# Patient Record
Sex: FEMALE | Race: BLACK OR AFRICAN AMERICAN | NOT HISPANIC OR LATINO | Employment: OTHER | URBAN - METROPOLITAN AREA
[De-identification: names, ages, dates, MRNs, and addresses within clinical notes are randomized per-mention and may not be internally consistent; named-entity substitution may affect disease eponyms.]

---

## 2017-01-04 RX ORDER — ESTRADIOL 0.1 MG/G
1 CREAM VAGINAL DAILY
Qty: 45 G | Refills: 1 | Status: SHIPPED | OUTPATIENT
Start: 2017-01-04 | End: 2018-06-26

## 2017-01-04 NOTE — TELEPHONE ENCOUNTER
----- Message from Kristen Denise sent at 1/3/2017  2:16 PM CST -----  Contact: pt  Needs refill on Estyrace sent to the [facility] in [place].  Please call pt at [phone] to confirm

## 2017-01-18 ENCOUNTER — OFFICE VISIT (OUTPATIENT)
Dept: URGENT CARE | Facility: CLINIC | Age: 67
End: 2017-01-18
Payer: COMMERCIAL

## 2017-01-18 VITALS
DIASTOLIC BLOOD PRESSURE: 66 MMHG | OXYGEN SATURATION: 98 % | HEART RATE: 77 BPM | BODY MASS INDEX: 29.72 KG/M2 | WEIGHT: 178.38 LBS | TEMPERATURE: 98 F | SYSTOLIC BLOOD PRESSURE: 140 MMHG | HEIGHT: 65 IN | RESPIRATION RATE: 20 BRPM

## 2017-01-18 DIAGNOSIS — J32.9 SINOBRONCHITIS: Primary | ICD-10-CM

## 2017-01-18 DIAGNOSIS — J40 SINOBRONCHITIS: Primary | ICD-10-CM

## 2017-01-18 PROCEDURE — 99999 PR PBB SHADOW E&M-EST. PATIENT-LVL III: CPT | Mod: PBBFAC,,, | Performed by: PHYSICIAN ASSISTANT

## 2017-01-18 PROCEDURE — 1159F MED LIST DOCD IN RCRD: CPT | Mod: S$GLB,,, | Performed by: PHYSICIAN ASSISTANT

## 2017-01-18 PROCEDURE — 3078F DIAST BP <80 MM HG: CPT | Mod: S$GLB,,, | Performed by: PHYSICIAN ASSISTANT

## 2017-01-18 PROCEDURE — 99214 OFFICE O/P EST MOD 30 MIN: CPT | Mod: 25,S$GLB,, | Performed by: PHYSICIAN ASSISTANT

## 2017-01-18 PROCEDURE — 3077F SYST BP >= 140 MM HG: CPT | Mod: S$GLB,,, | Performed by: PHYSICIAN ASSISTANT

## 2017-01-18 PROCEDURE — 96372 THER/PROPH/DIAG INJ SC/IM: CPT | Mod: S$GLB,,, | Performed by: PHYSICIAN ASSISTANT

## 2017-01-18 RX ORDER — AZITHROMYCIN 250 MG/1
TABLET, FILM COATED ORAL
Qty: 6 TABLET | Refills: 0 | Status: SHIPPED | OUTPATIENT
Start: 2017-01-18 | End: 2017-04-06

## 2017-01-18 RX ORDER — CODEINE PHOSPHATE AND GUAIFENESIN 10; 100 MG/5ML; MG/5ML
10 SOLUTION ORAL EVERY 6 HOURS PRN
Qty: 120 ML | Refills: 0 | Status: SHIPPED | OUTPATIENT
Start: 2017-01-18 | End: 2017-04-06

## 2017-01-18 RX ORDER — BENZONATATE 100 MG/1
200 CAPSULE ORAL 3 TIMES DAILY PRN
Qty: 60 CAPSULE | Refills: 0 | Status: SHIPPED | OUTPATIENT
Start: 2017-01-18 | End: 2017-01-28

## 2017-01-18 RX ORDER — BETAMETHASONE SODIUM PHOSPHATE AND BETAMETHASONE ACETATE 3; 3 MG/ML; MG/ML
6 INJECTION, SUSPENSION INTRA-ARTICULAR; INTRALESIONAL; INTRAMUSCULAR; SOFT TISSUE
Status: COMPLETED | OUTPATIENT
Start: 2017-01-18 | End: 2017-01-18

## 2017-01-18 RX ADMIN — BETAMETHASONE SODIUM PHOSPHATE AND BETAMETHASONE ACETATE 6 MG: 3; 3 INJECTION, SUSPENSION INTRA-ARTICULAR; INTRALESIONAL; INTRAMUSCULAR; SOFT TISSUE at 04:01

## 2017-01-18 NOTE — PATIENT INSTRUCTIONS
Bronchitis, Antibiotic Treatment (Adult)    Bronchitis is an infection of the air passages (bronchial tubes) in your lungs. It often occurs when you have a cold. This illness is contagious during the first few days and is spread through the air by coughing and sneezing, or by direct contact (touching the sick person and then touching your own eyes, nose, or mouth).  Symptoms of bronchitis include cough with mucus (phlegm) and low-grade fever. Bronchitis usually lasts 7 to 14 days. Mild cases can be treated with simple home remedies. More severe infection is treated with an antibiotic.  Home care  Follow these guidelines when caring for yourself at home:  · If your symptoms are severe, rest at home for the first 2 to 3 days. When you go back to your usual activities, don't let yourself get too tired.  · Do not smoke. Also avoid being exposed to secondhand smoke.  · You may use over-the-counter medicines to control fever or pain, unless another medicine was prescribed. (Note: If you have chronic liver or kidney disease or have ever had a stomach ulcer or gastrointestinal bleeding, talk with your healthcare provider before using these medicines. Also talk to your provider if you are taking medicine to prevent blood clots.) Aspirin should never be given to anyone younger than 18 years of age who is ill with a viral infection or fever. It may cause severe liver or brain damage.  · Your appetite may be poor, so a light diet is fine. Avoid dehydration by drinking 6 to 8 glasses of fluids per day (such as water, soft drinks, sports drinks, juices, tea, or soup). Extra fluids will help loosen secretions in the nose and lungs.  · Over-the-counter cough, cold, and sore-throat medicines will not shorten the length of the illness, but they may be helpful to reduce symptoms. (Note: Do not use decongestants if you have high blood pressure.)  · Finish all antibiotic medicine. Do this even if you are feeling better after only a  few days.  Follow-up care  Follow up with your healthcare provider, or as advised. If you had an X-ray or ECG (electrocardiogram), a specialist will review it. You will be notified of any new findings that may affect your care.  Note: If you are age 65 or older, or if you have a chronic lung disease or condition that affects your immune system, or you smoke, talk to your healthcare provider about having pneumococcal vaccinations and a yearly influenza vaccination (flu shot).  When to seek medical advice  Call your healthcare provider right away if any of these occur:  · Fever of 100.4°F (38°C) or higher  · Coughing up increased amounts of colored sputum  · Weakness, drowsiness, headache, facial pain, ear pain, or a stiff neck  Call 911, or get immediate medical care  Contact emergency services right away if any of these occur.  · Coughing up blood  · Worsening weakness, drowsiness, headache, or stiff neck  · Trouble breathing, wheezing, or pain with breathing  © 4512-0814 [employer] [address] [place] [address]. All rights reserved. This information is not intended as a substitute for professional medical care. Always follow your healthcare professional's instructions.      Complete antibiotic course.  Rest  Drink plenty of clear fluids--at least 64 ounces of water/juice  Normal saline nasal wash (example Ocean spray) to irrigate sinuses and for congestion/runny nose  Ibuprofen for fever, headache and body aches  Tessalon pearls to help with cough during daytime  Cough syrup to help with cough at night  Cool mist humidifier/vaporizer  Warm salt water gargles for throat comfort  Chloraseptic spray or lozenges for throat comfort  Warm tea with honey  Practice good handwashing    See your PCP or go to ER if symptoms worsen or fail to improve with treatment.

## 2017-01-18 NOTE — PROGRESS NOTES
"Subjective:       Patient ID: Smitha Salinas is a 66 y.o. female.    Chief Complaint: Cough and Sinus Problem    Cough   This is a new problem. The current episode started in the past 7 days (5 days ago). The problem has been gradually worsening. The problem occurs constantly. The cough is productive of sputum (clear). Associated symptoms include headaches (frontal sinus headache), nasal congestion, postnasal drip and rhinorrhea. Pertinent negatives include no chest pain, chills, ear congestion, ear pain, fever, sore throat, shortness of breath or wheezing. The symptoms are aggravated by lying down. Treatments tried: Mucinex, Multisymptom cold/flu, Robitussin. The treatment provided mild relief. Her past medical history is significant for bronchitis and pneumonia. There is no history of asthma. has RA on Actemra     Review of Systems   Constitutional: Positive for fatigue. Negative for chills, fever and unexpected weight change.   HENT: Positive for congestion, postnasal drip, rhinorrhea and sinus pressure. Negative for ear discharge, ear pain, sneezing, sore throat, trouble swallowing and voice change.    Eyes: Negative for pain and discharge.   Respiratory: Positive for cough. Negative for shortness of breath and wheezing.    Cardiovascular: Negative for chest pain and leg swelling.   Gastrointestinal: Negative for abdominal pain, nausea and vomiting.   Neurological: Positive for headaches (frontal sinus headache).       Objective:      Visit Vitals    BP (!) 140/66 (BP Location: Right arm, Patient Position: Sitting, BP Method: Manual)    Pulse 77    Temp 97.5 °F (36.4 °C) (Tympanic)    Resp 20    Ht 5' 5" (1.651 m)    Wt 80.9 kg (178 lb 5.6 oz)    SpO2 98%    BMI 29.68 kg/m2     Physical Exam   Constitutional: She is oriented to person, place, and time. She appears well-developed and well-nourished. No distress.   HENT:   Head: Normocephalic and atraumatic.   Right Ear: External ear normal.   Left Ear: " External ear normal.   Nose: Mucosal edema and rhinorrhea present. Right sinus exhibits frontal sinus tenderness. Left sinus exhibits frontal sinus tenderness.   Mouth/Throat: Oropharynx is clear and moist. No oropharyngeal exudate.   Eyes: Conjunctivae and EOM are normal. Pupils are equal, round, and reactive to light. Right eye exhibits no discharge. Left eye exhibits no discharge. No scleral icterus.   Neck: Normal range of motion. Neck supple.   Cardiovascular: Normal rate, regular rhythm, normal heart sounds and intact distal pulses.  Exam reveals no gallop and no friction rub.    No murmur heard.  Pulmonary/Chest: Effort normal and breath sounds normal. No stridor. No respiratory distress. She has no wheezes. She has no rales. She exhibits no tenderness.   Lymphadenopathy:     She has no cervical adenopathy.   Neurological: She is alert and oriented to person, place, and time. Coordination normal.   Skin: Skin is warm and dry. No rash noted. She is not diaphoretic. No erythema. No pallor.   Nursing note and vitals reviewed.      Assessment:       1. Sinobronchitis        Plan:       Sinobronchitis  -     guaifenesin-codeine 100-10 mg/5 ml (TUSSI-ORGANIDIN NR)  mg/5 mL syrup; Take 10 mLs by mouth every 6 (six) hours as needed for Cough.  Dispense: 120 mL; Refill: 0  -     betamethasone acetate-betamethasone sodium phosphate injection 6 mg; Inject 1 mL (6 mg total) into the muscle one time.  -     azithromycin (ZITHROMAX Z-ATIF) 250 MG tablet; Take pack as directed  Dispense: 6 tablet; Refill: 0  -     benzonatate (TESSALON) 100 MG capsule; Take 2 capsules (200 mg total) by mouth 3 (three) times daily as needed for Cough.  Dispense: 60 capsule; Refill: 0    Risk and benefits of steroid therapy were reviewed in detail and include, but not limited to, elevated blood sugars, necrosis of tissue or infection of the injection site, insomnia, sweating, hyperactivity, tremors, suppression of one's own cortisone  production, delayed wound healing and GI bleeding. The patient would like to proceed with steroid treatment (injection/medication) knowing and verbally accepting the risks.      Complete antibiotic course.  Rest  Drink plenty of clear fluids--at least 64 ounces of water/juice  Normal saline nasal wash (example Ocean spray) to irrigate sinuses and for congestion/runny nose  Ibuprofen for fever, headache and body aches  Tessalon pearls to help with cough during daytime  Cough syrup to help with cough at night  Cool mist humidifier/vaporizer  Warm salt water gargles for throat comfort  Chloraseptic spray or lozenges for throat comfort  Warm tea with honey  Practice good handwashing    See your PCP or go to ER if symptoms worsen or fail to improve with treatment.       Heather Trant PA-C [facility]

## 2017-02-07 NOTE — TELEPHONE ENCOUNTER
----- Message from Edith Gant sent at 2/7/2017  1:00 PM CST -----  Contact: Pt  Pt is requesting to have a refill on Myrbetriq. Pt states that she's completely out. Pt uses [facility] at [facility]. Pt can be reached at [phone].

## 2017-04-04 ENCOUNTER — TELEPHONE (OUTPATIENT)
Dept: INTERNAL MEDICINE | Facility: CLINIC | Age: 67
End: 2017-04-04

## 2017-04-04 NOTE — TELEPHONE ENCOUNTER
----- Message from Mark Multani sent at 4/4/2017  1:29 PM CDT -----  Contact: Smitha Salinas  The pt is requesting a refill for potassium to be filled at [facility] in [facility].  The can be reached at [phone] or [phone]

## 2017-04-06 ENCOUNTER — OFFICE VISIT (OUTPATIENT)
Dept: INTERNAL MEDICINE | Facility: CLINIC | Age: 67
End: 2017-04-06
Payer: COMMERCIAL

## 2017-04-06 VITALS
HEIGHT: 65 IN | SYSTOLIC BLOOD PRESSURE: 138 MMHG | OXYGEN SATURATION: 97 % | HEART RATE: 70 BPM | DIASTOLIC BLOOD PRESSURE: 62 MMHG | WEIGHT: 183.44 LBS | TEMPERATURE: 98 F | BODY MASS INDEX: 30.56 KG/M2

## 2017-04-06 DIAGNOSIS — I10 ESSENTIAL HYPERTENSION: Primary | Chronic | ICD-10-CM

## 2017-04-06 PROCEDURE — 3075F SYST BP GE 130 - 139MM HG: CPT | Mod: S$GLB,,, | Performed by: FAMILY MEDICINE

## 2017-04-06 PROCEDURE — 99999 PR PBB SHADOW E&M-EST. PATIENT-LVL III: CPT | Mod: PBBFAC,,, | Performed by: FAMILY MEDICINE

## 2017-04-06 PROCEDURE — 3078F DIAST BP <80 MM HG: CPT | Mod: S$GLB,,, | Performed by: FAMILY MEDICINE

## 2017-04-06 PROCEDURE — 99213 OFFICE O/P EST LOW 20 MIN: CPT | Mod: S$GLB,,, | Performed by: FAMILY MEDICINE

## 2017-04-06 PROCEDURE — 1160F RVW MEDS BY RX/DR IN RCRD: CPT | Mod: S$GLB,,, | Performed by: FAMILY MEDICINE

## 2017-04-06 RX ORDER — ACYCLOVIR 400 MG/1
TABLET ORAL DAILY
COMMUNITY
End: 2019-03-05 | Stop reason: SDUPTHER

## 2017-04-06 RX ORDER — PITAVASTATIN CALCIUM 2.09 MG/1
2 TABLET, FILM COATED ORAL DAILY
Qty: 30 TABLET | Refills: 5 | Status: SHIPPED | OUTPATIENT
Start: 2017-04-06 | End: 2018-01-04

## 2017-04-06 RX ORDER — CLONIDINE HYDROCHLORIDE 0.2 MG/1
TABLET ORAL
Qty: 90 TABLET | Refills: 5 | Status: SHIPPED | OUTPATIENT
Start: 2017-04-06 | End: 2018-01-18 | Stop reason: SDUPTHER

## 2017-04-06 RX ORDER — AMLODIPINE BESYLATE 5 MG/1
5 TABLET ORAL DAILY
Qty: 30 TABLET | Refills: 5 | Status: SHIPPED | OUTPATIENT
Start: 2017-04-06 | End: 2018-02-02 | Stop reason: SDUPTHER

## 2017-04-06 RX ORDER — CYCLOBENZAPRINE HCL 10 MG
10 TABLET ORAL
Qty: 30 TABLET | Refills: 5 | Status: SHIPPED | OUTPATIENT
Start: 2017-04-06 | End: 2019-11-27 | Stop reason: SDUPTHER

## 2017-04-06 RX ORDER — POTASSIUM CHLORIDE 20 MEQ/1
20 TABLET, EXTENDED RELEASE ORAL DAILY
Qty: 60 TABLET | Refills: 5 | Status: SHIPPED | OUTPATIENT
Start: 2017-04-06 | End: 2018-03-12 | Stop reason: SDUPTHER

## 2017-04-06 NOTE — MR AVS SNAPSHOT
[facility]  [address]  [place] [address]  Phone: [phone]  Fax: [phone]                  Smitha Salinsa   2017 3:20 PM   Office Visit    Description:  Female : 1950   Provider:  Jayesh Jaramillo MD   Department:  [facility] - [facility] Medicine           Diagnoses this Visit        Comments    Essential hypertension    -  Primary            To Do List           Future Appointments        Provider Department Dept Phone    10/5/2017 2:40 PM Jayesh Jaramillo MD [facility] [phone]      Goals (5 Years of Data)     None      Follow-Up and Disposition     Return in about 6 months (around 10/6/2017).       These Medications        Disp Refills Start End    cloNIDine (CATAPRES) 0.2 MG tablet 90 tablet 2017     1/2 tab in the am, 1/2 tab at noon, 2 tabs nightly.    Pharmacy: [address] Ph #: [phone]       cyclobenzaprine (FLEXERIL) 10 MG tablet 30 tablet 2017     Take 1 tablet (10 mg total) by mouth as needed for Muscle spasms. - Oral    Pharmacy: [address] Ph #: [phone]       potassium chloride SA (K-DUR,KLOR-CON) 20 MEQ tablet 60 tablet 5 2017     Take 1 tablet (20 mEq total) by mouth once daily. 1 tablet,ER particles/crystals Oral Twice a day - Oral    Pharmacy: [address] Ph #: [phone]       amlodipine (NORVASC) 5 MG tablet 30 tablet 5 2017     Take 1 tablet (5 mg total) by mouth once daily. - Oral    Pharmacy: [address] Ph #: [phone]       pitavastatin (LIVALO) 2 mg Tab tablet 30 tablet 5 2017     Take 1 tablet (2 mg total) by mouth once daily. - Oral    Pharmacy: [address] Ph #: [phone]         [facility]     [facility] -   Assistance  Unless otherwise directed by your provider, please contact [facility], our nurse care line that is available for 24/7 assistance.     Registered nurses in the [facility] provide: appointment scheduling, clinical advisement, health education, and other advisory services.  Call: [phone] (toll free)               Medications           Message regarding Medications     Verify the changes and/or additions to your medication regime listed below are the same as discussed with your clinician today.  If any of these changes or additions are incorrect, please notify your healthcare provider.        CHANGE how you are taking these medications     Start Taking Instead of    cyclobenzaprine (FLEXERIL) 10 MG tablet cyclobenzaprine (FLEXERIL) 10 MG tablet    Dosage:  Take 1 tablet (10 mg total) by mouth as needed for Muscle spasms. Dosage:  Take 10 mg by mouth every evening. 1 Tablet Oral Twice a day    Reason for Change:  Reorder     potassium chloride SA (K-DUR,KLOR-CON) 20 MEQ tablet potassium chloride SA (K-DUR,KLOR-CON) 20 MEQ tablet    Dosage:  Take 1 tablet (20 mEq total) by mouth once daily. 1 tablet,ER particles/crystals Oral Twice a day Dosage:  Take 20 mEq by mouth once daily. 1 tablet,ER particles/crystals Oral Twice a day    Reason for Change:  Reorder     amlodipine (NORVASC) 5 MG tablet amlodipine (NORVASC) 5 MG tablet    Dosage:  Take 1 tablet (5 mg total) by mouth once daily. Dosage:  Take 5 mg by mouth once daily.     Reason for Change:  Reorder     pitavastatin (LIVALO) 2 mg Tab tablet pitavastatin (LIVALO) 2 mg Tab tablet    Dosage:  Take 1 tablet (2 mg total) by mouth once daily. Dosage:  Take 2 mg by mouth once daily.    Reason for Change:  Reorder       STOP taking these medications     azithromycin (ZITHROMAX Z-ATIF) 250 MG tablet Take pack as directed    cyanocobalamin, vitamin B-12, 1,000 mcg TbSR Take by mouth. 1 Tablet Extended Release Oral Every day    duloxetine  (CYMBALTA) 30 MG capsule Take 30 mg by mouth.    guaifenesin-codeine 100-10 mg/5 ml (TUSSI-ORGANIDIN NR)  mg/5 mL syrup Take 10 mLs by mouth every 6 (six) hours as needed for Cough.    hydrOXYzine (ATARAX) 50 MG tablet Take 1 tablet (50 mg total) by mouth 3 (three) times daily as needed for Itching.           Verify that the below list of medications is an accurate representation of the medications you are currently taking.  If none reported, the list may be blank. If incorrect, please contact your healthcare provider. Carry this list with you in case of emergency.           Current Medications     acyclovir (ZOVIRAX) 400 MG tablet Take by mouth once daily.    amlodipine (NORVASC) 5 MG tablet Take 1 tablet (5 mg total) by mouth once daily.    ascorbic acid (VITAMIN C) 500 MG tablet Take by mouth. 1 Tablet Oral Every day    clobetasol 0.05% (TEMOVATE) 0.05 % Oint Apply topically daily prn vulvar irritation    cloNIDine (CATAPRES) 0.2 MG tablet 1/2 tab in the am, 1/2 tab at noon, 2 tabs nightly.    cyclobenzaprine (FLEXERIL) 10 MG tablet Take 1 tablet (10 mg total) by mouth as needed for Muscle spasms.    diazepam (VALIUM) 5 MG tablet TAKE ONE TABLET BY MOUTH BY MOUTH AT BEDTIME FOR ANXIETY    estradiol (ESTRACE) 0.01 % (0.1 mg/gram) vaginal cream Place 1 g vaginally once daily.    FEXOFENADINE HCL (ALLEGRA ORAL) Take by mouth.    LORATADINE (CLARITIN ORAL) Take by mouth.    mirabegron (MYRBETRIQ) 50 mg Tb24 Take 1 tablet (50 mg total) by mouth once daily.    modafinil (PROVIGIL) 100 MG Tab Take 200 mg by mouth once daily.    oxycodone-acetaminophen (PERCOCET)  mg per tablet Take 1 tablet by mouth every 4 (four) hours as needed for Pain.    pitavastatin (LIVALO) 2 mg Tab tablet Take 1 tablet (2 mg total) by mouth once daily.    potassium chloride SA (K-DUR,KLOR-CON) 20 MEQ tablet Take 1 tablet (20 mEq total) by mouth once daily. 1 tablet,ER particles/crystals Oral Twice a day    promethazine (PHENERGAN) 25  "MG tablet Take 1 tablet (25 mg total) by mouth every 6 (six) hours as needed for Nausea.    tocilizumab (ACTEMRA) 162 mg/0.9 mL Syrg Inject 162 mg into the skin.    tramadol (ULTRAM) 50 mg tablet Take 100 mg by mouth.           Clinical Reference Information           Your Vitals Were     BP Pulse Temp Height    138/62 (BP Location: Right arm, Patient Position: Sitting, BP Method: Manual) 70 97.7 °F (36.5 °C) (Tympanic) 5' 5" (1.651 m)    Weight SpO2 BMI    83.2 kg (183 lb 6.8 oz) 97% 30.52 kg/m2      Blood Pressure          Most Recent Value    BP  138/62      Allergies as of 4/6/2017     Bextra [Valdecoxib]    Gabapentin    Hydrocodone-acetaminophen      Immunizations Administered on Date of Encounter - 4/6/2017     None      Orders Placed During Today's Visit     Future Labs/Procedures Expected by Expires    Basic metabolic panel  4/6/2017 4/6/2018    Lipid panel  4/6/2017 4/6/2018      Language Assistance Services     ATTENTION: Language assistance services are available, free of charge. Please call [phone].      ATENCIÓN: Si habla español, tiene a rao disposición servicios gratuitos de asistencia lingüística. Llame al [phone].     CATINA Ý: N?u b?n nói Ti?ng Vi?t, có các d?ch v? h? tr? ngôn ng? mi?n phí dành cho b?n. G?i s? [phone].         [facility] complies with applicable Federal civil rights laws and does not discriminate on the basis of race, color, national origin, age, disability, or sex.        "

## 2017-04-17 NOTE — PROGRESS NOTES
Subjective:       Patient ID: Smitha helms. female.    Chief Complaint: Multiple issues see below    HPI Hypertension: blood pressures at home normal. Tolerating medicine.   RA utd rheum   Past Medical History   Diagnosis Date    Adult ADHD     Chronic rheumatic arthritis      dr tai    Colon polyps     Ex-smoker     Herpes zoster infection     Hyperlipidemia     Hypertension     Immunosuppressed status     Lichen sclerosus et atrophicus     SUKHDEEP (obstructive sleep apnea)     Osteopenia      5/16 wai 5/18(start fmax if on pred 3months or more)     Past Surgical History   Procedure Laterality Date    Bladder surgery  8/2012     Dr Claudia Lamar    Tonsillectomy  1958    Skin graft  1965     laceration to right  fingers  from thigh     Cryotherapy  1980     Family History   Problem Relation Age of Onset    Heart disease Mother     Diabetes Mother     Stroke Father     Kidney disease Father     Cancer Sister 39     breast    Cancer Other 68     breast    Stroke Maternal Grandmother     Stroke Paternal Grandmother     Stroke Paternal Grandfather      Social History     Social History    Marital status:      Spouse name: N/A    Number of children: 1    Years of education: N/A     Occupational History     [employer]      Social History Main Topics    Smoking status: Former Smoker     Quit date: 7/22/2013    Smokeless tobacco: Never Used      Comment: smokes for a few weeks per year - when under pressure. has been abstinent times years at a time. a pack lasts about a week    Alcohol use 0.0 oz/week     0 Standard drinks or equivalent per week    Drug use: No    Sexual activity: Not Currently     Birth control/ protection: Post-menopausal     Other Topics Concern    None     Social History Narrative    Lives alone. Caffeine intake rare -1-2 per week of coffee. Does not have a Living Will or Advanced Directive       Review of Systems  Cardiovascular: no chest  pain  Chest: no shortness of breath  Abd: no abd pain  Remainder review of systems negative    Objective:      Physical Exam   Constitutional: She is oriented to person, place, and time. She appears well-developed and well-nourished. No distress.   HENT:   Head: Atraumatic.   Right Ear: External ear normal.   Left Ear: External ear normal.   Nose: Nose normal.   Mouth/Throat: Oropharynx is clear and moist. No oropharyngeal exudate.  Eyes: Conjunctivae and EOM are normal. Pupils are equal, round, and reactive to light. No scleral icterus.   Neck: Normal range of motion. Neck supple. No thyromegaly present.   Cardiovascular: Normal rate, regular rhythm and normal heart sounds.    No murmur heard.  Pulmonary/Chest: Effort normal and breath sounds normal. No respiratory distress. She has no wheezes. She has no rales.      Musculoskeletal: Normal range of motion. She exhibits no edema or tenderness.     Lymphadenopathy:     She has no cervical adenopathy.   Neurological: She is alert and oriented to person, place, and time. No cranial nerve deficit. She exhibits normal muscle tone. Coordination normal.   Skin: Skin is warm. No rash noted. No erythema. No pallor.   Psychiatric: She has a normal mood and affect. Her behavior is normal. Judgment and thought content normal.   Nursing note and vitals reviewed.      Assessment:       1. Essential hypertension    2. Rheumatoid arthritis of hand, unspecified laterality, unspecified rheumatoid factor presence    3. Immunosuppressed status    4. Hyperlipidemia, unspecified hyperlipidemia type    5.    6.        Plan:     [facility]  *Essential hypertension  -     Lipid panel; Future; Expected date: 4/6/17  -     Basic metabolic panel; Future; Expected date: 4/6/17    Other orders  -     cloNIDine (CATAPRES) 0.2 MG tablet; 1/2 tab in the am, 1/2 tab at noon, 2 tabs nightly.  Dispense: 90 tablet; Refill: 5  -     cyclobenzaprine (FLEXERIL) 10 MG tablet; Take 1 tablet (10 mg total) by  mouth as needed for Muscle spasms.  Dispense: 30 tablet; Refill: 5  -     potassium chloride SA (K-DUR,KLOR-CON) 20 MEQ tablet; Take 1 tablet (20 mEq total) by mouth once daily. 1 tablet,ER particles/crystals Oral Twice a day  Dispense: 60 tablet; Refill: 5  -     amlodipine (NORVASC) 5 MG tablet; Take 1 tablet (5 mg total) by mouth once daily.  Dispense: 30 tablet; Refill: 5  -     pitavastatin (LIVALO) 2 mg Tab tablet; Take 1 tablet (2 mg total) by mouth once daily.  Dispense: 30 tablet; Refill: 5

## 2017-05-02 ENCOUNTER — OFFICE VISIT (OUTPATIENT)
Dept: PAIN MEDICINE | Facility: CLINIC | Age: 67
End: 2017-05-02
Payer: COMMERCIAL

## 2017-05-02 VITALS
HEART RATE: 55 BPM | BODY MASS INDEX: 30.49 KG/M2 | HEIGHT: 65 IN | SYSTOLIC BLOOD PRESSURE: 140 MMHG | WEIGHT: 183 LBS | DIASTOLIC BLOOD PRESSURE: 76 MMHG

## 2017-05-02 DIAGNOSIS — M25.529 ELBOW PAIN, UNSPECIFIED LATERALITY: ICD-10-CM

## 2017-05-02 DIAGNOSIS — M47.817 SPONDYLOSIS OF LUMBOSACRAL REGION WITHOUT MYELOPATHY OR RADICULOPATHY: ICD-10-CM

## 2017-05-02 DIAGNOSIS — G89.4 CHRONIC PAIN DISORDER: Primary | ICD-10-CM

## 2017-05-02 DIAGNOSIS — M06.9 RHEUMATOID ARTHRITIS INVOLVING MULTIPLE JOINTS: ICD-10-CM

## 2017-05-02 PROCEDURE — 99999 PR PBB SHADOW E&M-EST. PATIENT-LVL IV: CPT | Mod: PBBFAC,,, | Performed by: PHYSICIAN ASSISTANT

## 2017-05-02 PROCEDURE — 3077F SYST BP >= 140 MM HG: CPT | Mod: S$GLB,,, | Performed by: PHYSICIAN ASSISTANT

## 2017-05-02 PROCEDURE — 99214 OFFICE O/P EST MOD 30 MIN: CPT | Mod: S$GLB,,, | Performed by: PHYSICIAN ASSISTANT

## 2017-05-02 PROCEDURE — 3078F DIAST BP <80 MM HG: CPT | Mod: S$GLB,,, | Performed by: PHYSICIAN ASSISTANT

## 2017-05-02 PROCEDURE — 1160F RVW MEDS BY RX/DR IN RCRD: CPT | Mod: S$GLB,,, | Performed by: PHYSICIAN ASSISTANT

## 2017-05-02 RX ORDER — OXYCODONE AND ACETAMINOPHEN 5; 325 MG/1; MG/1
1 TABLET ORAL EVERY 8 HOURS PRN
Qty: 90 TABLET | Refills: 0 | Status: SHIPPED | OUTPATIENT
Start: 2017-05-02 | End: 2017-06-01

## 2017-05-02 NOTE — PROGRESS NOTES
"Chief Pain Complaint:  Low Back Pain, bilateral shoulder pain, bilateral buttock pain, bilateral foot pain    History of Present Illness:  This patient is a 66 y.o. female who presents today complaining of the above noted pain/s. The patient describes this pain as follows.    - duration of pain: pain for years   - timing (constant, intermittent): mostly constant  - character (sharp, dull, aching, burning): stiffness, aching, sharp  - radiating, dermatomal:nNonradiating, nondermatomal  - antecedent trauma, prior spinal surgery: No antecedent trauma or prior spinal surgery  - pertinent negatives: No fever, No chills, No weight loss, No urinary incontinence, No bowel dysfunction, No extremity weakness, No saddle anesthesia  - pertinent positives: none  - medications, other therapies tried (physical therapy, injections):    >> Medications: Tramadol, Percocet, Mobic, Norco (causes nausea)    >> Has previously undergone physical therapy which was not effective    >> Injections: previously underwent ESIs which were marginally effective      IMAGING (reviewed on 5/2/2017):    10/8/12 Left Foot X-ray   Findings:  Dorsal and plantar calcaneal spurs.  Mild degenerative change at the ankle, midfoot, first MTP and throughout the IP joints.  No acute or healing fracture.  Mild pes planus.          Review of Systems:  CONSTITUTIONAL: No fever, chills, weight loss  SKIN: No rash or itching  CARDIOVASCULAR:  No chest pain, palpitations  RESPIRATORY: No shortness of breath  GASTROINTESTINAL: No diarrhea, No constipation  GENITOURINARY: No urinary incontinence  MUSCULOSKELETAL:  - patient reports pain as above  NEUROLOGICAL:   - Pain as above  - Strength in lower extremities is normal  - Sensation in lower extremities is normal  - No bowel or bladder incontinence  PSYCHIATRIC: No change in mood noticed       Physical Exam:    Vitals:  BP (!) 140/76  Pulse (!) 55  Ht 5' 5" (1.651 m)  Wt 83 kg (183 lb)  BMI 30.45 kg/m2   (reviewed " on 5/2/2017)     General: alert and oriented   Gait: normal gait  Skin: No rashes, No discoloration, No obvious lesions  HEENT: EOMI  Respiratory: respirations nonlabored     Musculoskeletal:  - Any pain on flexion, extension, rotation:    >> Pain on extension and rotation  - Straight Leg Raise:    >> negative on RIGHT    >> negative on LEFT  - Any tenderness to palpation across lumbar paraspinal muscles, Sacroiliac joint/s, greater trochanteric bursae:     >> along lumbar paraspinals and SI joints      Neuro:  - Lower extremities:    >> 5/5 strength bilaterally, throughout, symmetric  - Reflexes:    >> Patellar 2+ on the (R) & 2+ on the (L), Achilles 2+ on the (R) & 2+ on the (L)  - Sensory:    >> sensation to light touch intact bilaterally, symmetric      Assessment:  - Lumbar Spondylosis  - Rheumatoid Arthritis  - Chronic Pain      Plan:  This patient presents today for follow-up after 8 month absence. She has multiple pain complaints and hx of RA.  She takes tramadol from her rheumatologist (Dr. Clinton). She takes Percocet when the pain is more severe.  - Will refill Percocet 5mg #90 PRN. She was last given this in September 2016 at her last appt.   - [place]  reviewed. She has filled medications sporadically. She filled [place] from the dentist in November, tramadol #180 from Dr. Clinton on 1-6-17, and [place] 10mg #120 from Dr. Neo Henry (rheumatology) on 1-18-17. She was informed that in the future, she will only be allowed to fill other pain medication from us, which she understands. She reports that Norco makes her nauseated, and she cannot tolerate it.  RTC for med refill when needed. I discussed the risks, benefits, and alternatives to potential treatment options. All questions and concerns were fully addressed today in clinic. Dr. Hutchison was consulted regarding the patient plan and agrees.            >>Pain Disability Questionnaire:   9-13-16 :: 109    >> Pain Disability Index:  5/2/2017 :: 40

## 2017-06-30 ENCOUNTER — OFFICE VISIT (OUTPATIENT)
Dept: URGENT CARE | Facility: CLINIC | Age: 67
End: 2017-06-30
Payer: COMMERCIAL

## 2017-06-30 VITALS
WEIGHT: 176.94 LBS | DIASTOLIC BLOOD PRESSURE: 64 MMHG | SYSTOLIC BLOOD PRESSURE: 112 MMHG | OXYGEN SATURATION: 98 % | BODY MASS INDEX: 29.48 KG/M2 | HEART RATE: 102 BPM | TEMPERATURE: 99 F | HEIGHT: 65 IN

## 2017-06-30 DIAGNOSIS — J01.91 ACUTE RECURRENT SINUSITIS, UNSPECIFIED LOCATION: ICD-10-CM

## 2017-06-30 DIAGNOSIS — R11.0 NAUSEA: Primary | ICD-10-CM

## 2017-06-30 PROCEDURE — 99999 PR PBB SHADOW E&M-EST. PATIENT-LVL IV: CPT | Mod: PBBFAC,,, | Performed by: PHYSICIAN ASSISTANT

## 2017-06-30 PROCEDURE — 99213 OFFICE O/P EST LOW 20 MIN: CPT | Mod: S$GLB,,, | Performed by: PHYSICIAN ASSISTANT

## 2017-06-30 RX ORDER — ASPIRIN 81 MG/1
81 TABLET ORAL DAILY
Status: ON HOLD | COMMUNITY
End: 2020-11-23 | Stop reason: HOSPADM

## 2017-06-30 RX ORDER — FLUTICASONE PROPIONATE 50 MCG
2 SPRAY, SUSPENSION (ML) NASAL DAILY
Qty: 1 BOTTLE | Refills: 0 | Status: SHIPPED | OUTPATIENT
Start: 2017-06-30 | End: 2017-08-28

## 2017-06-30 RX ORDER — MONTELUKAST SODIUM 10 MG/1
10 TABLET ORAL NIGHTLY
Qty: 30 TABLET | Refills: 0 | Status: SHIPPED | OUTPATIENT
Start: 2017-06-30 | End: 2017-07-30

## 2017-06-30 RX ORDER — ONDANSETRON 4 MG/1
4 TABLET, ORALLY DISINTEGRATING ORAL EVERY 8 HOURS PRN
Qty: 10 TABLET | Refills: 0 | Status: SHIPPED | OUTPATIENT
Start: 2017-06-30 | End: 2017-08-28

## 2017-06-30 NOTE — LETTER
June 30, 2017      [facility]  [address] [facility] [address]  [place] [address]  Phone: [phone]  Fax: [phone]       Patient: Smitha Salinas   YOB: 1950  Date of Visit: 06/30/2017    To Whom It May Concern:    Smitha Mccracken was at [facility] on 06/30/2017. She may return to work/school on 7/3/17 with no restrictions. If you have any questions or concerns, or if I can be of further assistance, please do not hesitate to contact me.    Sincerely,      Laura Camara PA-C

## 2017-06-30 NOTE — PROGRESS NOTES
"Subjective:      Patient ID: Smitha aSlinas is a 66 y.o. female.    Chief Complaint: Nausea; Emesis; and Fatigue    Ms. Salinas is a 65yo female that presents to Urgent Care for nausea/vomiting and recurrent sinus issues.  Patient states that she has had on and off sinus problems for years.  Occassionally (every few years), the drainage will get bad enough to make her nauseated and vomit.  She states this has been the case over the last few days.  She states she was projectile vomiting, however, the last episode was yesterday.  She states she always feels like there is "phlegm" in her throat.  She does feel like her symptoms are improving a bit since yesterday, however, they never fully resolve.  She has seen an ENT in the past but it has been years.          Nausea   This is a new problem. The current episode started in the past 7 days. The problem has been gradually improving. Associated symptoms include congestion, diaphoresis ("clammy"), a fever, nausea (due to phlegm ) and vomiting (last episode yesterday). Pertinent negatives include no abdominal pain, chest pain, chills, coughing, headaches or sore throat.   Vomiting     Associated symptoms include a fever and URI. Pertinent negatives include no abdominal pain, chest pain, chills, coughing, dizziness or headaches.     Associated symptoms include congestion, diaphoresis ("clammy"), a fever, nausea (due to phlegm ) and vomiting (last episode yesterday). Pertinent negatives include no abdominal pain, chest pain, chills, coughing, headaches or sore throat.   URI    This is a new problem. The problem has been waxing and waning. There has been no fever. Associated symptoms include congestion, nausea (due to phlegm ), rhinorrhea and vomiting (last episode yesterday). Pertinent negatives include no abdominal pain, chest pain, coughing, headaches, sneezing, sore throat or wheezing. Treatments tried: Claritin or allegra, nasal spray prn, mucus relief syrup. The " "treatment provided mild relief.     Review of Systems   Constitutional: Positive for diaphoresis ("clammy") and fever. Negative for chills.   HENT: Positive for congestion, postnasal drip, rhinorrhea and sinus pressure. Negative for sneezing and sore throat.    Eyes:        Watery eyes   Respiratory: Negative for cough, shortness of breath and wheezing.    Cardiovascular: Negative for chest pain and palpitations.   Gastrointestinal: Positive for constipation, nausea (due to phlegm ) and vomiting (last episode yesterday). Negative for abdominal pain.   Musculoskeletal:        Leg cramps, chronic   Neurological: Positive for light-headedness (mild, lately bc not eating). Negative for dizziness and headaches.       Objective:   /64 (BP Location: Right arm, Patient Position: Sitting, BP Method: Manual)   Pulse 102   Temp 99 °F (37.2 °C) (Tympanic)   Ht 5' 5" (1.651 m)   Wt 80.3 kg (176 lb 14.7 oz)   SpO2 98%   BMI 29.44 kg/m²   Physical Exam   Constitutional: Vital signs are normal. She appears well-developed and well-nourished.   HENT:   Head: Normocephalic and atraumatic.   Right Ear: Tympanic membrane and ear canal normal. No tenderness. Tympanic membrane is not erythematous.   Left Ear: Tympanic membrane and ear canal normal. No tenderness. Tympanic membrane is not erythematous.   Nose: Mucosal edema present. Right sinus exhibits no maxillary sinus tenderness and no frontal sinus tenderness. Left sinus exhibits no maxillary sinus tenderness and no frontal sinus tenderness.   Mouth/Throat: Uvula is midline. Posterior oropharyngeal erythema (mild) present.   (+) for signs of post-nasal drip   Cardiovascular: Normal rate, regular rhythm and normal heart sounds.    Pulmonary/Chest: Effort normal and breath sounds normal. No respiratory distress. She has no wheezes. She has no rhonchi. She has no rales.   Skin: Skin is warm, dry and intact. She is not diaphoretic.   Psychiatric: She has a normal mood and " affect. Her speech is normal and behavior is normal. Thought content normal.     Assessment:      1. Nausea    2. Acute recurrent sinusitis, unspecified location       Plan:   Nausea  -     ondansetron (ZOFRAN-ODT) 4 MG TbDL; Take 1 tablet (4 mg total) by mouth every 8 (eight) hours as needed (nausea).  Dispense: 10 tablet; Refill: 0    Acute recurrent sinusitis, unspecified location  -     Ambulatory referral to ENT  -     fluticasone (FLONASE) 50 mcg/actuation nasal spray; 2 sprays by Each Nare route once daily.  Dispense: 1 Bottle; Refill: 0  -     montelukast (SINGULAIR) 10 mg tablet; Take 1 tablet (10 mg total) by mouth every evening.  Dispense: 30 tablet; Refill: 0    Gave patient handout on sinusitis.  Printed and reviewed AVS.   Discussed mild diet while current symptoms persist.   Scheduled follow up with ENT to be evaluated for recurrent sinus symptoms/infection.   If symptoms worsen or do not improve before ENT appt, follow up with PCP.    Patient expressed understanding and agrees with treatment plan.

## 2017-06-30 NOTE — PATIENT INSTRUCTIONS

## 2017-07-03 ENCOUNTER — TELEPHONE (OUTPATIENT)
Dept: URGENT CARE | Facility: CLINIC | Age: 67
End: 2017-07-03

## 2017-07-03 NOTE — TELEPHONE ENCOUNTER
Spoke with pt stated that she was calling to get excuse back dated for wed and thurs. Pt stated that she was in the clinic on Friday. Informed pt that i'm sorry but ms burnett is not able to back date work excuse due to she was not seen on that date per ms burnett. Pt stated understanding.  thanks

## 2017-07-03 NOTE — TELEPHONE ENCOUNTER
----- Message from Laura Camara PA-C sent at 7/3/2017  3:01 PM CDT -----  Contact: Pt  I believe you tried to return her call regarding her initial call asking for a changed work excuse.  The dates on the excuse can not be changed, if she is still symptomatic and needs another excuse she may need to follow up w PCP.  Thanks!  ----- Message -----  From: Tamara Nuñez  Sent: 7/3/2017   2:54 PM  To: Laura Camara PA-C    Pt called and stated she was returning a call. She can be reached at [phone] (bydu) [phone].     Thanks,  TF

## 2017-07-03 NOTE — TELEPHONE ENCOUNTER
----- Message from Rebecca Colon sent at 7/3/2017  9:57 AM CDT -----  Would like to speak to nurse about changing dates on her work excuse. Please call back at [phone]. thanks

## 2017-08-15 ENCOUNTER — TELEPHONE (OUTPATIENT)
Dept: INTERNAL MEDICINE | Facility: CLINIC | Age: 67
End: 2017-08-15

## 2017-08-15 DIAGNOSIS — R09.81 SINUS CONGESTION: Primary | ICD-10-CM

## 2017-08-15 NOTE — TELEPHONE ENCOUNTER
----- Message from Rebecca Colon sent at 8/15/2017  2:12 PM CDT -----  Would like to speak to nurse about referral. Please call back at [phone]. thanks

## 2017-08-16 NOTE — TELEPHONE ENCOUNTER
----- Message from Tabatha Huggins sent at 8/16/2017 12:55 PM CDT -----  Contact: Patient  Patient called and stated that she was supposed to get a call yesterday for a recommendation of an ENT and no one called her back, so she just went onto [facility] site and found one and scheduled it.    She stated she also needs a Lipid Panel test; the orders need to go to [facility].      She can be contacted at [phone].    Thanks,  Tabatha

## 2017-08-28 ENCOUNTER — OFFICE VISIT (OUTPATIENT)
Dept: OTOLARYNGOLOGY | Facility: CLINIC | Age: 67
End: 2017-08-28
Payer: COMMERCIAL

## 2017-08-28 ENCOUNTER — TELEPHONE (OUTPATIENT)
Dept: OTOLARYNGOLOGY | Facility: CLINIC | Age: 67
End: 2017-08-28

## 2017-08-28 VITALS
WEIGHT: 182.13 LBS | SYSTOLIC BLOOD PRESSURE: 137 MMHG | HEART RATE: 59 BPM | BODY MASS INDEX: 30.3 KG/M2 | DIASTOLIC BLOOD PRESSURE: 80 MMHG

## 2017-08-28 DIAGNOSIS — J30.89 CHRONIC NON-SEASONAL ALLERGIC RHINITIS, UNSPECIFIED TRIGGER: Primary | ICD-10-CM

## 2017-08-28 DIAGNOSIS — J35.1 LINGUAL TONSIL HYPERTROPHY: ICD-10-CM

## 2017-08-28 DIAGNOSIS — J32.9 CHRONIC SINUSITIS, UNSPECIFIED LOCATION: ICD-10-CM

## 2017-08-28 PROCEDURE — 31231 NASAL ENDOSCOPY DX: CPT | Mod: S$GLB,,, | Performed by: OTOLARYNGOLOGY

## 2017-08-28 PROCEDURE — 99244 OFF/OP CNSLTJ NEW/EST MOD 40: CPT | Mod: 25,S$GLB,, | Performed by: OTOLARYNGOLOGY

## 2017-08-28 PROCEDURE — 99999 PR PBB SHADOW E&M-EST. PATIENT-LVL III: CPT | Mod: PBBFAC,,, | Performed by: OTOLARYNGOLOGY

## 2017-08-28 NOTE — LETTER
August 31, 2017      Jayesh Jaramillo MD  [address] [place] [address]           [facility]  [address] [place] [place] [address]  Phone: [phone]  Fax: [phone]          Patient: Smitha Salinas   MR Number: [id number]   YOB: 1950   Date of Visit: 8/28/2017       Dear Dr. Jayesh Jaramillo:    Thank you for referring Smitha Salinas to me for evaluation. Attached you will find relevant portions of my assessment and plan of care.    If you have questions, please do not hesitate to call me. I look forward to following Smitha Salinas along with you.    Sincerely,    Neo Zheng MD    Enclosure  CC:  No Recipients    If you would like to receive this communication electronically, please contact [email or web address] or [phone] to request more information on [facility] access.    For providers and/or their staff who would like to refer a patient to [facility], please contact us through our one-stop-shop provider referral line, [facility], at [phone].    If you feel you have received this communication in error or would no longer like to receive these types of communications, please e-mail [email or web address]

## 2017-08-28 NOTE — PROGRESS NOTES
Referring Provider:    Jayesh Jaramillo Md  [address] [facility] [address] [place] [address]  Subjective:   Patient: Smitha Salinas [id number], :1950   Visit date:2017 10:56 AM    Chief Complaint:  Sinus Problem    HPI:  Smitha is a 66 y.o. female who I was asked to see in consultation for evaluation of the following issue(s):    Sinonasal / Allergy: Smitha has bilateral mild nasal obstruction. She reports the the following sinonasal symptoms: post-nasal drip, frequent congestion.  When she has PND, she has severe gagging and vomiting that may last for days.  This happens 2-3 times per year..  She has been  on medications for these symptoms: Dymista, claritin, flonase, zyrtec, allegra- unhelpful.  Mucinex D, Afrin taking now and helpful.   She has had 0 sinus infections in the past 12 months.  There have been no recent imaging study of the sinuses (none).    She has moderate allergic rhinitis with symptoms including nasal congestion and sneezing.  She denies the following allergy symptoms:   coughing, eye itchiness, eye watering, nasal itchiness and wheezing    Allergy testing for this patient was not done.    Current smoker:  No          Review of Systems:  Negative unless checked off.  Gen:  []fever   []fatigue  HENT:  []nosebleeds  []dental problem   Eyes:  []photophobia  []visual disturbance  Resp:  []chest tightness []wheezing  Card:  []chest pain  []leg swelling  GI:  []abdominal pain []blood in stool  :  []dysuria  []hematuria  Musc:  []joint swelling  []gait problem  Skin:  []color change  []pallor  Neuro:  []seizures  []numbness  Hem:  []bruise/bleed easily  Psych:  []hallucinations  []behavioral problems  Allergy/Imm: is allergic to bextra [valdecoxib]; gabapentin; and hydrocodone-acetaminophen.    Her meds, allergies, medical, surgical, social & family histories were reviewed & updated:  -     She has a current medication list which includes the following prescription(s): acyclovir, amlodipine,  aspirin, clonidine, cyclobenzaprine, diazepam, estradiol, fexofenadine hcl, mirabegron, pitavastatin, potassium chloride sa, tocilizumab, tramadol, clobetasol 0.05%, and loratadine.  -     She  has a past medical history of Adult ADHD; Chronic rheumatic arthritis; Colon polyps; Ex-smoker; Herpes zoster infection; Hyperlipidemia; Hypertension; Immunosuppressed status; Lichen sclerosus et atrophicus; SUKHDEEP (obstructive sleep apnea); and Osteopenia.   -     She  does not have any pertinent problems on file.   -     She  has a past surgical history that includes Bladder surgery (8/2012); Tonsillectomy (1958); Skin graft (1965); and Cryotherapy (1980).  -     She  reports that she quit smoking about 4 years ago. She has never used smokeless tobacco. She reports that she drinks alcohol. She reports that she does not use drugs.  -     Her family history includes Cancer (age of onset: 39) in her sister; Cancer (age of onset: 68) in her other; Diabetes in her mother; Heart disease in her mother; Kidney disease in her father; Stroke in her father, maternal grandmother, paternal grandfather, and paternal grandmother.  -     She is allergic to bextra [valdecoxib]; gabapentin; and hydrocodone-acetaminophen.    Objective:     Physical Exam:  Vitals:  /80   Pulse (!) 59   Wt 82.6 kg (182 lb 1.6 oz)   BMI 30.30 kg/m²   General appearance:  Well developed, well nourished    Eyes:  Extraocular motions intact, PERRL    Communication:  no hoarseness, no dysphonia    Ears:  Otoscopy of external auditory canals and tympanic membranes was normal, clinical speech reception thresholds grossly intact, no mass/lesion of auricle.  Nose:  No masses/lesions of external nose, nasal mucosa, septum, and turbinates were within normal limits.  Mouth:  No mass/lesion of lips, teeth, gums, hard/soft palate, tongue, tonsils, or oropharynx.    Cardiovascular:  No pedal edema; Radial Pulses +2     Neck & Lymphatics:  No cervical lymphadenopathy, no  neck mass/crepitus/ asymmetry, trachea is midline, no thyroid enlargement/tenderness/mass.    Psych: Oriented x3,  Alert with normal mood and affect.     Respiration/Chest:  Symmetric expansion during respiration, normal respiratory effort.    Skin:  Warm and intact. No ulcerations of face, scalp, neck.    Assessment & Plan:   Smitha was seen today for sinus problem.    Diagnoses and all orders for this visit:    Chronic non-seasonal allergic rhinitis, unspecified trigger  -     Aspergillus fumagatus IgE; Future  -     Bermuda grass IgE; Future  -     Cat epithelium IgE; Future  -     Cladosporium IgE; Future  -     Cockroach, American IgE; Future  -     [place], bald IgE; Future  -     D. farinae IgE; Future  -     D. pteronyssinus IgE; Future  -     Dog dander IgE; Future  -     Plantain, English IgE; Future  -     Efe grass IgE; Future  -     Marsh elder, rough IgE; Future  -     Mugwort IgE; Future  -     Nettle IgE; Future  -     Oak, white IgE; Future  -     Penicillium IgE; Future  -     Ragweed, short, common IgE; Future  -     Dmitri IgE; Future  -     Allergen, Cocklebur; Future  -     Allergen, Meadow Grass ([place] Blue); Future  -     Allergen, Pecan [place]ola IgE; Future  -     Allergen, White Selvin; Future  -     Allergen-Alternaria Alternata; Future  -     Allergen-Cedar; Future  -     Allergen-Common Pigweed; Future  -     RAST Allergen Maple ([place]); Future  -     RAST Allergen Lancing; Future  -     RAST Allergen, Sheep Huey([place]ellow [place]); Future  -     RAST Allergen for [place]; Future  -     RAST Allergen, Lamb's Quarters; Future  -     Feather Panel #2; Future  -     Allergen-Silver Birch; Future  -     Allergen, Elm Cedar; Future  -     X-Ray Sinuses Min 3 Views; Future  -     X-Ray Sinuses Min 3 Views    Chronic sinusitis, unspecified location    Lingual tonsil hypertrophy      -SINUSITIS/RHINITIS  Smitha presents today for initial evaluation.  They have multiple sinonasal  complaints and determination of the underlying etiology is a problem of moderate to high complexity.  Patients may present with sinonasal symptoms such as nasal obstruction as a primary anatomic disorder.  Patients may also present with recurrent or chronic inflammatory sinonasal symptoms.  Generally, patients can be stratified into one of several groups.      The first group represents patients who have frequent or recurrent upper respiratory infections, most frequently viral.  These patients most frequently have normally functioning immune system's but have high levels of exposure.  This is commonly seen in nurses and schoolteachers as well as other groups who spend large amounts of time around sick patients and children.      Other patients may have isolated rhinitis without evidence of sinusitis.  The majority of these patients have ALLERGIC rhinitis however other groups may have more rare forms of rhinitis such as nonallergic rhinitis with eosinophilia syndrome.  As a screening evaluation, if the patient has had a normal endoscopy, from time to time a simple x-ray of the sinuses may be performed in combination with antigen specific ALLERGY testing.    The third group of patients present with significant evidence of chronic sinusitis.  Nasal endoscopy may reveal polyps or purulent drainage.  CT scan is an important aspect to determining the extent of disease.  Some patients with chronic sinusitis have an underlying etiology of ALLERGY leading to obstruction of the ostiomeatal units with furthering of the infection.  Others may have an acute infection that leads to stenosis of the sinonasal openings followed by a long, progressive course of infection.  Patients with unilateral disease who do not have inverting papilloma typically fall into this latter group.    CT scan of the sinuses has not been performed.      Antigen specific allergy testing  has not been performed.    Allergy treatment with topical steroids  and/or antihistamines has been used with good compliance with symptoms unchanged.      By review of all data, history and exam, there does not seem to be a clear distinction between allergic rhinitis versus rhinitis with a component of chronic sinusitis.   My impression is that there is not a significant component of sinusitis.  Her main issue is unrelieved vomting with a sensation of PND.     My recommendation is RAST testing, sinus plain films.        Over 25 minutes were spent in face to face contact with the patient with greater than 50% spent discussing their diagnosis and coordination of care.     We discussed her medical conditions, treatments and plan.  Smitha should return to clinic if any issues arise (symptoms worsen or persist), otherwise we will see her back in the clinic after labs and/or imaging, consults, etc. have been completed.    Thank you for allowing me to participate in the care of Smitha.    Neo Zheng MD      Patient: Smitha Salinas [id number], :1950  Procedure date:2017  Patient's medications, allergies, past medical, surgical, social and family histories were reviewed and updated as appropriate.  Chief Complaint:  Sinus Problem    HPI:  Smitha is a 66 y.o. female with the history of present illness as discussed in the clinic note from today.    Procedure: Risks, benefits, and alternatives of the procedure were discussed with the patient, and the patient consented to the fiberoptic examination.  We applied a topical nasal decongestant and analgesic.  After adequate anesthesia was obtained, the flexible fiberoptic scope was passed into each nostril independently.  Each nasal cavity, the entire pharynx (nasopharynx to hypopharynx) and the larynx were visualized. At the end of the examination, the scope was removed. The patient tolerated the procedure well with no complications.     Findings:  -     Laryngeal mucosa is normal  -     Posterior commissure has mild hypertrophy  -      Lingual tonsils have significant hypertrophy  -     Adenoids have no  hypertrophy  -     Right vocal fold: normal mobility     mass/lesion: none  -     Left vocal fold: normal mobility     mass/lesion: none  -     Other findings: none    Assessment & Plan:  - see today's clinic note

## 2017-08-29 LAB
BUN SERPL-MCNC: 11 MG/DL (ref 7–25)
BUN/CREAT SERPL: NORMAL (CALC) (ref 6–22)
CALCIUM SERPL-MCNC: 9.9 MG/DL (ref 8.6–10.4)
CHLORIDE SERPL-SCNC: 106 MMOL/L (ref 98–110)
CHOLEST SERPL-MCNC: 200 MG/DL
CHOLEST/HDLC SERPL: 4.2 (CALC)
CO2 SERPL-SCNC: 28 MMOL/L (ref 20–31)
CREAT SERPL-MCNC: 0.74 MG/DL (ref 0.5–0.99)
GFR SERPL CREATININE-BSD FRML MDRD: 85 ML/MIN/1.73M2
GLUCOSE SERPL-MCNC: 88 MG/DL (ref 65–99)
HDLC SERPL-MCNC: 48 MG/DL
LDLC SERPL CALC-MCNC: 115 MG/DL (CALC)
NONHDLC SERPL-MCNC: 152 MG/DL (CALC)
POTASSIUM SERPL-SCNC: 4 MMOL/L (ref 3.5–5.3)
SODIUM SERPL-SCNC: 142 MMOL/L (ref 135–146)
TRIGL SERPL-MCNC: 259 MG/DL

## 2017-09-15 ENCOUNTER — TELEPHONE (OUTPATIENT)
Dept: INTERNAL MEDICINE | Facility: CLINIC | Age: 67
End: 2017-09-15

## 2017-09-17 ENCOUNTER — PATIENT MESSAGE (OUTPATIENT)
Dept: OBSTETRICS AND GYNECOLOGY | Facility: CLINIC | Age: 67
End: 2017-09-17

## 2017-09-20 ENCOUNTER — TELEPHONE (OUTPATIENT)
Dept: OBSTETRICS AND GYNECOLOGY | Facility: CLINIC | Age: 67
End: 2017-09-20

## 2017-09-20 DIAGNOSIS — Z12.39 BREAST CANCER SCREENING: Primary | ICD-10-CM

## 2017-09-20 NOTE — TELEPHONE ENCOUNTER
Mammogram ordered placed per written order guideline. Left message with pt to call and schedule her mammogram appointment.

## 2017-09-20 NOTE — TELEPHONE ENCOUNTER
----- Message from Tabatha Huggins sent at 9/20/2017 10:34 AM CDT -----  Contact: Patient  Patient called to schedule her Mammo and will like to have it done on 10/17/17. She can be contacted at [phone].    Thanks,  Tabatha

## 2017-10-06 ENCOUNTER — PATIENT MESSAGE (OUTPATIENT)
Dept: OBSTETRICS AND GYNECOLOGY | Facility: CLINIC | Age: 67
End: 2017-10-06

## 2017-10-10 ENCOUNTER — TELEPHONE (OUTPATIENT)
Dept: OTOLARYNGOLOGY | Facility: CLINIC | Age: 67
End: 2017-10-10

## 2017-10-10 LAB
A ALTERNATA IGE QN: <0.1 KU/L
A FUMIGATUS1 AB SER QL ID: NEGATIVE
AMER ROACH IGE QN: <0.1 KU/L
BALD CYPRESS IGE QN: <0.1 KU/L
BERMUDA GRASS IGE QN: <0.1 KU/L
BOXELDER IGE QN: <0.1 KU/L
BUDGERIGAR FEATHER IGE AB [UNITS/VOLUME] IN SERUM: <0.1 KU/L
C HERBARUM IGE QN: <0.1 KU/L
CANARY FEATHER IGE QN: <0.1 KU/L
CAT DANDER IGE QN: <0.1 KU/L
CHICKEN FEATHER IGE QN: <0.1 KU/L
CMN PIGWEED IGE QN: <0.1 KU/L
COCKLEBUR IGE QN: <0.1 KU/L
COMMON RAGWEED IGE QN: <0.1 KU/L
D FARINAE IGE QN: <0.1 KU/L
D PTERONYSS IGE QN: <0.1 KU/L
DOG DANDER IGE QN: <0.1 KU/L
DUCK FEATHER IGE QN: <0.1 KU/L
ELM CEDAR, IGE: <0.1 KU/L
ENGL PLANTAIN IGE QN: <0.1 KU/L
FINCH FEATHER IGE AB [UNITS/VOLUME] IN SERUM: <0.1 KU/L
GOOSE FEATHER IGE QN: <0.1 KU/L
GOOSEFOOT IGE QN: <0.1 KU/L
JOHNSON GRASS IGE QN: <0.1 KU/L
KENT BLUE GRASS IGE QN: <0.1 KU/L
LONDON PLANE IGE QN: <0.1 KU/L
MARSH ELDER IGE QN: <0.1 KU/L
MUGWORT IGE QN: <0.1 KU/L
NETTLE IGE QN: <0.1 KU/L
P NOTATUM IGE QN: <0.1 KU/L
PECAN/HICK TREE IGE QN: <0.1 KU/L
PIGEON FEATHER IGE AB [UNITS/VOLUME] IN SERUM: <0.1 KU/L
RED CEDAR IGE QN: <0.1 KU/L
SHEEP SORREL IGE QN: <0.1 KU/L
SILVER BIRCH IGE QN: <0.1 KU/L
TIMOTHY IGE QN: <0.1 KU/L
TURKEY FEATHER IGE AB [UNITS/VOLUME] IN SERUM: <0.1 KU/L
WHITE ASH IGE QN: <0.1 KU/L
WHITE MULBERRY IGE QN: <0.1 KU/L
WHITE OAK IGE QN: <0.1 KU/L

## 2017-10-11 ENCOUNTER — TELEPHONE (OUTPATIENT)
Dept: OTOLARYNGOLOGY | Facility: CLINIC | Age: 67
End: 2017-10-11

## 2017-10-11 NOTE — TELEPHONE ENCOUNTER
Left detailed message on mobile contact regarding negative results and that she may discontinue her allergy medications. Asked to please return our call with any additional questions or concerns.

## 2017-10-11 NOTE — TELEPHONE ENCOUNTER
RAST testing received from [facility] and reviewed.  37 of 37 antigens tested were Class 0 for IgE specific allergic response.

## 2017-10-17 ENCOUNTER — TELEPHONE (OUTPATIENT)
Dept: OBSTETRICS AND GYNECOLOGY | Facility: CLINIC | Age: 67
End: 2017-10-17

## 2017-10-17 NOTE — TELEPHONE ENCOUNTER
----- Message from Monalisa Coronel sent at 10/17/2017  1:18 PM CDT -----  Contact: pt  Calling in regard to mammogram orders and pt is very upset. She is confused about the scheduling and the referring. She said she took off work for today for mammo.  Please advise. [phone]

## 2017-11-02 RX ORDER — DIAZEPAM 5 MG/1
TABLET ORAL
Qty: 30 TABLET | Refills: 1 | Status: SHIPPED | OUTPATIENT
Start: 2017-11-02 | End: 2019-04-11 | Stop reason: SDUPTHER

## 2017-11-06 ENCOUNTER — TELEPHONE (OUTPATIENT)
Dept: OBSTETRICS AND GYNECOLOGY | Facility: CLINIC | Age: 67
End: 2017-11-06

## 2017-11-06 NOTE — TELEPHONE ENCOUNTER
After calling patient she said that she was on the phone with [facility] for her mammogram myrtle taveras

## 2017-11-06 NOTE — TELEPHONE ENCOUNTER
----- Message from Thomas Salinas sent at 11/6/2017 11:00 AM CST -----  Contact: pt  She's calling in regards to her mammogram results, [phone] (home) [phone] (work)

## 2018-01-04 ENCOUNTER — OFFICE VISIT (OUTPATIENT)
Dept: INTERNAL MEDICINE | Facility: CLINIC | Age: 68
End: 2018-01-04
Payer: MEDICARE

## 2018-01-04 VITALS
WEIGHT: 177.5 LBS | OXYGEN SATURATION: 98 % | TEMPERATURE: 98 F | SYSTOLIC BLOOD PRESSURE: 118 MMHG | BODY MASS INDEX: 29.57 KG/M2 | HEIGHT: 65 IN | DIASTOLIC BLOOD PRESSURE: 65 MMHG | HEART RATE: 68 BPM

## 2018-01-04 DIAGNOSIS — M89.9 BONE DISORDER: ICD-10-CM

## 2018-01-04 DIAGNOSIS — M85.89 OSTEOPENIA OF MULTIPLE SITES: ICD-10-CM

## 2018-01-04 DIAGNOSIS — E78.5 HYPERLIPIDEMIA, UNSPECIFIED HYPERLIPIDEMIA TYPE: Chronic | ICD-10-CM

## 2018-01-04 DIAGNOSIS — D84.9 IMMUNOSUPPRESSED STATUS: ICD-10-CM

## 2018-01-04 DIAGNOSIS — M06.9 RHEUMATOID ARTHRITIS OF HAND, UNSPECIFIED LATERALITY, UNSPECIFIED RHEUMATOID FACTOR PRESENCE: Chronic | ICD-10-CM

## 2018-01-04 DIAGNOSIS — Z00.00 ROUTINE HEALTH MAINTENANCE: Primary | ICD-10-CM

## 2018-01-04 DIAGNOSIS — I10 ESSENTIAL HYPERTENSION: Chronic | ICD-10-CM

## 2018-01-04 PROCEDURE — 99999 PR PBB SHADOW E&M-EST. PATIENT-LVL III: CPT | Mod: PBBFAC,,, | Performed by: FAMILY MEDICINE

## 2018-01-04 PROCEDURE — 90662 IIV NO PRSV INCREASED AG IM: CPT | Mod: S$GLB,,, | Performed by: FAMILY MEDICINE

## 2018-01-04 PROCEDURE — 99397 PER PM REEVAL EST PAT 65+ YR: CPT | Mod: S$GLB,,, | Performed by: FAMILY MEDICINE

## 2018-01-04 PROCEDURE — G0009 ADMIN PNEUMOCOCCAL VACCINE: HCPCS | Mod: S$GLB,,, | Performed by: FAMILY MEDICINE

## 2018-01-04 PROCEDURE — G0008 ADMIN INFLUENZA VIRUS VAC: HCPCS | Mod: S$GLB,,, | Performed by: FAMILY MEDICINE

## 2018-01-04 PROCEDURE — 90732 PPSV23 VACC 2 YRS+ SUBQ/IM: CPT | Mod: S$GLB,,, | Performed by: FAMILY MEDICINE

## 2018-01-04 RX ORDER — PRAVASTATIN SODIUM 40 MG/1
40 TABLET ORAL DAILY
Qty: 90 TABLET | Refills: 3 | Status: SHIPPED | OUTPATIENT
Start: 2018-01-04 | End: 2019-05-07 | Stop reason: SDUPTHER

## 2018-01-04 RX ORDER — DULOXETIN HYDROCHLORIDE 30 MG/1
30 CAPSULE, DELAYED RELEASE ORAL DAILY
COMMUNITY
Start: 2017-11-22 | End: 2018-06-26

## 2018-01-19 RX ORDER — CLONIDINE HYDROCHLORIDE 0.2 MG/1
TABLET ORAL
Qty: 90 TABLET | Refills: 5 | Status: SHIPPED | OUTPATIENT
Start: 2018-01-19 | End: 2018-01-19 | Stop reason: SDUPTHER

## 2018-01-19 NOTE — TELEPHONE ENCOUNTER
----- Message from Tamara Nuñez sent at 1/19/2018 11:16 AM CST -----  Contact: Pt  Pt called and stated she needed to speak to the nurse. She stated she needs a refill:      1. What is the name of the medication you are requesting? Clonidine   2. What is the dose? 0.2 mg   3. How do you take the medication? Orally, topically, etc? One half in am and 1/2 in afternoon  A nd 2 at bedtime  4. How often do you take this medication?   5. Do you need a 30 day or 90 day supply? 90 day supply   6. How many refills are you requesting?   7. What is your preferred pharmacy and location of the pharmacy?   [facility] [address] [place] [address] - [address] [facility]  [address] [facility] [address]  Phone: [phone] Fax: [phone]      8. Who can we contact with further questions? She can be reached at [phone] (home) [phone] (work).  Thanks,  TF

## 2018-01-22 RX ORDER — CLONIDINE HYDROCHLORIDE 0.2 MG/1
TABLET ORAL
Qty: 90 TABLET | Refills: 5 | Status: SHIPPED | OUTPATIENT
Start: 2018-01-22 | End: 2018-03-12 | Stop reason: SDUPTHER

## 2018-01-31 NOTE — TELEPHONE ENCOUNTER
----- Message from Monalisa Coronel sent at 1/31/2018 11:24 AM CST -----  Contact: pt  Calling about Rx Amolditine need to be refill and please send it to [facility] and please advise and contact pt once it has been sent  [phone] (home) [phone] (work)

## 2018-02-02 RX ORDER — AMLODIPINE BESYLATE 5 MG/1
TABLET ORAL
Qty: 30 TABLET | Refills: 5 | Status: SHIPPED | OUTPATIENT
Start: 2018-02-02 | End: 2018-03-12 | Stop reason: SDUPTHER

## 2018-03-12 ENCOUNTER — PATIENT MESSAGE (OUTPATIENT)
Dept: INTERNAL MEDICINE | Facility: CLINIC | Age: 68
End: 2018-03-12

## 2018-03-12 ENCOUNTER — TELEPHONE (OUTPATIENT)
Dept: INTERNAL MEDICINE | Facility: CLINIC | Age: 68
End: 2018-03-12

## 2018-03-12 RX ORDER — POTASSIUM CHLORIDE 20 MEQ/1
20 TABLET, EXTENDED RELEASE ORAL DAILY
Qty: 60 TABLET | Refills: 5 | Status: SHIPPED | OUTPATIENT
Start: 2018-03-12 | End: 2018-03-21 | Stop reason: SDUPTHER

## 2018-03-12 RX ORDER — CLONIDINE HYDROCHLORIDE 0.2 MG/1
TABLET ORAL
Qty: 90 TABLET | Refills: 5 | Status: SHIPPED | OUTPATIENT
Start: 2018-03-12 | End: 2018-03-14 | Stop reason: SDUPTHER

## 2018-03-12 RX ORDER — AMLODIPINE BESYLATE 5 MG/1
5 TABLET ORAL DAILY
Qty: 30 TABLET | Refills: 5 | Status: SHIPPED | OUTPATIENT
Start: 2018-03-12 | End: 2018-03-21 | Stop reason: SDUPTHER

## 2018-03-12 NOTE — TELEPHONE ENCOUNTER
----- Message from Karli Basilio sent at 3/12/2018  3:03 PM CDT -----  Contact: Pt   Pt called and requested a callback in regards to 90 day  request was not sent 3/4 prescriptions need to know why callback number to discuss us..[phone] (home)

## 2018-03-14 RX ORDER — CLONIDINE HYDROCHLORIDE 0.2 MG/1
TABLET ORAL
Qty: 30 TABLET | Refills: 5 | Status: SHIPPED | OUTPATIENT
Start: 2018-03-14 | End: 2018-03-16 | Stop reason: SDUPTHER

## 2018-03-14 NOTE — TELEPHONE ENCOUNTER
----- Message from Monalisa Coronel sent at 3/14/2018 11:01 AM CDT -----  Contact: pt  Calling about Conadine 0.2 mg called in to [facility] and please advise [phone] (home)

## 2018-03-14 NOTE — TELEPHONE ENCOUNTER
S/w pt.Pt stated that her clonidine was sent to [facility]. Pt stated she would like a 30 day supply sent to [facility]. Please Advise

## 2018-03-18 RX ORDER — CLONIDINE HYDROCHLORIDE 0.2 MG/1
TABLET ORAL
Qty: 30 TABLET | Refills: 5 | Status: SHIPPED | OUTPATIENT
Start: 2018-03-18 | End: 2018-03-21 | Stop reason: SDUPTHER

## 2018-03-21 NOTE — TELEPHONE ENCOUNTER
----- Message from Christiano Todd sent at 3/21/2018 10:50 AM CDT -----  Pt is requesting a call from nurse to discuss her medication.        Please call pt back at [phone]

## 2018-03-22 RX ORDER — CLONIDINE HYDROCHLORIDE 0.2 MG/1
TABLET ORAL
Qty: 30 TABLET | Refills: 5 | Status: SHIPPED | OUTPATIENT
Start: 2018-03-22 | End: 2018-03-27 | Stop reason: SDUPTHER

## 2018-03-22 RX ORDER — POTASSIUM CHLORIDE 20 MEQ/1
20 TABLET, EXTENDED RELEASE ORAL DAILY
Qty: 60 TABLET | Refills: 5 | Status: SHIPPED | OUTPATIENT
Start: 2018-03-22 | End: 2020-02-07 | Stop reason: SDUPTHER

## 2018-03-22 RX ORDER — AMLODIPINE BESYLATE 5 MG/1
5 TABLET ORAL DAILY
Qty: 30 TABLET | Refills: 5 | Status: SHIPPED | OUTPATIENT
Start: 2018-03-22 | End: 2019-02-26

## 2018-03-27 ENCOUNTER — TELEPHONE (OUTPATIENT)
Dept: INTERNAL MEDICINE | Facility: CLINIC | Age: 68
End: 2018-03-27

## 2018-03-27 NOTE — TELEPHONE ENCOUNTER
----- Message from Magui Owens sent at 3/27/2018 12:22 PM CDT -----  Contact: Pt  Please give pt a call at ..[phone] (home) [phone] (work) regarding a refill on her CLONIDINE 0.2 Mg        ..  [facility] [id number] [place] - [address] [facility]  [address] [facility] [address]  Phone: [phone] Fax: [phone]

## 2018-03-27 NOTE — TELEPHONE ENCOUNTER
S/w pt. Pt stated that her bp has been high. Pt stated that she would like to get a increase in clonidine. Please Advise

## 2018-03-27 NOTE — TELEPHONE ENCOUNTER
Needs amauri Garrett review home bps make sure there is not a reason it is high and see how high and determine which med to increase if needed as increasing clonidine has increased side effects

## 2018-03-28 RX ORDER — CLONIDINE HYDROCHLORIDE 0.2 MG/1
TABLET ORAL
Qty: 120 TABLET | Refills: 11 | Status: SHIPPED | OUTPATIENT
Start: 2018-03-28 | End: 2018-03-28 | Stop reason: SDUPTHER

## 2018-03-28 RX ORDER — CLONIDINE HYDROCHLORIDE 0.2 MG/1
TABLET ORAL
Qty: 360 TABLET | Refills: 3 | Status: SHIPPED | OUTPATIENT
Start: 2018-03-28 | End: 2018-03-28 | Stop reason: SDUPTHER

## 2018-03-28 RX ORDER — CLONIDINE HYDROCHLORIDE 0.2 MG/1
TABLET ORAL
Qty: 270 TABLET | Refills: 3 | Status: SHIPPED | OUTPATIENT
Start: 2018-03-28 | End: 2018-05-09 | Stop reason: SDUPTHER

## 2018-03-28 NOTE — TELEPHONE ENCOUNTER
S/w pt. Pt stated that she was out of the clonidine and would like a 90 day refill. Pt stated that she had it under control now. Pt declined to come in and see Tami Pt stated that she didn't have the time to come in. Please Advise

## 2018-04-25 RX ORDER — MIRABEGRON 50 MG/1
TABLET, FILM COATED, EXTENDED RELEASE ORAL
Qty: 30 TABLET | Refills: 11 | Status: SHIPPED | OUTPATIENT
Start: 2018-04-25 | End: 2019-05-07 | Stop reason: SDUPTHER

## 2018-04-25 NOTE — TELEPHONE ENCOUNTER
----- Message from Jackie Aggarwal sent at 4/25/2018  2:56 PM CDT -----  Contact: Pt   ..1. What is the name of the medication you are requesting? Myrperic  2. What is the dose? 50 mg   3. How do you take the medication? Orally, topically, etc? Orally  4. How often do you take this medication? Once a day   5. Do you need a 30 day or 90 day supply? 30 day   6. How many refills are you requesting? 2  7. What is your preferred pharmacy and location of the pharmacy? ..  [facility] [address] [place] [address] - [address] [facility]  [address] [facility] [address]  Phone: [phone] Fax: [phone]      8. Who can we contact with further questions? Pt ..[phone] (home)

## 2018-05-10 RX ORDER — CLONIDINE HYDROCHLORIDE 0.2 MG/1
TABLET ORAL
Qty: 180 TABLET | Refills: 5 | Status: SHIPPED | OUTPATIENT
Start: 2018-05-10 | End: 2019-02-26

## 2018-06-25 ENCOUNTER — TELEPHONE (OUTPATIENT)
Dept: PAIN MEDICINE | Facility: CLINIC | Age: 68
End: 2018-06-25

## 2018-06-25 NOTE — TELEPHONE ENCOUNTER
----- Message from Latasha Lamb sent at 6/25/2018  2:04 PM CDT -----  Contact: pt  States she would like a sooner appt for pain management for both of her knees. Please call pt at [phone]. Thank you

## 2018-06-26 ENCOUNTER — OFFICE VISIT (OUTPATIENT)
Dept: INTERNAL MEDICINE | Facility: CLINIC | Age: 68
End: 2018-06-26
Payer: MEDICARE

## 2018-06-26 VITALS
DIASTOLIC BLOOD PRESSURE: 60 MMHG | SYSTOLIC BLOOD PRESSURE: 114 MMHG | TEMPERATURE: 98 F | HEIGHT: 65 IN | HEART RATE: 73 BPM | WEIGHT: 188.69 LBS | BODY MASS INDEX: 31.44 KG/M2 | OXYGEN SATURATION: 99 %

## 2018-06-26 DIAGNOSIS — M06.9 RHEUMATOID ARTHRITIS OF HAND, UNSPECIFIED LATERALITY, UNSPECIFIED RHEUMATOID FACTOR PRESENCE: Chronic | ICD-10-CM

## 2018-06-26 DIAGNOSIS — E78.5 HYPERLIPIDEMIA, UNSPECIFIED HYPERLIPIDEMIA TYPE: Chronic | ICD-10-CM

## 2018-06-26 DIAGNOSIS — I10 ESSENTIAL HYPERTENSION: Primary | Chronic | ICD-10-CM

## 2018-06-26 DIAGNOSIS — D84.9 IMMUNOSUPPRESSED STATUS: ICD-10-CM

## 2018-06-26 PROCEDURE — 99214 OFFICE O/P EST MOD 30 MIN: CPT | Mod: S$GLB,,, | Performed by: FAMILY MEDICINE

## 2018-06-26 PROCEDURE — 99999 PR PBB SHADOW E&M-EST. PATIENT-LVL III: CPT | Mod: PBBFAC,,, | Performed by: FAMILY MEDICINE

## 2018-06-26 PROCEDURE — 3074F SYST BP LT 130 MM HG: CPT | Mod: CPTII,S$GLB,, | Performed by: FAMILY MEDICINE

## 2018-06-26 PROCEDURE — 3078F DIAST BP <80 MM HG: CPT | Mod: CPTII,S$GLB,, | Performed by: FAMILY MEDICINE

## 2018-06-26 RX ORDER — VIT C/E/ZN/COPPR/LUTEIN/ZEAXAN 250MG-90MG
1 CAPSULE ORAL DAILY
COMMUNITY
End: 2022-02-11

## 2018-06-26 NOTE — PROGRESS NOTES
Subjective:       Patient ID: Smitha Salinas is a  female.    Chief Complaint:Multiple issues see below\    S: Hypertension: blood pressures at home normal. Tolerating medicine.   RA/immunosup utd dr tai  Hypercholesterolemia: controlled. Tolerating medicine.b/c insur needs to switch statin  intermitt anxiety prn infre bnzo    Stressed and at times down/frustrated working on getting affordable contractor. No si. No desire for meds /therapist    Past Medical History:   Diagnosis Date    Adult ADHD     Chronic rheumatic arthritis     dr tai    Colon polyps     Ex-smoker     Herpes zoster infection     Hyperlipidemia     Hypertension     Immunosuppressed status     Lichen sclerosus et atrophicus     SUKHDEEP (obstructive sleep apnea)     Osteopenia     5/16 wai 5/18(start fmax if on pred 3months or more)     Past Surgical History:   Procedure Laterality Date    BLADDER SURGERY  8/2012    Dr Claudia Lamar    CRYOTHERAPY  1980    SKIN GRAFT  1965    laceration to right  fingers  from thigh     TONSILLECTOMY  1958     Family History   Problem Relation Age of Onset    Heart disease Mother     Diabetes Mother     Stroke Father     Kidney disease Father     Cancer Sister 39     breast    Cancer Other 68     breast    Stroke Maternal Grandmother     Stroke Paternal Grandmother     Stroke Paternal Grandfather      Social History     Social History    Marital status:      Spouse name: N/A    Number of children: 1    Years of education: N/A     Occupational History     [employer]      Social History Main Topics    Smoking status: Former Smoker     Quit date: 7/22/2013    Smokeless tobacco: Never Used      Comment: smokes for a few weeks per year - when under pressure. has been abstinent times years at a time. a pack lasts about a week    Alcohol use 0.0 oz/week    Drug use: No    Sexual activity: Not Currently     Birth control/ protection: Post-menopausal     Other Topics  Concern    None     Social History Narrative    Lives alone. Caffeine intake rare -1-2 per week of coffee. Does not have a Living Will or Advanced Directive                   HPI  Review of Systems  Cardiovascular: no chest pain  Chest: no shortness of breath  Abd: no abd pain  Remainder review of systems negative    Objective:      Physical Exam   Constitutional: She is oriented to person, place, and time. She appears well-developed and well-nourished. No distress.   HENT:   Head: Atraumatic.   Right Ear: External ear normal.   Left Ear: External ear normal.   Nose: Nose normal.   Mouth/Throat: Oropharynx is clear and moist. No oropharyngeal exudate.   bilat tms nl   Eyes: Conjunctivae and EOM are normal. Pupils are equal, round, and reactive to light. No scleral icterus.   Neck: Normal range of motion. Neck supple. No thyromegaly present.   Cardiovascular: Normal rate, regular rhythm and normal heart sounds.    No murmur heard.  Pulmonary/Chest: Effort normal and breath sounds normal. No respiratory distress. She has no wheezes. She has no rales.   Abdominal: Soft. Bowel sounds are normal. She exhibits no distension and no mass. There is no hepatosplenomegaly. There is no tenderness. There is no rebound and no guarding.   Musculoskeletal: Normal range of motion. She exhibits no edema or tenderness.   Lymphadenopathy:     She has no cervical adenopathy.   Neurological: She is alert and oriented to person, place, and time. No cranial nerve deficit. She exhibits normal muscle tone. Coordination normal.   Skin: Skin is warm. No rash noted. No erythema. No pallor.   Psychiatric: She has a normal mood and affect. Her behavior is normal. Judgment and thought content normal.   Nursing note and vitals reviewed.      Assessment:           2. Essential hypertension    3. Rheumatoid arthritis of hand, unspecified laterality, unspecified rheumatoid factor presence    4. Immunosuppressed status    5. Hyperlipidemia,  unspecified hyperlipidemia type      osteopenia  Plan:       **f/u 6months  F/u dr tai when due  dexa sched early july    Due lipid at f/u  rf valium when due. Ok now

## 2018-07-02 ENCOUNTER — TELEPHONE (OUTPATIENT)
Dept: INTERNAL MEDICINE | Facility: CLINIC | Age: 68
End: 2018-07-02

## 2018-07-02 NOTE — TELEPHONE ENCOUNTER
----- Message from Maryann Baxter sent at 7/2/2018  3:17 PM CDT -----  Contact: pt   Call pt regarding pain mgmt appt. Pt states that she still haven't been schedule to see anyone.    .[phone] (home) [phone] (work)

## 2018-07-03 ENCOUNTER — TELEPHONE (OUTPATIENT)
Dept: INTERNAL MEDICINE | Facility: CLINIC | Age: 68
End: 2018-07-03

## 2018-07-03 DIAGNOSIS — M05.762 RHEUMATOID ARTHRITIS INVOLVING BOTH KNEES WITH POSITIVE RHEUMATOID FACTOR: Primary | ICD-10-CM

## 2018-07-03 DIAGNOSIS — M05.761 RHEUMATOID ARTHRITIS INVOLVING BOTH KNEES WITH POSITIVE RHEUMATOID FACTOR: Primary | ICD-10-CM

## 2018-07-03 NOTE — TELEPHONE ENCOUNTER
----- Message from Carolin Loza sent at 7/3/2018  3:44 PM CDT -----  States she is calling back regarding her referral for pain management//can be reached at [phone]//please call//thanks/lh

## 2018-07-05 ENCOUNTER — TELEPHONE (OUTPATIENT)
Dept: INTERNAL MEDICINE | Facility: CLINIC | Age: 68
End: 2018-07-05

## 2018-07-05 ENCOUNTER — APPOINTMENT (OUTPATIENT)
Dept: RADIOLOGY | Facility: CLINIC | Age: 68
End: 2018-07-05
Attending: FAMILY MEDICINE
Payer: MEDICARE

## 2018-07-05 DIAGNOSIS — M89.9 BONE DISORDER: ICD-10-CM

## 2018-07-05 PROCEDURE — 77080 DXA BONE DENSITY AXIAL: CPT | Mod: 26,,, | Performed by: RADIOLOGY

## 2018-07-05 PROCEDURE — 77080 DXA BONE DENSITY AXIAL: CPT | Mod: TC,PO

## 2018-07-05 NOTE — TELEPHONE ENCOUNTER
----- Message from Tabatha Huggins sent at 7/5/2018  9:14 AM CDT -----  Contact: Patient  Patient called to speak with the nurse; she contacted her insurance and they gave her the names of some pain medication doctors. She is at [facility] today and wants to sign anything she needs to in order to have her records sent because some of the providers won't even schedule without records. She would like someone to call her right away. She can be contacted at [phone].    Thanks,  Tabatha

## 2018-07-05 NOTE — TELEPHONE ENCOUNTER
Called pt regarding message, pt was in clinic on the first floor already filling out release of information. Went out to lobby to speak with patient. Pt requesting office visits from Dr. Hutchison to be sent to Dr. Hussein at [facility]. Notes, referral, and release form faxed to Dr. Hussein at [phone]. Pt also filled out release of information form for Bone Density test to be sent to Dr. Domenico Clinton when results come in. Form at Lis's desk.

## 2018-10-15 ENCOUNTER — TELEPHONE (OUTPATIENT)
Dept: INTERNAL MEDICINE | Facility: CLINIC | Age: 68
End: 2018-10-15

## 2018-10-15 NOTE — TELEPHONE ENCOUNTER
Spoke with pt and she states that her foot is much better.  Does not want to come in for an appointment.

## 2018-10-17 ENCOUNTER — IMMUNIZATION (OUTPATIENT)
Dept: INTERNAL MEDICINE | Facility: CLINIC | Age: 68
End: 2018-10-17
Payer: MEDICARE

## 2018-10-17 ENCOUNTER — HOSPITAL ENCOUNTER (OUTPATIENT)
Dept: RADIOLOGY | Facility: HOSPITAL | Age: 68
Discharge: HOME | End: 2018-10-17
Attending: OBSTETRICS & GYNECOLOGY
Payer: MEDICARE

## 2018-10-17 VITALS — HEIGHT: 65 IN | WEIGHT: 188 LBS | BODY MASS INDEX: 31.32 KG/M2

## 2018-10-17 DIAGNOSIS — Z12.39 BREAST CANCER SCREENING: ICD-10-CM

## 2018-10-17 PROCEDURE — 99999 PR PBB SHADOW E&M-EST. PATIENT-LVL II: CPT | Mod: PBBFAC,,,

## 2018-10-17 PROCEDURE — 99212 OFFICE O/P EST SF 10 MIN: CPT | Mod: PBBFAC,PO,25

## 2018-10-17 PROCEDURE — 77067 SCR MAMMO BI INCL CAD: CPT | Mod: TC,PO

## 2018-10-17 PROCEDURE — 77063 BREAST TOMOSYNTHESIS BI: CPT | Mod: 26,,, | Performed by: RADIOLOGY

## 2018-10-17 PROCEDURE — 77063 BREAST TOMOSYNTHESIS BI: CPT | Mod: TC,PO

## 2018-10-17 PROCEDURE — 77067 SCR MAMMO BI INCL CAD: CPT | Mod: 26,,, | Performed by: RADIOLOGY

## 2018-10-17 PROCEDURE — 90662 IIV NO PRSV INCREASED AG IM: CPT | Mod: PBBFAC,PO

## 2018-11-29 ENCOUNTER — OFFICE VISIT (OUTPATIENT)
Dept: URGENT CARE | Facility: CLINIC | Age: 68
End: 2018-11-29
Payer: MEDICARE

## 2018-11-29 VITALS
BODY MASS INDEX: 32.06 KG/M2 | OXYGEN SATURATION: 98 % | DIASTOLIC BLOOD PRESSURE: 72 MMHG | RESPIRATION RATE: 16 BRPM | HEART RATE: 71 BPM | SYSTOLIC BLOOD PRESSURE: 130 MMHG | TEMPERATURE: 98 F | HEIGHT: 65 IN | WEIGHT: 192.44 LBS

## 2018-11-29 DIAGNOSIS — H65.01 RIGHT ACUTE SEROUS OTITIS MEDIA, RECURRENCE NOT SPECIFIED: Primary | ICD-10-CM

## 2018-11-29 DIAGNOSIS — B96.89 ACUTE BACTERIAL PHARYNGITIS: ICD-10-CM

## 2018-11-29 DIAGNOSIS — J02.8 ACUTE BACTERIAL PHARYNGITIS: ICD-10-CM

## 2018-11-29 LAB
CTP QC/QA: YES
S PYO RRNA THROAT QL PROBE: NEGATIVE

## 2018-11-29 PROCEDURE — 99214 OFFICE O/P EST MOD 30 MIN: CPT | Mod: HCNC,S$GLB,, | Performed by: NURSE PRACTITIONER

## 2018-11-29 PROCEDURE — 3075F SYST BP GE 130 - 139MM HG: CPT | Mod: CPTII,HCNC,S$GLB, | Performed by: NURSE PRACTITIONER

## 2018-11-29 PROCEDURE — 87880 STREP A ASSAY W/OPTIC: CPT | Mod: QW,HCNC,S$GLB, | Performed by: NURSE PRACTITIONER

## 2018-11-29 PROCEDURE — 99999 PR PBB SHADOW E&M-EST. PATIENT-LVL V: CPT | Mod: PBBFAC,HCNC,, | Performed by: NURSE PRACTITIONER

## 2018-11-29 PROCEDURE — 1101F PT FALLS ASSESS-DOCD LE1/YR: CPT | Mod: CPTII,HCNC,S$GLB, | Performed by: NURSE PRACTITIONER

## 2018-11-29 PROCEDURE — 3078F DIAST BP <80 MM HG: CPT | Mod: CPTII,HCNC,S$GLB, | Performed by: NURSE PRACTITIONER

## 2018-11-29 PROCEDURE — 87081 CULTURE SCREEN ONLY: CPT | Mod: HCNC

## 2018-11-29 RX ORDER — BENZONATATE 200 MG/1
200 CAPSULE ORAL 3 TIMES DAILY PRN
Qty: 30 CAPSULE | Refills: 0 | Status: SHIPPED | OUTPATIENT
Start: 2018-11-29 | End: 2019-03-05

## 2018-11-29 RX ORDER — AMOXICILLIN 875 MG/1
875 TABLET, FILM COATED ORAL 2 TIMES DAILY
Qty: 20 TABLET | Refills: 0 | Status: SHIPPED | OUTPATIENT
Start: 2018-11-29 | End: 2018-12-09

## 2018-11-29 RX ORDER — TRAMADOL HYDROCHLORIDE 200 MG/1
TABLET, EXTENDED RELEASE ORAL
COMMUNITY
Start: 2018-10-30 | End: 2019-07-05

## 2018-11-29 NOTE — PROGRESS NOTES
Subjective:       Patient ID: Smitha Salinas is a 68 y.o. female.    Chief Complaint: Sore Throat and Otalgia    68 year old female presents to Urgent Care with reports of sore throat and ear painthat has been present for about 1 week with no improvement with self-treatment. Denies any other problems or concerns at this time.       Sore Throat    This is a new problem. The current episode started in the past 7 days. The problem has been gradually worsening. There has been no fever. The pain is mild. Associated symptoms include ear pain. Pertinent negatives include no abdominal pain, diarrhea, shortness of breath, trouble swallowing or vomiting. She has tried cool liquids and gargles for the symptoms.     Review of Systems   Constitutional: Negative for appetite change, chills and fever.   HENT: Positive for ear pain and sore throat. Negative for sinus pressure and trouble swallowing.    Eyes: Negative for visual disturbance.   Respiratory: Negative for shortness of breath.    Cardiovascular: Negative for chest pain.   Gastrointestinal: Negative for abdominal pain, diarrhea, nausea and vomiting.   Endocrine: Negative for cold intolerance, polyphagia and polyuria.   Genitourinary: Negative for decreased urine volume and dysuria.   Musculoskeletal: Negative for back pain.   Skin: Negative for rash.   Allergic/Immunologic: Negative for environmental allergies and food allergies.   Neurological: Negative for dizziness, tremors, weakness and numbness.   Hematological: Does not bruise/bleed easily.   Psychiatric/Behavioral: Negative for confusion and hallucinations. The patient is not nervous/anxious and is not hyperactive.    All other systems reviewed and are negative.      Objective:     Physical Exam   Constitutional: She is oriented to person, place, and time. She appears well-developed and well-nourished.   HENT:   Head: Normocephalic and atraumatic.   Right Ear: External ear normal.   Left Ear: External ear normal.  Tympanic membrane is erythematous.   Nose: Nose normal.   Mouth/Throat: Uvula is midline. Posterior oropharyngeal erythema present.   Eyes: Conjunctivae and EOM are normal. Pupils are equal, round, and reactive to light.   Neck: Normal range of motion. Neck supple.   Cardiovascular: Normal rate, regular rhythm, normal heart sounds and intact distal pulses.   No murmur heard.  Pulmonary/Chest: Effort normal and breath sounds normal. She has no wheezes.   Abdominal: Soft. Bowel sounds are normal. There is no tenderness.   Musculoskeletal: Normal range of motion.   Neurological: She is alert and oriented to person, place, and time. She has normal reflexes.   Skin: Skin is warm and dry. No rash noted.   Psychiatric: She has a normal mood and affect. Her behavior is normal. Judgment and thought content normal.   Nursing note and vitals reviewed.    Assessment:     No diagnosis found.  Plan:   There are no diagnoses linked to this encounter.

## 2018-11-29 NOTE — PROGRESS NOTES
Subjective:       Patient ID: Smitha Salinas is a 68 y.o. female.    Chief Complaint: Sore Throat and Otalgia    68 year old presents to Urgent Care with reports of sore throat and ear pain. Denies any other problems or concerns at this time.      Sore Throat    This is a new problem. The current episode started yesterday. The problem has been gradually worsening. There has been no fever. The fever has been present for 3 to 4 days. The pain is at a severity of 4/10. The pain is mild. Associated symptoms include ear pain. Pertinent negatives include no abdominal pain, diarrhea, shortness of breath, trouble swallowing or vomiting. She has had no exposure to strep. She has tried nothing for the symptoms.     Review of Systems   Constitutional: Negative for appetite change, chills and fever.   HENT: Positive for ear pain and sore throat. Negative for sinus pressure and trouble swallowing.    Eyes: Negative for visual disturbance.   Respiratory: Negative for shortness of breath.    Cardiovascular: Negative for chest pain.   Gastrointestinal: Negative for abdominal pain, diarrhea, nausea and vomiting.   Endocrine: Negative for cold intolerance, polyphagia and polyuria.   Genitourinary: Negative for decreased urine volume and dysuria.   Musculoskeletal: Negative for back pain.   Skin: Negative for rash.   Allergic/Immunologic: Negative for environmental allergies and food allergies.   Neurological: Negative for dizziness, tremors, weakness and numbness.   Hematological: Does not bruise/bleed easily.   Psychiatric/Behavioral: Negative for confusion and hallucinations. The patient is not nervous/anxious and is not hyperactive.    All other systems reviewed and are negative.      Objective:     Physical Exam   Constitutional: She is oriented to person, place, and time. She appears well-developed and well-nourished.   HENT:   Head: Normocephalic and atraumatic.   Right Ear: External ear normal. Tympanic membrane is  erythematous.   Left Ear: External ear normal.   Nose: Nose normal.   Mouth/Throat: Uvula is midline. Posterior oropharyngeal erythema present.   Eyes: Conjunctivae and EOM are normal. Pupils are equal, round, and reactive to light.   Neck: Normal range of motion. Neck supple.   Cardiovascular: Normal rate, regular rhythm, normal heart sounds and intact distal pulses.   No murmur heard.  Pulmonary/Chest: Effort normal and breath sounds normal. She has no wheezes.   Abdominal: Soft. Bowel sounds are normal. There is no tenderness.   Musculoskeletal: Normal range of motion.   Neurological: She is alert and oriented to person, place, and time. She has normal reflexes.   Skin: Skin is warm and dry. No rash noted.   Psychiatric: She has a normal mood and affect. Her behavior is normal. Judgment and thought content normal.   Nursing note and vitals reviewed.    Assessment:     1. Right acute serous otitis media, recurrence not specified    2. Acute bacterial pharyngitis      Plan:   Smitha was seen today for sore throat and otalgia.    Diagnoses and all orders for this visit:    Right acute serous otitis media, recurrence not specified    Acute bacterial pharyngitis  -     POCT Rapid Strep A  -     Strep A culture, throat    Other orders  -     amoxicillin (AMOXIL) 875 MG tablet; Take 1 tablet (875 mg total) by mouth 2 (two) times daily. for 10 days  -     benzonatate (TESSALON) 200 MG capsule; Take 1 capsule (200 mg total) by mouth 3 (three) times daily as needed for Cough.

## 2018-11-30 NOTE — PATIENT INSTRUCTIONS
Middle Ear Infection (Adult)  You have an infection of the middle ear, the space behind the eardrum. This is also called acute otitis media (AOM). Sometimes it is caused by the common cold. This is because congestion can block the internal passage (eustachian tube) that drains fluid from the middle ear. When the middle ear fills with fluid, bacteria can grow there and cause an infection. Oral antibiotics are used to treat this illness, not ear drops. Symptoms usually start to improve within 1 to 2 days of treatment.    Home care  The following are general care guidelines:  · Finish all of the antibiotic medicine given, even though you may feel better after the first few days.  · You may use over-the-counter medicine, such as acetaminophen or ibuprofen, to control pain and fever, unless something else was prescribed. If you have chronic liver or kidney disease or have ever had a stomach ulcer or gastrointestinal bleeding, talk with your healthcare provider before using these medicines. Do not give aspirin to anyone under 18 years of age who has a fever. It may cause severe illness or death.  Follow-up care  Follow up with your healthcare provider, or as advised, in 2 weeks if all symptoms have not gotten better, or if hearing doesn't go back to normal within 1 month.  When to seek medical advice  Call your healthcare provider right away if any of these occur:  · Ear pain gets worse or does not improve after 3 days of treatment  · Unusual drowsiness or confusion  · Neck pain, stiff neck, or headache  · Fluid or blood draining from the ear canal  · Fever of 100.4°F (38°C) or as advised   · Seizure  Date Last Reviewed: 6/1/2016  © 6383-1556 [employer]. [address] [place] [address], [place] [address]. All rights reserved. This information is not intended as a substitute for professional medical care. Always follow your healthcare professional's instructions.

## 2018-12-02 LAB — BACTERIA THROAT CULT: NORMAL

## 2019-01-23 ENCOUNTER — TELEPHONE (OUTPATIENT)
Dept: UROLOGY | Facility: CLINIC | Age: 69
End: 2019-01-23

## 2019-01-23 NOTE — TELEPHONE ENCOUNTER
Patient states Myrbetriq is 80.00 and [employer] is supposed to be faxing our office a PA/tier exception request. Provided patient with our fax number and advised that she contact [employer] to confirm that paper work is being sent to correct number.

## 2019-01-23 NOTE — TELEPHONE ENCOUNTER
----- Message from Sylvia James sent at 1/23/2019 11:31 AM CST -----  Contact: pt   Pt is requesting a call back from the nurse in regards to the pt Rx MYRBETRIQ and her insurance   [phone] (home)

## 2019-02-26 ENCOUNTER — TELEPHONE (OUTPATIENT)
Dept: INTERNAL MEDICINE | Facility: CLINIC | Age: 69
End: 2019-02-26

## 2019-02-26 RX ORDER — CLONIDINE HYDROCHLORIDE 0.2 MG/1
TABLET ORAL
Qty: 270 TABLET | Refills: 3 | Status: SHIPPED | OUTPATIENT
Start: 2019-02-26 | End: 2020-02-18 | Stop reason: SDUPTHER

## 2019-02-26 RX ORDER — AMLODIPINE BESYLATE 5 MG/1
5 TABLET ORAL DAILY
Qty: 90 TABLET | Refills: 3 | Status: SHIPPED | OUTPATIENT
Start: 2019-02-26 | End: 2019-05-07 | Stop reason: SDUPTHER

## 2019-02-26 NOTE — TELEPHONE ENCOUNTER
----- Message from Blue Aburto sent at 2/26/2019 10:36 AM CST -----  Contact: [phone]   Pt would like to consult with the nurse about Rx medication from [facility], pt would like to an update.  Pt would like a 90 day refill supply called in on Rx medication Amlodipine 5 mg  And Clonidine 0.2mg.  Please call back at [phone].  Thx-           Pt Uses:  [facility] mail order

## 2019-03-02 ENCOUNTER — OFFICE VISIT (OUTPATIENT)
Dept: URGENT CARE | Facility: CLINIC | Age: 69
End: 2019-03-02
Payer: MEDICARE

## 2019-03-02 VITALS
OXYGEN SATURATION: 98 % | HEIGHT: 65 IN | HEART RATE: 58 BPM | WEIGHT: 182.56 LBS | DIASTOLIC BLOOD PRESSURE: 84 MMHG | TEMPERATURE: 98 F | BODY MASS INDEX: 30.42 KG/M2 | SYSTOLIC BLOOD PRESSURE: 136 MMHG

## 2019-03-02 DIAGNOSIS — H01.119 ALLERGIC DERMATITIS OF EYELID, UNSPECIFIED LATERALITY: ICD-10-CM

## 2019-03-02 DIAGNOSIS — A60.00 GENITAL HERPES SIMPLEX, UNSPECIFIED SITE: Primary | ICD-10-CM

## 2019-03-02 PROCEDURE — 99999 PR PBB SHADOW E&M-EST. PATIENT-LVL V: CPT | Mod: PBBFAC,,, | Performed by: NURSE PRACTITIONER

## 2019-03-02 PROCEDURE — 3075F SYST BP GE 130 - 139MM HG: CPT | Mod: CPTII,S$GLB,, | Performed by: NURSE PRACTITIONER

## 2019-03-02 PROCEDURE — 3079F PR MOST RECENT DIASTOLIC BLOOD PRESSURE 80-89 MM HG: ICD-10-PCS | Mod: CPTII,S$GLB,, | Performed by: NURSE PRACTITIONER

## 2019-03-02 PROCEDURE — 3079F DIAST BP 80-89 MM HG: CPT | Mod: CPTII,S$GLB,, | Performed by: NURSE PRACTITIONER

## 2019-03-02 PROCEDURE — 99214 OFFICE O/P EST MOD 30 MIN: CPT | Mod: 25,S$GLB,, | Performed by: NURSE PRACTITIONER

## 2019-03-02 PROCEDURE — 96372 PR INJECTION,THERAP/PROPH/DIAG2ST, IM OR SUBCUT: ICD-10-PCS | Mod: S$GLB,,, | Performed by: NURSE PRACTITIONER

## 2019-03-02 PROCEDURE — 96372 THER/PROPH/DIAG INJ SC/IM: CPT | Mod: S$GLB,,, | Performed by: NURSE PRACTITIONER

## 2019-03-02 PROCEDURE — 1101F PR PT FALLS ASSESS DOC 0-1 FALLS W/OUT INJ PAST YR: ICD-10-PCS | Mod: CPTII,S$GLB,, | Performed by: NURSE PRACTITIONER

## 2019-03-02 PROCEDURE — 3075F PR MOST RECENT SYSTOLIC BLOOD PRESS GE 130-139MM HG: ICD-10-PCS | Mod: CPTII,S$GLB,, | Performed by: NURSE PRACTITIONER

## 2019-03-02 PROCEDURE — 99214 PR OFFICE/OUTPT VISIT, EST, LEVL IV, 30-39 MIN: ICD-10-PCS | Mod: 25,S$GLB,, | Performed by: NURSE PRACTITIONER

## 2019-03-02 PROCEDURE — 99999 PR PBB SHADOW E&M-EST. PATIENT-LVL V: ICD-10-PCS | Mod: PBBFAC,,, | Performed by: NURSE PRACTITIONER

## 2019-03-02 PROCEDURE — 1101F PT FALLS ASSESS-DOCD LE1/YR: CPT | Mod: CPTII,S$GLB,, | Performed by: NURSE PRACTITIONER

## 2019-03-02 RX ORDER — ACYCLOVIR 800 MG/1
800 TABLET ORAL 2 TIMES DAILY
Qty: 10 TABLET | Refills: 0 | Status: SHIPPED | OUTPATIENT
Start: 2019-03-02 | End: 2020-03-12

## 2019-03-02 RX ORDER — METHYLPREDNISOLONE ACETATE 40 MG/ML
40 INJECTION, SUSPENSION INTRA-ARTICULAR; INTRALESIONAL; INTRAMUSCULAR; SOFT TISSUE
Status: COMPLETED | OUTPATIENT
Start: 2019-03-02 | End: 2019-03-02

## 2019-03-02 RX ORDER — HYDROXYZINE HYDROCHLORIDE 25 MG/1
25 TABLET, FILM COATED ORAL
Qty: 21 TABLET | Refills: 0 | Status: SHIPPED | OUTPATIENT
Start: 2019-03-02 | End: 2019-03-09

## 2019-03-02 RX ADMIN — METHYLPREDNISOLONE ACETATE 40 MG: 40 INJECTION, SUSPENSION INTRA-ARTICULAR; INTRALESIONAL; INTRAMUSCULAR; SOFT TISSUE at 02:03

## 2019-03-02 NOTE — Clinical Note
Ms. Salinas requested a refill on her tramadol (taking for chronic knee pain). Rheumatology would not refill. She has an appointment with you on Tuesday at 7 am, but just started a new job and was hoping to be able to get medications refilled in urgent care this weekend. I advised that Urgent Care couldn't refill pain meds and that she would likely still need to come in and see you but that I would forward my note to you.

## 2019-03-02 NOTE — PATIENT INSTRUCTIONS
Nonspecific Dermatitis  Dermatitis is a skin rash caused by something that touches the skin and makes it irritated and inflamed.  Your skin may be red, swollen, dry, and may be cracked. Blisters may form and ooze. The rash will itch.  Dermatitis can form on the face and neck, backs of hands, forearms, genitals, and lower legs. Dermatitis is not passed from person to person.  Talk with your health care provider about what may have caused the rash. Common things that cause skin allergies are metal in jewelry, plants like poison ivy or poison oak, and certain skin care products. You will need to avoid the source of your rash in the future to prevent it from coming back. In some cases, the cause of the dermatitis may not be found.  Treatment is done to relieve itching and prevent the rash from coming back. The rash should go away in a few days to a few weeks.  Home care  The health care provider may prescribe medications to relieve swelling and itching. Follow all instructions when using these medications.  · Avoid anything that heats up your skin, such as hot showers or baths, or direct sunlight. This can make itching worse.  · Stay away from whatever you think caused the rash.  · Bathe in warm, not hot, water. Apply a moisturizing lotion after bathing to prevent dry skin.  · Avoid skin irritants such as wool or silk clothing, grease, oils, harsh soaps, and detergents.  · Apply cold compresses to soothe your sores to help relieve your symptoms. Do this for 30 minutes 3 to 4 times a day. You can make a cold compress by soaking a cloth in cold water. Squeeze out excess water. You can add colloidal oatmeal to the water to help reduce itching. For severe itching in a small area, apply an ice pack wrapped in a thin towel. Do this for 20 minutes 3 to 4 times a day.  · You can also help relieve large areas of itching by taking a lukewarm bath with colloidal oatmeal added to the water.  · Use hydrocortisone cream for redness  and irritation, unless another medicine was prescribed. You can also use benzocaine anesthetic cream or spray.  · Use oral diphenhydramine to help reduce itching. This is an antihistamine you can buy at drug and grocery stores. It can make you sleepy, so use lower doses during the daytime. Or you can use loratadine. This is an antihistamine that will not make you sleepy. Dont use diphenhydramine if you have glaucoma or have trouble urinating because of an enlarged prostate.  · Wash your hands or use an antibacterial gel often to prevent the spread of the rash.  Follow-up care  Follow up with your health care provider. Make an appointment with your health care provider if your symptoms do not get better in the next 1 to 2 weeks.  When to seek medical advice  Call your health care provider right away if any of these occur:  · Spreading of the rash to other parts of your body  · Severe swelling of your face, eyelids, mouth, throat or tongue  · Trouble urinating due to swelling in the genital area  · Fever of 100.4°F (38°C) or higher  · Redness or swelling that gets worse  · Pain that gets worse  · Foul-smelling fluid leaking from the skin  · Yellow-brown crusts on the open blisters  · Joint pain   Date Last Reviewed: 7/23/2014  © 8736-5169 [employer]. [address] [place] [address], [place] [id number]. All rights reserved. This information is not intended as a substitute for professional medical care. Always follow your healthcare professional's instructions.        The Herpes Virus  Herpes is a virus that can cause sores on the skin. There are 2 types of the virus. Depending on how you come in contact with the virus, either type can cause outbreaks near the mouth or on the sex organs.  Understanding the herpes virus  Herpes reproduces only when it is inside the body. It does so by tricking a healthy cell into producing copies of the herpes virus. Each copy can infect nearby cells. But, before too long, the  bodys defenses rally to stop the attack. The immune system forces the virus to retreat. Even then, the virus stays inside the body but does not cause disease. For some people, an acute outbreak never happens again. For others, outbreaks are more likely to occur due to menstruation, illness, poor diet, fatigue, exposure to cold or strong sunlight, or stress.    How the herpes virus attacks  1. The herpes virus enters the body through a small break in the skin. The virus can also enter by direct contact with mucous membranes, such as those of the lips, vagina, or anus.  2. Inside the body, the herpes virus binds to a special site on a skin cell. Then part of the virus moves into the cell.  3. Inside the skin cell, the virus releases a set of instructions. These commands cause the cell to begin making copies of the herpes virus.  4. Herpes blisters appear on the skin. Herpes blisters may also appear on mucous membranes lining the mouth, vagina, or anus.  Date Last Reviewed: 1/1/2017  © 4937-4848 [employer]. [address] [place] [address], [place] [address]. All rights reserved. This information is not intended as a substitute for professional medical care. Always follow your healthcare professional's instructions.
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
## 2019-03-05 ENCOUNTER — OFFICE VISIT (OUTPATIENT)
Dept: INTERNAL MEDICINE | Facility: CLINIC | Age: 69
End: 2019-03-05
Payer: MEDICARE

## 2019-03-05 VITALS
OXYGEN SATURATION: 97 % | TEMPERATURE: 98 F | DIASTOLIC BLOOD PRESSURE: 56 MMHG | SYSTOLIC BLOOD PRESSURE: 98 MMHG | BODY MASS INDEX: 30.38 KG/M2 | HEART RATE: 64 BPM | HEIGHT: 65 IN

## 2019-03-05 DIAGNOSIS — Z00.00 ROUTINE HEALTH MAINTENANCE: Primary | ICD-10-CM

## 2019-03-05 DIAGNOSIS — I10 ESSENTIAL HYPERTENSION: Chronic | ICD-10-CM

## 2019-03-05 DIAGNOSIS — M06.9 RHEUMATOID ARTHRITIS OF HAND, UNSPECIFIED LATERALITY, UNSPECIFIED RHEUMATOID FACTOR PRESENCE: Chronic | ICD-10-CM

## 2019-03-05 DIAGNOSIS — R73.9 HYPERGLYCEMIA: ICD-10-CM

## 2019-03-05 DIAGNOSIS — D84.9 IMMUNOSUPPRESSED STATUS: ICD-10-CM

## 2019-03-05 PROCEDURE — G0008 ADMIN INFLUENZA VIRUS VAC: HCPCS | Mod: S$GLB,,, | Performed by: FAMILY MEDICINE

## 2019-03-05 PROCEDURE — 3078F PR MOST RECENT DIASTOLIC BLOOD PRESSURE < 80 MM HG: ICD-10-PCS | Mod: CPTII,S$GLB,, | Performed by: FAMILY MEDICINE

## 2019-03-05 PROCEDURE — 99499 RISK ADDL DX/OHS AUDIT: ICD-10-PCS | Mod: S$GLB,,, | Performed by: FAMILY MEDICINE

## 2019-03-05 PROCEDURE — 99499 UNLISTED E&M SERVICE: CPT | Mod: S$GLB,,, | Performed by: FAMILY MEDICINE

## 2019-03-05 PROCEDURE — 99214 OFFICE O/P EST MOD 30 MIN: CPT | Mod: 25,S$GLB,, | Performed by: FAMILY MEDICINE

## 2019-03-05 PROCEDURE — 99999 PR PBB SHADOW E&M-EST. PATIENT-LVL V: CPT | Mod: PBBFAC,,, | Performed by: FAMILY MEDICINE

## 2019-03-05 PROCEDURE — 99214 PR OFFICE/OUTPT VISIT, EST, LEVL IV, 30-39 MIN: ICD-10-PCS | Mod: 25,S$GLB,, | Performed by: FAMILY MEDICINE

## 2019-03-05 PROCEDURE — 90662 FLU VACCINE - HIGH DOSE (65+) PRESERVATIVE FREE IM: ICD-10-PCS | Mod: S$GLB,,, | Performed by: FAMILY MEDICINE

## 2019-03-05 PROCEDURE — 3078F DIAST BP <80 MM HG: CPT | Mod: CPTII,S$GLB,, | Performed by: FAMILY MEDICINE

## 2019-03-05 PROCEDURE — 90662 IIV NO PRSV INCREASED AG IM: CPT | Mod: S$GLB,,, | Performed by: FAMILY MEDICINE

## 2019-03-05 PROCEDURE — G0008 FLU VACCINE - HIGH DOSE (65+) PRESERVATIVE FREE IM: ICD-10-PCS | Mod: S$GLB,,, | Performed by: FAMILY MEDICINE

## 2019-03-05 PROCEDURE — 3074F SYST BP LT 130 MM HG: CPT | Mod: CPTII,S$GLB,, | Performed by: FAMILY MEDICINE

## 2019-03-05 PROCEDURE — 3074F PR MOST RECENT SYSTOLIC BLOOD PRESSURE < 130 MM HG: ICD-10-PCS | Mod: CPTII,S$GLB,, | Performed by: FAMILY MEDICINE

## 2019-03-05 PROCEDURE — 99999 PR PBB SHADOW E&M-EST. PATIENT-LVL V: ICD-10-PCS | Mod: PBBFAC,,, | Performed by: FAMILY MEDICINE

## 2019-03-05 RX ORDER — ACYCLOVIR 400 MG/1
400 TABLET ORAL DAILY
Qty: 90 TABLET | Refills: 3 | Status: SHIPPED | OUTPATIENT
Start: 2019-03-05 | End: 2019-09-26 | Stop reason: SDUPTHER

## 2019-03-05 NOTE — PROGRESS NOTES
Subjective:       Patient ID: Smitha Salinas is a 68 y.o. female.    Chief Complaint: here for physical examination and issues  below    HPI Hypertension: blood pressures  normal. Tolerating medicine. Lipid cmp 1/19 via dr blackwood reviewed  RA/immunosup utd dr tai but she asks to switch rheum. She plans to f/u pain mgmt dr sharpe for tramadol  Hypercholesterolemia: controlled. Tolerating medicine.b/c insur needs to switch statin  intermitt anxiety prn infre bnzolipi    intermitt genital herpes. She req rf maint dose acyl.     Stressed and at times down/frustrated working on getting house finished 2/12 yrs later. No si expressed. No desire for meds /therapist    Recent lab mild inc gluc 112-115 may have had coffee w sugar that morning    Past Medical History:   Diagnosis Date    Adult ADHD     Chronic rheumatic arthritis     dr tai    Colon polyps     Ex-smoker     Genital herpes     Herpes zoster infection     Hyperlipidemia     Hypertension     Immunosuppressed status     Lichen sclerosus et atrophicus     SUKHDEEP (obstructive sleep apnea)     Osteopenia     5/16 wai 5/18(start fmax if on pred 3months or more)     Past Surgical History:   Procedure Laterality Date    BLADDER SURGERY  8/2012    Dr Claudia Lamar    BREAST BIOPSY      CRYOTHERAPY  1980    SKIN GRAFT  1965    laceration to right  fingers  from thigh     TONSILLECTOMY  1958     Family History   Problem Relation Age of Onset    Heart disease Mother     Diabetes Mother     Stroke Father     Kidney disease Father     Cancer Sister 39        breast    Breast cancer Sister     Cancer Other 68        breast    Stroke Maternal Grandmother     Stroke Paternal Grandmother     Stroke Paternal Grandfather      Social History     Socioeconomic History    Marital status:      Spouse name: None    Number of children: 1    Years of education: None    Highest education level: None   Social Needs    Financial resource strain: None     Food insecurity - worry: None    Food insecurity - inability: None    Transportation needs - medical: None    Transportation needs - non-medical: None   Occupational History    Occupation:      Employer: [employer]    Tobacco Use    Smoking status: Former Smoker     Last attempt to quit: 2013     Years since quittin.6    Smokeless tobacco: Never Used    Tobacco comment: smokes for a few weeks per year - when under pressure. has been abstinent times years at a time. a pack lasts about a week   Substance and Sexual Activity    Alcohol use: Yes     Alcohol/week: 0.0 oz    Drug use: No    Sexual activity: Not Currently     Birth control/protection: Post-menopausal   Other Topics Concern    None   Social History Narrative    Lives alone. Caffeine intake rare -1-2 per week of coffee. Does not have a Living Will or Advanced Directive       Review of Systems  Cardiovascular: no chest pain  Chest: no shortness of breath  Abd: no abd pain  Remainder review of systems negative    Objective:      Physical Exam   Constitutional: She is oriented to person, place, and time. She appears well-developed and well-nourished. No distress.   HENT:   Head: Atraumatic.   Right Ear: External ear normal.   Left Ear: External ear normal.   Nose: Nose normal.   Mouth/Throat: Oropharynx is clear and moist. No oropharyngeal exudate.   bilat tms nl   Eyes: Conjunctivae and EOM are normal. Pupils are equal, round, and reactive to light. No scleral icterus.   Neck: Normal range of motion. Neck supple. No thyromegaly present.   Cardiovascular: Normal rate, regular rhythm and normal heart sounds.   No murmur heard.  Pulmonary/Chest: Effort normal and breath sounds normal. No respiratory distress. She has no wheezes. She has no rales.   Abdominal: Soft. Bowel sounds are normal. She exhibits no distension and no mass. There is no hepatosplenomegaly. There is no tenderness. There is no rebound and no guarding.    Musculoskeletal: Normal range of motion. She exhibits no edema or tenderness.   Lymphadenopathy:     She has no cervical adenopathy.   Neurological: She is alert and oriented to person, place, and time. No cranial nerve deficit. She exhibits normal muscle tone. Coordination normal.   Skin: Skin is warm. No rash noted. No erythema. No pallor.   Psychiatric: She has a normal mood and affect. Her behavior is normal. Judgment and thought content normal.   Nursing note and vitals reviewed.      Assessment:       1. Routine health maintenance    2. Rheumatoid arthritis of hand, unspecified laterality, unspecified rheumatoid factor presence    3. Essential hypertension    4. Immunosuppressed status    5. Hyperglycemia        Plan:       **flu shot  Shingrix new shingles vaccine  via a pharmacy  Routine health maintenance    Rheumatoid arthritis of hand, unspecified laterality, unspecified rheumatoid factor presence  -     Ambulatory Referral to Rheumatology    Essential hypertension    Immunosuppressed status    Hyperglycemia  -     Hemoglobin A1c; Future; Expected date: 09/01/2019  -     Glucose, fasting; Future; Expected date: 09/01/2019    Other orders  -     acyclovir (ZOVIRAX) 400 MG tablet; Take 1 tablet (400 mg total) by mouth once daily.  Dispense: 90 tablet; Refill: 3    *

## 2019-04-11 RX ORDER — DIAZEPAM 5 MG/1
TABLET ORAL
Qty: 30 TABLET | Refills: 2 | Status: SHIPPED | OUTPATIENT
Start: 2019-04-11 | End: 2019-09-26 | Stop reason: SDUPTHER

## 2019-04-11 NOTE — TELEPHONE ENCOUNTER
----- Message from Maira Holt sent at 4/11/2019 11:04 AM CDT -----  Contact: self  Pt is calling in regards to shingles vaccine. Pt doesn't want to have vaccine done at [facility].     Type:  RX Refill Request    Who Called: Patient  Refill or New Rx: Refill  RX Name and Strength:Valum 5 mg  How is the patient currently taking it? (ex. 1XDay): as needed  Is this a 30 day or 90 day RX: 30   Preferred Pharmacy with phone number:    Pt uses:  [facility] [address] [facility] [address]  [address] [facility] [address]  Phone: [phone] Fax: [phone]    Local or Mail Order: Local  Ordering Provider: Dr. Jaramillo  Would the patient rather a call back or a response via [facility]? Call back  Best Call Back Number: [phone]  Additional Information: n/a    Thanks,   Maira Holt

## 2019-05-07 RX ORDER — AMLODIPINE BESYLATE 5 MG/1
5 TABLET ORAL DAILY
Qty: 90 TABLET | Refills: 3 | Status: SHIPPED | OUTPATIENT
Start: 2019-05-07 | End: 2019-09-05

## 2019-05-07 RX ORDER — PRAVASTATIN SODIUM 40 MG/1
40 TABLET ORAL DAILY
Qty: 90 TABLET | Refills: 3 | Status: SHIPPED | OUTPATIENT
Start: 2019-05-07 | End: 2020-02-18 | Stop reason: SDUPTHER

## 2019-05-07 NOTE — TELEPHONE ENCOUNTER
----- Message from Ina Diaz sent at 5/7/2019 10:01 AM CDT -----  ..Type:  RX Refill Request    Who Called: patient  Refill or New Rx:refill  RX Name and Strength:amloditine  How is the patient currently taking it? (ex. 1XDay):  Is this a 30 day or 90 day RX  Preferred Pharmacy with phone number:..  [facility]     Local or Mail Order:  Ordering Provider  Would the patient rather a call back or a response via [facility]? Call back  Best Call Back Number:..[phone] (home)     Additional Information:

## 2019-05-07 NOTE — TELEPHONE ENCOUNTER
----- Message from Maryann Baxter sent at 5/7/2019 10:42 AM CDT -----  Contact: pt   Type:  RX Refill Request    Who Called: pt   Refill or New Rx:refill  RX Name and Strength: MYRBETRIQ 50 mg Tb24  How is the patient currently taking it? (ex. 1XDay):  Is this a 30 day or 90 day RX: 30  Preferred Pharmacy with phone number:  [facility] [place], [place] - [address]  [address] [facility] [address]  Phone: [phone] Fax: [phone]  Local or Mail Order:local  Ordering Provider:  Would the patient rather a call back or a response via [facility]? Call back   Best Call Back Number:[phone] (home)  Additional Information:

## 2019-05-07 NOTE — TELEPHONE ENCOUNTER
----- Message from Patrizia Mott sent at 5/7/2019  9:15 AM CDT -----  Contact: self/[phone]  Would like to consult with nurse regarding medication. Patient need for medication to be call in for 90 days to [facility]. Please call back at [phone]. Thanks/ar

## 2019-06-26 ENCOUNTER — TELEPHONE (OUTPATIENT)
Dept: INTERNAL MEDICINE | Facility: CLINIC | Age: 69
End: 2019-06-26

## 2019-06-26 NOTE — TELEPHONE ENCOUNTER
Patient notified she will need to be seen and appointment scheduled with Np. Jose De Jesus 7/1/19 at 2:20pm

## 2019-06-26 NOTE — TELEPHONE ENCOUNTER
----- Message from Paulette Ortega sent at 6/26/2019  9:30 AM CDT -----  Contact: Self  Patient is requesting lab work. She would like to schedule an appointment also but she has to have her lab work scheduled before. Please call patient back at [phone]

## 2019-07-05 ENCOUNTER — OFFICE VISIT (OUTPATIENT)
Dept: INTERNAL MEDICINE | Facility: CLINIC | Age: 69
End: 2019-07-05
Payer: MEDICARE

## 2019-07-05 VITALS
SYSTOLIC BLOOD PRESSURE: 110 MMHG | OXYGEN SATURATION: 98 % | DIASTOLIC BLOOD PRESSURE: 64 MMHG | TEMPERATURE: 97 F | WEIGHT: 183 LBS | HEIGHT: 65 IN | BODY MASS INDEX: 30.49 KG/M2 | HEART RATE: 52 BPM

## 2019-07-05 DIAGNOSIS — I10 ESSENTIAL HYPERTENSION: Chronic | ICD-10-CM

## 2019-07-05 DIAGNOSIS — Z00.00 ROUTINE MEDICAL EXAM: Primary | ICD-10-CM

## 2019-07-05 DIAGNOSIS — M06.9 RHEUMATOID ARTHRITIS OF HAND, UNSPECIFIED LATERALITY, UNSPECIFIED RHEUMATOID FACTOR PRESENCE: Chronic | ICD-10-CM

## 2019-07-05 DIAGNOSIS — E78.5 HYPERLIPIDEMIA, UNSPECIFIED HYPERLIPIDEMIA TYPE: Chronic | ICD-10-CM

## 2019-07-05 DIAGNOSIS — R73.02 IMPAIRED GLUCOSE TOLERANCE: ICD-10-CM

## 2019-07-05 PROCEDURE — 1101F PT FALLS ASSESS-DOCD LE1/YR: CPT | Mod: CPTII,S$GLB,, | Performed by: NURSE PRACTITIONER

## 2019-07-05 PROCEDURE — 99213 PR OFFICE/OUTPT VISIT, EST, LEVL III, 20-29 MIN: ICD-10-PCS | Mod: S$GLB,,, | Performed by: NURSE PRACTITIONER

## 2019-07-05 PROCEDURE — 3078F DIAST BP <80 MM HG: CPT | Mod: CPTII,S$GLB,, | Performed by: NURSE PRACTITIONER

## 2019-07-05 PROCEDURE — 99999 PR PBB SHADOW E&M-EST. PATIENT-LVL IV: CPT | Mod: PBBFAC,,, | Performed by: NURSE PRACTITIONER

## 2019-07-05 PROCEDURE — 99999 PR PBB SHADOW E&M-EST. PATIENT-LVL IV: ICD-10-PCS | Mod: PBBFAC,,, | Performed by: NURSE PRACTITIONER

## 2019-07-05 PROCEDURE — 3074F PR MOST RECENT SYSTOLIC BLOOD PRESSURE < 130 MM HG: ICD-10-PCS | Mod: CPTII,S$GLB,, | Performed by: NURSE PRACTITIONER

## 2019-07-05 PROCEDURE — 1101F PR PT FALLS ASSESS DOC 0-1 FALLS W/OUT INJ PAST YR: ICD-10-PCS | Mod: CPTII,S$GLB,, | Performed by: NURSE PRACTITIONER

## 2019-07-05 PROCEDURE — 3074F SYST BP LT 130 MM HG: CPT | Mod: CPTII,S$GLB,, | Performed by: NURSE PRACTITIONER

## 2019-07-05 PROCEDURE — 99213 OFFICE O/P EST LOW 20 MIN: CPT | Mod: S$GLB,,, | Performed by: NURSE PRACTITIONER

## 2019-07-05 PROCEDURE — 3078F PR MOST RECENT DIASTOLIC BLOOD PRESSURE < 80 MM HG: ICD-10-PCS | Mod: CPTII,S$GLB,, | Performed by: NURSE PRACTITIONER

## 2019-07-05 RX ORDER — TRAMADOL HYDROCHLORIDE 50 MG/1
100 TABLET ORAL EVERY 8 HOURS PRN
Qty: 30 TABLET | Refills: 0 | Status: SHIPPED | OUTPATIENT
Start: 2019-07-05 | End: 2019-09-26 | Stop reason: SDUPTHER

## 2019-07-05 NOTE — PROGRESS NOTES
Subjective:       Patient ID: Smitha Salinas is a 68 y.o. female.    Chief Complaint: Continuous Glucose Monitoring Evaluation    Patient presents for routine labs.  Concerned about diabetes.  Had labs in Jan at [facility].  Fasting glucose was elevated.   No other concerns.  Requesting refill on Tramadol.  Has arthritic pain.      Review of Systems   Constitutional: Negative for chills and fatigue.   Respiratory: Negative for cough and shortness of breath.    Genitourinary: Positive for frequency.   Musculoskeletal: Positive for arthralgias and myalgias. Negative for gait problem.   Skin: Negative for color change and wound.   Psychiatric/Behavioral: Negative for agitation and confusion.       Objective:      Physical Exam   Constitutional: She is oriented to person, place, and time. Vital signs are normal. She appears well-developed and well-nourished.   HENT:   Head: Normocephalic and atraumatic.   Neck: Normal range of motion.   Cardiovascular: Normal rate and regular rhythm.   Pulmonary/Chest: Effort normal and breath sounds normal.   Musculoskeletal: Normal range of motion.   Neurological: She is alert and oriented to person, place, and time.   Skin: Skin is warm.   Psychiatric: She has a normal mood and affect. Her behavior is normal.       Assessment:       1. Routine medical exam    2. Hyperlipidemia, unspecified hyperlipidemia type    3. Essential hypertension    4. Impaired glucose tolerance    5. Rheumatoid arthritis of hand, unspecified laterality, unspecified rheumatoid factor presence        Plan:         Routine medical exam    Hyperlipidemia, unspecified hyperlipidemia type    Essential hypertension    Impaired glucose tolerance    Rheumatoid arthritis of hand, unspecified laterality, unspecified rheumatoid factor presence  -     traMADol (ULTRAM) 50 mg tablet; Take 2 tablets (100 mg total) by mouth every 8 (eight) hours as needed for Pain.  Dispense: 30 tablet; Refill: 0        Will reschedule labs for  today.  Follow up as scheduled.

## 2019-07-08 ENCOUNTER — LAB VISIT (OUTPATIENT)
Dept: LAB | Facility: HOSPITAL | Age: 69
End: 2019-07-08
Attending: FAMILY MEDICINE
Payer: MEDICARE

## 2019-07-08 DIAGNOSIS — R73.9 HYPERGLYCEMIA: ICD-10-CM

## 2019-07-08 LAB
ESTIMATED AVG GLUCOSE: 131 MG/DL (ref 68–131)
GLUCOSE SERPL-MCNC: 91 MG/DL (ref 70–110)
HBA1C MFR BLD HPLC: 6.2 % (ref 4–5.6)

## 2019-07-08 PROCEDURE — 36415 COLL VENOUS BLD VENIPUNCTURE: CPT

## 2019-07-08 PROCEDURE — 82947 ASSAY GLUCOSE BLOOD QUANT: CPT

## 2019-07-08 PROCEDURE — 83036 HEMOGLOBIN GLYCOSYLATED A1C: CPT

## 2019-07-15 RX ORDER — AMLODIPINE BESYLATE 5 MG/1
TABLET ORAL
Qty: 30 TABLET | Refills: 5 | Status: SHIPPED | OUTPATIENT
Start: 2019-07-15 | End: 2020-02-18 | Stop reason: SDUPTHER

## 2019-07-23 ENCOUNTER — OFFICE VISIT (OUTPATIENT)
Dept: INTERNAL MEDICINE | Facility: CLINIC | Age: 69
End: 2019-07-23
Payer: MEDICARE

## 2019-07-23 VITALS
HEIGHT: 65 IN | BODY MASS INDEX: 29.12 KG/M2 | TEMPERATURE: 97 F | SYSTOLIC BLOOD PRESSURE: 108 MMHG | WEIGHT: 174.81 LBS | DIASTOLIC BLOOD PRESSURE: 66 MMHG | OXYGEN SATURATION: 98 % | HEART RATE: 62 BPM

## 2019-07-23 DIAGNOSIS — R10.9 ABDOMINAL DISCOMFORT: Primary | ICD-10-CM

## 2019-07-23 DIAGNOSIS — R19.7 DIARRHEA, UNSPECIFIED TYPE: ICD-10-CM

## 2019-07-23 DIAGNOSIS — R11.0 NAUSEA: ICD-10-CM

## 2019-07-23 PROCEDURE — 3074F PR MOST RECENT SYSTOLIC BLOOD PRESSURE < 130 MM HG: ICD-10-PCS | Mod: CPTII,S$GLB,, | Performed by: NURSE PRACTITIONER

## 2019-07-23 PROCEDURE — 1101F PT FALLS ASSESS-DOCD LE1/YR: CPT | Mod: CPTII,S$GLB,, | Performed by: NURSE PRACTITIONER

## 2019-07-23 PROCEDURE — 99214 PR OFFICE/OUTPT VISIT, EST, LEVL IV, 30-39 MIN: ICD-10-PCS | Mod: S$GLB,,, | Performed by: NURSE PRACTITIONER

## 2019-07-23 PROCEDURE — 99999 PR PBB SHADOW E&M-EST. PATIENT-LVL V: ICD-10-PCS | Mod: PBBFAC,,, | Performed by: NURSE PRACTITIONER

## 2019-07-23 PROCEDURE — 3074F SYST BP LT 130 MM HG: CPT | Mod: CPTII,S$GLB,, | Performed by: NURSE PRACTITIONER

## 2019-07-23 PROCEDURE — 1101F PR PT FALLS ASSESS DOC 0-1 FALLS W/OUT INJ PAST YR: ICD-10-PCS | Mod: CPTII,S$GLB,, | Performed by: NURSE PRACTITIONER

## 2019-07-23 PROCEDURE — 3078F PR MOST RECENT DIASTOLIC BLOOD PRESSURE < 80 MM HG: ICD-10-PCS | Mod: CPTII,S$GLB,, | Performed by: NURSE PRACTITIONER

## 2019-07-23 PROCEDURE — 99999 PR PBB SHADOW E&M-EST. PATIENT-LVL V: CPT | Mod: PBBFAC,,, | Performed by: NURSE PRACTITIONER

## 2019-07-23 PROCEDURE — 99214 OFFICE O/P EST MOD 30 MIN: CPT | Mod: S$GLB,,, | Performed by: NURSE PRACTITIONER

## 2019-07-23 PROCEDURE — 3078F DIAST BP <80 MM HG: CPT | Mod: CPTII,S$GLB,, | Performed by: NURSE PRACTITIONER

## 2019-07-23 RX ORDER — ONDANSETRON 4 MG/1
4 TABLET, ORALLY DISINTEGRATING ORAL EVERY 8 HOURS PRN
Qty: 10 TABLET | Refills: 1 | Status: ON HOLD | OUTPATIENT
Start: 2019-07-23 | End: 2023-03-27

## 2019-07-23 NOTE — PATIENT INSTRUCTIONS
Clear Liquid Diet    Clear liquids are any liquid that you can see through. They are also very easy to digest. You may be put on a clear liquid diet if you are recovering from irritation or infection of the stomach or intestinal tract. This diet may also be used before surgery or special procedures such as a colonoscopy. You should not be on this diet for more than 3 days. Below are some clear liquids you can have on this diet.  Adults and children over 2 years old  Adults should drink a total of 2 to 3 quarts of liquid per day. It may be easier to drink small frequent servings rather than a few large ones. Liquids can include:  · Fruit juices. Strained orange juice or lemonade (no pulp); apple, grape, and cranberry juice; clear fruit drinks  · Beverages. Sport drinks, sodas, mineral water (plain or flavored), tea, black coffee, liquid gelatin (add twice the recommended amount of water)  · Soups. Clear broth  · Desserts. Plain gelatin, popsicles, fruit juice bars  Children under 2 years old  Oral rehydration fluids are available at drug stores and most grocery stores. You dont need a prescription.  Date Last Reviewed: 8/1/2016  © 3529-7544 [employer]. [address] [place] [address], [place] [address]. All rights reserved. This information is not intended as a substitute for professional medical care. Always follow your healthcare professional's instructions.

## 2019-07-23 NOTE — PROGRESS NOTES
Subjective:       Patient ID: Smitha Salinas is a 68 y.o. female.    Chief Complaint: Nausea and Vomiting    (Patient upset because of wait).  Patient presents with abdominal pain and diarrhea that started on Friday.  Don't think its food related.  Reports symptoms are intermittent throughout the year.  She has abdominal cramps, diarrhea, and nausea.   Usually spit up mucous.  [facility].  Had polyps with colonoscopy.  Not sure of date.      Review of Systems   Constitutional: Positive for fatigue. Negative for chills.   Respiratory: Negative for cough and shortness of breath.    Gastrointestinal: Positive for abdominal pain, diarrhea and nausea.   Musculoskeletal: Negative for arthralgias and gait problem.   Skin: Negative for color change and rash.   Psychiatric/Behavioral: Negative for agitation and confusion.       Objective:      Physical Exam   Constitutional: She is oriented to person, place, and time. Vital signs are normal. She appears well-developed and well-nourished.   HENT:   Head: Normocephalic and atraumatic.   Neck: Normal range of motion.   Cardiovascular: Normal rate.   Pulmonary/Chest: Effort normal.   Musculoskeletal: Normal range of motion.   Neurological: She is alert and oriented to person, place, and time.   Skin: Skin is warm.   Psychiatric: She has a normal mood and affect. Her behavior is normal.       Assessment:       1. Abdominal discomfort    2. Diarrhea, unspecified type    3. Nausea        Plan:         Abdominal discomfort  -     Ambulatory Referral to Gastroenterology    Diarrhea, unspecified type  -     Cancel: Ambulatory Referral to Gastroenterology  -     WBC, Stool; Future; Expected date: 07/23/2019  -     Stool Exam-Ova,Cysts,Parasites; Future; Expected date: 07/23/2019  -     Clostridium difficile EIA; Future; Expected date: 07/23/2019  -     Giardia / Cryptosporidum, EIA; Future; Expected date: 07/23/2019  -     Stool culture; Future; Expected date:  07/23/2019  -     H. pylori antigen, stool; Future; Expected date: 07/23/2019  -     Ambulatory Referral to Gastroenterology    Nausea  -     ondansetron (ZOFRAN-ODT) 4 MG TbDL; Take 1 tablet (4 mg total) by mouth every 8 (eight) hours as needed (nausea).  Dispense: 10 tablet; Refill: 1        GI referral.  Request to see an [facility] provider.   Stool testing.  Zofran as needed. Clear liquid/bland diet.  Follow up sooner if needed.

## 2019-07-26 ENCOUNTER — TELEPHONE (OUTPATIENT)
Dept: INTERNAL MEDICINE | Facility: CLINIC | Age: 69
End: 2019-07-26

## 2019-07-26 ENCOUNTER — TELEPHONE (OUTPATIENT)
Dept: GASTROENTEROLOGY | Facility: CLINIC | Age: 69
End: 2019-07-26

## 2019-07-26 NOTE — TELEPHONE ENCOUNTER
----- Message from Ana Rosa Matthews sent at 7/26/2019  1:41 PM CDT -----  Contact: Patient  Patient would like a call back in reference to her gastro appointment and stool sample. Please call at Ph [phone]

## 2019-07-26 NOTE — TELEPHONE ENCOUNTER
Spoke with patient. She stated that she had a question about the stool sample that she just obtained. Told her that she can drop it off at the  Lab today by 5 pm or they will be open from 8-1 on tomorrow. She stated she will bring the sample tomorrow. Also she got an appointment with Gastro but it wasn't with who she wanted. She scheduled with Dr Al but Dr Kinney didn't have anything soon. She said she will keep that in the meantime.

## 2019-07-26 NOTE — TELEPHONE ENCOUNTER
----- Message from Kiel Todd sent at 7/26/2019  2:11 PM CDT -----  Contact: [phone]  Pt would like a call from nurse to schedule an appt. Please call back [phone].       Thank You,   Kiel Todd

## 2019-07-27 ENCOUNTER — LAB VISIT (OUTPATIENT)
Dept: LAB | Facility: HOSPITAL | Age: 69
End: 2019-07-27
Attending: NURSE PRACTITIONER
Payer: MEDICARE

## 2019-07-27 DIAGNOSIS — R19.7 DIARRHEA, UNSPECIFIED TYPE: ICD-10-CM

## 2019-07-27 LAB — WBC #/AREA STL HPF: NORMAL /[HPF]

## 2019-07-27 PROCEDURE — 87427 SHIGA-LIKE TOXIN AG IA: CPT

## 2019-07-27 PROCEDURE — 87045 FECES CULTURE AEROBIC BACT: CPT

## 2019-07-27 PROCEDURE — 87338 HPYLORI STOOL AG IA: CPT

## 2019-07-27 PROCEDURE — 87209 SMEAR COMPLEX STAIN: CPT

## 2019-07-27 PROCEDURE — 87329 GIARDIA AG IA: CPT

## 2019-07-27 PROCEDURE — 87046 STOOL CULTR AEROBIC BACT EA: CPT

## 2019-07-27 PROCEDURE — 89055 LEUKOCYTE ASSESSMENT FECAL: CPT

## 2019-07-29 LAB
CRYPTOSP AG STL QL IA: NEGATIVE
E COLI SXT1 STL QL IA: NEGATIVE
E COLI SXT2 STL QL IA: NEGATIVE
G LAMBLIA AG STL QL IA: NEGATIVE
O+P STL TRI STN: NORMAL

## 2019-07-30 ENCOUNTER — TELEPHONE (OUTPATIENT)
Dept: INTERNAL MEDICINE | Facility: CLINIC | Age: 69
End: 2019-07-30

## 2019-07-30 NOTE — TELEPHONE ENCOUNTER
harleen put in referral last week so no new referral needed ;  She had appt dr escobedo but canceled. Dr Kinney , dr wu very good gi doctors and we can help facilitate that appt; if for recent nausea, abd pain then should get in to see them asap   [facility]

## 2019-07-30 NOTE — TELEPHONE ENCOUNTER
Patient would like you to refer her to a Gastro doctor that has a 5 star credentials and they don't have to be part of [facility].

## 2019-07-30 NOTE — TELEPHONE ENCOUNTER
----- Message from Tammy Bello sent at 7/30/2019  7:41 AM CDT -----  Contact: patient   Patient requesting a call back regarding gastro referral.Please call back at [phone].      Thanks,  Tammy Bello

## 2019-07-31 LAB — BACTERIA STL CULT: NORMAL

## 2019-08-04 LAB — H PYLORI AG STL QL IA: NOT DETECTED

## 2019-08-28 ENCOUNTER — TELEPHONE (OUTPATIENT)
Dept: GASTROENTEROLOGY | Facility: CLINIC | Age: 69
End: 2019-08-28

## 2019-08-28 ENCOUNTER — TELEPHONE (OUTPATIENT)
Dept: RHEUMATOLOGY | Facility: CLINIC | Age: 69
End: 2019-08-28

## 2019-08-28 NOTE — TELEPHONE ENCOUNTER
----- Message from Patrizia Mott sent at 8/28/2019  4:03 PM CDT -----  Contact: self/[phone]  Would like to consult with nurse regarding a order for labs before appt on 09-05-19. Please call back at [phone]. Thanks/ar

## 2019-08-28 NOTE — TELEPHONE ENCOUNTER
Patient is scheduled as a new patient in Sept. She would like to get labs done before her appointment. Informed that since we have never seen her we would see her first. Patient states that since 2005 she has always had labs before her appointment and she wants her labs drawn.

## 2019-08-28 NOTE — TELEPHONE ENCOUNTER
----- Message from Joseline Nascimento sent at 8/28/2019 12:41 PM CDT -----  Contact: Pt  Pt would like nurse to contact her for NP appointment.  [phone]

## 2019-08-28 NOTE — TELEPHONE ENCOUNTER
Spoke to pt and advised her that Dr Kinney is out of the office on medical leave and not sure when he will be back in the office.   Offered to make an appt with another provider however, pt refused to schedule an appt. She states she will do her research and call back if she changes her mind but prefers to only see Dr Kinney.   Again offered to schedule an appt with another provider. Pt refused.

## 2019-09-03 NOTE — TELEPHONE ENCOUNTER
MD Amada Hooper MA   Caller: Unspecified (6 days ago,  4:22 PM)             Need to evaluate patient first to know what labs need to be done. At subsequent visits, can potentially do labs prior to visit, but not at initial visit.      Left message with Dr. VALENTIN's recommendations.

## 2019-09-04 ENCOUNTER — TELEPHONE (OUTPATIENT)
Dept: RHEUMATOLOGY | Facility: CLINIC | Age: 69
End: 2019-09-04

## 2019-09-04 NOTE — TELEPHONE ENCOUNTER
Left voice message stating I was calling as a courtesy reminder that you have an appointment scheduled with Dr. Aguila on tomorrow

## 2019-09-05 ENCOUNTER — OFFICE VISIT (OUTPATIENT)
Dept: INTERNAL MEDICINE | Facility: CLINIC | Age: 69
End: 2019-09-05
Payer: MEDICARE

## 2019-09-05 ENCOUNTER — OFFICE VISIT (OUTPATIENT)
Dept: RHEUMATOLOGY | Facility: CLINIC | Age: 69
End: 2019-09-05
Payer: MEDICARE

## 2019-09-05 ENCOUNTER — HOSPITAL ENCOUNTER (OUTPATIENT)
Dept: RADIOLOGY | Facility: HOSPITAL | Age: 69
Discharge: HOME | End: 2019-09-05
Attending: STUDENT IN AN ORGANIZED HEALTH CARE EDUCATION/TRAINING PROGRAM
Payer: MEDICARE

## 2019-09-05 ENCOUNTER — IMMUNIZATION (OUTPATIENT)
Dept: PHARMACY | Facility: CLINIC | Age: 69
End: 2019-09-05
Payer: MEDICARE

## 2019-09-05 ENCOUNTER — IMMUNIZATION (OUTPATIENT)
Dept: PHARMACY | Facility: CLINIC | Age: 69
End: 2019-09-05

## 2019-09-05 VITALS
SYSTOLIC BLOOD PRESSURE: 142 MMHG | TEMPERATURE: 96 F | DIASTOLIC BLOOD PRESSURE: 72 MMHG | HEIGHT: 65 IN | WEIGHT: 170.88 LBS | OXYGEN SATURATION: 98 % | BODY MASS INDEX: 28.47 KG/M2 | HEART RATE: 71 BPM

## 2019-09-05 VITALS
BODY MASS INDEX: 30.23 KG/M2 | WEIGHT: 181.44 LBS | DIASTOLIC BLOOD PRESSURE: 88 MMHG | SYSTOLIC BLOOD PRESSURE: 162 MMHG | HEART RATE: 70 BPM | HEIGHT: 65 IN

## 2019-09-05 DIAGNOSIS — M06.9 RHEUMATOID ARTHRITIS OF HAND, UNSPECIFIED LATERALITY, UNSPECIFIED RHEUMATOID FACTOR PRESENCE: Chronic | ICD-10-CM

## 2019-09-05 DIAGNOSIS — R73.02 IGT (IMPAIRED GLUCOSE TOLERANCE): ICD-10-CM

## 2019-09-05 DIAGNOSIS — R53.82 CHRONIC FATIGUE: ICD-10-CM

## 2019-09-05 DIAGNOSIS — E78.5 HYPERLIPIDEMIA, UNSPECIFIED HYPERLIPIDEMIA TYPE: Chronic | ICD-10-CM

## 2019-09-05 DIAGNOSIS — F41.8 MIXED ANXIETY AND DEPRESSIVE DISORDER: ICD-10-CM

## 2019-09-05 DIAGNOSIS — M06.9 RHEUMATOID ARTHRITIS, INVOLVING UNSPECIFIED SITE, UNSPECIFIED RHEUMATOID FACTOR PRESENCE: Primary | ICD-10-CM

## 2019-09-05 DIAGNOSIS — R63.4 LOSS OF WEIGHT: ICD-10-CM

## 2019-09-05 DIAGNOSIS — M06.9 RHEUMATOID ARTHRITIS, INVOLVING UNSPECIFIED SITE, UNSPECIFIED RHEUMATOID FACTOR PRESENCE: ICD-10-CM

## 2019-09-05 DIAGNOSIS — D84.9 IMMUNOSUPPRESSED STATUS: ICD-10-CM

## 2019-09-05 DIAGNOSIS — F90.9 ADULT ADHD: ICD-10-CM

## 2019-09-05 DIAGNOSIS — I10 ESSENTIAL HYPERTENSION: Primary | ICD-10-CM

## 2019-09-05 DIAGNOSIS — M85.89 OSTEOPENIA OF MULTIPLE SITES: ICD-10-CM

## 2019-09-05 PROCEDURE — 73130 X-RAY EXAM OF HAND: CPT | Mod: 26,LT,, | Performed by: RADIOLOGY

## 2019-09-05 PROCEDURE — 3077F PR MOST RECENT SYSTOLIC BLOOD PRESSURE >= 140 MM HG: ICD-10-PCS | Mod: CPTII,S$GLB,, | Performed by: STUDENT IN AN ORGANIZED HEALTH CARE EDUCATION/TRAINING PROGRAM

## 2019-09-05 PROCEDURE — 73630 X-RAY EXAM OF FOOT: CPT | Mod: 26,LT,, | Performed by: RADIOLOGY

## 2019-09-05 PROCEDURE — 73630 XR FOOT COMPLETE 3 VIEW BILATERAL: ICD-10-PCS | Mod: 26,LT,, | Performed by: RADIOLOGY

## 2019-09-05 PROCEDURE — 1101F PR PT FALLS ASSESS DOC 0-1 FALLS W/OUT INJ PAST YR: ICD-10-PCS | Mod: CPTII,S$GLB,, | Performed by: STUDENT IN AN ORGANIZED HEALTH CARE EDUCATION/TRAINING PROGRAM

## 2019-09-05 PROCEDURE — 73630 X-RAY EXAM OF FOOT: CPT | Mod: 50,TC

## 2019-09-05 PROCEDURE — 99499 UNLISTED E&M SERVICE: CPT | Mod: S$GLB,,, | Performed by: FAMILY MEDICINE

## 2019-09-05 PROCEDURE — 73130 X-RAY EXAM OF HAND: CPT | Mod: 26,RT,, | Performed by: RADIOLOGY

## 2019-09-05 PROCEDURE — 99203 PR OFFICE/OUTPT VISIT, NEW, LEVL III, 30-44 MIN: ICD-10-PCS | Mod: S$GLB,,, | Performed by: STUDENT IN AN ORGANIZED HEALTH CARE EDUCATION/TRAINING PROGRAM

## 2019-09-05 PROCEDURE — 99999 PR PBB SHADOW E&M-EST. PATIENT-LVL IV: ICD-10-PCS | Mod: PBBFAC,,, | Performed by: FAMILY MEDICINE

## 2019-09-05 PROCEDURE — 3078F DIAST BP <80 MM HG: CPT | Mod: CPTII,S$GLB,, | Performed by: FAMILY MEDICINE

## 2019-09-05 PROCEDURE — 99203 OFFICE O/P NEW LOW 30 MIN: CPT | Mod: S$GLB,,, | Performed by: STUDENT IN AN ORGANIZED HEALTH CARE EDUCATION/TRAINING PROGRAM

## 2019-09-05 PROCEDURE — 99999 PR PBB SHADOW E&M-EST. PATIENT-LVL IV: CPT | Mod: PBBFAC,,, | Performed by: STUDENT IN AN ORGANIZED HEALTH CARE EDUCATION/TRAINING PROGRAM

## 2019-09-05 PROCEDURE — 1101F PT FALLS ASSESS-DOCD LE1/YR: CPT | Mod: CPTII,S$GLB,, | Performed by: FAMILY MEDICINE

## 2019-09-05 PROCEDURE — 1101F PT FALLS ASSESS-DOCD LE1/YR: CPT | Mod: CPTII,S$GLB,, | Performed by: STUDENT IN AN ORGANIZED HEALTH CARE EDUCATION/TRAINING PROGRAM

## 2019-09-05 PROCEDURE — 73130 PR  X-RAY HAND 3+ VW: ICD-10-PCS | Mod: 26,RT,, | Performed by: RADIOLOGY

## 2019-09-05 PROCEDURE — 99999 PR PBB SHADOW E&M-EST. PATIENT-LVL IV: ICD-10-PCS | Mod: PBBFAC,,, | Performed by: STUDENT IN AN ORGANIZED HEALTH CARE EDUCATION/TRAINING PROGRAM

## 2019-09-05 PROCEDURE — 99999 PR PBB SHADOW E&M-EST. PATIENT-LVL IV: CPT | Mod: PBBFAC,,, | Performed by: FAMILY MEDICINE

## 2019-09-05 PROCEDURE — 73130 X-RAY EXAM OF HAND: CPT | Mod: 50,TC

## 2019-09-05 PROCEDURE — 99499 RISK ADDL DX/OHS AUDIT: ICD-10-PCS | Mod: S$GLB,,, | Performed by: FAMILY MEDICINE

## 2019-09-05 PROCEDURE — 1101F PR PT FALLS ASSESS DOC 0-1 FALLS W/OUT INJ PAST YR: ICD-10-PCS | Mod: CPTII,S$GLB,, | Performed by: FAMILY MEDICINE

## 2019-09-05 PROCEDURE — 3079F DIAST BP 80-89 MM HG: CPT | Mod: CPTII,S$GLB,, | Performed by: STUDENT IN AN ORGANIZED HEALTH CARE EDUCATION/TRAINING PROGRAM

## 2019-09-05 PROCEDURE — 3078F PR MOST RECENT DIASTOLIC BLOOD PRESSURE < 80 MM HG: ICD-10-PCS | Mod: CPTII,S$GLB,, | Performed by: FAMILY MEDICINE

## 2019-09-05 PROCEDURE — 3077F SYST BP >= 140 MM HG: CPT | Mod: CPTII,S$GLB,, | Performed by: STUDENT IN AN ORGANIZED HEALTH CARE EDUCATION/TRAINING PROGRAM

## 2019-09-05 PROCEDURE — 3077F SYST BP >= 140 MM HG: CPT | Mod: CPTII,S$GLB,, | Performed by: FAMILY MEDICINE

## 2019-09-05 PROCEDURE — 3079F PR MOST RECENT DIASTOLIC BLOOD PRESSURE 80-89 MM HG: ICD-10-PCS | Mod: CPTII,S$GLB,, | Performed by: STUDENT IN AN ORGANIZED HEALTH CARE EDUCATION/TRAINING PROGRAM

## 2019-09-05 PROCEDURE — 3077F PR MOST RECENT SYSTOLIC BLOOD PRESSURE >= 140 MM HG: ICD-10-PCS | Mod: CPTII,S$GLB,, | Performed by: FAMILY MEDICINE

## 2019-09-05 PROCEDURE — 73630 X-RAY EXAM OF FOOT: CPT | Mod: 26,RT,, | Performed by: RADIOLOGY

## 2019-09-05 PROCEDURE — 99214 PR OFFICE/OUTPT VISIT, EST, LEVL IV, 30-39 MIN: ICD-10-PCS | Mod: S$GLB,,, | Performed by: FAMILY MEDICINE

## 2019-09-05 PROCEDURE — 99214 OFFICE O/P EST MOD 30 MIN: CPT | Mod: S$GLB,,, | Performed by: FAMILY MEDICINE

## 2019-09-05 RX ORDER — LEVOCETIRIZINE DIHYDROCHLORIDE 5 MG/1
TABLET, FILM COATED ORAL
Refills: 2 | COMMUNITY
Start: 2019-08-09 | End: 2020-01-17 | Stop reason: SDUPTHER

## 2019-09-05 RX ORDER — DEXTROAMPHETAMINE SACCHARATE, AMPHETAMINE ASPARTATE, DEXTROAMPHETAMINE SULFATE AND AMPHETAMINE SULFATE 5; 5; 5; 5 MG/1; MG/1; MG/1; MG/1
1 TABLET ORAL 2 TIMES DAILY
Refills: 0 | COMMUNITY
Start: 2019-08-09 | End: 2019-09-05

## 2019-09-05 RX ORDER — ESCITALOPRAM OXALATE 5 MG/1
5 TABLET ORAL DAILY
Qty: 30 TABLET | Refills: 11 | Status: SHIPPED | OUTPATIENT
Start: 2019-09-05 | End: 2019-11-27

## 2019-09-05 NOTE — PROGRESS NOTES
Subjective:       Patient ID: Smitha Salnias is a 69 y.o. female.    Chief Complaint: Follow-up (6mo rtc)    HPIhtn typically controlled  Wt loss: lots of stress(no recurr abd pain) ; back in home but still stressors  ADHD dx and tx via neuromed. Tried adderall but too stimulating  RA plans to [facility]  Chronc pain sees dr sharpe pain mgmt;no longer seeing  intermitt anxiety prn infre bnzolipi; no si.   igt rcent lab;d/wd lab and diet/exerc  intermitt insomniia taking benadryl    occas n/v went to gi clinic but plans to see another;had egd;nl bms    Wt loss she attrib to stress;appet is ok though    Past Medical History:   Diagnosis Date    [facility]    Chronic rheumatic arthritis     dr tai    Colon polyps     Ex-smoker     Genital herpes     Herpes zoster infection     Hyperlipidemia     Hypertension     Immunosuppressed status     Lichen sclerosus et atrophicus     SUKHDEEP (obstructive sleep apnea)     Osteopenia     5/16 wai 5/18(start fmax if on pred 3months or more)     Past Surgical History:   Procedure Laterality Date    BLADDER SURGERY  8/2012    Dr Claudia Lamar    BREAST BIOPSY      CRYOTHERAPY  1980    SKIN GRAFT  1965    laceration to right  fingers  from thigh     TONSILLECTOMY  1958     Family History   Problem Relation Age of Onset    Heart disease Mother     Diabetes Mother     Stroke Father     Kidney disease Father     Cancer Sister 39        breast    Breast cancer Sister     Cancer Other 68        breast    Stroke Maternal Grandmother     Stroke Paternal Grandmother     Stroke Paternal Grandfather      Social History     Socioeconomic History    Marital status:      Spouse name: Not on file    Number of children: 1    Years of education: Not on file    Highest education level: Not on file   Occupational History    Occupation:      Employer: [employer]    Social Needs    Financial resource strain: Not on file    Food  insecurity:     Worry: Not on file     Inability: Not on file    Transportation needs:     Medical: Not on file     Non-medical: Not on file   Tobacco Use    Smoking status: Former Smoker     Last attempt to quit: 2013     Years since quittin.1    Smokeless tobacco: Never Used    Tobacco comment: smokes for a few weeks per year - when under pressure. has been abstinent times years at a time. a pack lasts about a week   Substance and Sexual Activity    Alcohol use: Yes     Alcohol/week: 0.0 oz    Drug use: No    Sexual activity: Not Currently     Birth control/protection: Post-menopausal   Lifestyle    Physical activity:     Days per week: Not on file     Minutes per session: Not on file    Stress: Not on file   Relationships    Social connections:     Talks on phone: Not on file     Gets together: Not on file     Attends Sabianist service: Not on file     Active member of club or organization: Not on file     Attends meetings of clubs or organizations: Not on file     Relationship status: Not on file   Other Topics Concern    Not on file   Social History Narrative    Lives alone. Caffeine intake rare -1-2 per week of coffee. Does not have a Living Will or Advanced Directive       Review of Systems  Cardiovascular: no chest pain  Chest: no shortness of breath  Abd: no abd pain  Remainder review of systems negative    Objective:      Physical Exam   Constitutional: She appears well-developed and well-nourished.   Cardiovascular: Normal rate and regular rhythm.   Pulmonary/Chest: Effort normal and breath sounds normal.       Assessment:       1. Essential hypertension    2. IGT (impaired glucose tolerance)    3. Rheumatoid arthritis of hand, unspecified laterality, unspecified rheumatoid factor presence    4. Hyperlipidemia, unspecified hyperlipidemia type    5. Immunosuppressed status    6. Osteopenia of multiple sites    7. Adult ADHD      wt loss  Stressors  Depression  Plan:     also lab  today  **Lab and follow up after in 6 months  Determine +/-asa at a f/u  Nery bp at f/u  D/wd counseling  Gisela- to schedule counseling [phone](she defers meds for mood or insomnia)  *  .F/u me couple weeks  lexapro (hx higher dose in past sedating)  ab and follow up after in 6 months  Also lab today . Please make sure correct ones  F/ume couple weeks      Essential hypertension  -     Comprehensive metabolic panel; Future; Expected date: 03/03/2020  -     Lipid panel; Future; Expected date: 03/03/2020  -     TSH; Future; Expected date: 03/03/2020    IGT (impaired glucose tolerance)  -     Hemoglobin A1c; Future; Expected date: 03/03/2020    Rheumatoid arthritis of hand, unspecified laterality, unspecified rheumatoid factor presence    Hyperlipidemia, unspecified hyperlipidemia type    Immunosuppressed status    Osteopenia of multiple sites    Adult ADHD    Mixed anxiety and depressive disorder    Loss of weight  -     TSH; Future; Expected date: 09/05/2019  -     Comprehensive metabolic panel; Future; Expected date: 09/05/2019  -     CBC auto differential; Future; Expected date: 09/05/2019    Other orders  -     escitalopram oxalate (LEXAPRO) 5 MG Tab; Take 1 tablet (5 mg total) by mouth once daily.  Dispense: 30 tablet; Refill: 11

## 2019-09-09 NOTE — PROGRESS NOTES
RHEUMATOLOGY OUTPATIENT CLINIC NOTE        Attending Rheumatologist: Lois Aguila  Primary Care Provider: Jayseh Jaramillo MD   Physician Requesting Consultation: Aaareferral Self  No address on file  Chief Complaint/Reason For Consultation:  RA    Subjective:       HPI  Smitha Salinas is a 69 y.o. AAF presents to establish care for hx rheumatoid arthritis. On actemra x past 4 years. Reports overall feeling well. No prolonged AM stiffness. Previously followed w Dr Clinton, last seen 1/2019. Hx mtx but stopped 2.2 genital herpes flares. No other acute issues or complaints.    14pt ROS negative xcept as otherwise stated above    Review of Systems   Constitutional: Negative for chills, fever and weight loss.   HENT: Negative for congestion, sinus pain and sore throat.    Eyes: Negative for blurred vision, double vision and pain.   Respiratory: Negative for cough and shortness of breath.    Cardiovascular: Negative for chest pain, palpitations and claudication.   Gastrointestinal: Negative for abdominal pain, nausea and vomiting.   Genitourinary: Negative for dysuria and frequency.   Musculoskeletal: Positive for joint pain. Negative for falls.   Skin: Negative for itching and rash.   Neurological: Negative for dizziness and weakness.   Endo/Heme/Allergies: Negative for polydipsia. Does not bruise/bleed easily.       Chronic comorbid conditions affecting medical decision making today:  Past Medical History:   Diagnosis Date    [facility]    Chronic rheumatic arthritis     dr clinton    Colon polyps     Ex-smoker     Genital herpes     Herpes zoster infection     Hyperlipidemia     Hypertension     IGT (impaired glucose tolerance)     Immunosuppressed status     Lichen sclerosus et atrophicus     SUKHDEEP (obstructive sleep apnea)     Osteopenia     5/16 wai 5/18(start fmax if on pred 3months or more)     Past Surgical History:   Procedure Laterality Date    BLADDER SURGERY  8/2012     Dr Claudia Lamar    BREAST BIOPSY      CRYOTHERAPY      SKIN GRAFT      laceration to right  fingers  from thigh     TONSILLECTOMY       Family History   Problem Relation Age of Onset    Heart disease Mother     Diabetes Mother     Stroke Father     Kidney disease Father     Cancer Sister 39        breast    Breast cancer Sister     Cancer Other 68        breast    Stroke Maternal Grandmother     Stroke Paternal Grandmother     Stroke Paternal Grandfather      Social History     Substance and Sexual Activity   Alcohol Use Yes    Alcohol/week: 0.0 oz     Social History     Tobacco Use   Smoking Status Former Smoker    Last attempt to quit: 2013    Years since quittin.1   Smokeless Tobacco Never Used   Tobacco Comment    smokes for a few weeks per year - when under pressure. has been abstinent times years at a time. a pack lasts about a week     Social History     Substance and Sexual Activity   Drug Use No       Current Outpatient Medications:     acyclovir (ZOVIRAX) 400 MG tablet, Take 1 tablet (400 mg total) by mouth once daily., Disp: 90 tablet, Rfl: 3    amLODIPine (NORVASC) 5 MG tablet, TAKE ONE TABLET BY MOUTH ONCE DAILY, Disp: 30 tablet, Rfl: 5    aspirin (ECOTRIN) 81 MG EC tablet, Take 81 mg by mouth once daily., Disp: , Rfl:     cholecalciferol, vitamin D3, 1,000 unit capsule, Take 1 capsule by mouth once daily., Disp: , Rfl:     cloNIDine (CATAPRES) 0.2 MG tablet, TAKE 1/2 TABLET IN THE MORNING, 1/2 TABLET AT NOON,   THEN 2 TABLETS AT BEDTIME (Patient taking differently: TAKE 1/2 TABLET IN THE MORNING, 1/2 TABLET AT NOON,   THEN 2 TABLETS AT BEDTIME), Disp: 270 tablet, Rfl: 3    cyclobenzaprine (FLEXERIL) 10 MG tablet, Take 1 tablet (10 mg total) by mouth as needed for Muscle spasms., Disp: 30 tablet, Rfl: 5    diazePAM (VALIUM) 5 MG tablet, TAKE ONE TABLET BY MOUTH AT BEDTIME FOR ANXIETY, Disp: 30 tablet, Rfl: 2    escitalopram oxalate (LEXAPRO) 5 MG Tab, Take  1 tablet (5 mg total) by mouth once daily., Disp: 30 tablet, Rfl: 11    levocetirizine (XYZAL) 5 MG tablet, TAKE 1 TABLET BY MOUTH ONCE DAILY IN THE EVENING FOR 30 DAYS, Disp: , Rfl: 2    mirabegron (MYRBETRIQ) 50 mg Tb24, Take 1 tablet (50 mg total) by mouth once daily., Disp: 30 tablet, Rfl: 11    ondansetron (ZOFRAN-ODT) 4 MG TbDL, Take 1 tablet (4 mg total) by mouth every 8 (eight) hours as needed (nausea)., Disp: 10 tablet, Rfl: 1    potassium chloride SA (K-DUR,KLOR-CON) 20 MEQ tablet, Take 1 tablet (20 mEq total) by mouth once daily. 1 tablet,ER particles/crystals Oral Twice a day, Disp: 60 tablet, Rfl: 5    pravastatin (PRAVACHOL) 40 MG tablet, Take 1 tablet (40 mg total) by mouth once daily., Disp: 90 tablet, Rfl: 3    tocilizumab (ACTEMRA) 162 mg/0.9 mL Syrg, Inject 162 mg into the skin., Disp: , Rfl:     traMADol (ULTRAM) 50 mg tablet, Take 2 tablets (100 mg total) by mouth every 8 (eight) hours as needed for Pain., Disp: 30 tablet, Rfl: 0       Objective:         Vitals:    09/05/19 1054   BP: (!) 162/88   Pulse: 70     Physical Exam   Nursing note and vitals reviewed.  Constitutional: She is oriented to person, place, and time and well-developed, well-nourished, and in no distress.   HENT:   Head: Normocephalic.   Mouth/Throat: Oropharynx is clear and moist.   Eyes: Conjunctivae are normal. Pupils are equal, round, and reactive to light.   Neck: Normal range of motion. Neck supple.   Cardiovascular: Normal rate and normal heart sounds.    Pulmonary/Chest: Effort normal. No respiratory distress.   Abdominal: Soft. There is no tenderness.   Neurological: She is alert and oriented to person, place, and time.   Skin: Skin is warm. No rash noted. No erythema.     Psychiatric: Memory and affect normal.   Musculoskeletal: She exhibits no edema, tenderness or deformity.   +fullness vs synovial hypertrophy in mcps b/l. Mild pain w lateral squeeze. No nodules. No effusions over large joints            Reviewed old and all outside pertinent medical records available.    All lab results personally reviewed and interpreted by me.  Lab Results   Component Value Date    WBC 6.18 09/05/2019    WBC 5.95 09/05/2019    HGB 13.5 09/05/2019    HGB 13.6 09/05/2019    HCT 43.2 09/05/2019    HCT 42.2 09/05/2019    MCV 90 09/05/2019    MCV 86 09/05/2019    MCH 28.0 09/05/2019    MCH 27.8 09/05/2019    MCHC 31.3 (L) 09/05/2019    MCHC 32.2 09/05/2019    RDW 13.9 09/05/2019    RDW 13.8 09/05/2019     (L) 09/05/2019     09/05/2019    MPV SEE COMMENT 09/05/2019    MPV 13.9 (H) 09/05/2019    PLTEST Adequate 05/14/2013       Lab Results   Component Value Date     09/05/2019     09/05/2019    K 3.8 09/05/2019    K 3.9 09/05/2019     09/05/2019     09/05/2019    CO2 26 09/05/2019    CO2 25 09/05/2019     09/05/2019     09/05/2019    BUN 14 09/05/2019    BUN 13 09/05/2019    CALCIUM 10.4 09/05/2019    CALCIUM 10.2 09/05/2019    PROT 7.9 09/05/2019    PROT 7.7 09/05/2019    ALBUMIN 5.0 09/05/2019    ALBUMIN 4.7 09/05/2019    BILITOT 0.5 09/05/2019    BILITOT 0.5 09/05/2019    AST 23 09/05/2019    AST 22 09/05/2019    ALKPHOS 86 09/05/2019    ALKPHOS 88 09/05/2019    ALT 36 09/05/2019    ALT 36 09/05/2019       Lab Results   Component Value Date    COLORU Yellow 12/26/2013    APPEARANCEUA Cloudy (A) 12/26/2013    SPECGRAV 1.025 12/26/2013    PHUR 6.0 12/26/2013    PROTEINUA Negative 12/26/2013    KETONESU Negative 12/26/2013    LEUKOCYTESUR Negative 12/26/2013    NITRITE Negative 12/26/2013    UROBILINOGEN Negative 12/26/2013       Lab Results   Component Value Date    CRP 0.1 09/05/2019       Lab Results   Component Value Date    SEDRATE <2 09/05/2019       Lab Results   Component Value Date     (H) 03/19/2012    SEDRATE <2 09/05/2019       No components found for: 25OHVITDTOT, 84ELLEHE2, 45BTXTIY9, METHODNOTE    Lab Results   Component Value Date    URICACID 2.6  03/07/2009       No components found for: TSPOTTB      Imaging:     ASSESSMENT / PLAN:     Smitha Salinas is a 69 y.o. Black or  female with:      1. Rheumatoid arthritis, involving unspecified site, unspecified rheumatoid factor presence  -Presents for initial evaluation of hx RA. Previously f/w Dr Clinton  -RF+/CCP+ nonerosive arthritis  -On actemra x past 4 years, stable.   -No synovitis on exam. C/w actemra. Labs as below. Will update hand/foot XR to assess for progression.  - Comprehensive metabolic panel; Future  - CBC auto differential; Standing  - Sedimentation rate; Standing  - C-reactive protein; Future  - Hepatitis panel, acute; Future  - Quantiferon Gold TB; Future  - X-Ray Hand Complete Bilateral; Future  - X-Ray Foot Complete Bilateral; Future         Follow up in about 8 weeks (around 10/31/2019).    Method of contact with patient concerns: [facility]      Lois Aguila M.D.  Rheumatology Department   [facility] [place] [address], [place] [address]  Phone: [phone]  Fax: [phone]

## 2019-09-17 ENCOUNTER — TELEPHONE (OUTPATIENT)
Dept: RHEUMATOLOGY | Facility: CLINIC | Age: 69
End: 2019-09-17

## 2019-09-18 ENCOUNTER — TELEPHONE (OUTPATIENT)
Dept: RHEUMATOLOGY | Facility: CLINIC | Age: 69
End: 2019-09-18

## 2019-09-18 NOTE — TELEPHONE ENCOUNTER
Spoke with Mrs. Salinas informed her that I faxed the enrollment form for [facility]

## 2019-09-18 NOTE — TELEPHONE ENCOUNTER
----- Message from Christiano Todd sent at 9/18/2019 11:19 AM CDT -----  ..Type:  Patient Returning Call    Who Called: pt   Who Left Message for Patient:  Does the patient know what this is regarding?: prescription   Would the patient rather a call back or a response via [facility] [employer] [facility]? Call back   Best Call Back Number: [phone]  Additional Information: pt is requesting a call from nurse to f/u on a form  . Pt states she is out of medication [facility] pharmacy [phone]. Pt states please contact after cause she has to be at home to sign for it

## 2019-09-18 NOTE — TELEPHONE ENCOUNTER
----- Message from Mary Maciel sent at 9/18/2019  4:05 PM CDT -----  Contact: Pt  Pt is requesting call back in regards to fax number        [id number]  Fax:[phone]    Pls call back at [phone]

## 2019-09-19 ENCOUNTER — TELEPHONE (OUTPATIENT)
Dept: RHEUMATOLOGY | Facility: CLINIC | Age: 69
End: 2019-09-19

## 2019-09-19 NOTE — TELEPHONE ENCOUNTER
----- Message from Joseline Nascimento sent at 9/19/2019  9:10 AM CDT -----  Contact: Pt  Pt would like nurse to contact her regarding her medication. Pt states she is waiting on her medication to be fax over to pharmacy. Please contact pt at ([phone])  (tocilizumab (ACTEMRA) 162 mg/0.9 mL Syrg)      [employer]   [phone]

## 2019-09-19 NOTE — TELEPHONE ENCOUNTER
----- Message from Joseline Nascimento sent at 9/19/2019  9:10 AM CDT -----  Contact: Pt  Pt would like nurse to contact her regarding her medication. Pt states she is waiting on her medication to be fax over to pharmacy. Please contact pt at ([phone])  (tocilizumab (ACTEMRA) 162 mg/0.9 mL Syrg)      [employer]   [phone]

## 2019-09-23 NOTE — PROGRESS NOTES
Patient, Smitha Salinas (MRN #[id number]), presented with a recent Platelet count less than 150 K/uL consistent with the definition of thrombocytopenia (ICD10 - D69.6).    Platelets   Date Value Ref Range Status   09/05/2019 149 (L) 150 - 350 K/uL Final   09/05/2019 153 150 - 350 K/uL Final     The patient's thrombocytopenia was monitored, evaluated, addressed and/or treated. This addendum to the medical record is made on 09/22/2019.

## 2019-09-26 ENCOUNTER — OFFICE VISIT (OUTPATIENT)
Dept: OBSTETRICS AND GYNECOLOGY | Facility: CLINIC | Age: 69
End: 2019-09-26
Payer: MEDICARE

## 2019-09-26 VITALS
HEIGHT: 65 IN | SYSTOLIC BLOOD PRESSURE: 134 MMHG | DIASTOLIC BLOOD PRESSURE: 78 MMHG | WEIGHT: 182.75 LBS | BODY MASS INDEX: 30.45 KG/M2

## 2019-09-26 DIAGNOSIS — Z01.419 WELL WOMAN EXAM WITH ROUTINE GYNECOLOGICAL EXAM: Primary | ICD-10-CM

## 2019-09-26 DIAGNOSIS — Z12.39 BREAST CANCER SCREENING: ICD-10-CM

## 2019-09-26 PROCEDURE — 3078F PR MOST RECENT DIASTOLIC BLOOD PRESSURE < 80 MM HG: ICD-10-PCS | Mod: CPTII,S$GLB,, | Performed by: OBSTETRICS & GYNECOLOGY

## 2019-09-26 PROCEDURE — 99999 PR PBB SHADOW E&M-EST. PATIENT-LVL III: CPT | Mod: PBBFAC,,, | Performed by: OBSTETRICS & GYNECOLOGY

## 2019-09-26 PROCEDURE — 99999 PR PBB SHADOW E&M-EST. PATIENT-LVL III: ICD-10-PCS | Mod: PBBFAC,,, | Performed by: OBSTETRICS & GYNECOLOGY

## 2019-09-26 PROCEDURE — 3075F SYST BP GE 130 - 139MM HG: CPT | Mod: CPTII,S$GLB,, | Performed by: OBSTETRICS & GYNECOLOGY

## 2019-09-26 PROCEDURE — G0101 CA SCREEN;PELVIC/BREAST EXAM: HCPCS | Mod: S$GLB,,, | Performed by: OBSTETRICS & GYNECOLOGY

## 2019-09-26 PROCEDURE — G0101 PR CA SCREEN;PELVIC/BREAST EXAM: ICD-10-PCS | Mod: S$GLB,,, | Performed by: OBSTETRICS & GYNECOLOGY

## 2019-09-26 PROCEDURE — 3078F DIAST BP <80 MM HG: CPT | Mod: CPTII,S$GLB,, | Performed by: OBSTETRICS & GYNECOLOGY

## 2019-09-26 PROCEDURE — 3075F PR MOST RECENT SYSTOLIC BLOOD PRESS GE 130-139MM HG: ICD-10-PCS | Mod: CPTII,S$GLB,, | Performed by: OBSTETRICS & GYNECOLOGY

## 2019-09-26 RX ORDER — BUMETANIDE 1 MG/1
TABLET ORAL
COMMUNITY
Start: 2016-01-04 | End: 2020-10-07

## 2019-09-26 RX ORDER — DEXTROAMPHETAMINE SACCHARATE, AMPHETAMINE ASPARTATE, DEXTROAMPHETAMINE SULFATE AND AMPHETAMINE SULFATE 5; 5; 5; 5 MG/1; MG/1; MG/1; MG/1
TABLET ORAL
COMMUNITY
Start: 2019-08-07 | End: 2020-03-12

## 2019-09-26 RX ORDER — CYCLOBENZAPRINE HCL 5 MG
TABLET ORAL
COMMUNITY
End: 2019-11-27

## 2019-09-26 RX ORDER — HYDROXYZINE HYDROCHLORIDE 25 MG/1
TABLET, FILM COATED ORAL
COMMUNITY
End: 2019-09-26

## 2019-09-26 RX ORDER — POLYETHYLENE GLYCOL 3350 17 G/17G
17 POWDER, FOR SOLUTION ORAL DAILY PRN
COMMUNITY
End: 2020-10-07

## 2019-09-26 RX ORDER — DIAZEPAM 5 MG/1
TABLET ORAL EVERY 12 HOURS PRN
COMMUNITY
Start: 2014-04-29 | End: 2020-01-28

## 2019-09-26 RX ORDER — ACYCLOVIR 800 MG/1
TABLET ORAL
COMMUNITY
End: 2020-02-18 | Stop reason: SDUPTHER

## 2019-09-26 RX ORDER — POTASSIUM CHLORIDE 1.5 G/1.58G
POWDER, FOR SOLUTION ORAL
COMMUNITY
Start: 2017-01-09 | End: 2019-10-10 | Stop reason: DRUGHIGH

## 2019-09-26 RX ORDER — TRAMADOL HYDROCHLORIDE 50 MG/1
TABLET ORAL
COMMUNITY
Start: 2009-02-05 | End: 2019-11-27

## 2019-09-26 RX ORDER — ACYCLOVIR 800 MG/1
TABLET ORAL
Status: ON HOLD | COMMUNITY
Start: 2016-03-22 | End: 2020-02-12 | Stop reason: HOSPADM

## 2019-09-26 RX ORDER — METRONIDAZOLE 500 MG/1
TABLET ORAL
Status: ON HOLD | COMMUNITY
End: 2020-02-12 | Stop reason: HOSPADM

## 2019-09-26 NOTE — PROGRESS NOTES
Subjective:       Patient ID: Smitha Salinas is a 69 y.o. female.    Chief Complaint:  No chief complaint on file.      History of Present Illness  HPI  Presents for well-woman exam.  No gyn complaints.  Pt is not sexually active.  Has lichen sclerosus.  Uses clobetasol sparingly as needed for itching and irritation.  She has urge incontinence well-controlled with Myrbetriq.   Last pap: 2016: normal; no hx of dysplasia  MMG: 10/2018: normal  Has a colonoscopy scheduled at [facility] next month  DXA: 2018: osteopenia; low FRAX    GYN & OB History  No LMP recorded. Patient is postmenopausal.   Date of Last Pap: 10/6/2016    OB History    Para Term  AB Living   1 1 1     1   SAB TAB Ectopic Multiple Live Births                  # Outcome Date GA Lbr Law/2nd Weight Sex Delivery Anes PTL Lv   1 Term                Review of Systems  Review of Systems   Constitutional: Negative for fatigue, fever and unexpected weight change.   Gastrointestinal: Negative for abdominal pain, bloating, blood in stool, constipation, diarrhea, nausea and vomiting.   Endocrine: Negative for hot flashes.   Genitourinary: Negative for dysuria, flank pain, frequency, genital sores, hematuria, pelvic pain, urgency, vaginal bleeding, vaginal discharge, vaginal pain, urinary incontinence, postmenopausal bleeding and vaginal odor.   Integumentary:  Negative for rash, hair changes, breast mass, nipple discharge and breast skin changes.   Psychiatric/Behavioral: Negative for depression. The patient is not nervous/anxious.    Breast: Negative for mass, mastodynia, nipple discharge and skin changes          Objective:    Physical Exam:   Constitutional: She is oriented to person, place, and time. She appears well-developed and well-nourished. No distress.      Neck: Neck supple. No thyromegaly present.     Pulmonary/Chest: Right breast exhibits no inverted nipple, no mass, no nipple discharge, no skin change, no tenderness, no  bleeding and no swelling. Left breast exhibits no inverted nipple, no mass, no nipple discharge, no skin change, no tenderness, no bleeding and no swelling. Breasts are symmetrical.        Abdominal: Soft. She exhibits no distension and no mass. There is no tenderness. There is no rebound and no guarding.     Genitourinary:       Pelvic exam was performed with patient supine. There is no rash, tenderness, lesion or injury on the right labia. There is no rash, tenderness, lesion or injury on the left labia. Uterus is not deviated, not enlarged, not fixed, not tender, not hosting fibroids and not experiencing uterine prolapse. Cervix is normal. Right adnexum displays no mass, no tenderness and no fullness. Left adnexum displays no mass, no tenderness and no fullness. No erythema, tenderness, bleeding, rectocele or cystocele in the vagina. No foreign body in the vagina. No signs of injury around the vagina. No vaginal discharge found. Cervix exhibits no motion tenderness, no discharge and no friability.        Uterus Size: 6 cm      Lymphadenopathy:        Right: No inguinal adenopathy present.        Left: No inguinal adenopathy present.    Neurological: She is alert and oriented to person, place, and time.     Psychiatric: She has a normal mood and affect.          Assessment:       Diagnoses and all orders for this visit:    Well woman exam with routine gynecological exam    Breast cancer screening  -     Mammo Digital Screening Bilat; Future    Lichen sclerosis         Plan:      Diagnoses and all orders for this visit:    Well woman exam with routine gynecological exam    Breast cancer screening  -     Mammo Digital Screening Bilat; Future    Counseled pt on recommendations for Calcium and Vit D supplementation.  Pt no longer needs pap smears.  Needs a pelvic exam q 2 years.  Advised on annual CBE and annual mammogram.  Okay to use clobetasol ointment as needed for irritation related to lichen sclerosus.  States  she does not need a refill.  RTC 2 years.

## 2019-10-04 ENCOUNTER — PATIENT MESSAGE (OUTPATIENT)
Dept: INTERNAL MEDICINE | Facility: CLINIC | Age: 69
End: 2019-10-04

## 2019-10-04 DIAGNOSIS — I10 ESSENTIAL HYPERTENSION: Primary | ICD-10-CM

## 2019-10-04 RX ORDER — POTASSIUM CHLORIDE 1.5 G/1.58G
POWDER, FOR SOLUTION ORAL
Qty: 14 PACKET | Refills: 0 | Status: SHIPPED | OUTPATIENT
Start: 2019-10-04 | End: 2019-10-10 | Stop reason: SDUPTHER

## 2019-10-04 RX ORDER — POTASSIUM CHLORIDE 1.5 G/1.58G
POWDER, FOR SOLUTION ORAL
Qty: 90 PACKET | Refills: 3 | OUTPATIENT
Start: 2019-10-04

## 2019-10-04 NOTE — TELEPHONE ENCOUNTER
See [employer] message, refill request sent to Dr. Jaramillo for authorization [K+].    LV 09/05/19  NV 03/12/20    Last CMP 09/05/19

## 2019-10-08 ENCOUNTER — IMMUNIZATION (OUTPATIENT)
Dept: PHARMACY | Facility: CLINIC | Age: 69
End: 2019-10-08
Payer: MEDICARE

## 2019-10-10 ENCOUNTER — PATIENT MESSAGE (OUTPATIENT)
Dept: INTERNAL MEDICINE | Facility: CLINIC | Age: 69
End: 2019-10-10

## 2019-10-10 DIAGNOSIS — I10 ESSENTIAL HYPERTENSION: ICD-10-CM

## 2019-10-10 NOTE — TELEPHONE ENCOUNTER
See mychart message, chart & HM updated w/ Shingrix immun. Potassium refill request #[id number] sent to Dr. Jaramillo for authorization.    LV 09/05/19  NV 03/12/20    Last CMP 09/05/19

## 2019-10-14 RX ORDER — POTASSIUM CHLORIDE 20 MEQ/1
20 TABLET, EXTENDED RELEASE ORAL DAILY
Qty: 90 TABLET | Refills: 4 | Status: SHIPPED | OUTPATIENT
Start: 2019-10-14 | End: 2019-10-15 | Stop reason: SDUPTHER

## 2019-10-15 ENCOUNTER — PATIENT MESSAGE (OUTPATIENT)
Dept: INTERNAL MEDICINE | Facility: CLINIC | Age: 69
End: 2019-10-15

## 2019-10-15 ENCOUNTER — TELEPHONE (OUTPATIENT)
Dept: INTERNAL MEDICINE | Facility: CLINIC | Age: 69
End: 2019-10-15

## 2019-10-15 DIAGNOSIS — I10 ESSENTIAL HYPERTENSION: ICD-10-CM

## 2019-10-15 NOTE — TELEPHONE ENCOUNTER
----- Message from Mireya Kemp sent at 10/15/2019  9:31 AM CDT -----  Contact: patient  Type:  RX Refill Request    Who Called: PATIENT  Refill or New Rx:REFILL  RX Name and Strength:POTASSIUM CHLORIDE 20 MEQ TABLETS  How is the patient currently taking it? (ex. 1XDay):1 TAB DAILY  Is this a 30 day or 90 day RX:90 DAY  Preferred Pharmacy with phone number:  [facility] - [place], [place] - [address] AT [address] [facility]  [address]  [facility] [address]  Phone: [phone] Fax: [phone] - USE THIS FAX # [phone]    Local or Mail Order:MAIL ORDER  Ordering Provider:CHELSEY  Would the patient rather a call back or a response via [facility]? CALL  Best Call Back Number:[phone]  Additional Information: PLEASE CALL PATIENT BACK

## 2019-10-16 RX ORDER — POTASSIUM CHLORIDE 20 MEQ/1
20 TABLET, EXTENDED RELEASE ORAL DAILY
Qty: 90 TABLET | Refills: 4 | Status: SHIPPED | OUTPATIENT
Start: 2019-10-16 | End: 2020-02-18 | Stop reason: SDUPTHER

## 2019-10-17 ENCOUNTER — HOSPITAL ENCOUNTER (OUTPATIENT)
Dept: RADIOLOGY | Facility: HOSPITAL | Age: 69
Discharge: HOME | End: 2019-10-17
Attending: OBSTETRICS & GYNECOLOGY
Payer: MEDICARE

## 2019-10-17 DIAGNOSIS — Z12.31 BREAST CANCER SCREENING BY MAMMOGRAM: ICD-10-CM

## 2019-10-17 DIAGNOSIS — Z12.39 BREAST CANCER SCREENING: ICD-10-CM

## 2019-10-17 PROCEDURE — 77067 SCR MAMMO BI INCL CAD: CPT | Mod: 26,,, | Performed by: RADIOLOGY

## 2019-10-17 PROCEDURE — 77063 MAMMO DIGITAL SCREENING BILAT WITH TOMOSYNTHESIS_CAD: ICD-10-PCS | Mod: 26,,, | Performed by: RADIOLOGY

## 2019-10-17 PROCEDURE — 77067 SCR MAMMO BI INCL CAD: CPT | Mod: TC

## 2019-10-17 PROCEDURE — 77063 BREAST TOMOSYNTHESIS BI: CPT | Mod: 26,,, | Performed by: RADIOLOGY

## 2019-10-17 PROCEDURE — 77067 MAMMO DIGITAL SCREENING BILAT WITH TOMOSYNTHESIS_CAD: ICD-10-PCS | Mod: 26,,, | Performed by: RADIOLOGY

## 2019-10-31 ENCOUNTER — OFFICE VISIT (OUTPATIENT)
Dept: RHEUMATOLOGY | Facility: CLINIC | Age: 69
End: 2019-10-31
Payer: MEDICARE

## 2019-10-31 VITALS
WEIGHT: 184.31 LBS | HEART RATE: 53 BPM | HEIGHT: 65 IN | BODY MASS INDEX: 30.71 KG/M2 | DIASTOLIC BLOOD PRESSURE: 82 MMHG | SYSTOLIC BLOOD PRESSURE: 136 MMHG

## 2019-10-31 DIAGNOSIS — M06.9 RHEUMATOID ARTHRITIS, INVOLVING UNSPECIFIED SITE, UNSPECIFIED RHEUMATOID FACTOR PRESENCE: Primary | ICD-10-CM

## 2019-10-31 PROCEDURE — 3075F PR MOST RECENT SYSTOLIC BLOOD PRESS GE 130-139MM HG: ICD-10-PCS | Mod: CPTII,S$GLB,, | Performed by: STUDENT IN AN ORGANIZED HEALTH CARE EDUCATION/TRAINING PROGRAM

## 2019-10-31 PROCEDURE — 3075F SYST BP GE 130 - 139MM HG: CPT | Mod: CPTII,S$GLB,, | Performed by: STUDENT IN AN ORGANIZED HEALTH CARE EDUCATION/TRAINING PROGRAM

## 2019-10-31 PROCEDURE — 99499 RISK ADDL DX/OHS AUDIT: ICD-10-PCS | Mod: S$GLB,,, | Performed by: STUDENT IN AN ORGANIZED HEALTH CARE EDUCATION/TRAINING PROGRAM

## 2019-10-31 PROCEDURE — 1101F PT FALLS ASSESS-DOCD LE1/YR: CPT | Mod: CPTII,S$GLB,, | Performed by: STUDENT IN AN ORGANIZED HEALTH CARE EDUCATION/TRAINING PROGRAM

## 2019-10-31 PROCEDURE — 3079F PR MOST RECENT DIASTOLIC BLOOD PRESSURE 80-89 MM HG: ICD-10-PCS | Mod: CPTII,S$GLB,, | Performed by: STUDENT IN AN ORGANIZED HEALTH CARE EDUCATION/TRAINING PROGRAM

## 2019-10-31 PROCEDURE — 99999 PR PBB SHADOW E&M-EST. PATIENT-LVL IV: CPT | Mod: PBBFAC,,, | Performed by: STUDENT IN AN ORGANIZED HEALTH CARE EDUCATION/TRAINING PROGRAM

## 2019-10-31 PROCEDURE — 99213 OFFICE O/P EST LOW 20 MIN: CPT | Mod: S$GLB,,, | Performed by: STUDENT IN AN ORGANIZED HEALTH CARE EDUCATION/TRAINING PROGRAM

## 2019-10-31 PROCEDURE — 99213 PR OFFICE/OUTPT VISIT, EST, LEVL III, 20-29 MIN: ICD-10-PCS | Mod: S$GLB,,, | Performed by: STUDENT IN AN ORGANIZED HEALTH CARE EDUCATION/TRAINING PROGRAM

## 2019-10-31 PROCEDURE — 1101F PR PT FALLS ASSESS DOC 0-1 FALLS W/OUT INJ PAST YR: ICD-10-PCS | Mod: CPTII,S$GLB,, | Performed by: STUDENT IN AN ORGANIZED HEALTH CARE EDUCATION/TRAINING PROGRAM

## 2019-10-31 PROCEDURE — 99999 PR PBB SHADOW E&M-EST. PATIENT-LVL IV: ICD-10-PCS | Mod: PBBFAC,,, | Performed by: STUDENT IN AN ORGANIZED HEALTH CARE EDUCATION/TRAINING PROGRAM

## 2019-10-31 PROCEDURE — 99499 UNLISTED E&M SERVICE: CPT | Mod: S$GLB,,, | Performed by: STUDENT IN AN ORGANIZED HEALTH CARE EDUCATION/TRAINING PROGRAM

## 2019-10-31 PROCEDURE — 3079F DIAST BP 80-89 MM HG: CPT | Mod: CPTII,S$GLB,, | Performed by: STUDENT IN AN ORGANIZED HEALTH CARE EDUCATION/TRAINING PROGRAM

## 2019-10-31 ASSESSMENT — ROUTINE ASSESSMENT OF PATIENT INDEX DATA (RAPID3): MDHAQ FUNCTION SCORE: .2

## 2019-10-31 NOTE — PROGRESS NOTES
RHEUMATOLOGY OUTPATIENT CLINIC NOTE        Attending Rheumatologist: Lois Aguila  Primary Care Provider: Jayesh Jaramillo MD   Physician Requesting Consultation: No referring provider defined for this encounter.  Chief Complaint/Reason For Consultation:  RA    Subjective:       HPI  Smitha Salinas is a 69 y.o. AAF presents to establish care for hx rheumatoid arthritis. On actemra x past 4 years. Reports overall feeling well. No prolonged AM stiffness. Previously followed w Dr Clinton, last seen 1/2019. Hx mtx but stopped 2.2 genital herpes flares. No other acute issues or complaints.    14pt ROS negative xcept as otherwise stated above    Review of Systems   Constitutional: Negative for chills, fever and weight loss.   HENT: Negative for congestion, sinus pain and sore throat.    Eyes: Negative for blurred vision, double vision and pain.   Respiratory: Negative for cough and shortness of breath.    Cardiovascular: Negative for chest pain, palpitations and claudication.   Gastrointestinal: Negative for abdominal pain, nausea and vomiting.   Genitourinary: Negative for dysuria and frequency.   Musculoskeletal: Positive for joint pain. Negative for falls.   Skin: Negative for itching and rash.   Neurological: Negative for dizziness and weakness.   Endo/Heme/Allergies: Negative for polydipsia. Does not bruise/bleed easily.       Chronic comorbid conditions affecting medical decision making today:  Past Medical History:   Diagnosis Date    [facility]    Chronic rheumatic arthritis     dr clinton    Colon polyps     Ex-smoker     Genital herpes     Herpes zoster infection     Hyperlipidemia     Hypertension     IGT (impaired glucose tolerance)     Immunosuppressed status     Lichen sclerosus et atrophicus     SUKHDEEP (obstructive sleep apnea)     Osteopenia     5/16 wai 5/18(start fmax if on pred 3months or more)     Past Surgical History:   Procedure Laterality Date    BLADDER  SURGERY  2012    Dr Claudia Lamar    BREAST BIOPSY Left     benign    CRYOTHERAPY      SKIN GRAFT      laceration to right  fingers  from thigh     TONSILLECTOMY       Family History   Problem Relation Age of Onset    Heart disease Mother     Diabetes Mother     Stroke Father     Kidney disease Father     Cancer Sister 39        breast    Breast cancer Sister     Cancer Other 68        breast    Stroke Maternal Grandmother     Stroke Paternal Grandmother     Stroke Paternal Grandfather     Colon cancer Neg Hx     Ovarian cancer Neg Hx      Social History     Substance and Sexual Activity   Alcohol Use Yes    Alcohol/week: 0.0 standard drinks     Social History     Tobacco Use   Smoking Status Former Smoker    Last attempt to quit: 2013    Years since quittin.2   Smokeless Tobacco Never Used   Tobacco Comment    smokes for a few weeks per year - when under pressure. has been abstinent times years at a time. a pack lasts about a week     Social History     Substance and Sexual Activity   Drug Use No       Current Outpatient Medications:     amLODIPine (NORVASC) 5 MG tablet, TAKE ONE TABLET BY MOUTH ONCE DAILY, Disp: 30 tablet, Rfl: 5    aspirin (ECOTRIN) 81 MG EC tablet, Take 81 mg by mouth once daily., Disp: , Rfl:     cholecalciferol, vitamin D3, 1,000 unit capsule, Take 1 capsule by mouth once daily., Disp: , Rfl:     cloNIDine (CATAPRES) 0.2 MG tablet, TAKE 1/2 TABLET IN THE MORNING, 1/2 TABLET AT NOON,   THEN 2 TABLETS AT BEDTIME (Patient taking differently: TAKE 1/2 TABLET IN THE MORNING, 1/2 TABLET AT NOON,   THEN 2 TABLETS AT BEDTIME), Disp: 270 tablet, Rfl: 3    cyclobenzaprine (FLEXERIL) 10 MG tablet, Take 1 tablet (10 mg total) by mouth as needed for Muscle spasms., Disp: 30 tablet, Rfl: 5    diazePAM (VALIUM) 5 MG tablet, every 12 (twelve) hours as needed. , Disp: , Rfl:     flu vacc ox5120-52,65yr up,PF (FLUZONE HIGH-DOSE , PF,) 180 mcg/0.5 mL Syrg,  Fluzone High-Dose 2019-20 (PF) 180 mcg/0.5 mL intramuscular syringe, Disp: , Rfl:     levocetirizine (XYZAL) 5 MG tablet, TAKE 1 TABLET BY MOUTH ONCE DAILY IN THE EVENING FOR 30 DAYS, Disp: , Rfl: 2    mirabegron (MYRBETRIQ) 50 mg Tb24, Take 1 tablet (50 mg total) by mouth once daily., Disp: 30 tablet, Rfl: 11    polyethylene glycol (GLYCOLAX) 17 gram/dose powder, Miralax 17 gram/dose oral powder  use as directed, Disp: , Rfl:     potassium chloride SA (K-DUR,KLOR-CON) 20 MEQ tablet, Take 1 tablet (20 mEq total) by mouth once daily. 1 tablet,ER particles/crystals Oral Twice a day, Disp: 60 tablet, Rfl: 5    potassium chloride SA (K-DUR,KLOR-CON) 20 MEQ tablet, Take 1 tablet (20 mEq total) by mouth once daily., Disp: 90 tablet, Rfl: 4    pravastatin (PRAVACHOL) 40 MG tablet, Take 1 tablet (40 mg total) by mouth once daily., Disp: 90 tablet, Rfl: 3    tocilizumab (ACTEMRA) 162 mg/0.9 mL Syrg, Inject 162 mg into the skin once a week., Disp: 4 Syringe, Rfl: 3    traMADol (ULTRAM) 50 mg tablet, 1 tab(s) By Mouth 2 Times A Day prn, Disp: , Rfl:     acyclovir (ZOVIRAX) 800 MG Tab, acyclovir 800 mg tablet, Disp: , Rfl:     acyclovir (ZOVIRAX) 800 MG Tab, 1 tablet(s) by mouth 3 times per day, Disp: , Rfl:     bumetanide (BUMEX) 1 MG tablet, 1 tablet(s) by mouth daily, Disp: , Rfl:     cyclobenzaprine (FLEXERIL) 5 MG tablet, , Disp: , Rfl:     dextroamphetamine-amphetamine (ADDERALL) 20 mg tablet, dextroamphetamine-amphetamine 20 mg tablet, Disp: , Rfl:     escitalopram oxalate (LEXAPRO) 5 MG Tab, Take 1 tablet (5 mg total) by mouth once daily. (Patient not taking: Reported on 10/31/2019), Disp: 30 tablet, Rfl: 11    methylPREDNISolone (MEDROL) 2 MG tablet, methylprednisolone 4 mg tablets in a dose pack, Disp: , Rfl:     metroNIDAZOLE (FLAGYL) 500 MG tablet, metronidazole 500 mg tablet, Disp: , Rfl:     ondansetron (ZOFRAN-ODT) 4 MG TbDL, Take 1 tablet (4 mg total) by mouth every 8 (eight) hours as needed  (nausea). (Patient not taking: Reported on 10/31/2019), Disp: 10 tablet, Rfl: 1       Objective:         Vitals:    10/31/19 0915   BP: 136/82   Pulse: (!) 53     Physical Exam   Nursing note and vitals reviewed.  Constitutional: She is oriented to person, place, and time and well-developed, well-nourished, and in no distress.   HENT:   Head: Normocephalic.   Mouth/Throat: Oropharynx is clear and moist.   Eyes: Conjunctivae are normal. Pupils are equal, round, and reactive to light.   Neck: Normal range of motion. Neck supple.   Cardiovascular: Normal rate and normal heart sounds.    Pulmonary/Chest: Effort normal. No respiratory distress.   Abdominal: Soft. There is no tenderness.   Neurological: She is alert and oriented to person, place, and time.   Skin: Skin is warm. No rash noted. No erythema.     Psychiatric: Memory and affect normal.   Musculoskeletal: She exhibits no edema, tenderness or deformity.   +fullness vs synovial hypertrophy in mcps b/l. Mild pain w lateral squeeze. No nodules. No effusions over large joints           Reviewed old and all outside pertinent medical records available.    All lab results personally reviewed and interpreted by me.  Lab Results   Component Value Date    WBC 6.18 09/05/2019    WBC 5.95 09/05/2019    HGB 13.5 09/05/2019    HGB 13.6 09/05/2019    HCT 43.2 09/05/2019    HCT 42.2 09/05/2019    MCV 90 09/05/2019    MCV 86 09/05/2019    MCH 28.0 09/05/2019    MCH 27.8 09/05/2019    MCHC 31.3 (L) 09/05/2019    MCHC 32.2 09/05/2019    RDW 13.9 09/05/2019    RDW 13.8 09/05/2019     (L) 09/05/2019     09/05/2019    MPV SEE COMMENT 09/05/2019    MPV 13.9 (H) 09/05/2019    PLTEST Adequate 05/14/2013       Lab Results   Component Value Date     09/05/2019     09/05/2019    K 3.8 09/05/2019    K 3.9 09/05/2019     09/05/2019     09/05/2019    CO2 26 09/05/2019    CO2 25 09/05/2019     09/05/2019     09/05/2019    BUN 14 09/05/2019     BUN 13 09/05/2019    CALCIUM 10.4 09/05/2019    CALCIUM 10.2 09/05/2019    PROT 7.9 09/05/2019    PROT 7.7 09/05/2019    ALBUMIN 5.0 09/05/2019    ALBUMIN 4.7 09/05/2019    BILITOT 0.5 09/05/2019    BILITOT 0.5 09/05/2019    AST 23 09/05/2019    AST 22 09/05/2019    ALKPHOS 86 09/05/2019    ALKPHOS 88 09/05/2019    ALT 36 09/05/2019    ALT 36 09/05/2019       Lab Results   Component Value Date    COLORU Yellow 12/26/2013    APPEARANCEUA Cloudy (A) 12/26/2013    SPECGRAV 1.025 12/26/2013    PHUR 6.0 12/26/2013    PROTEINUA Negative 12/26/2013    KETONESU Negative 12/26/2013    LEUKOCYTESUR Negative 12/26/2013    NITRITE Negative 12/26/2013    UROBILINOGEN Negative 12/26/2013       Lab Results   Component Value Date    CRP 0.1 09/05/2019       Lab Results   Component Value Date    SEDRATE <2 09/05/2019       Lab Results   Component Value Date     (H) 03/19/2012    SEDRATE <2 09/05/2019       No components found for: 25OHVITDTOT, 09DARXVX9, 02RMHCWN0, METHODNOTE    Lab Results   Component Value Date    URICACID 2.6 03/07/2009       No components found for: TSPOTTB      Imaging:  Hand XR  Degenerative changes are seen scattered throughout the right hand and most prominent at the 1st interphalangeal joint, 1st CMC joint and DIP joint of the 3rd digit of the right hand.  The degenerative changes have progressed since the 2012 examination.  There is also a stable probable well corticated erosion within the trapezium.  No new erosive changes are identified.  Mild degenerative changes present at the 1st and 2nd MCP joints.    There is a swan-neck deformity of the 5th digit.  This is stable.  Prominent degenerative change also present at the 1st interphalangeal joint on the left which has progressed since the 2012 exam..  No obvious erosive changes are identified.  No significant soft tissue swelling noted.  Stable degenerative change noted at the 1st CMC joint.      Impression       1. No new erosive changes or soft  tissue swelling identified.        ASSESSMENT / PLAN:     Smitha Salinas is a 69 y.o. Black or  female with:      1. Rheumatoid arthritis, involving unspecified site, unspecified rheumatoid factor presence  -RF+/CCP+ nonerosive arthritis  -On actemra x past 4 years, stable. No progression on hand XR.   -No synovitis on exam. SJC 0; CDAI 0- in remission  -C/w actemra.     Fu 3 months  Method of contact with patient concerns: MyChart attn Rheumatology      Lois Aguila M.D.  Rheumatology Department   [facility] [place] [address], [place] [address]  Phone: [phone]  Fax: [phone]

## 2019-11-07 ENCOUNTER — PATIENT OUTREACH (OUTPATIENT)
Dept: ADMINISTRATIVE | Facility: HOSPITAL | Age: 69
End: 2019-11-07

## 2019-11-07 NOTE — PROGRESS NOTES
Attempted to contact patient regarding scheduling a clinic visit for HTN follow-up and/or annual visit. No answer. Left a detailed voicemail message including call back number.

## 2019-11-27 ENCOUNTER — TELEPHONE (OUTPATIENT)
Dept: INTERNAL MEDICINE | Facility: CLINIC | Age: 69
End: 2019-11-27

## 2019-11-27 RX ORDER — CYCLOBENZAPRINE HCL 10 MG
TABLET ORAL
Qty: 60 TABLET | Refills: 5 | Status: SHIPPED | OUTPATIENT
Start: 2019-11-27 | End: 2021-07-23

## 2019-11-27 RX ORDER — TRAMADOL HYDROCHLORIDE 50 MG/1
50 TABLET ORAL EVERY 6 HOURS PRN
Qty: 60 TABLET | Refills: 5 | Status: SHIPPED | OUTPATIENT
Start: 2019-11-27 | End: 2020-11-03

## 2019-11-27 NOTE — TELEPHONE ENCOUNTER
Flexeril erxd  Tramadol controlled substance and has been getting from a different primary care doctor dr zachery santiago

## 2019-11-27 NOTE — TELEPHONE ENCOUNTER
----- Message from Chantel Garcia sent at 11/27/2019  2:17 PM CST -----  Contact: Pt   Type:  RX Refill Request    Who Called: Pt   Refill or New Rx:refill  RX Name and Strength: 1.tramdol 50 mg , 2. Flexeril 10 mg   How is the patient currently taking it? (ex. 1XDay): as needed   Is this a 30 day or 90 day RX:30  Preferred Pharmacy with phone number:  [facility] [address] [facility] [address]  [address] [facility] [address]  Phone: [phone] Fax: [phone]  Local or Mail Order:local   Ordering Provider: liyah   Would the patient rather a call back or a response via [facility]? Call back   Best Call Back Number:[phone] (home)   Additional Information: n/a

## 2019-12-02 ENCOUNTER — PATIENT MESSAGE (OUTPATIENT)
Dept: ADMINISTRATIVE | Facility: OTHER | Age: 69
End: 2019-12-02

## 2019-12-06 ENCOUNTER — OFFICE VISIT (OUTPATIENT)
Dept: INTERNAL MEDICINE | Facility: CLINIC | Age: 69
End: 2019-12-06
Payer: MEDICARE

## 2019-12-06 VITALS
HEART RATE: 60 BPM | SYSTOLIC BLOOD PRESSURE: 116 MMHG | TEMPERATURE: 98 F | OXYGEN SATURATION: 98 % | DIASTOLIC BLOOD PRESSURE: 70 MMHG | BODY MASS INDEX: 31.22 KG/M2 | HEIGHT: 65 IN | WEIGHT: 187.38 LBS

## 2019-12-06 DIAGNOSIS — G89.29 CHRONIC PAIN OF BOTH KNEES: ICD-10-CM

## 2019-12-06 DIAGNOSIS — M25.562 CHRONIC PAIN OF BOTH KNEES: ICD-10-CM

## 2019-12-06 DIAGNOSIS — J30.9 ALLERGIC RHINITIS, UNSPECIFIED SEASONALITY, UNSPECIFIED TRIGGER: ICD-10-CM

## 2019-12-06 DIAGNOSIS — J02.9 ACUTE PHARYNGITIS, UNSPECIFIED ETIOLOGY: Primary | ICD-10-CM

## 2019-12-06 DIAGNOSIS — M25.561 CHRONIC PAIN OF BOTH KNEES: ICD-10-CM

## 2019-12-06 PROCEDURE — 3078F PR MOST RECENT DIASTOLIC BLOOD PRESSURE < 80 MM HG: ICD-10-PCS | Mod: CPTII,S$GLB,, | Performed by: PHYSICIAN ASSISTANT

## 2019-12-06 PROCEDURE — 1125F PR PAIN SEVERITY QUANTIFIED, PAIN PRESENT: ICD-10-PCS | Mod: S$GLB,,, | Performed by: PHYSICIAN ASSISTANT

## 2019-12-06 PROCEDURE — 99999 PR PBB SHADOW E&M-EST. PATIENT-LVL V: CPT | Mod: PBBFAC,,, | Performed by: PHYSICIAN ASSISTANT

## 2019-12-06 PROCEDURE — 96372 THER/PROPH/DIAG INJ SC/IM: CPT | Mod: S$GLB,,, | Performed by: PHYSICIAN ASSISTANT

## 2019-12-06 PROCEDURE — 3074F PR MOST RECENT SYSTOLIC BLOOD PRESSURE < 130 MM HG: ICD-10-PCS | Mod: CPTII,S$GLB,, | Performed by: PHYSICIAN ASSISTANT

## 2019-12-06 PROCEDURE — 1101F PR PT FALLS ASSESS DOC 0-1 FALLS W/OUT INJ PAST YR: ICD-10-PCS | Mod: CPTII,S$GLB,, | Performed by: PHYSICIAN ASSISTANT

## 2019-12-06 PROCEDURE — 3078F DIAST BP <80 MM HG: CPT | Mod: CPTII,S$GLB,, | Performed by: PHYSICIAN ASSISTANT

## 2019-12-06 PROCEDURE — 99214 PR OFFICE/OUTPT VISIT, EST, LEVL IV, 30-39 MIN: ICD-10-PCS | Mod: 25,S$GLB,, | Performed by: PHYSICIAN ASSISTANT

## 2019-12-06 PROCEDURE — 3074F SYST BP LT 130 MM HG: CPT | Mod: CPTII,S$GLB,, | Performed by: PHYSICIAN ASSISTANT

## 2019-12-06 PROCEDURE — 1159F MED LIST DOCD IN RCRD: CPT | Mod: S$GLB,,, | Performed by: PHYSICIAN ASSISTANT

## 2019-12-06 PROCEDURE — 99999 PR PBB SHADOW E&M-EST. PATIENT-LVL V: ICD-10-PCS | Mod: PBBFAC,,, | Performed by: PHYSICIAN ASSISTANT

## 2019-12-06 PROCEDURE — 1101F PT FALLS ASSESS-DOCD LE1/YR: CPT | Mod: CPTII,S$GLB,, | Performed by: PHYSICIAN ASSISTANT

## 2019-12-06 PROCEDURE — 1159F PR MEDICATION LIST DOCUMENTED IN MEDICAL RECORD: ICD-10-PCS | Mod: S$GLB,,, | Performed by: PHYSICIAN ASSISTANT

## 2019-12-06 PROCEDURE — 96372 PR INJECTION,THERAP/PROPH/DIAG2ST, IM OR SUBCUT: ICD-10-PCS | Mod: S$GLB,,, | Performed by: PHYSICIAN ASSISTANT

## 2019-12-06 PROCEDURE — 1125F AMNT PAIN NOTED PAIN PRSNT: CPT | Mod: S$GLB,,, | Performed by: PHYSICIAN ASSISTANT

## 2019-12-06 PROCEDURE — 99214 OFFICE O/P EST MOD 30 MIN: CPT | Mod: 25,S$GLB,, | Performed by: PHYSICIAN ASSISTANT

## 2019-12-06 RX ORDER — FLUTICASONE PROPIONATE 50 MCG
1 SPRAY, SUSPENSION (ML) NASAL DAILY
Qty: 1 BOTTLE | Refills: 0 | Status: SHIPPED | OUTPATIENT
Start: 2019-12-06 | End: 2020-01-05

## 2019-12-06 RX ORDER — BETAMETHASONE SODIUM PHOSPHATE AND BETAMETHASONE ACETATE 3; 3 MG/ML; MG/ML
9 INJECTION, SUSPENSION INTRA-ARTICULAR; INTRALESIONAL; INTRAMUSCULAR; SOFT TISSUE
Status: COMPLETED | OUTPATIENT
Start: 2019-12-06 | End: 2019-12-06

## 2019-12-06 RX ADMIN — BETAMETHASONE SODIUM PHOSPHATE AND BETAMETHASONE ACETATE 9 MG: 3; 3 INJECTION, SUSPENSION INTRA-ARTICULAR; INTRALESIONAL; INTRAMUSCULAR; SOFT TISSUE at 02:12

## 2019-12-06 NOTE — PATIENT INSTRUCTIONS
When You Have a Sore Throat    A sore throat can be painful. There are many reasons why you may have a sore throat. Your healthcare provider will work with you to find the cause of your sore throat. He or she will also find the best treatment for you.  What causes a sore throat?  Sore throats can be caused or worsened by:  · Cold or flu viruses  · Bacteria  · Irritants such as tobacco smoke or air pollution  · Acid reflux  A healthy throat  The tonsils are on the sides of the throat near the base of the tongue. They collect viruses and bacteria and help fight infection. The throat (pharynx) is the passage for air. Mucus from the nasal cavity also moves down the passage.  An inflamed throat  The tonsils and pharynx can become inflamed due to a cold or flu virus. Postnasal drip (excess mucus draining from the nasal cavity) can irritate the throat. It can also make the throat or tonsils more likely to be infected by bacteria. Severe, untreated tonsillitis in children or adults can cause a pocket of pus (abscess) to form near the tonsil.  Your evaluation  A medical evaluation can help find the cause of your sore throat. It can also help your healthcare provider choose the best treatment for you. The evaluation may include a health history, physical exam, and diagnostic tests.  Health history  Your healthcare provider may ask you the following:  · How long has the sore throat lasted and how have you been treating it?  · Do you have any other symptoms, such as body aches, fever, or cough?  · Does your sore throat recur? If so, how often? How many days of school or work have you missed because of a sore throat?  · Do you have trouble eating or swallowing?  · Have you been told that you snore or have other sleep problems?  · Do you have bad breath?  · Do you cough up bad-tasting mucus?  Physical exam  During the exam, your healthcare provider checks your ears, nose, and throat for problems. He or she also checks for  "swelling in the neck, and may listen to your chest.  Possible tests  Other tests your healthcare provider may perform include:  · A throat swab to check for bacteria such as streptococcus (the bacteria that causes strep throat)  · A blood test to check for mononucleosis (a viral infection)  · A chest X-ray to rule out pneumonia, especially if you have a cough  Treating a sore throat  Treatment depends on many factors. What is the likely cause? Is the problem recent? Does it keep coming back? In many cases, the best thing to do is to treat the symptoms, rest, and let the problem heal itself. Antibiotics may help clear up some bacterial infections. For cases of severe or recurring tonsillitis, the tonsils may need to be removed.  Relieving your symptoms  · Dont smoke, and avoid secondhand smoke.  · For children, try throat sprays or Popsicles. Adults and older children may try lozenges.  · Drink warm liquids to soothe the throat and help thin mucus. Avoid alcohol, spicy foods, and acidic drinks such as orange juice. These can irritate the throat.  · Gargle with warm saltwater (1 teaspoon of salt to 8 ounces of warm water).  · Use a humidifier to keep air moist and relieve throat dryness.  · Try over-the-counter pain relievers such as acetaminophen or ibuprofen. Use as directed, and dont exceed the recommended dose. Dont give aspirin to children.   Are antibiotics needed?  If your sore throat is due to a bacterial infection, antibiotics may speed healing and prevent complications. Although group A streptococcus ("strep throat" or GAS) is the major treatable infection for a sore throat, GAS causes only 5% to 15% of sore throats in adults who seek medical care. Most sore throats are caused by cold or flu viruses. And antibiotics dont treat viral illness. In fact, using antibiotics when theyre not needed may produce bacteria that are harder to kill. Your healthcare provider will prescribe antibiotics only if he or " she thinks they are likely to help.  If antibiotics are prescribed  Take the medicine exactly as directed. Be sure to finish your prescription even if youre feeling better. And be sure to ask your healthcare provider or pharmacist what side effects are common and what to do about them.  Is surgery needed?  In some cases, tonsils need to be removed. This is often done as outpatient (same-day) surgery. Your healthcare provider may advise removing the tonsils in cases of:  · Several severe bouts of tonsillitis in a year. Severe episodes include those that lead to missed days of school or work, or that need to be treated with antibiotics.  · Tonsillitis that causes breathing problems during sleep  · Tonsillitis caused by food particles collecting in pouches in the tonsils (cryptic tonsillitis)  Call your healthcare provider if any of the following occur:  · Symptoms worsen, or new symptoms develop.  · Swollen tonsils make breathing difficult.  · The pain is severe enough to keep you from drinking liquids.  · A skin rash, hives, or wheezing develops. Any of these could signal an allergic reaction to antibiotics.  · Symptoms dont improve within a week.  · Symptoms dont improve within 2 to 3 days of starting antibiotics.   Date Last Reviewed: 10/1/2016  © 0226-3366 [employer] (?????). [address] [place] [address], [place] [address]. All rights reserved. This information is not intended as a substitute for professional medical care. Always follow your healthcare professional's instructions.        Self-Care for Sore Throats    Sore throats happen for many reasons, such as colds, allergies, and infections caused by viruses or bacteria. In any case, your throat becomes red and sore. Your goal for self-care is to reduce your discomfort while giving your throat a chance to heal.  Moisten and soothe your throat  Tips include the following:  · Try a sip of water first thing after waking up.  · Keep your throat moist by  drinking 6 or more glasses of clear liquids every day.  · Run a cool-air humidifier in your room overnight.  · Avoid cigarette smoke.   · Suck on throat lozenges, cough drops, hard candy, ice chips, or frozen fruit-juice bars. Use the sugar-free versions if your diet or medical condition requires them.  Gargle to ease irritation  Gargling every hour or 2 can ease irritation. Try gargling with 1 of these solutions:  · 1/4 teaspoon of salt in 1/2 cup of warm water  · An over-the-counter anesthetic gargle  Use medicine for more relief  Over-the-counter medicine can reduce sore throat symptoms. Ask your pharmacist if you have questions about which medicine to use:  · Ease pain with anesthetic sprays. Aspirin or an aspirin substitute also helps. Remember, never give aspirin to anyone 18 or younger, or if you are already taking blood thinners.   · For sore throats caused by allergies, try antihistamines to block the allergic reaction.  · Remember: unless a sore throat is caused by a bacterial infection, antibiotics wont help you.  Prevent future sore throats  Prevention tips include the following:  · Stop smoking or reduce contact with secondhand smoke. Smoke irritates the tender throat lining.  · Limit contact with pets and with allergy-causing substances, such as pollen and mold.  · When youre around someone with a sore throat or cold, wash your hands often to keep viruses or bacteria from spreading.  · Dont strain your vocal cords.  Call your healthcare provider  Contact your healthcare provider if you have:  · A temperature over 101°F (38.3°C)  · White spots on the throat  · Great difficulty swallowing  · Trouble breathing  · A skin rash  · Recent exposure to someone else with strep bacteria  · Severe hoarseness and swollen glands in the neck or jaw   Date Last Reviewed: 8/1/2016  © 7621-5157 [employer]. [address] [place] [address], [place] [address]. All rights reserved. This information is not intended  as a substitute for professional medical care. Always follow your healthcare professional's instructions.        Viral Upper Respiratory Illness (Adult)  You have a viral upper respiratory illness (URI), which is another term for the common cold. This illness is contagious during the first few days. It is spread through the air by coughing and sneezing. It may also be spread by direct contact (touching the sick person and then touching your own eyes, nose, or mouth). Frequent handwashing will decrease risk of spread. Most viral illnesses go away within 7 to 10 days with rest and simple home remedies. Sometimes the illness may last for several weeks. Antibiotics will not kill a virus, and they are generally not prescribed for this condition.    Home care  · If symptoms are severe, rest at home for the first 2 to 3 days. When you resume activity, don't let yourself get too tired.  · Avoid being exposed to cigarette smoke (yours or others).  · You may use acetaminophen or ibuprofen to control pain and fever, unless another medicine was prescribed. (Note: If you have chronic liver or kidney disease, have ever had a stomach ulcer or gastrointestinal bleeding, or are taking blood-thinning medicines, talk with your healthcare provider before using these medicines.) Aspirin should never be given to anyone under 18 years of age who is ill with a viral infection or fever. It may cause severe liver or brain damage.  · Your appetite may be poor, so a light diet is fine. Avoid dehydration by drinking 6 to 8 glasses of fluids per day (water, soft drinks, juices, tea, or soup). Extra fluids will help loosen secretions in the nose and lungs.  · Over-the-counter cold medicines will not shorten the length of time youre sick, but they may be helpful for the following symptoms: cough, sore throat, and nasal and sinus congestion. (Note: Do not use decongestants if you have high blood pressure.)  Follow-up care  Follow up with your  healthcare provider, or as advised.  When to seek medical advice  Call your healthcare provider right away if any of these occur:  · Cough with lots of colored sputum (mucus)  · Severe headache; face, neck, or ear pain  · Difficulty swallowing due to throat pain  · Fever of 100.4°F (38°C)  Call 911, or get immediate medical care  Call emergency services right away if any of these occur:  · Chest pain, shortness of breath, wheezing, or difficulty breathing  · Coughing up blood  · Inability to swallow due to throat pain  Date Last Reviewed: 9/13/2015  © 8357-8303 [employer]. [address] [place] [address]. All rights reserved. This information is not intended as a substitute for professional medical care. Always follow your healthcare professional's instructions.

## 2019-12-07 ENCOUNTER — PATIENT MESSAGE (OUTPATIENT)
Dept: INTERNAL MEDICINE | Facility: CLINIC | Age: 69
End: 2019-12-07

## 2019-12-09 ENCOUNTER — OFFICE VISIT (OUTPATIENT)
Dept: RHEUMATOLOGY | Facility: CLINIC | Age: 69
End: 2019-12-09
Payer: MEDICARE

## 2019-12-09 VITALS
WEIGHT: 191.38 LBS | BODY MASS INDEX: 31.89 KG/M2 | SYSTOLIC BLOOD PRESSURE: 140 MMHG | DIASTOLIC BLOOD PRESSURE: 78 MMHG | HEIGHT: 65 IN | HEART RATE: 72 BPM

## 2019-12-09 DIAGNOSIS — M06.9 RHEUMATOID ARTHRITIS, INVOLVING UNSPECIFIED SITE, UNSPECIFIED RHEUMATOID FACTOR PRESENCE: ICD-10-CM

## 2019-12-09 DIAGNOSIS — M19.90 OSTEOARTHRITIS, UNSPECIFIED OSTEOARTHRITIS TYPE, UNSPECIFIED SITE: Primary | ICD-10-CM

## 2019-12-09 PROCEDURE — 1125F AMNT PAIN NOTED PAIN PRSNT: CPT | Mod: S$GLB,,, | Performed by: STUDENT IN AN ORGANIZED HEALTH CARE EDUCATION/TRAINING PROGRAM

## 2019-12-09 PROCEDURE — 99999 PR PBB SHADOW E&M-EST. PATIENT-LVL III: ICD-10-PCS | Mod: PBBFAC,,, | Performed by: STUDENT IN AN ORGANIZED HEALTH CARE EDUCATION/TRAINING PROGRAM

## 2019-12-09 PROCEDURE — 99999 PR PBB SHADOW E&M-EST. PATIENT-LVL III: CPT | Mod: PBBFAC,,, | Performed by: STUDENT IN AN ORGANIZED HEALTH CARE EDUCATION/TRAINING PROGRAM

## 2019-12-09 PROCEDURE — 1125F PR PAIN SEVERITY QUANTIFIED, PAIN PRESENT: ICD-10-PCS | Mod: S$GLB,,, | Performed by: STUDENT IN AN ORGANIZED HEALTH CARE EDUCATION/TRAINING PROGRAM

## 2019-12-09 PROCEDURE — 1159F MED LIST DOCD IN RCRD: CPT | Mod: S$GLB,,, | Performed by: STUDENT IN AN ORGANIZED HEALTH CARE EDUCATION/TRAINING PROGRAM

## 2019-12-09 PROCEDURE — 3078F DIAST BP <80 MM HG: CPT | Mod: CPTII,S$GLB,, | Performed by: STUDENT IN AN ORGANIZED HEALTH CARE EDUCATION/TRAINING PROGRAM

## 2019-12-09 PROCEDURE — 99214 PR OFFICE/OUTPT VISIT, EST, LEVL IV, 30-39 MIN: ICD-10-PCS | Mod: S$GLB,,, | Performed by: STUDENT IN AN ORGANIZED HEALTH CARE EDUCATION/TRAINING PROGRAM

## 2019-12-09 PROCEDURE — 1101F PT FALLS ASSESS-DOCD LE1/YR: CPT | Mod: CPTII,S$GLB,, | Performed by: STUDENT IN AN ORGANIZED HEALTH CARE EDUCATION/TRAINING PROGRAM

## 2019-12-09 PROCEDURE — 99214 OFFICE O/P EST MOD 30 MIN: CPT | Mod: S$GLB,,, | Performed by: STUDENT IN AN ORGANIZED HEALTH CARE EDUCATION/TRAINING PROGRAM

## 2019-12-09 PROCEDURE — 3078F PR MOST RECENT DIASTOLIC BLOOD PRESSURE < 80 MM HG: ICD-10-PCS | Mod: CPTII,S$GLB,, | Performed by: STUDENT IN AN ORGANIZED HEALTH CARE EDUCATION/TRAINING PROGRAM

## 2019-12-09 PROCEDURE — 1101F PR PT FALLS ASSESS DOC 0-1 FALLS W/OUT INJ PAST YR: ICD-10-PCS | Mod: CPTII,S$GLB,, | Performed by: STUDENT IN AN ORGANIZED HEALTH CARE EDUCATION/TRAINING PROGRAM

## 2019-12-09 PROCEDURE — 1159F PR MEDICATION LIST DOCUMENTED IN MEDICAL RECORD: ICD-10-PCS | Mod: S$GLB,,, | Performed by: STUDENT IN AN ORGANIZED HEALTH CARE EDUCATION/TRAINING PROGRAM

## 2019-12-09 PROCEDURE — 3077F PR MOST RECENT SYSTOLIC BLOOD PRESSURE >= 140 MM HG: ICD-10-PCS | Mod: CPTII,S$GLB,, | Performed by: STUDENT IN AN ORGANIZED HEALTH CARE EDUCATION/TRAINING PROGRAM

## 2019-12-09 PROCEDURE — 3077F SYST BP >= 140 MM HG: CPT | Mod: CPTII,S$GLB,, | Performed by: STUDENT IN AN ORGANIZED HEALTH CARE EDUCATION/TRAINING PROGRAM

## 2019-12-09 RX ORDER — DICLOFENAC SODIUM 10 MG/G
2 GEL TOPICAL 4 TIMES DAILY
Qty: 100 G | Refills: 5 | Status: SHIPPED | OUTPATIENT
Start: 2019-12-09 | End: 2020-06-18

## 2019-12-10 ENCOUNTER — PATIENT MESSAGE (OUTPATIENT)
Dept: INTERNAL MEDICINE | Facility: CLINIC | Age: 69
End: 2019-12-10

## 2019-12-10 ENCOUNTER — OFFICE VISIT (OUTPATIENT)
Dept: GASTROENTEROLOGY | Facility: CLINIC | Age: 69
End: 2019-12-10
Payer: MEDICARE

## 2019-12-10 ENCOUNTER — TELEPHONE (OUTPATIENT)
Dept: INTERNAL MEDICINE | Facility: CLINIC | Age: 69
End: 2019-12-10

## 2019-12-10 VITALS
DIASTOLIC BLOOD PRESSURE: 60 MMHG | BODY MASS INDEX: 30.99 KG/M2 | SYSTOLIC BLOOD PRESSURE: 120 MMHG | HEART RATE: 68 BPM | WEIGHT: 186 LBS | OXYGEN SATURATION: 99 % | HEIGHT: 65 IN

## 2019-12-10 DIAGNOSIS — R13.10 DYSPHAGIA, UNSPECIFIED TYPE: Primary | ICD-10-CM

## 2019-12-10 DIAGNOSIS — R19.5 LOOSE STOOLS: ICD-10-CM

## 2019-12-10 DIAGNOSIS — J01.90 ACUTE SINUSITIS, RECURRENCE NOT SPECIFIED, UNSPECIFIED LOCATION: Primary | ICD-10-CM

## 2019-12-10 PROCEDURE — 1159F MED LIST DOCD IN RCRD: CPT | Mod: S$GLB,,, | Performed by: PHYSICIAN ASSISTANT

## 2019-12-10 PROCEDURE — 3078F PR MOST RECENT DIASTOLIC BLOOD PRESSURE < 80 MM HG: ICD-10-PCS | Mod: CPTII,S$GLB,, | Performed by: PHYSICIAN ASSISTANT

## 2019-12-10 PROCEDURE — 99999 PR PBB SHADOW E&M-EST. PATIENT-LVL V: CPT | Mod: PBBFAC,,, | Performed by: PHYSICIAN ASSISTANT

## 2019-12-10 PROCEDURE — 1101F PT FALLS ASSESS-DOCD LE1/YR: CPT | Mod: CPTII,S$GLB,, | Performed by: PHYSICIAN ASSISTANT

## 2019-12-10 PROCEDURE — 1126F AMNT PAIN NOTED NONE PRSNT: CPT | Mod: S$GLB,,, | Performed by: PHYSICIAN ASSISTANT

## 2019-12-10 PROCEDURE — 1101F PR PT FALLS ASSESS DOC 0-1 FALLS W/OUT INJ PAST YR: ICD-10-PCS | Mod: CPTII,S$GLB,, | Performed by: PHYSICIAN ASSISTANT

## 2019-12-10 PROCEDURE — 3078F DIAST BP <80 MM HG: CPT | Mod: CPTII,S$GLB,, | Performed by: PHYSICIAN ASSISTANT

## 2019-12-10 PROCEDURE — 99204 PR OFFICE/OUTPT VISIT, NEW, LEVL IV, 45-59 MIN: ICD-10-PCS | Mod: S$GLB,,, | Performed by: PHYSICIAN ASSISTANT

## 2019-12-10 PROCEDURE — 99204 OFFICE O/P NEW MOD 45 MIN: CPT | Mod: S$GLB,,, | Performed by: PHYSICIAN ASSISTANT

## 2019-12-10 PROCEDURE — 1126F PR PAIN SEVERITY QUANTIFIED, NO PAIN PRESENT: ICD-10-PCS | Mod: S$GLB,,, | Performed by: PHYSICIAN ASSISTANT

## 2019-12-10 PROCEDURE — 3074F SYST BP LT 130 MM HG: CPT | Mod: CPTII,S$GLB,, | Performed by: PHYSICIAN ASSISTANT

## 2019-12-10 PROCEDURE — 3074F PR MOST RECENT SYSTOLIC BLOOD PRESSURE < 130 MM HG: ICD-10-PCS | Mod: CPTII,S$GLB,, | Performed by: PHYSICIAN ASSISTANT

## 2019-12-10 PROCEDURE — 99999 PR PBB SHADOW E&M-EST. PATIENT-LVL V: ICD-10-PCS | Mod: PBBFAC,,, | Performed by: PHYSICIAN ASSISTANT

## 2019-12-10 PROCEDURE — 1159F PR MEDICATION LIST DOCUMENTED IN MEDICAL RECORD: ICD-10-PCS | Mod: S$GLB,,, | Performed by: PHYSICIAN ASSISTANT

## 2019-12-10 RX ORDER — BENZONATATE 200 MG/1
200 CAPSULE ORAL 3 TIMES DAILY PRN
Qty: 30 CAPSULE | Refills: 0 | Status: SHIPPED | OUTPATIENT
Start: 2019-12-10 | End: 2019-12-20

## 2019-12-10 RX ORDER — PROMETHAZINE HYDROCHLORIDE AND DEXTROMETHORPHAN HYDROBROMIDE 6.25; 15 MG/5ML; MG/5ML
5 SYRUP ORAL NIGHTLY
Qty: 118 ML | Refills: 0 | Status: SHIPPED | OUTPATIENT
Start: 2019-12-10 | End: 2019-12-15

## 2019-12-10 RX ORDER — SODIUM, POTASSIUM,MAG SULFATES 17.5-3.13G
1 SOLUTION, RECONSTITUTED, ORAL ORAL DAILY
Qty: 1 KIT | Refills: 0 | Status: SHIPPED | OUTPATIENT
Start: 2019-12-10 | End: 2019-12-12

## 2019-12-10 RX ORDER — DOXYCYCLINE 100 MG/1
100 CAPSULE ORAL 2 TIMES DAILY
Qty: 20 CAPSULE | Refills: 0 | Status: SHIPPED | OUTPATIENT
Start: 2019-12-10 | End: 2019-12-20

## 2019-12-10 NOTE — LETTER
December 10, 2019      Tami Dela Cruz, ANÍBAL  [address]  [place] [address]           [facility]ly [facility], later [facility]  [address] [place] [id number]  Phone: [phone]  Fax: [phone]          Patient: Smitha Salinas   MR Number: [id number]   YOB: 1950   Date of Visit: 12/10/2019       Dear Tami Dela Cruz:    Thank you for referring Smitha Salinas to me for evaluation. Attached you will find relevant portions of my assessment and plan of care.    If you have questions, please do not hesitate to call me. I look forward to following Smitha Salinas along with you.    Sincerely,    Airam Aden PA-C    Enclosure  CC:  No Recipients    If you would like to receive this communication electronically, please contact [email or web address] or [phone] to request more information on [employer] access.    For providers and/or their staff who would like to refer a patient to [facility], please contact us through our one-stop-shop provider referral line, [facility], at [phone].    If you feel you have received this communication in error or would no longer like to receive these types of communications, please e-mail [email or web address]

## 2019-12-10 NOTE — PROGRESS NOTES
"Clinic Consult:  [facility] Consultation Note    Reason for Consult:  The primary encounter diagnosis was Dysphagia, unspecified type. A diagnosis of Loose stools was also pertinent to this visit.    PCP: Jayesh Jaramillo   [id number] [facility] / [place] [id number]    CC: Colonoscopy and Dysphagia      HPI:  This is a 69 y.o. female here for colonoscopy screening. Patient had colonoscopy in 2006 at [facility] with single polyp removal. Suggested repeat in 4 years. Another colonoscopy in 2010 with no polyps. Suggested repeat was 5 years. She reports having loose stools daily that began approximately 1 year ago. She denies blood in her stools except for some light pink on the toilet paper every 6 months or so that she believes is from her fissure. Denies abdominal pain. Her PCP ran stool studies in July with negative results. She was also having episodes of chest discomfort with projectile vomiting. These episodes were only occasional. She had reported to the ER at one point for the symptoms but no findings. The painful episodes with nausea and vomiting have seem to have resolved (last episodes in September), but she does notice some trouble intermittently with swallowing - mostly liquids. She had an EGD back in 2006 which showed gastritis. Patient denies reflux type symptoms and denies taking any medications for it. She is taking Imodium regularly and has started also to incorporate Pepto Bismol.      Review of Systems   Constitutional: Negative for appetite change, chills, fever and unexpected weight change.   HENT: Positive for trouble swallowing (more so with liquids).    Eyes: Negative for visual disturbance.   Respiratory: Negative for chest tightness and shortness of breath.    Cardiovascular: Positive for chest pain (pain after "eating something" last week.). Negative for palpitations.   Gastrointestinal: Positive for diarrhea. Negative for abdominal pain, blood in stool, constipation, " nausea and vomiting.   Genitourinary: Negative for dysuria and hematuria.        Urge incontinence.   Musculoskeletal: Negative for back pain and myalgias.   Skin: Negative for rash.   Neurological: Negative for dizziness, weakness and light-headedness.   Psychiatric/Behavioral: Negative for agitation and confusion. The patient is not nervous/anxious.         Medical History:   Past Medical History:   Diagnosis Date    [facility]    Chronic rheumatic arthritis     dr tai    Colon polyps     Ex-smoker     Genital herpes     Herpes zoster infection     Hyperlipidemia     Hypertension     IGT (impaired glucose tolerance)     Immunosuppressed status     Lichen sclerosus et atrophicus     SUKHDEEP (obstructive sleep apnea)     Osteopenia     5/16 wai 5/18(start fmax if on pred 3months or more)       Surgical History:   Past Surgical History:   Procedure Laterality Date    BLADDER SURGERY  8/2012    Dr Claudia Lamar    BREAST BIOPSY Left     benign    CRYOTHERAPY  1980    SKIN GRAFT  1965    laceration to right  fingers  from thigh     TONSILLECTOMY  1958       Family History:    Family History   Problem Relation Age of Onset    Heart disease Mother     Diabetes Mother     Stroke Father     Kidney disease Father     Cancer Sister 39        breast    Breast cancer Sister     Cancer Other 68        breast    Stroke Maternal Grandmother     Stroke Paternal Grandmother     Stroke Paternal Grandfather     Colon cancer Neg Hx     Ovarian cancer Neg Hx        Social History:   Social History     Socioeconomic History    Marital status:      Spouse name: Not on file    Number of children: 1    Years of education: Not on file    Highest education level: Not on file   Occupational History    Occupation:      Employer: [employer]    Social Needs    Financial resource strain: Not on file    Food insecurity:     Worry: Not on file     Inability: Not on file     Transportation needs:     Medical: Not on file     Non-medical: Not on file   Tobacco Use    Smoking status: Former Smoker     Last attempt to quit: 2013     Years since quittin.3    Smokeless tobacco: Never Used    Tobacco comment: smokes for a few weeks per year - when under pressure. has been abstinent times years at a time. a pack lasts about a week   Substance and Sexual Activity    Alcohol use: Yes     Alcohol/week: 0.0 standard drinks    Drug use: No    Sexual activity: Not Currently     Birth control/protection: Post-menopausal   Lifestyle    Physical activity:     Days per week: Not on file     Minutes per session: Not on file    Stress: Not on file   Relationships    Social connections:     Talks on phone: Not on file     Gets together: Not on file     Attends Muslim service: Not on file     Active member of club or organization: Not on file     Attends meetings of clubs or organizations: Not on file     Relationship status: Not on file   Other Topics Concern    Not on file   Social History Narrative    Lives alone. Caffeine intake rare -1-2 per week of coffee. Does not have a Living Will or Advanced Directive       Allergies:   Review of patient's allergies indicates:   Allergen Reactions    Cymbalta [duloxetine]      constipation    Gabapentin      Disoriented    Mirabegron Hives and Other (See Comments)    Valdecoxib Other (See Comments)     palpitation    Hydrocodone-acetaminophen Anxiety     Other reaction(s): increase heart rate       Home Medications:   Current Outpatient Medications on File Prior to Visit   Medication Sig Dispense Refill    acyclovir (ZOVIRAX) 800 MG Tab acyclovir 800 mg tablet      amLODIPine (NORVASC) 5 MG tablet TAKE ONE TABLET BY MOUTH ONCE DAILY 30 tablet 5    aspirin (ECOTRIN) 81 MG EC tablet Take 81 mg by mouth once daily.      cholecalciferol, vitamin D3, 1,000 unit capsule Take 1 capsule by mouth once daily.      cloNIDine (CATAPRES) 0.2 MG  tablet TAKE 1/2 TABLET IN THE MORNING, 1/2 TABLET AT NOON,   THEN 2 TABLETS AT BEDTIME (Patient taking differently: TAKE 1/2 TABLET IN THE MORNING, 1/2 TABLET AT NOON,   THEN 2 TABLETS AT BEDTIME) 270 tablet 3    cyclobenzaprine (FLEXERIL) 10 MG tablet 1/2 to 1 po tid prn muscle spasm 60 tablet 5    diazePAM (VALIUM) 5 MG tablet every 12 (twelve) hours as needed.       fluticasone propionate (FLONASE) 50 mcg/actuation nasal spray 1 spray (50 mcg total) by Each Nostril route once daily. 1 Bottle 0    levocetirizine (XYZAL) 5 MG tablet TAKE 1 TABLET BY MOUTH ONCE DAILY IN THE EVENING FOR 30 DAYS  2    potassium chloride SA (K-DUR,KLOR-CON) 20 MEQ tablet Take 1 tablet (20 mEq total) by mouth once daily. 90 tablet 4    pravastatin (PRAVACHOL) 40 MG tablet Take 1 tablet (40 mg total) by mouth once daily. 90 tablet 3    traMADol (ULTRAM) 50 mg tablet Take 1 tablet (50 mg total) by mouth every 6 (six) hours as needed for Pain. 60 tablet 5    acyclovir (ZOVIRAX) 800 MG Tab 1 tablet(s) by mouth 3 times per day      bumetanide (BUMEX) 1 MG tablet 1 tablet(s) by mouth daily      dextroamphetamine-amphetamine (ADDERALL) 20 mg tablet dextroamphetamine-amphetamine 20 mg tablet      diclofenac sodium (VOLTAREN) 1 % Gel Apply 2 g topically 4 (four) times daily. (Patient not taking: Reported on 12/10/2019) 1 Tube 5    metroNIDAZOLE (FLAGYL) 500 MG tablet metronidazole 500 mg tablet      mirabegron (MYRBETRIQ) 50 mg Tb24 Take 1 tablet (50 mg total) by mouth once daily. 30 tablet 11    ondansetron (ZOFRAN-ODT) 4 MG TbDL Take 1 tablet (4 mg total) by mouth every 8 (eight) hours as needed (nausea). (Patient not taking: Reported on 12/10/2019) 10 tablet 1    polyethylene glycol (GLYCOLAX) 17 gram/dose powder Miralax 17 gram/dose oral powder   use as directed      potassium chloride SA (K-DUR,KLOR-CON) 20 MEQ tablet Take 1 tablet (20 mEq total) by mouth once daily. 1 tablet,ER particles/crystals Oral Twice a day 60 tablet  "5    tocilizumab (ACTEMRA) 162 mg/0.9 mL Syrg Inject 162 mg into the skin once a week. 4 Syringe 3     No current facility-administered medications on file prior to visit.        Physical Exam:  /60   Pulse 68   Ht 5' 5" (1.651 m)   Wt 84.4 kg (186 lb) Comment: stated wt/pt did not want to weigh  SpO2 99%   BMI 30.95 kg/m²   Body mass index is 30.95 kg/m².  Physical Exam   Constitutional: She is oriented to person, place, and time. She appears well-developed and well-nourished. No distress.   HENT:   Head: Normocephalic and atraumatic.   Eyes: EOM are normal. Right eye exhibits no discharge. Left eye exhibits no discharge.   Neck: Normal range of motion.   Cardiovascular: Normal rate, regular rhythm and normal heart sounds.   Pulmonary/Chest: Effort normal and breath sounds normal. No respiratory distress. She has no wheezes.   Abdominal: Soft. Bowel sounds are normal. She exhibits no distension. There is no tenderness. There is no guarding.   Musculoskeletal: Normal range of motion. She exhibits no deformity.   Neurological: She is alert and oriented to person, place, and time.   Skin: Skin is warm and dry. She is not diaphoretic.   Psychiatric: She has a normal mood and affect. Her behavior is normal. Judgment and thought content normal.         Labs: Pertinent labs reviewed.  Endoscopy:   CRC Screenin  Anticoagulation: none    Assessment:  1. Dysphagia, unspecified type    2. Loose stools         Recommendations:  -Metamucil daily to help bulk the stool.  -Schedule Colonoscopy/EGD  -Discussed starting with a PPI daily for likely reflux. Patient denies having reflux symptoms and would like to wait until after EGD to discuss medication further.    Dysphagia, unspecified type  -     Case request GI: COLONOSCOPY, EGD (ESOPHAGOGASTRODUODENOSCOPY)  -     sodium,potassium,mag sulfates (SUPREP BOWEL PREP KIT) 17.5-3.13-1.6 gram SolR; Take 177 mLs by mouth once daily. for 2 days  Dispense: 1 kit; " Refill: 0    Loose stools  -     Case request GI: COLONOSCOPY, EGD (ESOPHAGOGASTRODUODENOSCOPY)  -     sodium,potassium,mag sulfates (SUPREP BOWEL PREP KIT) 17.5-3.13-1.6 gram SolR; Take 177 mLs by mouth once daily. for 2 days  Dispense: 1 kit; Refill: 0        No follow-ups on file.    Thank you so much for allowing me to participate in the care of Smitha Aden PA-C

## 2019-12-16 NOTE — PROGRESS NOTES
RHEUMATOLOGY OUTPATIENT CLINIC NOTE        Attending Rheumatologist: Lois Aguila  Primary Care Provider: Jayesh Jaramillo MD   Physician Requesting Consultation: No referring provider defined for this encounter.  Chief Complaint/Reason For Consultation:  Knee pain    Subjective:       HPI  Smitha Salinas is a 69 y.o. AAF presents to establish care for hx rheumatoid arthritis. On actemra x past 4 years. Reports overall feeling well. No prolonged AM stiffness. Previously followed w Dr Clinton, last seen 1/2019. Hx mtx but stopped 2.2 genital herpes flares. No other acute issues or complaints.    Today:  Pt reports overall doing well since last visit. Main complaint however is chronic knee pain. States she has had steroid injections in the past, no significant relief. Pain now 5/10 worse w prolonged activity and towards end of day. Occasional swelling. No other acute issues or complaints. Has not tried topicals or PT.    14pt ROS negative xcept as otherwise stated above    Review of Systems   Constitutional: Negative for chills, fever and weight loss.   HENT: Negative for congestion, sinus pain and sore throat.    Eyes: Negative for blurred vision, double vision and pain.   Respiratory: Negative for cough and shortness of breath.    Cardiovascular: Negative for chest pain, palpitations and claudication.   Gastrointestinal: Negative for abdominal pain, nausea and vomiting.   Genitourinary: Negative for dysuria and frequency.   Musculoskeletal: Positive for joint pain. Negative for falls.   Skin: Negative for itching and rash.   Neurological: Negative for dizziness and weakness.   Endo/Heme/Allergies: Negative for polydipsia. Does not bruise/bleed easily.       Chronic comorbid conditions affecting medical decision making today:  Past Medical History:   Diagnosis Date    [facility]    Chronic rheumatic arthritis     dr clinton    Colon polyps     Ex-smoker     Genital herpes     Herpes  zoster infection     Hyperlipidemia     Hypertension     IGT (impaired glucose tolerance)     Immunosuppressed status     Lichen sclerosus et atrophicus     SUKHDEEP (obstructive sleep apnea)     Osteopenia      wai (start fmax if on pred 3months or more)     Past Surgical History:   Procedure Laterality Date    BLADDER SURGERY  2012    Dr Claudia Lamar    BREAST BIOPSY Left     benign    CRYOTHERAPY      SKIN GRAFT      laceration to right  fingers  from thigh     TONSILLECTOMY       Family History   Problem Relation Age of Onset    Heart disease Mother     Diabetes Mother     Stroke Father     Kidney disease Father     Cancer Sister 39        breast    Breast cancer Sister     Cancer Other 68        breast    Stroke Maternal Grandmother     Stroke Paternal Grandmother     Stroke Paternal Grandfather     Colon cancer Neg Hx     Ovarian cancer Neg Hx      Social History     Substance and Sexual Activity   Alcohol Use Yes    Alcohol/week: 0.0 standard drinks     Social History     Tobacco Use   Smoking Status Former Smoker    Last attempt to quit: 2013    Years since quittin.4   Smokeless Tobacco Never Used   Tobacco Comment    smokes for a few weeks per year - when under pressure. has been abstinent times years at a time. a pack lasts about a week     Social History     Substance and Sexual Activity   Drug Use No       Current Outpatient Medications:     acyclovir (ZOVIRAX) 800 MG Tab, 1 tablet(s) by mouth 3 times per day, Disp: , Rfl:     amLODIPine (NORVASC) 5 MG tablet, TAKE ONE TABLET BY MOUTH ONCE DAILY, Disp: 30 tablet, Rfl: 5    aspirin (ECOTRIN) 81 MG EC tablet, Take 81 mg by mouth once daily., Disp: , Rfl:     bumetanide (BUMEX) 1 MG tablet, 1 tablet(s) by mouth daily, Disp: , Rfl:     cholecalciferol, vitamin D3, 1,000 unit capsule, Take 1 capsule by mouth once daily., Disp: , Rfl:     cloNIDine (CATAPRES) 0.2 MG tablet, TAKE 1/2 TABLET IN THE  MORNING, 1/2 TABLET AT NOON,   THEN 2 TABLETS AT BEDTIME (Patient taking differently: TAKE 1/2 TABLET IN THE MORNING, 1/2 TABLET AT NOON,   THEN 2 TABLETS AT BEDTIME), Disp: 270 tablet, Rfl: 3    cyclobenzaprine (FLEXERIL) 10 MG tablet, 1/2 to 1 po tid prn muscle spasm, Disp: 60 tablet, Rfl: 5    dextroamphetamine-amphetamine (ADDERALL) 20 mg tablet, dextroamphetamine-amphetamine 20 mg tablet, Disp: , Rfl:     diazePAM (VALIUM) 5 MG tablet, every 12 (twelve) hours as needed. , Disp: , Rfl:     fluticasone propionate (FLONASE) 50 mcg/actuation nasal spray, 1 spray (50 mcg total) by Each Nostril route once daily., Disp: 1 Bottle, Rfl: 0    levocetirizine (XYZAL) 5 MG tablet, TAKE 1 TABLET BY MOUTH ONCE DAILY IN THE EVENING FOR 30 DAYS, Disp: , Rfl: 2    metroNIDAZOLE (FLAGYL) 500 MG tablet, metronidazole 500 mg tablet, Disp: , Rfl:     mirabegron (MYRBETRIQ) 50 mg Tb24, Take 1 tablet (50 mg total) by mouth once daily., Disp: 30 tablet, Rfl: 11    ondansetron (ZOFRAN-ODT) 4 MG TbDL, Take 1 tablet (4 mg total) by mouth every 8 (eight) hours as needed (nausea). (Patient not taking: Reported on 12/10/2019), Disp: 10 tablet, Rfl: 1    polyethylene glycol (GLYCOLAX) 17 gram/dose powder, Miralax 17 gram/dose oral powder  use as directed, Disp: , Rfl:     potassium chloride SA (K-DUR,KLOR-CON) 20 MEQ tablet, Take 1 tablet (20 mEq total) by mouth once daily. 1 tablet,ER particles/crystals Oral Twice a day, Disp: 60 tablet, Rfl: 5    potassium chloride SA (K-DUR,KLOR-CON) 20 MEQ tablet, Take 1 tablet (20 mEq total) by mouth once daily., Disp: 90 tablet, Rfl: 4    pravastatin (PRAVACHOL) 40 MG tablet, Take 1 tablet (40 mg total) by mouth once daily., Disp: 90 tablet, Rfl: 3    tocilizumab (ACTEMRA) 162 mg/0.9 mL Syrg, Inject 162 mg into the skin once a week., Disp: 4 Syringe, Rfl: 3    traMADol (ULTRAM) 50 mg tablet, Take 1 tablet (50 mg total) by mouth every 6 (six) hours as needed for Pain., Disp: 60 tablet, Rfl:  5    acyclovir (ZOVIRAX) 800 MG Tab, acyclovir 800 mg tablet, Disp: , Rfl:     benzonatate (TESSALON) 200 MG capsule, Take 1 capsule (200 mg total) by mouth 3 (three) times daily as needed for Cough. (Patient not taking: Reported on 12/10/2019), Disp: 30 capsule, Rfl: 0    diclofenac sodium (VOLTAREN) 1 % Gel, Apply 2 g topically 4 (four) times daily. (Patient not taking: Reported on 12/10/2019), Disp: 100 g, Rfl: 5    doxycycline (MONODOX) 100 MG capsule, Take 1 capsule (100 mg total) by mouth 2 (two) times daily. Do not take with milk or yogurt for 10 days (Patient not taking: Reported on 12/10/2019), Disp: 20 capsule, Rfl: 0    promethazine-dextromethorphan (PROMETHAZINE-DM) 6.25-15 mg/5 mL Syrp, Take 5 mLs by mouth every evening. for 5 days (Patient not taking: Reported on 12/10/2019), Disp: 118 mL, Rfl: 0       Objective:         Vitals:    12/09/19 0837   BP: (!) 140/78   Pulse: 72     Physical Exam   Nursing note and vitals reviewed.  Constitutional: She is oriented to person, place, and time and well-developed, well-nourished, and in no distress.   HENT:   Head: Normocephalic.   Mouth/Throat: Oropharynx is clear and moist.   Eyes: Conjunctivae are normal. Pupils are equal, round, and reactive to light.   Neck: Normal range of motion. Neck supple.   Cardiovascular: Normal rate and normal heart sounds.    Pulmonary/Chest: Effort normal. No respiratory distress.   Abdominal: Soft. There is no tenderness.   Neurological: She is alert and oriented to person, place, and time.   Skin: Skin is warm. No rash noted. No erythema.     Psychiatric: Memory and affect normal.   Musculoskeletal: She exhibits no edema, tenderness or deformity.   +fullness vs synovial hypertrophy in mcps b/l. Mild pain w lateral squeeze. No nodules. No effusions over large joints           Reviewed old and all outside pertinent medical records available.    All lab results personally reviewed and interpreted by me.  Lab Results    Component Value Date    WBC 6.18 09/05/2019    WBC 5.95 09/05/2019    HGB 13.5 09/05/2019    HGB 13.6 09/05/2019    HCT 43.2 09/05/2019    HCT 42.2 09/05/2019    MCV 90 09/05/2019    MCV 86 09/05/2019    MCH 28.0 09/05/2019    MCH 27.8 09/05/2019    MCHC 31.3 (L) 09/05/2019    MCHC 32.2 09/05/2019    RDW 13.9 09/05/2019    RDW 13.8 09/05/2019     (L) 09/05/2019     09/05/2019    MPV SEE COMMENT 09/05/2019    MPV 13.9 (H) 09/05/2019    PLTEST Adequate 05/14/2013       Lab Results   Component Value Date     09/05/2019     09/05/2019    K 3.8 09/05/2019    K 3.9 09/05/2019     09/05/2019     09/05/2019    CO2 26 09/05/2019    CO2 25 09/05/2019     09/05/2019     09/05/2019    BUN 14 09/05/2019    BUN 13 09/05/2019    CALCIUM 10.4 09/05/2019    CALCIUM 10.2 09/05/2019    PROT 7.9 09/05/2019    PROT 7.7 09/05/2019    ALBUMIN 5.0 09/05/2019    ALBUMIN 4.7 09/05/2019    BILITOT 0.5 09/05/2019    BILITOT 0.5 09/05/2019    AST 23 09/05/2019    AST 22 09/05/2019    ALKPHOS 86 09/05/2019    ALKPHOS 88 09/05/2019    ALT 36 09/05/2019    ALT 36 09/05/2019       Lab Results   Component Value Date    COLORU Yellow 12/26/2013    APPEARANCEUA Cloudy (A) 12/26/2013    SPECGRAV 1.025 12/26/2013    PHUR 6.0 12/26/2013    PROTEINUA Negative 12/26/2013    KETONESU Negative 12/26/2013    LEUKOCYTESUR Negative 12/26/2013    NITRITE Negative 12/26/2013    UROBILINOGEN Negative 12/26/2013       Lab Results   Component Value Date    CRP 0.1 09/05/2019       Lab Results   Component Value Date    SEDRATE <2 09/05/2019       Lab Results   Component Value Date     (H) 03/19/2012    SEDRATE <2 09/05/2019       No components found for: 25OHVITDTOT, 59VGWBYS8, 84ZRZHRW2, METHODNOTE    Lab Results   Component Value Date    URICACID 2.6 03/07/2009       No components found for: TSPOTTB      Imaging:  Hand XR  Degenerative changes are seen scattered throughout the right hand and most prominent  at the 1st interphalangeal joint, 1st CMC joint and DIP joint of the 3rd digit of the right hand.  The degenerative changes have progressed since the 2012 examination.  There is also a stable probable well corticated erosion within the trapezium.  No new erosive changes are identified.  Mild degenerative changes present at the 1st and 2nd MCP joints.    There is a swan-neck deformity of the 5th digit.  This is stable.  Prominent degenerative change also present at the 1st interphalangeal joint on the left which has progressed since the 2012 exam..  No obvious erosive changes are identified.  No significant soft tissue swelling noted.  Stable degenerative change noted at the 1st CMC joint.      Impression       1. No new erosive changes or soft tissue swelling identified.     Knee MRI 1/2019  1. Osseous changes suggestive of osteoarthritis of knees. Medial joint compartment narrowing with subarticular edema/sclerosis and marginal osteophyte formation.  2. Degenerative change of the medial meniscus with linear signal tear and blunting.  3. Small joint effusion and nonspecific mild soft tissue edema.  4. Popliteal cyst.   ASSESSMENT / PLAN:     Smitha Salinas is a 69 y.o. Black or  female with:      1. Rheumatoid arthritis, involving unspecified site, unspecified rheumatoid factor presence  -RF+/CCP+ nonerosive arthritis  -On actemra x past 4 years, stable. No progression on hand XR.   -No synovitis on exam. SJC 0; CDAI 0- in remission  -C/w actemra.     #Knee pain  -2.2 OA ; no significant benefit w steroid injections previously  -c/w voltaren gel, ice, topicals prn. Discussed quad strengthening exercises  -Start PA for synvisc injections at rtc       Method of contact with patient concerns: [facility]      Lois Aguila M.D.  Rheumatology Department   [facility] - [address] [address]  Phone: [phone]  Fax: [phone]

## 2019-12-27 ENCOUNTER — PATIENT MESSAGE (OUTPATIENT)
Dept: INTERNAL MEDICINE | Facility: CLINIC | Age: 69
End: 2019-12-27

## 2019-12-27 DIAGNOSIS — J40 BRONCHITIS: Primary | ICD-10-CM

## 2019-12-27 RX ORDER — PROMETHAZINE HYDROCHLORIDE AND DEXTROMETHORPHAN HYDROBROMIDE 6.25; 15 MG/5ML; MG/5ML
5 SYRUP ORAL NIGHTLY
Qty: 118 ML | Refills: 0 | Status: SHIPPED | OUTPATIENT
Start: 2019-12-27 | End: 2020-01-01

## 2019-12-30 ENCOUNTER — OFFICE VISIT (OUTPATIENT)
Dept: RHEUMATOLOGY | Facility: CLINIC | Age: 69
End: 2019-12-30
Payer: MEDICARE

## 2019-12-30 VITALS
BODY MASS INDEX: 32.29 KG/M2 | SYSTOLIC BLOOD PRESSURE: 126 MMHG | WEIGHT: 193.81 LBS | DIASTOLIC BLOOD PRESSURE: 72 MMHG | HEIGHT: 65 IN | HEART RATE: 64 BPM

## 2019-12-30 DIAGNOSIS — M19.90 OSTEOARTHRITIS, UNSPECIFIED OSTEOARTHRITIS TYPE, UNSPECIFIED SITE: ICD-10-CM

## 2019-12-30 DIAGNOSIS — M06.9 RHEUMATOID ARTHRITIS, INVOLVING UNSPECIFIED SITE, UNSPECIFIED RHEUMATOID FACTOR PRESENCE: Primary | ICD-10-CM

## 2019-12-30 PROCEDURE — 1101F PR PT FALLS ASSESS DOC 0-1 FALLS W/OUT INJ PAST YR: ICD-10-PCS | Mod: CPTII,S$GLB,, | Performed by: STUDENT IN AN ORGANIZED HEALTH CARE EDUCATION/TRAINING PROGRAM

## 2019-12-30 PROCEDURE — 3074F PR MOST RECENT SYSTOLIC BLOOD PRESSURE < 130 MM HG: ICD-10-PCS | Mod: CPTII,S$GLB,, | Performed by: STUDENT IN AN ORGANIZED HEALTH CARE EDUCATION/TRAINING PROGRAM

## 2019-12-30 PROCEDURE — 3078F DIAST BP <80 MM HG: CPT | Mod: CPTII,S$GLB,, | Performed by: STUDENT IN AN ORGANIZED HEALTH CARE EDUCATION/TRAINING PROGRAM

## 2019-12-30 PROCEDURE — 99213 OFFICE O/P EST LOW 20 MIN: CPT | Mod: 25,S$GLB,, | Performed by: STUDENT IN AN ORGANIZED HEALTH CARE EDUCATION/TRAINING PROGRAM

## 2019-12-30 PROCEDURE — 99213 PR OFFICE/OUTPT VISIT, EST, LEVL III, 20-29 MIN: ICD-10-PCS | Mod: 25,S$GLB,, | Performed by: STUDENT IN AN ORGANIZED HEALTH CARE EDUCATION/TRAINING PROGRAM

## 2019-12-30 PROCEDURE — 1159F PR MEDICATION LIST DOCUMENTED IN MEDICAL RECORD: ICD-10-PCS | Mod: S$GLB,,, | Performed by: STUDENT IN AN ORGANIZED HEALTH CARE EDUCATION/TRAINING PROGRAM

## 2019-12-30 PROCEDURE — 3074F SYST BP LT 130 MM HG: CPT | Mod: CPTII,S$GLB,, | Performed by: STUDENT IN AN ORGANIZED HEALTH CARE EDUCATION/TRAINING PROGRAM

## 2019-12-30 PROCEDURE — 3078F PR MOST RECENT DIASTOLIC BLOOD PRESSURE < 80 MM HG: ICD-10-PCS | Mod: CPTII,S$GLB,, | Performed by: STUDENT IN AN ORGANIZED HEALTH CARE EDUCATION/TRAINING PROGRAM

## 2019-12-30 PROCEDURE — 1125F PR PAIN SEVERITY QUANTIFIED, PAIN PRESENT: ICD-10-PCS | Mod: S$GLB,,, | Performed by: STUDENT IN AN ORGANIZED HEALTH CARE EDUCATION/TRAINING PROGRAM

## 2019-12-30 PROCEDURE — 20610 DRAIN/INJ JOINT/BURSA W/O US: CPT | Mod: 50,S$GLB,, | Performed by: STUDENT IN AN ORGANIZED HEALTH CARE EDUCATION/TRAINING PROGRAM

## 2019-12-30 PROCEDURE — 1125F AMNT PAIN NOTED PAIN PRSNT: CPT | Mod: S$GLB,,, | Performed by: STUDENT IN AN ORGANIZED HEALTH CARE EDUCATION/TRAINING PROGRAM

## 2019-12-30 PROCEDURE — 1101F PT FALLS ASSESS-DOCD LE1/YR: CPT | Mod: CPTII,S$GLB,, | Performed by: STUDENT IN AN ORGANIZED HEALTH CARE EDUCATION/TRAINING PROGRAM

## 2019-12-30 PROCEDURE — 20610 LARGE JOINT ASPIRATION/INJECTION: ICD-10-PCS | Mod: 50,S$GLB,, | Performed by: STUDENT IN AN ORGANIZED HEALTH CARE EDUCATION/TRAINING PROGRAM

## 2019-12-30 PROCEDURE — 99999 PR PBB SHADOW E&M-EST. PATIENT-LVL IV: ICD-10-PCS | Mod: PBBFAC,,, | Performed by: STUDENT IN AN ORGANIZED HEALTH CARE EDUCATION/TRAINING PROGRAM

## 2019-12-30 PROCEDURE — 1159F MED LIST DOCD IN RCRD: CPT | Mod: S$GLB,,, | Performed by: STUDENT IN AN ORGANIZED HEALTH CARE EDUCATION/TRAINING PROGRAM

## 2019-12-30 PROCEDURE — 99999 PR PBB SHADOW E&M-EST. PATIENT-LVL IV: CPT | Mod: PBBFAC,,, | Performed by: STUDENT IN AN ORGANIZED HEALTH CARE EDUCATION/TRAINING PROGRAM

## 2019-12-31 ENCOUNTER — DOCUMENTATION ONLY (OUTPATIENT)
Dept: RHEUMATOLOGY | Facility: CLINIC | Age: 69
End: 2019-12-31

## 2019-12-31 NOTE — NURSING
Prior authorization for Cyclobenzaprine 10mg approved through 3/2020, spoke with Solomon @ [facility]. Prior authorization #[id number]

## 2020-01-06 NOTE — PROGRESS NOTES
RHEUMATOLOGY OUTPATIENT CLINIC NOTE        Attending Rheumatologist: Lois Aguila  Primary Care Provider: Jayesh Jaramillo MD   Physician Requesting Consultation: Lois Aguila MD  [id number] [place] [address]  Chief Complaint/Reason For Consultation:  Knee pain    Subjective:       HPI  Smitha Salinas is a 69 y.o. AAF presents to establish care for hx rheumatoid arthritis. On actemra x past 4 years. Reports overall feeling well. No prolonged AM stiffness. Previously followed w Dr Clinton, last seen 1/2019. Hx mtx but stopped 2.2 genital herpes flares. No other acute issues or complaints.    Today:  Pt reports overall doing well since last visit. Main complaint however is chronic knee pain. States she has had steroid injections in the past, no significant relief. Pain now 5/10 worse w prolonged activity and towards end of day. Occasional swelling. No other acute issues or complaints. Here for synvisc injections.    14pt ROS negative xcept as otherwise stated above    Review of Systems   Constitutional: Negative for chills, fever and weight loss.   HENT: Negative for congestion, sinus pain and sore throat.    Eyes: Negative for blurred vision, double vision and pain.   Respiratory: Negative for cough and shortness of breath.    Cardiovascular: Negative for chest pain, palpitations and claudication.   Gastrointestinal: Negative for abdominal pain, nausea and vomiting.   Genitourinary: Negative for dysuria and frequency.   Musculoskeletal: Positive for joint pain. Negative for falls.   Skin: Negative for itching and rash.   Neurological: Negative for dizziness and weakness.   Endo/Heme/Allergies: Negative for polydipsia. Does not bruise/bleed easily.       Chronic comorbid conditions affecting medical decision making today:  Past Medical History:   Diagnosis Date    [facility]    Chronic rheumatic arthritis     dr clinton    Colon polyps     Ex-smoker     Genital  herpes     Herpes zoster infection     Hyperlipidemia     Hypertension     IGT (impaired glucose tolerance)     Immunosuppressed status     Lichen sclerosus et atrophicus     SUKHDEEP (obstructive sleep apnea)     Osteopenia      wai (start fmax if on pred 3months or more)     Past Surgical History:   Procedure Laterality Date    BLADDER SURGERY  2012    Dr Claudia Lamar    BREAST BIOPSY Left     benign    CRYOTHERAPY      SKIN GRAFT      laceration to right  fingers  from thigh     TONSILLECTOMY       Family History   Problem Relation Age of Onset    Heart disease Mother     Diabetes Mother     Stroke Father     Kidney disease Father     Cancer Sister 39        breast    Breast cancer Sister     Cancer Other 68        breast    Stroke Maternal Grandmother     Stroke Paternal Grandmother     Stroke Paternal Grandfather     Colon cancer Neg Hx     Ovarian cancer Neg Hx      Social History     Substance and Sexual Activity   Alcohol Use Yes    Alcohol/week: 0.0 standard drinks     Social History     Tobacco Use   Smoking Status Former Smoker    Last attempt to quit: 2013    Years since quittin.4   Smokeless Tobacco Never Used   Tobacco Comment    smokes for a few weeks per year - when under pressure. has been abstinent times years at a time. a pack lasts about a week     Social History     Substance and Sexual Activity   Drug Use No       Current Outpatient Medications:     acyclovir (ZOVIRAX) 800 MG Tab, acyclovir 800 mg tablet, Disp: , Rfl:     acyclovir (ZOVIRAX) 800 MG Tab, 1 tablet(s) by mouth 3 times per day, Disp: , Rfl:     amLODIPine (NORVASC) 5 MG tablet, TAKE ONE TABLET BY MOUTH ONCE DAILY, Disp: 30 tablet, Rfl: 5    aspirin (ECOTRIN) 81 MG EC tablet, Take 81 mg by mouth once daily., Disp: , Rfl:     bumetanide (BUMEX) 1 MG tablet, 1 tablet(s) by mouth daily, Disp: , Rfl:     cholecalciferol, vitamin D3, 1,000 unit capsule, Take 1 capsule by mouth  once daily., Disp: , Rfl:     cloNIDine (CATAPRES) 0.2 MG tablet, TAKE 1/2 TABLET IN THE MORNING, 1/2 TABLET AT NOON,   THEN 2 TABLETS AT BEDTIME (Patient taking differently: TAKE 1/2 TABLET IN THE MORNING, 1/2 TABLET AT NOON,   THEN 2 TABLETS AT BEDTIME), Disp: 270 tablet, Rfl: 3    cyclobenzaprine (FLEXERIL) 10 MG tablet, 1/2 to 1 po tid prn muscle spasm, Disp: 60 tablet, Rfl: 5    dextroamphetamine-amphetamine (ADDERALL) 20 mg tablet, dextroamphetamine-amphetamine 20 mg tablet, Disp: , Rfl:     diazePAM (VALIUM) 5 MG tablet, every 12 (twelve) hours as needed. , Disp: , Rfl:     diclofenac sodium (VOLTAREN) 1 % Gel, Apply 2 g topically 4 (four) times daily., Disp: 100 g, Rfl: 5    fluticasone propionate (FLONASE) 50 mcg/actuation nasal spray, 1 spray (50 mcg total) by Each Nostril route once daily., Disp: 1 Bottle, Rfl: 0    levocetirizine (XYZAL) 5 MG tablet, TAKE 1 TABLET BY MOUTH ONCE DAILY IN THE EVENING FOR 30 DAYS, Disp: , Rfl: 2    metroNIDAZOLE (FLAGYL) 500 MG tablet, metronidazole 500 mg tablet, Disp: , Rfl:     mirabegron (MYRBETRIQ) 50 mg Tb24, Take 1 tablet (50 mg total) by mouth once daily., Disp: 30 tablet, Rfl: 11    ondansetron (ZOFRAN-ODT) 4 MG TbDL, Take 1 tablet (4 mg total) by mouth every 8 (eight) hours as needed (nausea)., Disp: 10 tablet, Rfl: 1    polyethylene glycol (GLYCOLAX) 17 gram/dose powder, Miralax 17 gram/dose oral powder  use as directed, Disp: , Rfl:     potassium chloride SA (K-DUR,KLOR-CON) 20 MEQ tablet, Take 1 tablet (20 mEq total) by mouth once daily. 1 tablet,ER particles/crystals Oral Twice a day, Disp: 60 tablet, Rfl: 5    potassium chloride SA (K-DUR,KLOR-CON) 20 MEQ tablet, Take 1 tablet (20 mEq total) by mouth once daily., Disp: 90 tablet, Rfl: 4    pravastatin (PRAVACHOL) 40 MG tablet, Take 1 tablet (40 mg total) by mouth once daily., Disp: 90 tablet, Rfl: 3    tocilizumab (ACTEMRA) 162 mg/0.9 mL Syrg, Inject 162 mg into the skin once a week., Disp: 4  Syringe, Rfl: 3    traMADol (ULTRAM) 50 mg tablet, Take 1 tablet (50 mg total) by mouth every 6 (six) hours as needed for Pain., Disp: 60 tablet, Rfl: 5       Objective:         Vitals:    12/30/19 1329   BP: 126/72   Pulse: 64     Physical Exam   Nursing note and vitals reviewed.  Constitutional: She is oriented to person, place, and time and well-developed, well-nourished, and in no distress.   HENT:   Head: Normocephalic.   Mouth/Throat: Oropharynx is clear and moist.   Eyes: Conjunctivae are normal. Pupils are equal, round, and reactive to light.   Neck: Normal range of motion. Neck supple.   Cardiovascular: Normal rate and normal heart sounds.    Pulmonary/Chest: Effort normal. No respiratory distress.   Abdominal: Soft. There is no tenderness.   Neurological: She is alert and oriented to person, place, and time.   Skin: Skin is warm. No rash noted. No erythema.     Psychiatric: Memory and affect normal.   Musculoskeletal: She exhibits no edema, tenderness or deformity.   No synovitis in small joints of hands and feet. No nodules. No effusions over large joints             Reviewed old and all outside pertinent medical records available.    All lab results personally reviewed and interpreted by me.  Lab Results   Component Value Date    WBC 6.18 09/05/2019    WBC 5.95 09/05/2019    HGB 13.5 09/05/2019    HGB 13.6 09/05/2019    HCT 43.2 09/05/2019    HCT 42.2 09/05/2019    MCV 90 09/05/2019    MCV 86 09/05/2019    MCH 28.0 09/05/2019    MCH 27.8 09/05/2019    MCHC 31.3 (L) 09/05/2019    MCHC 32.2 09/05/2019    RDW 13.9 09/05/2019    RDW 13.8 09/05/2019     (L) 09/05/2019     09/05/2019    MPV SEE COMMENT 09/05/2019    MPV 13.9 (H) 09/05/2019    PLTEST Adequate 05/14/2013       Lab Results   Component Value Date     09/05/2019     09/05/2019    K 3.8 09/05/2019    K 3.9 09/05/2019     09/05/2019     09/05/2019    CO2 26 09/05/2019    CO2 25 09/05/2019     09/05/2019      09/05/2019    BUN 14 09/05/2019    BUN 13 09/05/2019    CALCIUM 10.4 09/05/2019    CALCIUM 10.2 09/05/2019    PROT 7.9 09/05/2019    PROT 7.7 09/05/2019    ALBUMIN 5.0 09/05/2019    ALBUMIN 4.7 09/05/2019    BILITOT 0.5 09/05/2019    BILITOT 0.5 09/05/2019    AST 23 09/05/2019    AST 22 09/05/2019    ALKPHOS 86 09/05/2019    ALKPHOS 88 09/05/2019    ALT 36 09/05/2019    ALT 36 09/05/2019       Lab Results   Component Value Date    COLORU Yellow 12/26/2013    APPEARANCEUA Cloudy (A) 12/26/2013    SPECGRAV 1.025 12/26/2013    PHUR 6.0 12/26/2013    PROTEINUA Negative 12/26/2013    KETONESU Negative 12/26/2013    LEUKOCYTESUR Negative 12/26/2013    NITRITE Negative 12/26/2013    UROBILINOGEN Negative 12/26/2013       Lab Results   Component Value Date    CRP 0.1 09/05/2019       Lab Results   Component Value Date    SEDRATE <2 09/05/2019       Lab Results   Component Value Date     (H) 03/19/2012    SEDRATE <2 09/05/2019       No components found for: 25OHVITDTOT, 79VWMWRL4, 72KNYAFC2, METHODNOTE    Lab Results   Component Value Date    URICACID 2.6 03/07/2009       No components found for: TSPOTTB      Imaging:  Hand XR  Degenerative changes are seen scattered throughout the right hand and most prominent at the 1st interphalangeal joint, 1st CMC joint and DIP joint of the 3rd digit of the right hand.  The degenerative changes have progressed since the 2012 examination.  There is also a stable probable well corticated erosion within the trapezium.  No new erosive changes are identified.  Mild degenerative changes present at the 1st and 2nd MCP joints.    There is a swan-neck deformity of the 5th digit.  This is stable.  Prominent degenerative change also present at the 1st interphalangeal joint on the left which has progressed since the 2012 exam..  No obvious erosive changes are identified.  No significant soft tissue swelling noted.  Stable degenerative change noted at the 1st CMC joint.       Impression       1. No new erosive changes or soft tissue swelling identified.     Knee MRI 1/2019  1. Osseous changes suggestive of osteoarthritis of knees. Medial joint compartment narrowing with subarticular edema/sclerosis and marginal osteophyte formation.  2. Degenerative change of the medial meniscus with linear signal tear and blunting.  3. Small joint effusion and nonspecific mild soft tissue edema.  4. Popliteal cyst.     Large Joint Aspiration/Injection  Date/Time: 12/30/2019 1:00 PM  Performed by: Lois Aguila MD  Authorized by: Lois Aguila MD     Consent Done?:  Yes (Verbal)  Indications:  Pain  Procedure site marked: Yes    Timeout: Prior to procedure the correct patient, procedure, and site was verified    Anesthesia  Local anesthesia used  Anesthetic: lidocaine 2% without epinephrine  Anesthetic total: 2mL  Prep: Patient was prepped and draped in usual sterile fashion    Needle gauge: 21.  Approach:  Anterolateral  Medications:  48 mg hylan g-f 20 48 mg/6 mL  Patient tolerance:  Patient tolerated the procedure well with no immediate complications  Large Joint Aspiration/Injection  Date/Time: 12/30/2019 1:00 PM  Performed by: Lois Aguila MD  Authorized by: Lois Aguila MD     Consent Done?:  Yes (Verbal)  Indications:  Pain  Procedure site marked: Yes    Timeout: Prior to procedure the correct patient, procedure, and site was verified    Anesthesia  Local anesthesia used  Anesthetic: lidocaine 2% without epinephrine  Anesthetic total: 3mL  Prep: Patient was prepped and draped in usual sterile fashion    Needle gauge: 21.  Approach:  Anterolateral  Medications:  48 mg hylan g-f 20 48 mg/6 mL  Patient tolerance:  Patient tolerated the procedure well with no immediate complications       ASSESSMENT / PLAN:     Smitha aSlinas is a 69 y.o. Black or  female with:      1. Rheumatoid arthritis, involving unspecified site, unspecified rheumatoid factor presence  -RF+/CCP+  nonerosive arthritis  -On actemra x past 4 years, stable. No progression on hand XR.   -No synovitis on exam. SJC 0; CDAI 0- in remission  -C/w actemra.     #Knee pain  -2.2 OA ; no significant benefit w steroid injections previously  -c/w voltaren gel, ice, topicals prn. Discussed quad strengthening exercises  -synvisc-1 injected today b/l knees       Method of contact with patient concerns: MyChart attn Rheumatology      Lois Aguila M.D.  Rheumatology Department   [facility] [place] [address], [place] [address]  Phone: [phone]  Fax: [phone]

## 2020-01-13 ENCOUNTER — TELEPHONE (OUTPATIENT)
Dept: RHEUMATOLOGY | Facility: CLINIC | Age: 70
End: 2020-01-13

## 2020-01-13 ENCOUNTER — PATIENT MESSAGE (OUTPATIENT)
Dept: INTERNAL MEDICINE | Facility: CLINIC | Age: 70
End: 2020-01-13

## 2020-01-14 ENCOUNTER — PATIENT MESSAGE (OUTPATIENT)
Dept: RHEUMATOLOGY | Facility: CLINIC | Age: 70
End: 2020-01-14

## 2020-01-17 RX ORDER — LEVOCETIRIZINE DIHYDROCHLORIDE 5 MG/1
5 TABLET, FILM COATED ORAL NIGHTLY PRN
Qty: 90 TABLET | Refills: 4 | Status: SHIPPED | OUTPATIENT
Start: 2020-01-17 | End: 2020-02-18 | Stop reason: SDUPTHER

## 2020-01-23 ENCOUNTER — OFFICE VISIT (OUTPATIENT)
Dept: PODIATRY | Facility: CLINIC | Age: 70
End: 2020-01-23
Payer: COMMERCIAL

## 2020-01-23 ENCOUNTER — TELEPHONE (OUTPATIENT)
Dept: UROLOGY | Facility: CLINIC | Age: 70
End: 2020-01-23

## 2020-01-23 ENCOUNTER — PATIENT MESSAGE (OUTPATIENT)
Dept: INTERNAL MEDICINE | Facility: CLINIC | Age: 70
End: 2020-01-23

## 2020-01-23 ENCOUNTER — OFFICE VISIT (OUTPATIENT)
Dept: INTERNAL MEDICINE | Facility: CLINIC | Age: 70
End: 2020-01-23
Payer: COMMERCIAL

## 2020-01-23 ENCOUNTER — IMMUNIZATION (OUTPATIENT)
Dept: PHARMACY | Facility: CLINIC | Age: 70
End: 2020-01-23
Payer: MEDICARE

## 2020-01-23 VITALS
OXYGEN SATURATION: 98 % | SYSTOLIC BLOOD PRESSURE: 96 MMHG | BODY MASS INDEX: 32.51 KG/M2 | HEIGHT: 65 IN | WEIGHT: 195.13 LBS | DIASTOLIC BLOOD PRESSURE: 60 MMHG | TEMPERATURE: 96 F | HEART RATE: 63 BPM

## 2020-01-23 VITALS
WEIGHT: 195.13 LBS | DIASTOLIC BLOOD PRESSURE: 80 MMHG | HEART RATE: 67 BPM | SYSTOLIC BLOOD PRESSURE: 140 MMHG | HEIGHT: 65 IN | BODY MASS INDEX: 32.51 KG/M2

## 2020-01-23 DIAGNOSIS — S99.922S TOE INJURY, LEFT, SEQUELA: Primary | ICD-10-CM

## 2020-01-23 DIAGNOSIS — S91.209A AVULSION OF TOENAIL OF LEFT FOOT: ICD-10-CM

## 2020-01-23 DIAGNOSIS — M79.675 TOE PAIN, LEFT: Primary | ICD-10-CM

## 2020-01-23 DIAGNOSIS — S91.209A TRAUMATIC AVULSION OF NAIL PLATE OF TOE, INITIAL ENCOUNTER: Primary | ICD-10-CM

## 2020-01-23 PROCEDURE — 99213 OFFICE O/P EST LOW 20 MIN: CPT | Mod: S$GLB,,, | Performed by: NURSE PRACTITIONER

## 2020-01-23 PROCEDURE — 99999 PR PBB SHADOW E&M-EST. PATIENT-LVL V: CPT | Mod: PBBFAC,,, | Performed by: NURSE PRACTITIONER

## 2020-01-23 PROCEDURE — 3074F SYST BP LT 130 MM HG: CPT | Mod: CPTII,S$GLB,, | Performed by: NURSE PRACTITIONER

## 2020-01-23 PROCEDURE — 99999 PR PBB SHADOW E&M-EST. PATIENT-LVL II: CPT | Mod: PBBFAC,,, | Performed by: PODIATRIST

## 2020-01-23 PROCEDURE — 99213 PR OFFICE/OUTPT VISIT, EST, LEVL III, 20-29 MIN: ICD-10-PCS | Mod: S$GLB,,, | Performed by: NURSE PRACTITIONER

## 2020-01-23 PROCEDURE — 3074F PR MOST RECENT SYSTOLIC BLOOD PRESSURE < 130 MM HG: ICD-10-PCS | Mod: CPTII,S$GLB,, | Performed by: NURSE PRACTITIONER

## 2020-01-23 PROCEDURE — 1101F PT FALLS ASSESS-DOCD LE1/YR: CPT | Mod: CPTII,S$GLB,, | Performed by: NURSE PRACTITIONER

## 2020-01-23 PROCEDURE — 1159F MED LIST DOCD IN RCRD: CPT | Mod: S$GLB,,, | Performed by: NURSE PRACTITIONER

## 2020-01-23 PROCEDURE — 99999 PR PBB SHADOW E&M-EST. PATIENT-LVL II: ICD-10-PCS | Mod: PBBFAC,,, | Performed by: PODIATRIST

## 2020-01-23 PROCEDURE — 1125F PR PAIN SEVERITY QUANTIFIED, PAIN PRESENT: ICD-10-PCS | Mod: S$GLB,,, | Performed by: NURSE PRACTITIONER

## 2020-01-23 PROCEDURE — 1159F PR MEDICATION LIST DOCUMENTED IN MEDICAL RECORD: ICD-10-PCS | Mod: S$GLB,,, | Performed by: NURSE PRACTITIONER

## 2020-01-23 PROCEDURE — 1101F PR PT FALLS ASSESS DOC 0-1 FALLS W/OUT INJ PAST YR: ICD-10-PCS | Mod: CPTII,S$GLB,, | Performed by: NURSE PRACTITIONER

## 2020-01-23 PROCEDURE — 99203 PR OFFICE/OUTPT VISIT, NEW, LEVL III, 30-44 MIN: ICD-10-PCS | Mod: S$GLB,,, | Performed by: PODIATRIST

## 2020-01-23 PROCEDURE — 99203 OFFICE O/P NEW LOW 30 MIN: CPT | Mod: S$GLB,,, | Performed by: PODIATRIST

## 2020-01-23 PROCEDURE — 3078F PR MOST RECENT DIASTOLIC BLOOD PRESSURE < 80 MM HG: ICD-10-PCS | Mod: CPTII,S$GLB,, | Performed by: NURSE PRACTITIONER

## 2020-01-23 PROCEDURE — 3078F DIAST BP <80 MM HG: CPT | Mod: CPTII,S$GLB,, | Performed by: NURSE PRACTITIONER

## 2020-01-23 PROCEDURE — 99999 PR PBB SHADOW E&M-EST. PATIENT-LVL V: ICD-10-PCS | Mod: PBBFAC,,, | Performed by: NURSE PRACTITIONER

## 2020-01-23 PROCEDURE — 1125F AMNT PAIN NOTED PAIN PRSNT: CPT | Mod: S$GLB,,, | Performed by: NURSE PRACTITIONER

## 2020-01-23 RX ORDER — OFLOXACIN 3 MG/ML
SOLUTION/ DROPS OPHTHALMIC
COMMUNITY
Start: 2020-01-22 | End: 2020-10-07

## 2020-01-23 RX ORDER — ERYTHROMYCIN 5 MG/G
OINTMENT OPHTHALMIC
COMMUNITY
Start: 2020-01-22 | End: 2020-10-07

## 2020-01-23 NOTE — TELEPHONE ENCOUNTER
----- Message from Santana Arredondo sent at 1/23/2020 11:17 AM CST -----  Hello,     Pt called today requesting a sooner appt with Urology. She has an appointment scheduled on 6/11 with Dr. Ibanez, but has concerns about her Myrabetriq not working.     She mentioned that she has looked into adding a supplement to it, but needed advice and would like to be seen soon to address the issues she is having.     In attempting to reschedule, I could not find any appointments sooner than her standing appointment. Pt would like to bee seen as soon as possible, with Dr. Ibanez or any other available provider in or near the [facility].     Thank you for your help,   Santana

## 2020-01-23 NOTE — LETTER
January 23, 2020      Tami Dela Cruz NP  [address] [facility] [address]           [facility]  [address] [facility] [address]  Phone: [phone]  Fax: [phone]          Patient: Smitha Salinas   MR Number: [id number]   YOB: 1950   Date of Visit: 1/23/2020       Dear Tami Dela Cruz:    Thank you for referring Smitha Salinas to me for evaluation. Attached you will find relevant portions of my assessment and plan of care.    If you have questions, please do not hesitate to call me. I look forward to following Smitha Salinas along with you.    Sincerely,    Violetta High, GEOVANI    Enclosure  CC:  No Recipients    If you would like to receive this communication electronically, please contact [email or web address] or [phone] to request more information on [employer] access.    For providers and/or their staff who would like to refer a patient to [facility], please contact us through our one-stop-shop provider referral line, [facility], at [phone].    If you feel you have received this communication in error or would no longer like to receive these types of communications, please e-mail [email or web address]

## 2020-01-23 NOTE — PROGRESS NOTES
[facility]  PODIATRIC MEDICINE AND SURGERY  PROGRESS NOTE    Reason for Visit   Chief Complaint   Patient presents with    Foot Injury     left 3rd toe injury 01/19/20, dropped a top of a pot on foot, denies pain at present, 12/6/2019 last appt with internal medicine        HPI  Smitha Salinas is a 69 y.o. female w/ PMH of RA, who presents today after sustaining injury to left third toe. Pt describes mechanism as direct injury in which sharp object fell on toenail.  She denies any pain to the site.  She states she has been able to ambulate without any issues.  She was referred here for further evaluation by Tami Dela Cruz NP.  She has performed warms soaks since injury. Pain is 0/10. She is able to ambulate without difficulty.    Patient denies other pedal complaints at this time.        PMH  Past Medical History:   Diagnosis Date    [facility]    Chronic rheumatic arthritis     dr tai    Colon polyps     Ex-smoker     Genital herpes     Herpes zoster infection     Hyperlipidemia     Hypertension     IGT (impaired glucose tolerance)     Immunosuppressed status     Lichen sclerosus et atrophicus     SUKHDEEP (obstructive sleep apnea)     Osteopenia     5/16 wai 5/18(start fmax if on pred 3months or more)       MEDS  Current Outpatient Medications on File Prior to Visit   Medication Sig Dispense Refill    acyclovir (ZOVIRAX) 800 MG Tab acyclovir 800 mg tablet      acyclovir (ZOVIRAX) 800 MG Tab 1 tablet(s) by mouth 3 times per day      amLODIPine (NORVASC) 5 MG tablet TAKE ONE TABLET BY MOUTH ONCE DAILY 30 tablet 5    aspirin (ECOTRIN) 81 MG EC tablet Take 81 mg by mouth once daily.      bumetanide (BUMEX) 1 MG tablet 1 tablet(s) by mouth daily      cholecalciferol, vitamin D3, 1,000 unit capsule Take 1 capsule by mouth once daily.      cloNIDine (CATAPRES) 0.2 MG tablet TAKE 1/2 TABLET IN THE MORNING, 1/2 TABLET AT NOON,   THEN 2 TABLETS AT BEDTIME (Patient  taking differently: TAKE 1/2 TABLET IN THE MORNING, 1/2 TABLET AT NOON,   THEN 2 TABLETS AT BEDTIME) 270 tablet 3    cyclobenzaprine (FLEXERIL) 10 MG tablet 1/2 to 1 po tid prn muscle spasm 60 tablet 5    dextroamphetamine-amphetamine (ADDERALL) 20 mg tablet dextroamphetamine-amphetamine 20 mg tablet      diazePAM (VALIUM) 5 MG tablet every 12 (twelve) hours as needed.       diclofenac sodium (VOLTAREN) 1 % Gel Apply 2 g topically 4 (four) times daily. 100 g 5    erythromycin (ROMYCIN) ophthalmic ointment       levocetirizine (XYZAL) 5 MG tablet Take 1 tablet (5 mg total) by mouth nightly as needed for Allergies. 90 tablet 4    metroNIDAZOLE (FLAGYL) 500 MG tablet metronidazole 500 mg tablet      mirabegron (MYRBETRIQ) 50 mg Tb24 Take 1 tablet (50 mg total) by mouth once daily. 30 tablet 11    ofloxacin (OCUFLOX) 0.3 % ophthalmic solution       ondansetron (ZOFRAN-ODT) 4 MG TbDL Take 1 tablet (4 mg total) by mouth every 8 (eight) hours as needed (nausea). 10 tablet 1    polyethylene glycol (GLYCOLAX) 17 gram/dose powder Miralax 17 gram/dose oral powder   use as directed      potassium chloride SA (K-DUR,KLOR-CON) 20 MEQ tablet Take 1 tablet (20 mEq total) by mouth once daily. 1 tablet,ER particles/crystals Oral Twice a day 60 tablet 5    potassium chloride SA (K-DUR,KLOR-CON) 20 MEQ tablet Take 1 tablet (20 mEq total) by mouth once daily. 90 tablet 4    pravastatin (PRAVACHOL) 40 MG tablet Take 1 tablet (40 mg total) by mouth once daily. 90 tablet 3    tocilizumab (ACTEMRA) 162 mg/0.9 mL Syrg Inject 162 mg into the skin once a week. 4 Syringe 3    traMADol (ULTRAM) 50 mg tablet Take 1 tablet (50 mg total) by mouth every 6 (six) hours as needed for Pain. 60 tablet 5     No current facility-administered medications on file prior to visit.        PSH     Past Surgical History:   Procedure Laterality Date    BLADDER SURGERY  8/2012    Dr Claudia Lamar    BREAST BIOPSY Left     benign    CRYOTHERAPY  1980  "   SKIN GRAFT      laceration to right  fingers  from thigh     TONSILLECTOMY          ALL  Review of patient's allergies indicates:   Allergen Reactions    Gabapentin Other (See Comments)     Disoriented    Valdecoxib Other (See Comments)     palpitation       SOC     Social History     Tobacco Use    Smoking status: Former Smoker     Last attempt to quit: 2013     Years since quittin.5    Smokeless tobacco: Never Used    Tobacco comment: smokes for a few weeks per year - when under pressure. has been abstinent times years at a time. a pack lasts about a week   Substance Use Topics    Alcohol use: Yes     Alcohol/week: 0.0 standard drinks    Drug use: No         Family HX    Family History   Problem Relation Age of Onset    Heart disease Mother     Diabetes Mother     Stroke Father     Kidney disease Father     Cancer Sister 39        breast    Breast cancer Sister     Cancer Other 68        breast    Stroke Maternal Grandmother     Stroke Paternal Grandmother     Stroke Paternal Grandfather     Colon cancer Neg Hx     Ovarian cancer Neg Hx             REVIEW OF SYSTEMS  General: Denies any fever or chills  Chest: Denies shortness of breath, wheezing, coughing, or sputum production  Heart: Denies chest pain, cold extremities, orthopenia, or reduced exercise tolerance  Musk: Positive for pain as noted to affected extremity in HPI   As noted above and per history of current illness above, otherwise negative in the remainder of the 14 systems.      PHYSICAL EXAM  Vitals:    20 1316   BP: (!) 140/80   Pulse: 67   Weight: 88.5 kg (195 lb 1.7 oz)   Height: 5' 5" (1.651 m)   PainSc: 0-No pain       General: This patient is well-developed, well-nourished and appears stated age, well-oriented to person, place and time, and cooperative and pleasant on today's visit    Lower Extremity Physical Exam    Vascular exam:   · Dorsalis pedis and posterior tibial pulses palpable 2/4 " bilaterally.   · Capillary refill time immediate to the toes.   · Feet are warm to the touch. Skin temperature warm to warm from proximally to distally   · There are no varicosities, telangiectasias noted to bilateral foot and ankle regions.   · There are no ecchymoses noted to bilateral foot and ankle regions.   · There is  No edema     Dermatologic exam:   · Skin moist with healthy texture and turgor.  · There are no open ulcerations, lacerations, or fissures to bilateral foot and ankle regions.  There is no  evidence of ecchymosis or fracture blisters. The nail plate on left 3rd toenail is partially traumatically avulsed proximally, there is intact nail plate distally approximately 3 mm from nail fold.  No acute signs of infection noted.  There is negative drainage noted.      Neuro:   · Epicritic sensation is intact as the patient is able to sense light touch to bilateral foot and ankle regions.   · Achilles and patellar deep tendon reflexes intact  · Babinski reflex absent    MSK:   + Wiggle toes ; there is negative pain with diffuse palpation of left third digit and with ROM  5/5 muscle strength for all compartments tested.    IMAGING   Reviewed by me and I agree with radiologist findings, 3 views of foot/ankle, reveal:  Results for orders placed during the hospital encounter of 09/05/19   X-Ray Foot Complete Bilateral    Narrative EXAMINATION:  XR FOOT COMPLETE 3 VIEW BILATERAL    CLINICAL HISTORY:  . Rheumatoid arthritis, unspecified    TECHNIQUE:  Three views of both feet.    COMPARISON:  None    FINDINGS:  The bones are diffusely osteopenic.  There is bilateral hallux valgus and bunion formation.  Degenerative changes present in the midfoot region and 1st MTP joints bilaterally.  Calcaneal spurs on both sides.  No periarticular erosions.  No acute fracture or dislocation.      Impression As above.      Electronically signed by: ESTHER Pratt MD  Date:    09/05/2019  Time:    12:47          Impression  As above.      Electronically signed by: ESTHER Pratt MD  Date:    09/05/2019  Time:    12:47         ASSESSMENT  1. Traumatic avulsion of nail plate of toe, initial encounter         PLAN  1. Patient was educated about clinical and imaging findings, and verbalizes understanding of above.     Diagnoses and all orders for this visit:  Traumatic avulsion of nail plate of toe, initial encounter     Discussed warm Epsom salt soaks times 10 days b.i.d., applied Neosporin ointment cyst site.  Allow area to be clean and dry.  Recommend x-ray to rule out underlying and subungual fracture.  Patient declines.  Patient states that she does not have any pain to the site and would like to hold off on x-ray.     RTC if symptoms fail to improve or worsen in severity    Future Appointments   Date Time Provider Department Center   1/29/2020  9:30 AM Lois Aguila MD [facility]   3/5/2020 10:30 AM LABORATORY, [facility] LAB [facility]   3/12/2020  9:40 AM Jayesh Jaramillo MD [facility]   6/11/2020  1:00 PM Perez Ibanez IV, MD ON UROLOGY  Medical C     Report Electronically Signed By:     Violetta High DPM   Podiatry  [facility]   1/23/2020

## 2020-01-23 NOTE — PROGRESS NOTES
Subjective:       Patient ID: Smitha Salinas is a 69 y.o. female.    Chief Complaint: Foot Pain (left middle toe injury x 5 days)    Patient presents with injury to left 2nd and 3rd toe.  Reports dropping a sharp object about 5 days ago.  No pain with walking.  Some pain with palpation.  Concerned about bleeding that has not stopped.  Has been soaking foot in Apple Cider vinegar and salt water.     Review of Systems   Constitutional: Negative for chills and fatigue.   Respiratory: Negative for cough and shortness of breath.    Musculoskeletal: Positive for arthralgias and joint swelling. Negative for gait problem.   Skin: Negative for color change and rash.   Psychiatric/Behavioral: Negative for agitation and confusion.       Objective:      Physical Exam   Constitutional: She is oriented to person, place, and time. Vital signs are normal. She appears well-developed and well-nourished.   HENT:   Head: Normocephalic and atraumatic.   Neck: Normal range of motion.   Cardiovascular: Normal rate.   Pulmonary/Chest: Effort normal.   Musculoskeletal: She exhibits tenderness.        Left foot: There is tenderness. There is normal range of motion.        Feet:    Neurological: She is alert and oriented to person, place, and time.   Skin: Skin is warm.   Psychiatric: She has a normal mood and affect. Her behavior is normal.       Assessment:       1. Toe injury, left, sequela    2. Avulsion of toenail of left foot        Plan:         Toe injury, left, sequela  -     Ambulatory Referral to Podiatry    Avulsion of toenail of left foot        Due to avulsion of the toenail to the 3rd toe, I recommend that she see podiatry.  Recommend x-ray of the toes, patient declined.  Will wait to see podiatry.

## 2020-01-28 RX ORDER — DIAZEPAM 5 MG/1
TABLET ORAL
Qty: 30 TABLET | Refills: 5 | Status: SHIPPED | OUTPATIENT
Start: 2020-01-28 | End: 2020-02-18 | Stop reason: SDUPTHER

## 2020-01-29 ENCOUNTER — OFFICE VISIT (OUTPATIENT)
Dept: RHEUMATOLOGY | Facility: CLINIC | Age: 70
End: 2020-01-29
Payer: COMMERCIAL

## 2020-01-29 ENCOUNTER — HOSPITAL ENCOUNTER (OUTPATIENT)
Dept: RADIOLOGY | Facility: HOSPITAL | Age: 70
Discharge: HOME | End: 2020-01-29
Attending: STUDENT IN AN ORGANIZED HEALTH CARE EDUCATION/TRAINING PROGRAM
Payer: COMMERCIAL

## 2020-01-29 VITALS
BODY MASS INDEX: 32.1 KG/M2 | HEIGHT: 65 IN | WEIGHT: 192.69 LBS | HEART RATE: 59 BPM | SYSTOLIC BLOOD PRESSURE: 126 MMHG | DIASTOLIC BLOOD PRESSURE: 78 MMHG

## 2020-01-29 DIAGNOSIS — M06.9 RHEUMATOID ARTHRITIS, INVOLVING UNSPECIFIED SITE, UNSPECIFIED RHEUMATOID FACTOR PRESENCE: ICD-10-CM

## 2020-01-29 DIAGNOSIS — M19.90 OSTEOARTHRITIS, UNSPECIFIED OSTEOARTHRITIS TYPE, UNSPECIFIED SITE: ICD-10-CM

## 2020-01-29 DIAGNOSIS — M19.90 OSTEOARTHRITIS, UNSPECIFIED OSTEOARTHRITIS TYPE, UNSPECIFIED SITE: Primary | ICD-10-CM

## 2020-01-29 DIAGNOSIS — Z79.899 HIGH RISK MEDICATION USE: ICD-10-CM

## 2020-01-29 PROCEDURE — 1101F PR PT FALLS ASSESS DOC 0-1 FALLS W/OUT INJ PAST YR: ICD-10-PCS | Mod: CPTII,S$GLB,, | Performed by: STUDENT IN AN ORGANIZED HEALTH CARE EDUCATION/TRAINING PROGRAM

## 2020-01-29 PROCEDURE — 1125F PR PAIN SEVERITY QUANTIFIED, PAIN PRESENT: ICD-10-PCS | Mod: S$GLB,,, | Performed by: STUDENT IN AN ORGANIZED HEALTH CARE EDUCATION/TRAINING PROGRAM

## 2020-01-29 PROCEDURE — 99215 PR OFFICE/OUTPT VISIT, EST, LEVL V, 40-54 MIN: ICD-10-PCS | Mod: 25,S$GLB,, | Performed by: STUDENT IN AN ORGANIZED HEALTH CARE EDUCATION/TRAINING PROGRAM

## 2020-01-29 PROCEDURE — 3078F DIAST BP <80 MM HG: CPT | Mod: CPTII,S$GLB,, | Performed by: STUDENT IN AN ORGANIZED HEALTH CARE EDUCATION/TRAINING PROGRAM

## 2020-01-29 PROCEDURE — 73562 X-RAY EXAM OF KNEE 3: CPT | Mod: TC,50

## 2020-01-29 PROCEDURE — 99999 PR PBB SHADOW E&M-EST. PATIENT-LVL IV: ICD-10-PCS | Mod: PBBFAC,,, | Performed by: STUDENT IN AN ORGANIZED HEALTH CARE EDUCATION/TRAINING PROGRAM

## 2020-01-29 PROCEDURE — 73562 XR KNEE ORTHO BILAT: ICD-10-PCS | Mod: 26,50,, | Performed by: RADIOLOGY

## 2020-01-29 PROCEDURE — 1159F MED LIST DOCD IN RCRD: CPT | Mod: S$GLB,,, | Performed by: STUDENT IN AN ORGANIZED HEALTH CARE EDUCATION/TRAINING PROGRAM

## 2020-01-29 PROCEDURE — 1125F AMNT PAIN NOTED PAIN PRSNT: CPT | Mod: S$GLB,,, | Performed by: STUDENT IN AN ORGANIZED HEALTH CARE EDUCATION/TRAINING PROGRAM

## 2020-01-29 PROCEDURE — 3074F SYST BP LT 130 MM HG: CPT | Mod: CPTII,S$GLB,, | Performed by: STUDENT IN AN ORGANIZED HEALTH CARE EDUCATION/TRAINING PROGRAM

## 2020-01-29 PROCEDURE — 1159F PR MEDICATION LIST DOCUMENTED IN MEDICAL RECORD: ICD-10-PCS | Mod: S$GLB,,, | Performed by: STUDENT IN AN ORGANIZED HEALTH CARE EDUCATION/TRAINING PROGRAM

## 2020-01-29 PROCEDURE — 73562 X-RAY EXAM OF KNEE 3: CPT | Mod: 26,50,, | Performed by: RADIOLOGY

## 2020-01-29 PROCEDURE — 1101F PT FALLS ASSESS-DOCD LE1/YR: CPT | Mod: CPTII,S$GLB,, | Performed by: STUDENT IN AN ORGANIZED HEALTH CARE EDUCATION/TRAINING PROGRAM

## 2020-01-29 PROCEDURE — 99999 PR PBB SHADOW E&M-EST. PATIENT-LVL IV: CPT | Mod: PBBFAC,,, | Performed by: STUDENT IN AN ORGANIZED HEALTH CARE EDUCATION/TRAINING PROGRAM

## 2020-01-29 PROCEDURE — 99215 OFFICE O/P EST HI 40 MIN: CPT | Mod: 25,S$GLB,, | Performed by: STUDENT IN AN ORGANIZED HEALTH CARE EDUCATION/TRAINING PROGRAM

## 2020-01-29 PROCEDURE — 20610 DRAIN/INJ JOINT/BURSA W/O US: CPT | Mod: 50,S$GLB,, | Performed by: STUDENT IN AN ORGANIZED HEALTH CARE EDUCATION/TRAINING PROGRAM

## 2020-01-29 PROCEDURE — 3078F PR MOST RECENT DIASTOLIC BLOOD PRESSURE < 80 MM HG: ICD-10-PCS | Mod: CPTII,S$GLB,, | Performed by: STUDENT IN AN ORGANIZED HEALTH CARE EDUCATION/TRAINING PROGRAM

## 2020-01-29 PROCEDURE — 3074F PR MOST RECENT SYSTOLIC BLOOD PRESSURE < 130 MM HG: ICD-10-PCS | Mod: CPTII,S$GLB,, | Performed by: STUDENT IN AN ORGANIZED HEALTH CARE EDUCATION/TRAINING PROGRAM

## 2020-01-29 PROCEDURE — 20610 LARGE JOINT ASPIRATION/INJECTION: ICD-10-PCS | Mod: 50,S$GLB,, | Performed by: STUDENT IN AN ORGANIZED HEALTH CARE EDUCATION/TRAINING PROGRAM

## 2020-01-29 RX ORDER — TRIAMCINOLONE ACETONIDE 40 MG/ML
40 INJECTION, SUSPENSION INTRA-ARTICULAR; INTRAMUSCULAR
Status: DISCONTINUED | OUTPATIENT
Start: 2020-01-29 | End: 2020-01-29 | Stop reason: HOSPADM

## 2020-01-29 RX ORDER — PREDNISONE 5 MG/1
TABLET ORAL
Qty: 21 TABLET | Refills: 1 | Status: ON HOLD | OUTPATIENT
Start: 2020-01-29 | End: 2020-02-12 | Stop reason: HOSPADM

## 2020-01-29 RX ADMIN — TRIAMCINOLONE ACETONIDE 40 MG: 40 INJECTION, SUSPENSION INTRA-ARTICULAR; INTRAMUSCULAR at 09:01

## 2020-01-29 NOTE — PROGRESS NOTES
RHEUMATOLOGY OUTPATIENT CLINIC NOTE        Attending Rheumatologist: Lois Aguila  Primary Care Provider: Jayesh Jaramillo MD   Physician Requesting Consultation: No referring provider defined for this encounter.  Chief Complaint/Reason For Consultation:  Knee pain    Subjective:       HPI  Smitha Salinas is a 69 y.o. AAF presents to establish care for hx rheumatoid arthritis. On actemra x past 4 years. Reports overall feeling well. No prolonged AM stiffness. Previously followed w Dr Clinton, last seen 1/2019. Hx mtx but stopped 2.2 genital herpes flares. No other acute issues or complaints.    Today:  Pt reports overall doing well since last visit. Main complaint however is chronic knee pain. States she has had steroid injections in the past, no significant relief. Pain now 5/10 worse w prolonged activity and towards end of day. Occasional swelling. No other acute issues or complaints. Denies any significant benefit since synvisc injections. Dropped a large kettle on left foot recently and has been walking w altered gait. Has noticed worsening knee pain also since. Last steroid injections were >3 years ago. No relief w otc analgesics or tramadol.    14pt ROS negative xcept as otherwise stated above    Review of Systems   Constitutional: Negative for chills, fever and weight loss.   HENT: Negative for congestion, sinus pain and sore throat.    Eyes: Negative for blurred vision, double vision and pain.   Respiratory: Negative for cough and shortness of breath.    Cardiovascular: Negative for chest pain, palpitations and claudication.   Gastrointestinal: Negative for abdominal pain, nausea and vomiting.   Genitourinary: Negative for dysuria and frequency.   Musculoskeletal: Positive for joint pain. Negative for falls.   Skin: Negative for itching and rash.   Neurological: Negative for dizziness and weakness.   Endo/Heme/Allergies: Negative for polydipsia. Does not bruise/bleed easily.       Chronic comorbid  conditions affecting medical decision making today:  Past Medical History:   Diagnosis Date    [facility]    Chronic rheumatic arthritis     dr tai    Colon polyps     Ex-smoker     Genital herpes     Herpes zoster infection     Hyperlipidemia     Hypertension     IGT (impaired glucose tolerance)     Immunosuppressed status     Lichen sclerosus et atrophicus     SUKHDEEP (obstructive sleep apnea)     Osteopenia      wai (start fmax if on pred 3months or more)     Past Surgical History:   Procedure Laterality Date    BLADDER SURGERY  2012    Dr Claudia Lamar    BREAST BIOPSY Left     benign    CRYOTHERAPY      SKIN GRAFT  1965    laceration to right  fingers  from thigh     TONSILLECTOMY       Family History   Problem Relation Age of Onset    Heart disease Mother     Diabetes Mother     Stroke Father     Kidney disease Father     Cancer Sister 39        breast    Breast cancer Sister     Cancer Other 68        breast    Stroke Maternal Grandmother     Stroke Paternal Grandmother     Stroke Paternal Grandfather     Colon cancer Neg Hx     Ovarian cancer Neg Hx      Social History     Substance and Sexual Activity   Alcohol Use Yes    Alcohol/week: 0.0 standard drinks     Social History     Tobacco Use   Smoking Status Former Smoker    Last attempt to quit: 2013    Years since quittin.5   Smokeless Tobacco Never Used   Tobacco Comment    smokes for a few weeks per year - when under pressure. has been abstinent times years at a time. a pack lasts about a week     Social History     Substance and Sexual Activity   Drug Use No       Current Outpatient Medications:     acyclovir (ZOVIRAX) 800 MG Tab, 1 tablet(s) by mouth 3 times per day, Disp: , Rfl:     amLODIPine (NORVASC) 5 MG tablet, TAKE ONE TABLET BY MOUTH ONCE DAILY, Disp: 30 tablet, Rfl: 5    aspirin (ECOTRIN) 81 MG EC tablet, Take 81 mg by mouth once daily., Disp: , Rfl:     bumetanide  (BUMEX) 1 MG tablet, 1 tablet(s) by mouth daily, Disp: , Rfl:     cholecalciferol, vitamin D3, 1,000 unit capsule, Take 1 capsule by mouth once daily., Disp: , Rfl:     cloNIDine (CATAPRES) 0.2 MG tablet, TAKE 1/2 TABLET IN THE MORNING, 1/2 TABLET AT NOON,   THEN 2 TABLETS AT BEDTIME (Patient taking differently: TAKE 1/2 TABLET IN THE MORNING, 1/2 TABLET AT NOON,   THEN 2 TABLETS AT BEDTIME), Disp: 270 tablet, Rfl: 3    cyclobenzaprine (FLEXERIL) 10 MG tablet, 1/2 to 1 po tid prn muscle spasm, Disp: 60 tablet, Rfl: 5    dextroamphetamine-amphetamine (ADDERALL) 20 mg tablet, dextroamphetamine-amphetamine 20 mg tablet, Disp: , Rfl:     diazePAM (VALIUM) 5 MG tablet, TAKE 1 TABLET BY MOUTH AT BEDTIME FOR ANXIETY, Disp: 30 tablet, Rfl: 5    diclofenac sodium (VOLTAREN) 1 % Gel, Apply 2 g topically 4 (four) times daily., Disp: 100 g, Rfl: 5    erythromycin (ROMYCIN) ophthalmic ointment, , Disp: , Rfl:     levocetirizine (XYZAL) 5 MG tablet, Take 1 tablet (5 mg total) by mouth nightly as needed for Allergies., Disp: 90 tablet, Rfl: 4    metroNIDAZOLE (FLAGYL) 500 MG tablet, metronidazole 500 mg tablet, Disp: , Rfl:     mirabegron (MYRBETRIQ) 50 mg Tb24, Take 1 tablet (50 mg total) by mouth once daily., Disp: 30 tablet, Rfl: 11    ofloxacin (OCUFLOX) 0.3 % ophthalmic solution, , Disp: , Rfl:     ondansetron (ZOFRAN-ODT) 4 MG TbDL, Take 1 tablet (4 mg total) by mouth every 8 (eight) hours as needed (nausea)., Disp: 10 tablet, Rfl: 1    polyethylene glycol (GLYCOLAX) 17 gram/dose powder, Miralax 17 gram/dose oral powder  use as directed, Disp: , Rfl:     potassium chloride SA (K-DUR,KLOR-CON) 20 MEQ tablet, Take 1 tablet (20 mEq total) by mouth once daily. 1 tablet,ER particles/crystals Oral Twice a day, Disp: 60 tablet, Rfl: 5    potassium chloride SA (K-DUR,KLOR-CON) 20 MEQ tablet, Take 1 tablet (20 mEq total) by mouth once daily., Disp: 90 tablet, Rfl: 4    pravastatin (PRAVACHOL) 40 MG tablet, Take 1  tablet (40 mg total) by mouth once daily., Disp: 90 tablet, Rfl: 3    tocilizumab (ACTEMRA) 162 mg/0.9 mL Syrg, Inject 162 mg into the skin once a week., Disp: 4 Syringe, Rfl: 3    traMADol (ULTRAM) 50 mg tablet, Take 1 tablet (50 mg total) by mouth every 6 (six) hours as needed for Pain., Disp: 60 tablet, Rfl: 5    acyclovir (ZOVIRAX) 800 MG Tab, acyclovir 800 mg tablet, Disp: , Rfl:        Objective:         Vitals:    01/29/20 0920   BP: 126/78   Pulse: (!) 59     Physical Exam   Nursing note and vitals reviewed.  Constitutional: She is oriented to person, place, and time and well-developed, well-nourished, and in no distress.   HENT:   Head: Normocephalic.   Mouth/Throat: Oropharynx is clear and moist.   Eyes: Conjunctivae are normal. Pupils are equal, round, and reactive to light.   Neck: Normal range of motion. Neck supple.   Cardiovascular: Normal rate and normal heart sounds.    Pulmonary/Chest: Effort normal. No respiratory distress.   Abdominal: Soft. There is no tenderness.   Neurological: She is alert and oriented to person, place, and time.   Skin: Skin is warm. No rash noted. No erythema.     Psychiatric: Memory and affect normal.   Musculoskeletal: She exhibits no edema, tenderness or deformity.   No synovitis in small joints of hands and feet. No nodules.  +mild effusion over left knee               Reviewed old and all outside pertinent medical records available.    All lab results personally reviewed and interpreted by me.  Lab Results   Component Value Date    WBC 6.18 09/05/2019    WBC 5.95 09/05/2019    HGB 13.5 09/05/2019    HGB 13.6 09/05/2019    HCT 43.2 09/05/2019    HCT 42.2 09/05/2019    MCV 90 09/05/2019    MCV 86 09/05/2019    MCH 28.0 09/05/2019    MCH 27.8 09/05/2019    MCHC 31.3 (L) 09/05/2019    MCHC 32.2 09/05/2019    RDW 13.9 09/05/2019    RDW 13.8 09/05/2019     (L) 09/05/2019     09/05/2019    MPV SEE COMMENT 09/05/2019    MPV 13.9 (H) 09/05/2019    PLTEST  Adequate 05/14/2013       Lab Results   Component Value Date     09/05/2019     09/05/2019    K 3.8 09/05/2019    K 3.9 09/05/2019     09/05/2019     09/05/2019    CO2 26 09/05/2019    CO2 25 09/05/2019     09/05/2019     09/05/2019    BUN 14 09/05/2019    BUN 13 09/05/2019    CALCIUM 10.4 09/05/2019    CALCIUM 10.2 09/05/2019    PROT 7.9 09/05/2019    PROT 7.7 09/05/2019    ALBUMIN 5.0 09/05/2019    ALBUMIN 4.7 09/05/2019    BILITOT 0.5 09/05/2019    BILITOT 0.5 09/05/2019    AST 23 09/05/2019    AST 22 09/05/2019    ALKPHOS 86 09/05/2019    ALKPHOS 88 09/05/2019    ALT 36 09/05/2019    ALT 36 09/05/2019       Lab Results   Component Value Date    COLORU Yellow 12/26/2013    APPEARANCEUA Cloudy (A) 12/26/2013    SPECGRAV 1.025 12/26/2013    PHUR 6.0 12/26/2013    PROTEINUA Negative 12/26/2013    KETONESU Negative 12/26/2013    LEUKOCYTESUR Negative 12/26/2013    NITRITE Negative 12/26/2013    UROBILINOGEN Negative 12/26/2013       Lab Results   Component Value Date    CRP 0.1 09/05/2019       Lab Results   Component Value Date    SEDRATE <2 09/05/2019       Lab Results   Component Value Date     (H) 03/19/2012    SEDRATE <2 09/05/2019       No components found for: 25OHVITDTOT, 28VHYITG3, 79AORGIB1, METHODNOTE    Lab Results   Component Value Date    URICACID 2.6 03/07/2009       No components found for: TSPOTTB      Imaging:  Hand XR  Degenerative changes are seen scattered throughout the right hand and most prominent at the 1st interphalangeal joint, 1st CMC joint and DIP joint of the 3rd digit of the right hand.  The degenerative changes have progressed since the 2012 examination.  There is also a stable probable well corticated erosion within the trapezium.  No new erosive changes are identified.  Mild degenerative changes present at the 1st and 2nd MCP joints.    There is a swan-neck deformity of the 5th digit.  This is stable.  Prominent degenerative change also  present at the 1st interphalangeal joint on the left which has progressed since the 2012 exam..  No obvious erosive changes are identified.  No significant soft tissue swelling noted.  Stable degenerative change noted at the 1st CMC joint.      Impression       1. No new erosive changes or soft tissue swelling identified.     Knee MRI 1/2019  1. Osseous changes suggestive of osteoarthritis of knees. Medial joint compartment narrowing with subarticular edema/sclerosis and marginal osteophyte formation.  2. Degenerative change of the medial meniscus with linear signal tear and blunting.  3. Small joint effusion and nonspecific mild soft tissue edema.  4. Popliteal cyst.     Large Joint Aspiration/Injection: R knee  Date/Time: 1/29/2020 9:30 AM  Performed by: Lois Aguila MD  Authorized by: Lois Aguila MD     Consent Done?:  Yes (Verbal)  Indications:  Pain  Procedure site marked: Yes    Timeout: Prior to procedure the correct patient, procedure, and site was verified    Anesthesia    Anesthetic: lidocaine 2% without epinephrine  Anesthetic total: 2mL    Location:  Knee  Site:  R knee  Prep: Patient was prepped and draped in usual sterile fashion    Needle size:  25 G  Ultrasonic Guidance for needle placement: No  Approach:  Anterolateral  Medications:  40 mg triamcinolone acetonide 40 mg/mL  Patient tolerance:  Patient tolerated the procedure well with no immediate complications  Large Joint Aspiration/Injection  Date/Time: 1/29/2020 9:30 AM  Performed by: Lois Aguila MD  Authorized by: Lois Aguila MD     Consent Done?:  Yes (Verbal)  Indications:  Pain and joint swelling  Procedure site marked: Yes    Timeout: Prior to procedure the correct patient, procedure, and site was verified    Anesthesia      Anesthetic total: 2mL    Location:  Knee  Prep: Patient was prepped and draped in usual sterile fashion    Needle size:  25 G  Approach:  Anterolateral  Medications:  40 mg triamcinolone acetonide 40  mg/mL  Patient tolerance:  Patient tolerated the procedure well with no immediate complications       ASSESSMENT / PLAN:     Smitha Salinas is a 69 y.o. Black or  female with:      1. Rheumatoid arthritis, involving unspecified site, unspecified rheumatoid factor presence  -RF+/CCP+ nonerosive arthritis  -On actemra x past 4 years, stable. No progression on hand XR.   -No synovitis on exam. SJC 0; CDAI 3- low disease activity  -C/w actemra.     #OA  -No significant benefit w  synvisc injections previously  -c/w voltaren gel, ice, topicals prn. Discussed quad strengthening exercises. No significant benefit w PT  -kenalog/lidocaine injected today b/l knees  -Previous hx single monosodium urate crystal per knee arthrocentesis in 2015 w Dr Clinton. No hx urate lowering therapy. Will check uric acid today and update knee XR  -Prx prednisone taper    #High risk med use  -No sign of infection. Hold if infection     Time spent: 45 minutes in face to face discussion concerning diagnosis, prognosis, review of lab and test results, benefits of treatment as well as management of disease, counseling of patient and coordination of care between various health care providers . Greater than half the time spent was used for coordination of care and counseling of patient.  Method of contact with patient concerns: MyChart attn Rheumatology      Lois Aguila M.D.  Rheumatology Department   [facility] - [address] [address]  Phone: [phone]  Fax: [phone]

## 2020-02-07 ENCOUNTER — TELEPHONE (OUTPATIENT)
Dept: ENDOSCOPY | Facility: HOSPITAL | Age: 70
End: 2020-02-07

## 2020-02-07 NOTE — TELEPHONE ENCOUNTER
----- Message from Vonda Mckeon sent at 2/7/2020  1:28 PM CST -----  Contact: Patient  Type:  RX Refill Request    Who Called: Patient  Refill or New Rx:new  RX Name and Strength: Potassium, delarosa snot know strength  How is the patient currently taking it? (ex. 1XDay):once daily  Is this a 30 day or 90 day RX:90  Preferred Pharmacy with phone number:[facility]  Local or Mail Order:mail order  Ordering Provider:Dr Jaramillo  Would the patient rather a call back or a response via [facility]? call  Best Call Back Number:[phone]  Additional Information:

## 2020-02-10 ENCOUNTER — PATIENT MESSAGE (OUTPATIENT)
Dept: INTERNAL MEDICINE | Facility: CLINIC | Age: 70
End: 2020-02-10

## 2020-02-10 ENCOUNTER — TELEPHONE (OUTPATIENT)
Dept: GASTROENTEROLOGY | Facility: CLINIC | Age: 70
End: 2020-02-10

## 2020-02-10 RX ORDER — CYCLOBENZAPRINE HCL 10 MG
TABLET ORAL
Qty: 60 TABLET | Refills: 5 | Status: CANCELLED | OUTPATIENT
Start: 2020-02-10

## 2020-02-10 RX ORDER — POTASSIUM CHLORIDE 20 MEQ/1
20 TABLET, EXTENDED RELEASE ORAL DAILY
Qty: 90 TABLET | Refills: 5 | Status: SHIPPED | OUTPATIENT
Start: 2020-02-10 | End: 2020-02-11 | Stop reason: SDUPTHER

## 2020-02-10 NOTE — TELEPHONE ENCOUNTER
Refill requested for Cyclobenzaprine to be sent to [facility]

## 2020-02-10 NOTE — TELEPHONE ENCOUNTER
Spoke with patient in regards to prep instructions for upcoming procedure. Patient advised not to follow the prep instructions that came with suprep. Patient advised to follow the instructions that she received at time of visit. Patient verbalized that he still had suprep instructions that she received at time of visit.

## 2020-02-10 NOTE — TELEPHONE ENCOUNTER
----- Message from Paco Freire sent at 2/10/2020  1:03 PM CST -----  Contact: Pt  Please call Smitha to discuss her colonoscopy prep [phone] (home).

## 2020-02-11 NOTE — TELEPHONE ENCOUNTER
Received refill request for Tramadol from [facility], called patient and she said that she does not need a refill at this time. Recently had filled by Dr Jaramillo

## 2020-02-12 ENCOUNTER — ANESTHESIA EVENT (OUTPATIENT)
Dept: ENDOSCOPY | Facility: HOSPITAL | Age: 70
End: 2020-02-12
Payer: MEDICARE

## 2020-02-12 ENCOUNTER — ANESTHESIA (OUTPATIENT)
Dept: ENDOSCOPY | Facility: HOSPITAL | Age: 70
End: 2020-02-12
Payer: MEDICARE

## 2020-02-12 ENCOUNTER — HOSPITAL ENCOUNTER (OUTPATIENT)
Facility: HOSPITAL | Age: 70
Discharge: HOME | End: 2020-02-12
Attending: INTERNAL MEDICINE | Admitting: INTERNAL MEDICINE
Payer: MEDICARE

## 2020-02-12 VITALS
RESPIRATION RATE: 17 BRPM | DIASTOLIC BLOOD PRESSURE: 99 MMHG | WEIGHT: 185.88 LBS | HEART RATE: 58 BPM | OXYGEN SATURATION: 100 % | SYSTOLIC BLOOD PRESSURE: 161 MMHG | BODY MASS INDEX: 30.93 KG/M2 | TEMPERATURE: 97 F

## 2020-02-12 DIAGNOSIS — K63.5 POLYP OF COLON, UNSPECIFIED PART OF COLON, UNSPECIFIED TYPE: ICD-10-CM

## 2020-02-12 DIAGNOSIS — R19.5 LOOSE STOOLS: Primary | ICD-10-CM

## 2020-02-12 DIAGNOSIS — R13.10 DYSPHAGIA, UNSPECIFIED TYPE: ICD-10-CM

## 2020-02-12 PROCEDURE — 88305 TISSUE EXAM BY PATHOLOGIST: CPT | Mod: HCNC | Performed by: PATHOLOGY

## 2020-02-12 PROCEDURE — 43239 EGD BIOPSY SINGLE/MULTIPLE: CPT | Mod: HCNC | Performed by: INTERNAL MEDICINE

## 2020-02-12 PROCEDURE — 37000009 HC ANESTHESIA EA ADD 15 MINS: Mod: HCNC | Performed by: INTERNAL MEDICINE

## 2020-02-12 PROCEDURE — 45380 COLONOSCOPY AND BIOPSY: CPT | Mod: HCNC,,, | Performed by: INTERNAL MEDICINE

## 2020-02-12 PROCEDURE — 45380 PR COLONOSCOPY,BIOPSY: ICD-10-PCS | Mod: HCNC,,, | Performed by: INTERNAL MEDICINE

## 2020-02-12 PROCEDURE — 63600175 PHARM REV CODE 636 W HCPCS: Mod: HCNC | Performed by: NURSE ANESTHETIST, CERTIFIED REGISTERED

## 2020-02-12 PROCEDURE — 43239 PR EGD, FLEX, W/BIOPSY, SGL/MULTI: ICD-10-PCS | Mod: 51,HCNC,, | Performed by: INTERNAL MEDICINE

## 2020-02-12 PROCEDURE — 27201012 HC FORCEPS, HOT/COLD, DISP: Mod: HCNC | Performed by: INTERNAL MEDICINE

## 2020-02-12 PROCEDURE — 37000008 HC ANESTHESIA 1ST 15 MINUTES: Mod: HCNC | Performed by: INTERNAL MEDICINE

## 2020-02-12 PROCEDURE — 88305 TISSUE EXAM BY PATHOLOGIST: CPT | Mod: 26,HCNC,, | Performed by: PATHOLOGY

## 2020-02-12 PROCEDURE — 45380 COLONOSCOPY AND BIOPSY: CPT | Mod: HCNC | Performed by: INTERNAL MEDICINE

## 2020-02-12 PROCEDURE — 88305 TISSUE EXAM BY PATHOLOGIST: ICD-10-PCS | Mod: 26,HCNC,, | Performed by: PATHOLOGY

## 2020-02-12 PROCEDURE — 43239 EGD BIOPSY SINGLE/MULTIPLE: CPT | Mod: 51,HCNC,, | Performed by: INTERNAL MEDICINE

## 2020-02-12 RX ORDER — SODIUM CHLORIDE, SODIUM LACTATE, POTASSIUM CHLORIDE, CALCIUM CHLORIDE 600; 310; 30; 20 MG/100ML; MG/100ML; MG/100ML; MG/100ML
INJECTION, SOLUTION INTRAVENOUS CONTINUOUS
Status: CANCELLED | OUTPATIENT
Start: 2020-02-12

## 2020-02-12 RX ORDER — SODIUM CHLORIDE 0.9 % (FLUSH) 0.9 %
10 SYRINGE (ML) INJECTION
Status: CANCELLED | OUTPATIENT
Start: 2020-02-12

## 2020-02-12 RX ORDER — SODIUM CHLORIDE, SODIUM LACTATE, POTASSIUM CHLORIDE, CALCIUM CHLORIDE 600; 310; 30; 20 MG/100ML; MG/100ML; MG/100ML; MG/100ML
INJECTION, SOLUTION INTRAVENOUS CONTINUOUS PRN
Status: DISCONTINUED | OUTPATIENT
Start: 2020-02-12 | End: 2020-02-12

## 2020-02-12 RX ORDER — PROPOFOL 10 MG/ML
VIAL (ML) INTRAVENOUS
Status: DISCONTINUED | OUTPATIENT
Start: 2020-02-12 | End: 2020-02-12

## 2020-02-12 RX ADMIN — PROPOFOL 50 MG: 10 INJECTION, EMULSION INTRAVENOUS at 12:02

## 2020-02-12 RX ADMIN — SODIUM CHLORIDE, SODIUM LACTATE, POTASSIUM CHLORIDE, AND CALCIUM CHLORIDE: 600; 310; 30; 20 INJECTION, SOLUTION INTRAVENOUS at 11:02

## 2020-02-12 RX ADMIN — PROPOFOL 30 MG: 10 INJECTION, EMULSION INTRAVENOUS at 12:02

## 2020-02-12 RX ADMIN — PROPOFOL 20 MG: 10 INJECTION, EMULSION INTRAVENOUS at 12:02

## 2020-02-12 RX ADMIN — PROPOFOL 80 MG: 10 INJECTION, EMULSION INTRAVENOUS at 12:02

## 2020-02-12 NOTE — OR NURSING
Pt denies anything loose or removable in mouth. Bite block placed gently into mouth while pt awake, no complaints from pt.

## 2020-02-12 NOTE — [employer] PATIENT INSTRUCTIONS
Discharge Summary/Instructions after an Endoscopic Procedure  Patient Name: Smitha Salinas  Patient MRN: [id number]  Patient YOB: 1950 Wednesday, February 12, 2020 Eloina Castro MD  RESTRICTIONS:  During your procedure today, you received medications for sedation.  These   medications may affect your judgment, balance and coordination.  Therefore,   for 24 hours, you have the following restrictions:   - DO NOT drive a car, operate machinery, make legal/financial decisions,   sign important papers or drink alcohol.    ACTIVITY:  Today: no heavy lifting, straining or running due to procedural   sedation/anesthesia.  The following day: return to full activity including work.  DIET:  Eat and drink normally unless instructed otherwise.     TREATMENT FOR COMMON SIDE EFFECTS:  - Mild abdominal pain, nausea, belching, bloating or excessive gas:  rest,   eat lightly and use a heating pad.  - Sore Throat: treat with throat lozenges and/or gargle with warm salt   water.  - Because air was used during the procedure, expelling large amounts of air   from your rectum or belching is normal.  - If a bowel prep was taken, you may not have a bowel movement for 1-3 days.    This is normal.  SYMPTOMS TO WATCH FOR AND REPORT TO YOUR PHYSICIAN:  1. Abdominal pain or bloating, other than gas cramps.  2. Chest pain.  3. Back pain.  4. Signs of infection such as: chills or fever occurring within 24 hours   after the procedure.  5. Rectal bleeding, which would show as bright red, maroon, or black stools.   (A tablespoon of blood from the rectum is not serious, especially if   hemorrhoids are present.)  6. Vomiting.  7. Weakness or dizziness.  GO DIRECTLY TO THE NEAREST EMERGENCY ROOM IF YOU HAVE ANY OF THE FOLLOWING:      Difficulty breathing              Chills and/or fever over 101 F   Persistent vomiting and/or vomiting blood   Severe abdominal pain   Severe chest pain   Black, tarry stools   Bleeding- more than one  tablespoon   Any other symptom or condition that you feel may need urgent attention  Your doctor recommends these additional instructions:  If any biopsies were taken, your doctors clinic will contact you in 1 to 2   weeks with any results.  - Discharge patient to home (via wheelchair).   - Resume previous diet.   - Continue present medications.   - Await pathology results.   - Telephone GI clinic for pathology results in 2 weeks.   - Patient has a contact number available for emergencies.  The signs and   symptoms of potential delayed complications were discussed with the   patient.  Return to normal activities tomorrow.  Written discharge   instructions were provided to the patient.  For questions, problems or results please call your physician Eloina Castro MD at Work:  [phone]  If you have any questions about the above instructions, call the GI   department at [phone] or call the endoscopy unit at [phone]   from 7am until 3 pm.  [facility] [place], EMERGENCY ROOM PHONE NUMBER:   [phone]  IF A COMPLICATION OR EMERGENCY SITUATION ARISES AND YOU ARE UNABLE TO REACH   YOUR PHYSICIAN - GO DIRECTLY TO THE EMERGENCY ROOM.  I have read or have had read to me these discharge instructions for my   procedure and have received a written copy.  I understand these   instructions and will follow-up with my physician if I have any questions.     __________________________________       _____________________________________  Nurse Signature                                          Patient/Designated   Responsible Party Signature  MD Eloina Teresa MD  2/12/2020 12:24:50 PM  [employer]

## 2020-02-12 NOTE — [employer]ATION PATIENT INSTRUCTIONS
Discharge Summary/Instructions after an Endoscopic Procedure  Patient Name: Smitha Salinas  Patient MRN: [id number]  Patient YOB: 1950 Wednesday, February 12, 2020 Eloina Castro MD  RESTRICTIONS:  During your procedure today, you received medications for sedation.  These   medications may affect your judgment, balance and coordination.  Therefore,   for 24 hours, you have the following restrictions:   - DO NOT drive a car, operate machinery, make legal/financial decisions,   sign important papers or drink alcohol.    ACTIVITY:  Today: no heavy lifting, straining or running due to procedural   sedation/anesthesia.  The following day: return to full activity including work.  DIET:  Eat and drink normally unless instructed otherwise.     TREATMENT FOR COMMON SIDE EFFECTS:  - Mild abdominal pain, nausea, belching, bloating or excessive gas:  rest,   eat lightly and use a heating pad.  - Sore Throat: treat with throat lozenges and/or gargle with warm salt   water.  - Because air was used during the procedure, expelling large amounts of air   from your rectum or belching is normal.  - If a bowel prep was taken, you may not have a bowel movement for 1-3 days.    This is normal.  SYMPTOMS TO WATCH FOR AND REPORT TO YOUR PHYSICIAN:  1. Abdominal pain or bloating, other than gas cramps.  2. Chest pain.  3. Back pain.  4. Signs of infection such as: chills or fever occurring within 24 hours   after the procedure.  5. Rectal bleeding, which would show as bright red, maroon, or black stools.   (A tablespoon of blood from the rectum is not serious, especially if   hemorrhoids are present.)  6. Vomiting.  7. Weakness or dizziness.  GO DIRECTLY TO THE NEAREST EMERGENCY ROOM IF YOU HAVE ANY OF THE FOLLOWING:      Difficulty breathing              Chills and/or fever over 101 F   Persistent vomiting and/or vomiting blood   Severe abdominal pain   Severe chest pain   Black, tarry stools   Bleeding- more than one  tablespoon   Any other symptom or condition that you feel may need urgent attention  Your doctor recommends these additional instructions:  If any biopsies were taken, your doctors clinic will contact you in 1 to 2   weeks with any results.  - Discharge patient to home (via wheelchair).   - High fiber diet.   - Continue present medications.   - Await pathology results.   - Repeat colonoscopy in 5 years for surveillance.   - Telephone GI clinic for pathology results in 2 weeks.   - Patient has a contact number available for emergencies.  The signs and   symptoms of potential delayed complications were discussed with the   patient.  Return to normal activities tomorrow.  Written discharge   instructions were provided to the patient.  For questions, problems or results please call your physician Eloina Castro MD at Work:  [phone]  If you have any questions about the above instructions, call the GI   department at [phone] or call the endoscopy unit at [phone]   from 7am until 3 pm.  [facility], EMERGENCY ROOM PHONE NUMBER:   [phone]  IF A COMPLICATION OR EMERGENCY SITUATION ARISES AND YOU ARE UNABLE TO REACH   YOUR PHYSICIAN - GO DIRECTLY TO THE EMERGENCY ROOM.  I have read or have had read to me these discharge instructions for my   procedure and have received a written copy.  I understand these   instructions and will follow-up with my physician if I have any questions.     __________________________________       _____________________________________  Nurse Signature                                          Patient/Designated   Responsible Party Signature  MD Eloina Teresa MD  2/12/2020 12:22:47 PM  [employer]

## 2020-02-12 NOTE — ANESTHESIA PREPROCEDURE EVALUATION
02/12/2020  Smitha Salinas is a 69 y.o., female.    Anesthesia Evaluation    I have reviewed the Patient Summary Reports.    I have reviewed the Nursing Notes.   I have reviewed the Medications.   Prednisone    Review of Systems  Anesthesia Hx:  No problems with previous Anesthesia Denies Hx of Anesthetic complications  History of prior surgery of interest to airway management or planning: Denies Family Hx of Anesthesia complications.   Denies Personal Hx of Anesthesia complications.   Social:  Former Smoker    Hematology/Oncology:  Hematology Normal   Oncology Normal     EENT/Dental:EENT/Dental Normal   Cardiovascular:   Hypertension hyperlipidemia    Pulmonary:   Sleep Apnea    Education provided regarding risk of obstructive sleep apnea     Renal/:  Renal/ Normal  Genital herpes   Hepatic/GI:   dysphagia   Musculoskeletal:   Arthritis     Neurological:   Post herpetic neuralgia   Endocrine:  Endocrine Normal    Dermatological:   Lichen sclerosus et atrophicus   Psych:   anxiety depression ADHD         Physical Exam  General:  Obesity    Airway/Jaw/Neck:  Airway Findings: Mouth Opening: Normal Tongue: Normal  General Airway Assessment: Adult  Mallampati: III  TM Distance: Normal, at least 6 cm      Dental:  Pt states no loose teeth             Anesthesia Plan  Type of Anesthesia, risks & benefits discussed:  Anesthesia Type:  MAC  Patient's Preference:   Intra-op Monitoring Plan: standard ASA monitors  Intra-op Monitoring Plan Comments:   Post Op Pain Control Plan:   Post Op Pain Control Plan Comments:   Induction:   IV  Beta Blocker:  Patient is not currently on a Beta-Blocker (No further documentation required).       Informed Consent: Patient understands risks and agrees with Anesthesia plan.  Questions answered. Anesthesia consent signed with patient.  ASA Score: 2     Day of Surgery Review of  History & Physical: I have interviewed and examined the patient. I have reviewed the patient's H&P dated:    H&P update referred to the provider.         Ready For Surgery From Anesthesia Perspective.

## 2020-02-12 NOTE — ANESTHESIA POSTPROCEDURE EVALUATION
Anesthesia Post Evaluation    Patient: Smitha Salinas    Procedure(s) Performed: Procedure(s) (LRB):  COLONOSCOPY (N/A)  EGD (ESOPHAGOGASTRODUODENOSCOPY) (N/A)    Final Anesthesia Type: MAC    Patient location during evaluation: GI PACU  Patient participation: Yes- Able to Participate  Level of consciousness: awake and alert and oriented  Post-procedure vital signs: reviewed and stable  Pain management: adequate  Airway patency: patent    PONV status at discharge: No PONV  Anesthetic complications: no      Cardiovascular status: blood pressure returned to baseline, stable and hemodynamically stable  Respiratory status: unassisted, room air and spontaneous ventilation  Hydration status: euvolemic  Follow-up not needed.          Vitals Value Taken Time   /99 2/12/2020 12:44 PM   Temp 36.2 °C (97.2 °F) 2/12/2020 11:31 AM   Pulse 58 2/12/2020 12:44 PM   Resp 17 2/12/2020 12:44 PM   SpO2 100 % 2/12/2020 12:44 PM         Event Time     Out of Recovery 12:45:04          Pain/Bianka Score: Bianka Score: 10 (2/12/2020 12:44 PM)

## 2020-02-12 NOTE — DISCHARGE SUMMARY
Endoscopy Discharge Summary      Admit Date: 2/12/2020    Discharge Date and Time:  2/12/2020 12:25 PM    Attending Physician: Eloina Castro MD     Discharge Physician: Eloina Castro MD     Principal Admitting Diagnoses: Loose stools         Discharge Diagnosis: The primary encounter diagnosis was Loose stools. Diagnoses of Polyp of colon, unspecified part of colon, unspecified type and Dysphagia, unspecified type were also pertinent to this visit.     Discharged Condition: Good    Indication for Admission: Loose stools     Hospital Course: Patient was admitted for an inpatient procedure and tolerated the procedure well with no complications.    Significant Diagnostic Studies: EGD with biopsy and colonoscopy with biopsy    Pathology (if any):  Specimen (12h ago, onward)    None          Estimated Blood Loss: 1 ml.    Discussed with: patient.    Disposition: Home.    Follow Up/Patient Instructions:   Current Discharge Medication List      CONTINUE these medications which have NOT CHANGED    Details   acyclovir (ZOVIRAX) 800 MG Tab acyclovir 800 mg tablet      amLODIPine (NORVASC) 5 MG tablet TAKE ONE TABLET BY MOUTH ONCE DAILY  Qty: 30 tablet, Refills: 5    Comments: Please consider 90 day supplies to promote better adherence      aspirin (ECOTRIN) 81 MG EC tablet Take 81 mg by mouth once daily.      cloNIDine (CATAPRES) 0.2 MG tablet TAKE 1/2 TABLET IN THE MORNING, 1/2 TABLET AT NOON,   THEN 2 TABLETS AT BEDTIME  Qty: 270 tablet, Refills: 3      cyclobenzaprine (FLEXERIL) 10 MG tablet 1/2 to 1 po tid prn muscle spasm  Qty: 60 tablet, Refills: 5      diazePAM (VALIUM) 5 MG tablet TAKE 1 TABLET BY MOUTH AT BEDTIME FOR ANXIETY  Qty: 30 tablet, Refills: 5      diclofenac sodium (VOLTAREN) 1 % Gel Apply 2 g topically 4 (four) times daily.  Qty: 100 g, Refills: 5      levocetirizine (XYZAL) 5 MG tablet Take 1 tablet (5 mg total) by mouth nightly as needed for Allergies.  Qty: 90 tablet, Refills: 4       mirabegron (MYRBETRIQ) 50 mg Tb24 Take 1 tablet (50 mg total) by mouth once daily.  Qty: 30 tablet, Refills: 11    Comments: Please consider 90 day supplies to promote better adherence      ofloxacin (OCUFLOX) 0.3 % ophthalmic solution       !! potassium chloride SA (K-DUR,KLOR-CON) 20 MEQ tablet Take 1 tablet (20 mEq total) by mouth once daily.  Qty: 90 tablet, Refills: 4    Associated Diagnoses: Essential hypertension      !! potassium chloride SA (K-DUR,KLOR-CON) 20 MEQ tablet Take 1 tablet (20 mEq total) by mouth once daily. 1 tablet,ER particles/crystals Oral Twice a day  Qty: 90 tablet, Refills: 5      pravastatin (PRAVACHOL) 40 MG tablet Take 1 tablet (40 mg total) by mouth once daily.  Qty: 90 tablet, Refills: 3      tocilizumab (ACTEMRA) 162 mg/0.9 mL Syrg Inject 162 mg into the skin once a week.  Qty: 4 Syringe, Refills: 3      traMADol (ULTRAM) 50 mg tablet Take 1 tablet (50 mg total) by mouth every 6 (six) hours as needed for Pain.  Qty: 60 tablet, Refills: 5    Comments: Quantity prescribed more than 7 day supply? Yes, quantity medically necessary      bumetanide (BUMEX) 1 MG tablet 1 tablet(s) by mouth daily      cholecalciferol, vitamin D3, 1,000 unit capsule Take 1 capsule by mouth once daily.      dextroamphetamine-amphetamine (ADDERALL) 20 mg tablet dextroamphetamine-amphetamine 20 mg tablet      erythromycin (ROMYCIN) ophthalmic ointment       ondansetron (ZOFRAN-ODT) 4 MG TbDL Take 1 tablet (4 mg total) by mouth every 8 (eight) hours as needed (nausea).  Qty: 10 tablet, Refills: 1    Associated Diagnoses: Nausea      polyethylene glycol (GLYCOLAX) 17 gram/dose powder Miralax 17 gram/dose oral powder   use as directed       !! - Potential duplicate medications found. Please discuss with provider.      STOP taking these medications       metroNIDAZOLE (FLAGYL) 500 MG tablet Comments:   Reason for Stopping:         predniSONE (DELTASONE) 5 MG tablet Comments:   Reason for Stopping:                No discharge procedures on file.        Eloina Castro MD  Gastroenterology and Hepatology

## 2020-02-12 NOTE — H&P
Short Stay Endoscopy History and Physical    PCP - Jayesh Jaramillo MD    Procedure - EGD and colonoscopy  ASA - 2  Mallampati - per anesthesia  History of Anesthesia problems - no  Family history Anesthesia problems -  no     HPI:  This is a 69 y.o. female here for evaluation of :   Active Hospital Problems    Diagnosis  POA    *Loose stools [R19.5]  Yes    Dysphagia [R13.10]  No    Colon polyps [K63.5]  Yes      Resolved Hospital Problems   No resolved problems to display.         Health Maintenance       Date Due Completion Date    TETANUS VACCINE 05/30/2021 5/30/2011    DEXA SCAN 07/05/2021 7/5/2018    Override on 4/29/2011: Done (Normal results; low fx risk; repeat in 2-3 years per [facility])    Mammogram 10/17/2021 10/17/2019    Override on 10/17/2012: Done    Colonoscopy 09/23/2023 9/23/2013 (Done)    Override on 9/23/2013: Done ( [facility] , EGD as well)    Lipid Panel 01/23/2024 1/23/2019          EGD  Reflux - no  Dysphagia - yes  Abdominal pain - no  Diarrhea - no  Anemia - no  GI bleeding - no  Other - no    Colonoscopy  Screening - no  History of polyps - yes  Diarrhea - no  Anemia - no  Blood in stools - no  Abdominal pain - no  Other - no    ROS:  CONSTITUTIONAL: Denies weight change,  fatigue, fevers, chills, night sweats.  CARDIOVASCULAR: Denies chest pain, shortness of breath, orthopnea and edema.  RESPIRATORY: Denies cough, hemoptysis, dyspnea, and wheezing.  GI: See HPI.    Medical History:   Past Medical History:   Diagnosis Date    [facility]    Chronic rheumatic arthritis     dr tai    Colon polyps     Ex-smoker     Genital herpes     Herpes zoster infection     Hyperlipidemia     Hypertension     IGT (impaired glucose tolerance)     Immunosuppressed status     Lichen sclerosus et atrophicus     SUKHDEEP (obstructive sleep apnea)     Osteopenia     5/16 wai 5/18(start fmax if on pred 3months or more)       Surgical History:   Past Surgical History:    Procedure Laterality Date    BLADDER SURGERY  2012    Dr Claudia Lamar    BREAST BIOPSY Left     benign    CRYOTHERAPY      SKIN GRAFT      laceration to right  fingers  from thigh     TONSILLECTOMY         Family History:   Family History   Problem Relation Age of Onset    Heart disease Mother     Diabetes Mother     Stroke Father     Kidney disease Father     Cancer Sister 39        breast    Breast cancer Sister     Cancer Other 68        breast    Stroke Maternal Grandmother     Stroke Paternal Grandmother     Stroke Paternal Grandfather     Colon cancer Neg Hx     Ovarian cancer Neg Hx        Social History:   Social History     Tobacco Use    Smoking status: Former Smoker     Last attempt to quit: 2013     Years since quittin.5    Smokeless tobacco: Never Used    Tobacco comment: smokes for a few weeks per year - when under pressure. has been abstinent times years at a time. a pack lasts about a week   Substance Use Topics    Alcohol use: Yes     Alcohol/week: 0.0 standard drinks    Drug use: No       Allergies:   Review of patient's allergies indicates:   Allergen Reactions    Gabapentin Other (See Comments)     Disoriented    Valdecoxib Other (See Comments)     palpitation       Medications:   No current facility-administered medications on file prior to encounter.      Current Outpatient Medications on File Prior to Encounter   Medication Sig Dispense Refill    acyclovir (ZOVIRAX) 800 MG Tab acyclovir 800 mg tablet      acyclovir (ZOVIRAX) 800 MG Tab 1 tablet(s) by mouth 3 times per day      amLODIPine (NORVASC) 5 MG tablet TAKE ONE TABLET BY MOUTH ONCE DAILY 30 tablet 5    aspirin (ECOTRIN) 81 MG EC tablet Take 81 mg by mouth once daily.      cloNIDine (CATAPRES) 0.2 MG tablet TAKE 1/2 TABLET IN THE MORNING, 1/2 TABLET AT NOON,   THEN 2 TABLETS AT BEDTIME (Patient taking differently: TAKE 1/2 TABLET IN THE MORNING, 1/2 TABLET AT NOON,   THEN 2 TABLETS AT  BEDTIME) 270 tablet 3    cyclobenzaprine (FLEXERIL) 10 MG tablet 1/2 to 1 po tid prn muscle spasm 60 tablet 5    diclofenac sodium (VOLTAREN) 1 % Gel Apply 2 g topically 4 (four) times daily. 100 g 5    mirabegron (MYRBETRIQ) 50 mg Tb24 Take 1 tablet (50 mg total) by mouth once daily. 30 tablet 11    potassium chloride SA (K-DUR,KLOR-CON) 20 MEQ tablet Take 1 tablet (20 mEq total) by mouth once daily. 90 tablet 4    pravastatin (PRAVACHOL) 40 MG tablet Take 1 tablet (40 mg total) by mouth once daily. 90 tablet 3    traMADol (ULTRAM) 50 mg tablet Take 1 tablet (50 mg total) by mouth every 6 (six) hours as needed for Pain. 60 tablet 5    bumetanide (BUMEX) 1 MG tablet 1 tablet(s) by mouth daily      cholecalciferol, vitamin D3, 1,000 unit capsule Take 1 capsule by mouth once daily.      dextroamphetamine-amphetamine (ADDERALL) 20 mg tablet dextroamphetamine-amphetamine 20 mg tablet      metroNIDAZOLE (FLAGYL) 500 MG tablet metronidazole 500 mg tablet      ondansetron (ZOFRAN-ODT) 4 MG TbDL Take 1 tablet (4 mg total) by mouth every 8 (eight) hours as needed (nausea). 10 tablet 1    polyethylene glycol (GLYCOLAX) 17 gram/dose powder Miralax 17 gram/dose oral powder   use as directed         Physical Exam:  Vital Signs:   Vitals:    02/12/20 1131   BP: 138/70   Pulse: (!) 46   Resp: 18   Temp: 97.2 °F (36.2 °C)     General Appearance: Well appearing in no acute distress  ENT: OP clear  Chest: CTA B  CV: RRR, no m/r/g  Abd: s/nt/nd/nabs  Ext: no edema    Labs:Reviewed    IMP:  Active Hospital Problems    Diagnosis  POA    *Loose stools [R19.5]  Yes    Dysphagia [R13.10]  No    Colon polyps [K63.5]  Yes      Resolved Hospital Problems   No resolved problems to display.         Plan:   I have explained the risks and benefits of upper endoscopy and colonoscopy to the patient including but not limited to bleeding, perforation, infection, and death. The patient wishes to proceed.

## 2020-02-12 NOTE — TRANSFER OF CARE
Anesthesia Transfer of Care Note    Patient: Smitha Salinas    Procedure(s) Performed: Procedure(s) (LRB):  COLONOSCOPY (N/A)  EGD (ESOPHAGOGASTRODUODENOSCOPY) (N/A)    Patient location: GI    Anesthesia Type: MAC    Transport from OR: Transported from OR on room air with adequate spontaneous ventilation    Post pain: adequate analgesia    Post assessment: no apparent anesthetic complications    Post vital signs: stable    Level of consciousness: awake, alert and oriented    Nausea/Vomiting: no nausea/vomiting    Complications: none    Transfer of care protocol was followed      Last vitals:   Visit Vitals  BP (!) 118/57   Pulse 61   Temp 36.2 °C (97.2 °F) (Temporal)   Resp 16   Wt 84.3 kg (185 lb 13.6 oz)   SpO2 96%   Breastfeeding? No   BMI 30.93 kg/m²

## 2020-02-12 NOTE — DISCHARGE INSTRUCTIONS
Diverticulosis    Diverticulosis means that small pouches have formed in the wall of your large intestine (colon). Most often, this problem causes no symptoms and is common as people age. But the pouches in the colon are at risk of becoming infected. When this happens, the condition is called diverticulitis. Although most people with diverticulosis never develop diverticulitis, it is still not uncommon. Rectal bleeding can also occur and in less common situations, a type of colon inflammation called colitis.  While most people do not have symptoms, some people with diverticulosis may have:  · Abdominal cramps and pain  · Bloating  · Constipation  · Change in bowel habits  Causes  The exact cause of diverticulosis (and diverticulitis) has not been proved, but a few things are associated with the condition:  · Low-fiber diet  · Constipation  · Lack of exercise  Your healthcare provider will talk with you about how to manage your condition. Diet changes may be all that are needed to help control diverticulosis and prevent progression to diverticulitis. If you develop diverticulitis, you will likely need other treatments.  Home care  You may be told to take fiber supplements daily. Fiber adds bulk to the stool so that it passes through the colon more easily. Stool softeners may be recommended. You may also be given medications for pain relief. Be sure to take all medications as directed.  In the past, people were told to avoid corn, nuts, and seeds. This is no longer necessary.  Follow these guidelines when caring for yourself at home:  · Eat unprocessed foods that are high in fiber. Whole grains, fruits, and vegetables are good choices.  · Drink 6 to 8 glasses of water every day unless your healthcare provider has you limit how much fluid you should have.  · Watch for changes in your bowel movements. Tell your provider if you notice any changes.  · Begin an exercise program. Ask your provider how to get started.  Generally, walking is the best.  · Get plenty of rest and sleep.  Follow-up care  Follow up with your healthcare provider, or as advised. Regular visits may be needed to check on your health. Sometimes special procedures such as colonoscopy, are needed after an episode of diverticulitis or blooding. Be sure to keep all your appointments.  If a stool sample was taken, or cultures were done, you should be told if they are positive, or if your treatment needs to be changed. You can call as directed for the results.  If X-rays were done, a radiologist will look at them. You will be told if there is a change in your treatment.  If antibiotics were prescribed, be sure to finish them all.  When to seek medical advice  Call your healthcare provider right away if any of these occur:  · Fever of 100.4°F (38°C) or higher, or as directed by your healthcare provider  · Severe cramps in the lower left side of the abdomen or pain that is getting worse  · Tenderness in the lower left side of the abdomen or worsening pain throughout the abdomen  · Diarrhea or constipation that doesn't get better within 24 hours  · Nausea and vomiting  · Bleeding from the rectum  Call 911  Call emergency services if any of the following occur:  · Trouble breathing  · Confusion  · Very drowsy or trouble awakening  · Fainting or loss of consciousness  · Rapid heart rate  · Chest pain  Date Last Reviewed: 12/30/2015 © 2000-2017 [employer]. [address] [place] [address]. All rights reserved. This information is not intended as a substitute for professional medical care. Always follow your healthcare professional's instructions.        Gastritis (Adult)    Gastritis is inflammation and irritation of the stomach lining. It can be present for a short time (acute) or be long lasting (chronic). Gastritis is often caused by infection with bacteria called H pylori. More than a third of people in the [place] have this bacteria in their  bodies. In many cases, H pylori causes no problems or symptoms. In some people, though, the infection irritates the stomach lining and causes gastritis. Other causes of stomach irritation include drinking alcohol or taking pain-relieving medicines called NSAIDs (such as aspirin or ibuprofen).   Symptoms of gastritis can include:  · Abdominal pain or bloating  · Loss of appetite  · Nausea or vomiting  · Vomiting blood or having black stools  · Feeling more tired than usual  An inflamed and irritated stomach lining is more likely to develop a sore called an ulcer. To help prevent this, gastritis should be treated.  Home care  If needed, medicines may be prescribed. If you have H pylori infection, treating it will likely relieve your symptoms. Other changes can help reduce stomach irritation and help it heal.  · If you have been prescribed medicines for H pylori infection, take them as directed. Take all of the medicine until it is finished or your healthcare provider tells you to stop, even if you feel better.  · Your healthcare provider may recommend avoiding NSAIDs. If you take daily aspirin for your heart or other medical reasons, do not stop without talking to your healthcare provider first.  · Avoid drinking alcohol.  · Stop smoking. Smoking can irritate the stomach and delay healing. As much as possible, stay away from second hand smoke.  Follow-up care  Follow up with your healthcare provider, or as advised by our staff. Testing may be needed to check for inflammation or an ulcer.  When to seek medical advice  Call your healthcare provider for any of the following:  · Stomach pain that gets worse or moves to the lower right abdomen (appendix area)  · Chest pain that appears or gets worse, or spreads to the back, neck, shoulder, or arm  · Frequent vomiting (cant keep down liquids)  · Blood in the stool or vomit (red or black in color)  · Feeling weak or dizzy  · Fever of 100.4ºF (38ºC) or higher, or as directed  by your healthcare provider  Date Last Reviewed: 6/22/2015  © 8173-1851 [employer]. [address] [place] [address], [place] [address]. All rights reserved. This information is not intended as a substitute for professional medical care. Always follow your healthcare professional's instructions.

## 2020-02-13 RX ORDER — POTASSIUM CHLORIDE 20 MEQ/1
20 TABLET, EXTENDED RELEASE ORAL DAILY
Qty: 90 TABLET | Refills: 5 | Status: SHIPPED | OUTPATIENT
Start: 2020-02-13 | End: 2020-11-03 | Stop reason: SDUPTHER

## 2020-02-14 RX ORDER — TRAMADOL HYDROCHLORIDE 50 MG/1
50 TABLET ORAL EVERY 6 HOURS PRN
Qty: 60 TABLET | Refills: 5 | OUTPATIENT
Start: 2020-02-14

## 2020-02-14 RX ORDER — CYCLOBENZAPRINE HCL 10 MG
10 TABLET ORAL NIGHTLY
Qty: 90 TABLET | Refills: 1 | Status: SHIPPED | OUTPATIENT
Start: 2020-02-14 | End: 2020-03-12 | Stop reason: SDUPTHER

## 2020-02-17 ENCOUNTER — PATIENT MESSAGE (OUTPATIENT)
Dept: INTERNAL MEDICINE | Facility: CLINIC | Age: 70
End: 2020-02-17

## 2020-02-17 DIAGNOSIS — I10 ESSENTIAL HYPERTENSION: ICD-10-CM

## 2020-02-18 ENCOUNTER — TELEPHONE (OUTPATIENT)
Dept: RHEUMATOLOGY | Facility: CLINIC | Age: 70
End: 2020-02-18

## 2020-02-18 RX ORDER — POTASSIUM CHLORIDE 20 MEQ/1
20 TABLET, EXTENDED RELEASE ORAL DAILY
Qty: 90 TABLET | Refills: 4 | Status: SHIPPED | OUTPATIENT
Start: 2020-02-18 | End: 2020-06-18

## 2020-02-18 RX ORDER — CLONIDINE HYDROCHLORIDE 0.2 MG/1
TABLET ORAL
Qty: 270 TABLET | Refills: 3 | Status: SHIPPED | OUTPATIENT
Start: 2020-02-18 | End: 2020-06-18 | Stop reason: SDUPTHER

## 2020-02-18 RX ORDER — AMLODIPINE BESYLATE 5 MG/1
5 TABLET ORAL DAILY
Qty: 30 TABLET | Refills: 5 | Status: SHIPPED | OUTPATIENT
Start: 2020-02-18 | End: 2020-07-08 | Stop reason: SDUPTHER

## 2020-02-18 RX ORDER — PRAVASTATIN SODIUM 40 MG/1
40 TABLET ORAL DAILY
Qty: 90 TABLET | Refills: 3 | Status: SHIPPED | OUTPATIENT
Start: 2020-02-18 | End: 2020-08-20 | Stop reason: SDUPTHER

## 2020-02-18 RX ORDER — LEVOCETIRIZINE DIHYDROCHLORIDE 5 MG/1
5 TABLET, FILM COATED ORAL NIGHTLY PRN
Qty: 90 TABLET | Refills: 4 | Status: SHIPPED | OUTPATIENT
Start: 2020-02-18 | End: 2020-10-07

## 2020-02-18 RX ORDER — ACYCLOVIR 800 MG/1
TABLET ORAL
Qty: 90 TABLET | Refills: 3 | Status: SHIPPED | OUTPATIENT
Start: 2020-02-18 | End: 2020-03-12

## 2020-02-18 RX ORDER — DIAZEPAM 5 MG/1
TABLET ORAL
Qty: 30 TABLET | Refills: 5 | Status: SHIPPED | OUTPATIENT
Start: 2020-02-18 | End: 2024-02-08

## 2020-02-18 NOTE — TELEPHONE ENCOUNTER
----- Message from Gena Mccoy sent at 2/18/2020 11:37 AM CST -----  Contact: [facility]   Caller called in regards to getting clarification on a new Rx for pt. The name of the medication is traMADol (ULTRAM) 50 mg tablet.  Caller can be reached at [phone]    [facility] - [facility] [address]  [address] [facility] [place] [address]  Phone: [phone] Fax: [phone]

## 2020-02-18 NOTE — TELEPHONE ENCOUNTER
----- Message from Gena Mcocy sent at 2/18/2020 11:34 AM CST -----  Contact: Hayley slater/ [facility]   Caller called in regards to pt requesting several new Rx and that they only received one. Caller wanted to clarify the directions for the pt Potassium.   The other medications are   1)diazePAM (VALIUM) 5 MG tablet   2) acyclovir (ZOVIRAX) 800 MG Tab  3)amLODIPine (NORVASC) 5 MG tablet  4)pravastatin (PRAVACHOL) 40 MG tablet   5)cloNIDine (CATAPRES) 0.2 MG tablet   6)levocetirizine (XYZAL) 5 MG tablet

## 2020-02-26 ENCOUNTER — TELEPHONE (OUTPATIENT)
Dept: INTERNAL MEDICINE | Facility: CLINIC | Age: 70
End: 2020-02-26

## 2020-02-26 NOTE — TELEPHONE ENCOUNTER
Please check with her to see if she is taking daily to prevent outbreak or prn outbreak. That will determine dosage.

## 2020-02-26 NOTE — TELEPHONE ENCOUNTER
----- Message from Mark Dominguez sent at 2/26/2020 12:25 PM CST -----  Type:  Pharmacy Calling to Clarify an RX    Name of Caller: maged   [facility] Name: [facility]  Prescription Name:acyclovir  What do they need to clarify?:missing the directions   Best Call Back Number: [phone]  Additional Information: ref # [id number]

## 2020-02-27 ENCOUNTER — TELEPHONE (OUTPATIENT)
Dept: INTERNAL MEDICINE | Facility: CLINIC | Age: 70
End: 2020-02-27

## 2020-02-27 NOTE — TELEPHONE ENCOUNTER
----- Message from Liza Baxter sent at 2/27/2020  2:59 PM CST -----  Contact: Patient   Patient returned call she is not sure what the call is for, Please call her [phone].    Thanks  Td

## 2020-02-27 NOTE — TELEPHONE ENCOUNTER
Spoke with pt regarding refill on medication.Pt states that she have since receivied medication and is good.//LB

## 2020-03-03 ENCOUNTER — TELEPHONE (OUTPATIENT)
Dept: INTERNAL MEDICINE | Facility: CLINIC | Age: 70
End: 2020-03-03

## 2020-03-03 NOTE — TELEPHONE ENCOUNTER
----- Message from Evelia Tabares sent at 3/3/2020  8:51 AM CST -----  .Type:  Patient Returning Call    Who Called:self  Who Left Message for Patient:  Does the patient know what this is regarding?:no  Would the patient rather a call back or a response via [facility]? call  Best Call Back Number:.[phone] (home)   Additional Information:

## 2020-03-05 ENCOUNTER — LAB VISIT (OUTPATIENT)
Dept: LAB | Facility: HOSPITAL | Age: 70
End: 2020-03-05
Attending: FAMILY MEDICINE
Payer: MEDICARE

## 2020-03-05 DIAGNOSIS — R73.02 IGT (IMPAIRED GLUCOSE TOLERANCE): ICD-10-CM

## 2020-03-05 DIAGNOSIS — I10 ESSENTIAL HYPERTENSION: ICD-10-CM

## 2020-03-05 LAB
ALBUMIN SERPL BCP-MCNC: 4.1 G/DL (ref 3.5–5.2)
ALP SERPL-CCNC: 82 U/L (ref 55–135)
ALT SERPL W/O P-5'-P-CCNC: 32 U/L (ref 10–44)
ANION GAP SERPL CALC-SCNC: 10 MMOL/L (ref 8–16)
AST SERPL-CCNC: 18 U/L (ref 10–40)
BILIRUB SERPL-MCNC: 0.5 MG/DL (ref 0.1–1)
BUN SERPL-MCNC: 17 MG/DL (ref 8–23)
CALCIUM SERPL-MCNC: 9.6 MG/DL (ref 8.7–10.5)
CHLORIDE SERPL-SCNC: 112 MMOL/L (ref 95–110)
CHOLEST SERPL-MCNC: 219 MG/DL (ref 120–199)
CHOLEST/HDLC SERPL: 3.7 {RATIO} (ref 2–5)
CO2 SERPL-SCNC: 24 MMOL/L (ref 23–29)
CREAT SERPL-MCNC: 0.8 MG/DL (ref 0.5–1.4)
EST. GFR  (AFRICAN AMERICAN): >60 ML/MIN/1.73 M^2
EST. GFR  (NON AFRICAN AMERICAN): >60 ML/MIN/1.73 M^2
ESTIMATED AVG GLUCOSE: 140 MG/DL (ref 68–131)
GLUCOSE SERPL-MCNC: 101 MG/DL (ref 70–110)
HBA1C MFR BLD HPLC: 6.5 % (ref 4–5.6)
HDLC SERPL-MCNC: 60 MG/DL (ref 40–75)
HDLC SERPL: 27.4 % (ref 20–50)
LDLC SERPL CALC-MCNC: 146.2 MG/DL (ref 63–159)
NONHDLC SERPL-MCNC: 159 MG/DL
POTASSIUM SERPL-SCNC: 4.5 MMOL/L (ref 3.5–5.1)
PROT SERPL-MCNC: 6.8 G/DL (ref 6–8.4)
SODIUM SERPL-SCNC: 146 MMOL/L (ref 136–145)
TRIGL SERPL-MCNC: 64 MG/DL (ref 30–150)
TSH SERPL DL<=0.005 MIU/L-ACNC: 2.07 UIU/ML (ref 0.4–4)

## 2020-03-05 PROCEDURE — 84443 ASSAY THYROID STIM HORMONE: CPT | Mod: HCNC

## 2020-03-05 PROCEDURE — 83036 HEMOGLOBIN GLYCOSYLATED A1C: CPT | Mod: HCNC

## 2020-03-05 PROCEDURE — 80061 LIPID PANEL: CPT | Mod: HCNC

## 2020-03-05 PROCEDURE — 80053 COMPREHEN METABOLIC PANEL: CPT | Mod: HCNC

## 2020-03-05 PROCEDURE — 36415 COLL VENOUS BLD VENIPUNCTURE: CPT | Mod: HCNC

## 2020-03-12 ENCOUNTER — OFFICE VISIT (OUTPATIENT)
Dept: INTERNAL MEDICINE | Facility: CLINIC | Age: 70
End: 2020-03-12
Payer: MEDICARE

## 2020-03-12 VITALS
TEMPERATURE: 98 F | SYSTOLIC BLOOD PRESSURE: 110 MMHG | HEART RATE: 72 BPM | HEIGHT: 65 IN | DIASTOLIC BLOOD PRESSURE: 60 MMHG | OXYGEN SATURATION: 96 % | BODY MASS INDEX: 31.77 KG/M2 | WEIGHT: 190.69 LBS

## 2020-03-12 DIAGNOSIS — M06.9 RHEUMATOID ARTHRITIS OF HAND, UNSPECIFIED LATERALITY, UNSPECIFIED RHEUMATOID FACTOR PRESENCE: Chronic | ICD-10-CM

## 2020-03-12 DIAGNOSIS — I10 ESSENTIAL HYPERTENSION: Primary | Chronic | ICD-10-CM

## 2020-03-12 DIAGNOSIS — A60.00 GENITAL HERPES SIMPLEX, UNSPECIFIED SITE: ICD-10-CM

## 2020-03-12 DIAGNOSIS — F41.8 MIXED ANXIETY AND DEPRESSIVE DISORDER: ICD-10-CM

## 2020-03-12 DIAGNOSIS — F90.9 ADULT ADHD: ICD-10-CM

## 2020-03-12 DIAGNOSIS — M85.89 OSTEOPENIA OF MULTIPLE SITES: ICD-10-CM

## 2020-03-12 DIAGNOSIS — R73.02 IGT (IMPAIRED GLUCOSE TOLERANCE): ICD-10-CM

## 2020-03-12 DIAGNOSIS — D84.9 IMMUNOSUPPRESSED STATUS: ICD-10-CM

## 2020-03-12 PROCEDURE — 1159F PR MEDICATION LIST DOCUMENTED IN MEDICAL RECORD: ICD-10-PCS | Mod: HCNC,S$GLB,, | Performed by: FAMILY MEDICINE

## 2020-03-12 PROCEDURE — 1159F MED LIST DOCD IN RCRD: CPT | Mod: HCNC,S$GLB,, | Performed by: FAMILY MEDICINE

## 2020-03-12 PROCEDURE — 99999 PR PBB SHADOW E&M-EST. PATIENT-LVL III: ICD-10-PCS | Mod: PBBFAC,HCNC,, | Performed by: FAMILY MEDICINE

## 2020-03-12 PROCEDURE — 99999 PR PBB SHADOW E&M-EST. PATIENT-LVL III: CPT | Mod: PBBFAC,HCNC,, | Performed by: FAMILY MEDICINE

## 2020-03-12 PROCEDURE — 3078F DIAST BP <80 MM HG: CPT | Mod: HCNC,CPTII,S$GLB, | Performed by: FAMILY MEDICINE

## 2020-03-12 PROCEDURE — 3074F PR MOST RECENT SYSTOLIC BLOOD PRESSURE < 130 MM HG: ICD-10-PCS | Mod: HCNC,CPTII,S$GLB, | Performed by: FAMILY MEDICINE

## 2020-03-12 PROCEDURE — 99499 RISK ADDL DX/OHS AUDIT: ICD-10-PCS | Mod: S$GLB,,, | Performed by: FAMILY MEDICINE

## 2020-03-12 PROCEDURE — 99214 PR OFFICE/OUTPT VISIT, EST, LEVL IV, 30-39 MIN: ICD-10-PCS | Mod: HCNC,S$GLB,, | Performed by: FAMILY MEDICINE

## 2020-03-12 PROCEDURE — 1126F AMNT PAIN NOTED NONE PRSNT: CPT | Mod: HCNC,S$GLB,, | Performed by: FAMILY MEDICINE

## 2020-03-12 PROCEDURE — 99499 UNLISTED E&M SERVICE: CPT | Mod: S$GLB,,, | Performed by: FAMILY MEDICINE

## 2020-03-12 PROCEDURE — 1101F PT FALLS ASSESS-DOCD LE1/YR: CPT | Mod: HCNC,CPTII,S$GLB, | Performed by: FAMILY MEDICINE

## 2020-03-12 PROCEDURE — 1126F PR PAIN SEVERITY QUANTIFIED, NO PAIN PRESENT: ICD-10-PCS | Mod: HCNC,S$GLB,, | Performed by: FAMILY MEDICINE

## 2020-03-12 PROCEDURE — 99214 OFFICE O/P EST MOD 30 MIN: CPT | Mod: HCNC,S$GLB,, | Performed by: FAMILY MEDICINE

## 2020-03-12 PROCEDURE — 3078F PR MOST RECENT DIASTOLIC BLOOD PRESSURE < 80 MM HG: ICD-10-PCS | Mod: HCNC,CPTII,S$GLB, | Performed by: FAMILY MEDICINE

## 2020-03-12 PROCEDURE — 3074F SYST BP LT 130 MM HG: CPT | Mod: HCNC,CPTII,S$GLB, | Performed by: FAMILY MEDICINE

## 2020-03-12 PROCEDURE — 1101F PR PT FALLS ASSESS DOC 0-1 FALLS W/OUT INJ PAST YR: ICD-10-PCS | Mod: HCNC,CPTII,S$GLB, | Performed by: FAMILY MEDICINE

## 2020-03-12 RX ORDER — ACYCLOVIR 800 MG/1
800 TABLET ORAL 2 TIMES DAILY
Qty: 10 TABLET | Refills: 11 | Status: SHIPPED | OUTPATIENT
Start: 2020-03-12 | End: 2020-08-20 | Stop reason: SDUPTHER

## 2020-03-12 NOTE — PROGRESS NOTES
Subjective:       Patient ID: Smitha Salinas is a . female.    Chief Complaint: Follow-up (6mo rtc)    HPIhtncontrolled    ADHD dx and tx via neuromed. Tried adderall but too stimulating  RA plans to see [facility]  Chronc pain sees dr sharpe pain mgmt;no longer seeing  intermitt anxiety/depression prn infre bnzolipi; no si. Doing well off lexapro. Also not taking adderall; not in counseling. Stressors better    igt rcent lab;d/wd lab and diet/exerc;a1c 6.2; 6.5 fgluc nl last couple; she did not take prednisone that was rxd late jan until after one week ago a1c; she did have a steroid injxn in knee late january        occas n/v went to [facility] clinic but plans to see another;had egd;nl bms      Past Medical History:   Diagnosis Date    [facility]    Chronic rheumatic arthritis     dr tai    Colon polyps     Ex-smoker     Genital herpes     Herpes zoster infection     Hyperlipidemia     Hypertension     IGT (impaired glucose tolerance)     Immunosuppressed status     Lichen sclerosus et atrophicus     SUKHDEEP (obstructive sleep apnea)     Osteopenia     5/16 wai 5/18(start fmax if on pred 3months or more)     Past Surgical History:   Procedure Laterality Date    BLADDER SURGERY  8/2012    Dr Claudia Lamar    BREAST BIOPSY Left     benign    COLONOSCOPY N/A 2/12/2020    Procedure: COLONOSCOPY;  Surgeon: Eloina Castro MD;  Location: [facility];  Service: Endoscopy;  Laterality: N/A;    CRYOTHERAPY  1980    ESOPHAGOGASTRODUODENOSCOPY N/A 2/12/2020    Procedure: EGD (ESOPHAGOGASTRODUODENOSCOPY);  Surgeon: Eloina Castro MD;  Location: [facility];  Service: Endoscopy;  Laterality: N/A;    SKIN GRAFT  1965    laceration to right  fingers  from thigh     TONSILLECTOMY  1958     Family History   Problem Relation Age of Onset    Heart disease Mother     Diabetes Mother     Stroke Father     Kidney disease Father     Cancer Sister 39        breast    Breast cancer Sister      Cancer Other 68        breast    Stroke Maternal Grandmother     Stroke Paternal Grandmother     Stroke Paternal Grandfather     Colon cancer Neg Hx     Ovarian cancer Neg Hx      Social History     Socioeconomic History    Marital status:      Spouse name: Not on file    Number of children: 1    Years of education: Not on file    Highest education level: Not on file   Occupational History    Occupation:      Employer: [employer]    Social Needs    Financial resource strain: Not on file    Food insecurity:     Worry: Not on file     Inability: Not on file    Transportation needs:     Medical: Not on file     Non-medical: Not on file   Tobacco Use    Smoking status: Former Smoker     Last attempt to quit: 2013     Years since quittin.6    Smokeless tobacco: Never Used    Tobacco comment: smokes for a few weeks per year - when under pressure. has been abstinent times years at a time. a pack lasts about a week   Substance and Sexual Activity    Alcohol use: Yes     Alcohol/week: 0.0 standard drinks    Drug use: No    Sexual activity: Not Currently     Birth control/protection: Post-menopausal   Lifestyle    Physical activity:     Days per week: Not on file     Minutes per session: Not on file    Stress: Not on file   Relationships    Social connections:     Talks on phone: Not on file     Gets together: Not on file     Attends Caodaism service: Not on file     Active member of club or organization: Not on file     Attends meetings of clubs or organizations: Not on file     Relationship status: Not on file   Other Topics Concern    Not on file   Social History Narrative    Lives alone. Caffeine intake rare -1-2 per week of coffee. Does not have a Living Will or Advanced Directive           Review of Systems  Cardiovascular: no chest pain  Chest: no shortness of breath  Abd: no abd pain  Remainder review of systems negative    Objective:      Physical Exam    Constitutional: She appears well-developed and well-nourished.   Cardiovascular: Normal rate and regular rhythm.   Pulmonary/Chest: Effort normal and breath sounds normal.       Assessment:       1. Essential hypertension    2. IGT (impaired glucose tolerance)    3. Rheumatoid arthritis of hand, unspecified laterality, unspecified rheumatoid factor presence    4. Hyperlipidemia, unspecified hyperlipidemia type    5. Immunosuppressed status    6. Osteopenia of multiple sites    7. Adult ADHD        Depression  Plan:     also lab today  **Lab and follow up after in 3 months; if a1c 6.5 and if no recent steroid then this will be the first a1c of record that could contrib to dx dm as most recent a1c raised by steroid    Clarify smoking hx at f/u    Consider cont asa until igt? Dm dx established    Essential hypertension  -     Hypertension Digital Medicine ([facility]) Enrollment Order  -     Hypertension Digital Medicine ([facility]): Assign Onboarding Questionnaires    Immunosuppressed status    Mixed anxiety and depressive disorder    Osteopenia of multiple sites    Rheumatoid arthritis of hand, unspecified laterality, unspecified rheumatoid factor presence    Adult ADHD    IGT (impaired glucose tolerance)  -     Hemoglobin A1c; Future; Expected date: 06/10/2020  -     Glucose, fasting; Future; Expected date: 06/12/2020    Lab in 3 months and video visit after if she is ok with that

## 2020-03-13 NOTE — TELEPHONE ENCOUNTER
Please let her know I sent the modified rx that is for when she has an outbreak . If this is not how she wants the rx please ask to her contact us

## 2020-03-21 LAB
FINAL PATHOLOGIC DIAGNOSIS: NORMAL
GROSS: NORMAL

## 2020-03-24 ENCOUNTER — PATIENT MESSAGE (OUTPATIENT)
Dept: UROLOGY | Facility: CLINIC | Age: 70
End: 2020-03-24

## 2020-04-09 ENCOUNTER — IMMUNIZATION (OUTPATIENT)
Dept: PHARMACY | Facility: CLINIC | Age: 70
End: 2020-04-09
Payer: MEDICARE

## 2020-04-15 ENCOUNTER — TELEPHONE (OUTPATIENT)
Dept: RHEUMATOLOGY | Facility: CLINIC | Age: 70
End: 2020-04-15

## 2020-04-15 ENCOUNTER — PATIENT MESSAGE (OUTPATIENT)
Dept: RHEUMATOLOGY | Facility: CLINIC | Age: 70
End: 2020-04-15

## 2020-04-15 NOTE — TELEPHONE ENCOUNTER
Left voice message to return call to clinic regarding rescheduling appointment with Dr. Aguila from 4/29/2020. Also sent patient a message through patient portal

## 2020-04-16 ENCOUNTER — TELEPHONE (OUTPATIENT)
Dept: RHEUMATOLOGY | Facility: CLINIC | Age: 70
End: 2020-04-16

## 2020-04-16 NOTE — TELEPHONE ENCOUNTER
Pt wanted to reschedule appointment was able to move appt to the 06/18/2020 all questions answered//dp

## 2020-05-04 ENCOUNTER — PATIENT MESSAGE (OUTPATIENT)
Dept: RHEUMATOLOGY | Facility: CLINIC | Age: 70
End: 2020-05-04

## 2020-05-08 ENCOUNTER — PATIENT MESSAGE (OUTPATIENT)
Dept: OBSTETRICS AND GYNECOLOGY | Facility: CLINIC | Age: 70
End: 2020-05-08

## 2020-05-18 RX ORDER — MIRABEGRON 50 MG/1
TABLET, FILM COATED, EXTENDED RELEASE ORAL
Qty: 30 TABLET | Refills: 11 | Status: SHIPPED | OUTPATIENT
Start: 2020-05-18 | End: 2020-06-18

## 2020-05-18 RX ORDER — MIRABEGRON 50 MG/1
TABLET, FILM COATED, EXTENDED RELEASE ORAL
Qty: 30 TABLET | Refills: 11 | Status: SHIPPED | OUTPATIENT
Start: 2020-05-18 | End: 2020-07-23 | Stop reason: SDUPTHER

## 2020-05-21 ENCOUNTER — TELEPHONE (OUTPATIENT)
Dept: OBSTETRICS AND GYNECOLOGY | Facility: CLINIC | Age: 70
End: 2020-05-21

## 2020-05-21 DIAGNOSIS — N95.2 VAGINAL ATROPHY: Primary | ICD-10-CM

## 2020-05-21 RX ORDER — ESTRADIOL 0.1 MG/G
1 CREAM VAGINAL
Qty: 42.5 G | Refills: 3 | Status: SHIPPED | OUTPATIENT
Start: 2020-05-21 | End: 2021-09-16

## 2020-05-21 NOTE — TELEPHONE ENCOUNTER
"Patient stating that she used to use estrace cream and has not in the past few years.  Would like to either have estrace prescribed or something else if there is something new that you would suggest.    Offered patient telemed appointment so that she could discuss this with you and she declined stated "I will only do that if it is required".     Advised patient message would be sent to Dr. Rodriguez and she verbalized understanding.  "

## 2020-05-21 NOTE — TELEPHONE ENCOUNTER
----- Message from Evelia Tabares sent at 5/21/2020  8:36 AM CDT -----  Contact: self  duplicate message regarding estrace cream or another med for post menopausal vaginal pain...[phone] (home)

## 2020-05-21 NOTE — TELEPHONE ENCOUNTER
----- Message from Taylor Rodriguez MD sent at 5/21/2020 11:21 AM CDT -----  Contact: self    I have no idea what this means.  Can you please call this patient and see what she needs?  ----- Message -----  From: Karyn Mcclain LPN  Sent: 5/21/2020   9:35 AM CDT  To: MD Dr. Michael Wiggins:    Please advise.    J  ----- Message -----  From: Evelia Tabares  Sent: 5/21/2020   8:36 AM CDT  To: [facility] Staff    duplicate message regarding estrace cream or another med for post menopausal vaginal pain...[phone] ([place])

## 2020-05-22 ENCOUNTER — TELEPHONE (OUTPATIENT)
Dept: OBSTETRICS AND GYNECOLOGY | Facility: CLINIC | Age: 70
End: 2020-05-22

## 2020-05-22 NOTE — TELEPHONE ENCOUNTER
Informed pt. Prescription sent to pharmacy. Pt. Voiced understanding.  
impaired postural control/decreased strength/impaired balance

## 2020-06-11 ENCOUNTER — LAB VISIT (OUTPATIENT)
Dept: LAB | Facility: HOSPITAL | Age: 70
End: 2020-06-11
Attending: FAMILY MEDICINE
Payer: MEDICARE

## 2020-06-11 DIAGNOSIS — R73.02 IGT (IMPAIRED GLUCOSE TOLERANCE): ICD-10-CM

## 2020-06-11 LAB
ESTIMATED AVG GLUCOSE: 137 MG/DL (ref 68–131)
GLUCOSE SERPL-MCNC: 108 MG/DL (ref 70–110)
HBA1C MFR BLD HPLC: 6.4 % (ref 4–5.6)

## 2020-06-11 PROCEDURE — 82947 ASSAY GLUCOSE BLOOD QUANT: CPT

## 2020-06-11 PROCEDURE — 83036 HEMOGLOBIN GLYCOSYLATED A1C: CPT

## 2020-06-11 PROCEDURE — 36415 COLL VENOUS BLD VENIPUNCTURE: CPT

## 2020-06-16 ENCOUNTER — PATIENT OUTREACH (OUTPATIENT)
Dept: ADMINISTRATIVE | Facility: OTHER | Age: 70
End: 2020-06-16

## 2020-06-18 ENCOUNTER — OFFICE VISIT (OUTPATIENT)
Dept: RHEUMATOLOGY | Facility: CLINIC | Age: 70
End: 2020-06-18
Payer: MEDICARE

## 2020-06-18 ENCOUNTER — OFFICE VISIT (OUTPATIENT)
Dept: INTERNAL MEDICINE | Facility: CLINIC | Age: 70
End: 2020-06-18
Payer: MEDICARE

## 2020-06-18 VITALS
DIASTOLIC BLOOD PRESSURE: 70 MMHG | OXYGEN SATURATION: 97 % | RESPIRATION RATE: 16 BRPM | TEMPERATURE: 98 F | SYSTOLIC BLOOD PRESSURE: 136 MMHG | HEART RATE: 74 BPM | HEIGHT: 65 IN | WEIGHT: 191.38 LBS | BODY MASS INDEX: 31.89 KG/M2

## 2020-06-18 VITALS
BODY MASS INDEX: 31.92 KG/M2 | HEART RATE: 74 BPM | SYSTOLIC BLOOD PRESSURE: 137 MMHG | DIASTOLIC BLOOD PRESSURE: 70 MMHG | WEIGHT: 191.56 LBS | HEIGHT: 65 IN

## 2020-06-18 DIAGNOSIS — M19.90 OSTEOARTHRITIS, UNSPECIFIED OSTEOARTHRITIS TYPE, UNSPECIFIED SITE: ICD-10-CM

## 2020-06-18 DIAGNOSIS — R73.02 IGT (IMPAIRED GLUCOSE TOLERANCE): Primary | ICD-10-CM

## 2020-06-18 DIAGNOSIS — M06.9 RHEUMATOID ARTHRITIS, INVOLVING UNSPECIFIED SITE, UNSPECIFIED RHEUMATOID FACTOR PRESENCE: Primary | ICD-10-CM

## 2020-06-18 DIAGNOSIS — M79.18 MYALGIA, OTHER SITE: ICD-10-CM

## 2020-06-18 DIAGNOSIS — I10 ESSENTIAL HYPERTENSION: Chronic | ICD-10-CM

## 2020-06-18 DIAGNOSIS — M79.18 MYOFASCIAL MUSCLE PAIN: ICD-10-CM

## 2020-06-18 DIAGNOSIS — Z79.899 HIGH RISK MEDICATION USE: ICD-10-CM

## 2020-06-18 DIAGNOSIS — M85.89 OSTEOPENIA OF MULTIPLE SITES: ICD-10-CM

## 2020-06-18 DIAGNOSIS — M06.9 RHEUMATOID ARTHRITIS OF HAND, UNSPECIFIED LATERALITY, UNSPECIFIED RHEUMATOID FACTOR PRESENCE: Chronic | ICD-10-CM

## 2020-06-18 PROCEDURE — 99214 PR OFFICE/OUTPT VISIT, EST, LEVL IV, 30-39 MIN: ICD-10-PCS | Mod: S$GLB,,, | Performed by: FAMILY MEDICINE

## 2020-06-18 PROCEDURE — 99499 RISK ADDL DX/OHS AUDIT: ICD-10-PCS | Mod: S$GLB,,, | Performed by: FAMILY MEDICINE

## 2020-06-18 PROCEDURE — 99214 OFFICE O/P EST MOD 30 MIN: CPT | Mod: S$GLB,,, | Performed by: FAMILY MEDICINE

## 2020-06-18 PROCEDURE — 3078F DIAST BP <80 MM HG: CPT | Mod: CPTII,S$GLB,, | Performed by: STUDENT IN AN ORGANIZED HEALTH CARE EDUCATION/TRAINING PROGRAM

## 2020-06-18 PROCEDURE — 99499 UNLISTED E&M SERVICE: CPT | Mod: S$GLB,,, | Performed by: FAMILY MEDICINE

## 2020-06-18 PROCEDURE — 1159F PR MEDICATION LIST DOCUMENTED IN MEDICAL RECORD: ICD-10-PCS | Mod: S$GLB,,, | Performed by: FAMILY MEDICINE

## 2020-06-18 PROCEDURE — 1101F PR PT FALLS ASSESS DOC 0-1 FALLS W/OUT INJ PAST YR: ICD-10-PCS | Mod: CPTII,S$GLB,, | Performed by: STUDENT IN AN ORGANIZED HEALTH CARE EDUCATION/TRAINING PROGRAM

## 2020-06-18 PROCEDURE — 3075F SYST BP GE 130 - 139MM HG: CPT | Mod: CPTII,S$GLB,, | Performed by: FAMILY MEDICINE

## 2020-06-18 PROCEDURE — 99999 PR PBB SHADOW E&M-EST. PATIENT-LVL IV: CPT | Mod: PBBFAC,,, | Performed by: STUDENT IN AN ORGANIZED HEALTH CARE EDUCATION/TRAINING PROGRAM

## 2020-06-18 PROCEDURE — 99999 PR PBB SHADOW E&M-EST. PATIENT-LVL IV: ICD-10-PCS | Mod: PBBFAC,,, | Performed by: FAMILY MEDICINE

## 2020-06-18 PROCEDURE — 3078F DIAST BP <80 MM HG: CPT | Mod: CPTII,S$GLB,, | Performed by: FAMILY MEDICINE

## 2020-06-18 PROCEDURE — 1125F AMNT PAIN NOTED PAIN PRSNT: CPT | Mod: S$GLB,,, | Performed by: STUDENT IN AN ORGANIZED HEALTH CARE EDUCATION/TRAINING PROGRAM

## 2020-06-18 PROCEDURE — 99214 PR OFFICE/OUTPT VISIT, EST, LEVL IV, 30-39 MIN: ICD-10-PCS | Mod: S$GLB,,, | Performed by: STUDENT IN AN ORGANIZED HEALTH CARE EDUCATION/TRAINING PROGRAM

## 2020-06-18 PROCEDURE — 1159F MED LIST DOCD IN RCRD: CPT | Mod: S$GLB,,, | Performed by: FAMILY MEDICINE

## 2020-06-18 PROCEDURE — 3078F PR MOST RECENT DIASTOLIC BLOOD PRESSURE < 80 MM HG: ICD-10-PCS | Mod: CPTII,S$GLB,, | Performed by: FAMILY MEDICINE

## 2020-06-18 PROCEDURE — 3075F PR MOST RECENT SYSTOLIC BLOOD PRESS GE 130-139MM HG: ICD-10-PCS | Mod: CPTII,S$GLB,, | Performed by: STUDENT IN AN ORGANIZED HEALTH CARE EDUCATION/TRAINING PROGRAM

## 2020-06-18 PROCEDURE — 3008F BODY MASS INDEX DOCD: CPT | Mod: CPTII,S$GLB,, | Performed by: STUDENT IN AN ORGANIZED HEALTH CARE EDUCATION/TRAINING PROGRAM

## 2020-06-18 PROCEDURE — 3075F SYST BP GE 130 - 139MM HG: CPT | Mod: CPTII,S$GLB,, | Performed by: STUDENT IN AN ORGANIZED HEALTH CARE EDUCATION/TRAINING PROGRAM

## 2020-06-18 PROCEDURE — 99999 PR PBB SHADOW E&M-EST. PATIENT-LVL IV: CPT | Mod: PBBFAC,,, | Performed by: FAMILY MEDICINE

## 2020-06-18 PROCEDURE — 99499 RISK ADDL DX/OHS AUDIT: ICD-10-PCS | Mod: S$GLB,,, | Performed by: STUDENT IN AN ORGANIZED HEALTH CARE EDUCATION/TRAINING PROGRAM

## 2020-06-18 PROCEDURE — 3008F PR BODY MASS INDEX (BMI) DOCUMENTED: ICD-10-PCS | Mod: CPTII,S$GLB,, | Performed by: STUDENT IN AN ORGANIZED HEALTH CARE EDUCATION/TRAINING PROGRAM

## 2020-06-18 PROCEDURE — 3075F PR MOST RECENT SYSTOLIC BLOOD PRESS GE 130-139MM HG: ICD-10-PCS | Mod: CPTII,S$GLB,, | Performed by: FAMILY MEDICINE

## 2020-06-18 PROCEDURE — 99214 OFFICE O/P EST MOD 30 MIN: CPT | Mod: S$GLB,,, | Performed by: STUDENT IN AN ORGANIZED HEALTH CARE EDUCATION/TRAINING PROGRAM

## 2020-06-18 PROCEDURE — 1159F PR MEDICATION LIST DOCUMENTED IN MEDICAL RECORD: ICD-10-PCS | Mod: S$GLB,,, | Performed by: STUDENT IN AN ORGANIZED HEALTH CARE EDUCATION/TRAINING PROGRAM

## 2020-06-18 PROCEDURE — 99999 PR PBB SHADOW E&M-EST. PATIENT-LVL IV: ICD-10-PCS | Mod: PBBFAC,,, | Performed by: STUDENT IN AN ORGANIZED HEALTH CARE EDUCATION/TRAINING PROGRAM

## 2020-06-18 PROCEDURE — 3078F PR MOST RECENT DIASTOLIC BLOOD PRESSURE < 80 MM HG: ICD-10-PCS | Mod: CPTII,S$GLB,, | Performed by: STUDENT IN AN ORGANIZED HEALTH CARE EDUCATION/TRAINING PROGRAM

## 2020-06-18 PROCEDURE — 99499 UNLISTED E&M SERVICE: CPT | Mod: S$GLB,,, | Performed by: STUDENT IN AN ORGANIZED HEALTH CARE EDUCATION/TRAINING PROGRAM

## 2020-06-18 PROCEDURE — 1101F PR PT FALLS ASSESS DOC 0-1 FALLS W/OUT INJ PAST YR: ICD-10-PCS | Mod: CPTII,S$GLB,, | Performed by: FAMILY MEDICINE

## 2020-06-18 PROCEDURE — 1101F PT FALLS ASSESS-DOCD LE1/YR: CPT | Mod: CPTII,S$GLB,, | Performed by: FAMILY MEDICINE

## 2020-06-18 PROCEDURE — 1101F PT FALLS ASSESS-DOCD LE1/YR: CPT | Mod: CPTII,S$GLB,, | Performed by: STUDENT IN AN ORGANIZED HEALTH CARE EDUCATION/TRAINING PROGRAM

## 2020-06-18 PROCEDURE — 1159F MED LIST DOCD IN RCRD: CPT | Mod: S$GLB,,, | Performed by: STUDENT IN AN ORGANIZED HEALTH CARE EDUCATION/TRAINING PROGRAM

## 2020-06-18 PROCEDURE — 1125F PR PAIN SEVERITY QUANTIFIED, PAIN PRESENT: ICD-10-PCS | Mod: S$GLB,,, | Performed by: STUDENT IN AN ORGANIZED HEALTH CARE EDUCATION/TRAINING PROGRAM

## 2020-06-18 RX ORDER — CLONIDINE HYDROCHLORIDE 0.2 MG/1
TABLET ORAL
Qty: 270 TABLET | Refills: 3 | Status: SHIPPED | OUTPATIENT
Start: 2020-06-18 | End: 2020-06-25 | Stop reason: SDUPTHER

## 2020-06-18 NOTE — PROGRESS NOTES
Subjective:       Patient ID: Smitha Salinas is a . female.    Chief Complaint: Follow-up (6mo rtc)    HPIhtncontrolled    ADHD dx and tx via neuromed. Tried adderall but too stimulating  RA utdrheum  Chronc pain no longer seeing dr sharpe pain mgmt;  i    igt rcent lab;d/wd lab and diet/exerc;a1c 6.2; 6.5 fgluc nl 3/20 around time prednisone; no steroid since and a1c 6.4    Hx depression mood ok      Past Medical History:   Diagnosis Date    [facility]    Chronic rheumatic arthritis     dr tai    Colon polyps     Ex-smoker     Genital herpes     Herpes zoster infection     Hyperlipidemia     Hypertension     IGT (impaired glucose tolerance)     Immunosuppressed status     Lichen sclerosus et atrophicus     SUKHDEEP (obstructive sleep apnea)     Osteopenia     5/16 wai 5/18(start fmax if on pred 3months or more)     Past Surgical History:   Procedure Laterality Date    BLADDER SURGERY  8/2012    Dr Claudia Lamar    BREAST BIOPSY Left     benign    COLONOSCOPY N/A 2/12/2020    Procedure: COLONOSCOPY;  Surgeon: Eloina Castro MD;  Location: [facility];  Service: Endoscopy;  Laterality: N/A;    CRYOTHERAPY  1980    ESOPHAGOGASTRODUODENOSCOPY N/A 2/12/2020    Procedure: EGD (ESOPHAGOGASTRODUODENOSCOPY);  Surgeon: Eloina Castro MD;  Location: [facility];  Service: Endoscopy;  Laterality: N/A;    SKIN GRAFT  1965    laceration to right  fingers  from thigh     TONSILLECTOMY  1958     Family History   Problem Relation Age of Onset    Heart disease Mother     Diabetes Mother     Stroke Father     Kidney disease Father     Cancer Sister 39        breast    Breast cancer Sister     Cancer Other 68        breast    Stroke Maternal Grandmother     Stroke Paternal Grandmother     Stroke Paternal Grandfather     Colon cancer Neg Hx     Ovarian cancer Neg Hx      Social History     Socioeconomic History    Marital status:      Spouse name: Not on file    Number of  children: 1    Years of education: Not on file    Highest education level: Not on file   Occupational History    Occupation:      Employer: [employer]    Social Needs    Financial resource strain: Not on file    Food insecurity:     Worry: Not on file     Inability: Not on file    Transportation needs:     Medical: Not on file     Non-medical: Not on file   Tobacco Use    Smoking status: Former Smoker     Last attempt to quit: 2013     Years since quittin.6    Smokeless tobacco: Never Used    Tobacco comment: smokes for a few weeks per year - when under pressure. has been abstinent times years at a time. a pack lasts about a week   Substance and Sexual Activity    Alcohol use: Yes     Alcohol/week: 0.0 standard drinks    Drug use: No    Sexual activity: Not Currently     Birth control/protection: Post-menopausal   Lifestyle    Physical activity:     Days per week: Not on file     Minutes per session: Not on file    Stress: Not on file   Relationships    Social connections:     Talks on phone: Not on file     Gets together: Not on file     Attends Zoroastrianism service: Not on file     Active member of club or organization: Not on file     Attends meetings of clubs or organizations: Not on file     Relationship status: Not on file   Other Topics Concern    Not on file   Social History Narrative    Lives alone. Caffeine intake rare -1-2 per week of coffee. Does not have a Living Will or Advanced Directive           Review of Systems  Cardiovascular: no chest pain  Chest: no shortness of breath  Abd: no abd pain  Remainder review of systems negative    Objective:      Physical Exam   Constitutional: She appears well-developed and well-nourished.   Cardiovascular: Normal rate and regular rhythm.   Pulmonary/Chest: Effort normal and breath sounds normal.       Assessment:       1. Essential hypertension    2. IGT (impaired glucose tolerance)    3. Rheumatoid arthritis of hand, unspecified  laterality, unspecified rheumatoid factor presence    4. Hyperlipidemia, unspecified hyperlipidemia type    5. Immunosuppressed status    6. Osteopenia of multiple sites    7. Adult ADHD          Plan:       **Lab and follow up after in 3 months VV; if a1c 6.5 and if no recent steroid then this will be the first a1c of record that could contrib to dx dm as most recent a1c raised by steroid    Clarify smoking hx at f/u    D/wd metform or poss victoza she wants to work on diet (lots of bread ice cream etc)exerc    IGT (impaired glucose tolerance)  -     Hemoglobin A1C; Future; Expected date: 09/16/2020  -     Glucose, fasting; Future; Expected date: 09/18/2020    Essential hypertension    Rheumatoid arthritis of hand, unspecified laterality, unspecified rheumatoid factor presence    Osteopenia of multiple sites    Other orders  -     cloNIDine (CATAPRES) 0.2 MG tablet; TAKE 1/2 TABLET IN THE MORNING, 1/2 TABLET AT NOON,   THEN 2 TABLETS AT BEDTIME  Dispense: 270 tablet; Refill: 3

## 2020-06-18 NOTE — PROGRESS NOTES
RHEUMATOLOGY OUTPATIENT CLINIC NOTE        Attending Rheumatologist: Lois Aguila  Primary Care Provider: Jayesh Jaramillo MD   Physician Requesting Consultation: No referring provider defined for this encounter.  Chief Complaint/Reason For Consultation:  Knee pain    Subjective:       HPI  Smitha Salinas is a 69 y.o. AAF presents to establish care for hx rheumatoid arthritis. On actemra x past 4 years. Reports overall feeling well. No prolonged AM stiffness. Previously followed w Dr Clinton, last seen 1/2019. Hx mtx but stopped 2.2 genital herpes flares. No other acute issues or complaints.    Today:  Pt reports overall feeling ok since last visit. ++Fatigue. +Hx irregular sleep pattern. Joint symptoms stable. No sick contacts. No fever, sob, cough.   14pt ROS negative xcept as otherwise stated above    Review of Systems   Constitutional: Negative for chills, fever and weight loss.   HENT: Negative for congestion, sinus pain and sore throat.    Eyes: Negative for blurred vision, double vision and pain.   Respiratory: Negative for cough and shortness of breath.    Cardiovascular: Negative for chest pain, palpitations and claudication.   Gastrointestinal: Negative for abdominal pain, nausea and vomiting.   Genitourinary: Negative for dysuria and frequency.   Musculoskeletal: Positive for joint pain. Negative for falls.   Skin: Negative for itching and rash.   Neurological: Negative for dizziness and weakness.   Endo/Heme/Allergies: Negative for polydipsia. Does not bruise/bleed easily.       Chronic comorbid conditions affecting medical decision making today:  Past Medical History:   Diagnosis Date    [facility]    Chronic rheumatic arthritis     dr clinton    Colon polyps     Ex-smoker     Genital herpes     Herpes zoster infection     Hyperlipidemia     Hypertension     IGT (impaired glucose tolerance)     Immunosuppressed status     Lichen sclerosus et atrophicus     SUKHDEEP  (obstructive sleep apnea)     Osteopenia      wai (start fmax if on pred 3months or more)     Past Surgical History:   Procedure Laterality Date    BLADDER SURGERY  2012    Dr Claudia Lamar    BREAST BIOPSY Left     benign    COLONOSCOPY N/A 2020    Procedure: COLONOSCOPY;  Surgeon: Eloina Castro MD;  Location: [facility];  Service: Endoscopy;  Laterality: N/A;    CRYOTHERAPY      ESOPHAGOGASTRODUODENOSCOPY N/A 2020    Procedure: EGD (ESOPHAGOGASTRODUODENOSCOPY);  Surgeon: Eloina Castro MD;  Location: [facility];  Service: Endoscopy;  Laterality: N/A;    SKIN GRAFT      laceration to right  fingers  from thigh     TONSILLECTOMY       Family History   Problem Relation Age of Onset    Heart disease Mother     Diabetes Mother     Stroke Father     Kidney disease Father     Cancer Sister 39        breast    Breast cancer Sister     Cancer Other 68        breast    Stroke Maternal Grandmother     Stroke Paternal Grandmother     Stroke Paternal Grandfather     Colon cancer Neg Hx     Ovarian cancer Neg Hx      Social History     Substance and Sexual Activity   Alcohol Use Yes    Alcohol/week: 0.0 standard drinks     Social History     Tobacco Use   Smoking Status Former Smoker    Quit date: 2013    Years since quittin.9   Smokeless Tobacco Never Used   Tobacco Comment    smokes for a few weeks per year - when under pressure. has been abstinent times years at a time. a pack lasts about a week     Social History     Substance and Sexual Activity   Drug Use No       Current Outpatient Medications:     acyclovir (ZOVIRAX) 800 MG Tab, Take 1 tablet (800 mg total) by mouth 2 (two) times daily. for 5 days, Disp: 10 tablet, Rfl: 11    amLODIPine (NORVASC) 5 MG tablet, Take 1 tablet (5 mg total) by mouth once daily., Disp: 30 tablet, Rfl: 5    aspirin (ECOTRIN) 81 MG EC tablet, Take 81 mg by mouth once daily., Disp: , Rfl:     bumetanide (BUMEX) 1 MG  tablet, 1 tablet(s) by mouth daily, Disp: , Rfl:     cholecalciferol, vitamin D3, 1,000 unit capsule, Take 1 capsule by mouth once daily., Disp: , Rfl:     cloNIDine (CATAPRES) 0.2 MG tablet, TAKE 1/2 TABLET IN THE MORNING, 1/2 TABLET AT NOON,   THEN 2 TABLETS AT BEDTIME, Disp: 270 tablet, Rfl: 3    cyclobenzaprine (FLEXERIL) 10 MG tablet, 1/2 to 1 po tid prn muscle spasm, Disp: 60 tablet, Rfl: 5    diazePAM (VALIUM) 5 MG tablet, TAKE 1 TABLET BY MOUTH AT BEDTIME FOR ANXIETY, Disp: 30 tablet, Rfl: 5    erythromycin (ROMYCIN) ophthalmic ointment, , Disp: , Rfl:     estradioL (ESTRACE) 0.01 % (0.1 mg/gram) vaginal cream, Place 1 g vaginally twice a week., Disp: 42.5 g, Rfl: 3    levocetirizine (XYZAL) 5 MG tablet, Take 1 tablet (5 mg total) by mouth nightly as needed for Allergies., Disp: 90 tablet, Rfl: 4    MYRBETRIQ 50 mg Tb24, TAKE 1 TABLET BY MOUTH EVERY DAY, Disp: 30 tablet, Rfl: 11    ofloxacin (OCUFLOX) 0.3 % ophthalmic solution, , Disp: , Rfl:     ondansetron (ZOFRAN-ODT) 4 MG TbDL, Take 1 tablet (4 mg total) by mouth every 8 (eight) hours as needed (nausea)., Disp: 10 tablet, Rfl: 1    polyethylene glycol (GLYCOLAX) 17 gram/dose powder, Miralax 17 gram/dose oral powder  use as directed, Disp: , Rfl:     potassium chloride SA (K-DUR,KLOR-CON) 20 MEQ tablet, Take 1 tablet (20 mEq total) by mouth once daily. 1 tablet,ER particles/crystals Oral Twice a day, Disp: 90 tablet, Rfl: 5    pravastatin (PRAVACHOL) 40 MG tablet, Take 1 tablet (40 mg total) by mouth once daily., Disp: 90 tablet, Rfl: 3    tocilizumab (ACTEMRA) 162 mg/0.9 mL injection, Inject 0.9 mLs (162 mg total) into the skin once a week., Disp: 4 Syringe, Rfl: 3    traMADol (ULTRAM) 50 mg tablet, Take 1 tablet (50 mg total) by mouth every 6 (six) hours as needed for Pain., Disp: 60 tablet, Rfl: 5       Objective:         Vitals:    06/18/20 1519   BP: 137/70   Pulse: 74     Physical Exam   Nursing note and vitals  reviewed.  Constitutional: She is oriented to person, place, and time and well-developed, well-nourished, and in no distress.   HENT:   Head: Normocephalic.   Mouth/Throat: Oropharynx is clear and moist.   Eyes: Conjunctivae are normal. Pupils are equal, round, and reactive to light.   Neck: Normal range of motion. Neck supple.   Cardiovascular: Normal rate and normal heart sounds.    Pulmonary/Chest: Effort normal. No respiratory distress.   Abdominal: Soft. There is no abdominal tenderness.   Neurological: She is alert and oriented to person, place, and time.   Skin: Skin is warm. No rash noted. No erythema.     Psychiatric: Memory and affect normal.   Musculoskeletal: No tenderness, deformity or edema.      Comments: No synovitis in small joints of hands and feet. No nodules.  +mild effusion over left knee               Reviewed old and all outside pertinent medical records available.    All lab results personally reviewed and interpreted by me.  Lab Results   Component Value Date    WBC 4.31 01/29/2020    HGB 13.2 01/29/2020    HCT 43.4 01/29/2020    MCV 90 01/29/2020    MCH 27.3 01/29/2020    MCHC 30.4 (L) 01/29/2020    RDW 13.7 01/29/2020     01/29/2020    MPV 14.5 (H) 01/29/2020    PLTEST Adequate 05/14/2013       Lab Results   Component Value Date     (H) 03/05/2020    K 4.5 03/05/2020     (H) 03/05/2020    CO2 24 03/05/2020     03/05/2020    BUN 17 03/05/2020    CALCIUM 9.6 03/05/2020    PROT 6.8 03/05/2020    ALBUMIN 4.1 03/05/2020    BILITOT 0.5 03/05/2020    AST 18 03/05/2020    ALKPHOS 82 03/05/2020    ALT 32 03/05/2020       Lab Results   Component Value Date    COLORU Yellow 12/26/2013    APPEARANCEUA Cloudy (A) 12/26/2013    SPECGRAV 1.025 12/26/2013    PHUR 6.0 12/26/2013    PROTEINUA Negative 12/26/2013    KETONESU Negative 12/26/2013    LEUKOCYTESUR Negative 12/26/2013    NITRITE Negative 12/26/2013    UROBILINOGEN Negative 12/26/2013       Lab Results   Component Value  Date    CRP 0.1 01/29/2020       Lab Results   Component Value Date    SEDRATE 3 01/29/2020       Lab Results   Component Value Date     (H) 03/19/2012    SEDRATE 3 01/29/2020       No components found for: 25OHVITDTOT, 13VHVEKY2, 80JEWXMX7, METHODNOTE    Lab Results   Component Value Date    URICACID 4.3 01/29/2020       No components found for: TSPOTTB      Imaging:  Hand XR  Degenerative changes are seen scattered throughout the right hand and most prominent at the 1st interphalangeal joint, 1st CMC joint and DIP joint of the 3rd digit of the right hand.  The degenerative changes have progressed since the 2012 examination.  There is also a stable probable well corticated erosion within the trapezium.  No new erosive changes are identified.  Mild degenerative changes present at the 1st and 2nd MCP joints.    There is a swan-neck deformity of the 5th digit.  This is stable.  Prominent degenerative change also present at the 1st interphalangeal joint on the left which has progressed since the 2012 exam..  No obvious erosive changes are identified.  No significant soft tissue swelling noted.  Stable degenerative change noted at the 1st CMC joint.      Impression       1. No new erosive changes or soft tissue swelling identified.     Knee MRI 1/2019  1. Osseous changes suggestive of osteoarthritis of knees. Medial joint compartment narrowing with subarticular edema/sclerosis and marginal osteophyte formation.  2. Degenerative change of the medial meniscus with linear signal tear and blunting.  3. Small joint effusion and nonspecific mild soft tissue edema.  4. Popliteal cyst.     Procedures   ASSESSMENT / PLAN:     Smitha Salinas is a 69 y.o. Black or  female with:      1. Rheumatoid arthritis, involving unspecified site, unspecified rheumatoid factor presence  -RF+/CCP+ nonerosive arthritis  -On actemra x past 4 years, stable. No progression on hand XR.   -No synovitis on exam. SJC 0; CDAI 3-  low disease activity  -C/w actemra.     #OA  -No significant benefit w  synvisc injections previously  -c/w voltaren gel, ice, topicals prn. Discussed quad strengthening exercises. No significant benefit w PT  -Previously injected w kenalog/lidocaine w mild benefit  -Previous hx single monosodium urate crystal per knee arthrocentesis in 2015 w Dr Clinton. No hx urate lowering therapy.     #High risk med use  -No sign of infection. Hold if infection    #Myofascial muscle pain  -Heat prn / topicals/ massage  -Labs- vit d    Method of contact with patient concerns: MyChart attn Rheumatology      Lois Aguila M.D.  Rheumatology Department   [facility] - [address] [address]  Phone: [phone]  Fax: [phone]

## 2020-06-25 RX ORDER — CLONIDINE HYDROCHLORIDE 0.2 MG/1
TABLET ORAL
Qty: 270 TABLET | Refills: 0 | Status: SHIPPED | OUTPATIENT
Start: 2020-06-25 | End: 2020-09-22

## 2020-06-25 NOTE — TELEPHONE ENCOUNTER
----- Message from Kiel Todd sent at 6/25/2020  2:32 PM CDT -----  Regarding: [phone]   Pt would like a [facility] form nurse in regards to her medication  (cloNIDine (CATAPRES) 0.2 MG tablet). Please call back at .[phone] (home)     Thank you,   Kiel Todd

## 2020-07-07 ENCOUNTER — PATIENT MESSAGE (OUTPATIENT)
Dept: RHEUMATOLOGY | Facility: CLINIC | Age: 70
End: 2020-07-07

## 2020-07-08 ENCOUNTER — TELEPHONE (OUTPATIENT)
Dept: RHEUMATOLOGY | Facility: CLINIC | Age: 70
End: 2020-07-08

## 2020-07-08 ENCOUNTER — LAB VISIT (OUTPATIENT)
Dept: LAB | Facility: HOSPITAL | Age: 70
End: 2020-07-08
Attending: STUDENT IN AN ORGANIZED HEALTH CARE EDUCATION/TRAINING PROGRAM
Payer: MEDICARE

## 2020-07-08 ENCOUNTER — TELEPHONE (OUTPATIENT)
Dept: PHARMACY | Facility: CLINIC | Age: 70
End: 2020-07-08

## 2020-07-08 ENCOUNTER — OFFICE VISIT (OUTPATIENT)
Dept: RHEUMATOLOGY | Facility: CLINIC | Age: 70
End: 2020-07-08
Payer: MEDICARE

## 2020-07-08 VITALS
DIASTOLIC BLOOD PRESSURE: 82 MMHG | HEART RATE: 74 BPM | SYSTOLIC BLOOD PRESSURE: 134 MMHG | HEIGHT: 65 IN | WEIGHT: 190.94 LBS | BODY MASS INDEX: 31.81 KG/M2

## 2020-07-08 DIAGNOSIS — M06.9 RHEUMATOID ARTHRITIS, INVOLVING UNSPECIFIED SITE, UNSPECIFIED RHEUMATOID FACTOR PRESENCE: Primary | ICD-10-CM

## 2020-07-08 DIAGNOSIS — M06.9 RHEUMATOID ARTHRITIS, INVOLVING UNSPECIFIED SITE, UNSPECIFIED RHEUMATOID FACTOR PRESENCE: ICD-10-CM

## 2020-07-08 DIAGNOSIS — M19.90 OSTEOARTHRITIS, UNSPECIFIED OSTEOARTHRITIS TYPE, UNSPECIFIED SITE: ICD-10-CM

## 2020-07-08 DIAGNOSIS — Z79.899 HIGH RISK MEDICATION USE: ICD-10-CM

## 2020-07-08 DIAGNOSIS — M79.18 MYALGIA, OTHER SITE: ICD-10-CM

## 2020-07-08 LAB
ALBUMIN SERPL BCP-MCNC: 4.5 G/DL (ref 3.5–5.2)
ALP SERPL-CCNC: 92 U/L (ref 55–135)
ALT SERPL W/O P-5'-P-CCNC: 36 U/L (ref 10–44)
ANION GAP SERPL CALC-SCNC: 7 MMOL/L (ref 8–16)
AST SERPL-CCNC: 21 U/L (ref 10–40)
BASOPHILS # BLD AUTO: 0.03 K/UL (ref 0–0.2)
BASOPHILS NFR BLD: 0.5 % (ref 0–1.9)
BILIRUB SERPL-MCNC: 0.4 MG/DL (ref 0.1–1)
BUN SERPL-MCNC: 17 MG/DL (ref 8–23)
CALCIUM SERPL-MCNC: 9.8 MG/DL (ref 8.7–10.5)
CHLORIDE SERPL-SCNC: 110 MMOL/L (ref 95–110)
CO2 SERPL-SCNC: 28 MMOL/L (ref 23–29)
CREAT SERPL-MCNC: 0.9 MG/DL (ref 0.5–1.4)
CRP SERPL-MCNC: <0.1 MG/L (ref 0–8.2)
DIFFERENTIAL METHOD: ABNORMAL
EOSINOPHIL # BLD AUTO: 0.1 K/UL (ref 0–0.5)
EOSINOPHIL NFR BLD: 1.7 % (ref 0–8)
ERYTHROCYTE [DISTWIDTH] IN BLOOD BY AUTOMATED COUNT: 14.1 % (ref 11.5–14.5)
ERYTHROCYTE [SEDIMENTATION RATE] IN BLOOD BY WESTERGREN METHOD: <2 MM/HR (ref 0–36)
EST. GFR  (AFRICAN AMERICAN): >60 ML/MIN/1.73 M^2
EST. GFR  (NON AFRICAN AMERICAN): >60 ML/MIN/1.73 M^2
GLUCOSE SERPL-MCNC: 84 MG/DL (ref 70–110)
HCT VFR BLD AUTO: 43.1 % (ref 37–48.5)
HGB BLD-MCNC: 13.3 G/DL (ref 12–16)
IMM GRANULOCYTES # BLD AUTO: 0.01 K/UL (ref 0–0.04)
IMM GRANULOCYTES NFR BLD AUTO: 0.2 % (ref 0–0.5)
LYMPHOCYTES # BLD AUTO: 2 K/UL (ref 1–4.8)
LYMPHOCYTES NFR BLD: 33.7 % (ref 18–48)
MCH RBC QN AUTO: 27.3 PG (ref 27–31)
MCHC RBC AUTO-ENTMCNC: 30.9 G/DL (ref 32–36)
MCV RBC AUTO: 88 FL (ref 82–98)
MONOCYTES # BLD AUTO: 0.8 K/UL (ref 0.3–1)
MONOCYTES NFR BLD: 13.3 % (ref 4–15)
NEUTROPHILS # BLD AUTO: 3 K/UL (ref 1.8–7.7)
NEUTROPHILS NFR BLD: 50.6 % (ref 38–73)
NRBC BLD-RTO: 0 /100 WBC
PLATELET # BLD AUTO: 137 K/UL (ref 150–350)
PMV BLD AUTO: 14.9 FL (ref 9.2–12.9)
POTASSIUM SERPL-SCNC: 4.8 MMOL/L (ref 3.5–5.1)
PROT SERPL-MCNC: 7.4 G/DL (ref 6–8.4)
RBC # BLD AUTO: 4.88 M/UL (ref 4–5.4)
SODIUM SERPL-SCNC: 145 MMOL/L (ref 136–145)
WBC # BLD AUTO: 5.85 K/UL (ref 3.9–12.7)

## 2020-07-08 PROCEDURE — 99214 PR OFFICE/OUTPT VISIT, EST, LEVL IV, 30-39 MIN: ICD-10-PCS | Mod: 25,S$GLB,, | Performed by: STUDENT IN AN ORGANIZED HEALTH CARE EDUCATION/TRAINING PROGRAM

## 2020-07-08 PROCEDURE — 1125F AMNT PAIN NOTED PAIN PRSNT: CPT | Mod: S$GLB,,, | Performed by: STUDENT IN AN ORGANIZED HEALTH CARE EDUCATION/TRAINING PROGRAM

## 2020-07-08 PROCEDURE — 3008F PR BODY MASS INDEX (BMI) DOCUMENTED: ICD-10-PCS | Mod: CPTII,S$GLB,, | Performed by: STUDENT IN AN ORGANIZED HEALTH CARE EDUCATION/TRAINING PROGRAM

## 2020-07-08 PROCEDURE — 1101F PR PT FALLS ASSESS DOC 0-1 FALLS W/OUT INJ PAST YR: ICD-10-PCS | Mod: CPTII,S$GLB,, | Performed by: STUDENT IN AN ORGANIZED HEALTH CARE EDUCATION/TRAINING PROGRAM

## 2020-07-08 PROCEDURE — 1159F MED LIST DOCD IN RCRD: CPT | Mod: S$GLB,,, | Performed by: STUDENT IN AN ORGANIZED HEALTH CARE EDUCATION/TRAINING PROGRAM

## 2020-07-08 PROCEDURE — 36415 COLL VENOUS BLD VENIPUNCTURE: CPT

## 2020-07-08 PROCEDURE — 1125F PR PAIN SEVERITY QUANTIFIED, PAIN PRESENT: ICD-10-PCS | Mod: S$GLB,,, | Performed by: STUDENT IN AN ORGANIZED HEALTH CARE EDUCATION/TRAINING PROGRAM

## 2020-07-08 PROCEDURE — 3079F PR MOST RECENT DIASTOLIC BLOOD PRESSURE 80-89 MM HG: ICD-10-PCS | Mod: CPTII,S$GLB,, | Performed by: STUDENT IN AN ORGANIZED HEALTH CARE EDUCATION/TRAINING PROGRAM

## 2020-07-08 PROCEDURE — 3075F SYST BP GE 130 - 139MM HG: CPT | Mod: CPTII,S$GLB,, | Performed by: STUDENT IN AN ORGANIZED HEALTH CARE EDUCATION/TRAINING PROGRAM

## 2020-07-08 PROCEDURE — 99999 PR PBB SHADOW E&M-EST. PATIENT-LVL IV: CPT | Mod: PBBFAC,,, | Performed by: STUDENT IN AN ORGANIZED HEALTH CARE EDUCATION/TRAINING PROGRAM

## 2020-07-08 PROCEDURE — 99214 OFFICE O/P EST MOD 30 MIN: CPT | Mod: 25,S$GLB,, | Performed by: STUDENT IN AN ORGANIZED HEALTH CARE EDUCATION/TRAINING PROGRAM

## 2020-07-08 PROCEDURE — 3008F BODY MASS INDEX DOCD: CPT | Mod: CPTII,S$GLB,, | Performed by: STUDENT IN AN ORGANIZED HEALTH CARE EDUCATION/TRAINING PROGRAM

## 2020-07-08 PROCEDURE — 85025 COMPLETE CBC W/AUTO DIFF WBC: CPT

## 2020-07-08 PROCEDURE — 99999 PR PBB SHADOW E&M-EST. PATIENT-LVL IV: ICD-10-PCS | Mod: PBBFAC,,, | Performed by: STUDENT IN AN ORGANIZED HEALTH CARE EDUCATION/TRAINING PROGRAM

## 2020-07-08 PROCEDURE — 85652 RBC SED RATE AUTOMATED: CPT

## 2020-07-08 PROCEDURE — 1159F PR MEDICATION LIST DOCUMENTED IN MEDICAL RECORD: ICD-10-PCS | Mod: S$GLB,,, | Performed by: STUDENT IN AN ORGANIZED HEALTH CARE EDUCATION/TRAINING PROGRAM

## 2020-07-08 PROCEDURE — 96372 PR INJECTION,THERAP/PROPH/DIAG2ST, IM OR SUBCUT: ICD-10-PCS | Mod: S$GLB,,, | Performed by: STUDENT IN AN ORGANIZED HEALTH CARE EDUCATION/TRAINING PROGRAM

## 2020-07-08 PROCEDURE — 3079F DIAST BP 80-89 MM HG: CPT | Mod: CPTII,S$GLB,, | Performed by: STUDENT IN AN ORGANIZED HEALTH CARE EDUCATION/TRAINING PROGRAM

## 2020-07-08 PROCEDURE — 96372 THER/PROPH/DIAG INJ SC/IM: CPT | Mod: S$GLB,,, | Performed by: STUDENT IN AN ORGANIZED HEALTH CARE EDUCATION/TRAINING PROGRAM

## 2020-07-08 PROCEDURE — 80053 COMPREHEN METABOLIC PANEL: CPT

## 2020-07-08 PROCEDURE — 86140 C-REACTIVE PROTEIN: CPT

## 2020-07-08 PROCEDURE — 1101F PT FALLS ASSESS-DOCD LE1/YR: CPT | Mod: CPTII,S$GLB,, | Performed by: STUDENT IN AN ORGANIZED HEALTH CARE EDUCATION/TRAINING PROGRAM

## 2020-07-08 PROCEDURE — 3075F PR MOST RECENT SYSTOLIC BLOOD PRESS GE 130-139MM HG: ICD-10-PCS | Mod: CPTII,S$GLB,, | Performed by: STUDENT IN AN ORGANIZED HEALTH CARE EDUCATION/TRAINING PROGRAM

## 2020-07-08 RX ORDER — PREDNISONE 5 MG/1
TABLET ORAL
Qty: 21 TABLET | Refills: 1 | Status: SHIPPED | OUTPATIENT
Start: 2020-07-08 | End: 2020-10-07

## 2020-07-08 RX ORDER — UPADACITINIB 15 MG/1
15 TABLET, EXTENDED RELEASE ORAL DAILY
Qty: 30 TABLET | Refills: 2 | Status: SHIPPED | OUTPATIENT
Start: 2020-07-08 | End: 2020-10-07

## 2020-07-08 RX ORDER — BETAMETHASONE SODIUM PHOSPHATE AND BETAMETHASONE ACETATE 3; 3 MG/ML; MG/ML
6 INJECTION, SUSPENSION INTRA-ARTICULAR; INTRALESIONAL; INTRAMUSCULAR; SOFT TISSUE
Status: COMPLETED | OUTPATIENT
Start: 2020-07-08 | End: 2020-07-08

## 2020-07-08 RX ADMIN — BETAMETHASONE SODIUM PHOSPHATE AND BETAMETHASONE ACETATE 6 MG: 3; 3 INJECTION, SUSPENSION INTRA-ARTICULAR; INTRALESIONAL; INTRAMUSCULAR; SOFT TISSUE at 02:07

## 2020-07-08 NOTE — TELEPHONE ENCOUNTER
Spoke with Mrs. Salinas she reports that she is experiencing increase pain to left wrist and bilateral hands. Appointment scheduled for today with Dr. Aguila at 1:30pm

## 2020-07-08 NOTE — TELEPHONE ENCOUNTER
----- Message from Paulette Ortega sent at 7/8/2020  8:55 AM CDT -----  Contact: pt  Type:  Needs Medical Advice    Who Called: Smitha  Symptoms (please be specific): rheumatoid flare up/crisis   How long has patient had these symptoms:    Pharm[facility] and phone #:        [id number] [place] [facility] [address] [facility] [address] [facility]  [address] [facility] [address] [address]  Phone: [phone] Fax: [phone]    Would the patient rather a call back or a response via [facility]? call  Best Call Back Number: [phone]  Additional Information:

## 2020-07-08 NOTE — PROGRESS NOTES
Administered 1cc Betamethasone  6mg/cc to Left Ventrogluteal. Pt tolerated well. No acute reaction noted to site. Pt instructed on S/S to report. Pt verbalized understanding. Patient waited 15 minutes post injection    Lot:[id number]  Exp:6/4/2021  Manu:[employer]

## 2020-07-08 NOTE — TELEPHONE ENCOUNTER
----- Message from Mary Maciel sent at 7/8/2020  9:29 AM CDT -----  Regarding: RETURN CALL  Type:  Patient Returning Call    Who Called:PT  Who Left Message for Patient:EARLENE  Does the patient know what this is regarding?:YES  Would the patient rather a call back or a response via [facility]? CALL BACK  Best Call Back Number:[phone]  Additional Information:

## 2020-07-09 RX ORDER — PREDNISONE 5 MG/1
TABLET ORAL
Qty: 21 TABLET | Refills: 1 | Status: CANCELLED | OUTPATIENT
Start: 2020-07-09

## 2020-07-09 NOTE — TELEPHONE ENCOUNTER
----- Message from Temo Chang sent at 7/9/2020  4:29 PM CDT -----  Regarding: Refill Request  Contact: Smitha Salinas  Pt called and stated her prescription was sent to the wrong Pharmacy    Pt would like her prescription sent to     [facility] [address] in [place]     Pt can be reached at [phone]

## 2020-07-09 NOTE — TELEPHONE ENCOUNTER
Spoke with Ms. Brad who states that she needs her Prednisone RX sent to the [facility] on [address] and [address]. She asked if this coulc be refilled today since the medication was sent to the wrong pharmacy. Thanks, Faizan

## 2020-07-10 NOTE — TELEPHONE ENCOUNTER
Patient would like to get a refill of her Prednisone 5mg to be sent to the [facility] attached. Thanks, Faizan

## 2020-07-10 NOTE — TELEPHONE ENCOUNTER
----- Message from Temo Bernardo sent at 7/10/2020  9:56 AM CDT -----  Regarding: Refill Request  Contact: Smitha Salinas  Pt called and stated her prescription was sent to the wrong Pharmacy    predniSONE (DELTASONE) 5 MG tablet [[id number]]    Pt Pharmacy  [facility] on [address]     Phone [phone]    Pt would like to be called when possible   [phone]

## 2020-07-10 NOTE — TELEPHONE ENCOUNTER
Spoke with Mrs. Salinas informed her that her prescription for Prednisone 5mg [facility]  [address] SIG: Take 6 tablets x 1 day, then 5 tablets x 1 day, then 4 tablets x 1 day, then 3 tablets x 1 day, then 2 tablets x 1 days, then 1 tablet x 1 day. Mrs. Salinas voiced understanding

## 2020-07-11 RX ORDER — AMLODIPINE BESYLATE 5 MG/1
5 TABLET ORAL DAILY
Qty: 30 TABLET | Refills: 11 | Status: SHIPPED | OUTPATIENT
Start: 2020-07-11 | End: 2020-08-20 | Stop reason: SDUPTHER

## 2020-07-15 NOTE — TELEPHONE ENCOUNTER
DOCUMENTATION ONLY:     Prior authorization for Rinvoq approved from 07/15/20 to 12/31/20.      Case ID# [id number]       Co-pay: $ 1096.38    Patient Assistance IS required.      Forward to patient assistance for review. CRM

## 2020-07-17 NOTE — PROGRESS NOTES
RHEUMATOLOGY OUTPATIENT CLINIC NOTE        Attending Rheumatologist: Lois Aguila  Primary Care Provider: Jayesh Jaramillo MD   Physician Requesting Consultation: No referring provider defined for this encounter.  Chief Complaint/Reason For Consultation:  Knee pain    Subjective:       HPI  Smitha Salinas is a 69 y.o. AAF presents to establish care for hx rheumatoid arthritis. On actemra x past 4 years. Reports overall feeling well. No prolonged AM stiffness. Previously followed w Dr Clinton, last seen 1/2019. Hx mtx but stopped 2.2 genital herpes flares. No other acute issues or complaints.    Today:  Pt reports worsening hand symptoms w prolonged AM stiffness. Pain 8/10. Onset x several weeks. On actemra. +Fatigue. +Hx irregular sleep pattern.  No sick contacts. No fever, sob, cough. Chronic knee pain also w worsening over past several days.    14pt ROS negative xcept as otherwise stated above    Review of Systems   Constitutional: Negative for chills, fever and weight loss.   HENT: Negative for congestion, sinus pain and sore throat.    Eyes: Negative for blurred vision, double vision and pain.   Respiratory: Negative for cough and shortness of breath.    Cardiovascular: Negative for chest pain, palpitations and claudication.   Gastrointestinal: Negative for abdominal pain, nausea and vomiting.   Genitourinary: Negative for dysuria and frequency.   Musculoskeletal: Positive for joint pain. Negative for falls.   Skin: Negative for itching and rash.   Neurological: Negative for dizziness and weakness.   Endo/Heme/Allergies: Negative for polydipsia. Does not bruise/bleed easily.       Chronic comorbid conditions affecting medical decision making today:  Past Medical History:   Diagnosis Date    [facility]    Chronic rheumatic arthritis     dr clinton    Colon polyps     Ex-smoker     Genital herpes     Herpes zoster infection     Hyperlipidemia     Hypertension     IGT (impaired  glucose tolerance)     Immunosuppressed status     Lichen sclerosus et atrophicus     SUKHDEEP (obstructive sleep apnea)     Osteopenia      wai (start fmax if on pred 3months or more)     Past Surgical History:   Procedure Laterality Date    BLADDER SURGERY  2012    Dr Claudia Lamar    BREAST BIOPSY Left     benign    COLONOSCOPY N/A 2020    Procedure: COLONOSCOPY;  Surgeon: Eloina Castro MD;  Location: [facility] ENDO;  Service: Endoscopy;  Laterality: N/A;    CRYOTHERAPY      ESOPHAGOGASTRODUODENOSCOPY N/A 2020    Procedure: EGD (ESOPHAGOGASTRODUODENOSCOPY);  Surgeon: Eloina Castro MD;  Location: [facility] ENDO;  Service: Endoscopy;  Laterality: N/A;    SKIN GRAFT      laceration to right  fingers  from thigh     TONSILLECTOMY       Family History   Problem Relation Age of Onset    Heart disease Mother     Diabetes Mother     Stroke Father     Kidney disease Father     Cancer Sister 39        breast    Breast cancer Sister     Cancer Other 68        breast    Stroke Maternal Grandmother     Stroke Paternal Grandmother     Stroke Paternal Grandfather     Colon cancer Neg Hx     Ovarian cancer Neg Hx      Social History     Substance and Sexual Activity   Alcohol Use Yes    Alcohol/week: 0.0 standard drinks     Social History     Tobacco Use   Smoking Status Former Smoker    Quit date: 2013    Years since quittin.9   Smokeless Tobacco Never Used   Tobacco Comment    smokes for a few weeks per year - when under pressure. has been abstinent times years at a time. a pack lasts about a week     Social History     Substance and Sexual Activity   Drug Use No       Current Outpatient Medications:     aspirin (ECOTRIN) 81 MG EC tablet, Take 81 mg by mouth once daily., Disp: , Rfl:     cholecalciferol, vitamin D3, 1,000 unit capsule, Take 1 capsule by mouth once daily., Disp: , Rfl:     cloNIDine (CATAPRES) 0.2 MG tablet, TAKE 1/2 TABLET IN THE MORNING, 1/2  TABLET AT NOON,   THEN 2 TABLETS AT BEDTIME, Disp: 270 tablet, Rfl: 0    cyclobenzaprine (FLEXERIL) 10 MG tablet, 1/2 to 1 po tid prn muscle spasm, Disp: 60 tablet, Rfl: 5    estradioL (ESTRACE) 0.01 % (0.1 mg/gram) vaginal cream, Place 1 g vaginally twice a week., Disp: 42.5 g, Rfl: 3    MYRBETRIQ 50 mg Tb24, TAKE 1 TABLET BY MOUTH EVERY DAY, Disp: 30 tablet, Rfl: 11    ondansetron (ZOFRAN-ODT) 4 MG TbDL, Take 1 tablet (4 mg total) by mouth every 8 (eight) hours as needed (nausea)., Disp: 10 tablet, Rfl: 1    potassium chloride SA (K-DUR,KLOR-CON) 20 MEQ tablet, Take 1 tablet (20 mEq total) by mouth once daily. 1 tablet,ER particles/crystals Oral Twice a day, Disp: 90 tablet, Rfl: 5    pravastatin (PRAVACHOL) 40 MG tablet, Take 1 tablet (40 mg total) by mouth once daily., Disp: 90 tablet, Rfl: 3    tocilizumab (ACTEMRA) 162 mg/0.9 mL injection, Inject 0.9 mLs (162 mg total) into the skin once a week., Disp: 4 Syringe, Rfl: 3    traMADol (ULTRAM) 50 mg tablet, Take 1 tablet (50 mg total) by mouth every 6 (six) hours as needed for Pain., Disp: 60 tablet, Rfl: 5    acyclovir (ZOVIRAX) 800 MG Tab, Take 1 tablet (800 mg total) by mouth 2 (two) times daily. for 5 days, Disp: 10 tablet, Rfl: 11    amLODIPine (NORVASC) 5 MG tablet, Take 1 tablet (5 mg total) by mouth once daily., Disp: 30 tablet, Rfl: 11    bumetanide (BUMEX) 1 MG tablet, 1 tablet(s) by mouth daily, Disp: , Rfl:     diazePAM (VALIUM) 5 MG tablet, TAKE 1 TABLET BY MOUTH AT BEDTIME FOR ANXIETY (Patient not taking: Reported on 7/8/2020), Disp: 30 tablet, Rfl: 5    erythromycin (ROMYCIN) ophthalmic ointment, , Disp: , Rfl:     levocetirizine (XYZAL) 5 MG tablet, Take 1 tablet (5 mg total) by mouth nightly as needed for Allergies. (Patient not taking: Reported on 7/8/2020), Disp: 90 tablet, Rfl: 4    ofloxacin (OCUFLOX) 0.3 % ophthalmic solution, , Disp: , Rfl:     polyethylene glycol (GLYCOLAX) 17 gram/dose powder, Miralax 17 gram/dose oral  powder  use as directed, Disp: , Rfl:     predniSONE (DELTASONE) 5 MG tablet, Take 6 tablets x 1 day, then 5 tablets x 1 day, then 4 tablets x 1 day, then 3 tablets x 1 day, then 2 tablets x 1 days, then 1 tablet x 1 day (Patient not taking: Reported on 7/8/2020), Disp: 21 tablet, Rfl: 1    upadacitinib (RINVOQ) 15 mg 24 hr tablet, Take 1 tablet (15 mg total) by mouth once daily., Disp: 30 tablet, Rfl: 2       Objective:         Vitals:    07/08/20 1311   BP: 134/82   Pulse: 74     Physical Exam   Nursing note and vitals reviewed.  Constitutional: She is oriented to person, place, and time and well-developed, well-nourished, and in no distress.   HENT:   Head: Normocephalic.   Mouth/Throat: Oropharynx is clear and moist.   Eyes: Conjunctivae are normal. Pupils are equal, round, and reactive to light.   Neck: Normal range of motion. Neck supple.   Cardiovascular: Normal rate and normal heart sounds.    Pulmonary/Chest: Effort normal. No respiratory distress.   Abdominal: Soft. There is no abdominal tenderness.   Neurological: She is alert and oriented to person, place, and time.   Skin: Skin is warm. No rash noted. No erythema.     Psychiatric: Memory and affect normal.   Musculoskeletal: No tenderness, deformity or edema.      Comments: +Synovitis R3 mcp   No nodules.  +mild effusion over left knee               Reviewed old and all outside pertinent medical records available.    All lab results personally reviewed and interpreted by me.  Lab Results   Component Value Date    WBC 5.85 07/08/2020    HGB 13.3 07/08/2020    HCT 43.1 07/08/2020    MCV 88 07/08/2020    MCH 27.3 07/08/2020    MCHC 30.9 (L) 07/08/2020    RDW 14.1 07/08/2020     (L) 07/08/2020    MPV 14.9 (H) 07/08/2020    PLTEST Adequate 05/14/2013       Lab Results   Component Value Date     07/08/2020    K 4.8 07/08/2020     07/08/2020    CO2 28 07/08/2020    GLU 84 07/08/2020    BUN 17 07/08/2020    CALCIUM 9.8 07/08/2020    PROT  7.4 07/08/2020    ALBUMIN 4.5 07/08/2020    BILITOT 0.4 07/08/2020    AST 21 07/08/2020    ALKPHOS 92 07/08/2020    ALT 36 07/08/2020       Lab Results   Component Value Date    COLORU Yellow 12/26/2013    APPEARANCEUA Cloudy (A) 12/26/2013    SPECGRAV 1.025 12/26/2013    PHUR 6.0 12/26/2013    PROTEINUA Negative 12/26/2013    KETONESU Negative 12/26/2013    LEUKOCYTESUR Negative 12/26/2013    NITRITE Negative 12/26/2013    UROBILINOGEN Negative 12/26/2013       Lab Results   Component Value Date    CRP <0.1 07/08/2020       Lab Results   Component Value Date    SEDRATE <2 07/08/2020       Lab Results   Component Value Date     (H) 03/19/2012    SEDRATE <2 07/08/2020       No components found for: 25OHVITDTOT, 88RBISBO8, 92TPGVNR0, METHODNOTE    Lab Results   Component Value Date    URICACID 4.3 01/29/2020       No components found for: TSPOTTB      Imaging:  Hand XR  Degenerative changes are seen scattered throughout the right hand and most prominent at the 1st interphalangeal joint, 1st CMC joint and DIP joint of the 3rd digit of the right hand.  The degenerative changes have progressed since the 2012 examination.  There is also a stable probable well corticated erosion within the trapezium.  No new erosive changes are identified.  Mild degenerative changes present at the 1st and 2nd MCP joints.    There is a swan-neck deformity of the 5th digit.  This is stable.  Prominent degenerative change also present at the 1st interphalangeal joint on the left which has progressed since the 2012 exam..  No obvious erosive changes are identified.  No significant soft tissue swelling noted.  Stable degenerative change noted at the 1st CMC joint.      Impression       1. No new erosive changes or soft tissue swelling identified.     Knee MRI 1/2019  1. Osseous changes suggestive of osteoarthritis of knees. Medial joint compartment narrowing with subarticular edema/sclerosis and marginal osteophyte formation.  2.  Degenerative change of the medial meniscus with linear signal tear and blunting.  3. Small joint effusion and nonspecific mild soft tissue edema.  4. Popliteal cyst.     Procedures   ASSESSMENT / PLAN:     Smitha Salinas is a 69 y.o. Black or  female with:      1. Rheumatoid arthritis, involving unspecified site, unspecified rheumatoid factor presence  -RF+/CCP+ nonerosive arthritis  -On actemra x past 4 years, previously stable. Has had progressive worsening and recurrence of symptoms x months. +Synovitis.   -Discussed risks/benefits of all meds including increased risk of infection, DVT, GI perforation.   -Start PA for rinvoq. Received shingles vaccine.   -Celestone IM x1    #OA  -No significant benefit w  synvisc injections previously  -c/w voltaren gel, ice, topicals prn. Discussed quad strengthening exercises. No significant benefit w PT  -Previously injected w kenalog/lidocaine w mild benefit  -Previous hx single monosodium urate crystal per knee arthrocentesis in 2015 w Dr Clinton. No hx urate lowering therapy.     #High risk med use  -No sign of infection. Hold if infection    #Myofascial muscle pain  -Heat prn / topicals/ massage      Method of contact with patient concerns: MyChart attn Rheumatology      Lois Aguila M.D.  Rheumatology Department   [facility] [place] [address], [place] [address]  Phone: [phone]  Fax: [phone]

## 2020-07-22 ENCOUNTER — TELEPHONE (OUTPATIENT)
Dept: RHEUMATOLOGY | Facility: CLINIC | Age: 70
End: 2020-07-22

## 2020-07-22 NOTE — TELEPHONE ENCOUNTER
Patient informed that Dr. Aguila is no longer with [facility] and 9-18 appointment needs to be rescheduled with another provider. Patient states that she will call us back.

## 2020-07-23 ENCOUNTER — OFFICE VISIT (OUTPATIENT)
Dept: UROLOGY | Facility: CLINIC | Age: 70
End: 2020-07-23
Payer: MEDICARE

## 2020-07-23 VITALS
BODY MASS INDEX: 31.42 KG/M2 | DIASTOLIC BLOOD PRESSURE: 70 MMHG | SYSTOLIC BLOOD PRESSURE: 144 MMHG | WEIGHT: 188.81 LBS | TEMPERATURE: 97 F

## 2020-07-23 DIAGNOSIS — N32.81 OAB (OVERACTIVE BLADDER): Primary | ICD-10-CM

## 2020-07-23 DIAGNOSIS — R32 URINARY INCONTINENCE, UNSPECIFIED TYPE: ICD-10-CM

## 2020-07-23 DIAGNOSIS — E66.01 MORBID OBESITY: ICD-10-CM

## 2020-07-23 LAB
BILIRUB SERPL-MCNC: ABNORMAL MG/DL
BLOOD URINE, POC: ABNORMAL
CLARITY, POC UA: CLEAR
COLOR, POC UA: YELLOW
GLUCOSE UR QL STRIP: ABNORMAL
KETONES UR QL STRIP: ABNORMAL
LEUKOCYTE ESTERASE URINE, POC: ABNORMAL
NITRITE, POC UA: ABNORMAL
PH, POC UA: 5
PROTEIN, POC: POSITIVE
SPECIFIC GRAVITY, POC UA: 1.02
UROBILINOGEN, POC UA: ABNORMAL

## 2020-07-23 PROCEDURE — 3078F DIAST BP <80 MM HG: CPT | Mod: CPTII,S$GLB,, | Performed by: UROLOGY

## 2020-07-23 PROCEDURE — 1126F PR PAIN SEVERITY QUANTIFIED, NO PAIN PRESENT: ICD-10-PCS | Mod: S$GLB,,, | Performed by: UROLOGY

## 2020-07-23 PROCEDURE — 1101F PT FALLS ASSESS-DOCD LE1/YR: CPT | Mod: CPTII,S$GLB,, | Performed by: UROLOGY

## 2020-07-23 PROCEDURE — 81002 POCT URINE DIPSTICK WITHOUT MICROSCOPE: ICD-10-PCS | Mod: S$GLB,,, | Performed by: UROLOGY

## 2020-07-23 PROCEDURE — 99999 PR PBB SHADOW E&M-EST. PATIENT-LVL III: CPT | Mod: PBBFAC,,, | Performed by: UROLOGY

## 2020-07-23 PROCEDURE — 99204 PR OFFICE/OUTPT VISIT, NEW, LEVL IV, 45-59 MIN: ICD-10-PCS | Mod: 25,S$GLB,, | Performed by: UROLOGY

## 2020-07-23 PROCEDURE — 3077F SYST BP >= 140 MM HG: CPT | Mod: CPTII,S$GLB,, | Performed by: UROLOGY

## 2020-07-23 PROCEDURE — 1101F PR PT FALLS ASSESS DOC 0-1 FALLS W/OUT INJ PAST YR: ICD-10-PCS | Mod: CPTII,S$GLB,, | Performed by: UROLOGY

## 2020-07-23 PROCEDURE — 1159F PR MEDICATION LIST DOCUMENTED IN MEDICAL RECORD: ICD-10-PCS | Mod: S$GLB,,, | Performed by: UROLOGY

## 2020-07-23 PROCEDURE — 99999 PR PBB SHADOW E&M-EST. PATIENT-LVL III: ICD-10-PCS | Mod: PBBFAC,,, | Performed by: UROLOGY

## 2020-07-23 PROCEDURE — 3078F PR MOST RECENT DIASTOLIC BLOOD PRESSURE < 80 MM HG: ICD-10-PCS | Mod: CPTII,S$GLB,, | Performed by: UROLOGY

## 2020-07-23 PROCEDURE — 3008F PR BODY MASS INDEX (BMI) DOCUMENTED: ICD-10-PCS | Mod: CPTII,S$GLB,, | Performed by: UROLOGY

## 2020-07-23 PROCEDURE — 1159F MED LIST DOCD IN RCRD: CPT | Mod: S$GLB,,, | Performed by: UROLOGY

## 2020-07-23 PROCEDURE — 81002 URINALYSIS NONAUTO W/O SCOPE: CPT | Mod: S$GLB,,, | Performed by: UROLOGY

## 2020-07-23 PROCEDURE — 99204 OFFICE O/P NEW MOD 45 MIN: CPT | Mod: 25,S$GLB,, | Performed by: UROLOGY

## 2020-07-23 PROCEDURE — 1126F AMNT PAIN NOTED NONE PRSNT: CPT | Mod: S$GLB,,, | Performed by: UROLOGY

## 2020-07-23 PROCEDURE — 3077F PR MOST RECENT SYSTOLIC BLOOD PRESSURE >= 140 MM HG: ICD-10-PCS | Mod: CPTII,S$GLB,, | Performed by: UROLOGY

## 2020-07-23 PROCEDURE — 3008F BODY MASS INDEX DOCD: CPT | Mod: CPTII,S$GLB,, | Performed by: UROLOGY

## 2020-07-23 RX ORDER — MIRABEGRON 50 MG/1
1 TABLET, FILM COATED, EXTENDED RELEASE ORAL DAILY
Qty: 30 TABLET | Refills: 11 | Status: SHIPPED | OUTPATIENT
Start: 2020-07-23 | End: 2020-11-03 | Stop reason: SDUPTHER

## 2020-07-23 NOTE — PROGRESS NOTES
Chief Complaint: Incontinence    HPI:   7/23/20: Returns as a new pt again with complaints of OAB/incontinence.  Myrbetriq has worked well for a long time and not needing pads at times. Daytime is main problem.  No caffeine.  No chocolate.  No spicy foods.  No gross hematuria.  Gained 10-15 lbs last 4 years  1/20/16: Oxybutinin poorly tolerated with drowsiness and other effects.  Now on ER version not effective enough.  Chocolate found to be a bladder irritant.  Thinks she needs to address her diagnosed sleep apnea.  6/29/15: Pt returns with worsening OAB and having to use 1-2 pads a day but having to use thicker bigger pads.  Urgency is frequent and very bothersome. Did go to PFMT.  The urgency is all of a sudden and very severe.  Leaks on the way to the toilet 8/10 times. No hematuria. No UTI.  12/26/13: 64 yo woman had a procedure with Dr. Lamar for UI and OAB.  Before this she would use one pad a day or less and some months without needing a pad at all.  It was more squirts and not filling the pads up.  She can't say what was done.  She didn't need a pad at all after that for about a year and she was on oxybutinin during this period and not the Detrol she was on originally.  About 6-7 months ago she started needing a pad from time to time, now daily and with multiple pads; 2-3 a day.  She has been off of the oxybutinin for 4-5 days and the leakage was especially bad.  She got a refill from Dr. Lamar and is back to one a day that stays dry for the most part.  Has not tried PFMT.  She wants to be perfectly dry.  No urolithiasis history.  No UTI history.  No hematuria.  Not sexually active.    Allergies:  Gabapentin and Valdecoxib    Medications: has a current medication list which includes the following prescription(s): amlodipine, aspirin, bumetanide, cholecalciferol (vitamin d3), clonidine, cyclobenzaprine, diazepam, erythromycin, estradiol, levocetirizine, myrbetriq, ofloxacin, ondansetron, polyethylene glycol,  potassium chloride sa, pravastatin, prednisone, tocilizumab, tramadol, rinvoq, and acyclovir.    Review of Systems:  General: No fever, chills, fatigability, or weight loss.  Skin: No rashes, itching, or changes in color or texture of skin.  Chest: Denies ADLER, cyanosis, wheezing, cough, and sputum production.  Abdomen: Appetite fine. No weight loss. Denies diarrhea, abdominal pain, hematemesis, or blood in stool.  Musculoskeletal: No joint stiffness or swelling. Some back pain.  : As above.  All other review of systems negative.    PMH:   has a past medical history of Adult ADHD, Chronic rheumatic arthritis, Colon polyps, Ex-smoker, Genital herpes, Herpes zoster infection, Hyperlipidemia, Hypertension, IGT (impaired glucose tolerance), Immunosuppressed status, Lichen sclerosus et atrophicus, SUKHDEEP (obstructive sleep apnea), and Osteopenia.    PSH:   has a past surgical history that includes Bladder surgery (8/2012); Tonsillectomy (1958); Skin graft (1965); Cryotherapy (1980); Breast biopsy (Left); Colonoscopy (N/A, 2/12/2020); and Esophagogastroduodenoscopy (N/A, 2/12/2020).    FamHx: family history includes Breast cancer in her sister; Cancer (age of onset: 39) in her sister; Cancer (age of onset: 68) in her other; Diabetes in her mother; Heart disease in her mother; Kidney disease in her father; Stroke in her father, maternal grandmother, paternal grandfather, and paternal grandmother.    SocHx:  reports that she quit smoking about 7 years ago. She has never used smokeless tobacco. She reports current alcohol use. She reports that she does not use drugs.     Physical Exam:  Vitals:   Vitals:    07/23/20 1429   BP: (!) 144/70   Temp: 97.3 °F (36.3 °C)     General: A&Ox3. No apparent distress. No deformities.  Neck: No masses. Normal thyroid.  Lungs: CTA samia. No use of accessory muscles.  Heart: RRR. No arrhythmias.  Abdomen: Soft. NT. ND.  Skin: The skin is warm and dry. No jaundice.  Ext: No c/c/e.  :    12/26/13: External genitalia normal. No lesions. Meatus normal size and location. Urethra normal. No masses. Bladder normal. No fullness or masses. Vagina normal with no discharge or lesions. Anus/perineum normal. Uterus and adnexa no palpable abnormalities.    Labs/Studies:   Urinalysis performed in clinic, summary:     1/20/16: UA normal  Bladder Scan performed in office:     12/26/13: PVR 35 ml.    Impression/Plan:   1. Urge Incont with no stress component symptoms still persist with med change; has failed multiple anticholinergics. Advised again no caffeine or bladder stimulants.  Prob needs to reduce fluid intake.  2. Myrbetriq not good enough.  KUB/US to screen upper tracts.  To Dr. Rawls for urodynamics and further management.  3. Obseity: discussed

## 2020-07-28 ENCOUNTER — TELEPHONE (OUTPATIENT)
Dept: RHEUMATOLOGY | Facility: CLINIC | Age: 70
End: 2020-07-28

## 2020-07-28 NOTE — TELEPHONE ENCOUNTER
FOR DOCUMENTATION ONLY:  Financial Assistance for Rinvoq approved from 7/28/20 to 12/31/20  Source: [employer]  Phone: [phone]  Fax: [phone]

## 2020-07-28 NOTE — TELEPHONE ENCOUNTER
----- Message from Katherin Titus sent at 7/28/2020 10:50 AM CDT -----  FYI: Financial Assistance for Rinvoq approved from 7/28/20 to 12/31/20 through [employer].  Patient has been notified of the approval, and will call to schedule a delivery of the medication.     Thank you,  Katherin Titus  Patient Care Advocate  [facility]  Ph: [phone]

## 2020-07-29 ENCOUNTER — TELEPHONE (OUTPATIENT)
Dept: UROLOGY | Facility: CLINIC | Age: 70
End: 2020-07-29

## 2020-07-29 NOTE — TELEPHONE ENCOUNTER
----- Message from Gena Mccoy sent at 7/29/2020  2:31 PM CDT -----  Regarding: Labs  Contact: Pt  Pt called in regards to verifying that her labs would be available for the provider to see on 08/05/20. Pt can be reached at [phone] (saep).

## 2020-07-30 ENCOUNTER — DOCUMENTATION ONLY (OUTPATIENT)
Dept: RHEUMATOLOGY | Facility: CLINIC | Age: 70
End: 2020-07-30

## 2020-08-03 ENCOUNTER — TELEPHONE (OUTPATIENT)
Dept: RADIOLOGY | Facility: HOSPITAL | Age: 70
End: 2020-08-03

## 2020-08-04 ENCOUNTER — HOSPITAL ENCOUNTER (OUTPATIENT)
Dept: RADIOLOGY | Facility: HOSPITAL | Age: 70
Discharge: HOME | End: 2020-08-04
Attending: UROLOGY
Payer: MEDICARE

## 2020-08-04 DIAGNOSIS — R32 URINARY INCONTINENCE, UNSPECIFIED TYPE: ICD-10-CM

## 2020-08-04 DIAGNOSIS — N32.81 OAB (OVERACTIVE BLADDER): ICD-10-CM

## 2020-08-04 PROCEDURE — 74018 XR ABDOMEN AP 1 VIEW: ICD-10-PCS | Mod: 26,,, | Performed by: RADIOLOGY

## 2020-08-04 PROCEDURE — 76770 US RETROPERITONEAL COMPLETE: ICD-10-PCS | Mod: 26,,, | Performed by: RADIOLOGY

## 2020-08-04 PROCEDURE — 76770 US EXAM ABDO BACK WALL COMP: CPT | Mod: TC

## 2020-08-04 PROCEDURE — 74018 RADEX ABDOMEN 1 VIEW: CPT | Mod: 26,,, | Performed by: RADIOLOGY

## 2020-08-04 PROCEDURE — 76770 US EXAM ABDO BACK WALL COMP: CPT | Mod: 26,,, | Performed by: RADIOLOGY

## 2020-08-04 PROCEDURE — 74018 RADEX ABDOMEN 1 VIEW: CPT | Mod: TC

## 2020-08-05 ENCOUNTER — OFFICE VISIT (OUTPATIENT)
Dept: UROLOGY | Facility: CLINIC | Age: 70
End: 2020-08-05
Payer: MEDICARE

## 2020-08-05 VITALS
SYSTOLIC BLOOD PRESSURE: 138 MMHG | TEMPERATURE: 99 F | BODY MASS INDEX: 31.7 KG/M2 | HEIGHT: 65 IN | DIASTOLIC BLOOD PRESSURE: 76 MMHG | HEART RATE: 66 BPM | WEIGHT: 190.25 LBS

## 2020-08-05 DIAGNOSIS — N39.41 URGE INCONTINENCE: Primary | ICD-10-CM

## 2020-08-05 PROCEDURE — 3078F DIAST BP <80 MM HG: CPT | Mod: CPTII,S$GLB,, | Performed by: UROLOGY

## 2020-08-05 PROCEDURE — 3008F PR BODY MASS INDEX (BMI) DOCUMENTED: ICD-10-PCS | Mod: CPTII,S$GLB,, | Performed by: UROLOGY

## 2020-08-05 PROCEDURE — 3075F PR MOST RECENT SYSTOLIC BLOOD PRESS GE 130-139MM HG: ICD-10-PCS | Mod: CPTII,S$GLB,, | Performed by: UROLOGY

## 2020-08-05 PROCEDURE — 1126F PR PAIN SEVERITY QUANTIFIED, NO PAIN PRESENT: ICD-10-PCS | Mod: S$GLB,,, | Performed by: UROLOGY

## 2020-08-05 PROCEDURE — 99214 OFFICE O/P EST MOD 30 MIN: CPT | Mod: S$GLB,,, | Performed by: UROLOGY

## 2020-08-05 PROCEDURE — 99999 PR PBB SHADOW E&M-EST. PATIENT-LVL IV: ICD-10-PCS | Mod: PBBFAC,,, | Performed by: UROLOGY

## 2020-08-05 PROCEDURE — 1101F PR PT FALLS ASSESS DOC 0-1 FALLS W/OUT INJ PAST YR: ICD-10-PCS | Mod: CPTII,S$GLB,, | Performed by: UROLOGY

## 2020-08-05 PROCEDURE — 99214 PR OFFICE/OUTPT VISIT, EST, LEVL IV, 30-39 MIN: ICD-10-PCS | Mod: S$GLB,,, | Performed by: UROLOGY

## 2020-08-05 PROCEDURE — 3075F SYST BP GE 130 - 139MM HG: CPT | Mod: CPTII,S$GLB,, | Performed by: UROLOGY

## 2020-08-05 PROCEDURE — 1159F PR MEDICATION LIST DOCUMENTED IN MEDICAL RECORD: ICD-10-PCS | Mod: S$GLB,,, | Performed by: UROLOGY

## 2020-08-05 PROCEDURE — 1159F MED LIST DOCD IN RCRD: CPT | Mod: S$GLB,,, | Performed by: UROLOGY

## 2020-08-05 PROCEDURE — 3008F BODY MASS INDEX DOCD: CPT | Mod: CPTII,S$GLB,, | Performed by: UROLOGY

## 2020-08-05 PROCEDURE — 1101F PT FALLS ASSESS-DOCD LE1/YR: CPT | Mod: CPTII,S$GLB,, | Performed by: UROLOGY

## 2020-08-05 PROCEDURE — 1126F AMNT PAIN NOTED NONE PRSNT: CPT | Mod: S$GLB,,, | Performed by: UROLOGY

## 2020-08-05 PROCEDURE — 3078F PR MOST RECENT DIASTOLIC BLOOD PRESSURE < 80 MM HG: ICD-10-PCS | Mod: CPTII,S$GLB,, | Performed by: UROLOGY

## 2020-08-05 PROCEDURE — 99999 PR PBB SHADOW E&M-EST. PATIENT-LVL IV: CPT | Mod: PBBFAC,,, | Performed by: UROLOGY

## 2020-08-05 RX ORDER — OXYCODONE HYDROCHLORIDE 5 MG/1
5 TABLET ORAL EVERY 4 HOURS PRN
COMMUNITY
End: 2021-01-11

## 2020-08-05 NOTE — PROGRESS NOTES
Chief Complaint: Incontinence    HPI:   8/5/20- patient reports to discuss incontinence therapy, she has failed medical therapy.  Patient states she gets up 1 time per evening, going every 2-3 hours during the daytime.  No gross hematuria, no microscopic hematuria, she does have a history of smoking.  She is changing her pads a lot, stay dry.  She has transient is not as well.  No stress incontinence.  7/23/20: Returns as a new pt again with complaints of OAB/incontinence.  Myrbetriq has worked well for a long time and not needing pads at times. Daytime is main problem.  No caffeine.  No chocolate.  No spicy foods.  No gross hematuria.  Gained 10-15 lbs last 4 years  1/20/16: Oxybutinin poorly tolerated with drowsiness and other effects.  Now on ER version not effective enough.  Chocolate found to be a bladder irritant.  Thinks she needs to address her diagnosed sleep apnea.  6/29/15: Pt returns with worsening OAB and having to use 1-2 pads a day but having to use thicker bigger pads.  Urgency is frequent and very bothersome. Did go to PFMT.  The urgency is all of a sudden and very severe.  Leaks on the way to the toilet 8/10 times. No hematuria. No UTI.  12/26/13: 64 yo woman had a procedure with Dr. Lamar for UI and OAB.  Before this she would use one pad a day or less and some months without needing a pad at all.  It was more squirts and not filling the pads up.  She can't say what was done.  She didn't need a pad at all after that for about a year and she was on oxybutinin during this period and not the Detrol she was on originally.  About 6-7 months ago she started needing a pad from time to time, now daily and with multiple pads; 2-3 a day.  She has been off of the oxybutinin for 4-5 days and the leakage was especially bad.  She got a refill from Dr. Lamar and is back to one a day that stays dry for the most part.  Has not tried PFMT.  She wants to be perfectly dry.  No urolithiasis history.  No UTI history.  No  hematuria.  Not sexually active.    Allergies:  Gabapentin and Valdecoxib    Medications: has a current medication list which includes the following prescription(s): acyclovir, amlodipine, aspirin, bumetanide, cholecalciferol (vitamin d3), clonidine, cyclobenzaprine, diazepam, erythromycin, estradiol, levocetirizine, myrbetriq, ofloxacin, ondansetron, polyethylene glycol, potassium chloride sa, pravastatin, prednisone, tocilizumab, tramadol, and rinvoq.    Review of Systems:  General: No fever, chills, fatigability, or weight loss.  Skin: No rashes, itching, or changes in color or texture of skin.  Chest: Denies ADLER, cyanosis, wheezing, cough, and sputum production.  Abdomen: Appetite fine. No weight loss. Denies diarrhea, abdominal pain, hematemesis, or blood in stool.  Musculoskeletal: No joint stiffness or swelling. Some back pain.  : As above.  All other review of systems negative.    PMH:   has a past medical history of Adult ADHD, Chronic rheumatic arthritis, Colon polyps, Ex-smoker, Genital herpes, Herpes zoster infection, Hyperlipidemia, Hypertension, IGT (impaired glucose tolerance), Immunosuppressed status, Lichen sclerosus et atrophicus, SUKHDEEP (obstructive sleep apnea), and Osteopenia.    PSH:   has a past surgical history that includes Bladder surgery (8/2012); Tonsillectomy (1958); Skin graft (1965); Cryotherapy (1980); Breast biopsy (Left); Colonoscopy (N/A, 2/12/2020); and Esophagogastroduodenoscopy (N/A, 2/12/2020).    FamHx: family history includes Breast cancer in her sister; Cancer (age of onset: 39) in her sister; Cancer (age of onset: 68) in her other; Diabetes in her mother; Heart disease in her mother; Kidney disease in her father; Stroke in her father, maternal grandmother, paternal grandfather, and paternal grandmother.    SocHx:  reports that she quit smoking about 7 years ago. She has never used smokeless tobacco. She reports current alcohol use. She reports that she does not use drugs.      Physical Exam:  Vitals:   There were no vitals filed for this visit.  General: A&Ox3. No apparent distress. No deformities.  Neck: No masses. Normal thyroid.  Lungs: CTA samia. No use of accessory muscles.  Heart: RRR. No arrhythmias.  Abdomen: Soft. NT. ND.  Skin: The skin is warm and dry. No jaundice.  Ext: No c/c/e.  :   12/26/13: External genitalia normal. No lesions. Meatus normal size and location. Urethra normal. No masses. Bladder normal. No fullness or masses. Vagina normal with no discharge or lesions. Anus/perineum normal. Uterus and adnexa no palpable abnormalities.    Labs/Studies:   Urinalysis performed in clinic, summary:     1/20/16: UA normal  Bladder Scan performed in office:     12/26/13: PVR 35 ml.  KUB/GENIE normal 8/20    Impression/Plan:   1.  Urge incontinence- patient is now over time failed Myrbetriq 50 mg, Detrol and oxybutynin.  We will obtain urodynamics, follow up after this study.  I discussed 3rd line options, InterStim and Botox.  Patient appears to be leaning more towards Botox, but we can rediscuss after obtaining urodynamics.

## 2020-08-20 ENCOUNTER — TELEPHONE (OUTPATIENT)
Dept: RHEUMATOLOGY | Facility: CLINIC | Age: 70
End: 2020-08-20

## 2020-08-20 DIAGNOSIS — A60.00 GENITAL HERPES SIMPLEX, UNSPECIFIED SITE: ICD-10-CM

## 2020-08-20 DIAGNOSIS — M06.9 RHEUMATOID ARTHRITIS, INVOLVING UNSPECIFIED SITE, UNSPECIFIED RHEUMATOID FACTOR PRESENCE: Primary | ICD-10-CM

## 2020-08-20 RX ORDER — TOCILIZUMAB 180 MG/ML
162 INJECTION, SOLUTION SUBCUTANEOUS WEEKLY
Qty: 10.8 ML | Refills: 0 | Status: SHIPPED | OUTPATIENT
Start: 2020-08-20 | End: 2020-11-18

## 2020-08-25 ENCOUNTER — TELEPHONE (OUTPATIENT)
Dept: RHEUMATOLOGY | Facility: CLINIC | Age: 70
End: 2020-08-25

## 2020-08-25 ENCOUNTER — TELEPHONE (OUTPATIENT)
Dept: INTERNAL MEDICINE | Facility: CLINIC | Age: 70
End: 2020-08-25

## 2020-08-25 RX ORDER — ACYCLOVIR 800 MG/1
800 TABLET ORAL 2 TIMES DAILY
Qty: 10 TABLET | Refills: 11 | Status: SHIPPED | OUTPATIENT
Start: 2020-08-25 | End: 2020-08-25

## 2020-08-25 RX ORDER — AMLODIPINE BESYLATE 5 MG/1
5 TABLET ORAL DAILY
Qty: 90 TABLET | Refills: 4 | Status: SHIPPED | OUTPATIENT
Start: 2020-08-25 | End: 2021-07-23

## 2020-08-25 RX ORDER — PRAVASTATIN SODIUM 40 MG/1
40 TABLET ORAL DAILY
Qty: 90 TABLET | Refills: 3 | Status: SHIPPED | OUTPATIENT
Start: 2020-08-25 | End: 2021-03-22 | Stop reason: SDUPTHER

## 2020-08-25 NOTE — TELEPHONE ENCOUNTER
----- Message from Liza Baxter sent at 8/25/2020 11:12 AM CDT -----  Contact: Smitha Bone would like a call back at [phone], Regards to her My chart message.    Thanks  Td

## 2020-08-25 NOTE — TELEPHONE ENCOUNTER
Form and prescription for Actemra assistance filled out and faxed to [employer] faxed to [phone].

## 2020-08-25 NOTE — TELEPHONE ENCOUNTER
----- Message from Liza Baxter sent at 8/25/2020 11:14 AM CDT -----  Contact: Smitha Bone would like a call back at [phone], Regards to her partial refill for Acyclovir 800 mg.    Thanks  Td

## 2020-08-25 NOTE — TELEPHONE ENCOUNTER
Spoke with Mrs. Salinas informed her that Dr. England signed the forms for patient assistance and formes has been faxed.

## 2020-08-31 ENCOUNTER — TELEPHONE (OUTPATIENT)
Dept: RHEUMATOLOGY | Facility: CLINIC | Age: 70
End: 2020-08-31

## 2020-08-31 NOTE — TELEPHONE ENCOUNTER
Actemra prescription clarified with [employer]. Patient also notified to set up shipping date.

## 2020-08-31 NOTE — TELEPHONE ENCOUNTER
----- Message from Paulette Ortega sent at 8/31/2020  4:25 PM CDT -----  Contact: pt  Pt requesting a call back regarding the prescription for tocilizumab (ACTEMRA) 162 mg/0.9 mL injection. She states that the pharmacy can not read the signature or date on prescription and they are needing to speak to someone. Please call [facility] at [phone]. Pt call back at [phone] so that she will know to call and set up her shipment.

## 2020-09-09 ENCOUNTER — TELEPHONE (OUTPATIENT)
Dept: RHEUMATOLOGY | Facility: CLINIC | Age: 70
End: 2020-09-09

## 2020-09-09 NOTE — TELEPHONE ENCOUNTER
----- Message from Liat Rosario sent at 9/9/2020  4:50 PM CDT -----  Regarding: application actra rejected  Type:  Needs Medical Advice    Who Called: pt  Symptoms (please be specific): n/a   How long has patient had these symptoms:  n/a  Pharmacy name and phone #:  n/a  Would the patient rather a call back or a response via [facility]? Call back  Best Call Back Number: [phone]  Additional Information: Pt states she is out of injections. Thanks

## 2020-09-09 NOTE — TELEPHONE ENCOUNTER
Spoke with Geetha who states that the form for Ms. Salinas's Actemra will need to be faxed again. Please make sure that the signature and date is legible prior to faxing again. Please fax to [phone]

## 2020-09-10 ENCOUNTER — LAB VISIT (OUTPATIENT)
Dept: LAB | Facility: HOSPITAL | Age: 70
End: 2020-09-10
Attending: FAMILY MEDICINE
Payer: MEDICARE

## 2020-09-10 DIAGNOSIS — R73.02 IGT (IMPAIRED GLUCOSE TOLERANCE): ICD-10-CM

## 2020-09-10 PROCEDURE — 83036 HEMOGLOBIN GLYCOSYLATED A1C: CPT

## 2020-09-10 PROCEDURE — 36415 COLL VENOUS BLD VENIPUNCTURE: CPT

## 2020-09-10 PROCEDURE — 82947 ASSAY GLUCOSE BLOOD QUANT: CPT

## 2020-09-11 LAB
ESTIMATED AVG GLUCOSE: 140 MG/DL (ref 68–131)
GLUCOSE SERPL-MCNC: 103 MG/DL (ref 70–110)
HBA1C MFR BLD HPLC: 6.5 % (ref 4–5.6)

## 2020-09-15 ENCOUNTER — TELEPHONE (OUTPATIENT)
Dept: RHEUMATOLOGY | Facility: CLINIC | Age: 70
End: 2020-09-15

## 2020-09-15 ENCOUNTER — TELEPHONE (OUTPATIENT)
Dept: INTERNAL MEDICINE | Facility: CLINIC | Age: 70
End: 2020-09-15

## 2020-09-15 NOTE — TELEPHONE ENCOUNTER
----- Message from Liza Baxter sent at 9/15/2020  9:49 AM CDT -----  Contact: [facility]  Richard is calling regards to the prescriber form he stated that the date is not legible please fix it and refax to [phone] and or call him at [phone].    Thanks  Td

## 2020-09-17 ENCOUNTER — TELEPHONE (OUTPATIENT)
Dept: OBSTETRICS AND GYNECOLOGY | Facility: CLINIC | Age: 70
End: 2020-09-17

## 2020-09-17 ENCOUNTER — IMMUNIZATION (OUTPATIENT)
Dept: PHARMACY | Facility: CLINIC | Age: 70
End: 2020-09-17
Payer: MEDICARE

## 2020-09-17 ENCOUNTER — OFFICE VISIT (OUTPATIENT)
Dept: UROLOGY | Facility: CLINIC | Age: 70
End: 2020-09-17
Payer: MEDICARE

## 2020-09-17 ENCOUNTER — TELEPHONE (OUTPATIENT)
Dept: RHEUMATOLOGY | Facility: CLINIC | Age: 70
End: 2020-09-17

## 2020-09-17 VITALS
SYSTOLIC BLOOD PRESSURE: 126 MMHG | BODY MASS INDEX: 31.22 KG/M2 | DIASTOLIC BLOOD PRESSURE: 60 MMHG | WEIGHT: 187.63 LBS

## 2020-09-17 DIAGNOSIS — N39.41 URGE INCONTINENCE: Primary | ICD-10-CM

## 2020-09-17 PROCEDURE — 51797 PR VOIDING PRESS STUDY INTRA-ABDOMINAL VOID: ICD-10-PCS | Mod: 26,S$GLB,, | Performed by: UROLOGY

## 2020-09-17 PROCEDURE — 51728 CYSTOMETROGRAM W/VP: CPT | Mod: 26,S$GLB,, | Performed by: UROLOGY

## 2020-09-17 PROCEDURE — 51784 PR ANAL/URINARY MUSCLE STUDY: ICD-10-PCS | Mod: 26,51,S$GLB, | Performed by: UROLOGY

## 2020-09-17 PROCEDURE — 99999 PR PBB SHADOW E&M-EST. PATIENT-LVL IV: ICD-10-PCS | Mod: PBBFAC,,, | Performed by: UROLOGY

## 2020-09-17 PROCEDURE — 99499 UNLISTED E&M SERVICE: CPT | Mod: S$GLB,,, | Performed by: UROLOGY

## 2020-09-17 PROCEDURE — 99999 PR PBB SHADOW E&M-EST. PATIENT-LVL IV: CPT | Mod: PBBFAC,,, | Performed by: UROLOGY

## 2020-09-17 PROCEDURE — 99499 NO LOS: ICD-10-PCS | Mod: S$GLB,,, | Performed by: UROLOGY

## 2020-09-17 PROCEDURE — 51784 ANAL/URINARY MUSCLE STUDY: CPT | Mod: 26,51,S$GLB, | Performed by: UROLOGY

## 2020-09-17 PROCEDURE — 51728 PR COMPLEX CYSTOMETROGRAM VOIDING PRESSURE STUDIES: ICD-10-PCS | Mod: 26,S$GLB,, | Performed by: UROLOGY

## 2020-09-17 PROCEDURE — 51797 INTRAABDOMINAL PRESSURE TEST: CPT | Mod: 26,S$GLB,, | Performed by: UROLOGY

## 2020-09-17 RX ORDER — LEVOFLOXACIN 500 MG/1
500 TABLET, FILM COATED ORAL DAILY
Qty: 2 TABLET | Refills: 0 | Status: SHIPPED | OUTPATIENT
Start: 2020-09-17 | End: 2020-09-19

## 2020-09-17 NOTE — TELEPHONE ENCOUNTER
"Spoke with Ms. Salinas who states that [facility] has not received the form with Dr. England's highlighted signature and date. Ms. Salinas asked if this nurse could call [employer] at [phone].     Called number above 3 times and received an automated message stating " Your call can not be completed at this time." Ms. Salinas states that she spoke with them earlier and is the only number she has at this time. She attempted to call and stated the call keep dropping.     I informed Ms. Salinas to ask the nurse to pull her form on tomorrow when she comes in for her appointment and look to see if there is another number on the form to speak with someone. Ms. Salinas states understanding.   "

## 2020-09-17 NOTE — TELEPHONE ENCOUNTER
----- Message from Dorota Foster sent at 9/17/2020  2:14 PM CDT -----  Pt would like return call regarding form  [employer] prescription assistance program  Please call back at [phone].  Md Moy

## 2020-09-17 NOTE — PROGRESS NOTES
Patient here for Urodynamics Study. Urodynamics performed without incident. Patient tolerated well. Appt with Dr. Rawls in 1 week.

## 2020-09-17 NOTE — PROGRESS NOTES
09/17/2020    Procedure: Urodynamics    HPI: 70 year old female who has failed medical therapy.  Patient states she gets up 1 time per evening, going every 2-3 hours during the daytime.  No gross hematuria, no microscopic hematuria, she does have a history of smoking.  She is changing her pads a lot, stay dry.  She has transient is not as well.  No stress incontinence.    Procedure in Detail: Pt voided and a PVR was measured with straight cath using sterile technique. Urodynamics catheters were placed in bladder and rectum. EMG pads were placed. The patient was positioned on the urinal over the CMG cup. The test was performed for pressure-flow studies of storage and voiding function. Valsalva was performed at intervals to evaluate catheter function and detrusor response/incontinence. PVR was again measured after the patient voided. The catheters were withdrawn and the patient allowed to dress.      Uroflow:    Unable to be done secondary to only having 100 ml in the bladder.    Urodynamics:    Filling Phase:  Resting Pressure- 0 cm/H2O  First sensation- 195 ml  First desire- 209 ml  Strong desire- 266 ml  Capacity- 382ml  Uninhibited contractions/detrusor instability- none  Valsalva demonstrates no evidence of leakage  No loss of compliance.    Voiding Phase:  Max flow rate- 20.4 ml/sec  Voided volume- 370 ml  Residual- 12 ml  Intraabdominal pressure- 35.7 cm/H2O   Peak flow pressure- 19.9 cm/H2O  Peak detrusor pressure- 31.1 cm/H2O  Normal detrusor pressure shape.  Normal EMG.      Impression:  Urodynamics have been evaluated today demonstrating normal capacity, slightly diminished.  Patient has obvious coordinated bladder contractions.  No evidence of detrusor instability, stress incontinence or loss of compliance.  Of note this is on Myrbetriq, at this point without medical therapy patient would most likely have more diminished capacity.  She is failing medical therapy and therefore could be a candidate for  surgical treatment.  Will clinically coordinate and discuss in the office.

## 2020-09-18 ENCOUNTER — OFFICE VISIT (OUTPATIENT)
Dept: RHEUMATOLOGY | Facility: CLINIC | Age: 70
End: 2020-09-18
Payer: MEDICARE

## 2020-09-18 ENCOUNTER — TELEPHONE (OUTPATIENT)
Dept: OBSTETRICS AND GYNECOLOGY | Facility: CLINIC | Age: 70
End: 2020-09-18

## 2020-09-18 VITALS — BODY MASS INDEX: 31.48 KG/M2 | WEIGHT: 189.13 LBS

## 2020-09-18 DIAGNOSIS — M06.9 RHEUMATOID ARTHRITIS, INVOLVING UNSPECIFIED SITE, UNSPECIFIED RHEUMATOID FACTOR PRESENCE: Primary | ICD-10-CM

## 2020-09-18 DIAGNOSIS — Z12.31 ENCOUNTER FOR SCREENING MAMMOGRAM FOR MALIGNANT NEOPLASM OF BREAST: Primary | ICD-10-CM

## 2020-09-18 DIAGNOSIS — Z79.899 HIGH RISK MEDICATION USE: ICD-10-CM

## 2020-09-18 DIAGNOSIS — Z71.89 COUNSELING ON HEALTH PROMOTION AND DISEASE PREVENTION: ICD-10-CM

## 2020-09-18 PROCEDURE — 3008F PR BODY MASS INDEX (BMI) DOCUMENTED: ICD-10-PCS | Mod: CPTII,S$GLB,, | Performed by: INTERNAL MEDICINE

## 2020-09-18 PROCEDURE — 99999 PR PBB SHADOW E&M-EST. PATIENT-LVL IV: CPT | Mod: PBBFAC,,, | Performed by: INTERNAL MEDICINE

## 2020-09-18 PROCEDURE — 1159F MED LIST DOCD IN RCRD: CPT | Mod: S$GLB,,, | Performed by: INTERNAL MEDICINE

## 2020-09-18 PROCEDURE — 99214 PR OFFICE/OUTPT VISIT, EST, LEVL IV, 30-39 MIN: ICD-10-PCS | Mod: S$GLB,,, | Performed by: INTERNAL MEDICINE

## 2020-09-18 PROCEDURE — 1125F PR PAIN SEVERITY QUANTIFIED, PAIN PRESENT: ICD-10-PCS | Mod: S$GLB,,, | Performed by: INTERNAL MEDICINE

## 2020-09-18 PROCEDURE — 1101F PT FALLS ASSESS-DOCD LE1/YR: CPT | Mod: CPTII,S$GLB,, | Performed by: INTERNAL MEDICINE

## 2020-09-18 PROCEDURE — 1125F AMNT PAIN NOTED PAIN PRSNT: CPT | Mod: S$GLB,,, | Performed by: INTERNAL MEDICINE

## 2020-09-18 PROCEDURE — 1159F PR MEDICATION LIST DOCUMENTED IN MEDICAL RECORD: ICD-10-PCS | Mod: S$GLB,,, | Performed by: INTERNAL MEDICINE

## 2020-09-18 PROCEDURE — 99214 OFFICE O/P EST MOD 30 MIN: CPT | Mod: S$GLB,,, | Performed by: INTERNAL MEDICINE

## 2020-09-18 PROCEDURE — 99999 PR PBB SHADOW E&M-EST. PATIENT-LVL IV: ICD-10-PCS | Mod: PBBFAC,,, | Performed by: INTERNAL MEDICINE

## 2020-09-18 PROCEDURE — 1101F PR PT FALLS ASSESS DOC 0-1 FALLS W/OUT INJ PAST YR: ICD-10-PCS | Mod: CPTII,S$GLB,, | Performed by: INTERNAL MEDICINE

## 2020-09-18 PROCEDURE — 3008F BODY MASS INDEX DOCD: CPT | Mod: CPTII,S$GLB,, | Performed by: INTERNAL MEDICINE

## 2020-09-18 RX ORDER — TAPENTADOL HYDROCHLORIDE 50 MG/1
1 TABLET, FILM COATED, EXTENDED RELEASE ORAL EVERY 12 HOURS
Status: ON HOLD | COMMUNITY
Start: 2020-08-07 | End: 2023-03-27

## 2020-09-18 RX ORDER — OXYCODONE AND ACETAMINOPHEN 5; 325 MG/1; MG/1
TABLET ORAL
COMMUNITY
Start: 2020-07-19 | End: 2020-10-07

## 2020-09-18 NOTE — PATIENT INSTRUCTIONS
Understanding Plantar Fasciitis    Plantar fasciitis is a condition that causes foot and heel pain. The plantar fascia is a tough band of tissue that runs across the bottom of the foot from the heel to the toes. This tissue pulls on the heel bone. It supports the arch of the foot as it pushes off the ground. If the tissue becomes irritated or red and swollen (inflamed), it is called plantar fasciitis.  How to say it  PLAN-tuhr fa-see-IY-tis   What causes plantar fasciitis?  Plantar fasciitis most often occurs from overusing the plantar fascia. The tissue may become damaged from activities that put repeated stress on the heel and foot. Or it may wear down over time with age and ankle stiffness. You are more likely to have plantar fasciitis if you:  · Do activities that require a lot of running, jumping, or dancing  · Have a job that requires being on your feet for long periods  · Are overweight or obese  · Have certain foot problems, such as a tight Achilles tendon, flat feet, or high arches  · Often wear poorly fitting shoes  Symptoms of plantar fasciitis  The condition most often causes pain in the heel and the bottom of the foot. The pain may occur when you take your first steps in the morning. It may get better as you walk throughout the day. But as you continue to put weight on the foot, the pain often returns. Pain may also occur after standing or sitting for long periods.  Treating plantar fasciitis  Treatments for plantar fasciitis include:  · Resting the foot. This involves limiting movements that make your foot hurt. You may also need to avoid certain sports and types of work for a time.  · Using cold packs. Put an ice pack on the heel and foot to help reduce pain and swelling.  · Taking pain medicines. Prescription and over-the-counter pain medicines can help relieve pain and swelling.  · Using heel cups or foot inserts (orthotics). These are placed in the shoes to help support the heel or arch and  cushion the heel. You may also be told to buy proper-fitting shoes with good arch support and cushioned soles.  · Taping the foot. This supports the arch and limits the movement of the plantar fascia to help relieve symptoms.  · Wearing a night splint. This stretches the plantar fascia and leg muscles while you sleep. This may help relieve pain.  · Doing exercises and physical therapy. These stretch and strengthen the plantar fascia and the muscles in the leg that support the heel and foot.  · Getting shots of medicine into the foot. These may help relieve symptoms for a time.  · Having surgery. This may be needed if other treatments fail to relieve symptoms. During surgery, the surgeon may partially cut the plantar fascia to release tension.  Possible complications of plantar fasciitis  Without proper care and treatment, healing may take longer than normal. Also, symptoms may continue or get worse. Over time, the plantar fascia may be damaged. This can make it hard to walk or even stand without pain.  When to call your healthcare provider  Call your healthcare provider right away if you have any of these:  · Fever of 100.4°F (38°C) or higher, or as directed  · Symptoms that dont get better with treatment, or get worse  · New symptoms, such as numbness, tingling, or weakness in the foot   Date Last Reviewed: 3/10/2016  © 5445-5668 [employer]. [address] [place] [address], [place] [address]. All rights reserved. This information is not intended as a substitute for professional medical care. Always follow your healthcare professional's instructions.        Treating Plantar Fasciitis  First, your healthcare provider tries to determine the cause of your problem in order to suggest ways to relieve pain. If your pain is due to poor foot mechanics, custom-made shoe inserts (orthoses) may help.    Reduce symptoms  · To relieve mild symptoms, try aspirin, ibuprofen, or other medicines as directed. Rubbing ice on  the affected area may also help.  · To reduce severe pain and swelling, your healthcare provider may prescribe pills or injections or a walking cast in some instances. Physical therapy, such as ultrasound or a daily stretching program, may also be recommended. Surgery is rarely required.  · To reduce symptoms caused by poor foot mechanics, your foot may be taped. This supports the arch and temporarily controls movement. Night splints may also help by stretching the fascia.  Control movement  If taping helps, your healthcare provider may prescribe orthoses. Built from plaster casts of your feet, these inserts control the way your foot moves. As a result, your symptoms should go away.  Reduce overuse  Every time your foot strikes the ground, the plantar fascia is stretched. You can reduce the strain on the plantar fascia and the possibility of overuse by following these suggestions:  · Lose any excess weight.  · Avoid running on hard or uneven ground.  · Use orthoses at all times in your shoes and house slippers.  If surgery is needed  Your healthcare provider may consider surgery if other types of treatment don't control your pain. During surgery, the plantar fascia is partially cut to release tension. As you heal, fibrous tissue fills the space between the heel bone and the plantar fascia.   Date Last Reviewed: 10/14/2015  © 4160-4731 [employer]. [address] [place] [address], [place] [address]. All rights reserved. This information is not intended as a substitute for professional medical care. Always follow your healthcare professional's instructions.        Plantar Fasciitis  Plantar fasciitis is a painful swelling of the plantar fascia. The plantar fascia is a thick, fibrous layer of tissue. It covers the bones on the bottom of your foot. And it supports the foot bones in an arched position.  This can happen gradually or suddenly. It usually affects one foot at a time. Heel pain can be sharp, like a knife  sticking into the bottom of your foot. You may feel pain after exercising, long-distance jogging, stair climbing, long periods of standing, or after standing up.  Risk factors include: non-active lifestyle, arthritis, diabetes, obesity or recent weight gain, flat foot, high arch. Wearing high heels, loose shoes, or shoes with poor arch support for long periods of time adds to the risk. This problem is commonly found in runners and dancers. It also found in people who stand on hard surfaces for long periods of time.  Foot pain from this condition is usually worse in the morning. But it improves with walking. By the end of the day there may be a dull aching. Treatment requires short-term rest and controlling swelling. It may take up to 9 months before all symptoms go away. Rarely, a steroid injection into the foot, or surgery, may be needed.  Home care  · If you are overweight, lose weight to help healing.  · Choose supportive shoes with good arch support and shock absorbency. Replace athletic shoes when they become worn out. Dont walk or run barefoot.  · Premade or custom-fitted shoe inserts may be helpful. Inserts made of silicone seem to be the most effective. Custom-made inserts can be provided by a podiatrist or foot specialist, physical therapist, or orthopedist.  · Premade or custom-made night splints keep the heel stretched out while you sleep. They may prevent morning pain.  · Avoid activities that stress the feet: jogging, prolonged standing or walking, contact sports, etc.  · First thing in the morning and before sports, stretch the bottom of your feet. Gently flex your ankle so the toes move toward your knee.  · Icing may help control heel pain. Apply an ice pack to the heel for 10-20 minutes as a preventive. Or ice your heel after a severe flare-up of symptoms. You may repeat this every 1-2 hours as needed.  · You may use over-the-counter pain medicine to control pain, unless another medicine was  prescribed. Anti-inflammatory pain medicines, such as ibuprofen or naproxen, may work better than acetaminophen. If you have chronic liver or kidney disease or ever had a stomach ulcer or GI bleeding, talk with your healthcare provider before using these medicines.  Follow-up care  Follow up with your healthcare provider, physical therapist, or podiatrist or foot specialist as advised.  Call for an appointment if pain worsens or there is no relief after a few weeks of home treatment. Shoe inserts, a night splint, or a special boot may be required.  If X-rays were taken, you will be told of any new findings that may affect your care.  When to seek medical advice  Call your healthcare provider right away if any of these occur:  · Foot swelling  · Redness with increasing pain  Date Last Reviewed: 11/21/2015 © 2000-2017 [employer]. [address] [place] [address]. All rights reserved. This information is not intended as a substitute for professional medical care. Always follow your healthcare professional's instructions.        Diclofenac skin gel  What is this medicine?  DICLOFENAC (dye KLOE fen ak) is a non-steroidal anti-inflammatory drug (NSAID). The 1% skin gel is used to treat osteoarthritis of the hands or knees. The 3% skin gel is used to treat actinic keratosis.  How should I use this medicine?  This medicine is for external use only. Follow the directions on the prescription label. Wash hands before and after use. Do not get this medicine in your eyes. If you do, rinse out with plenty of cool tap water. Use your doses at regular intervals. Do not use your medicine more often than directed.  A special MedGuide will be given to you by the pharmacist with each prescription and refill of the 1% gel. Be sure to read this information carefully each time.  Talk to your pediatrician regarding the use of this medicine in children. Special care may be needed. The 3% gel is not approved for use  in children.  What side effects may I notice from receiving this medicine?  Side effects that you should report to your doctor or health care professional as soon as possible:  · allergic reactions like skin rash, itching or hives, swelling of the face, lips, or tongue  · black or bloody stools, blood in the urine or vomit  · blurred vision  · chest pain  · difficulty breathing or wheezing  · nausea or vomiting  · redness, blistering, peeling or loosening of the skin, including inside the mouth  · slurred speech or weakness on one side of the body  · trouble passing urine or change in the amount of urine  · unexplained weight gain or swelling  · unusually weak or tired  · yellowing of eyes or skin  Side effects that usually do not require medical attention (report to your doctor or health care professional if they continue or are bothersome):  · dizziness  · dry skin  · headache  · heartburn  · increased sensitivity to the sun  · stomach pain  · tingling at the application site  What may interact with this medicine?  · aspirin  · NSAIDs, medicines for pain and inflammation, like ibuprofen or naproxen  Do not use any other skin products without telling your doctor or health care professional.  What if I miss a dose?  If you miss a dose, use it as soon as you can. If it is almost time for your next dose, use only that dose. Do not use double or extra doses.  Where should I keep my medicine?  Keep out of the reach of children.  Store the 1% gel at room temperature between 15 and 30 degrees C (59 and 86 degrees F). Store the 3% gel at room temperature between 20 and 25 degrees C (68 and 77 degrees F). Protect from light. Throw away any unused medicine after the expiration date.  What should I tell my health care provider before I take this medicine?  They need to know if you have any of these conditions:  · asthma  · bleeding problems  · coronary artery bypass graft (CABG) surgery within the past 2 weeks  · heart  disease  · high blood pressure  · if you frequently drink alcohol containing drinks  · kidney disease  · liver disease  · open or infected skin  · stomach problems  · an unusual or allergic reaction to diclofenac, aspirin, other NSAIDs, other medicines, benzyl alcohol (3% gel only), foods, dyes, or preservatives  · pregnant or trying to get pregnant  · breast-feeding  What should I watch for while using this medicine?  Tell your doctor or healthcare professional if your symptoms do not start to get better or if they get worse. You will need to follow up with your health care provider to monitor your progress. You may need to be treated for up to 3 months if you are using the 3% gel, but the full effect may not occur until 1 month after stopping treatment. If you develop a severe skin reaction, contact your doctor or health care professional immediately.  This medicine can make you more sensitive to the sun. Keep out of the sun. If you cannot avoid being in the sun, wear protective clothing and use sunscreen. Do not use sun lamps or tanning beds/booths.  Do not take medicines such as ibuprofen and naproxen with this medicine. Side effects such as stomach upset, nausea, or ulcers may be more likely to occur. Many medicines available without a prescription should not be taken with this medicine.  This medicine does not prevent heart attack or stroke. In fact, this medicine may increase the chance of a heart attack or stroke. The chance may increase with longer use of this medicine and in people who have heart disease. If you take aspirin to prevent heart attack or stroke, talk with your doctor or health care professional.  This medicine can cause ulcers and bleeding in the stomach and intestines at any time during treatment. Do not smoke cigarettes or drink alcohol. These increase irritation to your stomach and can make it more susceptible to damage from this medicine. Ulcers and bleeding can happen without warning  symptoms and can cause death.  You may get drowsy or dizzy. Do not drive, use machinery, or do anything that needs mental alertness until you know how this medicine affects you. Do not stand or sit up quickly, especially if you are an older patient. This reduces the risk of dizzy or fainting spells.  This medicine can cause you to bleed more easily. Try to avoid damage to your teeth and gums when you brush or floss your teeth.  NOTE:This sheet is a summary. It may not cover all possible information. If you have questions about this medicine, talk to your doctor, pharmacist, or health care provider. Copyright© 2017 [employer]        Reducing Knee Pain and Swelling    Many treatments can help reduce pain and swelling in your knee. Your healthcare provider or physical therapist may suggest one or more of the following treatments:  · Icing your knee helps reduce swelling. You may be asked to ice your knee once a day or more. Apply ice for about 15 to 20 minutes at a time, with at least 40 minutes between sessions. Always keep a towel between the ice and your skin.   · Keeping your leg raised above your heart helps excess fluid flow out of your knee joint. This reduces swelling.  · Compression means wrapping an elastic bandage or neoprene sleeve snugly around your knees. This keeps fluid from collecting in your knee joint.  · Electrical stimulation, done by a physical therapist or , can help reduce excess fluid in your knee joint.  · Anti-inflammatory medicines may be prescribed by your healthcare provider. You may take pills or receive injections in your knee.  · Isometric (pricilla) exercises strengthen the muscles that support your knee joint. They also help reduce excess fluid in your knee.  · Massage helps fluid drain away from your knee.  Date Last Reviewed: 10/13/2015  © 7394-0609 [employer]. [address] [place] [address], [place] [address]. All rights reserved. This information is  not intended as a substitute for professional medical care. Always follow your healthcare professional's instructions.        Arch retraining    These exercises are for your right foot. Switch sides for your left foot.  1. Sit in a chair or stand with both feet flat on the floor. Press down with the ball of your right foot, but only on the left side of the foot, just under the big toe.  2. Then pull the bottom of your big toe back toward your heel. This should pull up the arch of your foot. Dont flex your toes while doing this. It is a subtle movement of the arch.  3. Hold for 5 seconds. Relax.  Date Last Reviewed: 3/10/2016  © 8883-2875 [employer]. [address] [place] [address]. All rights reserved. This information is not intended as a substitute for professional medical care. Always follow your healthcare professional's instructions.        Dorsiflexion/Plantarflexion (Flexibility)    These exercises are for your right foot. Switch sides for your left foot.  4. Sit on a bed or the floor with your right leg out straight.  5. Flex your right foot back, pushing your heel forward and pulling your toes toward you. This is dorsiflexion. Hold for 5 seconds.  6. Then move your foot in the opposite direction, pointing your foot and toes away from you. This is plantarflexion. Hold for 5 seconds.  7. Repeat 5 times, or as instructed.  8. Then slightly bend your right knee and repeat.  Date Last Reviewed: 3/10/2016  © 1096-8772 [employer]. [address] [place] [address]. All rights reserved. This information is not intended as a substitute for professional medical care. Always follow your healthcare professional's instructions.        Understanding Posterior Tibialis Tenosynovitis    The posterior tibialis tendon runs along the inside of the foot. It connects the calf muscle (posterior tibialis muscle) to bones on the inside of the foot. It helps maintain the arch of the  foot. It also gives you stability when you move. Posterior tibialis tenosynovitis is when this tendon becomes inflamed or torn.     How to say it  [id number]-pu-l-lis ten-o-sin-o-VI-[id number]   What causes posterior tibialis tenosynovitis?  This condition is often caused by an injury to the tendon. For instance, a fall can tear the tendon. Overuse can also damage it. People who play sports like basketball or tennis a lot may weaken the tendon over time.  Symptoms of posterior tibialis tenosynovitis  The symptoms of this condition include pain and swelling. The pain is usually felt near the tendon, on the inside of the foot and ankle. It often gets worse over time or with an increase in activity. Your arch may eventually fall, leading to a flat foot. Your foot may also start to turn outward.  Treatment for posterior tibialis tenosynovitis  Treatment for this condition depends on the severity of the tear. Treatment may include:  · Rest. You should avoid any activities that cause pain and swelling. You may also need to limit the amount of weight you put on your injured foot.  · Cold packs. Putting a cold pack on the tendon may reduce pain and swelling.  · Medicine. Over-the-counter pain medicines can reduce pain and swelling.  · Leg cast or walking boot. Severe tears of the posterior tibialis tendon may need to be kept from moving. These devices can help protect the tendon and reduce swelling.  · Shoe insert or brace. Like a leg cast or walking boot, these devices can help protect the tendon as it heals. They may also ease pain.  · Strengthening and stretching exercises. Certain exercises can help you regain strength and flexibility in your foot..  · Surgery. Several surgical choices are available to fix the torn tendon or replace it. But you often do not need surgery unless your symptoms do not get better after trying other treatments for at least 6 months.     When to call your healthcare provider  Call your healthcare provider  right away if you have any of these:  · Fever of 100.4°F (38°C) or higher, or as directed  · Pain that gets worse  · Symptoms that dont get better, or get worse  · New symptoms    Date Last Reviewed: 3/10/2016  © 2367-3775 [employer]. [address] [place] [address]. All rights reserved. This information is not intended as a substitute for professional medical care. Always follow your healthcare professional's instructions.        Soleus Stretch (Flexibility)    9. Stand facing a wall from 3 feet away. Take one step toward the wall with your right foot.  10. Place both palms on the wall. Bend both knees and lean forward. Keep both heels on the floor.  11. Hold for 30 to 60 seconds. Then relax both legs. Repeat the exercise 2 times.  12. Switch legs and repeat.  13. Repeat this exercise 3 times a day, or as instructed.     Tip: Dont bounce while youre stretching.   Date Last Reviewed: 3/10/2016  © 9915-2448 [employer]. [address] [place] [address]. All rights reserved. This information is not intended as a substitute for professional medical care. Always follow your healthcare professional's instructions.

## 2020-09-18 NOTE — TELEPHONE ENCOUNTER
----- Message from Chantel Garcia sent at 9/18/2020  1:43 PM CDT -----  Regarding: Orders for mammogram  Type:  Mammogram    Caller is requesting to schedule their annual mammogram appointment.  Order is not listed in EPIC.  Please enter order and contact patient to schedule.  Name of Caller: Pt   Where would they like the mammogram performed? Either one   Would the patient rather a call back or a response via [facility]? Call back   Best Call Back Number:[phone] (home)   Additional Information: 10/19/2020 is requested  date, please advise.

## 2020-09-18 NOTE — PROGRESS NOTES
RHEUMATOLOGY OUTPATIENT CLINIC NOTE    9/18/2020    Attending Rheumatologist: Ab England  Primary Care Provider: Jayesh Jaramillo MD   Physician Requesting Consultation: No referring provider defined for this encounter.  Chief Complaint/Reason For Consultation:  Rheumatoid Arthritis (pain in right foot & knee) and Osteoarthritis    Subjective:       HPI  Smitha Salinas is a 70 y.o. Black or  female with history of seropositive rheumatoid arthritis comes for follow-up.    Today:  Last seen by Rheumatology on July.  Was recommended to replace Actemra by Rinvoq.  Patient was hesitant regarding changing therapy, requested to stay on Actemra.  Has not yet received refilled to [facility], last injected medication 2 weeks ago.  Main complaint is right knee joint and bottom of right foot pain.  Worst in the evening, aggravated by range of motion/weight bearing, relieved somewhat by rest and OTC pain medication.  Association with gelling phenomena.  Denies joint redness or swelling.     Review of Systems   Constitutional: Negative for chills, fever and malaise/fatigue.   Eyes: Negative for pain and redness.   Respiratory: Negative for cough, hemoptysis and shortness of breath.    Cardiovascular: Negative for chest pain and leg swelling.   Gastrointestinal: Negative for abdominal pain, blood in stool and melena.   Genitourinary: Negative for dysuria and hematuria.        Denies history of nephrolithiasis.   Musculoskeletal: Positive for joint pain (Right knee and foot, mechanical pattern..  Denies precipitation with joint pain/swelling with any particular food or beverage intake.). Negative for falls.   Skin: Negative for rash.   Neurological: Negative for tingling and focal weakness.   Psychiatric/Behavioral: Negative for memory loss. The patient does not have insomnia.      Chronic comorbid conditions affecting medical decision making today:  Past Medical History:   Diagnosis Date    [facility]    Chronic rheumatic arthritis     dr tai    Colon polyps     Ex-smoker     Genital herpes     Herpes zoster infection     Hyperlipidemia     Hypertension     Immunosuppressed status     Lichen sclerosus et atrophicus     SUKHDEEP (obstructive sleep apnea)     Osteopenia      wai (start fmax if on pred 3months or more)    Type 2 diabetes mellitus     dxd      Past Surgical History:   Procedure Laterality Date    BLADDER SURGERY  2012    Dr Claudia Lamar    BREAST BIOPSY Left     benign    COLONOSCOPY N/A 2020    Procedure: COLONOSCOPY;  Surgeon: Eloina Castro MD;  Location: [facility] ENDO;  Service: Endoscopy;  Laterality: N/A;    CRYOTHERAPY      ESOPHAGOGASTRODUODENOSCOPY N/A 2020    Procedure: EGD (ESOPHAGOGASTRODUODENOSCOPY);  Surgeon: Eloina Castro MD;  Location: [facility] ENDO;  Service: Endoscopy;  Laterality: N/A;    SKIN GRAFT      laceration to right  fingers  from thigh     TONSILLECTOMY       Family History   Problem Relation Age of Onset    Heart disease Mother     Diabetes Mother     Stroke Father     Kidney disease Father     Cancer Sister 39        breast    Breast cancer Sister     Cancer Other 68        breast    Stroke Maternal Grandmother     Stroke Paternal Grandmother     Stroke Paternal Grandfather     Colon cancer Neg Hx     Ovarian cancer Neg Hx      Social History     Substance and Sexual Activity   Alcohol Use Yes    Alcohol/week: 0.0 standard drinks     Social History     Tobacco Use   Smoking Status Former Smoker    Quit date: 2013    Years since quittin.1   Smokeless Tobacco Never Used   Tobacco Comment    smokes for a few weeks per year - when under pressure. has been abstinent times years at a time. a pack lasts about a week     Social History     Substance and Sexual Activity   Drug Use No       Current Outpatient Medications:     acyclovir (ZOVIRAX) 400 MG tablet, Take 1 tablet (400  mg total) by mouth 2 (two) times daily., Disp: 180 tablet, Rfl: 4    amLODIPine (NORVASC) 5 MG tablet, Take 1 tablet (5 mg total) by mouth once daily., Disp: 90 tablet, Rfl: 4    aspirin (ECOTRIN) 81 MG EC tablet, Take 81 mg by mouth once daily., Disp: , Rfl:     bumetanide (BUMEX) 1 MG tablet, 1 tablet(s) by mouth daily, Disp: , Rfl:     cholecalciferol, vitamin D3, 1,000 unit capsule, Take 1 capsule by mouth once daily., Disp: , Rfl:     cloNIDine (CATAPRES) 0.2 MG tablet, TAKE 1/2 TABLET IN THE MORNING, 1/2 TABLET AT NOON,   THEN 2 TABLETS AT BEDTIME, Disp: 270 tablet, Rfl: 0    cyclobenzaprine (FLEXERIL) 10 MG tablet, 1/2 to 1 po tid prn muscle spasm, Disp: 60 tablet, Rfl: 5    diazePAM (VALIUM) 5 MG tablet, TAKE 1 TABLET BY MOUTH AT BEDTIME FOR ANXIETY, Disp: 30 tablet, Rfl: 5    erythromycin (ROMYCIN) ophthalmic ointment, , Disp: , Rfl:     estradioL (ESTRACE) 0.01 % (0.1 mg/gram) vaginal cream, Place 1 g vaginally twice a week., Disp: 42.5 g, Rfl: 3    flu vac 2020 65up-qnmGI74T,PF, (FLUAD QUAD 2020-21,65Y UP,,PF,) 60 mcg (15 mcg x 4)/0.5 mL Syrg, Inject 0.5 mLs into the muscle once. for 1 dose, Disp: 0.5 mL, Rfl: 0    levocetirizine (XYZAL) 5 MG tablet, Take 1 tablet (5 mg total) by mouth nightly as needed for Allergies., Disp: 90 tablet, Rfl: 4    levoFLOXacin (LEVAQUIN) 500 MG tablet, Take 1 tablet (500 mg total) by mouth once daily. for 2 days, Disp: 2 tablet, Rfl: 0    mirabegron (MYRBETRIQ) 50 mg Tb24, Take 1 tablet (50 mg total) by mouth once daily., Disp: 30 tablet, Rfl: 11    NUCYNTA ER 50 mg Tb12, Take 1 tablet by mouth every 12 (twelve) hours., Disp: , Rfl:     ofloxacin (OCUFLOX) 0.3 % ophthalmic solution, , Disp: , Rfl:     ondansetron (ZOFRAN-ODT) 4 MG TbDL, Take 1 tablet (4 mg total) by mouth every 8 (eight) hours as needed (nausea)., Disp: 10 tablet, Rfl: 1    oxyCODONE (ROXICODONE) 5 MG immediate release tablet, Take 5 mg by mouth every 4 (four) hours as needed for Pain.,  "Disp: , Rfl:     oxyCODONE-acetaminophen (PERCOCET) 5-325 mg per tablet, TK 1 T PO  Q DAY PRN, Disp: , Rfl:     potassium chloride SA (K-DUR,KLOR-CON) 20 MEQ tablet, Take 1 tablet (20 mEq total) by mouth once daily. 1 tablet,ER particles/crystals Oral Twice a day, Disp: 90 tablet, Rfl: 5    pravastatin (PRAVACHOL) 40 MG tablet, Take 1 tablet (40 mg total) by mouth once daily., Disp: 90 tablet, Rfl: 3    predniSONE (DELTASONE) 5 MG tablet, Take 6 tablets x 1 day, then 5 tablets x 1 day, then 4 tablets x 1 day, then 3 tablets x 1 day, then 2 tablets x 1 days, then 1 tablet x 1 day, Disp: 21 tablet, Rfl: 1    tocilizumab (ACTEMRA) 162 mg/0.9 mL injection, Inject 0.9 mLs (162 mg total) into the skin once a week., Disp: 10.8 mL, Rfl: 0    traMADol (ULTRAM) 50 mg tablet, Take 1 tablet (50 mg total) by mouth every 6 (six) hours as needed for Pain., Disp: 60 tablet, Rfl: 5    upadacitinib (RINVOQ) 15 mg 24 hr tablet, Take 1 tablet (15 mg total) by mouth once daily., Disp: 30 tablet, Rfl: 2    polyethylene glycol (GLYCOLAX) 17 gram/dose powder, Miralax 17 gram/dose oral powder  use as directed, Disp: , Rfl:      Objective:         There were no vitals filed for this visit.  Physical Exam   Constitutional: No distress.   Estimated body mass index is 31.48 kg/m² as calculated from the following:    Height as of 8/5/20: 5' 5" (1.651 m).    Weight as of this encounter: 85.8 kg (189 lb 2.5 oz).    Wt Readings from Last 1 Encounters:  09/18/20 1245 : 85.8 kg (189 lb 2.5 oz)     HENT:   Head: Normocephalic and atraumatic.   Eyes: Conjunctivae are normal. Pupils are equal, round, and reactive to light.   Neck: Normal range of motion.   Cardiovascular: Normal rate and intact distal pulses.    Pulmonary/Chest: Effort normal. No respiratory distress.   Abdominal: Soft. She exhibits no distension.   Neurological: She is alert. Gait normal.   Slightly antalgic gait, favoring left knee.   Skin: No rash noted. No erythema. "     Musculoskeletal: Normal range of motion. Tenderness (Right knee joint lines, mid plantar fascia area along 1st toe flexor tendon) and deformity (Heberden's, Jose's.  [place]-neck deformity, hallux valgus.) present.      Comments: :  Able to make full fist without difficulty  No synovitis or significant squeeze tenderness    AROM: intact  PROM: intact    Devices used by patient: none       Reviewed old and all outside pertinent medical records available.    All lab results personally reviewed and interpreted by me.  Lab Results   Component Value Date    WBC 5.85 07/08/2020    HGB 13.3 07/08/2020    HCT 43.1 07/08/2020    MCV 88 07/08/2020    MCH 27.3 07/08/2020    MCHC 30.9 (L) 07/08/2020    RDW 14.1 07/08/2020     (L) 07/08/2020    MPV 14.9 (H) 07/08/2020    PLTEST Adequate 05/14/2013       Lab Results   Component Value Date     07/08/2020    K 4.8 07/08/2020     07/08/2020    CO2 28 07/08/2020    GLU 84 07/08/2020    BUN 17 07/08/2020    CALCIUM 9.8 07/08/2020    PROT 7.4 07/08/2020    ALBUMIN 4.5 07/08/2020    BILITOT 0.4 07/08/2020    AST 21 07/08/2020    ALKPHOS 92 07/08/2020    ALT 36 07/08/2020       Lab Results   Component Value Date    COLORU Yellow 07/23/2020    APPEARANCEUA Cloudy (A) 12/26/2013    SPECGRAV 1.025 07/23/2020    PHUR 5 07/23/2020    PROTEINUA Negative 12/26/2013    KETONESU n 07/23/2020    LEUKOCYTESUR Negative 12/26/2013    NITRITE n 07/23/2020    UROBILINOGEN n 07/23/2020       Lab Results   Component Value Date    CRP <0.1 07/08/2020       Lab Results   Component Value Date    SEDRATE <2 07/08/2020       Lab Results   Component Value Date     (H) 03/19/2012    SEDRATE <2 07/08/2020       No components found for: 25OHVITDTOT, 85YMDQQT8, 91YKDIXQ1, METHODNOTE    Lab Results   Component Value Date    URICACID 4.3 01/29/2020       No components found for: TSPOTTB    Hemoglobin A1c 6.5% September 2020    Rheum Labs:   CCP 1123.  Rheumatoid factor  170    Infectious Labs:   Hepatitis profile nonreactive September 2019    QuantiFERON TB negative September 2019   HIV nonreactive May 2008    Imaging:  All imaging reviewed and independently interpreted by me.    X-ray hands September 2019  Degenerative changes are seen scattered throughout the right hand and most prominent at the 1st interphalangeal joint, 1st CMC joint and DIP joint of the 3rd digit of the right hand.  The degenerative changes have progressed since the 2012 examination.  There is also a stable probable well corticated erosion within the trapezium.  No new erosive changes are identified.  Mild degenerative changes present at the 1st and 2nd MCP joints.    X-ray foot September 2019   There is bilateral hallux valgus and bunion formation.  Degenerative changes present in the midfoot region and 1st MTP joints bilaterally.  Calcaneal spurs on both sides.  No periarticular erosions.     X-ray knee January 2020   Degenerative changes      ASSESSMENT / PLAN:     Smitha Salinas is a 70 y.o. Black or  female with:    1.  Seropositive, erosive rheumatoid arthritis.  - SDAI/CDAI:  Mild/moderate disease activity.  Current complaints mostly consistent with right knee DJD and plantar fasciitis  - awaiting refill of Actemra, last received 2 weeks ago.  Instructed if do not receive medications in the next 2 weeks, should go ahead and start Rinvoq which she has at home.  Recommended not to overlap both biologics  - Naproxen short course PRN no more than 2 weeks  - range of motion, flexibility, and strengthening exercises for plantar fasciitis.  - Will consider low dose PDN 5-10mg daily PRN, and adding sulfasalazine or Arava to current biologic  - C-Reactive Protein; Standing  - Sedimentation rate; Standing    2. High risk medication use  - Compromised immune system secondary to autoimmune disease and use of immunosuppressive drugs.   - Monitor carefully for infections and toxicities.   -  Advised to get immediate medical care if any infection.   - Also advised strict adherence to age appropriate vaccinations and cancer screenings with PCP.  - CBC auto differential; Standing  - Comprehensive metabolic panel; Standing    3. Counseling on health promotion and disease prevention  - over 10 minutes spent regarding below topics:  - Immunization counseling done.  - Nutrition and exercise counseling.  - Regular exercise:  Aerobic and resistance.  - Medication counseling provided.    Follow up in about 3 months (around 12/18/2020).    Method of contact with patient concerns: [facility]    Disclaimer:  This note is prepared using voice recognition software and as such is likely to have errors and has not been proof read. Please contact me for questions.     Time spent: 25 minutes in face to face discussion concerning diagnosis, prognosis, review of lab and test results, benefits of treatment as well as management of disease, counseling of patient and coordination of care between various health care providers.  Greater than half the time spent was used for coordination of care and counseling of patient.    Ab England M.D.  Rheumatology Department   [facility] [place] [facility]

## 2020-09-23 ENCOUNTER — OFFICE VISIT (OUTPATIENT)
Dept: UROLOGY | Facility: CLINIC | Age: 70
End: 2020-09-23
Payer: MEDICARE

## 2020-09-23 VITALS
DIASTOLIC BLOOD PRESSURE: 80 MMHG | BODY MASS INDEX: 31.59 KG/M2 | SYSTOLIC BLOOD PRESSURE: 148 MMHG | HEART RATE: 63 BPM | WEIGHT: 189.63 LBS | TEMPERATURE: 97 F | HEIGHT: 65 IN

## 2020-09-23 DIAGNOSIS — N39.41 URGE INCONTINENCE: Primary | ICD-10-CM

## 2020-09-23 PROCEDURE — 1159F MED LIST DOCD IN RCRD: CPT | Mod: S$GLB,,, | Performed by: UROLOGY

## 2020-09-23 PROCEDURE — 1159F PR MEDICATION LIST DOCUMENTED IN MEDICAL RECORD: ICD-10-PCS | Mod: S$GLB,,, | Performed by: UROLOGY

## 2020-09-23 PROCEDURE — 99999 PR PBB SHADOW E&M-EST. PATIENT-LVL IV: ICD-10-PCS | Mod: PBBFAC,,, | Performed by: UROLOGY

## 2020-09-23 PROCEDURE — 99214 OFFICE O/P EST MOD 30 MIN: CPT | Mod: S$GLB,,, | Performed by: UROLOGY

## 2020-09-23 PROCEDURE — 1126F AMNT PAIN NOTED NONE PRSNT: CPT | Mod: S$GLB,,, | Performed by: UROLOGY

## 2020-09-23 PROCEDURE — 3008F PR BODY MASS INDEX (BMI) DOCUMENTED: ICD-10-PCS | Mod: CPTII,S$GLB,, | Performed by: UROLOGY

## 2020-09-23 PROCEDURE — 3077F PR MOST RECENT SYSTOLIC BLOOD PRESSURE >= 140 MM HG: ICD-10-PCS | Mod: CPTII,S$GLB,, | Performed by: UROLOGY

## 2020-09-23 PROCEDURE — 1126F PR PAIN SEVERITY QUANTIFIED, NO PAIN PRESENT: ICD-10-PCS | Mod: S$GLB,,, | Performed by: UROLOGY

## 2020-09-23 PROCEDURE — 99999 PR PBB SHADOW E&M-EST. PATIENT-LVL IV: CPT | Mod: PBBFAC,,, | Performed by: UROLOGY

## 2020-09-23 PROCEDURE — 99214 PR OFFICE/OUTPT VISIT, EST, LEVL IV, 30-39 MIN: ICD-10-PCS | Mod: S$GLB,,, | Performed by: UROLOGY

## 2020-09-23 PROCEDURE — 3079F PR MOST RECENT DIASTOLIC BLOOD PRESSURE 80-89 MM HG: ICD-10-PCS | Mod: CPTII,S$GLB,, | Performed by: UROLOGY

## 2020-09-23 PROCEDURE — 3077F SYST BP >= 140 MM HG: CPT | Mod: CPTII,S$GLB,, | Performed by: UROLOGY

## 2020-09-23 PROCEDURE — 1101F PT FALLS ASSESS-DOCD LE1/YR: CPT | Mod: CPTII,S$GLB,, | Performed by: UROLOGY

## 2020-09-23 PROCEDURE — 3008F BODY MASS INDEX DOCD: CPT | Mod: CPTII,S$GLB,, | Performed by: UROLOGY

## 2020-09-23 PROCEDURE — 1101F PR PT FALLS ASSESS DOC 0-1 FALLS W/OUT INJ PAST YR: ICD-10-PCS | Mod: CPTII,S$GLB,, | Performed by: UROLOGY

## 2020-09-23 PROCEDURE — 3079F DIAST BP 80-89 MM HG: CPT | Mod: CPTII,S$GLB,, | Performed by: UROLOGY

## 2020-09-23 RX ORDER — DIAZEPAM 5 MG/1
5 TABLET ORAL ONCE
Qty: 1 TABLET | Refills: 0 | Status: SHIPPED | OUTPATIENT
Start: 2020-09-23 | End: 2020-09-23

## 2020-09-23 NOTE — PROGRESS NOTES
Chief Complaint: Incontinence    HPI:   9/23/20- patient is in today to discuss definitive management versus urge incontinence.  She is status post urodynamics.  8/5/20- patient reports to discuss incontinence therapy, she has failed medical therapy.  Patient states she gets up 1 time per evening, going every 2-3 hours during the daytime.  No gross hematuria, no microscopic hematuria, she does have a history of smoking.  She is changing her pads a lot, stay dry.  She has transient is not as well.  No stress incontinence.  7/23/20: Returns as a new pt again with complaints of OAB/incontinence.  Myrbetriq has worked well for a long time and not needing pads at times. Daytime is main problem.  No caffeine.  No chocolate.  No spicy foods.  No gross hematuria.  Gained 10-15 lbs last 4 years  1/20/16: Oxybutinin poorly tolerated with drowsiness and other effects.  Now on ER version not effective enough.  Chocolate found to be a bladder irritant.  Thinks she needs to address her diagnosed sleep apnea.  6/29/15: Pt returns with worsening OAB and having to use 1-2 pads a day but having to use thicker bigger pads.  Urgency is frequent and very bothersome. Did go to PFMT.  The urgency is all of a sudden and very severe.  Leaks on the way to the toilet 8/10 times. No hematuria. No UTI.  12/26/13: 64 yo woman had a procedure with Dr. Lamar for UI and OAB.  Before this she would use one pad a day or less and some months without needing a pad at all.  It was more squirts and not filling the pads up.  She can't say what was done.  She didn't need a pad at all after that for about a year and she was on oxybutinin during this period and not the Detrol she was on originally.  About 6-7 months ago she started needing a pad from time to time, now daily and with multiple pads; 2-3 a day.  She has been off of the oxybutinin for 4-5 days and the leakage was especially bad.  She got a refill from Dr. Lamar and is back to one a day that stays  dry for the most part.  Has not tried PFMT.  She wants to be perfectly dry.  No urolithiasis history.  No UTI history.  No hematuria.  Not sexually active.    Allergies:  Valdecoxib    Medications: has a current medication list which includes the following prescription(s): acyclovir, amlodipine, aspirin, bumetanide, cholecalciferol (vitamin d3), clonidine, cyclobenzaprine, diazepam, erythromycin, estradiol, levocetirizine, myrbetriq, nucynta er, ofloxacin, ondansetron, oxycodone, oxycodone-acetaminophen, polyethylene glycol, potassium chloride sa, pravastatin, prednisone, actemra, tramadol, and rinvoq.    Review of Systems:  General: No fever, chills, fatigability, or weight loss.  Skin: No rashes, itching, or changes in color or texture of skin.  Chest: Denies ADLER, cyanosis, wheezing, cough, and sputum production.  Abdomen: Appetite fine. No weight loss. Denies diarrhea, abdominal pain, hematemesis, or blood in stool.  Musculoskeletal: No joint stiffness or swelling. Some back pain.  : As above.  All other review of systems negative.    PMH:   has a past medical history of Adult ADHD, Chronic rheumatic arthritis, Colon polyps, Ex-smoker, Genital herpes, Herpes zoster infection, Hyperlipidemia, Hypertension, Immunosuppressed status, Lichen sclerosus et atrophicus, SUKHDEEP (obstructive sleep apnea), Osteopenia, and Type 2 diabetes mellitus.    PSH:   has a past surgical history that includes Bladder surgery (8/2012); Tonsillectomy (1958); Skin graft (1965); Cryotherapy (1980); Breast biopsy (Left); Colonoscopy (N/A, 2/12/2020); and Esophagogastroduodenoscopy (N/A, 2/12/2020).    FamHx: family history includes Breast cancer in her sister; Cancer (age of onset: 39) in her sister; Cancer (age of onset: 68) in her other; Diabetes in her mother; Heart disease in her mother; Kidney disease in her father; Stroke in her father, maternal grandmother, paternal grandfather, and paternal grandmother.    SocHx:  reports that she  quit smoking about 7 years ago. She has never used smokeless tobacco. She reports current alcohol use. She reports that she does not use drugs.     Physical Exam:  Vitals:   There were no vitals filed for this visit.  General: A&Ox3. No apparent distress. No deformities.  Neck: No masses. Normal thyroid.  Lungs: CTA samia. No use of accessory muscles.  Heart: RRR. No arrhythmias.  Abdomen: Soft. NT. ND.  Skin: The skin is warm and dry. No jaundice.  Ext: No c/c/e.  :   12/26/13: External genitalia normal. No lesions. Meatus normal size and location. Urethra normal. No masses. Bladder normal. No fullness or masses. Vagina normal with no discharge or lesions. Anus/perineum normal. Uterus and adnexa no palpable abnormalities.    Labs/Studies:   UDS- decreased capacity otherwise normal 9/20  Bladder Scan performed in office:     12/26/13: PVR 35 ml.  KUB/GENIE normal 8/20    Impression/Plan:   1.  Urge incontinence- patient is now over time failed Myrbetriq 50 mg, Detrol and oxybutynin.  Urodynamic have been completed and confirmed the diagnosis.  We discussed Botox versus InterStim, patient pursue InterStim.  Therefore I will arrange for a percutaneous InterStim procedure.  We will use Valium to assist with the procedure.  Patient understands the risks, benefits and alternatives.  These include but are not limited to damage to surrounding structures including spine, nerve tissue.  Risk of infection, chance this will assist.  Understands will have to be removed within a few days.  Understanding the further surgical management may be warranted.  Risk of side effects from the anesthesia.

## 2020-09-29 ENCOUNTER — OFFICE VISIT (OUTPATIENT)
Dept: UROLOGY | Facility: CLINIC | Age: 70
End: 2020-09-29
Payer: MEDICARE

## 2020-09-29 VITALS — BODY MASS INDEX: 31.45 KG/M2 | WEIGHT: 189 LBS | TEMPERATURE: 98 F

## 2020-09-29 DIAGNOSIS — M79.671 RIGHT FOOT PAIN: ICD-10-CM

## 2020-09-29 DIAGNOSIS — N39.41 URGE INCONTINENCE: Primary | ICD-10-CM

## 2020-09-29 DIAGNOSIS — M25.571 RIGHT ANKLE PAIN, UNSPECIFIED CHRONICITY: Primary | ICD-10-CM

## 2020-09-29 PROCEDURE — 99499 UNLISTED E&M SERVICE: CPT | Mod: S$GLB,,, | Performed by: UROLOGY

## 2020-09-29 PROCEDURE — 64561 IMPLANT NEUROELECTRODES: CPT | Mod: RT,S$GLB,, | Performed by: UROLOGY

## 2020-09-29 PROCEDURE — 64561 PR PERCUT IMPLANT,NEUROELEC,SACRAL NERVE: ICD-10-PCS | Mod: RT,S$GLB,, | Performed by: UROLOGY

## 2020-09-29 PROCEDURE — 99499 NO LOS: ICD-10-PCS | Mod: S$GLB,,, | Performed by: UROLOGY

## 2020-09-29 PROCEDURE — 99999 PR PBB SHADOW E&M-EST. PATIENT-LVL IV: ICD-10-PCS | Mod: PBBFAC,,, | Performed by: UROLOGY

## 2020-09-29 PROCEDURE — 99999 PR PBB SHADOW E&M-EST. PATIENT-LVL IV: CPT | Mod: PBBFAC,,, | Performed by: UROLOGY

## 2020-09-29 NOTE — PROGRESS NOTES
Chief Complaint: Incontinence    HPI:   09/29/2020- patient is here today for InterStim test trial.  9/23/20- patient is in today to discuss definitive management versus urge incontinence.  She is status post urodynamics.  8/5/20- patient reports to discuss incontinence therapy, she has failed medical therapy.  Patient states she gets up 1 time per evening, going every 2-3 hours during the daytime.  No gross hematuria, no microscopic hematuria, she does have a history of smoking.  She is changing her pads a lot, stay dry.  She has transient is not as well.  No stress incontinence.  7/23/20: Returns as a new pt again with complaints of OAB/incontinence.  Myrbetriq has worked well for a long time and not needing pads at times. Daytime is main problem.  No caffeine.  No chocolate.  No spicy foods.  No gross hematuria.  Gained 10-15 lbs last 4 years  1/20/16: Oxybutinin poorly tolerated with drowsiness and other effects.  Now on ER version not effective enough.  Chocolate found to be a bladder irritant.  Thinks she needs to address her diagnosed sleep apnea.  6/29/15: Pt returns with worsening OAB and having to use 1-2 pads a day but having to use thicker bigger pads.  Urgency is frequent and very bothersome. Did go to PFMT.  The urgency is all of a sudden and very severe.  Leaks on the way to the toilet 8/10 times. No hematuria. No UTI.  12/26/13: 62 yo woman had a procedure with Dr. Lamar for UI and OAB.  Before this she would use one pad a day or less and some months without needing a pad at all.  It was more squirts and not filling the pads up.  She can't say what was done.  She didn't need a pad at all after that for about a year and she was on oxybutinin during this period and not the Detrol she was on originally.  About 6-7 months ago she started needing a pad from time to time, now daily and with multiple pads; 2-3 a day.  She has been off of the oxybutinin for 4-5 days and the leakage was especially bad.  She  got a refill from Dr. Lamar and is back to one a day that stays dry for the most part.  Has not tried PFMT.  She wants to be perfectly dry.  No urolithiasis history.  No UTI history.  No hematuria.  Not sexually active.    Allergies:  Valdecoxib    Medications: has a current medication list which includes the following prescription(s): acyclovir, amlodipine, aspirin, bumetanide, cholecalciferol (vitamin d3), clonidine, cyclobenzaprine, diazepam, erythromycin, estradiol, levocetirizine, myrbetriq, nucynta er, ofloxacin, ondansetron, oxycodone, oxycodone-acetaminophen, polyethylene glycol, potassium chloride sa, pravastatin, prednisone, actemra, tramadol, and rinvoq.    Review of Systems:  General: No fever, chills, fatigability, or weight loss.  Skin: No rashes, itching, or changes in color or texture of skin.  Chest: Denies ADLER, cyanosis, wheezing, cough, and sputum production.  Abdomen: Appetite fine. No weight loss. Denies diarrhea, abdominal pain, hematemesis, or blood in stool.  Musculoskeletal: No joint stiffness or swelling. Some back pain.  : As above.  All other review of systems negative.    PMH:   has a past medical history of Adult ADHD, Chronic rheumatic arthritis, Colon polyps, Ex-smoker, Genital herpes, Herpes zoster infection, Hyperlipidemia, Hypertension, Immunosuppressed status, Lichen sclerosus et atrophicus, SUKHDEEP (obstructive sleep apnea), Osteopenia, and Type 2 diabetes mellitus.    PSH:   has a past surgical history that includes Bladder surgery (8/2012); Tonsillectomy (1958); Skin graft (1965); Cryotherapy (1980); Breast biopsy (Left); Colonoscopy (N/A, 2/12/2020); and Esophagogastroduodenoscopy (N/A, 2/12/2020).    FamHx: family history includes Breast cancer in her sister; Cancer (age of onset: 39) in her sister; Cancer (age of onset: 68) in her other; Diabetes in her mother; Heart disease in her mother; Kidney disease in her father; Stroke in her father, maternal grandmother, paternal  grandfather, and paternal grandmother.    SocHx:  reports that she quit smoking about 7 years ago. She has never used smokeless tobacco. She reports current alcohol use. She reports that she does not use drugs.     Physical Exam:  Vitals:   There were no vitals filed for this visit.  General: A&Ox3. No apparent distress. No deformities.  Neck: No masses. Normal thyroid.  Lungs: CTA samia. No use of accessory muscles.  Heart: RRR. No arrhythmias.  Abdomen: Soft. NT. ND.  Skin: The skin is warm and dry. No jaundice.  Ext: No c/c/e.  :   12/26/13: External genitalia normal. No lesions. Meatus normal size and location. Urethra normal. No masses. Bladder normal. No fullness or masses. Vagina normal with no discharge or lesions. Anus/perineum normal. Uterus and adnexa no palpable abnormalities.    Labs/Studies:   UDS- decreased capacity otherwise normal 9/20  Percutaneous InterStim 09/29/2020  Bladder Scan performed in office:     12/26/13: PVR 35 ml.  KUB/GENIE normal 8/20    Procedure: percutaneous InterStim lead placement    Discussion of the procedure was again had with the patient, consents were obtained.  Patient was placed in the supine position.  After being sterilely prepped and draped, we began the procedure.  Approximately 11-13 cm superior to the coccyx and 2 cm lateral of midline on either side we marked the patient with a marking pen.  Our needle guide was then placed within the right side, in approximately the S3 foramen.  Upon stimulation with the assistance of the [employer] rep, patient noted sensation within the perineum, noting appropriate position for the lead.  The lead was then placed into the needle and again tested and shown to be positive.  The stylet was removed from the wire, leaving our percutaneous wire, again tested and shown to be positive.  We then approached the left side, in the same manner.  Our needle guide was then placed within the left side, in approximately the S3 foramen.  Upon  stimulation with the assistance of the [employer] rep, patient noted sensation within the perineum, noting appropriate position for the lead.  The lead was then placed into the needle and again tested and shown to be positive.  The stylet was removed from the wire, leaving our percutaneous wire, again tested and shown to be positive.  Both leads were secured on the patient with the assistance of our [employer] rep and nursing staff.  She will return in a few days to assess and have the wires removed.  Patient tolerated the procedure well and will contact our office with any complaints or issues.    Impression/Plan:   1.  Urge incontinence- patient tolerated the procedure well, will have her return in 48-72 hours to assess effectiveness of treatment.  She will contact us with any complaints in the meantime.

## 2020-09-30 ENCOUNTER — TELEPHONE (OUTPATIENT)
Dept: UROLOGY | Facility: CLINIC | Age: 70
End: 2020-09-30

## 2020-09-30 NOTE — TELEPHONE ENCOUNTER
----- Message from Mark Dominguez sent at 9/30/2020 10:21 AM CDT -----  Contact: [pt  Type:  Patient Returning Call    Who Called: pt   Who Left Message for Patient:nurse   Does the patient know what this is regarding?:   Would the patient rather a call back or a response via [facility]? phone  Best Call Back Number: [phone]  Additional Information:

## 2020-09-30 NOTE — TELEPHONE ENCOUNTER
Pt wants clarification on how to use interstim. C/o pain, no being able to lift her legs. Please advise.

## 2020-10-01 ENCOUNTER — OFFICE VISIT (OUTPATIENT)
Dept: UROLOGY | Facility: CLINIC | Age: 70
End: 2020-10-01
Payer: MEDICARE

## 2020-10-01 ENCOUNTER — HOSPITAL ENCOUNTER (OUTPATIENT)
Dept: RADIOLOGY | Facility: HOSPITAL | Age: 70
Discharge: HOME | End: 2020-10-01
Attending: ORTHOPAEDIC SURGERY
Payer: MEDICARE

## 2020-10-01 ENCOUNTER — OFFICE VISIT (OUTPATIENT)
Dept: ORTHOPEDICS | Facility: CLINIC | Age: 70
End: 2020-10-01
Payer: MEDICARE

## 2020-10-01 VITALS
HEIGHT: 65 IN | DIASTOLIC BLOOD PRESSURE: 77 MMHG | HEART RATE: 69 BPM | WEIGHT: 189 LBS | BODY MASS INDEX: 31.49 KG/M2 | SYSTOLIC BLOOD PRESSURE: 124 MMHG

## 2020-10-01 VITALS
TEMPERATURE: 99 F | BODY MASS INDEX: 31.51 KG/M2 | WEIGHT: 189.13 LBS | HEIGHT: 65 IN | DIASTOLIC BLOOD PRESSURE: 70 MMHG | SYSTOLIC BLOOD PRESSURE: 148 MMHG | HEART RATE: 62 BPM

## 2020-10-01 DIAGNOSIS — M76.829 PTTD (POSTERIOR TIBIAL TENDON DYSFUNCTION): ICD-10-CM

## 2020-10-01 DIAGNOSIS — Z01.818 PRE-OP TESTING: ICD-10-CM

## 2020-10-01 DIAGNOSIS — M72.2 PLANTAR FASCIITIS OF RIGHT FOOT: Primary | ICD-10-CM

## 2020-10-01 DIAGNOSIS — M21.40 PES PLANUS, UNSPECIFIED LATERALITY: ICD-10-CM

## 2020-10-01 DIAGNOSIS — Z01.818 PRE-OP TESTING: Primary | ICD-10-CM

## 2020-10-01 DIAGNOSIS — M79.671 RIGHT FOOT PAIN: ICD-10-CM

## 2020-10-01 DIAGNOSIS — N39.41 URGE INCONTINENCE: Primary | ICD-10-CM

## 2020-10-01 PROCEDURE — 3078F DIAST BP <80 MM HG: CPT | Mod: CPTII,S$GLB,, | Performed by: UROLOGY

## 2020-10-01 PROCEDURE — 3008F PR BODY MASS INDEX (BMI) DOCUMENTED: ICD-10-PCS | Mod: CPTII,S$GLB,, | Performed by: ORTHOPAEDIC SURGERY

## 2020-10-01 PROCEDURE — 73630 X-RAY EXAM OF FOOT: CPT | Mod: 26,RT,, | Performed by: RADIOLOGY

## 2020-10-01 PROCEDURE — 1101F PR PT FALLS ASSESS DOC 0-1 FALLS W/OUT INJ PAST YR: ICD-10-PCS | Mod: CPTII,S$GLB,, | Performed by: ORTHOPAEDIC SURGERY

## 2020-10-01 PROCEDURE — 1126F PR PAIN SEVERITY QUANTIFIED, NO PAIN PRESENT: ICD-10-PCS | Mod: S$GLB,,, | Performed by: UROLOGY

## 2020-10-01 PROCEDURE — 3078F PR MOST RECENT DIASTOLIC BLOOD PRESSURE < 80 MM HG: ICD-10-PCS | Mod: CPTII,S$GLB,, | Performed by: ORTHOPAEDIC SURGERY

## 2020-10-01 PROCEDURE — 99999 PR PBB SHADOW E&M-EST. PATIENT-LVL V: CPT | Mod: PBBFAC,,, | Performed by: UROLOGY

## 2020-10-01 PROCEDURE — 3074F SYST BP LT 130 MM HG: CPT | Mod: CPTII,S$GLB,, | Performed by: UROLOGY

## 2020-10-01 PROCEDURE — 1101F PT FALLS ASSESS-DOCD LE1/YR: CPT | Mod: CPTII,S$GLB,, | Performed by: ORTHOPAEDIC SURGERY

## 2020-10-01 PROCEDURE — 3008F PR BODY MASS INDEX (BMI) DOCUMENTED: ICD-10-PCS | Mod: CPTII,S$GLB,, | Performed by: UROLOGY

## 2020-10-01 PROCEDURE — 99024 PR POST-OP FOLLOW-UP VISIT: ICD-10-PCS | Mod: S$GLB,,, | Performed by: UROLOGY

## 2020-10-01 PROCEDURE — 1159F MED LIST DOCD IN RCRD: CPT | Mod: S$GLB,,, | Performed by: UROLOGY

## 2020-10-01 PROCEDURE — 99203 PR OFFICE/OUTPT VISIT, NEW, LEVL III, 30-44 MIN: ICD-10-PCS | Mod: S$GLB,,, | Performed by: ORTHOPAEDIC SURGERY

## 2020-10-01 PROCEDURE — 1125F AMNT PAIN NOTED PAIN PRSNT: CPT | Mod: S$GLB,,, | Performed by: ORTHOPAEDIC SURGERY

## 2020-10-01 PROCEDURE — 1101F PT FALLS ASSESS-DOCD LE1/YR: CPT | Mod: CPTII,S$GLB,, | Performed by: UROLOGY

## 2020-10-01 PROCEDURE — 3078F DIAST BP <80 MM HG: CPT | Mod: CPTII,S$GLB,, | Performed by: ORTHOPAEDIC SURGERY

## 2020-10-01 PROCEDURE — 3074F SYST BP LT 130 MM HG: CPT | Mod: CPTII,S$GLB,, | Performed by: ORTHOPAEDIC SURGERY

## 2020-10-01 PROCEDURE — 1101F PR PT FALLS ASSESS DOC 0-1 FALLS W/OUT INJ PAST YR: ICD-10-PCS | Mod: CPTII,S$GLB,, | Performed by: UROLOGY

## 2020-10-01 PROCEDURE — 73630 X-RAY EXAM OF FOOT: CPT | Mod: TC,RT

## 2020-10-01 PROCEDURE — 1126F AMNT PAIN NOTED NONE PRSNT: CPT | Mod: S$GLB,,, | Performed by: UROLOGY

## 2020-10-01 PROCEDURE — 3078F PR MOST RECENT DIASTOLIC BLOOD PRESSURE < 80 MM HG: ICD-10-PCS | Mod: CPTII,S$GLB,, | Performed by: UROLOGY

## 2020-10-01 PROCEDURE — 3074F PR MOST RECENT SYSTOLIC BLOOD PRESSURE < 130 MM HG: ICD-10-PCS | Mod: CPTII,S$GLB,, | Performed by: UROLOGY

## 2020-10-01 PROCEDURE — 1125F PR PAIN SEVERITY QUANTIFIED, PAIN PRESENT: ICD-10-PCS | Mod: S$GLB,,, | Performed by: ORTHOPAEDIC SURGERY

## 2020-10-01 PROCEDURE — 1159F MED LIST DOCD IN RCRD: CPT | Mod: S$GLB,,, | Performed by: ORTHOPAEDIC SURGERY

## 2020-10-01 PROCEDURE — 73630 XR FOOT COMPLETE 3 VIEW RIGHT: ICD-10-PCS | Mod: 26,RT,, | Performed by: RADIOLOGY

## 2020-10-01 PROCEDURE — 99024 POSTOP FOLLOW-UP VISIT: CPT | Mod: S$GLB,,, | Performed by: UROLOGY

## 2020-10-01 PROCEDURE — 99999 PR PBB SHADOW E&M-EST. PATIENT-LVL V: ICD-10-PCS | Mod: PBBFAC,,, | Performed by: ORTHOPAEDIC SURGERY

## 2020-10-01 PROCEDURE — 3074F PR MOST RECENT SYSTOLIC BLOOD PRESSURE < 130 MM HG: ICD-10-PCS | Mod: CPTII,S$GLB,, | Performed by: ORTHOPAEDIC SURGERY

## 2020-10-01 PROCEDURE — 3008F BODY MASS INDEX DOCD: CPT | Mod: CPTII,S$GLB,, | Performed by: UROLOGY

## 2020-10-01 PROCEDURE — 99999 PR PBB SHADOW E&M-EST. PATIENT-LVL V: ICD-10-PCS | Mod: PBBFAC,,, | Performed by: UROLOGY

## 2020-10-01 PROCEDURE — 3008F BODY MASS INDEX DOCD: CPT | Mod: CPTII,S$GLB,, | Performed by: ORTHOPAEDIC SURGERY

## 2020-10-01 PROCEDURE — 99999 PR PBB SHADOW E&M-EST. PATIENT-LVL V: CPT | Mod: PBBFAC,,, | Performed by: ORTHOPAEDIC SURGERY

## 2020-10-01 PROCEDURE — 99203 OFFICE O/P NEW LOW 30 MIN: CPT | Mod: S$GLB,,, | Performed by: ORTHOPAEDIC SURGERY

## 2020-10-01 PROCEDURE — 1159F PR MEDICATION LIST DOCUMENTED IN MEDICAL RECORD: ICD-10-PCS | Mod: S$GLB,,, | Performed by: ORTHOPAEDIC SURGERY

## 2020-10-01 PROCEDURE — 1159F PR MEDICATION LIST DOCUMENTED IN MEDICAL RECORD: ICD-10-PCS | Mod: S$GLB,,, | Performed by: UROLOGY

## 2020-10-01 NOTE — PATIENT INSTRUCTIONS
Assessment:  Smitha Salinas is a 70 y.o. female referred by Dr. Hopper for right foot pain x 1 year, with a hx of RA.   Right foot plantar fasciitis    Posterior tibialis tendon dysfunction   Pes planus     Encounter Diagnoses   Name Primary?    Plantar fasciitis of right foot Yes    Pes planus, unspecified laterality     PTTD (posterior tibial tendon dysfunction)       Plan:   Physical therapy at [facility] with Jas    Voltaren gel   · Varsity sports shoe referral   · Referral to Podiatry for possible custom mold inserts.  · Follow up in 4 weeks for recheck     Follow-up: 4 weeks or sooner if there are any problems between now and then.    Thank you for choosing [facility] and Dr. Jamie Ge for your orthopedic & sports medicine care. It is our goal to provide you with exceptional care that will help keep you healthy, active, and get you back in the game.    If you felt that you received exemplary care today, please consider leaving us feedback on [employer] at [email or web address]     Please do not hesitate to reach out to us via email, phone, or MyChart with any questions, concerns, or feedback.    If you are experiencing pain/discomfort ,or have questions after 5pm and would like to be connected to the [facility] on-call team, please call this number and specify which Sports Medicine provider is treating you: [phone]

## 2020-10-01 NOTE — PROGRESS NOTES
Chief Complaint: Incontinence    HPI:   10/1/20- patient is here for InterStim removal.  It definitely reduced her symptoms.  09/29/2020- patient is here today for InterStim test trial.  9/23/20- patient is in today to discuss definitive management versus urge incontinence.  She is status post urodynamics.  8/5/20- patient reports to discuss incontinence therapy, she has failed medical therapy.  Patient states she gets up 1 time per evening, going every 2-3 hours during the daytime.  No gross hematuria, no microscopic hematuria, she does have a history of smoking.  She is changing her pads a lot, stay dry.  She has transient is not as well.  No stress incontinence.  7/23/20: Returns as a new pt again with complaints of OAB/incontinence.  Myrbetriq has worked well for a long time and not needing pads at times. Daytime is main problem.  No caffeine.  No chocolate.  No spicy foods.  No gross hematuria.  Gained 10-15 lbs last 4 years  1/20/16: Oxybutinin poorly tolerated with drowsiness and other effects.  Now on ER version not effective enough.  Chocolate found to be a bladder irritant.  Thinks she needs to address her diagnosed sleep apnea.  6/29/15: Pt returns with worsening OAB and having to use 1-2 pads a day but having to use thicker bigger pads.  Urgency is frequent and very bothersome. Did go to PFMT.  The urgency is all of a sudden and very severe.  Leaks on the way to the toilet 8/10 times. No hematuria. No UTI.  12/26/13: 62 yo woman had a procedure with Dr. Lamar for UI and OAB.  Before this she would use one pad a day or less and some months without needing a pad at all.  It was more squirts and not filling the pads up.  She can't say what was done.  She didn't need a pad at all after that for about a year and she was on oxybutinin during this period and not the Detrol she was on originally.  About 6-7 months ago she started needing a pad from time to time, now daily and with multiple pads; 2-3 a day.  She  has been off of the oxybutinin for 4-5 days and the leakage was especially bad.  She got a refill from Dr. Lamar and is back to one a day that stays dry for the most part.  Has not tried PFMT.  She wants to be perfectly dry.  No urolithiasis history.  No UTI history.  No hematuria.  Not sexually active.    Allergies:  Valdecoxib    Medications: has a current medication list which includes the following prescription(s): acyclovir, amlodipine, aspirin, bumetanide, cholecalciferol (vitamin d3), clonidine, cyclobenzaprine, diazepam, erythromycin, estradiol, levocetirizine, myrbetriq, nucynta er, ofloxacin, ondansetron, oxycodone, oxycodone-acetaminophen, polyethylene glycol, potassium chloride sa, pravastatin, prednisone, actemra, tramadol, and rinvoq.    Review of Systems:  General: No fever, chills, fatigability, or weight loss.  Skin: No rashes, itching, or changes in color or texture of skin.  Chest: Denies ADLER, cyanosis, wheezing, cough, and sputum production.  Abdomen: Appetite fine. No weight loss. Denies diarrhea, abdominal pain, hematemesis, or blood in stool.  Musculoskeletal: No joint stiffness or swelling. Some back pain.  : As above.  All other review of systems negative.    PMH:   has a past medical history of Adult ADHD, Chronic rheumatic arthritis, Colon polyps, Ex-smoker, Genital herpes, Herpes zoster infection, Hyperlipidemia, Hypertension, Immunosuppressed status, Lichen sclerosus et atrophicus, SUKHDEEP (obstructive sleep apnea), Osteopenia, and Type 2 diabetes mellitus.    PSH:   has a past surgical history that includes Bladder surgery (8/2012); Tonsillectomy (1958); Skin graft (1965); Cryotherapy (1980); Breast biopsy (Left); Colonoscopy (N/A, 2/12/2020); and Esophagogastroduodenoscopy (N/A, 2/12/2020).    FamHx: family history includes Breast cancer in her sister; Cancer (age of onset: 39) in her sister; Cancer (age of onset: 68) in her other; Diabetes in her mother; Heart disease in her mother;  Kidney disease in her father; Stroke in her father, maternal grandmother, paternal grandfather, and paternal grandmother.    SocHx:  reports that she quit smoking about 7 years ago. She has never used smokeless tobacco. She reports current alcohol use. She reports that she does not use drugs.     Physical Exam:  Vitals:   Vitals:    10/01/20 1602   BP: (!) 148/70   Pulse: 62   Temp: 99 °F (37.2 °C)     General: A&Ox3. No apparent distress. No deformities.  Neck: No masses. Normal thyroid.  Lungs: CTA samia. No use of accessory muscles.  Heart: RRR. No arrhythmias.  Abdomen: Soft. NT. ND.  Skin: The skin is warm and dry. No jaundice.  Ext: No c/c/e.  :   12/26/13: External genitalia normal. No lesions. Meatus normal size and location. Urethra normal. No masses. Bladder normal. No fullness or masses. Vagina normal with no discharge or lesions. Anus/perineum normal. Uterus and adnexa no palpable abnormalities.    Labs/Studies:   UDS- decreased capacity otherwise normal 9/20  Percutaneous InterStim 09/29/2020  Bladder Scan performed in office:     12/26/13: PVR 35 ml.  KUB/GENIE normal 8/20    Impression/Plan:   1.  Urge incontinence- leads have been removed and she stated it improved her symptoms.  She would like to proceed with a stage 1 and 2 InterStim.    Patient understands the risks, benefits and alternatives of the above-stated procedure.  These include but are not limited to damage to the surrounding structures including the subcutaneous soft tissue, spinal nerves, risk of infection requiring removal, risk of it not working sufficiently enough to control her incontinence.  Pain at the lead or device site.  Need for removal for discomfort or readjustment.  Patient understands that she will have to adjust her leads, to control her symptoms.  Patient understands the risks of heart attack, stroke, death, DVT and PE.  Patient understands the risk of bleeding and hematoma formation.  Patient understanding of all the  above has elected to pursue the procedure as stated.

## 2020-10-05 ENCOUNTER — TELEPHONE (OUTPATIENT)
Dept: PREADMISSION TESTING | Facility: HOSPITAL | Age: 70
End: 2020-10-05

## 2020-10-05 RX ORDER — DICLOFENAC SODIUM 10 MG/G
2 GEL TOPICAL 3 TIMES DAILY
Qty: 1 TUBE | Refills: 0 | Status: SHIPPED | OUTPATIENT
Start: 2020-10-05 | End: 2023-01-17 | Stop reason: SDUPTHER

## 2020-10-05 NOTE — TELEPHONE ENCOUNTER
Spoke with pt. States she is having a diverticulitis flare up, unsure if she will have surgery on 10/12. Agreed to scheduled PAT appt- will call if unable to make it.

## 2020-10-06 ENCOUNTER — TELEPHONE (OUTPATIENT)
Dept: RHEUMATOLOGY | Facility: CLINIC | Age: 70
End: 2020-10-06

## 2020-10-06 NOTE — TELEPHONE ENCOUNTER
----- Message from Kiel Todd sent at 10/6/2020 10:47 AM CDT -----  Regarding: pt  .Type:  Needs Medical Advice    Who Called: pt   Symptoms (please be specific): Excoriating pain in her right knee    How long has patient had these symptoms:  yesterday 10/5/20  Pharmacy name and phone #:  [facility] [address]    [address] [facility] [address]  Telephone        Fax  [phone]      Would the patient rather a call back or a response via [facility]? Call back   Best Call Back Number: [phone] (home)   Additional Information:   Pt stated she took a percocet on today to help with the pain.         Thank you,   Kiel Todd

## 2020-10-07 ENCOUNTER — PATIENT MESSAGE (OUTPATIENT)
Dept: SURGERY | Facility: HOSPITAL | Age: 70
End: 2020-10-07

## 2020-10-07 ENCOUNTER — HOSPITAL ENCOUNTER (OUTPATIENT)
Dept: PREADMISSION TESTING | Facility: HOSPITAL | Age: 70
Discharge: HOME | End: 2020-10-07
Attending: UROLOGY
Payer: MEDICARE

## 2020-10-07 ENCOUNTER — OFFICE VISIT (OUTPATIENT)
Dept: RHEUMATOLOGY | Facility: CLINIC | Age: 70
End: 2020-10-07
Payer: MEDICARE

## 2020-10-07 ENCOUNTER — HOSPITAL ENCOUNTER (OUTPATIENT)
Dept: RADIOLOGY | Facility: HOSPITAL | Age: 70
Discharge: HOME | End: 2020-10-07
Attending: UROLOGY
Payer: MEDICARE

## 2020-10-07 VITALS
WEIGHT: 185.19 LBS | DIASTOLIC BLOOD PRESSURE: 71 MMHG | BODY MASS INDEX: 30.82 KG/M2 | HEART RATE: 83 BPM | SYSTOLIC BLOOD PRESSURE: 103 MMHG

## 2020-10-07 VITALS
SYSTOLIC BLOOD PRESSURE: 128 MMHG | OXYGEN SATURATION: 98 % | TEMPERATURE: 97 F | RESPIRATION RATE: 16 BRPM | HEART RATE: 73 BPM | DIASTOLIC BLOOD PRESSURE: 56 MMHG

## 2020-10-07 DIAGNOSIS — Z01.818 PREOP TESTING: Primary | ICD-10-CM

## 2020-10-07 DIAGNOSIS — M19.90 OSTEOARTHRITIS, UNSPECIFIED OSTEOARTHRITIS TYPE, UNSPECIFIED SITE: ICD-10-CM

## 2020-10-07 DIAGNOSIS — Z01.818 PRE-OP TESTING: ICD-10-CM

## 2020-10-07 DIAGNOSIS — G47.33 OSA (OBSTRUCTIVE SLEEP APNEA): ICD-10-CM

## 2020-10-07 DIAGNOSIS — M05.9 SEROPOSITIVE RHEUMATOID ARTHRITIS: Primary | ICD-10-CM

## 2020-10-07 DIAGNOSIS — D84.9 IMMUNOSUPPRESSED STATUS: ICD-10-CM

## 2020-10-07 DIAGNOSIS — N39.41 URGE INCONTINENCE: ICD-10-CM

## 2020-10-07 DIAGNOSIS — M06.9 RHEUMATOID ARTHRITIS OF HAND, UNSPECIFIED LATERALITY, UNSPECIFIED WHETHER RHEUMATOID FACTOR PRESENT: Chronic | ICD-10-CM

## 2020-10-07 DIAGNOSIS — Z71.89 COUNSELING ON HEALTH PROMOTION AND DISEASE PREVENTION: ICD-10-CM

## 2020-10-07 DIAGNOSIS — Z79.899 HIGH RISK MEDICATION USE: ICD-10-CM

## 2020-10-07 DIAGNOSIS — B02.29 POST HERPETIC NEURALGIA: ICD-10-CM

## 2020-10-07 PROBLEM — R11.2 NAUSEA AND VOMITING: Status: ACTIVE | Noted: 2020-10-07

## 2020-10-07 LAB
ALBUMIN SERPL BCP-MCNC: 4.3 G/DL (ref 3.5–5.2)
ALP SERPL-CCNC: 86 U/L (ref 55–135)
ALT SERPL W/O P-5'-P-CCNC: 25 U/L (ref 10–44)
ANION GAP SERPL CALC-SCNC: 12 MMOL/L (ref 8–16)
AST SERPL-CCNC: 19 U/L (ref 10–40)
BASOPHILS # BLD AUTO: 0.03 K/UL (ref 0–0.2)
BASOPHILS NFR BLD: 0.4 % (ref 0–1.9)
BILIRUB SERPL-MCNC: 0.3 MG/DL (ref 0.1–1)
BUN SERPL-MCNC: 36 MG/DL (ref 8–23)
CALCIUM SERPL-MCNC: 9.5 MG/DL (ref 8.7–10.5)
CHLORIDE SERPL-SCNC: 108 MMOL/L (ref 95–110)
CO2 SERPL-SCNC: 21 MMOL/L (ref 23–29)
CREAT SERPL-MCNC: 1.1 MG/DL (ref 0.5–1.4)
DIFFERENTIAL METHOD: NORMAL
EOSINOPHIL # BLD AUTO: 0.1 K/UL (ref 0–0.5)
EOSINOPHIL NFR BLD: 0.9 % (ref 0–8)
ERYTHROCYTE [DISTWIDTH] IN BLOOD BY AUTOMATED COUNT: 14 % (ref 11.5–14.5)
EST. GFR  (AFRICAN AMERICAN): 59 ML/MIN/1.73 M^2
EST. GFR  (NON AFRICAN AMERICAN): 51 ML/MIN/1.73 M^2
GLUCOSE SERPL-MCNC: 141 MG/DL (ref 70–110)
HCT VFR BLD AUTO: 42.9 % (ref 37–48.5)
HGB BLD-MCNC: 14 G/DL (ref 12–16)
IMM GRANULOCYTES # BLD AUTO: 0.02 K/UL (ref 0–0.04)
IMM GRANULOCYTES NFR BLD AUTO: 0.2 % (ref 0–0.5)
LYMPHOCYTES # BLD AUTO: 1.7 K/UL (ref 1–4.8)
LYMPHOCYTES NFR BLD: 21 % (ref 18–48)
MCH RBC QN AUTO: 28 PG (ref 27–31)
MCHC RBC AUTO-ENTMCNC: 32.6 G/DL (ref 32–36)
MCV RBC AUTO: 86 FL (ref 82–98)
MONOCYTES # BLD AUTO: 1 K/UL (ref 0.3–1)
MONOCYTES NFR BLD: 11.8 % (ref 4–15)
NEUTROPHILS # BLD AUTO: 5.4 K/UL (ref 1.8–7.7)
NEUTROPHILS NFR BLD: 65.9 % (ref 38–73)
NRBC BLD-RTO: 0 /100 WBC
PLATELET # BLD AUTO: 209 K/UL (ref 150–350)
PMV BLD AUTO: 10.8 FL (ref 9.2–12.9)
POTASSIUM SERPL-SCNC: 3.7 MMOL/L (ref 3.5–5.1)
PROT SERPL-MCNC: 7.4 G/DL (ref 6–8.4)
RBC # BLD AUTO: 5 M/UL (ref 4–5.4)
SODIUM SERPL-SCNC: 141 MMOL/L (ref 136–145)
WBC # BLD AUTO: 8.15 K/UL (ref 3.9–12.7)

## 2020-10-07 PROCEDURE — 99999 PR PBB SHADOW E&M-EST. PATIENT-LVL IV: CPT | Mod: PBBFAC,,, | Performed by: INTERNAL MEDICINE

## 2020-10-07 PROCEDURE — 1159F MED LIST DOCD IN RCRD: CPT | Mod: S$GLB,,, | Performed by: INTERNAL MEDICINE

## 2020-10-07 PROCEDURE — 3008F PR BODY MASS INDEX (BMI) DOCUMENTED: ICD-10-PCS | Mod: CPTII,S$GLB,, | Performed by: INTERNAL MEDICINE

## 2020-10-07 PROCEDURE — 85025 COMPLETE CBC W/AUTO DIFF WBC: CPT

## 2020-10-07 PROCEDURE — 1101F PT FALLS ASSESS-DOCD LE1/YR: CPT | Mod: CPTII,S$GLB,, | Performed by: INTERNAL MEDICINE

## 2020-10-07 PROCEDURE — 20610 LARGE JOINT ASPIRATION/INJECTION: R KNEE: ICD-10-PCS | Mod: RT,S$GLB,, | Performed by: INTERNAL MEDICINE

## 2020-10-07 PROCEDURE — 20610 DRAIN/INJ JOINT/BURSA W/O US: CPT | Mod: RT,S$GLB,, | Performed by: INTERNAL MEDICINE

## 2020-10-07 PROCEDURE — 3078F DIAST BP <80 MM HG: CPT | Mod: CPTII,S$GLB,, | Performed by: INTERNAL MEDICINE

## 2020-10-07 PROCEDURE — 71046 XR CHEST PA AND LATERAL: ICD-10-PCS | Mod: 26,,, | Performed by: RADIOLOGY

## 2020-10-07 PROCEDURE — 80053 COMPREHEN METABOLIC PANEL: CPT

## 2020-10-07 PROCEDURE — 3078F PR MOST RECENT DIASTOLIC BLOOD PRESSURE < 80 MM HG: ICD-10-PCS | Mod: CPTII,S$GLB,, | Performed by: INTERNAL MEDICINE

## 2020-10-07 PROCEDURE — 71046 X-RAY EXAM CHEST 2 VIEWS: CPT | Mod: 26,,, | Performed by: RADIOLOGY

## 2020-10-07 PROCEDURE — 99214 OFFICE O/P EST MOD 30 MIN: CPT | Mod: 25,S$GLB,, | Performed by: INTERNAL MEDICINE

## 2020-10-07 PROCEDURE — 93010 ELECTROCARDIOGRAM REPORT: CPT | Mod: ,,, | Performed by: INTERNAL MEDICINE

## 2020-10-07 PROCEDURE — 1159F PR MEDICATION LIST DOCUMENTED IN MEDICAL RECORD: ICD-10-PCS | Mod: S$GLB,,, | Performed by: INTERNAL MEDICINE

## 2020-10-07 PROCEDURE — 99999 PR PBB SHADOW E&M-EST. PATIENT-LVL IV: ICD-10-PCS | Mod: PBBFAC,,, | Performed by: INTERNAL MEDICINE

## 2020-10-07 PROCEDURE — 1125F PR PAIN SEVERITY QUANTIFIED, PAIN PRESENT: ICD-10-PCS | Mod: S$GLB,,, | Performed by: INTERNAL MEDICINE

## 2020-10-07 PROCEDURE — 3008F BODY MASS INDEX DOCD: CPT | Mod: CPTII,S$GLB,, | Performed by: INTERNAL MEDICINE

## 2020-10-07 PROCEDURE — 1125F AMNT PAIN NOTED PAIN PRSNT: CPT | Mod: S$GLB,,, | Performed by: INTERNAL MEDICINE

## 2020-10-07 PROCEDURE — 93010 EKG 12-LEAD: ICD-10-PCS | Mod: ,,, | Performed by: INTERNAL MEDICINE

## 2020-10-07 PROCEDURE — 71046 X-RAY EXAM CHEST 2 VIEWS: CPT | Mod: TC

## 2020-10-07 PROCEDURE — 3074F SYST BP LT 130 MM HG: CPT | Mod: CPTII,S$GLB,, | Performed by: INTERNAL MEDICINE

## 2020-10-07 PROCEDURE — 93005 ELECTROCARDIOGRAM TRACING: CPT

## 2020-10-07 PROCEDURE — 99214 PR OFFICE/OUTPT VISIT, EST, LEVL IV, 30-39 MIN: ICD-10-PCS | Mod: 25,S$GLB,, | Performed by: INTERNAL MEDICINE

## 2020-10-07 PROCEDURE — 1101F PR PT FALLS ASSESS DOC 0-1 FALLS W/OUT INJ PAST YR: ICD-10-PCS | Mod: CPTII,S$GLB,, | Performed by: INTERNAL MEDICINE

## 2020-10-07 PROCEDURE — 3074F PR MOST RECENT SYSTOLIC BLOOD PRESSURE < 130 MM HG: ICD-10-PCS | Mod: CPTII,S$GLB,, | Performed by: INTERNAL MEDICINE

## 2020-10-07 RX ORDER — DIAZEPAM 5 MG/1
5 TABLET ORAL ONCE AS NEEDED
Status: CANCELLED | OUTPATIENT
Start: 2020-10-07 | End: 2032-03-05

## 2020-10-07 RX ORDER — SODIUM CHLORIDE, SODIUM LACTATE, POTASSIUM CHLORIDE, CALCIUM CHLORIDE 600; 310; 30; 20 MG/100ML; MG/100ML; MG/100ML; MG/100ML
INJECTION, SOLUTION INTRAVENOUS
Status: CANCELLED | OUTPATIENT
Start: 2020-10-07 | End: 2020-10-07

## 2020-10-07 RX ORDER — TRIAMCINOLONE ACETONIDE 40 MG/ML
40 INJECTION, SUSPENSION INTRA-ARTICULAR; INTRAMUSCULAR
Status: DISCONTINUED | OUTPATIENT
Start: 2020-10-07 | End: 2020-10-07 | Stop reason: HOSPADM

## 2020-10-07 RX ADMIN — TRIAMCINOLONE ACETONIDE 40 MG: 40 INJECTION, SUSPENSION INTRA-ARTICULAR; INTRAMUSCULAR at 12:10

## 2020-10-07 NOTE — ASSESSMENT & PLAN NOTE
The patient is scheduled for a Percutaneous InterStim lead placement on 10/12 20 by Dr. Rawls     Known risk factors for perioperative complications:     Immunocompromised 2/2 DMARD  DM  HTN      Difficulty with intubation is not anticipated.    Cardiac Risk Estimation:     1.) Preoperative workup as follows: chest x-ray, ECG, hemoglobin, hematocrit, electrolytes, creatinine, glucose.  2.) Change in medication regimen before surgery: discontinue ASA 6 days before surgery  3.) Prophylaxis for cardiac events with perioperative beta-blockers: not indicated.  4.) Invasive hemodynamic monitoring perioperatively: not indicated.  5.) Deep vein thrombosis prophylaxis postoperatively: regimen to be chosen by surgical team.  6.) Surveillance for postoperative MI with ECG immediately postoperatively and on postoperati ve days 1 and 2 AND troponin levels 24 hours postoperatively and on day 4 or hospital discharge (whichever comes first): not indicated.  7.) Current medications which may produce withdrawal symptoms if withheld perioperatively: none   8.) Other measures: None

## 2020-10-07 NOTE — PROGRESS NOTES
RHEUMATOLOGY OUTPATIENT CLINIC NOTE    10/7/2020    Attending Rheumatologist: Ab England  Primary Care Provider: Jayesh Jaramillo MD   Physician Requesting Consultation: No referring provider defined for this encounter.  Chief Complaint/Reason For Consultation:  Knee Pain    Subjective:       HPI  Smitha Salinas is a 70 y.o. Black or  female with seropositive rheumatoid arthritis comes for follow-up.    Today:  Last seen mid September.  Was awaiting Actemra which received later that month.  Resumed biologic without side effects.  Main complaint is worsening right knee joint pain.  Onset Monday, no associated with any particular precipitating event.  Worst in the evening, aggravated by range of motion/weight bearing, relieved somewhat by rest and OTC pain medication.  Denies association with prolonged morning stiffness, redness, or joint swelling.     Review of Systems   Constitutional: Negative for chills, fever and malaise/fatigue.   Eyes: Negative for pain and redness.   Respiratory: Negative for cough, hemoptysis and shortness of breath.    Cardiovascular: Negative for chest pain and leg swelling.   Gastrointestinal: Negative for abdominal pain, blood in stool and melena.   Genitourinary: Negative for dysuria and hematuria.   Musculoskeletal: Positive for joint pain (Right knee, mechanical pattern.). Negative for falls.   Skin: Negative for rash.   Neurological: Negative for tingling, focal weakness and weakness.   Psychiatric/Behavioral: Negative for memory loss. The patient does not have insomnia.      Chronic comorbid conditions affecting medical decision making today:  Past Medical History:   Diagnosis Date    [facility]    Chronic rheumatic arthritis     dr tai    Colon polyps     Ex-smoker     Genital herpes     Herpes zoster infection     Hyperlipidemia     Hypertension     Immunosuppressed status     Lichen sclerosus et atrophicus     SUKHDEEP  (obstructive sleep apnea)     Osteopenia      wai (start fmax if on pred 3months or more)    Type 2 diabetes mellitus     dxd      Past Surgical History:   Procedure Laterality Date    BLADDER SURGERY  2012    Dr Claudia Lamar    BREAST BIOPSY Left     benign    COLONOSCOPY N/A 2020    Procedure: COLONOSCOPY;  Surgeon: Eloina Castro MD;  Location: [facility];  Service: Endoscopy;  Laterality: N/A;    CRYOTHERAPY      ESOPHAGOGASTRODUODENOSCOPY N/A 2020    Procedure: EGD (ESOPHAGOGASTRODUODENOSCOPY);  Surgeon: Eloina Castro MD;  Location: [facility] ENDO;  Service: Endoscopy;  Laterality: N/A;    SKIN GRAFT      laceration to right  fingers  from thigh     TONSILLECTOMY       Family History   Problem Relation Age of Onset    Heart disease Mother     Diabetes Mother     Stroke Father     Kidney disease Father     Cancer Sister 39        breast    Breast cancer Sister     Cancer Other 68        breast    Stroke Maternal Grandmother     Stroke Paternal Grandmother     Stroke Paternal Grandfather     Colon cancer Neg Hx     Ovarian cancer Neg Hx      Social History     Substance and Sexual Activity   Alcohol Use Yes    Alcohol/week: 0.0 standard drinks     Social History     Tobacco Use   Smoking Status Former Smoker    Quit date: 2013    Years since quittin.2   Smokeless Tobacco Never Used   Tobacco Comment    smokes for a few weeks per year - when under pressure. has been abstinent times years at a time. a pack lasts about a week     Social History     Substance and Sexual Activity   Drug Use No       Current Outpatient Medications:     acyclovir (ZOVIRAX) 400 MG tablet, Take 1 tablet (400 mg total) by mouth 2 (two) times daily., Disp: 180 tablet, Rfl: 4    amLODIPine (NORVASC) 5 MG tablet, Take 1 tablet (5 mg total) by mouth once daily., Disp: 90 tablet, Rfl: 4    aspirin (ECOTRIN) 81 MG EC tablet, Take 81 mg by mouth once daily., Disp: , Rfl:      bumetanide (BUMEX) 1 MG tablet, 1 tablet(s) by mouth daily, Disp: , Rfl:     cholecalciferol, vitamin D3, 1,000 unit capsule, Take 1 capsule by mouth once daily., Disp: , Rfl:     cloNIDine (CATAPRES) 0.2 MG tablet, TAKE 1/2 TABLET IN THE MORNING, 1/2 TABLET AT NOON, THEN 2 TABLETS AT BEDTIME, Disp: 270 tablet, Rfl: 3    cyclobenzaprine (FLEXERIL) 10 MG tablet, 1/2 to 1 po tid prn muscle spasm, Disp: 60 tablet, Rfl: 5    diazePAM (VALIUM) 5 MG tablet, TAKE 1 TABLET BY MOUTH AT BEDTIME FOR ANXIETY, Disp: 30 tablet, Rfl: 5    diclofenac sodium (VOLTAREN) 1 % Gel, Apply 2 g topically 3 (three) times daily., Disp: 1 Tube, Rfl: 0    erythromycin (ROMYCIN) ophthalmic ointment, , Disp: , Rfl:     estradioL (ESTRACE) 0.01 % (0.1 mg/gram) vaginal cream, Place 1 g vaginally twice a week., Disp: 42.5 g, Rfl: 3    levocetirizine (XYZAL) 5 MG tablet, Take 1 tablet (5 mg total) by mouth nightly as needed for Allergies., Disp: 90 tablet, Rfl: 4    mirabegron (MYRBETRIQ) 50 mg Tb24, Take 1 tablet (50 mg total) by mouth once daily., Disp: 30 tablet, Rfl: 11    NUCYNTA ER 50 mg Tb12, Take 1 tablet by mouth every 12 (twelve) hours., Disp: , Rfl:     ofloxacin (OCUFLOX) 0.3 % ophthalmic solution, , Disp: , Rfl:     ondansetron (ZOFRAN-ODT) 4 MG TbDL, Take 1 tablet (4 mg total) by mouth every 8 (eight) hours as needed (nausea)., Disp: 10 tablet, Rfl: 1    oxyCODONE (ROXICODONE) 5 MG immediate release tablet, Take 5 mg by mouth every 4 (four) hours as needed for Pain., Disp: , Rfl:     oxyCODONE-acetaminophen (PERCOCET) 5-325 mg per tablet, TK 1 T PO  Q DAY PRN, Disp: , Rfl:     polyethylene glycol (GLYCOLAX) 17 gram/dose powder, Miralax 17 gram/dose oral powder  use as directed, Disp: , Rfl:     potassium chloride SA (K-DUR,KLOR-CON) 20 MEQ tablet, Take 1 tablet (20 mEq total) by mouth once daily. 1 tablet,ER particles/crystals Oral Twice a day, Disp: 90 tablet, Rfl: 5    pravastatin (PRAVACHOL) 40 MG tablet, Take  "1 tablet (40 mg total) by mouth once daily., Disp: 90 tablet, Rfl: 3    predniSONE (DELTASONE) 5 MG tablet, Take 6 tablets x 1 day, then 5 tablets x 1 day, then 4 tablets x 1 day, then 3 tablets x 1 day, then 2 tablets x 1 days, then 1 tablet x 1 day, Disp: 21 tablet, Rfl: 1    tocilizumab (ACTEMRA) 162 mg/0.9 mL injection, Inject 0.9 mLs (162 mg total) into the skin once a week., Disp: 10.8 mL, Rfl: 0    traMADol (ULTRAM) 50 mg tablet, Take 1 tablet (50 mg total) by mouth every 6 (six) hours as needed for Pain., Disp: 60 tablet, Rfl: 5    upadacitinib (RINVOQ) 15 mg 24 hr tablet, Take 1 tablet (15 mg total) by mouth once daily., Disp: 30 tablet, Rfl: 2     Objective:         Vitals:    10/07/20 1229   BP: 103/71   Pulse: 83     Physical Exam   Constitutional: No distress.   Estimated body mass index is 30.82 kg/m² as calculated from the following:    Height as of 10/1/20: 5' 5" (1.651 m).    Weight as of this encounter: 84 kg (185 lb 3 oz).    Wt Readings from Last 1 Encounters:  10/07/20 1229 : 84 kg (185 lb 3 oz)     HENT:   Head: Normocephalic and atraumatic.   Eyes: Conjunctivae are normal. Pupils are equal, round, and reactive to light.   Neck: Normal range of motion.   Cardiovascular: Normal rate and intact distal pulses.    Pulmonary/Chest: Effort normal. No respiratory distress.   Abdominal: Soft. She exhibits no distension.   Neurological: She is alert.   Antalgic gait, favoring left knee.   Skin: No rash noted. No erythema.     Musculoskeletal: Normal range of motion. Deformity (Heberden's, Jose's.  [place]-neck deformity, hallux valgus.) present. No tenderness.      Comments: :  Able to make full fist without difficulty.  No squeeze tenderness appreciated.    AROM: intact  PROM: intact    Devices used by patient: none       Reviewed old and all outside pertinent medical records available.    All lab results personally reviewed and interpreted by me.  Lab Results   Component Value Date    WBC " 5.85 07/08/2020    HGB 13.3 07/08/2020    HCT 43.1 07/08/2020    MCV 88 07/08/2020    MCH 27.3 07/08/2020    MCHC 30.9 (L) 07/08/2020    RDW 14.1 07/08/2020     (L) 07/08/2020    MPV 14.9 (H) 07/08/2020    PLTEST Adequate 05/14/2013       Lab Results   Component Value Date     07/08/2020    K 4.8 07/08/2020     07/08/2020    CO2 28 07/08/2020    GLU 84 07/08/2020    BUN 17 07/08/2020    CALCIUM 9.8 07/08/2020    PROT 7.4 07/08/2020    ALBUMIN 4.5 07/08/2020    BILITOT 0.4 07/08/2020    AST 21 07/08/2020    ALKPHOS 92 07/08/2020    ALT 36 07/08/2020       Lab Results   Component Value Date    COLORU Yellow 07/23/2020    APPEARANCEUA Cloudy (A) 12/26/2013    SPECGRAV 1.025 07/23/2020    PHUR 5 07/23/2020    PROTEINUA Negative 12/26/2013    KETONESU n 07/23/2020    LEUKOCYTESUR Negative 12/26/2013    NITRITE n 07/23/2020    UROBILINOGEN n 07/23/2020       Lab Results   Component Value Date    CRP <0.1 07/08/2020       Lab Results   Component Value Date    SEDRATE <2 07/08/2020       Lab Results   Component Value Date     (H) 03/19/2012    SEDRATE <2 07/08/2020       No components found for: 25OHVITDTOT, 36JYNEDZ1, 49XKAMLM1, METHODNOTE    Lab Results   Component Value Date    URICACID 4.3 01/29/2020       No components found for: TSPOTTB    Hemoglobin A1c 6.5% September 2020    Rheum Labs:   CCP 1123.  Rheumatoid factor 170    Infectious Labs:   Hepatitis profile nonreactive September 2019    QuantiFERON TB negative September 2019   HIV nonreactive May 2008    Imaging:  All imaging reviewed and independently interpreted by me.    X-ray hands September 2019  Degenerative changes are seen scattered throughout the right hand and most prominent at the 1st interphalangeal joint, 1st CMC joint and DIP joint of the 3rd digit of the right hand.  The degenerative changes have progressed since the 2012 examination.  There is also a stable probable well corticated erosion within the trapezium.  No new  erosive changes are identified.  Mild degenerative changes present at the 1st and 2nd MCP joints.    X-ray knee January 2020  Degenerative changes     X-ray foot September 2020  Bony demineralization noted.  Prominent hallux valgus deformity present with 1st MTP joint degenerative findings and 1st metatarsal median eminence prominence.  Mild mid and hindfoot degenerative findings.  Dorsal and plantar calcaneal enthesophytes present.  No acute osseous abnormality or focal soft tissue abnormality. No periarticular erosions.     X-ray knee January 2020   Degenerative changes      ASSESSMENT / PLAN:     Smitha Salinas is a 70 y.o. Black or  female with:    1.  Seropositive, erosive rheumatoid arthritis.  - SDAI/CDAI:  Mild disease activity.  Current complaints mostly consistent with right knee DJD  - documented for recurrent flares of herpes while on methotrexate.  Received Rinvoq but never took medication.  - resumed Actemra and tolerating without side effects  - may continue Naproxen short course PRN no more than 2 weeks  - range of motion, flexibility, and strengthening exercises  - local corticosteroid injection provided right knee today  - awaiting physical therapy for knee DJD  - Will consider low dose PDN 5-10mg daily PRN, and adding sulfasalazine or Arava to current biologic if refractory RA    2. High risk medication use  - Compromised immune system secondary to autoimmune disease and use of immunosuppressive drugs.   - Monitor carefully for infections and toxicities.   - Advised to get immediate medical care if any infection.   - Also advised strict adherence to age appropriate vaccinations and cancer screenings with PCP.  - CBC auto differential; Standing  - Comprehensive metabolic panel; Standing    3. Counseling on health promotion and disease prevention  - Immunization counseling done.  - Nutrition and exercise counseling.  - Regular exercise:  Aerobic and resistance.  - Medication  counseling provided.    No follow-ups on file.   Has follow-up visit on December    Method of contact with patient concerns: [facility]    Disclaimer:  This note is prepared using voice recognition software and as such is likely to have errors and has not been proof read. Please contact me for questions.     Time spent: 25 minutes in face to face discussion concerning diagnosis, prognosis, review of lab and test results, benefits of treatment as well as management of disease, counseling of patient and coordination of care between various health care providers.  Greater than half the time spent was used for coordination of care and counseling of patient.    Large Joint Aspiration/Injection: R knee    Date/Time: 10/7/2020 12:40 PM  Performed by: Ab England MD  Authorized by: Ab England MD     Consent Done?:  Yes (Verbal)  Indications:  Pain  Site marked: the procedure site was marked    Timeout: prior to procedure the correct patient, procedure, and site was verified    Prep: patient was prepped and draped in usual sterile fashion    Local anesthetic:  Lidocaine 2% without epinephrine    Details:  Needle Size:  25 G  Ultrasonic Guidance for needle placement?: No    Approach:  Anterolateral  Location:  Knee  Site:  R knee  Medications:  40 mg triamcinolone acetonide 40 mg/mL  Patient tolerance:  Patient tolerated the procedure well with no immediate complications      Ab England M.D.  Rheumatology Department   [facility]

## 2020-10-07 NOTE — DISCHARGE INSTRUCTIONS
To confirm, Your doctor has instructed you that surgery is scheduled for 10/12/20 *.       Please report to [facility] .  Pre admit office will call afternoon prior to surgery with final arrival time    INSTRUCTIONS IMPORTANT!!!   Do not eat, drink, or smoke after 12 midnight-including water. OK to brush teeth, no gum, candy or mints!    ¨ Take only these medicines with a small swallow of water-morning of surgery.  Amlodipine, Valium 10 mg.    Pre operative instructions:  Please review the Pre-Operative Instruction booklet that you were given.        Bathing Instructions--See page 6 in the Pre-operative booklet.      Prevention of surgical site infections:     -Keep incisions clean and dry.   -Do not soak/submerge incisions in water until completely healed.   -Do not apply lotions, powders, creams, or deodorants to site.   -Always make sure hands are cleaned with antibacterial soap/ alcohol-based  prior to touching the surgical site.  (This includes doctors, nurses, staff, and yourself.)    Signs and symptoms:   -Redness and pain around the area where you had surgery   -Drainage of cloudy fluid from your surgical wound   -Fever over 100.4       I have read or had read and explained to me, and understand the above information.  Additional comments or instructions:  Received a copy of Pre-operative instructions booklet, FAQ surgical site infection sheet, and packets of hibiclens (if indicated).

## 2020-10-07 NOTE — ASSESSMENT & PLAN NOTE
Pt had episode of N/V this past week- now resolved   Pt reports she has episodes of N/V and abdominal cramping 1-2 times per year  Pt had EGD Moderate Schatzki ring and Gastritis.   Encouraged use of PPI  F/U with GI

## 2020-10-07 NOTE — H&P
Preoperative History and Physical                                                             Hospital Medicine      Chief Complaint: Preoperative evaluation     History of Present Illness:      Smitha Salinas is a 70 y.o. female who presents to the office today for a preoperative consultation at the request of Dr. Rawls who plans on performing Percutaneous InterStim lead placement on 10/12/20     Functional Status:      The patient is able to climb a flight of stairs. The patient is able to ambulate 1-2 blocks without difficulty. The patient's functional status is not affected by the surgical problem. The patient's functional status is not affected by shortness of breath, chest pain, dyspnea on exertion and fatigue.    MET score greater than 4    Past Medical History:      Past Medical History:   Diagnosis Date    [facility]    Chronic rheumatic arthritis     on DMARD    Ex-smoker     Genital herpes     Hyperlipidemia     Hypertension     Immunosuppressed status     Lichen sclerosus et atrophicus     SUKHDEEP (obstructive sleep apnea)     Osteopenia     5/16 wai 5/18(start fmax if on pred 3months or more)    Type 2 diabetes mellitus     dxd 9/20        Past Surgical History:      Past Surgical History:   Procedure Laterality Date    BREAST BIOPSY Left     benign    COLONOSCOPY N/A 2/12/2020    Procedure: COLONOSCOPY;  Surgeon: Eloina Castro MD;  Location: [facility];  Service: Endoscopy;  Laterality: N/A;    CRYOTHERAPY  1980    ESOPHAGOGASTRODUODENOSCOPY N/A 2/12/2020    Procedure: EGD (ESOPHAGOGASTRODUODENOSCOPY);  Surgeon: Eloina Castro MD;  Location: [facility];  Service: Endoscopy;  Laterality: N/A;    SKIN GRAFT  1965    laceration to right  fingers  from thigh     TONSILLECTOMY  1958        Social History:      Social History     Socioeconomic History    Marital status:      Spouse name: Not on file    Number  of children: 1    Years of education: Not on file    Highest education level: Not on file   Occupational History    Occupation:      Employer: [employer]    Social Needs    Financial resource strain: Not on file    Food insecurity     Worry: Not on file     Inability: Not on file    Transportation needs     Medical: Not on file     Non-medical: Not on file   Tobacco Use    Smoking status: Former Smoker     Years: 15.00     Quit date: 2013     Years since quittin.2    Smokeless tobacco: Never Used    Tobacco comment: smokes for a few weeks per year - when under pressure. has been abstinent times years at a time. a pack lasts about a week   Substance and Sexual Activity    Alcohol use: Yes     Alcohol/week: 0.0 standard drinks     Frequency: Monthly or less     Drinks per session: 1 or 2    Drug use: No    Sexual activity: Not Currently     Birth control/protection: Post-menopausal   Lifestyle    Physical activity     Days per week: Not on file     Minutes per session: Not on file    Stress: Not on file   Relationships    Social connections     Talks on phone: Not on file     Gets together: Not on file     Attends Christianity service: Not on file     Active member of club or organization: Not on file     Attends meetings of clubs or organizations: Not on file     Relationship status: Not on file   Other Topics Concern    Not on file   Social History Narrative    Lives alone. Caffeine intake rare -1-2 per week of coffee. Does not have a Living Will or Advanced Directive        Family History:      Family History   Problem Relation Age of Onset    Heart disease Mother     Diabetes Mother     Peripheral vascular disease Mother         amputations    Stroke Father     Kidney failure Father     Breast cancer Sister 39    Cancer Other 68        breast    Stroke Maternal Grandmother     Stroke Paternal Grandmother     Stroke Paternal Grandfather     Colon cancer Neg Hx      Ovarian cancer Neg Hx        Allergies:      Review of patient's allergies indicates:   Allergen Reactions    Valdecoxib Other (See Comments)     palpitation       Medications:      Current Outpatient Medications   Medication Sig    acyclovir (ZOVIRAX) 400 MG tablet Take 1 tablet (400 mg total) by mouth 2 (two) times daily.    amLODIPine (NORVASC) 5 MG tablet Take 1 tablet (5 mg total) by mouth once daily.    cholecalciferol, vitamin D3, 1,000 unit capsule Take 1 capsule by mouth once daily.    cloNIDine (CATAPRES) 0.2 MG tablet TAKE 1/2 TABLET IN THE MORNING, 1/2 TABLET AT NOON, THEN 2 TABLETS AT BEDTIME (Patient taking differently: Take 0.6 mg by mouth every evening. TAKE 1/2 TABLET IN THE MORNING, 1/2 TABLET AT NOON, THEN 2 TABLETS AT BEDTIME)    cyclobenzaprine (FLEXERIL) 10 MG tablet 1/2 to 1 po tid prn muscle spasm    diazePAM (VALIUM) 5 MG tablet TAKE 1 TABLET BY MOUTH AT BEDTIME FOR ANXIETY (Patient taking differently: Take 5 mg by mouth daily as needed. TAKE 1 TABLET BY MOUTH AT BEDTIME FOR ANXIETY)    diclofenac sodium (VOLTAREN) 1 % Gel Apply 2 g topically 3 (three) times daily.    estradioL (ESTRACE) 0.01 % (0.1 mg/gram) vaginal cream Place 1 g vaginally twice a week.    mirabegron (MYRBETRIQ) 50 mg Tb24 Take 1 tablet (50 mg total) by mouth once daily.    ondansetron (ZOFRAN-ODT) 4 MG TbDL Take 1 tablet (4 mg total) by mouth every 8 (eight) hours as needed (nausea).    oxyCODONE (ROXICODONE) 5 MG immediate release tablet Take 5 mg by mouth every 4 (four) hours as needed for Pain.    potassium chloride SA (K-DUR,KLOR-CON) 20 MEQ tablet Take 1 tablet (20 mEq total) by mouth once daily. 1 tablet,ER particles/crystals Oral Twice a day (Patient taking differently: Take 20 mEq by mouth daily as needed. )    pravastatin (PRAVACHOL) 40 MG tablet Take 1 tablet (40 mg total) by mouth once daily.    tocilizumab (ACTEMRA) 162 mg/0.9 mL injection Inject 0.9 mLs (162 mg total) into the skin once a week.     traMADol (ULTRAM) 50 mg tablet Take 1 tablet (50 mg total) by mouth every 6 (six) hours as needed for Pain.    aspirin (ECOTRIN) 81 MG EC tablet Take 81 mg by mouth once daily.    NUCYNTA ER 50 mg Tb12 Take 1 tablet by mouth every 12 (twelve) hours.     No current facility-administered medications for this encounter.        Vitals:      Vitals:    10/07/20 1446   BP: (!) 128/56   Pulse: 73   Resp: 16   Temp: 97 °F (36.1 °C)       Review of Systems:        Constitutional: Negative for fever, chills, weight loss, malaise/fatigue and diaphoresis.   HENT: Negative for hearing loss, ear pain, nosebleeds, congestion, sore throat, neck pain, tinnitus and ear discharge.    Eyes: Negative for blurred vision, double vision, photophobia, pain, discharge and redness.   Respiratory: Negative for cough, hemoptysis, sputum production, shortness of breath, wheezing and stridor.    Cardiovascular: Negative for chest pain, palpitations, orthopnea, claudication, leg swelling and PND.   Gastrointestinal: +N/V and abdominal cramping a few days ago that has resolved. Negative for heartburn, diarrhea, constipation, blood in stool and melena.   Genitourinary: Negative for dysuria, urgency, frequency, hematuria and flank pain.   Musculoskeletal: +Right knee pain, Lower back pain, Right hip pain Negative for myalgias, back pain, joint pain and falls.   Skin: Negative for itching and rash.   Neurological: Negative for dizziness, tingling, tremors, sensory change, speech change, focal weakness, seizures, loss of consciousness, weakness and headaches.   Endo/Heme/Allergies: Negative for environmental allergies and polydipsia. Does not bruise/bleed easily.   Psychiatric/Behavioral: Negative for depression, suicidal ideas, hallucinations, memory loss and substance abuse. The patient is not nervous/anxious and does not have insomnia.    All 14 systems reviewed and negative except as noted above.    Physical Exam:      Constitutional: Appears  well-developed, well-nourished and in no acute distress.  Patient is oriented to person, place, and time.   Head: Normocephalic and atraumatic. Mucous membranes moist.  Neck: Neck supple no mass.   Cardiovascular: +2/6 heart murmur Normal rate and regular rhythm.  S1 S2 appreciated by ascultation.  Pulmonary/Chest: Effort normal and clear to auscultation bilaterally. No respiratory distress.   Abdomen: Soft. Non-tender and non-distended. Bowel sounds are normal.   Neurological: Patient is alert and oriented to person, place and time. Moves all extremities.  Skin: Warm and dry. No lesions.  Extremities: No clubbing, cyanosis or edema.    Laboratory data:      Reviewed and noted in plan where applicable. Please see chart for full laboratory data.    No results for input(s): CPK, CPKMB, TROPONINI, MB in the last 24 hours. No results for input(s): POCTGLUCOSE in the last 24 hours.     Lab Results   Component Value Date    INR 0.9 09/06/2013    INR 0.9 09/05/2013       Lab Results   Component Value Date    WBC 5.85 07/08/2020    HGB 13.3 07/08/2020    HCT 43.1 07/08/2020    MCV 88 07/08/2020     (L) 07/08/2020       No results for input(s): GLU, NA, K, CL, CO2, BUN, CREATININE, CALCIUM, MG in the last 24 hours.    Predictors of intubation difficulty:       Morbid obesity? no   Anatomically abnormal facies? no   Prominent incisors? no   Receding mandible? no   Short, thick neck? yes -    Neck range of motion: normal    Cardiographics:      ECG:10/7/20  Normal sinus rhythm  LVH with repolarization abnormality  Abnormal ECG  When compared with ECG of 06-SEP-2013 14:00,  ST now depressed in Lateral leads  T wave inversion now evident in Inferior leads  T wave inversion now evident in Anterior-lateral leads    Imaging:      Chest x-ray: 10/7/20  Cardiomediastinal silhouette remains prominent.  Scarring or atelectasis in the lower lung zones, left greater than right.  No focal consolidation or effusion.  Thoracic  spondylosis    Assessment and Plan:      Urge incontinence  The patient is scheduled for a Percutaneous InterStim lead placement on  10/12/20 by Dr. Rawls     Known risk factors for perioperative complications:     Immunocompromised 2/2 DMARD  DM  HTN      Difficulty with intubation is not anticipated.    Cardiac Risk Estimation:     1.) Preoperative workup as follows: chest x-ray, ECG, hemoglobin, hematocrit, electrolytes, creatinine, glucose.  2.) Change in medication regimen before surgery: discontinue ASA 6 days before surgery  3.) Prophylaxis for cardiac events with perioperative beta-blockers: not indicated.  4.) Invasive hemodynamic monitoring perioperatively: not indicated.  5.) Deep vein thrombosis prophylaxis postoperatively: regimen to be chosen by surgical team.  6.) Surveillance for postoperative MI with ECG immediately postoperatively and on postoperati ve days 1 and 2 AND troponin levels 24 hours postoperatively and on day 4 or hospital discharge (whichever comes first): not indicated.  7.) Current medications which may produce withdrawal symptoms if withheld perioperatively: none   8.) Other measures: None     Hypertension  BP stable   Cont Amlodipine daily and Clonidine nightly     Type 2 diabetes mellitus  Last Hgb A1c was 6.5 on 9/10/20  Cont Diet control     Chronic rheumatic arthritis  Followed by Rheumatology   Chronic pain   Pt takes Actemra injections weekly   Instructed pt to call Rheumatology today for instructions on the perioperative management of DMARD     Immunosuppressed status  2/2 DMARD  Pt counseled on s/s of post op infection and when to notify surgeon.     Post herpetic neuralgia  Cont Acyclovir BID     SUKHDEEP (obstructive sleep apnea)  Pt does not wear ordered CPAP  Counseled     Hyperlipidemia  Cont Statin     Nausea and vomiting  Pt had episode of N/V this past week- now resolved   Pt reports she has episodes of N/V and abdominal cramping 1-2 times per year  Pt had EGD 2/2020  which showed Moderate Schatzki ring and Gastritis-no dilation needed.   Denies significant dysphagia symptoms   Encouraged use of PPI  F/U with GI     Addendum 10/8/20 at 0821    Abnormal EKG: Pt has an abnormal EKG showing TWI and ST depression. Discussed with pt. Pt now admits to some intermittent chest tightness a few days ago which she attributed to recent nausea/vomiting. No further CP. Denies dizziness, Syncope, SOB. No further N/V.  Appointment made with Cardiology for tomorrow 10/9/20 at 1pm with Dr. Peterson. Pt given appt date/time. She verbalized understanding.     Pt reports transportation issues for surgery for Monday and plans to reschedule surgery for November when her daughter is more available for transportation. Will notify Dr. Rawls

## 2020-10-07 NOTE — PATIENT INSTRUCTIONS
Diclofenac skin gel  What is this medicine?  DICLOFENAC (dye BESSY barker ak) is a non-steroidal anti-inflammatory drug (NSAID). The 1% skin gel is used to treat osteoarthritis of the hands or knees. The 3% skin gel is used to treat actinic keratosis.  How should I use this medicine?  This medicine is for external use only. Follow the directions on the prescription label. Wash hands before and after use. Do not get this medicine in your eyes. If you do, rinse out with plenty of cool tap water. Use your doses at regular intervals. Do not use your medicine more often than directed.  A special MedGuide will be given to you by the pharmacist with each prescription and refill of the 1% gel. Be sure to read this information carefully each time.  Talk to your pediatrician regarding the use of this medicine in children. Special care may be needed. The 3% gel is not approved for use in children.  What side effects may I notice from receiving this medicine?  Side effects that you should report to your doctor or health care professional as soon as possible:  · allergic reactions like skin rash, itching or hives, swelling of the face, lips, or tongue  · black or bloody stools, blood in the urine or vomit  · blurred vision  · chest pain  · difficulty breathing or wheezing  · nausea or vomiting  · redness, blistering, peeling or loosening of the skin, including inside the mouth  · slurred speech or weakness on one side of the body  · trouble passing urine or change in the amount of urine  · unexplained weight gain or swelling  · unusually weak or tired  · yellowing of eyes or skin  Side effects that usually do not require medical attention (report to your doctor or health care professional if they continue or are bothersome):  · dizziness  · dry skin  · headache  · heartburn  · increased sensitivity to the sun  · stomach pain  · tingling at the application site  What may interact with this medicine?  · aspirin  · NSAIDs, medicines  for pain and inflammation, like ibuprofen or naproxen  Do not use any other skin products without telling your doctor or health care professional.  What if I miss a dose?  If you miss a dose, use it as soon as you can. If it is almost time for your next dose, use only that dose. Do not use double or extra doses.  Where should I keep my medicine?  Keep out of the reach of children.  Store the 1% gel at room temperature between 15 and 30 degrees C (59 and 86 degrees F). Store the 3% gel at room temperature between 20 and 25 degrees C (68 and 77 degrees F). Protect from light. Throw away any unused medicine after the expiration date.  What should I tell my health care provider before I take this medicine?  They need to know if you have any of these conditions:  · asthma  · bleeding problems  · coronary artery bypass graft (CABG) surgery within the past 2 weeks  · heart disease  · high blood pressure  · if you frequently drink alcohol containing drinks  · kidney disease  · liver disease  · open or infected skin  · stomach problems  · an unusual or allergic reaction to diclofenac, aspirin, other NSAIDs, other medicines, benzyl alcohol (3% gel only), foods, dyes, or preservatives  · pregnant or trying to get pregnant  · breast-feeding  What should I watch for while using this medicine?  Tell your doctor or healthcare professional if your symptoms do not start to get better or if they get worse. You will need to follow up with your health care provider to monitor your progress. You may need to be treated for up to 3 months if you are using the 3% gel, but the full effect may not occur until 1 month after stopping treatment. If you develop a severe skin reaction, contact your doctor or health care professional immediately.  This medicine can make you more sensitive to the sun. Keep out of the sun. If you cannot avoid being in the sun, wear protective clothing and use sunscreen. Do not use sun lamps or tanning  beds/booths.  Do not take medicines such as ibuprofen and naproxen with this medicine. Side effects such as stomach upset, nausea, or ulcers may be more likely to occur. Many medicines available without a prescription should not be taken with this medicine.  This medicine does not prevent heart attack or stroke. In fact, this medicine may increase the chance of a heart attack or stroke. The chance may increase with longer use of this medicine and in people who have heart disease. If you take aspirin to prevent heart attack or stroke, talk with your doctor or health care professional.  This medicine can cause ulcers and bleeding in the stomach and intestines at any time during treatment. Do not smoke cigarettes or drink alcohol. These increase irritation to your stomach and can make it more susceptible to damage from this medicine. Ulcers and bleeding can happen without warning symptoms and can cause death.  You may get drowsy or dizzy. Do not drive, use machinery, or do anything that needs mental alertness until you know how this medicine affects you. Do not stand or sit up quickly, especially if you are an older patient. This reduces the risk of dizzy or fainting spells.  This medicine can cause you to bleed more easily. Try to avoid damage to your teeth and gums when you brush or floss your teeth.  NOTE:This sheet is a summary. It may not cover all possible information. If you have questions about this medicine, talk to your doctor, pharmacist, or health care provider. Copyright© 2017 [employer]         Bill For Surgical Tray: no

## 2020-10-07 NOTE — ASSESSMENT & PLAN NOTE
Followed by Rheumatology   Chronic pain   Pt takes Actemra injections weekly   Instructed pt to call Rheumatology today for instructions on the perioperative management of DMARD

## 2020-10-07 NOTE — PROCEDURES
Large Joint Aspiration/Injection: R knee    Date/Time: 10/7/2020 12:40 PM  Performed by: Ab England MD  Authorized by: Ab England MD     Consent Done?:  Yes (Verbal)  Indications:  Pain  Site marked: the procedure site was marked    Timeout: prior to procedure the correct patient, procedure, and site was verified    Prep: patient was prepped and draped in usual sterile fashion    Local anesthetic:  Lidocaine 2% without epinephrine    Details:  Needle Size:  25 G  Ultrasonic Guidance for needle placement?: No    Approach:  Anterolateral  Location:  Knee  Site:  R knee  Medications:  40 mg triamcinolone acetonide 40 mg/mL  Patient tolerance:  Patient tolerated the procedure well with no immediate complications

## 2020-10-08 ENCOUNTER — TELEPHONE (OUTPATIENT)
Dept: UROLOGY | Facility: CLINIC | Age: 70
End: 2020-10-08

## 2020-10-08 ENCOUNTER — PATIENT MESSAGE (OUTPATIENT)
Dept: SURGERY | Facility: HOSPITAL | Age: 70
End: 2020-10-08

## 2020-10-08 NOTE — TELEPHONE ENCOUNTER
Called and spoke with pt about rescheduling her surgery.  I told her that I have sent Dr Rawls her message and that I suggested it be done on November 23 which is a Monday.  I am waiting on the ok from Dr Rawls and will then contact the rep to see if he is open that day.  She also has an apointment with Dr Gurrola the cardiologist about an abnormal EKG.  I will send his staff a message to get surgery clearance at the time.

## 2020-10-09 ENCOUNTER — OFFICE VISIT (OUTPATIENT)
Dept: CARDIOLOGY | Facility: CLINIC | Age: 70
End: 2020-10-09
Payer: MEDICARE

## 2020-10-09 VITALS
WEIGHT: 187.19 LBS | SYSTOLIC BLOOD PRESSURE: 122 MMHG | DIASTOLIC BLOOD PRESSURE: 64 MMHG | BODY MASS INDEX: 31.15 KG/M2 | OXYGEN SATURATION: 97 % | HEART RATE: 67 BPM

## 2020-10-09 DIAGNOSIS — E66.9 OBESITY, CLASS I, BMI 30-34.9: ICD-10-CM

## 2020-10-09 DIAGNOSIS — G47.33 OSA (OBSTRUCTIVE SLEEP APNEA): ICD-10-CM

## 2020-10-09 DIAGNOSIS — R94.31 ABNORMAL EKG: Primary | ICD-10-CM

## 2020-10-09 DIAGNOSIS — Z87.891 EX-SMOKER: ICD-10-CM

## 2020-10-09 DIAGNOSIS — E78.5 HYPERLIPIDEMIA, UNSPECIFIED HYPERLIPIDEMIA TYPE: Chronic | ICD-10-CM

## 2020-10-09 DIAGNOSIS — I10 ESSENTIAL HYPERTENSION: Chronic | ICD-10-CM

## 2020-10-09 DIAGNOSIS — R13.10 DYSPHAGIA, UNSPECIFIED TYPE: ICD-10-CM

## 2020-10-09 DIAGNOSIS — R73.02 IGT (IMPAIRED GLUCOSE TOLERANCE): ICD-10-CM

## 2020-10-09 PROCEDURE — 99499 RISK ADDL DX/OHS AUDIT: ICD-10-PCS | Mod: S$GLB,,, | Performed by: INTERNAL MEDICINE

## 2020-10-09 PROCEDURE — 99999 PR PBB SHADOW E&M-EST. PATIENT-LVL IV: CPT | Mod: PBBFAC,,, | Performed by: INTERNAL MEDICINE

## 2020-10-09 PROCEDURE — 3078F DIAST BP <80 MM HG: CPT | Mod: CPTII,S$GLB,, | Performed by: INTERNAL MEDICINE

## 2020-10-09 PROCEDURE — 3074F PR MOST RECENT SYSTOLIC BLOOD PRESSURE < 130 MM HG: ICD-10-PCS | Mod: CPTII,S$GLB,, | Performed by: INTERNAL MEDICINE

## 2020-10-09 PROCEDURE — 1159F PR MEDICATION LIST DOCUMENTED IN MEDICAL RECORD: ICD-10-PCS | Mod: S$GLB,,, | Performed by: INTERNAL MEDICINE

## 2020-10-09 PROCEDURE — 1101F PR PT FALLS ASSESS DOC 0-1 FALLS W/OUT INJ PAST YR: ICD-10-PCS | Mod: CPTII,S$GLB,, | Performed by: INTERNAL MEDICINE

## 2020-10-09 PROCEDURE — 99204 PR OFFICE/OUTPT VISIT, NEW, LEVL IV, 45-59 MIN: ICD-10-PCS | Mod: S$GLB,,, | Performed by: INTERNAL MEDICINE

## 2020-10-09 PROCEDURE — 99499 UNLISTED E&M SERVICE: CPT | Mod: S$GLB,,, | Performed by: INTERNAL MEDICINE

## 2020-10-09 PROCEDURE — 3074F SYST BP LT 130 MM HG: CPT | Mod: CPTII,S$GLB,, | Performed by: INTERNAL MEDICINE

## 2020-10-09 PROCEDURE — 3078F PR MOST RECENT DIASTOLIC BLOOD PRESSURE < 80 MM HG: ICD-10-PCS | Mod: CPTII,S$GLB,, | Performed by: INTERNAL MEDICINE

## 2020-10-09 PROCEDURE — 99204 OFFICE O/P NEW MOD 45 MIN: CPT | Mod: S$GLB,,, | Performed by: INTERNAL MEDICINE

## 2020-10-09 PROCEDURE — 99999 PR PBB SHADOW E&M-EST. PATIENT-LVL IV: ICD-10-PCS | Mod: PBBFAC,,, | Performed by: INTERNAL MEDICINE

## 2020-10-09 PROCEDURE — 1101F PT FALLS ASSESS-DOCD LE1/YR: CPT | Mod: CPTII,S$GLB,, | Performed by: INTERNAL MEDICINE

## 2020-10-09 PROCEDURE — 1159F MED LIST DOCD IN RCRD: CPT | Mod: S$GLB,,, | Performed by: INTERNAL MEDICINE

## 2020-10-09 PROCEDURE — 3008F BODY MASS INDEX DOCD: CPT | Mod: CPTII,S$GLB,, | Performed by: INTERNAL MEDICINE

## 2020-10-09 PROCEDURE — 3008F PR BODY MASS INDEX (BMI) DOCUMENTED: ICD-10-PCS | Mod: CPTII,S$GLB,, | Performed by: INTERNAL MEDICINE

## 2020-10-09 NOTE — PROGRESS NOTES
Subjective:   Patient ID:  Smitha Salinas is a 70 y.o. female who presents for evaluation of No chief complaint on file.      HPI  A 71 yo female exsmoker htn hlp impaired glucose tolerance  sukhdeep is referred from DR BARBOSA due to abnormal ekg. She has gained weight she does not exercise. She has been on the sedentary side exacerbated by arthritis has an episode last weekend of nausea projectile vomiting felt dehydrated was evaluated she feels better now however had epsiode of chest tightness difficulty swallowing her mother had diabetes pvd amputation mi. Has  hip pain from walking has rheumatoid arthritis she gest tired easily. She needs to have surgery and get sacral implants. Has abnormal ekg showing lvfh repolarization abnormalities and ischemic changes.   Past Medical History:   Diagnosis Date    [facility]    Chronic rheumatic arthritis     on DMARD    Ex-smoker     Genital herpes     Hyperlipidemia     Hypertension     Immunosuppressed status     Lichen sclerosus et atrophicus     SUKHDEEP (obstructive sleep apnea)     Osteopenia      wai (start fmax if on pred 3months or more)    Type 2 diabetes mellitus     dxd        Past Surgical History:   Procedure Laterality Date    BREAST BIOPSY Left     benign    COLONOSCOPY N/A 2020    Procedure: COLONOSCOPY;  Surgeon: Eloina Castro MD;  Location: [facility];  Service: Endoscopy;  Laterality: N/A;    CRYOTHERAPY      ESOPHAGOGASTRODUODENOSCOPY N/A 2020    Procedure: EGD (ESOPHAGOGASTRODUODENOSCOPY);  Surgeon: Eloina Castro MD;  Location: [facility];  Service: Endoscopy;  Laterality: N/A;    SKIN GRAFT      laceration to right  fingers  from thigh     TONSILLECTOMY         Social History     Tobacco Use    Smoking status: Former Smoker     Years: 15.00     Quit date: 2013     Years since quittin.2    Smokeless tobacco: Never Used    Tobacco comment: smokes for a few weeks per year -  when under pressure. has been abstinent times years at a time. a pack lasts about a week   Substance Use Topics    Alcohol use: Yes     Alcohol/week: 0.0 standard drinks     Frequency: Monthly or less     Drinks per session: 1 or 2    Drug use: No       Family History   Problem Relation Age of Onset    Heart disease Mother     Diabetes Mother     Peripheral vascular disease Mother         amputations    Stroke Father     Kidney failure Father     Breast cancer Sister 39    Cancer Other 68        breast    Stroke Maternal Grandmother     Stroke Paternal Grandmother     Stroke Paternal Grandfather     Colon cancer Neg Hx     Ovarian cancer Neg Hx        Current Outpatient Medications   Medication Sig    acyclovir (ZOVIRAX) 400 MG tablet Take 1 tablet (400 mg total) by mouth 2 (two) times daily.    amLODIPine (NORVASC) 5 MG tablet Take 1 tablet (5 mg total) by mouth once daily.    aspirin (ECOTRIN) 81 MG EC tablet Take 81 mg by mouth once daily.    cholecalciferol, vitamin D3, 1,000 unit capsule Take 1 capsule by mouth once daily.    cloNIDine (CATAPRES) 0.2 MG tablet TAKE 1/2 TABLET IN THE MORNING, 1/2 TABLET AT NOON, THEN 2 TABLETS AT BEDTIME (Patient taking differently: Take 0.6 mg by mouth every evening. TAKE 1/2 TABLET IN THE MORNING, 1/2 TABLET AT NOON, THEN 2 TABLETS AT BEDTIME)    cyclobenzaprine (FLEXERIL) 10 MG tablet 1/2 to 1 po tid prn muscle spasm    diazePAM (VALIUM) 5 MG tablet TAKE 1 TABLET BY MOUTH AT BEDTIME FOR ANXIETY (Patient taking differently: Take 5 mg by mouth daily as needed. TAKE 1 TABLET BY MOUTH AT BEDTIME FOR ANXIETY)    diclofenac sodium (VOLTAREN) 1 % Gel Apply 2 g topically 3 (three) times daily.    estradioL (ESTRACE) 0.01 % (0.1 mg/gram) vaginal cream Place 1 g vaginally twice a week.    mirabegron (MYRBETRIQ) 50 mg Tb24 Take 1 tablet (50 mg total) by mouth once daily.    ondansetron (ZOFRAN-ODT) 4 MG TbDL Take 1 tablet (4 mg total) by mouth every 8 (eight)  hours as needed (nausea).    oxyCODONE (ROXICODONE) 5 MG immediate release tablet Take 5 mg by mouth every 4 (four) hours as needed for Pain.    potassium chloride SA (K-DUR,KLOR-CON) 20 MEQ tablet Take 1 tablet (20 mEq total) by mouth once daily. 1 tablet,ER particles/crystals Oral Twice a day (Patient taking differently: Take 20 mEq by mouth daily as needed. )    pravastatin (PRAVACHOL) 40 MG tablet Take 1 tablet (40 mg total) by mouth once daily.    traMADol (ULTRAM) 50 mg tablet Take 1 tablet (50 mg total) by mouth every 6 (six) hours as needed for Pain.    NUCYNTA ER 50 mg Tb12 Take 1 tablet by mouth every 12 (twelve) hours.    tocilizumab (ACTEMRA) 162 mg/0.9 mL injection Inject 0.9 mLs (162 mg total) into the skin once a week. (Patient not taking: Reported on 10/9/2020)     No current facility-administered medications for this visit.      Current Outpatient Medications on File Prior to Visit   Medication Sig    acyclovir (ZOVIRAX) 400 MG tablet Take 1 tablet (400 mg total) by mouth 2 (two) times daily.    amLODIPine (NORVASC) 5 MG tablet Take 1 tablet (5 mg total) by mouth once daily.    aspirin (ECOTRIN) 81 MG EC tablet Take 81 mg by mouth once daily.    cholecalciferol, vitamin D3, 1,000 unit capsule Take 1 capsule by mouth once daily.    cloNIDine (CATAPRES) 0.2 MG tablet TAKE 1/2 TABLET IN THE MORNING, 1/2 TABLET AT NOON, THEN 2 TABLETS AT BEDTIME (Patient taking differently: Take 0.6 mg by mouth every evening. TAKE 1/2 TABLET IN THE MORNING, 1/2 TABLET AT NOON, THEN 2 TABLETS AT BEDTIME)    cyclobenzaprine (FLEXERIL) 10 MG tablet 1/2 to 1 po tid prn muscle spasm    diazePAM (VALIUM) 5 MG tablet TAKE 1 TABLET BY MOUTH AT BEDTIME FOR ANXIETY (Patient taking differently: Take 5 mg by mouth daily as needed. TAKE 1 TABLET BY MOUTH AT BEDTIME FOR ANXIETY)    diclofenac sodium (VOLTAREN) 1 % Gel Apply 2 g topically 3 (three) times daily.    estradioL (ESTRACE) 0.01 % (0.1 mg/gram) vaginal cream  Place 1 g vaginally twice a week.    mirabegron (MYRBETRIQ) 50 mg Tb24 Take 1 tablet (50 mg total) by mouth once daily.    ondansetron (ZOFRAN-ODT) 4 MG TbDL Take 1 tablet (4 mg total) by mouth every 8 (eight) hours as needed (nausea).    oxyCODONE (ROXICODONE) 5 MG immediate release tablet Take 5 mg by mouth every 4 (four) hours as needed for Pain.    potassium chloride SA (K-DUR,KLOR-CON) 20 MEQ tablet Take 1 tablet (20 mEq total) by mouth once daily. 1 tablet,ER particles/crystals Oral Twice a day (Patient taking differently: Take 20 mEq by mouth daily as needed. )    pravastatin (PRAVACHOL) 40 MG tablet Take 1 tablet (40 mg total) by mouth once daily.    traMADol (ULTRAM) 50 mg tablet Take 1 tablet (50 mg total) by mouth every 6 (six) hours as needed for Pain.    NUCYNTA ER 50 mg Tb12 Take 1 tablet by mouth every 12 (twelve) hours.    tocilizumab (ACTEMRA) 162 mg/0.9 mL injection Inject 0.9 mLs (162 mg total) into the skin once a week. (Patient not taking: Reported on 10/9/2020)     No current facility-administered medications on file prior to visit.        Review of patient's allergies indicates:   Allergen Reactions    Valdecoxib Other (See Comments)     palpitation       Review of Systems   Constitution: Negative for diaphoresis, malaise/fatigue and weight gain.   HENT: Negative for hoarse voice.    Eyes: Negative for double vision and visual disturbance.   Cardiovascular: Positive for chest pain. Negative for claudication, cyanosis, dyspnea on exertion, irregular heartbeat, leg swelling, near-syncope, orthopnea, palpitations, paroxysmal nocturnal dyspnea and syncope.   Respiratory: Positive for snoring. Negative for cough, hemoptysis and shortness of breath.    Hematologic/Lymphatic: Negative for bleeding problem. Does not bruise/bleed easily.   Skin: Negative for color change and poor wound healing.   Musculoskeletal: Positive for arthritis and joint pain. Negative for muscle cramps, muscle  weakness and myalgias.   Gastrointestinal: Positive for dysphagia, nausea and vomiting. Negative for bloating, abdominal pain, change in bowel habit, diarrhea, heartburn, hematemesis, hematochezia and melena.   Neurological: Negative for excessive daytime sleepiness, dizziness, headaches, light-headedness, loss of balance, numbness and weakness.   Psychiatric/Behavioral: Negative for memory loss. The patient does not have insomnia.    Allergic/Immunologic: Negative for hives.       Objective:   Physical Exam   Constitutional: She is oriented to person, place, and time. She appears well-developed and well-nourished. She does not appear ill. No distress.   HENT:   Head: Normocephalic and atraumatic.   Eyes: Pupils are equal, round, and reactive to light. EOM are normal. No scleral icterus.   Neck: Normal range of motion. Neck supple. Normal carotid pulses, no hepatojugular reflux and no JVD present. Carotid bruit is not present. No tracheal deviation present. No thyromegaly present.   Cardiovascular: Normal rate, regular rhythm, intact distal pulses and normal pulses. Exam reveals no gallop and no friction rub.   Murmur heard.   Harsh midsystolic murmur is present with a grade of 1/6 at the upper right sternal border radiating to the neck.  Pulses:       Carotid pulses are 2+ on the right side and 2+ on the left side.       Radial pulses are 2+ on the right side and 2+ on the left side.        Femoral pulses are 2+ on the right side and 2+ on the left side.       Popliteal pulses are 2+ on the right side and 2+ on the left side.        Dorsalis pedis pulses are 2+ on the right side and 2+ on the left side.        Posterior tibial pulses are 2+ on the right side and 2+ on the left side.   Pulmonary/Chest: Effort normal and breath sounds normal. No respiratory distress. She has no wheezes. She has no rhonchi. She has no rales. She exhibits no tenderness.   Abdominal: Soft. Normal appearance, normal aorta and bowel  sounds are normal. She exhibits no distension, no abdominal bruit, no ascites and no pulsatile midline mass. There is no hepatomegaly. There is no abdominal tenderness.   Obese    Musculoskeletal:         General: No edema.      Right shoulder: She exhibits no deformity.   Neurological: She is alert and oriented to person, place, and time. She has normal strength. No cranial nerve deficit. Coordination normal.   Skin: Skin is warm and dry. No rash noted. She is not diaphoretic. No cyanosis or erythema. Nails show no clubbing.   Psychiatric: She has a normal mood and affect. Her speech is normal and behavior is normal.   Nursing note and vitals reviewed.    Vitals:    10/09/20 0902 10/09/20 0905   BP: 118/64 122/64   BP Location: Left arm Right arm   Patient Position: Sitting Sitting   Pulse: 67 67   SpO2: 97%    Weight: 84.9 kg (187 lb 2.7 oz)      Lab Results   Component Value Date    CHOL 219 (H) 03/05/2020    CHOL 200 (H) 08/28/2017    CHOL 206 (H) 05/14/2013     Lab Results   Component Value Date    HDL 60 03/05/2020    HDL 48 (L) 08/28/2017    HDL 36 (L) 05/14/2013     Lab Results   Component Value Date    LDLCALC 146.2 03/05/2020    LDLCALC 115 (H) 08/28/2017    LDLCALC 141.2 05/14/2013     Lab Results   Component Value Date    TRIG 64 03/05/2020    TRIG 259 (H) 08/28/2017    TRIG 144 05/14/2013     Lab Results   Component Value Date    CHOLHDL 27.4 03/05/2020    CHOLHDL 4.2 08/28/2017    CHOLHDL 17.5 (L) 05/14/2013       Chemistry        Component Value Date/Time     10/07/2020 0320    K 3.7 10/07/2020 0320     10/07/2020 0320    CO2 21 (L) 10/07/2020 0320    BUN 36 (H) 10/07/2020 0320    CREATININE 1.1 10/07/2020 0320     (H) 10/07/2020 0320        Component Value Date/Time    CALCIUM 9.5 10/07/2020 0320    ALKPHOS 86 10/07/2020 0320    AST 19 10/07/2020 0320    ALT 25 10/07/2020 0320    BILITOT 0.3 10/07/2020 0320    ESTGFRAFRICA 59 (A) 10/07/2020 0320    EGFRNONAA 51 (A) 10/07/2020 0320         Lab Results   Component Value Date    HGBA1C 6.5 (H) 09/10/2020       Lab Results   Component Value Date    TSH 2.071 03/05/2020     Lab Results   Component Value Date    INR 0.9 09/06/2013    INR 0.9 09/05/2013     Lab Results   Component Value Date    WBC 8.15 10/07/2020    HGB 14.0 10/07/2020    HCT 42.9 10/07/2020    MCV 86 10/07/2020     10/07/2020     BNP  @LABRCNTIP(BNP,BNPTRIAGEBLO)@  Estimated Creatinine Clearance: 52 mL/min (based on SCr of 1.1 mg/dL).  Assessment:     1. Abnormal EKG    2. Essential hypertension    3. Hyperlipidemia, unspecified hyperlipidemia type    4. Ex-smoker    5. SUKHDEEP (obstructive sleep apnea)    6. Obesity, Class I, BMI 30-34.9    7. Dysphagia, unspecified type    8. IGT (impaired glucose tolerance)      The patient has abnormal ekg from [facility] and has gained weighht non compliant with diet and exercise has gained weight and has untreated sleep apnea. Her presentation is related to gastroparesis causing symptoms. However with such abnormal ekg and her upcoming surgery sedentary life multiple risk factors she needs eval non invasively and assess pulmonary htn since she has untreated sukhdeep.  She was counseled about diet compliance weight loss exercise antiplatelets statins. andtretating sleep apnea.  Plan:   Sleep eval   Echo   lexiscan   Continue current therapy  Cardiac low salt diet.  Risk factor modification and excercise program./weight loss  F/u in 1 month   Her surgery is planned for November 23.

## 2020-10-15 ENCOUNTER — HOSPITAL ENCOUNTER (OUTPATIENT)
Dept: RADIOLOGY | Facility: HOSPITAL | Age: 70
Discharge: HOME | End: 2020-10-15
Attending: INTERNAL MEDICINE
Payer: MEDICARE

## 2020-10-15 ENCOUNTER — HOSPITAL ENCOUNTER (OUTPATIENT)
Dept: CARDIOLOGY | Facility: HOSPITAL | Age: 70
Discharge: HOME | End: 2020-10-15
Attending: INTERNAL MEDICINE
Payer: MEDICARE

## 2020-10-15 DIAGNOSIS — G47.33 OSA (OBSTRUCTIVE SLEEP APNEA): ICD-10-CM

## 2020-10-15 DIAGNOSIS — R94.31 ABNORMAL EKG: ICD-10-CM

## 2020-10-15 LAB
CV STRESS BASE HR: 48 BPM
DIASTOLIC BLOOD PRESSURE: 81 MMHG
NUC REST EJECTION FRACTION: 74
NUC STRESS EJECTION FRACTION: 63 %
OHS CV CPX 85 PERCENT MAX PREDICTED HEART RATE MALE: 123
OHS CV CPX ESTIMATED METS: 1
OHS CV CPX MAX PREDICTED HEART RATE: 145
OHS CV CPX PATIENT IS FEMALE: 1
OHS CV CPX PATIENT IS MALE: 0
OHS CV CPX PEAK DIASTOLIC BLOOD PRESSURE: 78 MMHG
OHS CV CPX PEAK HEAR RATE: 80 BPM
OHS CV CPX PEAK RATE PRESSURE PRODUCT: NORMAL
OHS CV CPX PEAK SYSTOLIC BLOOD PRESSURE: 155 MMHG
OHS CV CPX PERCENT MAX PREDICTED HEART RATE ACHIEVED: 55
OHS CV CPX RATE PRESSURE PRODUCT PRESENTING: 6192
STRESS ECHO POST EXERCISE DUR MIN: 1 MINUTES
STRESS ECHO POST EXERCISE DUR SEC: 13 SECONDS
SYSTOLIC BLOOD PRESSURE: 129 MMHG

## 2020-10-15 PROCEDURE — 93016 STRESS TEST WITH MYOCARDIAL PERFUSION (CUPID ONLY): ICD-10-PCS | Mod: ,,, | Performed by: INTERNAL MEDICINE

## 2020-10-15 PROCEDURE — 93018 STRESS TEST WITH MYOCARDIAL PERFUSION (CUPID ONLY): ICD-10-PCS | Mod: ,,, | Performed by: INTERNAL MEDICINE

## 2020-10-15 PROCEDURE — 93016 CV STRESS TEST SUPVJ ONLY: CPT | Mod: ,,, | Performed by: INTERNAL MEDICINE

## 2020-10-15 PROCEDURE — A9502 TC99M TETROFOSMIN: HCPCS

## 2020-10-15 PROCEDURE — 78452 STRESS TEST WITH MYOCARDIAL PERFUSION (CUPID ONLY): ICD-10-PCS | Mod: 26,,, | Performed by: INTERNAL MEDICINE

## 2020-10-15 PROCEDURE — 78452 HT MUSCLE IMAGE SPECT MULT: CPT | Mod: 26,,, | Performed by: INTERNAL MEDICINE

## 2020-10-15 PROCEDURE — 93018 CV STRESS TEST I&R ONLY: CPT | Mod: ,,, | Performed by: INTERNAL MEDICINE

## 2020-10-15 PROCEDURE — 93017 CV STRESS TEST TRACING ONLY: CPT

## 2020-10-15 RX ORDER — REGADENOSON 0.08 MG/ML
0.4 INJECTION, SOLUTION INTRAVENOUS ONCE
Status: COMPLETED | OUTPATIENT
Start: 2020-10-15 | End: 2020-10-15

## 2020-10-15 RX ADMIN — REGADENOSON 0.4 MG: 0.08 INJECTION, SOLUTION INTRAVENOUS at 09:10

## 2020-10-16 ENCOUNTER — TELEPHONE (OUTPATIENT)
Dept: CARDIOLOGY | Facility: CLINIC | Age: 70
End: 2020-10-16

## 2020-10-16 NOTE — TELEPHONE ENCOUNTER
Patient was notified of results. All questions were answered. Pt verbalized understanding. Pt will call back with any other questions or concerns.      ----- Message from Mirian Peterson MD sent at 10/15/2020  9:18 PM CDT -----  Stress test negative

## 2020-10-19 ENCOUNTER — HOSPITAL ENCOUNTER (OUTPATIENT)
Dept: RADIOLOGY | Facility: HOSPITAL | Age: 70
Discharge: HOME | End: 2020-10-19
Attending: OBSTETRICS & GYNECOLOGY
Payer: MEDICARE

## 2020-10-19 DIAGNOSIS — Z12.31 ENCOUNTER FOR SCREENING MAMMOGRAM FOR MALIGNANT NEOPLASM OF BREAST: ICD-10-CM

## 2020-10-19 PROCEDURE — 77067 SCR MAMMO BI INCL CAD: CPT | Mod: 26,,, | Performed by: RADIOLOGY

## 2020-10-19 PROCEDURE — 77067 SCR MAMMO BI INCL CAD: CPT | Mod: TC

## 2020-10-19 PROCEDURE — 77063 BREAST TOMOSYNTHESIS BI: CPT | Mod: 26,,, | Performed by: RADIOLOGY

## 2020-10-19 PROCEDURE — 77063 MAMMO DIGITAL SCREENING BILAT WITH TOMO: ICD-10-PCS | Mod: 26,,, | Performed by: RADIOLOGY

## 2020-10-19 PROCEDURE — 77067 MAMMO DIGITAL SCREENING BILAT WITH TOMO: ICD-10-PCS | Mod: 26,,, | Performed by: RADIOLOGY

## 2020-10-21 ENCOUNTER — CLINICAL SUPPORT (OUTPATIENT)
Dept: DIABETES | Facility: CLINIC | Age: 70
End: 2020-10-21
Payer: MEDICARE

## 2020-10-21 VITALS — HEIGHT: 65 IN | BODY MASS INDEX: 31.07 KG/M2 | WEIGHT: 186.5 LBS

## 2020-10-21 DIAGNOSIS — E11.9 TYPE 2 DIABETES MELLITUS WITHOUT COMPLICATION, WITHOUT LONG-TERM CURRENT USE OF INSULIN: Primary | ICD-10-CM

## 2020-10-21 DIAGNOSIS — E11.9 TYPE 2 DIABETES MELLITUS WITHOUT COMPLICATION, WITHOUT LONG-TERM CURRENT USE OF INSULIN: ICD-10-CM

## 2020-10-21 PROCEDURE — 99999 PR PBB SHADOW E&M-EST. PATIENT-LVL II: ICD-10-PCS | Mod: PBBFAC,,, | Performed by: DIETITIAN, REGISTERED

## 2020-10-21 PROCEDURE — 99999 PR PBB SHADOW E&M-EST. PATIENT-LVL II: CPT | Mod: PBBFAC,,, | Performed by: DIETITIAN, REGISTERED

## 2020-10-21 PROCEDURE — G0108 DIAB MANAGE TRN  PER INDIV: HCPCS | Mod: S$GLB,,, | Performed by: DIETITIAN, REGISTERED

## 2020-10-21 PROCEDURE — G0108 PR DIAB MANAGE TRN  PER INDIV: ICD-10-PCS | Mod: S$GLB,,, | Performed by: DIETITIAN, REGISTERED

## 2020-10-21 NOTE — LETTER
October 21, 2020        Jayesh Jaramillo MD  [address] [place] [address]             [facility]  [address] [facility] [address]  Phone: [phone]  Fax: [phone]   Patient: Smitha Salinas   MR Number: [id number]   YOB: 1950   Date of Visit: 10/21/2020       Dear Dr. Jaramillo:    Thank you for referring Smitha Salinas to me for evaluation. Below are the relevant portions of my assessment and plan of care.        If you have questions, please do not hesitate to call me. I look forward to following Smitha along with you.    Sincerely,      Juan Pablo Carrizales RD           CC  No Recipients

## 2020-10-21 NOTE — PROGRESS NOTES
"Diabetes Education  Author: Juan Pablo Carrizales RD  Date: 10/21/2020    Diabetes Care Management Summary  Diabetes Education Record Assessment/Progress: Initial  Current Diabetes Risk Level: Low     Referring Provider: Jayesh Jaramillo MD  70 y.o. female in clinic today for diabetes education. Patient has not taken diabetes education courses in the past.   Pt has tried weight loss diets in the past but she cannot stick to it. She has taken appetite suppressants.   Lost 40 lbs with [facility] - they gave protein bars, shakes and supplements    Weight: 84.6 kg (186 lb 8.2 oz)   Height: 5' 5" (165.1 cm)   Body mass index is 31.04 kg/m².    Lab Results   Component Value Date    HGBA1C 6.5 (H) 09/10/2020       Diabetes Type  Diabetes Type : Type II    Diabetes History  Current Treatment: Diet    Health Maintenance was reviewed today with patient. Discussed with patient importance of routine eye exams, foot exams/foot care, blood work (i.e.: A1c, microalbumin, and lipid), dental visits, yearly flu vaccine, and pneumonia vaccine as indicated by PCP. Patient verbalized understanding.     Health Maintenance Topics with due status: Not Due       Topic Last Completion Date    TETANUS VACCINE 05/30/2011    DEXA SCAN 07/05/2018    Colorectal Cancer Screening 02/12/2020    Lipid Panel 03/05/2020    Eye Exam 06/08/2020    Hemoglobin A1c 09/10/2020    Mammogram 10/19/2020     Health Maintenance Due   Topic Date Due    Foot Exam  09/05/1961    Urine Microalbumin  09/05/1961       Nutrition  Meal Planning: 3 meals per day, water(pt eats a lot of mayonaisse)  What type of sweetener do you use?: Splenda, sugar  What type of beverages do you drink?: juice, water, diet soda/tea, regular soda/tea(SF Koolaid)  Meal Plan 24 Hour Recall - Breakfast: roasted chicken and grits; Koolaid w/Splenda or [place] pinapple mateo juice  Meal Plan 24 Hour Recall - Lunch: hamburger; Koolaid w/Splenda  Meal Plan 24 Hour Recall - Dinner: chicken " toritilla soup with avocado, sour cream and cheese; tea w/sugar  Meal Plan 24 Hour Recall - Snack: pickled pig lips    Monitoring   Self Monitoring : no meter  Blood Glucose Logs: No  Do you use a personal continuous glucose monitor?: No  In the last month, how often have you had a low blood sugar reaction?: never  Can you tell when your blood sugar is too high?: no    Exercise   Exercise Type: walking(in her 40's and 50's, pt did aerobics // currently not able work out more than 20 min  //   now walking dog 5 days/wk)  Intensity: Low  Frequency: 3-5 Times per week  Duration: 15 min    Current Diabetes Treatment   Current Treatment: Diet    Social History  Preferred Learning Method: Face to Face  Primary Support: Self  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  Smoking Status: Ex Smoker                                Barriers to Change  Barriers to Change: None  Learning Challenges : None    Readiness to Learn   Readiness to Learn : Eager    Cultural Influences  Cultural Influences: No    Diabetes Education Assessment/Progress  Diabetes Disease Process (diabetes disease process and treatment options): Discussion, Individual Session, Comprehends Key Points, Written Materials Provided  Nutrition (Incorporating nutritional management into one's lifestyle): Discussion, Instructed, Demonstration, Individual Session, Comprehends Key Points, Written Materials Provided  Physical Activity (incorporating physical activity into one's lifestyle): Discussion, Individual Session, Written Materials Provided  Medications (states correct name, dose, onset, peak, duration, side effects & timing of meds): Discussion, Individual Session, Written Materials Provided  Monitoring (monitoring blood glucose/other parameters & using results): Discussion, Individual Session, Instructed, Comprehends Key Points, Written Materials Provided  Acute Complications (preventing, detecting, and treating acute complications):  Discussion, Individual Session, Written Materials Provided  Chronic Complications (preventing, detecting, and treating chronic complications): Discussion, Individual Session, Written Materials Provided  Clinical (diabetes, other pertinent medical history, and relevant comorbidities reviewed during visit): Discussion, Individual Session  Cognitive (knowledge of self-management skills, functional health literacy): Demonstration, Individual Session  Psychosocial (emotional response to diabetes): Not Covered/Deferred  Diabetes Distress and Support Systems: Not Covered/Deferred  Behavioral (readiness for change, lifestyle practices, self-care behaviors): Discussion, Individual Session    Goals  Patient has selected/evaluated goals during today's session: Yes, selected  Healthy Eating: Set(Have 3 balanced meals each day - 30-45 grams carb in each //  replace sugary bevarages with SF options)  Start Date: 10/21/20  Target Date: 01/31/21  Monitoring: Set(check fasting BG at home twice a week)  Start Date: 10/21/20  Target Date: 01/31/21         Diabetes Care Plan/Intervention  Education Plan/Intervention: Individual Follow-Up DSMT(f/u in 6 wks)    Diabetes Meal Plan  Calories: 1600  Carbohydrate Per Meal: 30-45g  Carbohydrate Per Snack : 15-20g    Today's Self-Management Care Plan was developed with the patient's input and is based on barriers identified during today's assessment.    The long and short-term goals in the care plan were written with the patient/caregiver's input. The patient has agreed to work toward these goals to improve her overall diabetes control.      The patient received a copy of today's self-management plan and verbalized understanding of the care plan, goals, and all of today's instructions.      The patient was encouraged to communicate with her physician and care team regarding her condition(s) and treatment.  I provided the patient with my contact information today and encouraged her to contact  me via phone or patient portal as needed.     Education Units of Time   Time Spent: 60 min

## 2020-10-22 RX ORDER — LANCING DEVICE
1 EACH MISCELLANEOUS 3 TIMES DAILY PRN
Qty: 1 EACH | Refills: 0 | Status: SHIPPED | OUTPATIENT
Start: 2020-10-22

## 2020-10-22 RX ORDER — IBUPROFEN 200 MG
1 CAPSULE ORAL 3 TIMES DAILY PRN
Qty: 1 EACH | Refills: 11 | Status: SHIPPED | OUTPATIENT
Start: 2020-10-22 | End: 2020-11-10 | Stop reason: SDUPTHER

## 2020-10-22 RX ORDER — DEXTROSE 4 G
1 TABLET,CHEWABLE ORAL 3 TIMES DAILY PRN
Qty: 1 EACH | Refills: 0 | Status: SHIPPED | OUTPATIENT
Start: 2020-10-22 | End: 2020-11-10 | Stop reason: SDUPTHER

## 2020-10-22 RX ORDER — BLOOD-GLUCOSE CONTROL, NORMAL
1 EACH MISCELLANEOUS 3 TIMES DAILY PRN
Qty: 90 EACH | Refills: 11 | Status: SHIPPED | OUTPATIENT
Start: 2020-10-22 | End: 2020-11-10 | Stop reason: SDUPTHER

## 2020-10-26 ENCOUNTER — HOSPITAL ENCOUNTER (OUTPATIENT)
Dept: CARDIOLOGY | Facility: HOSPITAL | Age: 70
Discharge: HOME | End: 2020-10-26
Attending: INTERNAL MEDICINE
Payer: MEDICARE

## 2020-10-26 VITALS — HEIGHT: 65 IN | WEIGHT: 186 LBS | BODY MASS INDEX: 30.99 KG/M2

## 2020-10-26 DIAGNOSIS — R94.31 ABNORMAL EKG: ICD-10-CM

## 2020-10-26 DIAGNOSIS — G47.33 OSA (OBSTRUCTIVE SLEEP APNEA): ICD-10-CM

## 2020-10-26 LAB
AORTIC ROOT ANNULUS: 3.24 CM
AV INDEX (PROSTH): 0.93
AV MEAN GRADIENT: 6 MMHG
AV PEAK GRADIENT: 11 MMHG
AV VALVE AREA: 2.93 CM2
AV VELOCITY RATIO: 0.85
BSA FOR ECHO PROCEDURE: 1.97 M2
CV ECHO LV RWT: 0.58 CM
DOP CALC AO PEAK VEL: 1.66 M/S
DOP CALC AO VTI: 33.5 CM
DOP CALC LVOT AREA: 3.1 CM2
DOP CALC LVOT DIAMETER: 2 CM
DOP CALC LVOT PEAK VEL: 1.41 M/S
DOP CALC LVOT STROKE VOLUME: 98.13 CM3
DOP CALC RVOT PEAK VEL: 0.79 M/S
DOP CALC RVOT VTI: 18.31 CM
DOP CALCLVOT PEAK VEL VTI: 31.25 CM
E WAVE DECELERATION TIME: 234.41 MSEC
E/A RATIO: 0.98
E/E' RATIO: 8.13 M/S
ECHO LV POSTERIOR WALL: 1.3 CM (ref 0.6–1.1)
FRACTIONAL SHORTENING: 35 % (ref 28–44)
INTERVENTRICULAR SEPTUM: 1.62 CM (ref 0.6–1.1)
IVRT: 88.49 MSEC
LA MAJOR: 5.03 CM
LA MINOR: 4.47 CM
LA WIDTH: 3.69 CM
LEFT ATRIUM SIZE: 3.57 CM
LEFT ATRIUM VOLUME INDEX: 27.6 ML/M2
LEFT ATRIUM VOLUME: 53 CM3
LEFT INTERNAL DIMENSION IN SYSTOLE: 2.92 CM (ref 2.1–4)
LEFT VENTRICLE DIASTOLIC VOLUME INDEX: 47.58 ML/M2
LEFT VENTRICLE DIASTOLIC VOLUME: 91.27 ML
LEFT VENTRICLE MASS INDEX: 137 G/M2
LEFT VENTRICLE SYSTOLIC VOLUME INDEX: 17.1 ML/M2
LEFT VENTRICLE SYSTOLIC VOLUME: 32.84 ML
LEFT VENTRICULAR INTERNAL DIMENSION IN DIASTOLE: 4.48 CM (ref 3.5–6)
LEFT VENTRICULAR MASS: 262.94 G
LV LATERAL E/E' RATIO: 7.63 M/S
LV SEPTAL E/E' RATIO: 8.71 M/S
MV PEAK A VEL: 0.62 M/S
MV PEAK E VEL: 0.61 M/S
PISA TR MAX VEL: 2.32 M/S
PV MEAN GRADIENT: 1.56 MMHG
PV PEAK VELOCITY: 1.4 CM/S
RA MAJOR: 4.59 CM
RA PRESSURE: 3 MMHG
RA WIDTH: 3.26 CM
RIGHT VENTRICULAR END-DIASTOLIC DIMENSION: 2.59 CM
SINUS: 2.59 CM
STJ: 2.57 CM
TDI LATERAL: 0.08 M/S
TDI SEPTAL: 0.07 M/S
TDI: 0.08 M/S
TR MAX PG: 22 MMHG
TRICUSPID ANNULAR PLANE SYSTOLIC EXCURSION: 2.27 CM
TV REST PULMONARY ARTERY PRESSURE: 25 MMHG

## 2020-10-26 PROCEDURE — 93306 ECHO (CUPID ONLY): ICD-10-PCS | Mod: 26,,, | Performed by: INTERNAL MEDICINE

## 2020-10-26 PROCEDURE — 93306 TTE W/DOPPLER COMPLETE: CPT | Mod: 26,,, | Performed by: INTERNAL MEDICINE

## 2020-10-26 PROCEDURE — 93306 TTE W/DOPPLER COMPLETE: CPT

## 2020-10-27 ENCOUNTER — TELEPHONE (OUTPATIENT)
Dept: CARDIOLOGY | Facility: CLINIC | Age: 70
End: 2020-10-27

## 2020-10-27 NOTE — TELEPHONE ENCOUNTER
Patient was notified of results. All questions were answered. Pt verbalized understanding. Pt will call back with any other questions or concerns.    ----- Message from Mirian Peterson MD sent at 10/26/2020 11:36 PM CDT -----  Heart function is normal

## 2020-10-29 ENCOUNTER — PATIENT MESSAGE (OUTPATIENT)
Dept: INTERNAL MEDICINE | Facility: CLINIC | Age: 70
End: 2020-10-29

## 2020-10-29 ENCOUNTER — PATIENT MESSAGE (OUTPATIENT)
Dept: DIABETES | Facility: CLINIC | Age: 70
End: 2020-10-29

## 2020-10-30 RX ORDER — TRAMADOL HYDROCHLORIDE 50 MG/1
50 TABLET ORAL EVERY 6 HOURS PRN
Qty: 60 TABLET | Refills: 5 | Status: CANCELLED | OUTPATIENT
Start: 2020-10-30

## 2020-11-02 ENCOUNTER — TELEPHONE (OUTPATIENT)
Dept: INTERNAL MEDICINE | Facility: CLINIC | Age: 70
End: 2020-11-02

## 2020-11-02 ENCOUNTER — PATIENT MESSAGE (OUTPATIENT)
Dept: INTERNAL MEDICINE | Facility: CLINIC | Age: 70
End: 2020-11-02

## 2020-11-02 ENCOUNTER — TELEPHONE (OUTPATIENT)
Dept: DIABETES | Facility: CLINIC | Age: 70
End: 2020-11-02

## 2020-11-02 NOTE — TELEPHONE ENCOUNTER
Patient called [facility] to request a glucometer with test strips and lancets. Pt seen by nutritionist Juan Pablo Carrizales on 10/21/2020. Per Chichi ([facility] rep) the pt informed her that she was newly diagnosed. Per Chichi the pt would need a signed order for glucometer, test strips and lancets. Please advise.//ddw

## 2020-11-02 NOTE — TELEPHONE ENCOUNTER
Called pt in response to My Chart message.   Insurance denied coverage of glucometer and supplies through [facility] pharmacy. Pt will need prescriptions resent to [facility]. Pt is waiting on return call from PCP's nurse.

## 2020-11-02 NOTE — TELEPHONE ENCOUNTER
----- Message from Dorota Foster sent at 11/2/2020  2:05 PM CST -----  Pt would like return call, regarding getting device from [facility].  Please call back at [phone]. [place]

## 2020-11-02 NOTE — TELEPHONE ENCOUNTER
Please disregard the previous message. Patient would like to have an order placed to sign up for Diabetes Management Digital Monitoring Program. Please advise.//ddw

## 2020-11-02 NOTE — TELEPHONE ENCOUNTER
----- Message from Nanette Tovar sent at 11/2/2020  8:21 AM CST -----  Regarding: obar  Contact: pt  Caller is requesting a call back regarding [facility]ar instructions on the supplies that they are going to send.  Please call back at [phone] .  Thanks.

## 2020-11-02 NOTE — TELEPHONE ENCOUNTER
Patient requests refill on tramadol. Pharmacy verified-[facility]. Please see previous refill request sent in through interface.//ddw

## 2020-11-02 NOTE — TELEPHONE ENCOUNTER
Spoke with patient. Informed patient the provider has not entered the order for digital medicine as of yet. Informed patient once the order has been inputted she will receive a call. Verbalized understanding.//ddw

## 2020-11-03 NOTE — TELEPHONE ENCOUNTER
----- Message from Blake Crystal sent at 11/3/2020 12:30 PM CST -----  Regarding: Pt Advice  Contact: Mire[facility] ([facility]  Name of Who is Calling: Mireya ([facility])      What is the request in detail: Would like to speak with staff in regards to obtaining orders, clinical notes, and diagnosis for diabetic supplies, [employer] with supplies. Please fax to [phone]      Can the clinic reply by [facility]: no      [facility] [phone]

## 2020-11-03 NOTE — TELEPHONE ENCOUNTER
Patient states she does not take medication as prescribed because it chokes her. States taking medication only twice a week.//ddw

## 2020-11-03 NOTE — TELEPHONE ENCOUNTER
Spoke with [facility]. Informed representative that the provider has been in clinic and once the orders have been submitted it will be faxed to them along with clinical notes and demographics. Verbalized understanding.//ddw

## 2020-11-04 NOTE — TELEPHONE ENCOUNTER
I did not notice the order sent to you by Juan Pablo Carrizales. Nevertheless, the staff did not receive the signed orders to fax to [facility].//ddw

## 2020-11-05 ENCOUNTER — TELEPHONE (OUTPATIENT)
Dept: PULMONOLOGY | Facility: HOSPITAL | Age: 70
End: 2020-11-05

## 2020-11-05 ENCOUNTER — OFFICE VISIT (OUTPATIENT)
Dept: PULMONOLOGY | Facility: CLINIC | Age: 70
End: 2020-11-05
Payer: MEDICARE

## 2020-11-05 VITALS
OXYGEN SATURATION: 98 % | HEIGHT: 65 IN | RESPIRATION RATE: 17 BRPM | WEIGHT: 184.75 LBS | HEART RATE: 85 BPM | BODY MASS INDEX: 30.78 KG/M2 | DIASTOLIC BLOOD PRESSURE: 78 MMHG | SYSTOLIC BLOOD PRESSURE: 120 MMHG

## 2020-11-05 DIAGNOSIS — E66.9 OBESITY, CLASS I, BMI 30-34.9: ICD-10-CM

## 2020-11-05 DIAGNOSIS — G47.33 OSA (OBSTRUCTIVE SLEEP APNEA): Primary | ICD-10-CM

## 2020-11-05 DIAGNOSIS — Z72.821 POOR SLEEP HYGIENE: ICD-10-CM

## 2020-11-05 DIAGNOSIS — R94.31 ABNORMAL EKG: ICD-10-CM

## 2020-11-05 DIAGNOSIS — F90.9 ADULT ADHD: ICD-10-CM

## 2020-11-05 PROCEDURE — 1101F PR PT FALLS ASSESS DOC 0-1 FALLS W/OUT INJ PAST YR: ICD-10-PCS | Mod: CPTII,S$GLB,, | Performed by: INTERNAL MEDICINE

## 2020-11-05 PROCEDURE — 99499 UNLISTED E&M SERVICE: CPT | Mod: S$GLB,,, | Performed by: INTERNAL MEDICINE

## 2020-11-05 PROCEDURE — 99499 RISK ADDL DX/OHS AUDIT: ICD-10-PCS | Mod: S$GLB,,, | Performed by: INTERNAL MEDICINE

## 2020-11-05 PROCEDURE — 99999 PR PBB SHADOW E&M-EST. PATIENT-LVL V: CPT | Mod: PBBFAC,,, | Performed by: INTERNAL MEDICINE

## 2020-11-05 PROCEDURE — 1159F MED LIST DOCD IN RCRD: CPT | Mod: S$GLB,,, | Performed by: INTERNAL MEDICINE

## 2020-11-05 PROCEDURE — 3074F SYST BP LT 130 MM HG: CPT | Mod: CPTII,S$GLB,, | Performed by: INTERNAL MEDICINE

## 2020-11-05 PROCEDURE — 3078F PR MOST RECENT DIASTOLIC BLOOD PRESSURE < 80 MM HG: ICD-10-PCS | Mod: CPTII,S$GLB,, | Performed by: INTERNAL MEDICINE

## 2020-11-05 PROCEDURE — 99205 OFFICE O/P NEW HI 60 MIN: CPT | Mod: S$GLB,,, | Performed by: INTERNAL MEDICINE

## 2020-11-05 PROCEDURE — 3078F DIAST BP <80 MM HG: CPT | Mod: CPTII,S$GLB,, | Performed by: INTERNAL MEDICINE

## 2020-11-05 PROCEDURE — 3008F PR BODY MASS INDEX (BMI) DOCUMENTED: ICD-10-PCS | Mod: CPTII,S$GLB,, | Performed by: INTERNAL MEDICINE

## 2020-11-05 PROCEDURE — 99205 PR OFFICE/OUTPT VISIT, NEW, LEVL V, 60-74 MIN: ICD-10-PCS | Mod: S$GLB,,, | Performed by: INTERNAL MEDICINE

## 2020-11-05 PROCEDURE — 99999 PR PBB SHADOW E&M-EST. PATIENT-LVL V: ICD-10-PCS | Mod: PBBFAC,,, | Performed by: INTERNAL MEDICINE

## 2020-11-05 PROCEDURE — 3074F PR MOST RECENT SYSTOLIC BLOOD PRESSURE < 130 MM HG: ICD-10-PCS | Mod: CPTII,S$GLB,, | Performed by: INTERNAL MEDICINE

## 2020-11-05 PROCEDURE — 3008F BODY MASS INDEX DOCD: CPT | Mod: CPTII,S$GLB,, | Performed by: INTERNAL MEDICINE

## 2020-11-05 PROCEDURE — 1159F PR MEDICATION LIST DOCUMENTED IN MEDICAL RECORD: ICD-10-PCS | Mod: S$GLB,,, | Performed by: INTERNAL MEDICINE

## 2020-11-05 PROCEDURE — 1101F PT FALLS ASSESS-DOCD LE1/YR: CPT | Mod: CPTII,S$GLB,, | Performed by: INTERNAL MEDICINE

## 2020-11-05 NOTE — PATIENT INSTRUCTIONS
[facility] to schedule [facility] at  [phone] . Usually take 1- 2 days to get insurance company approval.  You will need to schedule at follow up clinic appointment 7 days after the sleep study to review the results.

## 2020-11-05 NOTE — TELEPHONE ENCOUNTER
----- Message from Conor Donis sent at 11/5/2020  2:32 PM CST -----  Review Chart,[facility]

## 2020-11-05 NOTE — PROGRESS NOTES
Subjective:     Patient ID: Smitha Salinas is a 70 y.o. female.    Chief Complaint:      HPI       Sleep Apnea  She presents for a sleep evaluation. She complains of snoring, periods of not breathing, decreased memory, decreased concentration, excessive daytime sleepiness, falling asleep while reading, feels sleepy during the day, take naps during the day.  Symptoms began 10 years ago, rapidly worsening since that time.  She goes to sleep at 3-4 am weekdays and  weekends. She awakens 9 weekdays and  Weekends - very irregular. She falls asleep in 15 minutes after sleeping pills.  Collar size na. She denies knees buckling with laughing, completely or partially paralyzed while falling asleep or waking up. Previous evaluation and treatment has included PSG.  Hx of night terrors since teenage/child [place] - rarely now  Pain keeps her awake - takes meds for rheumatoid arthritis  history of ADHD       Past Medical History:   Diagnosis Date    [facility]    Chronic rheumatic arthritis     on DMARD    Ex-smoker     Genital herpes     Hyperlipidemia     Hypertension     Immunosuppressed status     Lichen sclerosus et atrophicus     SUKHDEEP (obstructive sleep apnea)     Osteopenia     5/16 wai 5/18(start fmax if on pred 3months or more)    Type 2 diabetes mellitus     dxd 9/20     Past Surgical History:   Procedure Laterality Date    BREAST BIOPSY Left     benign    COLONOSCOPY N/A 2/12/2020    Procedure: COLONOSCOPY;  Surgeon: Eloina Castro MD;  Location: [facility];  Service: Endoscopy;  Laterality: N/A;    CRYOTHERAPY  1980    ESOPHAGOGASTRODUODENOSCOPY N/A 2/12/2020    Procedure: EGD (ESOPHAGOGASTRODUODENOSCOPY);  Surgeon: Eloina Castro MD;  Location: [facility];  Service: Endoscopy;  Laterality: N/A;    SKIN GRAFT  1965    laceration to right  fingers  from thigh     TONSILLECTOMY  1958     Review of patient's allergies indicates:   Allergen Reactions    Valdecoxib Other (See  Comments)     palpitation     Current Outpatient Medications on File Prior to Visit   Medication Sig Dispense Refill    acyclovir (ZOVIRAX) 400 MG tablet Take 1 tablet (400 mg total) by mouth 2 (two) times daily. 180 tablet 4    amLODIPine (NORVASC) 5 MG tablet Take 1 tablet (5 mg total) by mouth once daily. 90 tablet 4    aspirin (ECOTRIN) 81 MG EC tablet Take 81 mg by mouth once daily.      blood sugar diagnostic (BLOOD GLUCOSE TEST) Strp 1 strip by Misc.(Non-Drug; Combo Route) route 3 (three) times daily as needed. 1 each 11    blood-glucose meter Misc 1 each by Misc.(Non-Drug; Combo Route) route 3 (three) times daily as needed. 1 each 0    cholecalciferol, vitamin D3, 1,000 unit capsule Take 1 capsule by mouth once daily.      cloNIDine (CATAPRES) 0.2 MG tablet TAKE 1/2 TABLET IN THE MORNING, 1/2 TABLET AT NOON, THEN 2 TABLETS AT BEDTIME (Patient taking differently: Take 0.6 mg by mouth every evening. TAKE 1/2 TABLET IN THE MORNING, 1/2 TABLET AT NOON, THEN 2 TABLETS AT BEDTIME) 270 tablet 3    cyclobenzaprine (FLEXERIL) 10 MG tablet 1/2 to 1 po tid prn muscle spasm 60 tablet 5    diazePAM (VALIUM) 5 MG tablet TAKE 1 TABLET BY MOUTH AT BEDTIME FOR ANXIETY (Patient taking differently: Take 5 mg by mouth daily as needed. TAKE 1 TABLET BY MOUTH AT BEDTIME FOR ANXIETY) 30 tablet 5    diclofenac sodium (VOLTAREN) 1 % Gel Apply 2 g topically 3 (three) times daily. 1 Tube 0    estradioL (ESTRACE) 0.01 % (0.1 mg/gram) vaginal cream Place 1 g vaginally twice a week. 42.5 g 3    lancets 30 gauge Misc 1 lancet by Misc.(Non-Drug; Combo Route) route 3 (three) times daily as needed. 90 each 11    lancing device Misc 1 each by Misc.(Non-Drug; Combo Route) route 3 (three) times daily as needed. 1 each 0    mirabegron (MYRBETRIQ) 50 mg Tb24 Take 1 tablet (50 mg total) by mouth once daily. 30 tablet 11    NUCYNTA ER 50 mg Tb12 Take 1 tablet by mouth every 12 (twelve) hours.      ondansetron (ZOFRAN-ODT) 4 MG TbDL  Take 1 tablet (4 mg total) by mouth every 8 (eight) hours as needed (nausea). 10 tablet 1    oxyCODONE (ROXICODONE) 5 MG immediate release tablet Take 5 mg by mouth every 4 (four) hours as needed for Pain.      potassium chloride SA (K-DUR,KLOR-CON) 20 MEQ tablet Take 1 tablet (20 mEq total) by mouth once daily. 1 tablet,ER particles/crystals Oral Twice a day (Patient taking differently: Take 20 mEq by mouth daily as needed. ) 90 tablet 5    pravastatin (PRAVACHOL) 40 MG tablet Take 1 tablet (40 mg total) by mouth once daily. 90 tablet 3    tocilizumab (ACTEMRA) 162 mg/0.9 mL injection Inject 0.9 mLs (162 mg total) into the skin once a week. 10.8 mL 0    traMADoL (ULTRAM) 50 mg tablet TAKE 1 TABLET BY MOUTH EVERY 6 HOURS AS NEEDED FOR PAIN 60 tablet 0     No current facility-administered medications on file prior to visit.      Social History     Socioeconomic History    Marital status:      Spouse name: Not on file    Number of children: 1    Years of education: Not on file    Highest education level: Not on file   Occupational History    Occupation:      Employer: [employer]    Social Needs    Financial resource strain: Not on file    Food insecurity     Worry: Not on file     Inability: Not on file    Transportation needs     Medical: Not on file     Non-medical: Not on file   Tobacco Use    Smoking status: Former Smoker     Years: 15.00     Quit date: 2013     Years since quittin.2    Smokeless tobacco: Never Used    Tobacco comment: smokes for a few weeks per year - when under pressure. has been abstinent times years at a time. a pack lasts about a week   Substance and Sexual Activity    Alcohol use: Yes     Alcohol/week: 0.0 standard drinks     Frequency: Monthly or less     Drinks per session: 1 or 2    Drug use: No    Sexual activity: Not Currently     Birth control/protection: Post-menopausal   Lifestyle    Physical activity     Days per week: Not on file      "Minutes per session: Not on file    Stress: Not on file   Relationships    Social connections     Talks on phone: Not on file     Gets together: Not on file     Attends Rastafari service: Not on file     Active member of club or organization: Not on file     Attends meetings of clubs or organizations: Not on file     Relationship status: Not on file   Other Topics Concern    Not on file   Social History Narrative    Lives alone. Caffeine intake rare -1-2 per week of coffee. Does not have a Living Will or Advanced Directive     Family History   Problem Relation Age of Onset    Heart disease Mother     Diabetes Mother     Peripheral vascular disease Mother         amputations    Stroke Father     Kidney failure Father     Breast cancer Sister 39    Cancer Other 68        breast    Stroke Maternal Grandmother     Stroke Paternal Grandmother     Stroke Paternal Grandfather     Colon cancer Neg Hx     Ovarian cancer Neg Hx        Review of Systems   Constitutional: Positive for weight gain.   HENT: Negative.    Respiratory: Positive for apnea, asthma nighttime symptoms, dyspnea on extertion and Paroxysmal Nocturnal Dyspnea.    Cardiovascular: Negative.    Musculoskeletal: Positive for arthralgias and back pain.   Skin: Negative.    Gastrointestinal: Negative.    Neurological: Negative.    Psychiatric/Behavioral: Positive for sleep disturbance.       Objective:      /78   Pulse 85   Resp 17   Ht 5' 5" (1.651 m)   Wt 83.8 kg (184 lb 11.9 oz)   SpO2 98%   BMI 30.74 kg/m²   Physical Exam  Vitals signs and nursing note reviewed.   Constitutional:       Appearance: She is well-developed.   HENT:      Head: Normocephalic and atraumatic.      Nose: Nose normal.      Mouth/Throat:      Mouth: Mucous membranes are moist.      Comments: No tonsils  Eyes:      Conjunctiva/sclera: Conjunctivae normal.      Pupils: Pupils are equal, round, and reactive to light.   Neck:      Musculoskeletal: Neck supple.     "  Thyroid: No thyromegaly.      Vascular: No JVD.      Trachea: No tracheal deviation.   Cardiovascular:      Rate and Rhythm: Normal rate and regular rhythm.      Heart sounds: Gallop present. S4 sounds present.    Pulmonary:      Effort: Pulmonary effort is normal. No respiratory distress.      Breath sounds: Normal breath sounds. No wheezing or rales.   Chest:      Chest wall: No tenderness.   Abdominal:      General: Bowel sounds are normal.      Palpations: Abdomen is soft.   Musculoskeletal: Normal range of motion.   Lymphadenopathy:      Cervical: No cervical adenopathy.   Skin:     General: Skin is warm and dry.   Neurological:      Mental Status: She is alert and oriented to person, place, and time.       Personal Diagnostic Review  none pertinent  Echo Color Flow Doppler? Yes  · There is moderate left ventricular concentric hypertrophy.  · The left ventricle is normal in size with normal systolic function. The   estimated ejection fraction is 60%.  · Indeterminate diastolic function.  · Normal right ventricular systolic function.  · Mild tricuspid regurgitation.  · Normal central venous pressure (3 mmHg).  · The estimated PA systolic pressure is 25 mmHg.         Office Spirometry Results:     No flowsheet data found.  Pulmonary Studies Review 11/5/2020   SpO2 98   Height 65   Weight 2955.93   BMI (Calculated) 30.7   Predicted Distance 284.16   Predicted Distance Meters (Calculated) 424.64         Assessment:       Obesity, Class I, BMI 30-34.9    BMI noted to be not significantly changed. Patient advised and counseled concerning the adverse medical consequences of obesity.  The patient's severe obesity was monitored, evaluated, addressed and/or treated.       SUKHDEEP (obstructive sleep apnea)  -     Ambulatory referral/consult to Sleep Disorders  -     Home Sleep Studies; Future; Expected date: 11/05/2020    Abnormal EKG  -     Ambulatory referral/consult to Sleep Disorders    Poor sleep hygiene    Adult  ADHD    Obesity, Class I, BMI 30-34.9          Outpatient Encounter Medications as of 11/5/2020   Medication Sig Dispense Refill    acyclovir (ZOVIRAX) 400 MG tablet Take 1 tablet (400 mg total) by mouth 2 (two) times daily. 180 tablet 4    amLODIPine (NORVASC) 5 MG tablet Take 1 tablet (5 mg total) by mouth once daily. 90 tablet 4    aspirin (ECOTRIN) 81 MG EC tablet Take 81 mg by mouth once daily.      blood sugar diagnostic (BLOOD GLUCOSE TEST) Strp 1 strip by Misc.(Non-Drug; Combo Route) route 3 (three) times daily as needed. 1 each 11    blood-glucose meter Misc 1 each by Misc.(Non-Drug; Combo Route) route 3 (three) times daily as needed. 1 each 0    cholecalciferol, vitamin D3, 1,000 unit capsule Take 1 capsule by mouth once daily.      cloNIDine (CATAPRES) 0.2 MG tablet TAKE 1/2 TABLET IN THE MORNING, 1/2 TABLET AT NOON, THEN 2 TABLETS AT BEDTIME (Patient taking differently: Take 0.6 mg by mouth every evening. TAKE 1/2 TABLET IN THE MORNING, 1/2 TABLET AT NOON, THEN 2 TABLETS AT BEDTIME) 270 tablet 3    cyclobenzaprine (FLEXERIL) 10 MG tablet 1/2 to 1 po tid prn muscle spasm 60 tablet 5    diazePAM (VALIUM) 5 MG tablet TAKE 1 TABLET BY MOUTH AT BEDTIME FOR ANXIETY (Patient taking differently: Take 5 mg by mouth daily as needed. TAKE 1 TABLET BY MOUTH AT BEDTIME FOR ANXIETY) 30 tablet 5    diclofenac sodium (VOLTAREN) 1 % Gel Apply 2 g topically 3 (three) times daily. 1 Tube 0    estradioL (ESTRACE) 0.01 % (0.1 mg/gram) vaginal cream Place 1 g vaginally twice a week. 42.5 g 3    lancets 30 gauge Misc 1 lancet by Misc.(Non-Drug; Combo Route) route 3 (three) times daily as needed. 90 each 11    lancing device Misc 1 each by Misc.(Non-Drug; Combo Route) route 3 (three) times daily as needed. 1 each 0    mirabegron (MYRBETRIQ) 50 mg Tb24 Take 1 tablet (50 mg total) by mouth once daily. 30 tablet 11    NUCYNTA ER 50 mg Tb12 Take 1 tablet by mouth every 12 (twelve) hours.      ondansetron  (ZOFRAN-ODT) 4 MG TbDL Take 1 tablet (4 mg total) by mouth every 8 (eight) hours as needed (nausea). 10 tablet 1    oxyCODONE (ROXICODONE) 5 MG immediate release tablet Take 5 mg by mouth every 4 (four) hours as needed for Pain.      potassium chloride SA (K-DUR,KLOR-CON) 20 MEQ tablet Take 1 tablet (20 mEq total) by mouth once daily. 1 tablet,ER particles/crystals Oral Twice a day (Patient taking differently: Take 20 mEq by mouth daily as needed. ) 90 tablet 5    pravastatin (PRAVACHOL) 40 MG tablet Take 1 tablet (40 mg total) by mouth once daily. 90 tablet 3    tocilizumab (ACTEMRA) 162 mg/0.9 mL injection Inject 0.9 mLs (162 mg total) into the skin once a week. 10.8 mL 0    traMADoL (ULTRAM) 50 mg tablet TAKE 1 TABLET BY MOUTH EVERY 6 HOURS AS NEEDED FOR PAIN 60 tablet 0     No facility-administered encounter medications on file as of 11/5/2020.      Plan:       Requested Prescriptions      No prescriptions requested or ordered in this encounter     Problem List Items Addressed This Visit     Abnormal EKG    Adult ADHD    Obesity, Class I, BMI 30-34.9    Current Assessment & Plan       BMI noted to be not significantly changed. Patient advised and counseled concerning the adverse medical consequences of obesity.  The patient's severe obesity was monitored, evaluated, addressed and/or treated.            SUKHDEEP (obstructive sleep apnea) - Primary    Relevant Orders    Home Sleep Studies    Poor sleep hygiene             Follow up in about 4 weeks (around 12/3/2020) for Release Medical Records - Sleep Study, Review Sleep Study - on return.    MEDICAL DECISION MAKING: Moderate to high complexity.  Overall, the multiple problems listed are of moderate to high severity that may impact quality of life and activities of daily living. Side effects of medications, treatment plan as well as options and alternatives reviewed and discussed with patient. There was counseling of patient concerning these issues.    Total time  spent in face to face counseling and coordination of care - 60  minutes over 50% of time was used in discussion of prognosis, risks, benefits of treatment, instructions and compliance with regimen . Discussion with other physicians or health care providers (DME, NP, pharmacy, respiratory therapy) occurred.

## 2020-11-05 NOTE — LETTER
November 5, 2020      Mirian Peterson MD  [address]  [place] [address]           [facility]  [address]  [place] [address]  Phone: [phone]  Fax: [phone]          Patient: Smitha Salinas   MR Number: [id number]   YOB: 1950   Date of Visit: 11/5/2020       Dear Dr. Mirian Peterson:    Thank you for referring Smitha Salinas to me for evaluation. Attached you will find relevant portions of my assessment and plan of care.    If you have questions, please do not hesitate to call me. I look forward to following Smitha Salinas along with you.    Sincerely,    Yong Michelle MD    Enclosure  CC:  No Recipients    If you would like to receive this communication electronically, please contact [email or web address] or [phone] to request more information on [employer] access.    For providers and/or their staff who would like to refer a patient to [facility], please contact us through our one-stop-shop provider referral line, [facility], at [phone].    If you feel you have received this communication in error or would no longer like to receive these types of communications, please e-mail [email or web address]

## 2020-11-05 NOTE — ASSESSMENT & PLAN NOTE
BMI noted to be not significantly changed. Patient advised and counseled concerning the adverse medical consequences of obesity.  The patient's severe obesity was monitored, evaluated, addressed and/or treated.

## 2020-11-06 RX ORDER — POTASSIUM CHLORIDE 20 MEQ/1
TABLET, EXTENDED RELEASE ORAL
Qty: 90 TABLET | Refills: 5 | Status: SHIPPED | OUTPATIENT
Start: 2020-11-06 | End: 2024-02-08

## 2020-11-06 RX ORDER — MIRABEGRON 50 MG/1
1 TABLET, FILM COATED, EXTENDED RELEASE ORAL DAILY
Qty: 90 TABLET | Refills: 4 | Status: SHIPPED | OUTPATIENT
Start: 2020-11-06 | End: 2021-11-18

## 2020-11-09 ENCOUNTER — TELEPHONE (OUTPATIENT)
Dept: INTERNAL MEDICINE | Facility: CLINIC | Age: 70
End: 2020-11-09

## 2020-11-09 NOTE — TELEPHONE ENCOUNTER
----- Message from Savannah Argueta sent at 11/9/2020 12:39 PM CST -----  Good afternoon,      Kavitha with [employer] called to get a order for diabetic supplies and she can be reached at [phone] and fax # [phone].      Thanks,  Savannah Argueta

## 2020-11-09 NOTE — TELEPHONE ENCOUNTER
Message was already sent to provider, did not resend, did not call rep from [employer]

## 2020-11-10 DIAGNOSIS — E11.9 TYPE 2 DIABETES MELLITUS WITHOUT COMPLICATION, WITHOUT LONG-TERM CURRENT USE OF INSULIN: ICD-10-CM

## 2020-11-10 RX ORDER — BLOOD-GLUCOSE CONTROL, NORMAL
1 EACH MISCELLANEOUS 3 TIMES DAILY PRN
Qty: 90 EACH | Refills: 11 | Status: SHIPPED | OUTPATIENT
Start: 2020-11-10 | End: 2021-09-16 | Stop reason: SDUPTHER

## 2020-11-10 RX ORDER — IBUPROFEN 200 MG
1 CAPSULE ORAL 3 TIMES DAILY PRN
Qty: 1 EACH | Refills: 11 | Status: SHIPPED | OUTPATIENT
Start: 2020-11-10 | End: 2021-07-24

## 2020-11-10 RX ORDER — DEXTROSE 4 G
1 TABLET,CHEWABLE ORAL 3 TIMES DAILY PRN
Qty: 1 EACH | Refills: 0 | Status: SHIPPED | OUTPATIENT
Start: 2020-11-10 | End: 2021-07-24

## 2020-11-10 NOTE — TELEPHONE ENCOUNTER
[facility] is requesting an order for: glucometer, test strips, and lancets. Please advise.//ddw

## 2020-11-16 ENCOUNTER — TELEPHONE (OUTPATIENT)
Dept: PULMONOLOGY | Facility: CLINIC | Age: 70
End: 2020-11-16

## 2020-11-16 ENCOUNTER — PATIENT MESSAGE (OUTPATIENT)
Dept: SLEEP MEDICINE | Facility: HOSPITAL | Age: 70
End: 2020-11-16

## 2020-11-16 ENCOUNTER — TELEPHONE (OUTPATIENT)
Dept: UROLOGY | Facility: CLINIC | Age: 70
End: 2020-11-16

## 2020-11-16 NOTE — TELEPHONE ENCOUNTER
----- Message from Liza Baxter sent at 11/16/2020 10:48 AM CST -----  Contact: Smitha Bone would like a call back [phone], Regards to her procedure for Monday 11/23/2020.    Thanks  Td

## 2020-11-16 NOTE — TELEPHONE ENCOUNTER
----- Message from Liza Baxter sent at 11/16/2020 10:51 AM CST -----  Contact: Smitha Bone would like a call back at [phone], Regards to she will not be picking up her equipment until the 2nd and she wants to know how soon will she needs to seen for.    Thanks  Td

## 2020-11-18 ENCOUNTER — OFFICE VISIT (OUTPATIENT)
Dept: CARDIOLOGY | Facility: CLINIC | Age: 70
End: 2020-11-18
Payer: MEDICARE

## 2020-11-18 VITALS
BODY MASS INDEX: 31.04 KG/M2 | HEIGHT: 65 IN | HEART RATE: 56 BPM | OXYGEN SATURATION: 98 % | SYSTOLIC BLOOD PRESSURE: 128 MMHG | WEIGHT: 186.31 LBS | DIASTOLIC BLOOD PRESSURE: 66 MMHG

## 2020-11-18 DIAGNOSIS — E78.00 HYPERCHOLESTEROLEMIA: ICD-10-CM

## 2020-11-18 DIAGNOSIS — R73.02 IGT (IMPAIRED GLUCOSE TOLERANCE): ICD-10-CM

## 2020-11-18 DIAGNOSIS — D84.9 IMMUNOSUPPRESSED STATUS: ICD-10-CM

## 2020-11-18 DIAGNOSIS — G47.33 OSA (OBSTRUCTIVE SLEEP APNEA): ICD-10-CM

## 2020-11-18 DIAGNOSIS — E66.9 OBESITY, CLASS I, BMI 30-34.9: ICD-10-CM

## 2020-11-18 DIAGNOSIS — M05.749 RHEUMATOID ARTHRITIS INVOLVING HAND WITH POSITIVE RHEUMATOID FACTOR, UNSPECIFIED LATERALITY: ICD-10-CM

## 2020-11-18 DIAGNOSIS — Z87.891 EX-SMOKER: ICD-10-CM

## 2020-11-18 DIAGNOSIS — R94.31 ABNORMAL EKG: ICD-10-CM

## 2020-11-18 DIAGNOSIS — I10 ESSENTIAL HYPERTENSION: Primary | ICD-10-CM

## 2020-11-18 DIAGNOSIS — B02.29 POST HERPETIC NEURALGIA: ICD-10-CM

## 2020-11-18 DIAGNOSIS — R94.39 ABNORMAL STRESS TEST: ICD-10-CM

## 2020-11-18 PROCEDURE — 3074F PR MOST RECENT SYSTOLIC BLOOD PRESSURE < 130 MM HG: ICD-10-PCS | Mod: CPTII,S$GLB,, | Performed by: INTERNAL MEDICINE

## 2020-11-18 PROCEDURE — 3078F DIAST BP <80 MM HG: CPT | Mod: CPTII,S$GLB,, | Performed by: INTERNAL MEDICINE

## 2020-11-18 PROCEDURE — 99499 UNLISTED E&M SERVICE: CPT | Mod: S$GLB,,, | Performed by: INTERNAL MEDICINE

## 2020-11-18 PROCEDURE — 3074F SYST BP LT 130 MM HG: CPT | Mod: CPTII,S$GLB,, | Performed by: INTERNAL MEDICINE

## 2020-11-18 PROCEDURE — 99999 PR PBB SHADOW E&M-EST. PATIENT-LVL V: ICD-10-PCS | Mod: PBBFAC,,, | Performed by: INTERNAL MEDICINE

## 2020-11-18 PROCEDURE — 99214 PR OFFICE/OUTPT VISIT, EST, LEVL IV, 30-39 MIN: ICD-10-PCS | Mod: S$GLB,,, | Performed by: INTERNAL MEDICINE

## 2020-11-18 PROCEDURE — 1159F MED LIST DOCD IN RCRD: CPT | Mod: S$GLB,,, | Performed by: INTERNAL MEDICINE

## 2020-11-18 PROCEDURE — 3008F BODY MASS INDEX DOCD: CPT | Mod: CPTII,S$GLB,, | Performed by: INTERNAL MEDICINE

## 2020-11-18 PROCEDURE — 1159F PR MEDICATION LIST DOCUMENTED IN MEDICAL RECORD: ICD-10-PCS | Mod: S$GLB,,, | Performed by: INTERNAL MEDICINE

## 2020-11-18 PROCEDURE — 3008F PR BODY MASS INDEX (BMI) DOCUMENTED: ICD-10-PCS | Mod: CPTII,S$GLB,, | Performed by: INTERNAL MEDICINE

## 2020-11-18 PROCEDURE — 99999 PR PBB SHADOW E&M-EST. PATIENT-LVL V: CPT | Mod: PBBFAC,,, | Performed by: INTERNAL MEDICINE

## 2020-11-18 PROCEDURE — 99214 OFFICE O/P EST MOD 30 MIN: CPT | Mod: S$GLB,,, | Performed by: INTERNAL MEDICINE

## 2020-11-18 PROCEDURE — 99499 RISK ADDL DX/OHS AUDIT: ICD-10-PCS | Mod: S$GLB,,, | Performed by: INTERNAL MEDICINE

## 2020-11-18 PROCEDURE — 3078F PR MOST RECENT DIASTOLIC BLOOD PRESSURE < 80 MM HG: ICD-10-PCS | Mod: CPTII,S$GLB,, | Performed by: INTERNAL MEDICINE

## 2020-11-18 NOTE — PROGRESS NOTES
Subjective:   Patient ID:  Smitha Salinas is a 70 y.o. female who presents for follow up of Follow-up (one month)      HPI   10/9/2020  A 69 yo female exsmoker htn hlp impaired glucose tolerance  sukhdeep is referred from DR BARBOSA due to abnormal ekg. She has gained weight she does not exercise. She has been on the sedentary side exacerbated by arthritis has an episode last weekend of nausea projectile vomiting felt dehydrated was evaluated she feels better now however had epsiode of chest tightness difficulty swallowing her mother had diabetes pvd amputation mi. Has  hip pain from walking has rheumatoid arthritis she gest tired easily. She needs to have surgery and get sacral implants. Has abnormal ekg showing lvfh repolarization abnormalities and ischemic changes.     11/18/2020  Here for f/u test results. She has a slaty fatty  Dinner . She has no new complaints . Sleep study pending. Her echo showed lvh diastolic dysfunction. Her cardiolite negative had some dilatation with exercise. She has no angina no chest pain walks the dog. Her exercise is increased. She is trying to eat healthier adjusting her pantry.  Past Medical History:   Diagnosis Date    [facility]    Chronic rheumatic arthritis     on DMARD    Ex-smoker     Genital herpes     Hyperlipidemia     Hypertension     Immunosuppressed status     Lichen sclerosus et atrophicus     SUKHDEEP (obstructive sleep apnea)     Osteopenia     5/16 wai 5/18(start fmax if on pred 3months or more)    Type 2 diabetes mellitus     dxd 9/20       Past Surgical History:   Procedure Laterality Date    BREAST BIOPSY Left     benign    COLONOSCOPY N/A 2/12/2020    Procedure: COLONOSCOPY;  Surgeon: Eloina Castro MD;  Location: [facility];  Service: Endoscopy;  Laterality: N/A;    CRYOTHERAPY  1980    ESOPHAGOGASTRODUODENOSCOPY N/A 2/12/2020    Procedure: EGD (ESOPHAGOGASTRODUODENOSCOPY);  Surgeon: Eloina Castro MD;  Location: [facility];   Service: Endoscopy;  Laterality: N/A;    SKIN GRAFT      laceration to right  fingers  from thigh     TONSILLECTOMY         Social History     Tobacco Use    Smoking status: Former Smoker     Years: 15.00     Quit date: 2013     Years since quittin.3    Smokeless tobacco: Never Used    Tobacco comment: smokes for a few weeks per year - when under pressure. has been abstinent times years at a time. a pack lasts about a week   Substance Use Topics    Alcohol use: Yes     Alcohol/week: 0.0 standard drinks     Frequency: Monthly or less     Drinks per session: 1 or 2    Drug use: No       Family History   Problem Relation Age of Onset    Heart disease Mother     Diabetes Mother     Peripheral vascular disease Mother         amputations    Stroke Father     Kidney failure Father     Breast cancer Sister 39    Cancer Other 68        breast    Stroke Maternal Grandmother     Stroke Paternal Grandmother     Stroke Paternal Grandfather     Colon cancer Neg Hx     Ovarian cancer Neg Hx        Current Outpatient Medications   Medication Sig    acyclovir (ZOVIRAX) 400 MG tablet Take 1 tablet (400 mg total) by mouth 2 (two) times daily.    amLODIPine (NORVASC) 5 MG tablet Take 1 tablet (5 mg total) by mouth once daily.    aspirin (ECOTRIN) 81 MG EC tablet Take 81 mg by mouth once daily.    blood sugar diagnostic (BLOOD GLUCOSE TEST) Strp 1 strip by Misc.(Non-Drug; Combo Route) route 3 (three) times daily as needed.    blood-glucose meter Misc 1 each by Misc.(Non-Drug; Combo Route) route 3 (three) times daily as needed.    cholecalciferol, vitamin D3, 1,000 unit capsule Take 1 capsule by mouth once daily.    cloNIDine (CATAPRES) 0.2 MG tablet TAKE 1/2 TABLET IN THE MORNING, 1/2 TABLET AT NOON, THEN 2 TABLETS AT BEDTIME (Patient taking differently: Take 0.6 mg by mouth every evening. TAKE 1/2 TABLET IN THE MORNING, 1/2 TABLET AT NOON, THEN 2 TABLETS AT BEDTIME)    diazePAM (VALIUM) 5 MG  tablet TAKE 1 TABLET BY MOUTH AT BEDTIME FOR ANXIETY (Patient taking differently: Take 5 mg by mouth daily as needed. TAKE 1 TABLET BY MOUTH AT BEDTIME FOR ANXIETY)    diclofenac sodium (VOLTAREN) 1 % Gel Apply 2 g topically 3 (three) times daily.    estradioL (ESTRACE) 0.01 % (0.1 mg/gram) vaginal cream Place 1 g vaginally twice a week.    lancets 30 gauge Misc 1 lancet by Misc.(Non-Drug; Combo Route) route 3 (three) times daily as needed.    lancing device Misc 1 each by Misc.(Non-Drug; Combo Route) route 3 (three) times daily as needed.    mirabegron (MYRBETRIQ) 50 mg Tb24 Take 1 tablet (50 mg total) by mouth once daily.    ondansetron (ZOFRAN-ODT) 4 MG TbDL Take 1 tablet (4 mg total) by mouth every 8 (eight) hours as needed (nausea).    oxyCODONE (ROXICODONE) 5 MG immediate release tablet Take 5 mg by mouth every 4 (four) hours as needed for Pain.    potassium chloride SA (K-DUR,KLOR-CON) 20 MEQ tablet 1 tablet daily    pravastatin (PRAVACHOL) 40 MG tablet Take 1 tablet (40 mg total) by mouth once daily.    tocilizumab (ACTEMRA) 162 mg/0.9 mL injection Inject 0.9 mLs (162 mg total) into the skin once a week.    traMADoL (ULTRAM) 50 mg tablet TAKE 1 TABLET BY MOUTH EVERY 6 HOURS AS NEEDED FOR PAIN    cyclobenzaprine (FLEXERIL) 10 MG tablet 1/2 to 1 po tid prn muscle spasm (Patient not taking: Reported on 11/18/2020)    NUCYNTA ER 50 mg Tb12 Take 1 tablet by mouth every 12 (twelve) hours.     No current facility-administered medications for this visit.      Current Outpatient Medications on File Prior to Visit   Medication Sig    acyclovir (ZOVIRAX) 400 MG tablet Take 1 tablet (400 mg total) by mouth 2 (two) times daily.    amLODIPine (NORVASC) 5 MG tablet Take 1 tablet (5 mg total) by mouth once daily.    aspirin (ECOTRIN) 81 MG EC tablet Take 81 mg by mouth once daily.    blood sugar diagnostic (BLOOD GLUCOSE TEST) Strp 1 strip by Misc.(Non-Drug; Combo Route) route 3 (three) times daily as  needed.    blood-glucose meter Misc 1 each by Misc.(Non-Drug; Combo Route) route 3 (three) times daily as needed.    cholecalciferol, vitamin D3, 1,000 unit capsule Take 1 capsule by mouth once daily.    cloNIDine (CATAPRES) 0.2 MG tablet TAKE 1/2 TABLET IN THE MORNING, 1/2 TABLET AT NOON, THEN 2 TABLETS AT BEDTIME (Patient taking differently: Take 0.6 mg by mouth every evening. TAKE 1/2 TABLET IN THE MORNING, 1/2 TABLET AT NOON, THEN 2 TABLETS AT BEDTIME)    diazePAM (VALIUM) 5 MG tablet TAKE 1 TABLET BY MOUTH AT BEDTIME FOR ANXIETY (Patient taking differently: Take 5 mg by mouth daily as needed. TAKE 1 TABLET BY MOUTH AT BEDTIME FOR ANXIETY)    diclofenac sodium (VOLTAREN) 1 % Gel Apply 2 g topically 3 (three) times daily.    estradioL (ESTRACE) 0.01 % (0.1 mg/gram) vaginal cream Place 1 g vaginally twice a week.    lancets 30 gauge Misc 1 lancet by Misc.(Non-Drug; Combo Route) route 3 (three) times daily as needed.    lancing device Misc 1 each by Misc.(Non-Drug; Combo Route) route 3 (three) times daily as needed.    mirabegron (MYRBETRIQ) 50 mg Tb24 Take 1 tablet (50 mg total) by mouth once daily.    ondansetron (ZOFRAN-ODT) 4 MG TbDL Take 1 tablet (4 mg total) by mouth every 8 (eight) hours as needed (nausea).    oxyCODONE (ROXICODONE) 5 MG immediate release tablet Take 5 mg by mouth every 4 (four) hours as needed for Pain.    potassium chloride SA (K-DUR,KLOR-CON) 20 MEQ tablet 1 tablet daily    pravastatin (PRAVACHOL) 40 MG tablet Take 1 tablet (40 mg total) by mouth once daily.    tocilizumab (ACTEMRA) 162 mg/0.9 mL injection Inject 0.9 mLs (162 mg total) into the skin once a week.    traMADoL (ULTRAM) 50 mg tablet TAKE 1 TABLET BY MOUTH EVERY 6 HOURS AS NEEDED FOR PAIN    cyclobenzaprine (FLEXERIL) 10 MG tablet 1/2 to 1 po tid prn muscle spasm (Patient not taking: Reported on 11/18/2020)    NUCYNTA ER 50 mg Tb12 Take 1 tablet by mouth every 12 (twelve) hours.     No current  facility-administered medications on file prior to visit.      Review of patient's allergies indicates:   Allergen Reactions    Valdecoxib Other (See Comments)     palpitation     Review of Systems   Constitution: Negative for diaphoresis, malaise/fatigue and weight gain.   HENT: Negative for hoarse voice.    Eyes: Negative for double vision and visual disturbance.   Cardiovascular: Negative for chest pain, claudication, cyanosis, dyspnea on exertion, irregular heartbeat, leg swelling, near-syncope, orthopnea, palpitations, paroxysmal nocturnal dyspnea and syncope.   Respiratory: Negative for cough, hemoptysis, shortness of breath and snoring.    Hematologic/Lymphatic: Negative for bleeding problem. Does not bruise/bleed easily.   Skin: Negative for color change and poor wound healing.   Musculoskeletal: Negative for muscle cramps, muscle weakness and myalgias.   Gastrointestinal: Negative for bloating, abdominal pain, change in bowel habit, diarrhea, heartburn, hematemesis, hematochezia, melena and nausea.   Neurological: Negative for excessive daytime sleepiness, dizziness, headaches, light-headedness, loss of balance, numbness and weakness.   Psychiatric/Behavioral: Negative for memory loss. The patient does not have insomnia.    Allergic/Immunologic: Negative for hives.       Objective:   Physical Exam   Constitutional: She is oriented to person, place, and time. She appears well-developed and well-nourished. She does not appear ill. No distress.   HENT:   Head: Normocephalic and atraumatic.   Eyes: Pupils are equal, round, and reactive to light. EOM are normal. No scleral icterus.   Neck: Normal range of motion. Neck supple. Normal carotid pulses, no hepatojugular reflux and no JVD present. Carotid bruit is not present. No tracheal deviation present. No thyromegaly present.   Cardiovascular: Normal rate, regular rhythm, normal heart sounds, intact distal pulses and normal pulses. Exam reveals no gallop and no  "friction rub.   No murmur heard.  Pulmonary/Chest: Effort normal and breath sounds normal. No respiratory distress. She has no wheezes. She has no rhonchi. She has no rales. She exhibits no tenderness.   Abdominal: Soft. Normal appearance, normal aorta and bowel sounds are normal. She exhibits no distension, no abdominal bruit, no ascites and no pulsatile midline mass. There is no hepatomegaly. There is no abdominal tenderness.   Obese    Musculoskeletal:         General: No edema.      Right shoulder: She exhibits no deformity.   Neurological: She is alert and oriented to person, place, and time. She has normal strength. No cranial nerve deficit. Coordination normal.   Skin: Skin is warm and dry. No rash noted. She is not diaphoretic. No cyanosis or erythema. Nails show no clubbing.   Psychiatric: She has a normal mood and affect. Her speech is normal and behavior is normal.   Nursing note and vitals reviewed.    Vitals:    11/18/20 0945 11/18/20 0948 11/18/20 1007   BP: (!) 140/80 (!) 140/85 128/66   BP Location: Right arm Left arm Left arm   Patient Position: Sitting Sitting    BP Method: Medium (Manual) Medium (Manual)    Pulse: (!) 56     SpO2: 98%     Weight: 84.5 kg (186 lb 4.6 oz)     Height: 5' 5" (1.651 m)       Lab Results   Component Value Date    CHOL 219 (H) 03/05/2020    CHOL 200 (H) 08/28/2017    CHOL 206 (H) 05/14/2013     Lab Results   Component Value Date    HDL 60 03/05/2020    HDL 48 (L) 08/28/2017    HDL 36 (L) 05/14/2013     Lab Results   Component Value Date    LDLCALC 146.2 03/05/2020    LDLCALC 115 (H) 08/28/2017    LDLCALC 141.2 05/14/2013     Lab Results   Component Value Date    TRIG 64 03/05/2020    TRIG 259 (H) 08/28/2017    TRIG 144 05/14/2013     Lab Results   Component Value Date    CHOLHDL 27.4 03/05/2020    CHOLHDL 4.2 08/28/2017    CHOLHDL 17.5 (L) 05/14/2013       Chemistry        Component Value Date/Time     10/19/2020 1330    K 4.4 10/19/2020 1330     10/19/2020 " 1330    CO2 23 10/19/2020 1330    BUN 19 10/19/2020 1330    CREATININE 1.0 10/19/2020 1330     10/19/2020 1330        Component Value Date/Time    CALCIUM 9.2 10/19/2020 1330    ALKPHOS 83 10/19/2020 1330    AST 19 10/19/2020 1330    ALT 31 10/19/2020 1330    BILITOT 0.3 10/19/2020 1330    ESTGFRAFRICA >60 10/19/2020 1330    EGFRNONAA 58 (A) 10/19/2020 1330        Lab Results   Component Value Date    HGBA1C 6.5 (H) 09/10/2020       Lab Results   Component Value Date    TSH 2.071 03/05/2020     Lab Results   Component Value Date    INR 0.9 09/06/2013    INR 0.9 09/05/2013     Lab Results   Component Value Date    WBC 7.16 10/19/2020    HGB 12.2 10/19/2020    HCT 38.6 10/19/2020    MCV 87 10/19/2020     10/19/2020     BMP  Sodium   Date Value Ref Range Status   10/19/2020 141 136 - 145 mmol/L Final     Potassium   Date Value Ref Range Status   10/19/2020 4.4 3.5 - 5.1 mmol/L Final     Chloride   Date Value Ref Range Status   10/19/2020 109 95 - 110 mmol/L Final     CO2   Date Value Ref Range Status   10/19/2020 23 23 - 29 mmol/L Final     BUN   Date Value Ref Range Status   10/19/2020 19 8 - 23 mg/dL Final     Creatinine   Date Value Ref Range Status   10/19/2020 1.0 0.5 - 1.4 mg/dL Final     Calcium   Date Value Ref Range Status   10/19/2020 9.2 8.7 - 10.5 mg/dL Final     Anion Gap   Date Value Ref Range Status   10/19/2020 9 8 - 16 mmol/L Final     eGFR if    Date Value Ref Range Status   10/19/2020 >60 >60 mL/min/1.73 m^2 Final     eGFR if non    Date Value Ref Range Status   10/19/2020 58 (A) >60 mL/min/1.73 m^2 Final     Comment:     Calculation used to obtain the estimated glomerular filtration  rate (eGFR) is the CKD-EPI equation.        CrCl cannot be calculated (Patient's most recent lab result is older than the maximum 7 days allowed.).     · There is moderate left ventricular concentric hypertrophy.  · The left ventricle is normal in size with normal systolic  function. The estimated ejection fraction is 60%.  · Indeterminate diastolic function.  · Normal right ventricular systolic function.  · Mild tricuspid regurgitation.  · Normal central venous pressure (3 mmHg).  · The estimated PA systolic pressure is 25 mmHg.     The study shows normal myocardial perfusion.    The perfusion scan is free of evidence from myocardial ischemia or injury.    Gated perfusion images showed an ejection fraction of 74% at rest and 63% post stress. Normal ejection fraction is greater than %.    LV cavity size is normal at rest and mildly enlarged at stress. (TID = 1.4)    The EKG portion of this study is negative for ischemia.  Assessment:     1. Essential hypertension    2. Hypercholesterolemia    3. Post herpetic neuralgia    4. Rheumatoid arthritis involving hand with positive rheumatoid factor, unspecified laterality    5. Immunosuppressed status    6. Ex-smoker    7. SUKHDEEP (obstructive sleep apnea)    8. Obesity, Class I, BMI 30-34.9    9. IGT (impaired glucose tolerance)    10. Abnormal EKG      Concerned about  lv dilatation on cardiolite which may be a function of her lvh vs small vessel disease vs silent ischemia. .I think she has multiple risk factors for cad. Would obtain ca score to see if it is high then the likelihood of cad is high hence an need to define coronary anatomy. If ca score low will see if will consider cta coronaries.   In meantime she was counseled about rf modification diet compliance exercise.   Plan:     Ca score ct   Phone review   Continue current therapy  Cardiac low salt diet.  Risk factor modification and excercise program./weight loss  F/u in 6 months with lipid cmp a1c

## 2020-11-20 ENCOUNTER — ANESTHESIA EVENT (OUTPATIENT)
Dept: SURGERY | Facility: HOSPITAL | Age: 70
End: 2020-11-20
Payer: MEDICARE

## 2020-11-20 ENCOUNTER — TELEPHONE (OUTPATIENT)
Dept: PREADMISSION TESTING | Facility: HOSPITAL | Age: 70
End: 2020-11-20

## 2020-11-20 ENCOUNTER — LAB VISIT (OUTPATIENT)
Dept: OTOLARYNGOLOGY | Facility: CLINIC | Age: 70
End: 2020-11-20
Payer: MEDICARE

## 2020-11-20 DIAGNOSIS — Z01.818 PRE-OP TESTING: ICD-10-CM

## 2020-11-20 PROCEDURE — U0003 INFECTIOUS AGENT DETECTION BY NUCLEIC ACID (DNA OR RNA); SEVERE ACUTE RESPIRATORY SYNDROME CORONAVIRUS 2 (SARS-COV-2) (CORONAVIRUS DISEASE [COVID-19]), AMPLIFIED PROBE TECHNIQUE, MAKING USE OF HIGH THROUGHPUT TECHNOLOGIES AS DESCRIBED BY CMS-2020-01-R: HCPCS

## 2020-11-20 NOTE — ANESTHESIA PREPROCEDURE EVALUATION
11/20/2020   Past Medical History:   Diagnosis Date    [facility]    Chronic rheumatic arthritis     on DMARD    Ex-smoker     Genital herpes     Hyperlipidemia     Hypertension     Immunosuppressed status     Lichen sclerosus et atrophicus     SUKHDEEP (obstructive sleep apnea)     Osteopenia     5/16 wai 5/18(start fmax if on pred 3months or more)    Type 2 diabetes mellitus     dxd 9/20     Past Surgical History:   Procedure Laterality Date    BREAST BIOPSY Left     benign    COLONOSCOPY N/A 2/12/2020    Procedure: COLONOSCOPY;  Surgeon: Eloina Castro MD;  Location: [facility];  Service: Endoscopy;  Laterality: N/A;    CRYOTHERAPY  1980    ESOPHAGOGASTRODUODENOSCOPY N/A 2/12/2020    Procedure: EGD (ESOPHAGOGASTRODUODENOSCOPY);  Surgeon: Eloina Castro MD;  Location: [facility];  Service: Endoscopy;  Laterality: N/A;    SKIN GRAFT  1965    laceration to right  fingers  from thigh     TONSILLECTOMY  1958       Smitha Salinas is a 70 y.o., female.    Pre-op Assessment    I have reviewed the Patient Summary Reports.     I have reviewed the Nursing Notes. I have reviewed the NPO Status.   I have reviewed the Medications.   Prednisone    Review of Systems  Anesthesia Hx:  No problems with previous Anesthesia Denies Hx of Anesthetic complications  Denies Family Hx of Anesthesia complications.   Denies Personal Hx of Anesthesia complications.   Social:  Former Smoker, Social Alcohol Use    Hematology/Oncology:  Hematology Normal   Oncology Normal     EENT/Dental:EENT/Dental Normal   Cardiovascular:   Hypertension hyperlipidemia ECG has been reviewed. Had abnl ECG, seen and cleared per cards.  Echo WNL with diastolic dysfxn.  NEG perfusion scan.  F/U in 6 months to further evaluate overall longterm risk.   Pulmonary:   Sleep Apnea    Education provided regarding risk of  obstructive sleep apnea     Renal/:  Renal/ Normal  Genital herpes   Hepatic/GI:   dysphagia   Musculoskeletal:   Arthritis  Rheumatoid arthritis, immunotherapy.   Neurological:   Post herpetic neuralgia   Endocrine:   Diabetes, type 2    Dermatological:   Lichen sclerosus et atrophicus   Psych:   anxiety depression ADHD         Physical Exam  General:  Obesity    Airway/Jaw/Neck:  Airway Findings: Mouth Opening: Normal Tongue: Normal  General Airway Assessment: Adult  Mallampati: III  TM Distance: Normal, at least 6 cm  Jaw/Neck Findings:  Neck ROM: Normal ROM  Neck Findings:     Eyes/Ears/Nose:  Eyes/Ears/Nose Findings:    Dental:  Dental Findings: In tactPt states no loose teeth   Chest/Lungs:  Chest/Lungs Findings: Clear to auscultation, Normal Respiratory Rate     Heart/Vascular:  Heart Findings: Rate: Normal  Rhythm: Regular Rhythm  Sounds: Normal  Heart murmur: negative Vascular Findings: Normal    Abdomen:  Abdomen Findings: Normal    Musculoskeletal:  Musculoskeletal Findings: Normal   Skin:  Skin Findings: Normal    Mental Status:  Mental Status Findings:  Alert and Oriented         Anesthesia Plan  Type of Anesthesia, risks & benefits discussed:  Anesthesia Type:  MAC  Patient's Preference:   Intra-op Monitoring Plan: standard ASA monitors  Intra-op Monitoring Plan Comments:   Post Op Pain Control Plan: per primary service following discharge from PACU and multimodal analgesia  Post Op Pain Control Plan Comments:   Induction:   IV  Beta Blocker:  Patient is not currently on a Beta-Blocker (No further documentation required).       Informed Consent: Patient understands risks and agrees with Anesthesia plan.  Questions answered. Anesthesia consent signed with patient.  ASA Score: 3     Day of Surgery Review of History & Physical: I have interviewed and examined the patient. I have reviewed the patient's H&P dated:  There are no significant changes.  H&P update referred to the provider.         Ready For  Surgery From Anesthesia Perspective.

## 2020-11-21 LAB — SARS-COV-2 RNA RESP QL NAA+PROBE: NOT DETECTED

## 2020-11-23 ENCOUNTER — HOSPITAL ENCOUNTER (OUTPATIENT)
Dept: RADIOLOGY | Facility: HOSPITAL | Age: 70
Discharge: HOME | End: 2020-11-23
Attending: UROLOGY | Admitting: UROLOGY
Payer: MEDICARE

## 2020-11-23 ENCOUNTER — ANESTHESIA (OUTPATIENT)
Dept: SURGERY | Facility: HOSPITAL | Age: 70
End: 2020-11-23
Payer: MEDICARE

## 2020-11-23 ENCOUNTER — TELEPHONE (OUTPATIENT)
Dept: UROLOGY | Facility: CLINIC | Age: 70
End: 2020-11-23

## 2020-11-23 ENCOUNTER — HOSPITAL ENCOUNTER (OUTPATIENT)
Facility: HOSPITAL | Age: 70
Discharge: HOME | End: 2020-11-23
Attending: UROLOGY | Admitting: UROLOGY
Payer: MEDICARE

## 2020-11-23 VITALS
OXYGEN SATURATION: 98 % | BODY MASS INDEX: 30.12 KG/M2 | SYSTOLIC BLOOD PRESSURE: 162 MMHG | DIASTOLIC BLOOD PRESSURE: 84 MMHG | HEIGHT: 65 IN | WEIGHT: 180.75 LBS | RESPIRATION RATE: 25 BRPM | HEART RATE: 81 BPM | TEMPERATURE: 98 F

## 2020-11-23 DIAGNOSIS — N39.41 URGE INCONTINENCE: ICD-10-CM

## 2020-11-23 DIAGNOSIS — Z01.818 PRE-OP TESTING: ICD-10-CM

## 2020-11-23 LAB — POCT GLUCOSE: 87 MG/DL (ref 70–110)

## 2020-11-23 PROCEDURE — D9220A PRA ANESTHESIA: Mod: CRNA,,, | Performed by: NURSE ANESTHETIST, CERTIFIED REGISTERED

## 2020-11-23 PROCEDURE — 25000003 PHARM REV CODE 250: Performed by: NURSE ANESTHETIST, CERTIFIED REGISTERED

## 2020-11-23 PROCEDURE — 64590 INS/RPL PRPH SAC/GSTR NPG/R: CPT | Mod: 51,,, | Performed by: UROLOGY

## 2020-11-23 PROCEDURE — 63600175 PHARM REV CODE 636 W HCPCS: Performed by: NURSE ANESTHETIST, CERTIFIED REGISTERED

## 2020-11-23 PROCEDURE — 82962 GLUCOSE BLOOD TEST: CPT | Performed by: UROLOGY

## 2020-11-23 PROCEDURE — 63600175 PHARM REV CODE 636 W HCPCS: Performed by: UROLOGY

## 2020-11-23 PROCEDURE — C1767 GENERATOR, NEURO NON-RECHARG: HCPCS | Performed by: UROLOGY

## 2020-11-23 PROCEDURE — 72220 X-RAY EXAM SACRUM TAILBONE: CPT | Mod: TC

## 2020-11-23 PROCEDURE — 64581 PR IMPLANTATION, NEUROSTIM ELECT ARRAY, OPEN, SACRAL NERVE: ICD-10-PCS | Mod: ,,, | Performed by: UROLOGY

## 2020-11-23 PROCEDURE — C1820 GENERATOR NEURO RECHG BAT SY: HCPCS | Performed by: UROLOGY

## 2020-11-23 PROCEDURE — 95971 ALYS SMPL SP/PN NPGT W/PRGRM: CPT | Mod: 59,,, | Performed by: UROLOGY

## 2020-11-23 PROCEDURE — D9220A PRA ANESTHESIA: Mod: ANES,,, | Performed by: ANESTHESIOLOGY

## 2020-11-23 PROCEDURE — D9220A PRA ANESTHESIA: ICD-10-PCS | Mod: ANES,,, | Performed by: ANESTHESIOLOGY

## 2020-11-23 PROCEDURE — 76000 PR  FLUOROSCOPE EXAMINATION: ICD-10-PCS | Mod: 26,59,, | Performed by: UROLOGY

## 2020-11-23 PROCEDURE — 36000706: Performed by: UROLOGY

## 2020-11-23 PROCEDURE — 37000009 HC ANESTHESIA EA ADD 15 MINS: Performed by: UROLOGY

## 2020-11-23 PROCEDURE — 72220 X-RAY EXAM SACRUM TAILBONE: CPT | Mod: 26,,, | Performed by: RADIOLOGY

## 2020-11-23 PROCEDURE — 63600175 PHARM REV CODE 636 W HCPCS: Performed by: ANESTHESIOLOGY

## 2020-11-23 PROCEDURE — 64581 OPN IMPLTJ NEA SACRAL NERVE: CPT | Mod: ,,, | Performed by: UROLOGY

## 2020-11-23 PROCEDURE — 25000003 PHARM REV CODE 250: Performed by: ANESTHESIOLOGY

## 2020-11-23 PROCEDURE — 72220 XR SACRUM AND COCCYX: ICD-10-PCS | Mod: 26,,, | Performed by: RADIOLOGY

## 2020-11-23 PROCEDURE — 71000033 HC RECOVERY, INTIAL HOUR: Performed by: UROLOGY

## 2020-11-23 PROCEDURE — 37000008 HC ANESTHESIA 1ST 15 MINUTES: Performed by: UROLOGY

## 2020-11-23 PROCEDURE — 71000015 HC POSTOP RECOV 1ST HR: Performed by: UROLOGY

## 2020-11-23 PROCEDURE — 76000 FLUOROSCOPY <1 HR PHYS/QHP: CPT | Mod: 26,59,, | Performed by: UROLOGY

## 2020-11-23 PROCEDURE — 36000707: Performed by: UROLOGY

## 2020-11-23 PROCEDURE — C1778 LEAD, NEUROSTIMULATOR: HCPCS | Performed by: UROLOGY

## 2020-11-23 PROCEDURE — 95971 PR ANALYZE NEUROSTIM,SIMPLE/PROG: ICD-10-PCS | Mod: 59,,, | Performed by: UROLOGY

## 2020-11-23 PROCEDURE — 64590 PR IMPLANT PERIPH/GASTRIC NEUROSTIM/RECEIVER: ICD-10-PCS | Mod: 51,,, | Performed by: UROLOGY

## 2020-11-23 PROCEDURE — D9220A PRA ANESTHESIA: ICD-10-PCS | Mod: CRNA,,, | Performed by: NURSE ANESTHETIST, CERTIFIED REGISTERED

## 2020-11-23 DEVICE — NEUROSTIMULATOR INTERSTIM BLDR: Type: IMPLANTABLE DEVICE | Site: BLADDER | Status: FUNCTIONAL

## 2020-11-23 DEVICE — LEAD INTERSTIM MIC SURESCAN 28: Type: IMPLANTABLE DEVICE | Site: BLADDER | Status: FUNCTIONAL

## 2020-11-23 RX ORDER — DIAZEPAM 5 MG/1
5 TABLET ORAL ONCE AS NEEDED
Status: DISCONTINUED | OUTPATIENT
Start: 2020-11-23 | End: 2020-11-23 | Stop reason: HOSPADM

## 2020-11-23 RX ORDER — HYDROCODONE BITARTRATE AND ACETAMINOPHEN 5; 325 MG/1; MG/1
1 TABLET ORAL
Status: DISCONTINUED | OUTPATIENT
Start: 2020-11-23 | End: 2020-11-23 | Stop reason: HOSPADM

## 2020-11-23 RX ORDER — LIDOCAINE HYDROCHLORIDE AND EPINEPHRINE 10; 10 MG/ML; UG/ML
INJECTION, SOLUTION INFILTRATION; PERINEURAL
Status: DISCONTINUED | OUTPATIENT
Start: 2020-11-23 | End: 2020-11-23 | Stop reason: HOSPADM

## 2020-11-23 RX ORDER — LIDOCAINE HYDROCHLORIDE 20 MG/ML
INJECTION INTRAVENOUS
Status: DISCONTINUED | OUTPATIENT
Start: 2020-11-23 | End: 2020-11-23

## 2020-11-23 RX ORDER — ONDANSETRON 2 MG/ML
INJECTION INTRAMUSCULAR; INTRAVENOUS
Status: DISCONTINUED | OUTPATIENT
Start: 2020-11-23 | End: 2020-11-23

## 2020-11-23 RX ORDER — SUCCINYLCHOLINE CHLORIDE 20 MG/ML
INJECTION INTRAMUSCULAR; INTRAVENOUS
Status: DISCONTINUED | OUTPATIENT
Start: 2020-11-23 | End: 2020-11-23

## 2020-11-23 RX ORDER — PROPOFOL 10 MG/ML
VIAL (ML) INTRAVENOUS
Status: DISCONTINUED | OUTPATIENT
Start: 2020-11-23 | End: 2020-11-23

## 2020-11-23 RX ORDER — SODIUM CHLORIDE, SODIUM LACTATE, POTASSIUM CHLORIDE, CALCIUM CHLORIDE 600; 310; 30; 20 MG/100ML; MG/100ML; MG/100ML; MG/100ML
INJECTION, SOLUTION INTRAVENOUS CONTINUOUS
Status: DISCONTINUED | OUTPATIENT
Start: 2020-11-23 | End: 2020-11-23 | Stop reason: HOSPADM

## 2020-11-23 RX ORDER — LABETALOL HYDROCHLORIDE 5 MG/ML
INJECTION, SOLUTION INTRAVENOUS
Status: DISCONTINUED | OUTPATIENT
Start: 2020-11-23 | End: 2020-11-23

## 2020-11-23 RX ORDER — CEFAZOLIN SODIUM 2 G/50ML
2 SOLUTION INTRAVENOUS
Status: COMPLETED | OUTPATIENT
Start: 2020-11-23 | End: 2020-11-23

## 2020-11-23 RX ORDER — MEPERIDINE HYDROCHLORIDE 25 MG/ML
12.5 INJECTION INTRAMUSCULAR; INTRAVENOUS; SUBCUTANEOUS ONCE
Status: DISCONTINUED | OUTPATIENT
Start: 2020-11-23 | End: 2020-11-23 | Stop reason: HOSPADM

## 2020-11-23 RX ORDER — SODIUM CHLORIDE, SODIUM LACTATE, POTASSIUM CHLORIDE, CALCIUM CHLORIDE 600; 310; 30; 20 MG/100ML; MG/100ML; MG/100ML; MG/100ML
INJECTION, SOLUTION INTRAVENOUS
Status: DISCONTINUED | OUTPATIENT
Start: 2020-11-23 | End: 2020-11-23 | Stop reason: HOSPADM

## 2020-11-23 RX ORDER — ALBUTEROL SULFATE 0.83 MG/ML
2.5 SOLUTION RESPIRATORY (INHALATION) EVERY 4 HOURS PRN
Status: DISCONTINUED | OUTPATIENT
Start: 2020-11-23 | End: 2020-11-23 | Stop reason: HOSPADM

## 2020-11-23 RX ORDER — LEVOFLOXACIN 500 MG/1
500 TABLET, FILM COATED ORAL DAILY
Qty: 7 TABLET | Refills: 0 | Status: SHIPPED | OUTPATIENT
Start: 2020-11-23 | End: 2020-11-30

## 2020-11-23 RX ORDER — FENTANYL CITRATE 50 UG/ML
25 INJECTION, SOLUTION INTRAMUSCULAR; INTRAVENOUS EVERY 5 MIN PRN
Status: DISCONTINUED | OUTPATIENT
Start: 2020-11-23 | End: 2020-11-23 | Stop reason: HOSPADM

## 2020-11-23 RX ORDER — DIPHENHYDRAMINE HYDROCHLORIDE 50 MG/ML
25 INJECTION INTRAMUSCULAR; INTRAVENOUS EVERY 6 HOURS PRN
Status: DISCONTINUED | OUTPATIENT
Start: 2020-11-23 | End: 2020-11-23 | Stop reason: HOSPADM

## 2020-11-23 RX ORDER — FENTANYL CITRATE 50 UG/ML
INJECTION, SOLUTION INTRAMUSCULAR; INTRAVENOUS
Status: DISCONTINUED | OUTPATIENT
Start: 2020-11-23 | End: 2020-11-23

## 2020-11-23 RX ORDER — MIDAZOLAM HYDROCHLORIDE 1 MG/ML
INJECTION, SOLUTION INTRAMUSCULAR; INTRAVENOUS
Status: DISCONTINUED | OUTPATIENT
Start: 2020-11-23 | End: 2020-11-23

## 2020-11-23 RX ORDER — OXYCODONE AND ACETAMINOPHEN 5; 325 MG/1; MG/1
1 TABLET ORAL EVERY 4 HOURS PRN
Qty: 20 TABLET | Refills: 0 | Status: SHIPPED | OUTPATIENT
Start: 2020-11-23 | End: 2021-01-11

## 2020-11-23 RX ORDER — ONDANSETRON 2 MG/ML
4 INJECTION INTRAMUSCULAR; INTRAVENOUS ONCE AS NEEDED
Status: DISCONTINUED | OUTPATIENT
Start: 2020-11-23 | End: 2020-11-23 | Stop reason: HOSPADM

## 2020-11-23 RX ADMIN — ONDANSETRON 4 MG: 2 INJECTION, SOLUTION INTRAMUSCULAR; INTRAVENOUS at 07:11

## 2020-11-23 RX ADMIN — FENTANYL CITRATE 50 MCG: 50 INJECTION, SOLUTION INTRAMUSCULAR; INTRAVENOUS at 07:11

## 2020-11-23 RX ADMIN — SUCCINYLCHOLINE CHLORIDE 140 MG: 20 INJECTION, SOLUTION INTRAMUSCULAR; INTRAVENOUS at 07:11

## 2020-11-23 RX ADMIN — HYDROCODONE BITARTRATE AND ACETAMINOPHEN 1 TABLET: 5; 325 TABLET ORAL at 08:11

## 2020-11-23 RX ADMIN — SODIUM CHLORIDE, SODIUM LACTATE, POTASSIUM CHLORIDE, AND CALCIUM CHLORIDE: 600; 310; 30; 20 INJECTION, SOLUTION INTRAVENOUS at 06:11

## 2020-11-23 RX ADMIN — PROPOFOL 150 MG: 10 INJECTION, EMULSION INTRAVENOUS at 07:11

## 2020-11-23 RX ADMIN — LABETALOL HYDROCHLORIDE 5 MG: 5 INJECTION, SOLUTION INTRAVENOUS at 07:11

## 2020-11-23 RX ADMIN — Medication 50 MG: at 07:11

## 2020-11-23 RX ADMIN — GLYCOPYRROLATE 0.2 MG: 0.2 INJECTION, SOLUTION INTRAMUSCULAR; INTRAVITREAL at 07:11

## 2020-11-23 RX ADMIN — CEFAZOLIN SODIUM 2 G: 2 SOLUTION INTRAVENOUS at 07:11

## 2020-11-23 RX ADMIN — MIDAZOLAM 2 MG: 1 INJECTION INTRAMUSCULAR; INTRAVENOUS at 07:11

## 2020-11-23 NOTE — DISCHARGE SUMMARY
[facility]  Discharge Note  Short Stay    Procedure(s) (LRB):  INSERTION-IMPLANT (N/A)  INSERTION, ELECTRODE LEADS AND PULSE GENERATOR, NEUROSTIMULATOR, SACRAL  INSERTION, PULSE GENERATOR, NEUROSTIMULATOR, SACRAL    OUTCOME: Patient tolerated treatment/procedure well without complication and is now ready for discharge.    DISPOSITION: Home or Self Care    FINAL DIAGNOSIS:  <principal problem not specified>    FOLLOWUP: In clinic    DISCHARGE INSTRUCTIONS:    Discharge Procedure Orders   Diet Adult Regular     Notify your health care provider if you experience any of the following:  temperature >100.4     Notify your health care provider if you experience any of the following:  persistent nausea and vomiting or diarrhea     Notify your health care provider if you experience any of the following:  severe uncontrolled pain     Activity as tolerated

## 2020-11-23 NOTE — ANESTHESIA PROCEDURE NOTES
Intubation  Performed by: Anna Navas CRNA  Authorized by: Sylvia Bojorquez MD     Intubation:     Induction:  Intravenous    Intubated:  Postinduction    Mask Ventilation:  Easy mask    Attempts:  1    Attempted By:  CRNA    Method of Intubation:  Direct    Blade:  Bryan 2    Laryngeal View Grade: Grade I - full view of chords      Difficult Airway Encountered?: No      Complications:  None    Airway Device:  Oral endotracheal tube    Airway Device Size:  7.5    Style/Cuff Inflation:  Cuffed (inflated to minimal occlusive pressure)    Inflation Amount (mL):  7    Tube secured:  22    Secured at:  The lips    Placement Verified By:  Capnometry    Complicating Factors:  None    Findings Post-Intubation:  BS equal bilateral

## 2020-11-23 NOTE — OP NOTE
Date of Procedure:  11/23/2020     PREOPERATIVE DIAGNOSIS:  Urge incontinence     POSTOPERATIVE DIAGNOSIS:  Urge incontinence     PROCEDURES:  Stage I and 2 InterStim     SURGEON:  Pablo Rawls M.D.     Assistants: None     Specimen:  None     ANESTHESIA:  General endotracheal.     BLOOD LOSS:  Minimal     FINDINGS:   0-3 Janeth and toe response, left lead placement, right device placement.     PROCEDURE IN DETAIL:  Patient was brought to the operating suite where they were placed under general anesthesia.  Patient was then positioned in the prone position, sterilely prepped and draped.  Approximately 11 cm superior to the coccyx, 2 cm lateral on both the right and left we placed needles into the S3 foramen.  This was confirmed clinically and through fluoroscopic imaging.  Left tested more appropriately, and in good position within the S3 foramen on both the AP and lateral positions.  We then placed our guide through the needle removing the needle.  The skin was incised to allow the dilator.  The trocar was introduced over the guide.  We then placed our lead in an inferior and lateral position.   0-3 all noted positive signs of both Janeth and toes.  Lead was in appropriate position in an inferior lateral location both on AP and lateral fluoroscopic images.  We then made approximately a 3 cm incision, 2 finger breaths below the right iliac crest.  The pocket was developed and hemostasis was meticulous maintained.  The lead was passed subcutaneously to the device site.  It was then connected to the device and assessed by the [employer] representative, once place appropriately within the pocket.  It tested successful, we then began closure of the pocket.  Deep layer was closed with a running 3 0 Vicryl, skin with a 4 0 Monocryl in a subcuticular fashion.  Dermabond was applied to both sites, the patient was awakened and taken the PACU in stable condition.  They will be seen in 6 weeks for  evaluation.    COMPLICATIONS:  None

## 2020-11-23 NOTE — H&P
HPI:   10/1/20- patient is here for InterStim removal.  It definitely reduced her symptoms.  09/29/2020- patient is here today for InterStim test trial.  9/23/20- patient is in today to discuss definitive management versus urge incontinence.  She is status post urodynamics.  8/5/20- patient reports to discuss incontinence therapy, she has failed medical therapy.  Patient states she gets up 1 time per evening, going every 2-3 hours during the daytime.  No gross hematuria, no microscopic hematuria, she does have a history of smoking.  She is changing her pads a lot, stay dry.  She has transient is not as well.  No stress incontinence.  7/23/20: Returns as a new pt again with complaints of OAB/incontinence.  Myrbetriq has worked well for a long time and not needing pads at times. Daytime is main problem.  No caffeine.  No chocolate.  No spicy foods.  No gross hematuria.  Gained 10-15 lbs last 4 years  1/20/16: Oxybutinin poorly tolerated with drowsiness and other effects.  Now on ER version not effective enough.  Chocolate found to be a bladder irritant.  Thinks she needs to address her diagnosed sleep apnea.  6/29/15: Pt returns with worsening OAB and having to use 1-2 pads a day but having to use thicker bigger pads.  Urgency is frequent and very bothersome. Did go to PFMT.  The urgency is all of a sudden and very severe.  Leaks on the way to the toilet 8/10 times. No hematuria. No UTI.  12/26/13: 62 yo woman had a procedure with Dr. Lamar for UI and OAB.  Before this she would use one pad a day or less and some months without needing a pad at all.  It was more squirts and not filling the pads up.  She can't say what was done.  She didn't need a pad at all after that for about a year and she was on oxybutinin during this period and not the Detrol she was on originally.  About 6-7 months ago she started needing a pad from time to time, now daily and with multiple pads; 2-3 a day.  She has been off of the oxybutinin for  4-5 days and the leakage was especially bad.  She got a refill from Dr. Lamar and is back to one a day that stays dry for the most part.  Has not tried PFMT.  She wants to be perfectly dry.  No urolithiasis history.  No UTI history.  No hematuria.  Not sexually active.     Allergies:  Valdecoxib     Medications: has a current medication list which includes the following prescription(s): acyclovir, amlodipine, aspirin, bumetanide, cholecalciferol (vitamin d3), clonidine, cyclobenzaprine, diazepam, erythromycin, estradiol, levocetirizine, myrbetriq, nucynta er, ofloxacin, ondansetron, oxycodone, oxycodone-acetaminophen, polyethylene glycol, potassium chloride sa, pravastatin, prednisone, actemra, tramadol, and rinvoq.     Review of Systems:  General: No fever, chills, fatigability, or weight loss.  Skin: No rashes, itching, or changes in color or texture of skin.  Chest: Denies ADLER, cyanosis, wheezing, cough, and sputum production.  Abdomen: Appetite fine. No weight loss. Denies diarrhea, abdominal pain, hematemesis, or blood in stool.  Musculoskeletal: No joint stiffness or swelling. Some back pain.  : As above.  All other review of systems negative.     PMH:   has a past medical history of Adult ADHD, Chronic rheumatic arthritis, Colon polyps, Ex-smoker, Genital herpes, Herpes zoster infection, Hyperlipidemia, Hypertension, Immunosuppressed status, Lichen sclerosus et atrophicus, SUKHDEEP (obstructive sleep apnea), Osteopenia, and Type 2 diabetes mellitus.     PSH:   has a past surgical history that includes Bladder surgery (8/2012); Tonsillectomy (1958); Skin graft (1965); Cryotherapy (1980); Breast biopsy (Left); Colonoscopy (N/A, 2/12/2020); and Esophagogastroduodenoscopy (N/A, 2/12/2020).     FamHx: family history includes Breast cancer in her sister; Cancer (age of onset: 39) in her sister; Cancer (age of onset: 68) in her other; Diabetes in her mother; Heart disease in her mother; Kidney disease in her father;  Stroke in her father, maternal grandmother, paternal grandfather, and paternal grandmother.     SocHx:  reports that she quit smoking about 7 years ago. She has never used smokeless tobacco. She reports current alcohol use. She reports that she does not use drugs.      Physical Exam:  Vitals:       Vitals:     10/01/20 1602   BP: (!) 148/70   Pulse: 62   Temp: 99 °F (37.2 °C)      General: A&Ox3. No apparent distress. No deformities.  Neck: No masses. Normal thyroid.  Lungs: CTA samia. No use of accessory muscles.  Heart: RRR. No arrhythmias.  Abdomen: Soft. NT. ND.  Skin: The skin is warm and dry. No jaundice.  Ext: No c/c/e.  :   12/26/13: External genitalia normal. No lesions. Meatus normal size and location. Urethra normal. No masses. Bladder normal. No fullness or masses. Vagina normal with no discharge or lesions. Anus/perineum normal. Uterus and adnexa no palpable abnormalities.     Labs/Studies:   UDS- decreased capacity otherwise normal 9/20  Percutaneous InterStim 09/29/2020  Bladder Scan performed in office:     12/26/13: PVR 35 ml.  KUB/GENIE normal 8/20     Impression/Plan:   1.  Urge incontinence- leads have been removed and she stated it improved her symptoms.  She would like to proceed with a stage 1 and 2 InterStim.     Patient understands the risks, benefits and alternatives of the above-stated procedure.  These include but are not limited to damage to the surrounding structures including the subcutaneous soft tissue, spinal nerves, risk of infection requiring removal, risk of it not working sufficiently enough to control her incontinence.  Pain at the lead or device site.  Need for removal for discomfort or readjustment.  Patient understands that she will have to adjust her leads, to control her symptoms.  Patient understands the risks of heart attack, stroke, death, DVT and PE.  Patient understands the risk of bleeding and hematoma formation.  Patient understanding of all the above has elected to  pursue the procedure as stated.

## 2020-11-23 NOTE — PLAN OF CARE
Discussed Plan of Care with patient -OR- family member. Encouraged questions and answered adequately.

## 2020-11-23 NOTE — INTERVAL H&P NOTE
The patient has been examined and the H&P has been reviewed:    I concur with the findings and no changes have occurred since H&P was written.    Anesthesia/Surgery risks, benefits and alternative options discussed and understood by patient/family.          Active Hospital Problems    Diagnosis  POA    Urge incontinence [N39.41]  Yes      Resolved Hospital Problems   No resolved problems to display.

## 2020-11-23 NOTE — ANESTHESIA POSTPROCEDURE EVALUATION
Anesthesia Post Evaluation    Patient: Smitha Salinas    Procedure(s) Performed: Procedure(s) (LRB):  INSERTION-IMPLANT (N/A)  INSERTION, ELECTRODE LEADS AND PULSE GENERATOR, NEUROSTIMULATOR, SACRAL (Left)  INSERTION, PULSE GENERATOR, NEUROSTIMULATOR, SACRAL (Left)    Final Anesthesia Type: general    Patient location during evaluation: PACU  Patient participation: Yes- Able to Participate  Level of consciousness: awake and alert and oriented  Post-procedure vital signs: reviewed and stable  Pain management: adequate  Airway patency: patent    PONV status at discharge: No PONV  Anesthetic complications: no      Cardiovascular status: blood pressure returned to baseline, stable and hemodynamically stable  Respiratory status: unassisted  Hydration status: euvolemic  Follow-up not needed.          Vitals Value Taken Time   /86 11/23/20 0848   Temp 36.7 °C (98 °F) 11/23/20 0815   Pulse 71 11/23/20 0852   Resp 34 11/23/20 0852   SpO2 99 % 11/23/20 0852   Vitals shown include unvalidated device data.      Event Time   Out of Recovery 08:44:00         Pain/Bianka Score: Pain Rating Prior to Med Admin: 7 (11/23/2020  8:35 AM)  Bianka Score: 10 (11/23/2020  8:45 AM)

## 2020-11-24 ENCOUNTER — TELEPHONE (OUTPATIENT)
Dept: UROLOGY | Facility: CLINIC | Age: 70
End: 2020-11-24

## 2020-11-24 ENCOUNTER — PATIENT MESSAGE (OUTPATIENT)
Dept: UROLOGY | Facility: CLINIC | Age: 70
End: 2020-11-24

## 2020-11-24 NOTE — TELEPHONE ENCOUNTER
Returned call, pt c/o pain, unable to move. Pt advised per Dr. Rawls to take  mg then wait few hrs & take percocet. Advised to return call if symptoms worsen. Understanding verbalized.

## 2020-11-24 NOTE — TELEPHONE ENCOUNTER
----- Message from Nanette Tovar sent at 11/24/2020  9:23 AM CST -----  Regarding: can't move  Contact: pt  Caller is requesting a call back regarding her not being able to move, cough, and she has called 3 times.  Please call back at [phone] (home), thanks.  .

## 2020-11-24 NOTE — TELEPHONE ENCOUNTER
----- Message from Dorota Foster sent at 11/24/2020 12:32 PM CST -----  Pt would like return call, states she is pain, medication not working. Please call back at [phone]. Md Moy

## 2020-12-01 ENCOUNTER — TELEPHONE (OUTPATIENT)
Dept: RADIOLOGY | Facility: HOSPITAL | Age: 70
End: 2020-12-01

## 2020-12-02 ENCOUNTER — PATIENT MESSAGE (OUTPATIENT)
Dept: CARDIOLOGY | Facility: CLINIC | Age: 70
End: 2020-12-02

## 2020-12-02 ENCOUNTER — TELEPHONE (OUTPATIENT)
Dept: PULMONOLOGY | Facility: CLINIC | Age: 70
End: 2020-12-02

## 2020-12-02 NOTE — TELEPHONE ENCOUNTER
----- Message from Fior Goode sent at 12/2/2020  3:26 PM CST -----  Contact: [facility]/[phone]  Pt called in regards to talking to the dr about her appointment. Pt would like a call back,     Please advise

## 2020-12-11 ENCOUNTER — PROCEDURE VISIT (OUTPATIENT)
Dept: SLEEP MEDICINE | Facility: CLINIC | Age: 70
End: 2020-12-11
Payer: MEDICARE

## 2020-12-11 DIAGNOSIS — G47.33 OSA (OBSTRUCTIVE SLEEP APNEA): Primary | ICD-10-CM

## 2020-12-11 PROCEDURE — 95800 SLP STDY UNATTENDED: CPT | Mod: 26,,, | Performed by: INTERNAL MEDICINE

## 2020-12-11 PROCEDURE — 95800 PR SLEEP STUDY, UNATTENDED, RECORD HEART RATE/O2 SAT/RESP ANAL/SLEEP TIME: ICD-10-PCS | Mod: 26,,, | Performed by: INTERNAL MEDICINE

## 2020-12-11 PROCEDURE — 95800 SLP STDY UNATTENDED: CPT

## 2020-12-11 NOTE — Clinical Note
PHYSICIAN INTERPRETATION AND COMMENTS: Findings are consistent with mild, positional obstructive sleep apnea  (SUKHDEEP). There is insufficient non-supine study time to assess the severity of non-positional obstructive sleep apnea (SUKHDEEP). Night #1  AHI was 6.0/hr with 6.3 hours sleep. CPAP indicated, proceed with titration or trial of AutoPAP 4-20 cmwp  CLINICAL HISTORY: 70 year old female presented with: 15.8 inch neck, BMI of 30, an [place] sleepiness score of 7, history  of hypertension, a previous diagnosis of SUKHDEEP and symptoms of nocturnal snoring. Based on the clinical history, the patient has a high  pre-test probability of having moderate SUKHDEEP.

## 2020-12-11 NOTE — PROCEDURES
Home Sleep Studies    Date/Time: 12/11/2020 8:00 AM  Performed by: Melvin Kinney MD  Authorized by: Yong Michelle MD       PHYSICIAN INTERPRETATION AND COMMENTS: Findings are consistent with mild, positional obstructive sleep apnea  (SUKHDEEP). There is insufficient non-supine study time to assess the severity of non-positional obstructive sleep apnea (SUKHDEEP). Night #1  AHI was 6.0/hr with 6.3 hours sleep. CPAP indicated, proceed with titration or trial of AutoPAP 4-20 cmwp  CLINICAL HISTORY: 70 year old female presented with: 15.8 inch neck, BMI of 30, an [place] sleepiness score of 7, history  of hypertension, a previous diagnosis of SUKHDEEP and symptoms of nocturnal snoring. Based on the clinical history, the patient has a high  pre-test probability of having moderate SUKHDEEP.

## 2020-12-16 ENCOUNTER — PATIENT MESSAGE (OUTPATIENT)
Dept: INTERNAL MEDICINE | Facility: CLINIC | Age: 70
End: 2020-12-16

## 2020-12-16 ENCOUNTER — PATIENT MESSAGE (OUTPATIENT)
Dept: RHEUMATOLOGY | Facility: CLINIC | Age: 70
End: 2020-12-16

## 2020-12-16 RX ORDER — TRAMADOL HYDROCHLORIDE 50 MG/1
50 TABLET ORAL EVERY 6 HOURS PRN
Qty: 60 TABLET | Refills: 0 | Status: CANCELLED | OUTPATIENT
Start: 2020-12-16

## 2020-12-16 NOTE — TELEPHONE ENCOUNTER
----- Message from Paige Johnson sent at 12/16/2020  1:30 PM CST -----  Regarding: refill  Contact: pt  What is the name of the medication you are requesting? Tramadol  What is the dose? 50mg  How do you take the medication? Orally, Topically, etc?  How often do you take this medication?  Do you need a 30 day or 90 day supply?  How many refills are you requesting?  What is your preferred pharmacy and location of pharmacy? [facility] [place]  Who can we contact with further questions? [phone]      Pt plans to reschedule her visit to next month

## 2020-12-17 RX ORDER — TRAMADOL HYDROCHLORIDE 50 MG/1
TABLET ORAL
Qty: 60 TABLET | Refills: 1 | Status: SHIPPED | OUTPATIENT
Start: 2020-12-17 | End: 2021-07-12

## 2020-12-17 NOTE — TELEPHONE ENCOUNTER
Medicine approved for one REFILL. Needs follow up appointment with me or DARLINE in person or virtually

## 2020-12-22 NOTE — TELEPHONE ENCOUNTER
Message left for the patient regarding her contacting the office back to schedule her f/u appointment. Patient needs to also know that  (Medication traMADoL (ULTRAM) 50 mg tablet sent to pharmacy for only one refill).

## 2020-12-23 ENCOUNTER — TELEPHONE (OUTPATIENT)
Dept: INTERNAL MEDICINE | Facility: CLINIC | Age: 70
End: 2020-12-23

## 2020-12-23 NOTE — TELEPHONE ENCOUNTER
Scheduled patient for 1/11/2021 at 1140 ([facility] location). Patient wants to know if the appt could be virtual. Please advise.//ddw

## 2020-12-23 NOTE — TELEPHONE ENCOUNTER
----- Message from Brigette Leal sent at 12/22/2020  4:14 PM CST -----  Regarding: return call back  .Type:  Patient Returning Call    Who Called: pt  Who Left Message for Patient:   Does the patient know what this is regarding?:   Would the patient rather a call back or a response via [facility]? Call back   Best Call Back Number: [phone] (home)   Additional Information:

## 2021-01-04 ENCOUNTER — TELEPHONE (OUTPATIENT)
Dept: CARDIOLOGY | Facility: CLINIC | Age: 71
End: 2021-01-04

## 2021-01-04 ENCOUNTER — PATIENT MESSAGE (OUTPATIENT)
Dept: CARDIOLOGY | Facility: CLINIC | Age: 71
End: 2021-01-04

## 2021-01-05 ENCOUNTER — TELEPHONE (OUTPATIENT)
Dept: RHEUMATOLOGY | Facility: CLINIC | Age: 71
End: 2021-01-05

## 2021-01-06 ENCOUNTER — OFFICE VISIT (OUTPATIENT)
Dept: RHEUMATOLOGY | Facility: CLINIC | Age: 71
End: 2021-01-06
Payer: MEDICARE

## 2021-01-06 ENCOUNTER — LAB VISIT (OUTPATIENT)
Dept: LAB | Facility: HOSPITAL | Age: 71
End: 2021-01-06
Attending: PHYSICIAN ASSISTANT
Payer: MEDICARE

## 2021-01-06 VITALS
HEIGHT: 65 IN | WEIGHT: 180.31 LBS | BODY MASS INDEX: 30.04 KG/M2 | SYSTOLIC BLOOD PRESSURE: 133 MMHG | HEART RATE: 75 BPM | DIASTOLIC BLOOD PRESSURE: 86 MMHG

## 2021-01-06 DIAGNOSIS — D84.9 IMMUNOSUPPRESSED STATUS: ICD-10-CM

## 2021-01-06 DIAGNOSIS — M47.816 LUMBAR SPONDYLOSIS: ICD-10-CM

## 2021-01-06 DIAGNOSIS — M05.9 SEROPOSITIVE RHEUMATOID ARTHRITIS: ICD-10-CM

## 2021-01-06 DIAGNOSIS — Z79.899 HIGH RISK MEDICATION USE: ICD-10-CM

## 2021-01-06 DIAGNOSIS — M05.9 SEROPOSITIVE RHEUMATOID ARTHRITIS: Primary | ICD-10-CM

## 2021-01-06 DIAGNOSIS — M19.90 OSTEOARTHRITIS, UNSPECIFIED OSTEOARTHRITIS TYPE, UNSPECIFIED SITE: ICD-10-CM

## 2021-01-06 LAB
ALBUMIN SERPL BCP-MCNC: 4.7 G/DL (ref 3.5–5.2)
ALP SERPL-CCNC: 79 U/L (ref 55–135)
ALT SERPL W/O P-5'-P-CCNC: 35 U/L (ref 10–44)
ANION GAP SERPL CALC-SCNC: 9 MMOL/L (ref 8–16)
AST SERPL-CCNC: 22 U/L (ref 10–40)
BASOPHILS # BLD AUTO: 0.03 K/UL (ref 0–0.2)
BASOPHILS NFR BLD: 0.5 % (ref 0–1.9)
BILIRUB SERPL-MCNC: 0.6 MG/DL (ref 0.1–1)
BUN SERPL-MCNC: 17 MG/DL (ref 8–23)
CALCIUM SERPL-MCNC: 9.6 MG/DL (ref 8.7–10.5)
CHLORIDE SERPL-SCNC: 109 MMOL/L (ref 95–110)
CO2 SERPL-SCNC: 24 MMOL/L (ref 23–29)
CREAT SERPL-MCNC: 0.8 MG/DL (ref 0.5–1.4)
CRP SERPL-MCNC: 0.2 MG/L (ref 0–8.2)
DIFFERENTIAL METHOD: ABNORMAL
EOSINOPHIL # BLD AUTO: 0.1 K/UL (ref 0–0.5)
EOSINOPHIL NFR BLD: 1 % (ref 0–8)
ERYTHROCYTE [DISTWIDTH] IN BLOOD BY AUTOMATED COUNT: 14 % (ref 11.5–14.5)
ERYTHROCYTE [SEDIMENTATION RATE] IN BLOOD BY WESTERGREN METHOD: 3 MM/HR (ref 0–36)
EST. GFR  (AFRICAN AMERICAN): >60 ML/MIN/1.73 M^2
EST. GFR  (NON AFRICAN AMERICAN): >60 ML/MIN/1.73 M^2
GLUCOSE SERPL-MCNC: 118 MG/DL (ref 70–110)
HCT VFR BLD AUTO: 44.2 % (ref 37–48.5)
HGB BLD-MCNC: 14.1 G/DL (ref 12–16)
IMM GRANULOCYTES # BLD AUTO: 0.01 K/UL (ref 0–0.04)
IMM GRANULOCYTES NFR BLD AUTO: 0.2 % (ref 0–0.5)
LYMPHOCYTES # BLD AUTO: 1.6 K/UL (ref 1–4.8)
LYMPHOCYTES NFR BLD: 25.3 % (ref 18–48)
MCH RBC QN AUTO: 28.1 PG (ref 27–31)
MCHC RBC AUTO-ENTMCNC: 31.9 G/DL (ref 32–36)
MCV RBC AUTO: 88 FL (ref 82–98)
MONOCYTES # BLD AUTO: 0.8 K/UL (ref 0.3–1)
MONOCYTES NFR BLD: 11.9 % (ref 4–15)
NEUTROPHILS # BLD AUTO: 3.9 K/UL (ref 1.8–7.7)
NEUTROPHILS NFR BLD: 61.1 % (ref 38–73)
NRBC BLD-RTO: 0 /100 WBC
PLATELET # BLD AUTO: 166 K/UL (ref 150–350)
PMV BLD AUTO: 10.8 FL (ref 9.2–12.9)
POTASSIUM SERPL-SCNC: 4.4 MMOL/L (ref 3.5–5.1)
PROT SERPL-MCNC: 7.4 G/DL (ref 6–8.4)
RBC # BLD AUTO: 5.01 M/UL (ref 4–5.4)
SODIUM SERPL-SCNC: 142 MMOL/L (ref 136–145)
WBC # BLD AUTO: 6.29 K/UL (ref 3.9–12.7)

## 2021-01-06 PROCEDURE — 80053 COMPREHEN METABOLIC PANEL: CPT

## 2021-01-06 PROCEDURE — 3075F PR MOST RECENT SYSTOLIC BLOOD PRESS GE 130-139MM HG: ICD-10-PCS | Mod: CPTII,S$GLB,, | Performed by: PHYSICIAN ASSISTANT

## 2021-01-06 PROCEDURE — 36415 COLL VENOUS BLD VENIPUNCTURE: CPT

## 2021-01-06 PROCEDURE — 96372 PR INJECTION,THERAP/PROPH/DIAG2ST, IM OR SUBCUT: ICD-10-PCS | Mod: S$GLB,,, | Performed by: PHYSICIAN ASSISTANT

## 2021-01-06 PROCEDURE — 3079F DIAST BP 80-89 MM HG: CPT | Mod: CPTII,S$GLB,, | Performed by: PHYSICIAN ASSISTANT

## 2021-01-06 PROCEDURE — 99215 OFFICE O/P EST HI 40 MIN: CPT | Mod: 25,S$GLB,, | Performed by: PHYSICIAN ASSISTANT

## 2021-01-06 PROCEDURE — 85025 COMPLETE CBC W/AUTO DIFF WBC: CPT

## 2021-01-06 PROCEDURE — 99999 PR PBB SHADOW E&M-EST. PATIENT-LVL IV: CPT | Mod: PBBFAC,,, | Performed by: PHYSICIAN ASSISTANT

## 2021-01-06 PROCEDURE — 99999 PR PBB SHADOW E&M-EST. PATIENT-LVL IV: ICD-10-PCS | Mod: PBBFAC,,, | Performed by: PHYSICIAN ASSISTANT

## 2021-01-06 PROCEDURE — 99215 PR OFFICE/OUTPT VISIT, EST, LEVL V, 40-54 MIN: ICD-10-PCS | Mod: 25,S$GLB,, | Performed by: PHYSICIAN ASSISTANT

## 2021-01-06 PROCEDURE — 1159F PR MEDICATION LIST DOCUMENTED IN MEDICAL RECORD: ICD-10-PCS | Mod: S$GLB,,, | Performed by: PHYSICIAN ASSISTANT

## 2021-01-06 PROCEDURE — 3075F SYST BP GE 130 - 139MM HG: CPT | Mod: CPTII,S$GLB,, | Performed by: PHYSICIAN ASSISTANT

## 2021-01-06 PROCEDURE — 3079F PR MOST RECENT DIASTOLIC BLOOD PRESSURE 80-89 MM HG: ICD-10-PCS | Mod: CPTII,S$GLB,, | Performed by: PHYSICIAN ASSISTANT

## 2021-01-06 PROCEDURE — 1159F MED LIST DOCD IN RCRD: CPT | Mod: S$GLB,,, | Performed by: PHYSICIAN ASSISTANT

## 2021-01-06 PROCEDURE — 96372 THER/PROPH/DIAG INJ SC/IM: CPT | Mod: S$GLB,,, | Performed by: PHYSICIAN ASSISTANT

## 2021-01-06 PROCEDURE — 86140 C-REACTIVE PROTEIN: CPT

## 2021-01-06 PROCEDURE — 85652 RBC SED RATE AUTOMATED: CPT

## 2021-01-06 RX ORDER — KETOROLAC TROMETHAMINE 30 MG/ML
30 INJECTION, SOLUTION INTRAMUSCULAR; INTRAVENOUS ONCE
Status: COMPLETED | OUTPATIENT
Start: 2021-01-06 | End: 2021-01-06

## 2021-01-06 RX ORDER — LEFLUNOMIDE 10 MG/1
10 TABLET ORAL DAILY
Qty: 30 TABLET | Refills: 5 | Status: SHIPPED | OUTPATIENT
Start: 2021-01-06 | End: 2021-03-03

## 2021-01-06 RX ADMIN — KETOROLAC TROMETHAMINE 30 MG: 30 INJECTION, SOLUTION INTRAMUSCULAR; INTRAVENOUS at 09:01

## 2021-01-11 ENCOUNTER — OFFICE VISIT (OUTPATIENT)
Dept: UROLOGY | Facility: CLINIC | Age: 71
End: 2021-01-11
Payer: MEDICARE

## 2021-01-11 ENCOUNTER — TELEPHONE (OUTPATIENT)
Dept: INTERNAL MEDICINE | Facility: CLINIC | Age: 71
End: 2021-01-11

## 2021-01-11 ENCOUNTER — OFFICE VISIT (OUTPATIENT)
Dept: INTERNAL MEDICINE | Facility: CLINIC | Age: 71
End: 2021-01-11
Payer: MEDICARE

## 2021-01-11 VITALS
TEMPERATURE: 97 F | SYSTOLIC BLOOD PRESSURE: 158 MMHG | HEIGHT: 65 IN | DIASTOLIC BLOOD PRESSURE: 66 MMHG | BODY MASS INDEX: 30.34 KG/M2 | WEIGHT: 182.13 LBS | HEART RATE: 80 BPM

## 2021-01-11 DIAGNOSIS — E11.9 TYPE 2 DIABETES MELLITUS WITHOUT COMPLICATION, WITHOUT LONG-TERM CURRENT USE OF INSULIN: Primary | ICD-10-CM

## 2021-01-11 DIAGNOSIS — F90.9 ADULT ADHD: ICD-10-CM

## 2021-01-11 DIAGNOSIS — N39.41 URGE INCONTINENCE: Primary | ICD-10-CM

## 2021-01-11 DIAGNOSIS — I10 ESSENTIAL HYPERTENSION: ICD-10-CM

## 2021-01-11 DIAGNOSIS — M85.89 OSTEOPENIA OF MULTIPLE SITES: ICD-10-CM

## 2021-01-11 DIAGNOSIS — D84.9 IMMUNOSUPPRESSED STATUS: ICD-10-CM

## 2021-01-11 DIAGNOSIS — M05.749 RHEUMATOID ARTHRITIS INVOLVING HAND WITH POSITIVE RHEUMATOID FACTOR, UNSPECIFIED LATERALITY: ICD-10-CM

## 2021-01-11 PROBLEM — E66.01 MORBID OBESITY: Status: ACTIVE | Noted: 2021-01-11

## 2021-01-11 LAB
BILIRUB SERPL-MCNC: NORMAL MG/DL
BLOOD URINE, POC: 50
CLARITY, POC UA: NORMAL
COLOR, POC UA: YELLOW
GLUCOSE UR QL STRIP: NORMAL
KETONES UR QL STRIP: NORMAL
LEUKOCYTE ESTERASE URINE, POC: NORMAL
NITRITE, POC UA: NORMAL
PH, POC UA: 5
POC RESIDUAL URINE VOLUME: 12 ML (ref 0–100)
PROTEIN, POC: NORMAL
SPECIFIC GRAVITY, POC UA: 1.02
UROBILINOGEN, POC UA: NORMAL

## 2021-01-11 PROCEDURE — 81002 POCT URINE DIPSTICK WITHOUT MICROSCOPE: ICD-10-PCS | Mod: S$GLB,,, | Performed by: UROLOGY

## 2021-01-11 PROCEDURE — 3008F PR BODY MASS INDEX (BMI) DOCUMENTED: ICD-10-PCS | Mod: CPTII,S$GLB,, | Performed by: UROLOGY

## 2021-01-11 PROCEDURE — 1101F PR PT FALLS ASSESS DOC 0-1 FALLS W/OUT INJ PAST YR: ICD-10-PCS | Mod: CPTII,S$GLB,, | Performed by: UROLOGY

## 2021-01-11 PROCEDURE — 99499 UNLISTED E&M SERVICE: CPT | Mod: 95,,, | Performed by: FAMILY MEDICINE

## 2021-01-11 PROCEDURE — 51798 US URINE CAPACITY MEASURE: CPT | Mod: S$GLB,,, | Performed by: UROLOGY

## 2021-01-11 PROCEDURE — 99024 PR POST-OP FOLLOW-UP VISIT: ICD-10-PCS | Mod: S$GLB,,, | Performed by: UROLOGY

## 2021-01-11 PROCEDURE — 3288F FALL RISK ASSESSMENT DOCD: CPT | Mod: CPTII,S$GLB,, | Performed by: UROLOGY

## 2021-01-11 PROCEDURE — 1101F PT FALLS ASSESS-DOCD LE1/YR: CPT | Mod: CPTII,S$GLB,, | Performed by: UROLOGY

## 2021-01-11 PROCEDURE — 81002 URINALYSIS NONAUTO W/O SCOPE: CPT | Mod: S$GLB,,, | Performed by: UROLOGY

## 2021-01-11 PROCEDURE — 51798 POCT BLADDER SCAN: ICD-10-PCS | Mod: S$GLB,,, | Performed by: UROLOGY

## 2021-01-11 PROCEDURE — 99999 PR PBB SHADOW E&M-EST. PATIENT-LVL IV: ICD-10-PCS | Mod: PBBFAC,,, | Performed by: UROLOGY

## 2021-01-11 PROCEDURE — 1159F MED LIST DOCD IN RCRD: CPT | Mod: 95,,, | Performed by: FAMILY MEDICINE

## 2021-01-11 PROCEDURE — 1125F PR PAIN SEVERITY QUANTIFIED, PAIN PRESENT: ICD-10-PCS | Mod: S$GLB,,, | Performed by: UROLOGY

## 2021-01-11 PROCEDURE — 99499 RISK ADDL DX/OHS AUDIT: ICD-10-PCS | Mod: 95,,, | Performed by: FAMILY MEDICINE

## 2021-01-11 PROCEDURE — 99999 PR PBB SHADOW E&M-EST. PATIENT-LVL IV: CPT | Mod: PBBFAC,,, | Performed by: UROLOGY

## 2021-01-11 PROCEDURE — 3008F BODY MASS INDEX DOCD: CPT | Mod: CPTII,S$GLB,, | Performed by: UROLOGY

## 2021-01-11 PROCEDURE — 3044F HG A1C LEVEL LT 7.0%: CPT | Mod: CPTII,95,, | Performed by: FAMILY MEDICINE

## 2021-01-11 PROCEDURE — 3288F PR FALLS RISK ASSESSMENT DOCUMENTED: ICD-10-PCS | Mod: CPTII,S$GLB,, | Performed by: UROLOGY

## 2021-01-11 PROCEDURE — 1125F AMNT PAIN NOTED PAIN PRSNT: CPT | Mod: S$GLB,,, | Performed by: UROLOGY

## 2021-01-11 PROCEDURE — 3044F PR MOST RECENT HEMOGLOBIN A1C LEVEL <7.0%: ICD-10-PCS | Mod: CPTII,95,, | Performed by: FAMILY MEDICINE

## 2021-01-11 PROCEDURE — 99024 POSTOP FOLLOW-UP VISIT: CPT | Mod: S$GLB,,, | Performed by: UROLOGY

## 2021-01-11 PROCEDURE — 99214 PR OFFICE/OUTPT VISIT, EST, LEVL IV, 30-39 MIN: ICD-10-PCS | Mod: 95,,, | Performed by: FAMILY MEDICINE

## 2021-01-11 PROCEDURE — 99214 OFFICE O/P EST MOD 30 MIN: CPT | Mod: 95,,, | Performed by: FAMILY MEDICINE

## 2021-01-11 PROCEDURE — 1159F PR MEDICATION LIST DOCUMENTED IN MEDICAL RECORD: ICD-10-PCS | Mod: 95,,, | Performed by: FAMILY MEDICINE

## 2021-01-12 ENCOUNTER — OFFICE VISIT (OUTPATIENT)
Dept: PULMONOLOGY | Facility: CLINIC | Age: 71
End: 2021-01-12
Payer: MEDICARE

## 2021-01-12 VITALS
SYSTOLIC BLOOD PRESSURE: 110 MMHG | HEART RATE: 78 BPM | WEIGHT: 183.31 LBS | OXYGEN SATURATION: 98 % | RESPIRATION RATE: 18 BRPM | HEIGHT: 65 IN | DIASTOLIC BLOOD PRESSURE: 64 MMHG | BODY MASS INDEX: 30.54 KG/M2

## 2021-01-12 DIAGNOSIS — G47.33 OSA (OBSTRUCTIVE SLEEP APNEA): Primary | ICD-10-CM

## 2021-01-12 DIAGNOSIS — E66.9 OBESITY, CLASS I, BMI 30-34.9: ICD-10-CM

## 2021-01-12 PROCEDURE — 3078F DIAST BP <80 MM HG: CPT | Mod: CPTII,S$GLB,, | Performed by: INTERNAL MEDICINE

## 2021-01-12 PROCEDURE — 99999 PR PBB SHADOW E&M-EST. PATIENT-LVL V: CPT | Mod: PBBFAC,,, | Performed by: INTERNAL MEDICINE

## 2021-01-12 PROCEDURE — 99215 PR OFFICE/OUTPT VISIT, EST, LEVL V, 40-54 MIN: ICD-10-PCS | Mod: S$GLB,,, | Performed by: INTERNAL MEDICINE

## 2021-01-12 PROCEDURE — 3074F PR MOST RECENT SYSTOLIC BLOOD PRESSURE < 130 MM HG: ICD-10-PCS | Mod: CPTII,S$GLB,, | Performed by: INTERNAL MEDICINE

## 2021-01-12 PROCEDURE — 99999 PR PBB SHADOW E&M-EST. PATIENT-LVL V: ICD-10-PCS | Mod: PBBFAC,,, | Performed by: INTERNAL MEDICINE

## 2021-01-12 PROCEDURE — 3288F FALL RISK ASSESSMENT DOCD: CPT | Mod: CPTII,S$GLB,, | Performed by: INTERNAL MEDICINE

## 2021-01-12 PROCEDURE — 3074F SYST BP LT 130 MM HG: CPT | Mod: CPTII,S$GLB,, | Performed by: INTERNAL MEDICINE

## 2021-01-12 PROCEDURE — 99215 OFFICE O/P EST HI 40 MIN: CPT | Mod: S$GLB,,, | Performed by: INTERNAL MEDICINE

## 2021-01-12 PROCEDURE — 1159F MED LIST DOCD IN RCRD: CPT | Mod: S$GLB,,, | Performed by: INTERNAL MEDICINE

## 2021-01-12 PROCEDURE — 1101F PT FALLS ASSESS-DOCD LE1/YR: CPT | Mod: CPTII,S$GLB,, | Performed by: INTERNAL MEDICINE

## 2021-01-12 PROCEDURE — 3288F PR FALLS RISK ASSESSMENT DOCUMENTED: ICD-10-PCS | Mod: CPTII,S$GLB,, | Performed by: INTERNAL MEDICINE

## 2021-01-12 PROCEDURE — 3008F BODY MASS INDEX DOCD: CPT | Mod: CPTII,S$GLB,, | Performed by: INTERNAL MEDICINE

## 2021-01-12 PROCEDURE — 1159F PR MEDICATION LIST DOCUMENTED IN MEDICAL RECORD: ICD-10-PCS | Mod: S$GLB,,, | Performed by: INTERNAL MEDICINE

## 2021-01-12 PROCEDURE — 3008F PR BODY MASS INDEX (BMI) DOCUMENTED: ICD-10-PCS | Mod: CPTII,S$GLB,, | Performed by: INTERNAL MEDICINE

## 2021-01-12 PROCEDURE — 1101F PR PT FALLS ASSESS DOC 0-1 FALLS W/OUT INJ PAST YR: ICD-10-PCS | Mod: CPTII,S$GLB,, | Performed by: INTERNAL MEDICINE

## 2021-01-12 PROCEDURE — 3078F PR MOST RECENT DIASTOLIC BLOOD PRESSURE < 80 MM HG: ICD-10-PCS | Mod: CPTII,S$GLB,, | Performed by: INTERNAL MEDICINE

## 2021-01-15 ENCOUNTER — TELEPHONE (OUTPATIENT)
Dept: RADIOLOGY | Facility: HOSPITAL | Age: 71
End: 2021-01-15

## 2021-01-19 ENCOUNTER — HOSPITAL ENCOUNTER (OUTPATIENT)
Dept: RADIOLOGY | Facility: HOSPITAL | Age: 71
Discharge: HOME | End: 2021-01-19
Attending: INTERNAL MEDICINE
Payer: MEDICARE

## 2021-01-19 DIAGNOSIS — R94.39 ABNORMAL STRESS TEST: ICD-10-CM

## 2021-01-19 DIAGNOSIS — R94.31 ABNORMAL EKG: ICD-10-CM

## 2021-01-19 PROCEDURE — 75571 CT CALCIUM SCORING CARDIAC: ICD-10-PCS | Mod: 26,,, | Performed by: RADIOLOGY

## 2021-01-19 PROCEDURE — 75571 CT HRT W/O DYE W/CA TEST: CPT | Mod: TC

## 2021-01-19 PROCEDURE — 75571 CT HRT W/O DYE W/CA TEST: CPT | Mod: 26,,, | Performed by: RADIOLOGY

## 2021-01-20 ENCOUNTER — TELEPHONE (OUTPATIENT)
Dept: CARDIOLOGY | Facility: CLINIC | Age: 71
End: 2021-01-20

## 2021-01-20 DIAGNOSIS — E78.00 HYPERCHOLESTEROLEMIA: Primary | ICD-10-CM

## 2021-01-21 ENCOUNTER — PATIENT MESSAGE (OUTPATIENT)
Dept: PULMONOLOGY | Facility: CLINIC | Age: 71
End: 2021-01-21

## 2021-01-22 ENCOUNTER — TELEPHONE (OUTPATIENT)
Dept: CARDIOLOGY | Facility: CLINIC | Age: 71
End: 2021-01-22

## 2021-01-27 ENCOUNTER — LAB VISIT (OUTPATIENT)
Dept: LAB | Facility: HOSPITAL | Age: 71
End: 2021-01-27
Attending: INTERNAL MEDICINE
Payer: MEDICARE

## 2021-01-27 DIAGNOSIS — E78.00 HYPERCHOLESTEROLEMIA: ICD-10-CM

## 2021-01-27 LAB
CHOLEST SERPL-MCNC: 198 MG/DL (ref 120–199)
CHOLEST/HDLC SERPL: 3.9 {RATIO} (ref 2–5)
HDLC SERPL-MCNC: 51 MG/DL (ref 40–75)
HDLC SERPL: 25.8 % (ref 20–50)
LDLC SERPL CALC-MCNC: 129 MG/DL (ref 63–159)
NONHDLC SERPL-MCNC: 147 MG/DL
TRIGL SERPL-MCNC: 90 MG/DL (ref 30–150)

## 2021-01-27 PROCEDURE — 80061 LIPID PANEL: CPT

## 2021-01-27 PROCEDURE — 36415 COLL VENOUS BLD VENIPUNCTURE: CPT

## 2021-01-29 ENCOUNTER — TELEPHONE (OUTPATIENT)
Dept: CARDIOLOGY | Facility: CLINIC | Age: 71
End: 2021-01-29

## 2021-02-02 ENCOUNTER — PATIENT MESSAGE (OUTPATIENT)
Dept: PULMONOLOGY | Facility: CLINIC | Age: 71
End: 2021-02-02

## 2021-02-02 ENCOUNTER — PATIENT MESSAGE (OUTPATIENT)
Dept: RHEUMATOLOGY | Facility: CLINIC | Age: 71
End: 2021-02-02

## 2021-02-05 ENCOUNTER — PATIENT MESSAGE (OUTPATIENT)
Dept: RHEUMATOLOGY | Facility: CLINIC | Age: 71
End: 2021-02-05

## 2021-02-05 ENCOUNTER — OFFICE VISIT (OUTPATIENT)
Dept: RHEUMATOLOGY | Facility: CLINIC | Age: 71
End: 2021-02-05
Payer: MEDICARE

## 2021-02-05 VITALS
BODY MASS INDEX: 30.19 KG/M2 | WEIGHT: 181.44 LBS | SYSTOLIC BLOOD PRESSURE: 126 MMHG | DIASTOLIC BLOOD PRESSURE: 70 MMHG | HEART RATE: 54 BPM

## 2021-02-05 DIAGNOSIS — M19.90 OSTEOARTHRITIS, UNSPECIFIED OSTEOARTHRITIS TYPE, UNSPECIFIED SITE: ICD-10-CM

## 2021-02-05 DIAGNOSIS — Z71.89 COUNSELING ON HEALTH PROMOTION AND DISEASE PREVENTION: ICD-10-CM

## 2021-02-05 DIAGNOSIS — Z79.899 HIGH RISK MEDICATION USE: ICD-10-CM

## 2021-02-05 DIAGNOSIS — M05.9 SEROPOSITIVE RHEUMATOID ARTHRITIS: Primary | ICD-10-CM

## 2021-02-05 PROCEDURE — 3008F BODY MASS INDEX DOCD: CPT | Mod: CPTII,S$GLB,, | Performed by: INTERNAL MEDICINE

## 2021-02-05 PROCEDURE — 3078F PR MOST RECENT DIASTOLIC BLOOD PRESSURE < 80 MM HG: ICD-10-PCS | Mod: CPTII,S$GLB,, | Performed by: INTERNAL MEDICINE

## 2021-02-05 PROCEDURE — 99499 UNLISTED E&M SERVICE: CPT | Mod: S$GLB,,, | Performed by: INTERNAL MEDICINE

## 2021-02-05 PROCEDURE — 1159F PR MEDICATION LIST DOCUMENTED IN MEDICAL RECORD: ICD-10-PCS | Mod: S$GLB,,, | Performed by: INTERNAL MEDICINE

## 2021-02-05 PROCEDURE — 3074F PR MOST RECENT SYSTOLIC BLOOD PRESSURE < 130 MM HG: ICD-10-PCS | Mod: CPTII,S$GLB,, | Performed by: INTERNAL MEDICINE

## 2021-02-05 PROCEDURE — 3008F PR BODY MASS INDEX (BMI) DOCUMENTED: ICD-10-PCS | Mod: CPTII,S$GLB,, | Performed by: INTERNAL MEDICINE

## 2021-02-05 PROCEDURE — 99214 PR OFFICE/OUTPT VISIT, EST, LEVL IV, 30-39 MIN: ICD-10-PCS | Mod: S$GLB,,, | Performed by: INTERNAL MEDICINE

## 2021-02-05 PROCEDURE — 1125F AMNT PAIN NOTED PAIN PRSNT: CPT | Mod: S$GLB,,, | Performed by: INTERNAL MEDICINE

## 2021-02-05 PROCEDURE — 99214 OFFICE O/P EST MOD 30 MIN: CPT | Mod: S$GLB,,, | Performed by: INTERNAL MEDICINE

## 2021-02-05 PROCEDURE — 1125F PR PAIN SEVERITY QUANTIFIED, PAIN PRESENT: ICD-10-PCS | Mod: S$GLB,,, | Performed by: INTERNAL MEDICINE

## 2021-02-05 PROCEDURE — 99999 PR PBB SHADOW E&M-EST. PATIENT-LVL III: CPT | Mod: PBBFAC,,, | Performed by: INTERNAL MEDICINE

## 2021-02-05 PROCEDURE — 99999 PR PBB SHADOW E&M-EST. PATIENT-LVL III: ICD-10-PCS | Mod: PBBFAC,,, | Performed by: INTERNAL MEDICINE

## 2021-02-05 PROCEDURE — 3078F DIAST BP <80 MM HG: CPT | Mod: CPTII,S$GLB,, | Performed by: INTERNAL MEDICINE

## 2021-02-05 PROCEDURE — 1159F MED LIST DOCD IN RCRD: CPT | Mod: S$GLB,,, | Performed by: INTERNAL MEDICINE

## 2021-02-05 PROCEDURE — 99499 RISK ADDL DX/OHS AUDIT: ICD-10-PCS | Mod: S$GLB,,, | Performed by: INTERNAL MEDICINE

## 2021-02-05 PROCEDURE — 3074F SYST BP LT 130 MM HG: CPT | Mod: CPTII,S$GLB,, | Performed by: INTERNAL MEDICINE

## 2021-02-05 RX ORDER — ASPIRIN 81 MG/1
81 TABLET ORAL DAILY
COMMUNITY
End: 2023-08-28

## 2021-02-05 RX ORDER — OXYCODONE AND ACETAMINOPHEN 5; 325 MG/1; MG/1
1 TABLET ORAL EVERY 4 HOURS PRN
COMMUNITY
End: 2023-03-29 | Stop reason: SDUPTHER

## 2021-02-05 RX ORDER — SULFASALAZINE 500 MG/1
TABLET, DELAYED RELEASE ORAL
Qty: 42 TABLET | Refills: 3 | Status: SHIPPED | OUTPATIENT
Start: 2021-02-05 | End: 2021-08-17

## 2021-02-05 RX ORDER — PREDNISONE 5 MG/1
TABLET ORAL
Qty: 80 TABLET | Refills: 0 | Status: SHIPPED | OUTPATIENT
Start: 2021-02-05 | End: 2021-02-25

## 2021-02-11 ENCOUNTER — PATIENT MESSAGE (OUTPATIENT)
Dept: RHEUMATOLOGY | Facility: CLINIC | Age: 71
End: 2021-02-11

## 2021-02-24 ENCOUNTER — TELEPHONE (OUTPATIENT)
Dept: RHEUMATOLOGY | Facility: CLINIC | Age: 71
End: 2021-02-24

## 2021-02-25 ENCOUNTER — TELEPHONE (OUTPATIENT)
Dept: RHEUMATOLOGY | Facility: CLINIC | Age: 71
End: 2021-02-25

## 2021-02-26 ENCOUNTER — TELEPHONE (OUTPATIENT)
Dept: RHEUMATOLOGY | Facility: CLINIC | Age: 71
End: 2021-02-26

## 2021-03-03 ENCOUNTER — OFFICE VISIT (OUTPATIENT)
Dept: PULMONOLOGY | Facility: CLINIC | Age: 71
End: 2021-03-03
Payer: MEDICARE

## 2021-03-03 VITALS
WEIGHT: 172.81 LBS | BODY MASS INDEX: 28.79 KG/M2 | SYSTOLIC BLOOD PRESSURE: 132 MMHG | HEART RATE: 59 BPM | RESPIRATION RATE: 17 BRPM | HEIGHT: 65 IN | OXYGEN SATURATION: 97 % | DIASTOLIC BLOOD PRESSURE: 72 MMHG

## 2021-03-03 DIAGNOSIS — G47.33 OSA (OBSTRUCTIVE SLEEP APNEA): ICD-10-CM

## 2021-03-03 PROCEDURE — 3075F SYST BP GE 130 - 139MM HG: CPT | Mod: CPTII,S$GLB,, | Performed by: INTERNAL MEDICINE

## 2021-03-03 PROCEDURE — 3288F FALL RISK ASSESSMENT DOCD: CPT | Mod: CPTII,S$GLB,, | Performed by: INTERNAL MEDICINE

## 2021-03-03 PROCEDURE — 3008F PR BODY MASS INDEX (BMI) DOCUMENTED: ICD-10-PCS | Mod: CPTII,S$GLB,, | Performed by: INTERNAL MEDICINE

## 2021-03-03 PROCEDURE — 99999 PR PBB SHADOW E&M-EST. PATIENT-LVL III: CPT | Mod: PBBFAC,,, | Performed by: INTERNAL MEDICINE

## 2021-03-03 PROCEDURE — 1101F PR PT FALLS ASSESS DOC 0-1 FALLS W/OUT INJ PAST YR: ICD-10-PCS | Mod: CPTII,S$GLB,, | Performed by: INTERNAL MEDICINE

## 2021-03-03 PROCEDURE — 1101F PT FALLS ASSESS-DOCD LE1/YR: CPT | Mod: CPTII,S$GLB,, | Performed by: INTERNAL MEDICINE

## 2021-03-03 PROCEDURE — 1159F PR MEDICATION LIST DOCUMENTED IN MEDICAL RECORD: ICD-10-PCS | Mod: S$GLB,,, | Performed by: INTERNAL MEDICINE

## 2021-03-03 PROCEDURE — 3078F DIAST BP <80 MM HG: CPT | Mod: CPTII,S$GLB,, | Performed by: INTERNAL MEDICINE

## 2021-03-03 PROCEDURE — 3288F PR FALLS RISK ASSESSMENT DOCUMENTED: ICD-10-PCS | Mod: CPTII,S$GLB,, | Performed by: INTERNAL MEDICINE

## 2021-03-03 PROCEDURE — 1159F MED LIST DOCD IN RCRD: CPT | Mod: S$GLB,,, | Performed by: INTERNAL MEDICINE

## 2021-03-03 PROCEDURE — 3078F PR MOST RECENT DIASTOLIC BLOOD PRESSURE < 80 MM HG: ICD-10-PCS | Mod: CPTII,S$GLB,, | Performed by: INTERNAL MEDICINE

## 2021-03-03 PROCEDURE — 99999 PR PBB SHADOW E&M-EST. PATIENT-LVL III: ICD-10-PCS | Mod: PBBFAC,,, | Performed by: INTERNAL MEDICINE

## 2021-03-03 PROCEDURE — 3075F PR MOST RECENT SYSTOLIC BLOOD PRESS GE 130-139MM HG: ICD-10-PCS | Mod: CPTII,S$GLB,, | Performed by: INTERNAL MEDICINE

## 2021-03-03 PROCEDURE — 99213 OFFICE O/P EST LOW 20 MIN: CPT | Mod: S$GLB,,, | Performed by: INTERNAL MEDICINE

## 2021-03-03 PROCEDURE — 3008F BODY MASS INDEX DOCD: CPT | Mod: CPTII,S$GLB,, | Performed by: INTERNAL MEDICINE

## 2021-03-03 PROCEDURE — 99213 PR OFFICE/OUTPT VISIT, EST, LEVL III, 20-29 MIN: ICD-10-PCS | Mod: S$GLB,,, | Performed by: INTERNAL MEDICINE

## 2021-03-07 PROBLEM — E66.01 MORBID OBESITY: Status: RESOLVED | Noted: 2021-01-11 | Resolved: 2021-03-07

## 2021-03-08 ENCOUNTER — PATIENT MESSAGE (OUTPATIENT)
Dept: INTERNAL MEDICINE | Facility: CLINIC | Age: 71
End: 2021-03-08

## 2021-03-18 ENCOUNTER — PATIENT MESSAGE (OUTPATIENT)
Dept: UROLOGY | Facility: CLINIC | Age: 71
End: 2021-03-18

## 2021-03-22 ENCOUNTER — PATIENT MESSAGE (OUTPATIENT)
Dept: INTERNAL MEDICINE | Facility: CLINIC | Age: 71
End: 2021-03-22

## 2021-03-23 RX ORDER — PRAVASTATIN SODIUM 40 MG/1
40 TABLET ORAL DAILY
Qty: 90 TABLET | Refills: 3 | Status: SHIPPED | OUTPATIENT
Start: 2021-03-23 | End: 2021-05-14

## 2021-04-15 ENCOUNTER — PATIENT OUTREACH (OUTPATIENT)
Dept: ADMINISTRATIVE | Facility: OTHER | Age: 71
End: 2021-04-15

## 2021-04-19 ENCOUNTER — OFFICE VISIT (OUTPATIENT)
Dept: ALLERGY | Facility: CLINIC | Age: 71
End: 2021-04-19
Payer: MEDICARE

## 2021-04-19 VITALS
HEART RATE: 53 BPM | SYSTOLIC BLOOD PRESSURE: 113 MMHG | DIASTOLIC BLOOD PRESSURE: 70 MMHG | BODY MASS INDEX: 30.01 KG/M2 | HEIGHT: 65 IN | WEIGHT: 180.13 LBS

## 2021-04-19 DIAGNOSIS — J31.0 NON-ALLERGIC RHINITIS: Primary | ICD-10-CM

## 2021-04-19 DIAGNOSIS — H10.13 ALLERGIC CONJUNCTIVITIS OF BOTH EYES: ICD-10-CM

## 2021-04-19 DIAGNOSIS — K21.9 GASTROESOPHAGEAL REFLUX DISEASE, UNSPECIFIED WHETHER ESOPHAGITIS PRESENT: ICD-10-CM

## 2021-04-19 PROCEDURE — 99999 PR PBB SHADOW E&M-EST. PATIENT-LVL IV: CPT | Mod: PBBFAC,,, | Performed by: ALLERGY & IMMUNOLOGY

## 2021-04-19 PROCEDURE — 99999 PR PBB SHADOW E&M-EST. PATIENT-LVL IV: ICD-10-PCS | Mod: PBBFAC,,, | Performed by: ALLERGY & IMMUNOLOGY

## 2021-04-19 PROCEDURE — 3008F PR BODY MASS INDEX (BMI) DOCUMENTED: ICD-10-PCS | Mod: CPTII,S$GLB,, | Performed by: ALLERGY & IMMUNOLOGY

## 2021-04-19 PROCEDURE — 1159F MED LIST DOCD IN RCRD: CPT | Mod: S$GLB,,, | Performed by: ALLERGY & IMMUNOLOGY

## 2021-04-19 PROCEDURE — 99204 PR OFFICE/OUTPT VISIT, NEW, LEVL IV, 45-59 MIN: ICD-10-PCS | Mod: S$GLB,,, | Performed by: ALLERGY & IMMUNOLOGY

## 2021-04-19 PROCEDURE — 3008F BODY MASS INDEX DOCD: CPT | Mod: CPTII,S$GLB,, | Performed by: ALLERGY & IMMUNOLOGY

## 2021-04-19 PROCEDURE — 99204 OFFICE O/P NEW MOD 45 MIN: CPT | Mod: S$GLB,,, | Performed by: ALLERGY & IMMUNOLOGY

## 2021-04-19 PROCEDURE — 1159F PR MEDICATION LIST DOCUMENTED IN MEDICAL RECORD: ICD-10-PCS | Mod: S$GLB,,, | Performed by: ALLERGY & IMMUNOLOGY

## 2021-04-19 RX ORDER — IPRATROPIUM BROMIDE 21 UG/1
2 SPRAY, METERED NASAL 3 TIMES DAILY
Qty: 20 ML | Refills: 5 | Status: SHIPPED | OUTPATIENT
Start: 2021-04-19 | End: 2021-07-24

## 2021-04-19 RX ORDER — PANTOPRAZOLE SODIUM 40 MG/1
40 TABLET, DELAYED RELEASE ORAL DAILY
Qty: 30 TABLET | Refills: 11 | Status: SHIPPED | OUTPATIENT
Start: 2021-04-19 | End: 2021-07-24

## 2021-04-19 RX ORDER — OLOPATADINE HYDROCHLORIDE 1 MG/ML
1 SOLUTION/ DROPS OPHTHALMIC 2 TIMES DAILY
Qty: 5 ML | Refills: 5 | Status: SHIPPED | OUTPATIENT
Start: 2021-04-19 | End: 2021-07-24

## 2021-05-06 ENCOUNTER — TELEPHONE (OUTPATIENT)
Dept: RHEUMATOLOGY | Facility: CLINIC | Age: 71
End: 2021-05-06

## 2021-05-10 ENCOUNTER — PATIENT MESSAGE (OUTPATIENT)
Dept: INTERNAL MEDICINE | Facility: CLINIC | Age: 71
End: 2021-05-10

## 2021-05-12 ENCOUNTER — LAB VISIT (OUTPATIENT)
Dept: LAB | Facility: HOSPITAL | Age: 71
End: 2021-05-12
Attending: INTERNAL MEDICINE
Payer: MEDICARE

## 2021-05-12 DIAGNOSIS — E78.00 HYPERCHOLESTEROLEMIA: ICD-10-CM

## 2021-05-12 DIAGNOSIS — M06.9 RHEUMATOID ARTHRITIS: ICD-10-CM

## 2021-05-12 DIAGNOSIS — R73.02 IGT (IMPAIRED GLUCOSE TOLERANCE): ICD-10-CM

## 2021-05-12 LAB
ALBUMIN SERPL BCP-MCNC: 4.4 G/DL (ref 3.5–5.2)
ALBUMIN SERPL BCP-MCNC: 4.4 G/DL (ref 3.5–5.2)
ALP SERPL-CCNC: 79 U/L (ref 55–135)
ALP SERPL-CCNC: 79 U/L (ref 55–135)
ALT SERPL W/O P-5'-P-CCNC: 29 U/L (ref 10–44)
ALT SERPL W/O P-5'-P-CCNC: 29 U/L (ref 10–44)
ANION GAP SERPL CALC-SCNC: 7 MMOL/L (ref 8–16)
ANION GAP SERPL CALC-SCNC: 7 MMOL/L (ref 8–16)
AST SERPL-CCNC: 19 U/L (ref 10–40)
AST SERPL-CCNC: 19 U/L (ref 10–40)
BASOPHILS # BLD AUTO: 0.01 K/UL (ref 0–0.2)
BASOPHILS NFR BLD: 0.2 % (ref 0–1.9)
BILIRUB SERPL-MCNC: 0.6 MG/DL (ref 0.1–1)
BILIRUB SERPL-MCNC: 0.6 MG/DL (ref 0.1–1)
BUN SERPL-MCNC: 16 MG/DL (ref 8–23)
BUN SERPL-MCNC: 16 MG/DL (ref 8–23)
CALCIUM SERPL-MCNC: 9.5 MG/DL (ref 8.7–10.5)
CALCIUM SERPL-MCNC: 9.5 MG/DL (ref 8.7–10.5)
CHLORIDE SERPL-SCNC: 109 MMOL/L (ref 95–110)
CHLORIDE SERPL-SCNC: 109 MMOL/L (ref 95–110)
CHOLEST SERPL-MCNC: 248 MG/DL (ref 120–199)
CHOLEST/HDLC SERPL: 4.4 {RATIO} (ref 2–5)
CO2 SERPL-SCNC: 27 MMOL/L (ref 23–29)
CO2 SERPL-SCNC: 27 MMOL/L (ref 23–29)
CREAT SERPL-MCNC: 0.9 MG/DL (ref 0.5–1.4)
CREAT SERPL-MCNC: 0.9 MG/DL (ref 0.5–1.4)
CRP SERPL-MCNC: 0.2 MG/L (ref 0–8.2)
DIFFERENTIAL METHOD: ABNORMAL
EOSINOPHIL # BLD AUTO: 0.1 K/UL (ref 0–0.5)
EOSINOPHIL NFR BLD: 1.8 % (ref 0–8)
ERYTHROCYTE [DISTWIDTH] IN BLOOD BY AUTOMATED COUNT: 14.4 % (ref 11.5–14.5)
ERYTHROCYTE [SEDIMENTATION RATE] IN BLOOD BY WESTERGREN METHOD: 0 MM/HR (ref 0–20)
EST. GFR  (AFRICAN AMERICAN): >60 ML/MIN/1.73 M^2
EST. GFR  (AFRICAN AMERICAN): >60 ML/MIN/1.73 M^2
EST. GFR  (NON AFRICAN AMERICAN): >60 ML/MIN/1.73 M^2
EST. GFR  (NON AFRICAN AMERICAN): >60 ML/MIN/1.73 M^2
GLUCOSE SERPL-MCNC: 100 MG/DL (ref 70–110)
GLUCOSE SERPL-MCNC: 100 MG/DL (ref 70–110)
HCT VFR BLD AUTO: 41.2 % (ref 37–48.5)
HDLC SERPL-MCNC: 56 MG/DL (ref 40–75)
HDLC SERPL: 22.6 % (ref 20–50)
HGB BLD-MCNC: 13.3 G/DL (ref 12–16)
IMM GRANULOCYTES # BLD AUTO: 0.01 K/UL (ref 0–0.04)
IMM GRANULOCYTES NFR BLD AUTO: 0.2 % (ref 0–0.5)
LDLC SERPL CALC-MCNC: 169.2 MG/DL (ref 63–159)
LYMPHOCYTES # BLD AUTO: 1.5 K/UL (ref 1–4.8)
LYMPHOCYTES NFR BLD: 26.7 % (ref 18–48)
MCH RBC QN AUTO: 28.2 PG (ref 27–31)
MCHC RBC AUTO-ENTMCNC: 32.3 G/DL (ref 32–36)
MCV RBC AUTO: 87 FL (ref 82–98)
MONOCYTES # BLD AUTO: 0.7 K/UL (ref 0.3–1)
MONOCYTES NFR BLD: 12.5 % (ref 4–15)
NEUTROPHILS # BLD AUTO: 3.3 K/UL (ref 1.8–7.7)
NEUTROPHILS NFR BLD: 58.6 % (ref 38–73)
NONHDLC SERPL-MCNC: 192 MG/DL
NRBC BLD-RTO: 0 /100 WBC
PLATELET # BLD AUTO: 147 K/UL (ref 150–450)
PMV BLD AUTO: 10.8 FL (ref 9.2–12.9)
POTASSIUM SERPL-SCNC: 4.2 MMOL/L (ref 3.5–5.1)
POTASSIUM SERPL-SCNC: 4.2 MMOL/L (ref 3.5–5.1)
PROT SERPL-MCNC: 7.2 G/DL (ref 6–8.4)
PROT SERPL-MCNC: 7.2 G/DL (ref 6–8.4)
RBC # BLD AUTO: 4.72 M/UL (ref 4–5.4)
SODIUM SERPL-SCNC: 143 MMOL/L (ref 136–145)
SODIUM SERPL-SCNC: 143 MMOL/L (ref 136–145)
TRIGL SERPL-MCNC: 114 MG/DL (ref 30–150)
WBC # BLD AUTO: 5.62 K/UL (ref 3.9–12.7)

## 2021-05-12 PROCEDURE — 83036 HEMOGLOBIN GLYCOSYLATED A1C: CPT | Performed by: INTERNAL MEDICINE

## 2021-05-12 PROCEDURE — 85651 RBC SED RATE NONAUTOMATED: CPT | Performed by: INTERNAL MEDICINE

## 2021-05-12 PROCEDURE — 86140 C-REACTIVE PROTEIN: CPT | Performed by: INTERNAL MEDICINE

## 2021-05-12 PROCEDURE — 36415 COLL VENOUS BLD VENIPUNCTURE: CPT | Performed by: INTERNAL MEDICINE

## 2021-05-12 PROCEDURE — 80053 COMPREHEN METABOLIC PANEL: CPT | Performed by: INTERNAL MEDICINE

## 2021-05-12 PROCEDURE — 80061 LIPID PANEL: CPT | Performed by: INTERNAL MEDICINE

## 2021-05-12 PROCEDURE — 85025 COMPLETE CBC W/AUTO DIFF WBC: CPT | Performed by: INTERNAL MEDICINE

## 2021-05-13 LAB
ESTIMATED AVG GLUCOSE: 123 MG/DL (ref 68–131)
HBA1C MFR BLD: 5.9 % (ref 4–5.6)

## 2021-05-14 ENCOUNTER — OFFICE VISIT (OUTPATIENT)
Dept: CARDIOLOGY | Facility: CLINIC | Age: 71
End: 2021-05-14
Payer: MEDICARE

## 2021-05-14 ENCOUNTER — OFFICE VISIT (OUTPATIENT)
Dept: RHEUMATOLOGY | Facility: CLINIC | Age: 71
End: 2021-05-14
Payer: MEDICARE

## 2021-05-14 ENCOUNTER — TELEPHONE (OUTPATIENT)
Dept: RHEUMATOLOGY | Facility: CLINIC | Age: 71
End: 2021-05-14

## 2021-05-14 VITALS
BODY MASS INDEX: 29.39 KG/M2 | DIASTOLIC BLOOD PRESSURE: 85 MMHG | SYSTOLIC BLOOD PRESSURE: 135 MMHG | WEIGHT: 176.56 LBS | HEART RATE: 51 BPM

## 2021-05-14 VITALS
HEART RATE: 54 BPM | DIASTOLIC BLOOD PRESSURE: 62 MMHG | BODY MASS INDEX: 29.57 KG/M2 | OXYGEN SATURATION: 98 % | SYSTOLIC BLOOD PRESSURE: 130 MMHG | WEIGHT: 177.69 LBS

## 2021-05-14 DIAGNOSIS — M05.9 SEROPOSITIVE RHEUMATOID ARTHRITIS: Primary | ICD-10-CM

## 2021-05-14 DIAGNOSIS — E11.9 TYPE 2 DIABETES MELLITUS WITHOUT COMPLICATION, WITHOUT LONG-TERM CURRENT USE OF INSULIN: ICD-10-CM

## 2021-05-14 DIAGNOSIS — I10 ESSENTIAL HYPERTENSION: Primary | ICD-10-CM

## 2021-05-14 DIAGNOSIS — M15.9 PRIMARY OSTEOARTHRITIS INVOLVING MULTIPLE JOINTS: ICD-10-CM

## 2021-05-14 DIAGNOSIS — E78.00 HYPERCHOLESTEROLEMIA: ICD-10-CM

## 2021-05-14 DIAGNOSIS — F41.8 MIXED ANXIETY AND DEPRESSIVE DISORDER: ICD-10-CM

## 2021-05-14 DIAGNOSIS — G47.33 OSA (OBSTRUCTIVE SLEEP APNEA): ICD-10-CM

## 2021-05-14 DIAGNOSIS — Z79.899 HIGH RISK MEDICATION USE: ICD-10-CM

## 2021-05-14 DIAGNOSIS — M05.749 RHEUMATOID ARTHRITIS INVOLVING HAND WITH POSITIVE RHEUMATOID FACTOR, UNSPECIFIED LATERALITY: ICD-10-CM

## 2021-05-14 DIAGNOSIS — Z87.891 EX-SMOKER: ICD-10-CM

## 2021-05-14 DIAGNOSIS — R93.1 ELEVATED CORONARY ARTERY CALCIUM SCORE: ICD-10-CM

## 2021-05-14 DIAGNOSIS — Z71.89 COUNSELING ON HEALTH PROMOTION AND DISEASE PREVENTION: ICD-10-CM

## 2021-05-14 DIAGNOSIS — R94.31 ABNORMAL EKG: ICD-10-CM

## 2021-05-14 PROCEDURE — 1159F MED LIST DOCD IN RCRD: CPT | Mod: S$GLB,,, | Performed by: INTERNAL MEDICINE

## 2021-05-14 PROCEDURE — 1125F AMNT PAIN NOTED PAIN PRSNT: CPT | Mod: S$GLB,,, | Performed by: INTERNAL MEDICINE

## 2021-05-14 PROCEDURE — 99999 PR PBB SHADOW E&M-EST. PATIENT-LVL V: CPT | Mod: PBBFAC,,, | Performed by: INTERNAL MEDICINE

## 2021-05-14 PROCEDURE — 3008F BODY MASS INDEX DOCD: CPT | Mod: CPTII,S$GLB,, | Performed by: INTERNAL MEDICINE

## 2021-05-14 PROCEDURE — 3044F PR MOST RECENT HEMOGLOBIN A1C LEVEL <7.0%: ICD-10-PCS | Mod: CPTII,S$GLB,, | Performed by: INTERNAL MEDICINE

## 2021-05-14 PROCEDURE — 1101F PT FALLS ASSESS-DOCD LE1/YR: CPT | Mod: CPTII,S$GLB,, | Performed by: INTERNAL MEDICINE

## 2021-05-14 PROCEDURE — 3078F DIAST BP <80 MM HG: CPT | Mod: CPTII,S$GLB,, | Performed by: INTERNAL MEDICINE

## 2021-05-14 PROCEDURE — 99214 OFFICE O/P EST MOD 30 MIN: CPT | Mod: S$GLB,,, | Performed by: INTERNAL MEDICINE

## 2021-05-14 PROCEDURE — 1159F PR MEDICATION LIST DOCUMENTED IN MEDICAL RECORD: ICD-10-PCS | Mod: S$GLB,,, | Performed by: INTERNAL MEDICINE

## 2021-05-14 PROCEDURE — 99499 UNLISTED E&M SERVICE: CPT | Mod: S$GLB,,, | Performed by: INTERNAL MEDICINE

## 2021-05-14 PROCEDURE — 99999 PR PBB SHADOW E&M-EST. PATIENT-LVL IV: CPT | Mod: PBBFAC,,, | Performed by: INTERNAL MEDICINE

## 2021-05-14 PROCEDURE — 99999 PR PBB SHADOW E&M-EST. PATIENT-LVL V: ICD-10-PCS | Mod: PBBFAC,,, | Performed by: INTERNAL MEDICINE

## 2021-05-14 PROCEDURE — 3078F PR MOST RECENT DIASTOLIC BLOOD PRESSURE < 80 MM HG: ICD-10-PCS | Mod: CPTII,S$GLB,, | Performed by: INTERNAL MEDICINE

## 2021-05-14 PROCEDURE — 3044F HG A1C LEVEL LT 7.0%: CPT | Mod: CPTII,S$GLB,, | Performed by: INTERNAL MEDICINE

## 2021-05-14 PROCEDURE — 3288F PR FALLS RISK ASSESSMENT DOCUMENTED: ICD-10-PCS | Mod: CPTII,S$GLB,, | Performed by: INTERNAL MEDICINE

## 2021-05-14 PROCEDURE — 1125F PR PAIN SEVERITY QUANTIFIED, PAIN PRESENT: ICD-10-PCS | Mod: S$GLB,,, | Performed by: INTERNAL MEDICINE

## 2021-05-14 PROCEDURE — 99214 PR OFFICE/OUTPT VISIT, EST, LEVL IV, 30-39 MIN: ICD-10-PCS | Mod: S$GLB,,, | Performed by: INTERNAL MEDICINE

## 2021-05-14 PROCEDURE — 3288F FALL RISK ASSESSMENT DOCD: CPT | Mod: CPTII,S$GLB,, | Performed by: INTERNAL MEDICINE

## 2021-05-14 PROCEDURE — 3075F SYST BP GE 130 - 139MM HG: CPT | Mod: CPTII,S$GLB,, | Performed by: INTERNAL MEDICINE

## 2021-05-14 PROCEDURE — 99499 RISK ADDL DX/OHS AUDIT: ICD-10-PCS | Mod: S$GLB,,, | Performed by: INTERNAL MEDICINE

## 2021-05-14 PROCEDURE — 3075F PR MOST RECENT SYSTOLIC BLOOD PRESS GE 130-139MM HG: ICD-10-PCS | Mod: CPTII,S$GLB,, | Performed by: INTERNAL MEDICINE

## 2021-05-14 PROCEDURE — 3008F PR BODY MASS INDEX (BMI) DOCUMENTED: ICD-10-PCS | Mod: CPTII,S$GLB,, | Performed by: INTERNAL MEDICINE

## 2021-05-14 PROCEDURE — 1101F PR PT FALLS ASSESS DOC 0-1 FALLS W/OUT INJ PAST YR: ICD-10-PCS | Mod: CPTII,S$GLB,, | Performed by: INTERNAL MEDICINE

## 2021-05-14 PROCEDURE — 99999 PR PBB SHADOW E&M-EST. PATIENT-LVL IV: ICD-10-PCS | Mod: PBBFAC,,, | Performed by: INTERNAL MEDICINE

## 2021-05-14 RX ORDER — SULFASALAZINE 500 MG/1
TABLET ORAL
COMMUNITY
Start: 2021-02-05 | End: 2021-07-24

## 2021-05-14 RX ORDER — ROSUVASTATIN CALCIUM 20 MG/1
20 TABLET, COATED ORAL DAILY
Qty: 30 TABLET | Refills: 11 | Status: SHIPPED | OUTPATIENT
Start: 2021-05-14 | End: 2022-05-27

## 2021-05-17 ENCOUNTER — TELEPHONE (OUTPATIENT)
Dept: INTERNAL MEDICINE | Facility: CLINIC | Age: 71
End: 2021-05-17

## 2021-05-20 ENCOUNTER — OFFICE VISIT (OUTPATIENT)
Dept: INTERNAL MEDICINE | Facility: CLINIC | Age: 71
End: 2021-05-20
Payer: MEDICARE

## 2021-05-20 VITALS
OXYGEN SATURATION: 95 % | TEMPERATURE: 98 F | HEIGHT: 65 IN | SYSTOLIC BLOOD PRESSURE: 134 MMHG | WEIGHT: 179.44 LBS | HEART RATE: 70 BPM | BODY MASS INDEX: 29.9 KG/M2 | DIASTOLIC BLOOD PRESSURE: 70 MMHG

## 2021-05-20 DIAGNOSIS — F90.9 ADULT ADHD: ICD-10-CM

## 2021-05-20 DIAGNOSIS — D69.6 THROMBOCYTOPENIA, UNSPECIFIED: ICD-10-CM

## 2021-05-20 DIAGNOSIS — E11.9 TYPE 2 DIABETES MELLITUS WITHOUT COMPLICATION, WITHOUT LONG-TERM CURRENT USE OF INSULIN: Primary | ICD-10-CM

## 2021-05-20 DIAGNOSIS — I10 ESSENTIAL HYPERTENSION: ICD-10-CM

## 2021-05-20 DIAGNOSIS — M85.89 OSTEOPENIA OF MULTIPLE SITES: ICD-10-CM

## 2021-05-20 DIAGNOSIS — M05.749 RHEUMATOID ARTHRITIS INVOLVING HAND WITH POSITIVE RHEUMATOID FACTOR, UNSPECIFIED LATERALITY: ICD-10-CM

## 2021-05-20 DIAGNOSIS — D84.9 IMMUNOSUPPRESSED STATUS: ICD-10-CM

## 2021-05-20 DIAGNOSIS — F41.8 MIXED ANXIETY AND DEPRESSIVE DISORDER: ICD-10-CM

## 2021-05-20 PROCEDURE — 1101F PT FALLS ASSESS-DOCD LE1/YR: CPT | Mod: CPTII,S$GLB,, | Performed by: FAMILY MEDICINE

## 2021-05-20 PROCEDURE — 3078F DIAST BP <80 MM HG: CPT | Mod: CPTII,S$GLB,, | Performed by: FAMILY MEDICINE

## 2021-05-20 PROCEDURE — 3044F PR MOST RECENT HEMOGLOBIN A1C LEVEL <7.0%: ICD-10-PCS | Mod: CPTII,S$GLB,, | Performed by: FAMILY MEDICINE

## 2021-05-20 PROCEDURE — 3075F PR MOST RECENT SYSTOLIC BLOOD PRESS GE 130-139MM HG: ICD-10-PCS | Mod: CPTII,S$GLB,, | Performed by: FAMILY MEDICINE

## 2021-05-20 PROCEDURE — 3044F HG A1C LEVEL LT 7.0%: CPT | Mod: CPTII,S$GLB,, | Performed by: FAMILY MEDICINE

## 2021-05-20 PROCEDURE — 3008F PR BODY MASS INDEX (BMI) DOCUMENTED: ICD-10-PCS | Mod: CPTII,S$GLB,, | Performed by: FAMILY MEDICINE

## 2021-05-20 PROCEDURE — 99214 PR OFFICE/OUTPT VISIT, EST, LEVL IV, 30-39 MIN: ICD-10-PCS | Mod: S$GLB,,, | Performed by: FAMILY MEDICINE

## 2021-05-20 PROCEDURE — 99499 RISK ADDL DX/OHS AUDIT: ICD-10-PCS | Mod: S$GLB,,, | Performed by: FAMILY MEDICINE

## 2021-05-20 PROCEDURE — 99214 OFFICE O/P EST MOD 30 MIN: CPT | Mod: S$GLB,,, | Performed by: FAMILY MEDICINE

## 2021-05-20 PROCEDURE — 3075F SYST BP GE 130 - 139MM HG: CPT | Mod: CPTII,S$GLB,, | Performed by: FAMILY MEDICINE

## 2021-05-20 PROCEDURE — 3078F PR MOST RECENT DIASTOLIC BLOOD PRESSURE < 80 MM HG: ICD-10-PCS | Mod: CPTII,S$GLB,, | Performed by: FAMILY MEDICINE

## 2021-05-20 PROCEDURE — 3008F BODY MASS INDEX DOCD: CPT | Mod: CPTII,S$GLB,, | Performed by: FAMILY MEDICINE

## 2021-05-20 PROCEDURE — 1101F PR PT FALLS ASSESS DOC 0-1 FALLS W/OUT INJ PAST YR: ICD-10-PCS | Mod: CPTII,S$GLB,, | Performed by: FAMILY MEDICINE

## 2021-05-20 PROCEDURE — 99999 PR PBB SHADOW E&M-EST. PATIENT-LVL V: ICD-10-PCS | Mod: PBBFAC,,, | Performed by: FAMILY MEDICINE

## 2021-05-20 PROCEDURE — 1126F PR PAIN SEVERITY QUANTIFIED, NO PAIN PRESENT: ICD-10-PCS | Mod: S$GLB,,, | Performed by: FAMILY MEDICINE

## 2021-05-20 PROCEDURE — 99999 PR PBB SHADOW E&M-EST. PATIENT-LVL V: CPT | Mod: PBBFAC,,, | Performed by: FAMILY MEDICINE

## 2021-05-20 PROCEDURE — 1126F AMNT PAIN NOTED NONE PRSNT: CPT | Mod: S$GLB,,, | Performed by: FAMILY MEDICINE

## 2021-05-20 PROCEDURE — 1159F MED LIST DOCD IN RCRD: CPT | Mod: S$GLB,,, | Performed by: FAMILY MEDICINE

## 2021-05-20 PROCEDURE — 3288F FALL RISK ASSESSMENT DOCD: CPT | Mod: CPTII,S$GLB,, | Performed by: FAMILY MEDICINE

## 2021-05-20 PROCEDURE — 1159F PR MEDICATION LIST DOCUMENTED IN MEDICAL RECORD: ICD-10-PCS | Mod: S$GLB,,, | Performed by: FAMILY MEDICINE

## 2021-05-20 PROCEDURE — 3288F PR FALLS RISK ASSESSMENT DOCUMENTED: ICD-10-PCS | Mod: CPTII,S$GLB,, | Performed by: FAMILY MEDICINE

## 2021-05-20 PROCEDURE — 99499 UNLISTED E&M SERVICE: CPT | Mod: S$GLB,,, | Performed by: FAMILY MEDICINE

## 2021-05-20 RX ORDER — SEMAGLUTIDE 1.34 MG/ML
0.25 INJECTION, SOLUTION SUBCUTANEOUS
Qty: 4 PEN | Refills: 11 | Status: SHIPPED | OUTPATIENT
Start: 2021-05-20 | End: 2021-05-20 | Stop reason: SDUPTHER

## 2021-05-20 RX ORDER — SEMAGLUTIDE 1.34 MG/ML
0.25 INJECTION, SOLUTION SUBCUTANEOUS
Qty: 4 PEN | Refills: 11 | Status: SHIPPED | OUTPATIENT
Start: 2021-05-20 | End: 2021-09-24

## 2021-05-21 ENCOUNTER — PATIENT MESSAGE (OUTPATIENT)
Dept: INTERNAL MEDICINE | Facility: CLINIC | Age: 71
End: 2021-05-21

## 2021-05-24 ENCOUNTER — TELEPHONE (OUTPATIENT)
Dept: INTERNAL MEDICINE | Facility: CLINIC | Age: 71
End: 2021-05-24

## 2021-05-27 ENCOUNTER — PATIENT MESSAGE (OUTPATIENT)
Dept: INTERNAL MEDICINE | Facility: CLINIC | Age: 71
End: 2021-05-27

## 2021-06-02 ENCOUNTER — PATIENT MESSAGE (OUTPATIENT)
Dept: INTERNAL MEDICINE | Facility: CLINIC | Age: 71
End: 2021-06-02

## 2021-06-03 ENCOUNTER — PATIENT MESSAGE (OUTPATIENT)
Dept: INTERNAL MEDICINE | Facility: CLINIC | Age: 71
End: 2021-06-03

## 2021-06-03 ENCOUNTER — TELEPHONE (OUTPATIENT)
Dept: RHEUMATOLOGY | Facility: CLINIC | Age: 71
End: 2021-06-03

## 2021-06-03 ENCOUNTER — TELEPHONE (OUTPATIENT)
Dept: INTERNAL MEDICINE | Facility: CLINIC | Age: 71
End: 2021-06-03

## 2021-06-03 ENCOUNTER — PATIENT MESSAGE (OUTPATIENT)
Dept: RHEUMATOLOGY | Facility: CLINIC | Age: 71
End: 2021-06-03

## 2021-06-03 DIAGNOSIS — M05.9 SEROPOSITIVE RHEUMATOID ARTHRITIS: Primary | ICD-10-CM

## 2021-06-03 RX ORDER — METHYLPREDNISOLONE 4 MG/1
TABLET ORAL
Qty: 1 PACKAGE | Refills: 0 | Status: SHIPPED | OUTPATIENT
Start: 2021-06-03 | End: 2021-07-19

## 2021-06-07 ENCOUNTER — TELEPHONE (OUTPATIENT)
Dept: INTERNAL MEDICINE | Facility: CLINIC | Age: 71
End: 2021-06-07

## 2021-06-07 ENCOUNTER — PATIENT MESSAGE (OUTPATIENT)
Dept: INTERNAL MEDICINE | Facility: CLINIC | Age: 71
End: 2021-06-07

## 2021-06-22 ENCOUNTER — PATIENT MESSAGE (OUTPATIENT)
Dept: RHEUMATOLOGY | Facility: CLINIC | Age: 71
End: 2021-06-22

## 2021-06-22 RX ORDER — TOCILIZUMAB 180 MG/ML
162 INJECTION, SOLUTION SUBCUTANEOUS WEEKLY
Qty: 3.6 ML | Refills: 2 | Status: SHIPPED | OUTPATIENT
Start: 2021-06-22 | End: 2021-07-13 | Stop reason: SDUPTHER

## 2021-06-24 ENCOUNTER — TELEPHONE (OUTPATIENT)
Dept: RHEUMATOLOGY | Facility: CLINIC | Age: 71
End: 2021-06-24

## 2021-06-24 ENCOUNTER — OFFICE VISIT (OUTPATIENT)
Dept: INTERNAL MEDICINE | Facility: CLINIC | Age: 71
End: 2021-06-24
Payer: MEDICARE

## 2021-06-24 ENCOUNTER — PATIENT MESSAGE (OUTPATIENT)
Dept: UROLOGY | Facility: CLINIC | Age: 71
End: 2021-06-24

## 2021-06-24 DIAGNOSIS — M05.749 RHEUMATOID ARTHRITIS INVOLVING HAND WITH POSITIVE RHEUMATOID FACTOR, UNSPECIFIED LATERALITY: ICD-10-CM

## 2021-06-24 DIAGNOSIS — E11.9 TYPE 2 DIABETES MELLITUS WITHOUT COMPLICATION, WITHOUT LONG-TERM CURRENT USE OF INSULIN: Primary | ICD-10-CM

## 2021-06-24 PROCEDURE — 99213 PR OFFICE/OUTPT VISIT, EST, LEVL III, 20-29 MIN: ICD-10-PCS | Mod: 95,,, | Performed by: FAMILY MEDICINE

## 2021-06-24 PROCEDURE — 99213 OFFICE O/P EST LOW 20 MIN: CPT | Mod: 95,,, | Performed by: FAMILY MEDICINE

## 2021-06-24 PROCEDURE — 1159F PR MEDICATION LIST DOCUMENTED IN MEDICAL RECORD: ICD-10-PCS | Mod: 95,,, | Performed by: FAMILY MEDICINE

## 2021-06-24 PROCEDURE — 1159F MED LIST DOCD IN RCRD: CPT | Mod: 95,,, | Performed by: FAMILY MEDICINE

## 2021-06-24 PROCEDURE — 3044F HG A1C LEVEL LT 7.0%: CPT | Mod: CPTII,95,, | Performed by: FAMILY MEDICINE

## 2021-06-24 PROCEDURE — 3044F PR MOST RECENT HEMOGLOBIN A1C LEVEL <7.0%: ICD-10-PCS | Mod: CPTII,95,, | Performed by: FAMILY MEDICINE

## 2021-06-25 ENCOUNTER — TELEPHONE (OUTPATIENT)
Dept: INTERNAL MEDICINE | Facility: CLINIC | Age: 71
End: 2021-06-25

## 2021-06-28 ENCOUNTER — OFFICE VISIT (OUTPATIENT)
Dept: UROLOGY | Facility: CLINIC | Age: 71
End: 2021-06-28
Payer: MEDICARE

## 2021-06-28 DIAGNOSIS — N39.41 URGE INCONTINENCE: Primary | ICD-10-CM

## 2021-06-28 PROCEDURE — 1159F MED LIST DOCD IN RCRD: CPT | Mod: S$GLB,,, | Performed by: UROLOGY

## 2021-06-28 PROCEDURE — 99999 PR PBB SHADOW E&M-EST. PATIENT-LVL II: ICD-10-PCS | Mod: PBBFAC,,, | Performed by: UROLOGY

## 2021-06-28 PROCEDURE — 99213 OFFICE O/P EST LOW 20 MIN: CPT | Mod: S$GLB,,, | Performed by: UROLOGY

## 2021-06-28 PROCEDURE — 99999 PR PBB SHADOW E&M-EST. PATIENT-LVL II: CPT | Mod: PBBFAC,,, | Performed by: UROLOGY

## 2021-06-28 PROCEDURE — 1159F PR MEDICATION LIST DOCUMENTED IN MEDICAL RECORD: ICD-10-PCS | Mod: S$GLB,,, | Performed by: UROLOGY

## 2021-06-28 PROCEDURE — 99213 PR OFFICE/OUTPT VISIT, EST, LEVL III, 20-29 MIN: ICD-10-PCS | Mod: S$GLB,,, | Performed by: UROLOGY

## 2021-06-29 ENCOUNTER — PATIENT MESSAGE (OUTPATIENT)
Dept: RHEUMATOLOGY | Facility: CLINIC | Age: 71
End: 2021-06-29

## 2021-06-29 ENCOUNTER — TELEPHONE (OUTPATIENT)
Dept: RHEUMATOLOGY | Facility: CLINIC | Age: 71
End: 2021-06-29

## 2021-06-30 ENCOUNTER — OFFICE VISIT (OUTPATIENT)
Dept: RHEUMATOLOGY | Facility: CLINIC | Age: 71
End: 2021-06-30
Payer: MEDICARE

## 2021-06-30 VITALS
WEIGHT: 177.69 LBS | DIASTOLIC BLOOD PRESSURE: 84 MMHG | HEIGHT: 65 IN | SYSTOLIC BLOOD PRESSURE: 135 MMHG | BODY MASS INDEX: 29.61 KG/M2 | HEART RATE: 73 BPM

## 2021-06-30 DIAGNOSIS — Z71.89 COUNSELING ON HEALTH PROMOTION AND DISEASE PREVENTION: ICD-10-CM

## 2021-06-30 DIAGNOSIS — M05.9 SEROPOSITIVE RHEUMATOID ARTHRITIS: Primary | ICD-10-CM

## 2021-06-30 DIAGNOSIS — M15.9 PRIMARY OSTEOARTHRITIS INVOLVING MULTIPLE JOINTS: ICD-10-CM

## 2021-06-30 PROCEDURE — 1159F MED LIST DOCD IN RCRD: CPT | Mod: S$GLB,,, | Performed by: INTERNAL MEDICINE

## 2021-06-30 PROCEDURE — 3008F PR BODY MASS INDEX (BMI) DOCUMENTED: ICD-10-PCS | Mod: CPTII,S$GLB,, | Performed by: INTERNAL MEDICINE

## 2021-06-30 PROCEDURE — 99214 OFFICE O/P EST MOD 30 MIN: CPT | Mod: 25,S$GLB,, | Performed by: INTERNAL MEDICINE

## 2021-06-30 PROCEDURE — 99999 PR PBB SHADOW E&M-EST. PATIENT-LVL III: CPT | Mod: PBBFAC,,, | Performed by: INTERNAL MEDICINE

## 2021-06-30 PROCEDURE — 20610 LARGE JOINT ASPIRATION/INJECTION: L KNEE: ICD-10-PCS | Mod: LT,S$GLB,, | Performed by: INTERNAL MEDICINE

## 2021-06-30 PROCEDURE — 1159F PR MEDICATION LIST DOCUMENTED IN MEDICAL RECORD: ICD-10-PCS | Mod: S$GLB,,, | Performed by: INTERNAL MEDICINE

## 2021-06-30 PROCEDURE — 1125F AMNT PAIN NOTED PAIN PRSNT: CPT | Mod: S$GLB,,, | Performed by: INTERNAL MEDICINE

## 2021-06-30 PROCEDURE — 99214 PR OFFICE/OUTPT VISIT, EST, LEVL IV, 30-39 MIN: ICD-10-PCS | Mod: 25,S$GLB,, | Performed by: INTERNAL MEDICINE

## 2021-06-30 PROCEDURE — 3008F BODY MASS INDEX DOCD: CPT | Mod: CPTII,S$GLB,, | Performed by: INTERNAL MEDICINE

## 2021-06-30 PROCEDURE — 99499 UNLISTED E&M SERVICE: CPT | Mod: S$GLB,,, | Performed by: INTERNAL MEDICINE

## 2021-06-30 PROCEDURE — 20610 DRAIN/INJ JOINT/BURSA W/O US: CPT | Mod: LT,S$GLB,, | Performed by: INTERNAL MEDICINE

## 2021-06-30 PROCEDURE — 1125F PR PAIN SEVERITY QUANTIFIED, PAIN PRESENT: ICD-10-PCS | Mod: S$GLB,,, | Performed by: INTERNAL MEDICINE

## 2021-06-30 PROCEDURE — 99499 RISK ADDL DX/OHS AUDIT: ICD-10-PCS | Mod: S$GLB,,, | Performed by: INTERNAL MEDICINE

## 2021-06-30 PROCEDURE — 99999 PR PBB SHADOW E&M-EST. PATIENT-LVL III: ICD-10-PCS | Mod: PBBFAC,,, | Performed by: INTERNAL MEDICINE

## 2021-06-30 RX ORDER — TRIAMCINOLONE ACETONIDE 40 MG/ML
40 INJECTION, SUSPENSION INTRA-ARTICULAR; INTRAMUSCULAR
Status: DISCONTINUED | OUTPATIENT
Start: 2021-06-30 | End: 2021-06-30 | Stop reason: HOSPADM

## 2021-06-30 RX ADMIN — TRIAMCINOLONE ACETONIDE 40 MG: 40 INJECTION, SUSPENSION INTRA-ARTICULAR; INTRAMUSCULAR at 08:06

## 2021-07-02 LAB
LEFT EYE DM RETINOPATHY: NEGATIVE
RIGHT EYE DM RETINOPATHY: NEGATIVE

## 2021-07-09 ENCOUNTER — PATIENT MESSAGE (OUTPATIENT)
Dept: INTERNAL MEDICINE | Facility: CLINIC | Age: 71
End: 2021-07-09

## 2021-07-12 ENCOUNTER — PATIENT MESSAGE (OUTPATIENT)
Dept: RHEUMATOLOGY | Facility: CLINIC | Age: 71
End: 2021-07-12

## 2021-07-12 ENCOUNTER — PATIENT MESSAGE (OUTPATIENT)
Dept: INTERNAL MEDICINE | Facility: CLINIC | Age: 71
End: 2021-07-12

## 2021-07-13 RX ORDER — TOCILIZUMAB 180 MG/ML
162 INJECTION, SOLUTION SUBCUTANEOUS WEEKLY
Qty: 3.6 ML | Refills: 2 | Status: SHIPPED | OUTPATIENT
Start: 2021-07-13 | End: 2021-07-20 | Stop reason: SDUPTHER

## 2021-07-19 ENCOUNTER — TELEPHONE (OUTPATIENT)
Dept: RHEUMATOLOGY | Facility: CLINIC | Age: 71
End: 2021-07-19

## 2021-07-19 ENCOUNTER — OFFICE VISIT (OUTPATIENT)
Dept: INTERNAL MEDICINE | Facility: CLINIC | Age: 71
End: 2021-07-19
Payer: MEDICARE

## 2021-07-19 ENCOUNTER — HOSPITAL ENCOUNTER (OUTPATIENT)
Dept: RADIOLOGY | Facility: HOSPITAL | Age: 71
Discharge: HOME | End: 2021-07-19
Attending: INTERNAL MEDICINE
Payer: MEDICARE

## 2021-07-19 VITALS
WEIGHT: 177.94 LBS | BODY MASS INDEX: 29.65 KG/M2 | HEART RATE: 100 BPM | SYSTOLIC BLOOD PRESSURE: 124 MMHG | DIASTOLIC BLOOD PRESSURE: 77 MMHG | HEIGHT: 65 IN | TEMPERATURE: 98 F | OXYGEN SATURATION: 95 %

## 2021-07-19 DIAGNOSIS — M25.511 ACUTE PAIN OF RIGHT SHOULDER: ICD-10-CM

## 2021-07-19 DIAGNOSIS — S46.911A STRAIN OF RIGHT SHOULDER, INITIAL ENCOUNTER: Primary | ICD-10-CM

## 2021-07-19 PROCEDURE — 99999 PR PBB SHADOW E&M-EST. PATIENT-LVL V: CPT | Mod: PBBFAC,,, | Performed by: INTERNAL MEDICINE

## 2021-07-19 PROCEDURE — 99214 PR OFFICE/OUTPT VISIT, EST, LEVL IV, 30-39 MIN: ICD-10-PCS | Mod: S$GLB,,, | Performed by: INTERNAL MEDICINE

## 2021-07-19 PROCEDURE — 3078F DIAST BP <80 MM HG: CPT | Mod: CPTII,S$GLB,, | Performed by: INTERNAL MEDICINE

## 2021-07-19 PROCEDURE — 3288F PR FALLS RISK ASSESSMENT DOCUMENTED: ICD-10-PCS | Mod: CPTII,S$GLB,, | Performed by: INTERNAL MEDICINE

## 2021-07-19 PROCEDURE — 73030 X-RAY EXAM OF SHOULDER: CPT | Mod: TC,RT

## 2021-07-19 PROCEDURE — 99999 PR PBB SHADOW E&M-EST. PATIENT-LVL V: ICD-10-PCS | Mod: PBBFAC,,, | Performed by: INTERNAL MEDICINE

## 2021-07-19 PROCEDURE — 1159F PR MEDICATION LIST DOCUMENTED IN MEDICAL RECORD: ICD-10-PCS | Mod: CPTII,S$GLB,, | Performed by: INTERNAL MEDICINE

## 2021-07-19 PROCEDURE — 73030 X-RAY EXAM OF SHOULDER: CPT | Mod: 26,RT,, | Performed by: RADIOLOGY

## 2021-07-19 PROCEDURE — 3008F BODY MASS INDEX DOCD: CPT | Mod: CPTII,S$GLB,, | Performed by: INTERNAL MEDICINE

## 2021-07-19 PROCEDURE — 3044F PR MOST RECENT HEMOGLOBIN A1C LEVEL <7.0%: ICD-10-PCS | Mod: CPTII,S$GLB,, | Performed by: INTERNAL MEDICINE

## 2021-07-19 PROCEDURE — 1100F PTFALLS ASSESS-DOCD GE2>/YR: CPT | Mod: CPTII,S$GLB,, | Performed by: INTERNAL MEDICINE

## 2021-07-19 PROCEDURE — 3078F PR MOST RECENT DIASTOLIC BLOOD PRESSURE < 80 MM HG: ICD-10-PCS | Mod: CPTII,S$GLB,, | Performed by: INTERNAL MEDICINE

## 2021-07-19 PROCEDURE — 3288F FALL RISK ASSESSMENT DOCD: CPT | Mod: CPTII,S$GLB,, | Performed by: INTERNAL MEDICINE

## 2021-07-19 PROCEDURE — 3044F HG A1C LEVEL LT 7.0%: CPT | Mod: CPTII,S$GLB,, | Performed by: INTERNAL MEDICINE

## 2021-07-19 PROCEDURE — 1125F AMNT PAIN NOTED PAIN PRSNT: CPT | Mod: CPTII,S$GLB,, | Performed by: INTERNAL MEDICINE

## 2021-07-19 PROCEDURE — 3008F PR BODY MASS INDEX (BMI) DOCUMENTED: ICD-10-PCS | Mod: CPTII,S$GLB,, | Performed by: INTERNAL MEDICINE

## 2021-07-19 PROCEDURE — 1125F PR PAIN SEVERITY QUANTIFIED, PAIN PRESENT: ICD-10-PCS | Mod: CPTII,S$GLB,, | Performed by: INTERNAL MEDICINE

## 2021-07-19 PROCEDURE — 1100F PR PT FALLS ASSESS DOC 2+ FALLS/FALL W/INJURY/YR: ICD-10-PCS | Mod: CPTII,S$GLB,, | Performed by: INTERNAL MEDICINE

## 2021-07-19 PROCEDURE — 1159F MED LIST DOCD IN RCRD: CPT | Mod: CPTII,S$GLB,, | Performed by: INTERNAL MEDICINE

## 2021-07-19 PROCEDURE — 3074F PR MOST RECENT SYSTOLIC BLOOD PRESSURE < 130 MM HG: ICD-10-PCS | Mod: CPTII,S$GLB,, | Performed by: INTERNAL MEDICINE

## 2021-07-19 PROCEDURE — 99214 OFFICE O/P EST MOD 30 MIN: CPT | Mod: S$GLB,,, | Performed by: INTERNAL MEDICINE

## 2021-07-19 PROCEDURE — 3074F SYST BP LT 130 MM HG: CPT | Mod: CPTII,S$GLB,, | Performed by: INTERNAL MEDICINE

## 2021-07-19 PROCEDURE — 73030 XR SHOULDER COMPLETE 2 OR MORE VIEWS RIGHT: ICD-10-PCS | Mod: 26,RT,, | Performed by: RADIOLOGY

## 2021-07-19 RX ORDER — TRAMADOL HYDROCHLORIDE 50 MG/1
50 TABLET ORAL EVERY 6 HOURS PRN
Qty: 60 TABLET | Refills: 5 | Status: SHIPPED | OUTPATIENT
Start: 2021-07-19 | End: 2022-03-15 | Stop reason: SDUPTHER

## 2021-07-19 RX ORDER — METHYLPREDNISOLONE 4 MG/1
TABLET ORAL
Qty: 1 PACKAGE | Refills: 0 | Status: SHIPPED | OUTPATIENT
Start: 2021-07-19 | End: 2021-07-24

## 2021-07-20 ENCOUNTER — TELEPHONE (OUTPATIENT)
Dept: ORTHOPEDICS | Facility: CLINIC | Age: 71
End: 2021-07-20

## 2021-07-20 ENCOUNTER — PATIENT OUTREACH (OUTPATIENT)
Dept: ADMINISTRATIVE | Facility: OTHER | Age: 71
End: 2021-07-20

## 2021-07-20 RX ORDER — TOCILIZUMAB 180 MG/ML
162 INJECTION, SOLUTION SUBCUTANEOUS WEEKLY
Qty: 3.6 ML | Refills: 2 | Status: SHIPPED | OUTPATIENT
Start: 2021-07-20 | End: 2021-08-19

## 2021-07-21 ENCOUNTER — OFFICE VISIT (OUTPATIENT)
Dept: ORTHOPEDICS | Facility: CLINIC | Age: 71
End: 2021-07-21
Payer: MEDICARE

## 2021-07-21 ENCOUNTER — TELEPHONE (OUTPATIENT)
Dept: RHEUMATOLOGY | Facility: CLINIC | Age: 71
End: 2021-07-21

## 2021-07-21 VITALS — HEIGHT: 65 IN | WEIGHT: 177 LBS | BODY MASS INDEX: 29.49 KG/M2

## 2021-07-21 DIAGNOSIS — G89.29 CHRONIC RIGHT SHOULDER PAIN: Primary | ICD-10-CM

## 2021-07-21 DIAGNOSIS — M25.511 CHRONIC RIGHT SHOULDER PAIN: Primary | ICD-10-CM

## 2021-07-21 DIAGNOSIS — M75.41 IMPINGEMENT SYNDROME OF RIGHT SHOULDER: ICD-10-CM

## 2021-07-21 DIAGNOSIS — S46.911A STRAIN OF RIGHT SHOULDER, INITIAL ENCOUNTER: ICD-10-CM

## 2021-07-21 PROCEDURE — 99203 OFFICE O/P NEW LOW 30 MIN: CPT | Mod: S$GLB,,, | Performed by: PHYSICAL MEDICINE & REHABILITATION

## 2021-07-21 PROCEDURE — 99999 PR PBB SHADOW E&M-EST. PATIENT-LVL III: CPT | Mod: PBBFAC,,, | Performed by: PHYSICAL MEDICINE & REHABILITATION

## 2021-07-21 PROCEDURE — 1159F PR MEDICATION LIST DOCUMENTED IN MEDICAL RECORD: ICD-10-PCS | Mod: CPTII,S$GLB,, | Performed by: PHYSICAL MEDICINE & REHABILITATION

## 2021-07-21 PROCEDURE — 99999 PR PBB SHADOW E&M-EST. PATIENT-LVL III: ICD-10-PCS | Mod: PBBFAC,,, | Performed by: PHYSICAL MEDICINE & REHABILITATION

## 2021-07-21 PROCEDURE — 3288F PR FALLS RISK ASSESSMENT DOCUMENTED: ICD-10-PCS | Mod: CPTII,S$GLB,, | Performed by: PHYSICAL MEDICINE & REHABILITATION

## 2021-07-21 PROCEDURE — 3008F BODY MASS INDEX DOCD: CPT | Mod: CPTII,S$GLB,, | Performed by: PHYSICAL MEDICINE & REHABILITATION

## 2021-07-21 PROCEDURE — 3044F HG A1C LEVEL LT 7.0%: CPT | Mod: CPTII,S$GLB,, | Performed by: PHYSICAL MEDICINE & REHABILITATION

## 2021-07-21 PROCEDURE — 1125F PR PAIN SEVERITY QUANTIFIED, PAIN PRESENT: ICD-10-PCS | Mod: CPTII,S$GLB,, | Performed by: PHYSICAL MEDICINE & REHABILITATION

## 2021-07-21 PROCEDURE — 3008F PR BODY MASS INDEX (BMI) DOCUMENTED: ICD-10-PCS | Mod: CPTII,S$GLB,, | Performed by: PHYSICAL MEDICINE & REHABILITATION

## 2021-07-21 PROCEDURE — 3044F PR MOST RECENT HEMOGLOBIN A1C LEVEL <7.0%: ICD-10-PCS | Mod: CPTII,S$GLB,, | Performed by: PHYSICAL MEDICINE & REHABILITATION

## 2021-07-21 PROCEDURE — 1159F MED LIST DOCD IN RCRD: CPT | Mod: CPTII,S$GLB,, | Performed by: PHYSICAL MEDICINE & REHABILITATION

## 2021-07-21 PROCEDURE — 1101F PT FALLS ASSESS-DOCD LE1/YR: CPT | Mod: CPTII,S$GLB,, | Performed by: PHYSICAL MEDICINE & REHABILITATION

## 2021-07-21 PROCEDURE — 3288F FALL RISK ASSESSMENT DOCD: CPT | Mod: CPTII,S$GLB,, | Performed by: PHYSICAL MEDICINE & REHABILITATION

## 2021-07-21 PROCEDURE — 99203 PR OFFICE/OUTPT VISIT, NEW, LEVL III, 30-44 MIN: ICD-10-PCS | Mod: S$GLB,,, | Performed by: PHYSICAL MEDICINE & REHABILITATION

## 2021-07-21 PROCEDURE — 1125F AMNT PAIN NOTED PAIN PRSNT: CPT | Mod: CPTII,S$GLB,, | Performed by: PHYSICAL MEDICINE & REHABILITATION

## 2021-07-21 PROCEDURE — 1101F PR PT FALLS ASSESS DOC 0-1 FALLS W/OUT INJ PAST YR: ICD-10-PCS | Mod: CPTII,S$GLB,, | Performed by: PHYSICAL MEDICINE & REHABILITATION

## 2021-07-22 ENCOUNTER — TELEPHONE (OUTPATIENT)
Dept: RHEUMATOLOGY | Facility: CLINIC | Age: 71
End: 2021-07-22

## 2021-07-26 ENCOUNTER — OFFICE VISIT (OUTPATIENT)
Dept: FAMILY MEDICINE | Facility: CLINIC | Age: 71
End: 2021-07-26
Payer: MEDICARE

## 2021-07-26 VITALS
SYSTOLIC BLOOD PRESSURE: 120 MMHG | OXYGEN SATURATION: 98 % | WEIGHT: 171.31 LBS | HEIGHT: 65 IN | TEMPERATURE: 97 F | DIASTOLIC BLOOD PRESSURE: 72 MMHG | BODY MASS INDEX: 28.54 KG/M2 | HEART RATE: 88 BPM

## 2021-07-26 DIAGNOSIS — Z01.818 PREOP EXAMINATION: Primary | ICD-10-CM

## 2021-07-26 DIAGNOSIS — I10 ESSENTIAL HYPERTENSION: ICD-10-CM

## 2021-07-26 DIAGNOSIS — H26.9 CATARACT, UNSPECIFIED CATARACT TYPE, UNSPECIFIED LATERALITY: ICD-10-CM

## 2021-07-26 PROCEDURE — 3008F PR BODY MASS INDEX (BMI) DOCUMENTED: ICD-10-PCS | Mod: CPTII,S$GLB,, | Performed by: REGISTERED NURSE

## 2021-07-26 PROCEDURE — 3078F DIAST BP <80 MM HG: CPT | Mod: CPTII,S$GLB,, | Performed by: REGISTERED NURSE

## 2021-07-26 PROCEDURE — 3074F SYST BP LT 130 MM HG: CPT | Mod: CPTII,S$GLB,, | Performed by: REGISTERED NURSE

## 2021-07-26 PROCEDURE — 1159F MED LIST DOCD IN RCRD: CPT | Mod: CPTII,S$GLB,, | Performed by: REGISTERED NURSE

## 2021-07-26 PROCEDURE — 3078F PR MOST RECENT DIASTOLIC BLOOD PRESSURE < 80 MM HG: ICD-10-PCS | Mod: CPTII,S$GLB,, | Performed by: REGISTERED NURSE

## 2021-07-26 PROCEDURE — 99214 PR OFFICE/OUTPT VISIT, EST, LEVL IV, 30-39 MIN: ICD-10-PCS | Mod: S$GLB,,, | Performed by: REGISTERED NURSE

## 2021-07-26 PROCEDURE — 1159F PR MEDICATION LIST DOCUMENTED IN MEDICAL RECORD: ICD-10-PCS | Mod: CPTII,S$GLB,, | Performed by: REGISTERED NURSE

## 2021-07-26 PROCEDURE — 1126F AMNT PAIN NOTED NONE PRSNT: CPT | Mod: CPTII,S$GLB,, | Performed by: REGISTERED NURSE

## 2021-07-26 PROCEDURE — 99999 PR PBB SHADOW E&M-EST. PATIENT-LVL IV: ICD-10-PCS | Mod: PBBFAC,,, | Performed by: REGISTERED NURSE

## 2021-07-26 PROCEDURE — 3008F BODY MASS INDEX DOCD: CPT | Mod: CPTII,S$GLB,, | Performed by: REGISTERED NURSE

## 2021-07-26 PROCEDURE — 3044F PR MOST RECENT HEMOGLOBIN A1C LEVEL <7.0%: ICD-10-PCS | Mod: CPTII,S$GLB,, | Performed by: REGISTERED NURSE

## 2021-07-26 PROCEDURE — 3044F HG A1C LEVEL LT 7.0%: CPT | Mod: CPTII,S$GLB,, | Performed by: REGISTERED NURSE

## 2021-07-26 PROCEDURE — 3288F PR FALLS RISK ASSESSMENT DOCUMENTED: ICD-10-PCS | Mod: CPTII,S$GLB,, | Performed by: REGISTERED NURSE

## 2021-07-26 PROCEDURE — 1101F PT FALLS ASSESS-DOCD LE1/YR: CPT | Mod: CPTII,S$GLB,, | Performed by: REGISTERED NURSE

## 2021-07-26 PROCEDURE — 3288F FALL RISK ASSESSMENT DOCD: CPT | Mod: CPTII,S$GLB,, | Performed by: REGISTERED NURSE

## 2021-07-26 PROCEDURE — 3074F PR MOST RECENT SYSTOLIC BLOOD PRESSURE < 130 MM HG: ICD-10-PCS | Mod: CPTII,S$GLB,, | Performed by: REGISTERED NURSE

## 2021-07-26 PROCEDURE — 1126F PR PAIN SEVERITY QUANTIFIED, NO PAIN PRESENT: ICD-10-PCS | Mod: CPTII,S$GLB,, | Performed by: REGISTERED NURSE

## 2021-07-26 PROCEDURE — 99214 OFFICE O/P EST MOD 30 MIN: CPT | Mod: S$GLB,,, | Performed by: REGISTERED NURSE

## 2021-07-26 PROCEDURE — 99999 PR PBB SHADOW E&M-EST. PATIENT-LVL IV: CPT | Mod: PBBFAC,,, | Performed by: REGISTERED NURSE

## 2021-07-26 PROCEDURE — 1101F PR PT FALLS ASSESS DOC 0-1 FALLS W/OUT INJ PAST YR: ICD-10-PCS | Mod: CPTII,S$GLB,, | Performed by: REGISTERED NURSE

## 2021-08-09 ENCOUNTER — OFFICE VISIT (OUTPATIENT)
Dept: URGENT CARE | Facility: CLINIC | Age: 71
End: 2021-08-09
Payer: MEDICARE

## 2021-08-09 VITALS
HEIGHT: 65 IN | WEIGHT: 170 LBS | HEART RATE: 74 BPM | SYSTOLIC BLOOD PRESSURE: 144 MMHG | TEMPERATURE: 98 F | BODY MASS INDEX: 28.32 KG/M2 | OXYGEN SATURATION: 96 % | DIASTOLIC BLOOD PRESSURE: 81 MMHG | RESPIRATION RATE: 16 BRPM

## 2021-08-09 DIAGNOSIS — U07.1 COVID-19: ICD-10-CM

## 2021-08-09 DIAGNOSIS — R05.9 COUGH: Primary | ICD-10-CM

## 2021-08-09 LAB
CTP QC/QA: YES
SARS-COV-2 RDRP RESP QL NAA+PROBE: POSITIVE

## 2021-08-09 PROCEDURE — 3008F PR BODY MASS INDEX (BMI) DOCUMENTED: ICD-10-PCS | Mod: CPTII,S$GLB,, | Performed by: PHYSICIAN ASSISTANT

## 2021-08-09 PROCEDURE — 99213 OFFICE O/P EST LOW 20 MIN: CPT | Mod: S$GLB,,, | Performed by: PHYSICIAN ASSISTANT

## 2021-08-09 PROCEDURE — U0002 COVID-19 LAB TEST NON-CDC: HCPCS | Mod: QW,CR,S$GLB, | Performed by: PHYSICIAN ASSISTANT

## 2021-08-09 PROCEDURE — 3079F PR MOST RECENT DIASTOLIC BLOOD PRESSURE 80-89 MM HG: ICD-10-PCS | Mod: CPTII,S$GLB,, | Performed by: PHYSICIAN ASSISTANT

## 2021-08-09 PROCEDURE — 3077F PR MOST RECENT SYSTOLIC BLOOD PRESSURE >= 140 MM HG: ICD-10-PCS | Mod: CPTII,S$GLB,, | Performed by: PHYSICIAN ASSISTANT

## 2021-08-09 PROCEDURE — 1126F PR PAIN SEVERITY QUANTIFIED, NO PAIN PRESENT: ICD-10-PCS | Mod: CPTII,S$GLB,, | Performed by: PHYSICIAN ASSISTANT

## 2021-08-09 PROCEDURE — 3008F BODY MASS INDEX DOCD: CPT | Mod: CPTII,S$GLB,, | Performed by: PHYSICIAN ASSISTANT

## 2021-08-09 PROCEDURE — 1126F AMNT PAIN NOTED NONE PRSNT: CPT | Mod: CPTII,S$GLB,, | Performed by: PHYSICIAN ASSISTANT

## 2021-08-09 PROCEDURE — 3077F SYST BP >= 140 MM HG: CPT | Mod: CPTII,S$GLB,, | Performed by: PHYSICIAN ASSISTANT

## 2021-08-09 PROCEDURE — U0002: ICD-10-PCS | Mod: QW,CR,S$GLB, | Performed by: PHYSICIAN ASSISTANT

## 2021-08-09 PROCEDURE — 3044F PR MOST RECENT HEMOGLOBIN A1C LEVEL <7.0%: ICD-10-PCS | Mod: CPTII,S$GLB,, | Performed by: PHYSICIAN ASSISTANT

## 2021-08-09 PROCEDURE — 1159F MED LIST DOCD IN RCRD: CPT | Mod: CPTII,S$GLB,, | Performed by: PHYSICIAN ASSISTANT

## 2021-08-09 PROCEDURE — 1159F PR MEDICATION LIST DOCUMENTED IN MEDICAL RECORD: ICD-10-PCS | Mod: CPTII,S$GLB,, | Performed by: PHYSICIAN ASSISTANT

## 2021-08-09 PROCEDURE — 99213 PR OFFICE/OUTPT VISIT, EST, LEVL III, 20-29 MIN: ICD-10-PCS | Mod: S$GLB,,, | Performed by: PHYSICIAN ASSISTANT

## 2021-08-09 PROCEDURE — 3044F HG A1C LEVEL LT 7.0%: CPT | Mod: CPTII,S$GLB,, | Performed by: PHYSICIAN ASSISTANT

## 2021-08-09 PROCEDURE — 3079F DIAST BP 80-89 MM HG: CPT | Mod: CPTII,S$GLB,, | Performed by: PHYSICIAN ASSISTANT

## 2021-08-09 RX ORDER — PANTOPRAZOLE SODIUM 40 MG/1
40 TABLET, DELAYED RELEASE ORAL DAILY
COMMUNITY
End: 2021-08-12 | Stop reason: SDUPTHER

## 2021-08-12 ENCOUNTER — PATIENT MESSAGE (OUTPATIENT)
Dept: ALLERGY | Facility: CLINIC | Age: 71
End: 2021-08-12

## 2021-08-12 RX ORDER — PANTOPRAZOLE SODIUM 40 MG/1
40 TABLET, DELAYED RELEASE ORAL DAILY
Qty: 30 TABLET | Refills: 2 | Status: SHIPPED | OUTPATIENT
Start: 2021-08-12 | End: 2022-08-17

## 2021-08-13 ENCOUNTER — INFUSION (OUTPATIENT)
Dept: INFECTIOUS DISEASES | Facility: HOSPITAL | Age: 71
End: 2021-08-13
Attending: PHYSICIAN ASSISTANT
Payer: MEDICARE

## 2021-08-13 VITALS
RESPIRATION RATE: 12 BRPM | OXYGEN SATURATION: 98 % | SYSTOLIC BLOOD PRESSURE: 162 MMHG | HEIGHT: 65 IN | TEMPERATURE: 97 F | BODY MASS INDEX: 28.49 KG/M2 | HEART RATE: 70 BPM | WEIGHT: 171 LBS | DIASTOLIC BLOOD PRESSURE: 82 MMHG

## 2021-08-13 DIAGNOSIS — U07.1 COVID-19: Primary | ICD-10-CM

## 2021-08-13 PROCEDURE — 25000003 PHARM REV CODE 250: Performed by: INTERNAL MEDICINE

## 2021-08-13 PROCEDURE — 63600175 PHARM REV CODE 636 W HCPCS: Performed by: INTERNAL MEDICINE

## 2021-08-13 PROCEDURE — M0243 CASIRIVI AND IMDEVI INFUSION: HCPCS | Performed by: INTERNAL MEDICINE

## 2021-08-13 RX ORDER — ALBUTEROL SULFATE 90 UG/1
2 AEROSOL, METERED RESPIRATORY (INHALATION)
Status: DISCONTINUED | OUTPATIENT
Start: 2021-08-13 | End: 2021-09-16

## 2021-08-13 RX ORDER — ONDANSETRON 4 MG/1
4 TABLET, ORALLY DISINTEGRATING ORAL ONCE AS NEEDED
Status: DISCONTINUED | OUTPATIENT
Start: 2021-08-13 | End: 2023-04-25

## 2021-08-13 RX ORDER — ACETAMINOPHEN 325 MG/1
650 TABLET ORAL ONCE AS NEEDED
Status: DISCONTINUED | OUTPATIENT
Start: 2021-08-13 | End: 2021-09-16

## 2021-08-13 RX ORDER — SODIUM CHLORIDE 0.9 % (FLUSH) 0.9 %
10 SYRINGE (ML) INJECTION
Status: DISCONTINUED | OUTPATIENT
Start: 2021-08-13 | End: 2021-09-16

## 2021-08-13 RX ORDER — EPINEPHRINE 0.3 MG/.3ML
0.3 INJECTION SUBCUTANEOUS
Status: DISCONTINUED | OUTPATIENT
Start: 2021-08-13 | End: 2021-09-16

## 2021-08-13 RX ORDER — DIPHENHYDRAMINE HYDROCHLORIDE 50 MG/ML
25 INJECTION INTRAMUSCULAR; INTRAVENOUS ONCE AS NEEDED
Status: DISCONTINUED | OUTPATIENT
Start: 2021-08-13 | End: 2021-09-16

## 2021-08-13 RX ADMIN — CASIRIVIMAB AND IMDEVIMAB 600 MG: 600; 600 INJECTION, SOLUTION, CONCENTRATE INTRAVENOUS at 11:08

## 2021-08-17 DIAGNOSIS — M05.9 SEROPOSITIVE RHEUMATOID ARTHRITIS: ICD-10-CM

## 2021-08-17 RX ORDER — SULFASALAZINE 500 MG/1
500 TABLET ORAL 2 TIMES DAILY
Qty: 60 TABLET | Refills: 3 | Status: SHIPPED | OUTPATIENT
Start: 2021-08-17 | End: 2021-09-16

## 2021-09-07 ENCOUNTER — TELEPHONE (OUTPATIENT)
Dept: INTERNAL MEDICINE | Facility: CLINIC | Age: 71
End: 2021-09-07

## 2021-09-07 DIAGNOSIS — S46.911A STRAIN OF RIGHT SHOULDER, INITIAL ENCOUNTER: Primary | ICD-10-CM

## 2021-09-08 ENCOUNTER — PATIENT MESSAGE (OUTPATIENT)
Dept: INTERNAL MEDICINE | Facility: CLINIC | Age: 71
End: 2021-09-08

## 2021-09-10 ENCOUNTER — TELEPHONE (OUTPATIENT)
Dept: ORTHOPEDICS | Facility: CLINIC | Age: 71
End: 2021-09-10

## 2021-09-15 ENCOUNTER — OFFICE VISIT (OUTPATIENT)
Dept: SPORTS MEDICINE | Facility: CLINIC | Age: 71
End: 2021-09-15
Payer: MEDICARE

## 2021-09-15 VITALS — HEIGHT: 65 IN | BODY MASS INDEX: 28.49 KG/M2 | WEIGHT: 171 LBS

## 2021-09-15 DIAGNOSIS — S46.911A STRAIN OF RIGHT SHOULDER, INITIAL ENCOUNTER: ICD-10-CM

## 2021-09-15 DIAGNOSIS — M75.81 ROTATOR CUFF TENDONITIS, RIGHT: Primary | ICD-10-CM

## 2021-09-15 DIAGNOSIS — M75.82 ROTATOR CUFF TENDONITIS, LEFT: ICD-10-CM

## 2021-09-15 PROCEDURE — 1125F PR PAIN SEVERITY QUANTIFIED, PAIN PRESENT: ICD-10-PCS | Mod: CPTII,S$GLB,, | Performed by: PHYSICAL MEDICINE & REHABILITATION

## 2021-09-15 PROCEDURE — 3008F BODY MASS INDEX DOCD: CPT | Mod: CPTII,S$GLB,, | Performed by: PHYSICAL MEDICINE & REHABILITATION

## 2021-09-15 PROCEDURE — 1101F PR PT FALLS ASSESS DOC 0-1 FALLS W/OUT INJ PAST YR: ICD-10-PCS | Mod: CPTII,S$GLB,, | Performed by: PHYSICAL MEDICINE & REHABILITATION

## 2021-09-15 PROCEDURE — 3288F PR FALLS RISK ASSESSMENT DOCUMENTED: ICD-10-PCS | Mod: CPTII,S$GLB,, | Performed by: PHYSICAL MEDICINE & REHABILITATION

## 2021-09-15 PROCEDURE — 3288F FALL RISK ASSESSMENT DOCD: CPT | Mod: CPTII,S$GLB,, | Performed by: PHYSICAL MEDICINE & REHABILITATION

## 2021-09-15 PROCEDURE — 3044F HG A1C LEVEL LT 7.0%: CPT | Mod: CPTII,S$GLB,, | Performed by: PHYSICAL MEDICINE & REHABILITATION

## 2021-09-15 PROCEDURE — 1101F PT FALLS ASSESS-DOCD LE1/YR: CPT | Mod: CPTII,S$GLB,, | Performed by: PHYSICAL MEDICINE & REHABILITATION

## 2021-09-15 PROCEDURE — 99999 PR PBB SHADOW E&M-EST. PATIENT-LVL V: CPT | Mod: PBBFAC,,, | Performed by: PHYSICAL MEDICINE & REHABILITATION

## 2021-09-15 PROCEDURE — 99214 OFFICE O/P EST MOD 30 MIN: CPT | Mod: S$GLB,,, | Performed by: PHYSICAL MEDICINE & REHABILITATION

## 2021-09-15 PROCEDURE — 1125F AMNT PAIN NOTED PAIN PRSNT: CPT | Mod: CPTII,S$GLB,, | Performed by: PHYSICAL MEDICINE & REHABILITATION

## 2021-09-15 PROCEDURE — 99214 PR OFFICE/OUTPT VISIT, EST, LEVL IV, 30-39 MIN: ICD-10-PCS | Mod: S$GLB,,, | Performed by: PHYSICAL MEDICINE & REHABILITATION

## 2021-09-15 PROCEDURE — 99999 PR PBB SHADOW E&M-EST. PATIENT-LVL V: ICD-10-PCS | Mod: PBBFAC,,, | Performed by: PHYSICAL MEDICINE & REHABILITATION

## 2021-09-15 PROCEDURE — 3044F PR MOST RECENT HEMOGLOBIN A1C LEVEL <7.0%: ICD-10-PCS | Mod: CPTII,S$GLB,, | Performed by: PHYSICAL MEDICINE & REHABILITATION

## 2021-09-15 PROCEDURE — 3008F PR BODY MASS INDEX (BMI) DOCUMENTED: ICD-10-PCS | Mod: CPTII,S$GLB,, | Performed by: PHYSICAL MEDICINE & REHABILITATION

## 2021-09-15 RX ORDER — BROMFENAC SODIUM 0.7 MG/ML
SOLUTION/ DROPS OPHTHALMIC
Status: ON HOLD | COMMUNITY
Start: 2021-08-08 | End: 2023-03-27

## 2021-09-16 ENCOUNTER — OFFICE VISIT (OUTPATIENT)
Dept: FAMILY MEDICINE | Facility: CLINIC | Age: 71
End: 2021-09-16
Payer: MEDICARE

## 2021-09-16 ENCOUNTER — PATIENT MESSAGE (OUTPATIENT)
Dept: INTERNAL MEDICINE | Facility: CLINIC | Age: 71
End: 2021-09-16

## 2021-09-16 VITALS
RESPIRATION RATE: 18 BRPM | OXYGEN SATURATION: 96 % | HEIGHT: 65 IN | DIASTOLIC BLOOD PRESSURE: 70 MMHG | SYSTOLIC BLOOD PRESSURE: 130 MMHG | BODY MASS INDEX: 27.92 KG/M2 | TEMPERATURE: 98 F | HEART RATE: 68 BPM | WEIGHT: 167.56 LBS

## 2021-09-16 DIAGNOSIS — Z01.818 PREOP EXAMINATION: Primary | ICD-10-CM

## 2021-09-16 DIAGNOSIS — H26.9 CATARACT, UNSPECIFIED CATARACT TYPE, UNSPECIFIED LATERALITY: ICD-10-CM

## 2021-09-16 DIAGNOSIS — I10 ESSENTIAL HYPERTENSION: ICD-10-CM

## 2021-09-16 PROCEDURE — 1125F PR PAIN SEVERITY QUANTIFIED, PAIN PRESENT: ICD-10-PCS | Mod: CPTII,S$GLB,, | Performed by: REGISTERED NURSE

## 2021-09-16 PROCEDURE — 3078F PR MOST RECENT DIASTOLIC BLOOD PRESSURE < 80 MM HG: ICD-10-PCS | Mod: CPTII,S$GLB,, | Performed by: REGISTERED NURSE

## 2021-09-16 PROCEDURE — 3008F BODY MASS INDEX DOCD: CPT | Mod: CPTII,S$GLB,, | Performed by: REGISTERED NURSE

## 2021-09-16 PROCEDURE — 1159F PR MEDICATION LIST DOCUMENTED IN MEDICAL RECORD: ICD-10-PCS | Mod: CPTII,S$GLB,, | Performed by: REGISTERED NURSE

## 2021-09-16 PROCEDURE — 1100F PR PT FALLS ASSESS DOC 2+ FALLS/FALL W/INJURY/YR: ICD-10-PCS | Mod: CPTII,S$GLB,, | Performed by: REGISTERED NURSE

## 2021-09-16 PROCEDURE — 99214 PR OFFICE/OUTPT VISIT, EST, LEVL IV, 30-39 MIN: ICD-10-PCS | Mod: S$GLB,,, | Performed by: REGISTERED NURSE

## 2021-09-16 PROCEDURE — 3044F PR MOST RECENT HEMOGLOBIN A1C LEVEL <7.0%: ICD-10-PCS | Mod: CPTII,S$GLB,, | Performed by: REGISTERED NURSE

## 2021-09-16 PROCEDURE — 3288F PR FALLS RISK ASSESSMENT DOCUMENTED: ICD-10-PCS | Mod: CPTII,S$GLB,, | Performed by: REGISTERED NURSE

## 2021-09-16 PROCEDURE — 3075F SYST BP GE 130 - 139MM HG: CPT | Mod: CPTII,S$GLB,, | Performed by: REGISTERED NURSE

## 2021-09-16 PROCEDURE — 3008F PR BODY MASS INDEX (BMI) DOCUMENTED: ICD-10-PCS | Mod: CPTII,S$GLB,, | Performed by: REGISTERED NURSE

## 2021-09-16 PROCEDURE — 3075F PR MOST RECENT SYSTOLIC BLOOD PRESS GE 130-139MM HG: ICD-10-PCS | Mod: CPTII,S$GLB,, | Performed by: REGISTERED NURSE

## 2021-09-16 PROCEDURE — 3078F DIAST BP <80 MM HG: CPT | Mod: CPTII,S$GLB,, | Performed by: REGISTERED NURSE

## 2021-09-16 PROCEDURE — 99999 PR PBB SHADOW E&M-EST. PATIENT-LVL V: ICD-10-PCS | Mod: PBBFAC,,, | Performed by: REGISTERED NURSE

## 2021-09-16 PROCEDURE — 99999 PR PBB SHADOW E&M-EST. PATIENT-LVL V: CPT | Mod: PBBFAC,,, | Performed by: REGISTERED NURSE

## 2021-09-16 PROCEDURE — 3288F FALL RISK ASSESSMENT DOCD: CPT | Mod: CPTII,S$GLB,, | Performed by: REGISTERED NURSE

## 2021-09-16 PROCEDURE — 1125F AMNT PAIN NOTED PAIN PRSNT: CPT | Mod: CPTII,S$GLB,, | Performed by: REGISTERED NURSE

## 2021-09-16 PROCEDURE — 1159F MED LIST DOCD IN RCRD: CPT | Mod: CPTII,S$GLB,, | Performed by: REGISTERED NURSE

## 2021-09-16 PROCEDURE — 1100F PTFALLS ASSESS-DOCD GE2>/YR: CPT | Mod: CPTII,S$GLB,, | Performed by: REGISTERED NURSE

## 2021-09-16 PROCEDURE — 99214 OFFICE O/P EST MOD 30 MIN: CPT | Mod: S$GLB,,, | Performed by: REGISTERED NURSE

## 2021-09-16 PROCEDURE — 3044F HG A1C LEVEL LT 7.0%: CPT | Mod: CPTII,S$GLB,, | Performed by: REGISTERED NURSE

## 2021-09-17 ENCOUNTER — PATIENT MESSAGE (OUTPATIENT)
Dept: OBSTETRICS AND GYNECOLOGY | Facility: CLINIC | Age: 71
End: 2021-09-17

## 2021-09-17 DIAGNOSIS — Z12.31 ENCOUNTER FOR SCREENING MAMMOGRAM FOR MALIGNANT NEOPLASM OF BREAST: ICD-10-CM

## 2021-09-17 DIAGNOSIS — Z12.39 ENCOUNTER FOR SCREENING FOR MALIGNANT NEOPLASM OF BREAST, UNSPECIFIED SCREENING MODALITY: Primary | ICD-10-CM

## 2021-09-20 ENCOUNTER — CLINICAL SUPPORT (OUTPATIENT)
Dept: REHABILITATION | Facility: HOSPITAL | Age: 71
End: 2021-09-20
Payer: MEDICARE

## 2021-09-20 DIAGNOSIS — G89.29 CHRONIC LEFT SHOULDER PAIN: ICD-10-CM

## 2021-09-20 DIAGNOSIS — G89.29 CHRONIC RIGHT SHOULDER PAIN: Primary | ICD-10-CM

## 2021-09-20 DIAGNOSIS — M25.512 CHRONIC LEFT SHOULDER PAIN: ICD-10-CM

## 2021-09-20 DIAGNOSIS — M75.81 ROTATOR CUFF TENDONITIS, RIGHT: ICD-10-CM

## 2021-09-20 DIAGNOSIS — M25.511 CHRONIC RIGHT SHOULDER PAIN: Primary | ICD-10-CM

## 2021-09-20 DIAGNOSIS — M62.512 MUSCLE WASTING AND ATROPHY, NOT ELSEWHERE CLASSIFIED, LEFT SHOULDER: ICD-10-CM

## 2021-09-20 DIAGNOSIS — S46.911A STRAIN OF RIGHT SHOULDER, INITIAL ENCOUNTER: ICD-10-CM

## 2021-09-20 DIAGNOSIS — M25.611 SHOULDER STIFFNESS, RIGHT: ICD-10-CM

## 2021-09-20 DIAGNOSIS — M62.511 MUSCLE WASTING AND ATROPHY, NOT ELSEWHERE CLASSIFIED, RIGHT SHOULDER: ICD-10-CM

## 2021-09-20 DIAGNOSIS — M75.82 ROTATOR CUFF TENDONITIS, LEFT: ICD-10-CM

## 2021-09-20 PROCEDURE — 97110 THERAPEUTIC EXERCISES: CPT

## 2021-09-20 PROCEDURE — 97140 MANUAL THERAPY 1/> REGIONS: CPT

## 2021-09-20 PROCEDURE — 97162 PT EVAL MOD COMPLEX 30 MIN: CPT

## 2021-09-24 ENCOUNTER — PATIENT MESSAGE (OUTPATIENT)
Dept: INTERNAL MEDICINE | Facility: CLINIC | Age: 71
End: 2021-09-24

## 2021-09-24 RX ORDER — SEMAGLUTIDE 1.34 MG/ML
0.5 INJECTION, SOLUTION SUBCUTANEOUS
Qty: 1 PEN | Refills: 11 | Status: SHIPPED | OUTPATIENT
Start: 2021-09-24 | End: 2022-08-17

## 2021-09-27 ENCOUNTER — TELEPHONE (OUTPATIENT)
Dept: INTERNAL MEDICINE | Facility: CLINIC | Age: 71
End: 2021-09-27

## 2021-09-28 ENCOUNTER — PATIENT MESSAGE (OUTPATIENT)
Dept: INTERNAL MEDICINE | Facility: CLINIC | Age: 71
End: 2021-09-28

## 2021-09-29 ENCOUNTER — PATIENT MESSAGE (OUTPATIENT)
Dept: INTERNAL MEDICINE | Facility: CLINIC | Age: 71
End: 2021-09-29

## 2021-09-29 ENCOUNTER — CLINICAL SUPPORT (OUTPATIENT)
Dept: REHABILITATION | Facility: HOSPITAL | Age: 71
End: 2021-09-29
Payer: MEDICARE

## 2021-09-29 DIAGNOSIS — G89.29 CHRONIC RIGHT SHOULDER PAIN: ICD-10-CM

## 2021-09-29 DIAGNOSIS — M25.512 CHRONIC LEFT SHOULDER PAIN: ICD-10-CM

## 2021-09-29 DIAGNOSIS — M62.511 MUSCLE WASTING AND ATROPHY, NOT ELSEWHERE CLASSIFIED, RIGHT SHOULDER: ICD-10-CM

## 2021-09-29 DIAGNOSIS — G89.29 CHRONIC LEFT SHOULDER PAIN: ICD-10-CM

## 2021-09-29 DIAGNOSIS — M25.611 SHOULDER STIFFNESS, RIGHT: ICD-10-CM

## 2021-09-29 DIAGNOSIS — M62.512 MUSCLE WASTING AND ATROPHY, NOT ELSEWHERE CLASSIFIED, LEFT SHOULDER: ICD-10-CM

## 2021-09-29 DIAGNOSIS — M25.511 CHRONIC RIGHT SHOULDER PAIN: ICD-10-CM

## 2021-09-29 PROCEDURE — 97112 NEUROMUSCULAR REEDUCATION: CPT

## 2021-09-29 PROCEDURE — 97110 THERAPEUTIC EXERCISES: CPT

## 2021-09-30 ENCOUNTER — OFFICE VISIT (OUTPATIENT)
Dept: GASTROENTEROLOGY | Facility: CLINIC | Age: 71
End: 2021-09-30
Payer: MEDICARE

## 2021-09-30 ENCOUNTER — TELEPHONE (OUTPATIENT)
Dept: GASTROENTEROLOGY | Facility: CLINIC | Age: 71
End: 2021-09-30

## 2021-09-30 ENCOUNTER — CLINICAL SUPPORT (OUTPATIENT)
Dept: REHABILITATION | Facility: HOSPITAL | Age: 71
End: 2021-09-30
Payer: MEDICARE

## 2021-09-30 VITALS
SYSTOLIC BLOOD PRESSURE: 110 MMHG | HEIGHT: 65 IN | DIASTOLIC BLOOD PRESSURE: 60 MMHG | WEIGHT: 167.25 LBS | BODY MASS INDEX: 27.86 KG/M2

## 2021-09-30 DIAGNOSIS — K21.9 GASTROESOPHAGEAL REFLUX DISEASE, UNSPECIFIED WHETHER ESOPHAGITIS PRESENT: ICD-10-CM

## 2021-09-30 DIAGNOSIS — G89.29 CHRONIC LEFT SHOULDER PAIN: ICD-10-CM

## 2021-09-30 DIAGNOSIS — M25.511 CHRONIC RIGHT SHOULDER PAIN: ICD-10-CM

## 2021-09-30 DIAGNOSIS — G89.29 CHRONIC RIGHT SHOULDER PAIN: ICD-10-CM

## 2021-09-30 DIAGNOSIS — M62.512 MUSCLE WASTING AND ATROPHY, NOT ELSEWHERE CLASSIFIED, LEFT SHOULDER: ICD-10-CM

## 2021-09-30 DIAGNOSIS — M62.511 MUSCLE WASTING AND ATROPHY, NOT ELSEWHERE CLASSIFIED, RIGHT SHOULDER: ICD-10-CM

## 2021-09-30 DIAGNOSIS — M25.512 CHRONIC LEFT SHOULDER PAIN: ICD-10-CM

## 2021-09-30 DIAGNOSIS — M25.611 SHOULDER STIFFNESS, RIGHT: ICD-10-CM

## 2021-09-30 DIAGNOSIS — R11.10 VOMITING, INTRACTABILITY OF VOMITING NOT SPECIFIED, PRESENCE OF NAUSEA NOT SPECIFIED, UNSPECIFIED VOMITING TYPE: Primary | ICD-10-CM

## 2021-09-30 PROCEDURE — 3044F HG A1C LEVEL LT 7.0%: CPT | Mod: CPTII,S$GLB,, | Performed by: INTERNAL MEDICINE

## 2021-09-30 PROCEDURE — 97140 MANUAL THERAPY 1/> REGIONS: CPT

## 2021-09-30 PROCEDURE — 3074F SYST BP LT 130 MM HG: CPT | Mod: CPTII,S$GLB,, | Performed by: INTERNAL MEDICINE

## 2021-09-30 PROCEDURE — 99999 PR PBB SHADOW E&M-EST. PATIENT-LVL III: ICD-10-PCS | Mod: PBBFAC,,, | Performed by: INTERNAL MEDICINE

## 2021-09-30 PROCEDURE — 97112 NEUROMUSCULAR REEDUCATION: CPT

## 2021-09-30 PROCEDURE — 99999 PR PBB SHADOW E&M-EST. PATIENT-LVL III: CPT | Mod: PBBFAC,,, | Performed by: INTERNAL MEDICINE

## 2021-09-30 PROCEDURE — 3074F PR MOST RECENT SYSTOLIC BLOOD PRESSURE < 130 MM HG: ICD-10-PCS | Mod: CPTII,S$GLB,, | Performed by: INTERNAL MEDICINE

## 2021-09-30 PROCEDURE — 3008F PR BODY MASS INDEX (BMI) DOCUMENTED: ICD-10-PCS | Mod: CPTII,S$GLB,, | Performed by: INTERNAL MEDICINE

## 2021-09-30 PROCEDURE — 3008F BODY MASS INDEX DOCD: CPT | Mod: CPTII,S$GLB,, | Performed by: INTERNAL MEDICINE

## 2021-09-30 PROCEDURE — 1159F PR MEDICATION LIST DOCUMENTED IN MEDICAL RECORD: ICD-10-PCS | Mod: CPTII,S$GLB,, | Performed by: INTERNAL MEDICINE

## 2021-09-30 PROCEDURE — 99214 PR OFFICE/OUTPT VISIT, EST, LEVL IV, 30-39 MIN: ICD-10-PCS | Mod: S$GLB,,, | Performed by: INTERNAL MEDICINE

## 2021-09-30 PROCEDURE — 99214 OFFICE O/P EST MOD 30 MIN: CPT | Mod: S$GLB,,, | Performed by: INTERNAL MEDICINE

## 2021-09-30 PROCEDURE — 3078F PR MOST RECENT DIASTOLIC BLOOD PRESSURE < 80 MM HG: ICD-10-PCS | Mod: CPTII,S$GLB,, | Performed by: INTERNAL MEDICINE

## 2021-09-30 PROCEDURE — 3078F DIAST BP <80 MM HG: CPT | Mod: CPTII,S$GLB,, | Performed by: INTERNAL MEDICINE

## 2021-09-30 PROCEDURE — 1126F AMNT PAIN NOTED NONE PRSNT: CPT | Mod: CPTII,S$GLB,, | Performed by: INTERNAL MEDICINE

## 2021-09-30 PROCEDURE — 1159F MED LIST DOCD IN RCRD: CPT | Mod: CPTII,S$GLB,, | Performed by: INTERNAL MEDICINE

## 2021-09-30 PROCEDURE — 1126F PR PAIN SEVERITY QUANTIFIED, NO PAIN PRESENT: ICD-10-PCS | Mod: CPTII,S$GLB,, | Performed by: INTERNAL MEDICINE

## 2021-09-30 PROCEDURE — 97110 THERAPEUTIC EXERCISES: CPT

## 2021-09-30 PROCEDURE — 3044F PR MOST RECENT HEMOGLOBIN A1C LEVEL <7.0%: ICD-10-PCS | Mod: CPTII,S$GLB,, | Performed by: INTERNAL MEDICINE

## 2021-10-01 ENCOUNTER — TELEPHONE (OUTPATIENT)
Dept: GASTROENTEROLOGY | Facility: CLINIC | Age: 71
End: 2021-10-01

## 2021-10-01 ENCOUNTER — PATIENT MESSAGE (OUTPATIENT)
Dept: RHEUMATOLOGY | Facility: CLINIC | Age: 71
End: 2021-10-01

## 2021-10-05 ENCOUNTER — TELEPHONE (OUTPATIENT)
Dept: RHEUMATOLOGY | Facility: CLINIC | Age: 71
End: 2021-10-05

## 2021-10-05 DIAGNOSIS — M05.9 SEROPOSITIVE RHEUMATOID ARTHRITIS: Primary | ICD-10-CM

## 2021-10-05 RX ORDER — TOCILIZUMAB 180 MG/ML
INJECTION, SOLUTION SUBCUTANEOUS
Qty: 10.8 ML | Refills: 1 | Status: SHIPPED | OUTPATIENT
Start: 2021-10-05 | End: 2021-10-27 | Stop reason: SDUPTHER

## 2021-10-06 ENCOUNTER — CLINICAL SUPPORT (OUTPATIENT)
Dept: REHABILITATION | Facility: HOSPITAL | Age: 71
End: 2021-10-06
Payer: MEDICARE

## 2021-10-06 DIAGNOSIS — M25.511 CHRONIC RIGHT SHOULDER PAIN: ICD-10-CM

## 2021-10-06 DIAGNOSIS — G89.29 CHRONIC RIGHT SHOULDER PAIN: ICD-10-CM

## 2021-10-06 DIAGNOSIS — G89.29 CHRONIC LEFT SHOULDER PAIN: ICD-10-CM

## 2021-10-06 DIAGNOSIS — M25.611 SHOULDER STIFFNESS, RIGHT: ICD-10-CM

## 2021-10-06 DIAGNOSIS — M62.512 MUSCLE WASTING AND ATROPHY, NOT ELSEWHERE CLASSIFIED, LEFT SHOULDER: ICD-10-CM

## 2021-10-06 DIAGNOSIS — M25.512 CHRONIC LEFT SHOULDER PAIN: ICD-10-CM

## 2021-10-06 DIAGNOSIS — M62.511 MUSCLE WASTING AND ATROPHY, NOT ELSEWHERE CLASSIFIED, RIGHT SHOULDER: ICD-10-CM

## 2021-10-06 PROCEDURE — 97112 NEUROMUSCULAR REEDUCATION: CPT

## 2021-10-06 PROCEDURE — 97110 THERAPEUTIC EXERCISES: CPT

## 2021-10-06 PROCEDURE — 97140 MANUAL THERAPY 1/> REGIONS: CPT

## 2021-10-13 ENCOUNTER — PATIENT MESSAGE (OUTPATIENT)
Dept: RHEUMATOLOGY | Facility: CLINIC | Age: 71
End: 2021-10-13
Payer: MEDICARE

## 2021-10-18 ENCOUNTER — OFFICE VISIT (OUTPATIENT)
Dept: UROLOGY | Facility: CLINIC | Age: 71
End: 2021-10-18
Payer: MEDICARE

## 2021-10-18 ENCOUNTER — TELEPHONE (OUTPATIENT)
Dept: RHEUMATOLOGY | Facility: CLINIC | Age: 71
End: 2021-10-18

## 2021-10-18 VITALS
SYSTOLIC BLOOD PRESSURE: 112 MMHG | WEIGHT: 168.44 LBS | HEART RATE: 76 BPM | DIASTOLIC BLOOD PRESSURE: 54 MMHG | BODY MASS INDEX: 28.06 KG/M2 | HEIGHT: 65 IN

## 2021-10-18 DIAGNOSIS — N39.41 URGE INCONTINENCE: Primary | ICD-10-CM

## 2021-10-18 PROCEDURE — 99214 PR OFFICE/OUTPT VISIT, EST, LEVL IV, 30-39 MIN: ICD-10-PCS | Mod: S$GLB,,, | Performed by: UROLOGY

## 2021-10-18 PROCEDURE — 3288F FALL RISK ASSESSMENT DOCD: CPT | Mod: CPTII,S$GLB,, | Performed by: UROLOGY

## 2021-10-18 PROCEDURE — 1126F AMNT PAIN NOTED NONE PRSNT: CPT | Mod: CPTII,S$GLB,, | Performed by: UROLOGY

## 2021-10-18 PROCEDURE — 3078F PR MOST RECENT DIASTOLIC BLOOD PRESSURE < 80 MM HG: ICD-10-PCS | Mod: CPTII,S$GLB,, | Performed by: UROLOGY

## 2021-10-18 PROCEDURE — 3074F PR MOST RECENT SYSTOLIC BLOOD PRESSURE < 130 MM HG: ICD-10-PCS | Mod: CPTII,S$GLB,, | Performed by: UROLOGY

## 2021-10-18 PROCEDURE — 1159F PR MEDICATION LIST DOCUMENTED IN MEDICAL RECORD: ICD-10-PCS | Mod: CPTII,S$GLB,, | Performed by: UROLOGY

## 2021-10-18 PROCEDURE — 3044F HG A1C LEVEL LT 7.0%: CPT | Mod: CPTII,S$GLB,, | Performed by: UROLOGY

## 2021-10-18 PROCEDURE — 1159F MED LIST DOCD IN RCRD: CPT | Mod: CPTII,S$GLB,, | Performed by: UROLOGY

## 2021-10-18 PROCEDURE — 3078F DIAST BP <80 MM HG: CPT | Mod: CPTII,S$GLB,, | Performed by: UROLOGY

## 2021-10-18 PROCEDURE — 1101F PR PT FALLS ASSESS DOC 0-1 FALLS W/OUT INJ PAST YR: ICD-10-PCS | Mod: CPTII,S$GLB,, | Performed by: UROLOGY

## 2021-10-18 PROCEDURE — 3044F PR MOST RECENT HEMOGLOBIN A1C LEVEL <7.0%: ICD-10-PCS | Mod: CPTII,S$GLB,, | Performed by: UROLOGY

## 2021-10-18 PROCEDURE — 1101F PT FALLS ASSESS-DOCD LE1/YR: CPT | Mod: CPTII,S$GLB,, | Performed by: UROLOGY

## 2021-10-18 PROCEDURE — 3074F SYST BP LT 130 MM HG: CPT | Mod: CPTII,S$GLB,, | Performed by: UROLOGY

## 2021-10-18 PROCEDURE — 99999 PR PBB SHADOW E&M-EST. PATIENT-LVL IV: CPT | Mod: PBBFAC,,, | Performed by: UROLOGY

## 2021-10-18 PROCEDURE — 99999 PR PBB SHADOW E&M-EST. PATIENT-LVL IV: ICD-10-PCS | Mod: PBBFAC,,, | Performed by: UROLOGY

## 2021-10-18 PROCEDURE — 3008F PR BODY MASS INDEX (BMI) DOCUMENTED: ICD-10-PCS | Mod: CPTII,S$GLB,, | Performed by: UROLOGY

## 2021-10-18 PROCEDURE — 3008F BODY MASS INDEX DOCD: CPT | Mod: CPTII,S$GLB,, | Performed by: UROLOGY

## 2021-10-18 PROCEDURE — 3288F PR FALLS RISK ASSESSMENT DOCUMENTED: ICD-10-PCS | Mod: CPTII,S$GLB,, | Performed by: UROLOGY

## 2021-10-18 PROCEDURE — 1126F PR PAIN SEVERITY QUANTIFIED, NO PAIN PRESENT: ICD-10-PCS | Mod: CPTII,S$GLB,, | Performed by: UROLOGY

## 2021-10-18 PROCEDURE — 99214 OFFICE O/P EST MOD 30 MIN: CPT | Mod: S$GLB,,, | Performed by: UROLOGY

## 2021-10-18 RX ORDER — SULFASALAZINE 500 MG/1
TABLET ORAL
COMMUNITY
Start: 2021-10-07 | End: 2022-05-13

## 2021-10-20 ENCOUNTER — HOSPITAL ENCOUNTER (OUTPATIENT)
Dept: RADIOLOGY | Facility: HOSPITAL | Age: 71
Discharge: HOME | End: 2021-10-20
Attending: OBSTETRICS & GYNECOLOGY
Payer: MEDICARE

## 2021-10-20 VITALS — BODY MASS INDEX: 28.06 KG/M2 | WEIGHT: 168.44 LBS | HEIGHT: 65 IN

## 2021-10-20 DIAGNOSIS — Z12.31 ENCOUNTER FOR SCREENING MAMMOGRAM FOR MALIGNANT NEOPLASM OF BREAST: ICD-10-CM

## 2021-10-20 DIAGNOSIS — Z12.39 ENCOUNTER FOR SCREENING FOR MALIGNANT NEOPLASM OF BREAST, UNSPECIFIED SCREENING MODALITY: ICD-10-CM

## 2021-10-20 PROCEDURE — 77067 SCR MAMMO BI INCL CAD: CPT | Mod: TC

## 2021-10-20 PROCEDURE — 77067 SCR MAMMO BI INCL CAD: CPT | Mod: 26,,, | Performed by: RADIOLOGY

## 2021-10-20 PROCEDURE — 77063 MAMMO DIGITAL SCREENING BILAT WITH TOMO: ICD-10-PCS | Mod: 26,,, | Performed by: RADIOLOGY

## 2021-10-20 PROCEDURE — 77063 BREAST TOMOSYNTHESIS BI: CPT | Mod: 26,,, | Performed by: RADIOLOGY

## 2021-10-20 PROCEDURE — 77067 MAMMO DIGITAL SCREENING BILAT WITH TOMO: ICD-10-PCS | Mod: 26,,, | Performed by: RADIOLOGY

## 2021-10-21 ENCOUNTER — PATIENT MESSAGE (OUTPATIENT)
Dept: INTERNAL MEDICINE | Facility: CLINIC | Age: 71
End: 2021-10-21
Payer: MEDICARE

## 2021-10-21 ENCOUNTER — TELEPHONE (OUTPATIENT)
Dept: ADMINISTRATIVE | Facility: HOSPITAL | Age: 71
End: 2021-10-21

## 2021-10-21 ENCOUNTER — NURSE TRIAGE (OUTPATIENT)
Dept: ADMINISTRATIVE | Facility: CLINIC | Age: 71
End: 2021-10-21

## 2021-10-21 ENCOUNTER — PATIENT MESSAGE (OUTPATIENT)
Dept: GASTROENTEROLOGY | Facility: CLINIC | Age: 71
End: 2021-10-21
Payer: MEDICARE

## 2021-10-22 ENCOUNTER — HOSPITAL ENCOUNTER (EMERGENCY)
Facility: HOSPITAL | Age: 71
Discharge: HOME | End: 2021-10-22
Attending: EMERGENCY MEDICINE
Payer: MEDICARE

## 2021-10-22 VITALS
BODY MASS INDEX: 28.23 KG/M2 | HEART RATE: 54 BPM | SYSTOLIC BLOOD PRESSURE: 121 MMHG | OXYGEN SATURATION: 100 % | TEMPERATURE: 98 F | WEIGHT: 169.44 LBS | RESPIRATION RATE: 18 BRPM | HEIGHT: 65 IN | DIASTOLIC BLOOD PRESSURE: 65 MMHG

## 2021-10-22 DIAGNOSIS — R04.2 HEMOPTYSIS: Primary | ICD-10-CM

## 2021-10-22 DIAGNOSIS — J40 BRONCHITIS: ICD-10-CM

## 2021-10-22 LAB
ALBUMIN SERPL BCP-MCNC: 4.1 G/DL (ref 3.5–5.2)
ALP SERPL-CCNC: 74 U/L (ref 55–135)
ALT SERPL W/O P-5'-P-CCNC: 72 U/L (ref 10–44)
ANION GAP SERPL CALC-SCNC: 7 MMOL/L (ref 8–16)
APTT BLDCRRT: 24.1 SEC (ref 21–32)
AST SERPL-CCNC: 31 U/L (ref 10–40)
BASOPHILS # BLD AUTO: 0.02 K/UL (ref 0–0.2)
BASOPHILS NFR BLD: 0.3 % (ref 0–1.9)
BILIRUB SERPL-MCNC: 0.4 MG/DL (ref 0.1–1)
BNP SERPL-MCNC: 150 PG/ML (ref 0–99)
BUN SERPL-MCNC: 16 MG/DL (ref 8–23)
CALCIUM SERPL-MCNC: 9.4 MG/DL (ref 8.7–10.5)
CHLORIDE SERPL-SCNC: 109 MMOL/L (ref 95–110)
CO2 SERPL-SCNC: 26 MMOL/L (ref 23–29)
CREAT SERPL-MCNC: 0.7 MG/DL (ref 0.5–1.4)
CTP QC/QA: YES
D DIMER PPP IA.FEU-MCNC: 0.19 MG/L FEU
DIFFERENTIAL METHOD: ABNORMAL
EOSINOPHIL # BLD AUTO: 0.1 K/UL (ref 0–0.5)
EOSINOPHIL NFR BLD: 1.8 % (ref 0–8)
ERYTHROCYTE [DISTWIDTH] IN BLOOD BY AUTOMATED COUNT: 14.1 % (ref 11.5–14.5)
EST. GFR  (AFRICAN AMERICAN): >60 ML/MIN/1.73 M^2
EST. GFR  (NON AFRICAN AMERICAN): >60 ML/MIN/1.73 M^2
GLUCOSE SERPL-MCNC: 110 MG/DL (ref 70–110)
HCT VFR BLD AUTO: 38 % (ref 37–48.5)
HGB BLD-MCNC: 11.9 G/DL (ref 12–16)
IMM GRANULOCYTES # BLD AUTO: 0.02 K/UL (ref 0–0.04)
IMM GRANULOCYTES NFR BLD AUTO: 0.3 % (ref 0–0.5)
INR PPP: 0.9 (ref 0.8–1.2)
LYMPHOCYTES # BLD AUTO: 1.6 K/UL (ref 1–4.8)
LYMPHOCYTES NFR BLD: 25.3 % (ref 18–48)
MCH RBC QN AUTO: 27.9 PG (ref 27–31)
MCHC RBC AUTO-ENTMCNC: 31.3 G/DL (ref 32–36)
MCV RBC AUTO: 89 FL (ref 82–98)
MONOCYTES # BLD AUTO: 0.8 K/UL (ref 0.3–1)
MONOCYTES NFR BLD: 12 % (ref 4–15)
NEUTROPHILS # BLD AUTO: 3.8 K/UL (ref 1.8–7.7)
NEUTROPHILS NFR BLD: 60.3 % (ref 38–73)
NRBC BLD-RTO: 0 /100 WBC
PLATELET # BLD AUTO: 114 K/UL (ref 150–450)
PMV BLD AUTO: 11.9 FL (ref 9.2–12.9)
POTASSIUM SERPL-SCNC: 3.9 MMOL/L (ref 3.5–5.1)
PROT SERPL-MCNC: 6.5 G/DL (ref 6–8.4)
PROTHROMBIN TIME: 10.5 SEC (ref 9–12.5)
RBC # BLD AUTO: 4.27 M/UL (ref 4–5.4)
SARS-COV-2 RDRP RESP QL NAA+PROBE: NEGATIVE
SODIUM SERPL-SCNC: 142 MMOL/L (ref 136–145)
TROPONIN I SERPL DL<=0.01 NG/ML-MCNC: 0.01 NG/ML (ref 0–0.03)
WBC # BLD AUTO: 6.25 K/UL (ref 3.9–12.7)

## 2021-10-22 PROCEDURE — 93010 ELECTROCARDIOGRAM REPORT: CPT | Mod: ,,, | Performed by: INTERNAL MEDICINE

## 2021-10-22 PROCEDURE — 85730 THROMBOPLASTIN TIME PARTIAL: CPT | Performed by: EMERGENCY MEDICINE

## 2021-10-22 PROCEDURE — 93005 ELECTROCARDIOGRAM TRACING: CPT

## 2021-10-22 PROCEDURE — 93010 EKG 12-LEAD: ICD-10-PCS | Mod: ,,, | Performed by: INTERNAL MEDICINE

## 2021-10-22 PROCEDURE — U0002 COVID-19 LAB TEST NON-CDC: HCPCS | Performed by: EMERGENCY MEDICINE

## 2021-10-22 PROCEDURE — 85610 PROTHROMBIN TIME: CPT | Performed by: EMERGENCY MEDICINE

## 2021-10-22 PROCEDURE — 25500020 PHARM REV CODE 255: Performed by: EMERGENCY MEDICINE

## 2021-10-22 PROCEDURE — 85025 COMPLETE CBC W/AUTO DIFF WBC: CPT | Performed by: EMERGENCY MEDICINE

## 2021-10-22 PROCEDURE — 84484 ASSAY OF TROPONIN QUANT: CPT | Performed by: EMERGENCY MEDICINE

## 2021-10-22 PROCEDURE — 80053 COMPREHEN METABOLIC PANEL: CPT | Performed by: EMERGENCY MEDICINE

## 2021-10-22 PROCEDURE — 85379 FIBRIN DEGRADATION QUANT: CPT | Performed by: EMERGENCY MEDICINE

## 2021-10-22 PROCEDURE — 83880 ASSAY OF NATRIURETIC PEPTIDE: CPT | Performed by: EMERGENCY MEDICINE

## 2021-10-22 PROCEDURE — 99285 EMERGENCY DEPT VISIT HI MDM: CPT | Mod: 25

## 2021-10-22 RX ORDER — LEVOFLOXACIN 500 MG/1
500 TABLET, FILM COATED ORAL DAILY
Qty: 7 TABLET | Refills: 0 | Status: SHIPPED | OUTPATIENT
Start: 2021-10-22 | End: 2021-10-22 | Stop reason: SDUPTHER

## 2021-10-22 RX ORDER — PROMETHAZINE HYDROCHLORIDE AND DEXTROMETHORPHAN HYDROBROMIDE 6.25; 15 MG/5ML; MG/5ML
5 SYRUP ORAL EVERY 6 HOURS PRN
Qty: 120 ML | Refills: 0 | Status: SHIPPED | OUTPATIENT
Start: 2021-10-22 | End: 2021-11-01

## 2021-10-22 RX ORDER — PROMETHAZINE HYDROCHLORIDE AND DEXTROMETHORPHAN HYDROBROMIDE 6.25; 15 MG/5ML; MG/5ML
5 SYRUP ORAL EVERY 6 HOURS PRN
Qty: 120 ML | Refills: 0 | Status: SHIPPED | OUTPATIENT
Start: 2021-10-22 | End: 2021-10-22 | Stop reason: SDUPTHER

## 2021-10-22 RX ORDER — LEVOFLOXACIN 500 MG/1
500 TABLET, FILM COATED ORAL DAILY
Qty: 7 TABLET | Refills: 0 | Status: SHIPPED | OUTPATIENT
Start: 2021-10-22 | End: 2021-10-29

## 2021-10-22 RX ADMIN — IOHEXOL 100 ML: 350 INJECTION, SOLUTION INTRAVENOUS at 06:10

## 2021-10-27 ENCOUNTER — PATIENT MESSAGE (OUTPATIENT)
Dept: RHEUMATOLOGY | Facility: CLINIC | Age: 71
End: 2021-10-27
Payer: MEDICARE

## 2021-10-27 DIAGNOSIS — M05.9 SEROPOSITIVE RHEUMATOID ARTHRITIS: ICD-10-CM

## 2021-10-28 ENCOUNTER — TELEPHONE (OUTPATIENT)
Dept: INTERNAL MEDICINE | Facility: CLINIC | Age: 71
End: 2021-10-28
Payer: MEDICARE

## 2021-10-28 RX ORDER — TOCILIZUMAB 180 MG/ML
INJECTION, SOLUTION SUBCUTANEOUS
Qty: 10.8 ML | Refills: 3 | OUTPATIENT
Start: 2021-10-28 | End: 2021-10-29 | Stop reason: SDUPTHER

## 2021-10-29 ENCOUNTER — PATIENT MESSAGE (OUTPATIENT)
Dept: RHEUMATOLOGY | Facility: CLINIC | Age: 71
End: 2021-10-29
Payer: MEDICARE

## 2021-10-29 ENCOUNTER — PATIENT MESSAGE (OUTPATIENT)
Dept: INTERNAL MEDICINE | Facility: CLINIC | Age: 71
End: 2021-10-29
Payer: MEDICARE

## 2021-10-29 DIAGNOSIS — M05.9 SEROPOSITIVE RHEUMATOID ARTHRITIS: ICD-10-CM

## 2021-10-29 RX ORDER — TOCILIZUMAB 180 MG/ML
INJECTION, SOLUTION SUBCUTANEOUS
Qty: 10.8 ML | Refills: 3 | Status: CANCELLED | OUTPATIENT
Start: 2021-10-29

## 2021-10-29 RX ORDER — TOCILIZUMAB 180 MG/ML
162 INJECTION, SOLUTION SUBCUTANEOUS
Qty: 10.8 ML | Refills: 3 | Status: SHIPPED | OUTPATIENT
Start: 2021-10-29 | End: 2021-11-10

## 2021-10-29 RX ORDER — PREDNISONE 5 MG/1
TABLET ORAL
Qty: 80 TABLET | Refills: 0 | Status: SHIPPED | OUTPATIENT
Start: 2021-10-29 | End: 2021-11-09 | Stop reason: SDUPTHER

## 2021-10-29 RX ORDER — METHYLPREDNISOLONE 4 MG/1
TABLET ORAL
Qty: 21 EACH | Refills: 0 | Status: CANCELLED | OUTPATIENT
Start: 2021-10-29 | End: 2021-11-19

## 2021-10-30 ENCOUNTER — OFFICE VISIT (OUTPATIENT)
Dept: URGENT CARE | Facility: CLINIC | Age: 71
End: 2021-10-30
Payer: MEDICARE

## 2021-10-30 VITALS
DIASTOLIC BLOOD PRESSURE: 76 MMHG | BODY MASS INDEX: 28.16 KG/M2 | RESPIRATION RATE: 18 BRPM | WEIGHT: 169 LBS | TEMPERATURE: 98 F | OXYGEN SATURATION: 98 % | HEIGHT: 65 IN | HEART RATE: 61 BPM | SYSTOLIC BLOOD PRESSURE: 160 MMHG

## 2021-10-30 DIAGNOSIS — Z53.21 PATIENT LEFT AFTER TRIAGE: ICD-10-CM

## 2021-11-01 ENCOUNTER — HOSPITAL ENCOUNTER (OUTPATIENT)
Dept: RADIOLOGY | Facility: HOSPITAL | Age: 71
Discharge: HOME | End: 2021-11-01
Attending: INTERNAL MEDICINE
Payer: MEDICARE

## 2021-11-01 ENCOUNTER — TELEPHONE (OUTPATIENT)
Dept: INTERNAL MEDICINE | Facility: CLINIC | Age: 71
End: 2021-11-01
Payer: MEDICARE

## 2021-11-01 DIAGNOSIS — R11.10 VOMITING, INTRACTABILITY OF VOMITING NOT SPECIFIED, PRESENCE OF NAUSEA NOT SPECIFIED, UNSPECIFIED VOMITING TYPE: ICD-10-CM

## 2021-11-01 PROCEDURE — 78264 GASTRIC EMPTYING IMG STUDY: CPT | Mod: TC

## 2021-11-01 PROCEDURE — 78264 GASTRIC EMPTYING IMG STUDY: CPT | Mod: 26,,, | Performed by: RADIOLOGY

## 2021-11-01 PROCEDURE — 78264 NM GASTRIC EMPTYING: ICD-10-PCS | Mod: 26,,, | Performed by: RADIOLOGY

## 2021-11-02 ENCOUNTER — TELEPHONE (OUTPATIENT)
Dept: URGENT CARE | Facility: CLINIC | Age: 71
End: 2021-11-02
Payer: MEDICARE

## 2021-11-03 ENCOUNTER — TELEPHONE (OUTPATIENT)
Dept: INTERNAL MEDICINE | Facility: CLINIC | Age: 71
End: 2021-11-03
Payer: MEDICARE

## 2021-11-08 ENCOUNTER — TELEPHONE (OUTPATIENT)
Dept: CARDIOLOGY | Facility: HOSPITAL | Age: 71
End: 2021-11-08
Payer: MEDICARE

## 2021-11-08 ENCOUNTER — PATIENT MESSAGE (OUTPATIENT)
Dept: RHEUMATOLOGY | Facility: CLINIC | Age: 71
End: 2021-11-08
Payer: MEDICARE

## 2021-11-08 ENCOUNTER — TELEPHONE (OUTPATIENT)
Dept: RHEUMATOLOGY | Facility: CLINIC | Age: 71
End: 2021-11-08
Payer: MEDICARE

## 2021-11-08 ENCOUNTER — PATIENT MESSAGE (OUTPATIENT)
Dept: CARDIOLOGY | Facility: CLINIC | Age: 71
End: 2021-11-08
Payer: MEDICARE

## 2021-11-08 ENCOUNTER — CLINICAL SUPPORT (OUTPATIENT)
Dept: REHABILITATION | Facility: HOSPITAL | Age: 71
End: 2021-11-08
Payer: MEDICARE

## 2021-11-08 DIAGNOSIS — M62.512 MUSCLE WASTING AND ATROPHY, NOT ELSEWHERE CLASSIFIED, LEFT SHOULDER: ICD-10-CM

## 2021-11-08 DIAGNOSIS — M25.611 SHOULDER STIFFNESS, RIGHT: ICD-10-CM

## 2021-11-08 DIAGNOSIS — G89.29 CHRONIC LEFT SHOULDER PAIN: ICD-10-CM

## 2021-11-08 DIAGNOSIS — G89.29 CHRONIC RIGHT SHOULDER PAIN: ICD-10-CM

## 2021-11-08 DIAGNOSIS — M62.511 MUSCLE WASTING AND ATROPHY, NOT ELSEWHERE CLASSIFIED, RIGHT SHOULDER: ICD-10-CM

## 2021-11-08 DIAGNOSIS — M25.512 CHRONIC LEFT SHOULDER PAIN: ICD-10-CM

## 2021-11-08 DIAGNOSIS — M25.511 CHRONIC RIGHT SHOULDER PAIN: ICD-10-CM

## 2021-11-08 PROCEDURE — 97110 THERAPEUTIC EXERCISES: CPT

## 2021-11-08 PROCEDURE — 97112 NEUROMUSCULAR REEDUCATION: CPT

## 2021-11-08 PROCEDURE — 97140 MANUAL THERAPY 1/> REGIONS: CPT

## 2021-11-09 ENCOUNTER — OFFICE VISIT (OUTPATIENT)
Dept: URGENT CARE | Facility: CLINIC | Age: 71
End: 2021-11-09
Payer: MEDICARE

## 2021-11-09 ENCOUNTER — TELEPHONE (OUTPATIENT)
Dept: RHEUMATOLOGY | Facility: CLINIC | Age: 71
End: 2021-11-09
Payer: MEDICARE

## 2021-11-09 ENCOUNTER — PATIENT MESSAGE (OUTPATIENT)
Dept: RHEUMATOLOGY | Facility: CLINIC | Age: 71
End: 2021-11-09
Payer: MEDICARE

## 2021-11-09 ENCOUNTER — TELEPHONE (OUTPATIENT)
Dept: OBSTETRICS AND GYNECOLOGY | Facility: CLINIC | Age: 71
End: 2021-11-09
Payer: MEDICARE

## 2021-11-09 VITALS
TEMPERATURE: 98 F | HEIGHT: 65 IN | SYSTOLIC BLOOD PRESSURE: 144 MMHG | WEIGHT: 169 LBS | HEART RATE: 64 BPM | BODY MASS INDEX: 28.16 KG/M2 | RESPIRATION RATE: 18 BRPM | DIASTOLIC BLOOD PRESSURE: 71 MMHG | OXYGEN SATURATION: 99 %

## 2021-11-09 DIAGNOSIS — M05.9 SEROPOSITIVE RHEUMATOID ARTHRITIS: ICD-10-CM

## 2021-11-09 DIAGNOSIS — K13.79 MOUTH PAIN: Primary | ICD-10-CM

## 2021-11-09 PROCEDURE — 1160F RVW MEDS BY RX/DR IN RCRD: CPT | Mod: CPTII,S$GLB,, | Performed by: NURSE PRACTITIONER

## 2021-11-09 PROCEDURE — 1125F AMNT PAIN NOTED PAIN PRSNT: CPT | Mod: CPTII,S$GLB,, | Performed by: NURSE PRACTITIONER

## 2021-11-09 PROCEDURE — 1125F PR PAIN SEVERITY QUANTIFIED, PAIN PRESENT: ICD-10-PCS | Mod: CPTII,S$GLB,, | Performed by: NURSE PRACTITIONER

## 2021-11-09 PROCEDURE — 99214 OFFICE O/P EST MOD 30 MIN: CPT | Mod: S$GLB,,, | Performed by: NURSE PRACTITIONER

## 2021-11-09 PROCEDURE — 1159F PR MEDICATION LIST DOCUMENTED IN MEDICAL RECORD: ICD-10-PCS | Mod: CPTII,S$GLB,, | Performed by: NURSE PRACTITIONER

## 2021-11-09 PROCEDURE — 3077F SYST BP >= 140 MM HG: CPT | Mod: CPTII,S$GLB,, | Performed by: NURSE PRACTITIONER

## 2021-11-09 PROCEDURE — 3044F PR MOST RECENT HEMOGLOBIN A1C LEVEL <7.0%: ICD-10-PCS | Mod: CPTII,S$GLB,, | Performed by: NURSE PRACTITIONER

## 2021-11-09 PROCEDURE — 1160F PR REVIEW ALL MEDS BY PRESCRIBER/CLIN PHARMACIST DOCUMENTED: ICD-10-PCS | Mod: CPTII,S$GLB,, | Performed by: NURSE PRACTITIONER

## 2021-11-09 PROCEDURE — 3008F BODY MASS INDEX DOCD: CPT | Mod: CPTII,S$GLB,, | Performed by: NURSE PRACTITIONER

## 2021-11-09 PROCEDURE — 99214 PR OFFICE/OUTPT VISIT, EST, LEVL IV, 30-39 MIN: ICD-10-PCS | Mod: S$GLB,,, | Performed by: NURSE PRACTITIONER

## 2021-11-09 PROCEDURE — 3008F PR BODY MASS INDEX (BMI) DOCUMENTED: ICD-10-PCS | Mod: CPTII,S$GLB,, | Performed by: NURSE PRACTITIONER

## 2021-11-09 PROCEDURE — 3078F DIAST BP <80 MM HG: CPT | Mod: CPTII,S$GLB,, | Performed by: NURSE PRACTITIONER

## 2021-11-09 PROCEDURE — 3044F HG A1C LEVEL LT 7.0%: CPT | Mod: CPTII,S$GLB,, | Performed by: NURSE PRACTITIONER

## 2021-11-09 PROCEDURE — 3077F PR MOST RECENT SYSTOLIC BLOOD PRESSURE >= 140 MM HG: ICD-10-PCS | Mod: CPTII,S$GLB,, | Performed by: NURSE PRACTITIONER

## 2021-11-09 PROCEDURE — 3078F PR MOST RECENT DIASTOLIC BLOOD PRESSURE < 80 MM HG: ICD-10-PCS | Mod: CPTII,S$GLB,, | Performed by: NURSE PRACTITIONER

## 2021-11-09 PROCEDURE — 1159F MED LIST DOCD IN RCRD: CPT | Mod: CPTII,S$GLB,, | Performed by: NURSE PRACTITIONER

## 2021-11-09 RX ORDER — PREDNISONE 5 MG/1
10 TABLET ORAL DAILY
Qty: 60 TABLET | Refills: 1 | Status: SHIPPED | OUTPATIENT
Start: 2021-11-09 | End: 2021-11-11 | Stop reason: CLARIF

## 2021-11-10 ENCOUNTER — PATIENT MESSAGE (OUTPATIENT)
Dept: RHEUMATOLOGY | Facility: CLINIC | Age: 71
End: 2021-11-10
Payer: MEDICARE

## 2021-11-10 ENCOUNTER — TELEPHONE (OUTPATIENT)
Dept: RHEUMATOLOGY | Facility: CLINIC | Age: 71
End: 2021-11-10
Payer: MEDICARE

## 2021-11-10 DIAGNOSIS — M05.9 SEROPOSITIVE RHEUMATOID ARTHRITIS: ICD-10-CM

## 2021-11-10 RX ORDER — UPADACITINIB 15 MG/1
15 TABLET, EXTENDED RELEASE ORAL DAILY
Qty: 30 TABLET | Refills: 3 | COMMUNITY
Start: 2021-11-10 | End: 2021-12-10

## 2021-11-11 ENCOUNTER — TELEPHONE (OUTPATIENT)
Dept: RHEUMATOLOGY | Facility: CLINIC | Age: 71
End: 2021-11-11
Payer: MEDICARE

## 2021-11-11 RX ORDER — PREDNISONE 5 MG/1
10 TABLET ORAL DAILY
Qty: 60 TABLET | Refills: 1 | Status: SHIPPED | OUTPATIENT
Start: 2021-11-11 | End: 2021-12-11

## 2021-11-12 ENCOUNTER — TELEPHONE (OUTPATIENT)
Dept: URGENT CARE | Facility: CLINIC | Age: 71
End: 2021-11-12
Payer: MEDICARE

## 2021-11-15 ENCOUNTER — CLINICAL SUPPORT (OUTPATIENT)
Dept: REHABILITATION | Facility: HOSPITAL | Age: 71
End: 2021-11-15
Payer: MEDICARE

## 2021-11-15 ENCOUNTER — DOCUMENTATION ONLY (OUTPATIENT)
Dept: REHABILITATION | Facility: HOSPITAL | Age: 71
End: 2021-11-15
Payer: MEDICARE

## 2021-11-15 ENCOUNTER — PATIENT MESSAGE (OUTPATIENT)
Dept: RHEUMATOLOGY | Facility: CLINIC | Age: 71
End: 2021-11-15
Payer: MEDICARE

## 2021-11-15 DIAGNOSIS — M25.611 SHOULDER STIFFNESS, RIGHT: ICD-10-CM

## 2021-11-15 DIAGNOSIS — G89.29 CHRONIC RIGHT SHOULDER PAIN: ICD-10-CM

## 2021-11-15 DIAGNOSIS — M25.512 CHRONIC LEFT SHOULDER PAIN: ICD-10-CM

## 2021-11-15 DIAGNOSIS — M25.511 CHRONIC RIGHT SHOULDER PAIN: ICD-10-CM

## 2021-11-15 DIAGNOSIS — M62.512 MUSCLE WASTING AND ATROPHY, NOT ELSEWHERE CLASSIFIED, LEFT SHOULDER: ICD-10-CM

## 2021-11-15 DIAGNOSIS — M62.511 MUSCLE WASTING AND ATROPHY, NOT ELSEWHERE CLASSIFIED, RIGHT SHOULDER: ICD-10-CM

## 2021-11-15 DIAGNOSIS — G89.29 CHRONIC LEFT SHOULDER PAIN: ICD-10-CM

## 2021-11-15 PROCEDURE — 97110 THERAPEUTIC EXERCISES: CPT | Mod: CQ

## 2021-11-16 ENCOUNTER — TELEPHONE (OUTPATIENT)
Dept: INTERNAL MEDICINE | Facility: CLINIC | Age: 71
End: 2021-11-16
Payer: MEDICARE

## 2021-11-16 ENCOUNTER — PATIENT MESSAGE (OUTPATIENT)
Dept: INTERNAL MEDICINE | Facility: CLINIC | Age: 71
End: 2021-11-16
Payer: MEDICARE

## 2021-11-17 ENCOUNTER — PATIENT MESSAGE (OUTPATIENT)
Dept: INTERNAL MEDICINE | Facility: CLINIC | Age: 71
End: 2021-11-17
Payer: MEDICARE

## 2021-11-17 LAB
LEFT EYE DM RETINOPATHY: NEGATIVE
RIGHT EYE DM RETINOPATHY: NEGATIVE

## 2021-11-18 ENCOUNTER — HOSPITAL ENCOUNTER (OUTPATIENT)
Dept: RADIOLOGY | Facility: HOSPITAL | Age: 71
Discharge: HOME | End: 2021-11-18
Attending: INTERNAL MEDICINE
Payer: MEDICARE

## 2021-11-18 ENCOUNTER — HOSPITAL ENCOUNTER (OUTPATIENT)
Dept: CARDIOLOGY | Facility: HOSPITAL | Age: 71
Discharge: HOME | End: 2021-11-18
Attending: INTERNAL MEDICINE
Payer: MEDICARE

## 2021-11-18 DIAGNOSIS — R94.31 ABNORMAL EKG: ICD-10-CM

## 2021-11-18 DIAGNOSIS — E78.00 HYPERCHOLESTEROLEMIA: ICD-10-CM

## 2021-11-18 DIAGNOSIS — R93.1 ELEVATED CORONARY ARTERY CALCIUM SCORE: ICD-10-CM

## 2021-11-18 DIAGNOSIS — E11.9 TYPE 2 DIABETES MELLITUS WITHOUT COMPLICATION, WITHOUT LONG-TERM CURRENT USE OF INSULIN: ICD-10-CM

## 2021-11-18 LAB
CV STRESS BASE HR: 59 BPM
DIASTOLIC BLOOD PRESSURE: 78 MMHG
NUC REST EJECTION FRACTION: 74
NUC STRESS EJECTION FRACTION: 73 %
OHS CV CPX 85 PERCENT MAX PREDICTED HEART RATE MALE: 123
OHS CV CPX MAX PREDICTED HEART RATE: 144
OHS CV CPX PATIENT IS FEMALE: 1
OHS CV CPX PATIENT IS MALE: 0
OHS CV CPX PEAK DIASTOLIC BLOOD PRESSURE: 65 MMHG
OHS CV CPX PEAK HEAR RATE: 99 BPM
OHS CV CPX PEAK RATE PRESSURE PRODUCT: NORMAL
OHS CV CPX PEAK SYSTOLIC BLOOD PRESSURE: 160 MMHG
OHS CV CPX PERCENT MAX PREDICTED HEART RATE ACHIEVED: 69
OHS CV CPX RATE PRESSURE PRODUCT PRESENTING: 9853
STRESS ECHO POST EXERCISE DUR MIN: 1 MINUTES
STRESS ECHO POST EXERCISE DUR SEC: 12 SECONDS
SYSTOLIC BLOOD PRESSURE: 167 MMHG

## 2021-11-18 PROCEDURE — 78452 HT MUSCLE IMAGE SPECT MULT: CPT | Mod: 26,,, | Performed by: INTERNAL MEDICINE

## 2021-11-18 PROCEDURE — 93018 CV STRESS TEST I&R ONLY: CPT | Mod: ,,, | Performed by: INTERNAL MEDICINE

## 2021-11-18 PROCEDURE — 93018 STRESS TEST WITH MYOCARDIAL PERFUSION (CUPID ONLY): ICD-10-PCS | Mod: ,,, | Performed by: INTERNAL MEDICINE

## 2021-11-18 PROCEDURE — 63600175 PHARM REV CODE 636 W HCPCS: Performed by: INTERNAL MEDICINE

## 2021-11-18 PROCEDURE — 78452 HT MUSCLE IMAGE SPECT MULT: CPT

## 2021-11-18 PROCEDURE — 93016 STRESS TEST WITH MYOCARDIAL PERFUSION (CUPID ONLY): ICD-10-PCS | Mod: ,,, | Performed by: INTERNAL MEDICINE

## 2021-11-18 PROCEDURE — 78452 STRESS TEST WITH MYOCARDIAL PERFUSION (CUPID ONLY): ICD-10-PCS | Mod: 26,,, | Performed by: INTERNAL MEDICINE

## 2021-11-18 PROCEDURE — 93016 CV STRESS TEST SUPVJ ONLY: CPT | Mod: ,,, | Performed by: INTERNAL MEDICINE

## 2021-11-18 PROCEDURE — 93017 CV STRESS TEST TRACING ONLY: CPT

## 2021-11-18 PROCEDURE — A9502 TC99M TETROFOSMIN: HCPCS

## 2021-11-18 RX ORDER — AMLODIPINE BESYLATE 5 MG/1
5 TABLET ORAL DAILY
Qty: 90 TABLET | Refills: 4 | Status: SHIPPED | OUTPATIENT
Start: 2021-11-18 | End: 2022-12-22

## 2021-11-18 RX ORDER — REGADENOSON 0.08 MG/ML
0.4 INJECTION, SOLUTION INTRAVENOUS ONCE
Status: COMPLETED | OUTPATIENT
Start: 2021-11-18 | End: 2021-11-18

## 2021-11-18 RX ORDER — MIRABEGRON 50 MG/1
TABLET, FILM COATED, EXTENDED RELEASE ORAL
Qty: 30 TABLET | Refills: 11 | Status: SHIPPED | OUTPATIENT
Start: 2021-11-18 | End: 2022-12-22

## 2021-11-18 RX ADMIN — REGADENOSON 0.4 MG: 0.08 INJECTION, SOLUTION INTRAVENOUS at 12:11

## 2021-11-19 ENCOUNTER — TELEPHONE (OUTPATIENT)
Dept: CARDIOLOGY | Facility: CLINIC | Age: 71
End: 2021-11-19
Payer: MEDICARE

## 2021-11-19 ENCOUNTER — CLINICAL SUPPORT (OUTPATIENT)
Dept: REHABILITATION | Facility: HOSPITAL | Age: 71
End: 2021-11-19
Payer: MEDICARE

## 2021-11-19 ENCOUNTER — PATIENT MESSAGE (OUTPATIENT)
Dept: INTERNAL MEDICINE | Facility: CLINIC | Age: 71
End: 2021-11-19
Payer: MEDICARE

## 2021-11-19 DIAGNOSIS — G89.29 CHRONIC RIGHT SHOULDER PAIN: ICD-10-CM

## 2021-11-19 DIAGNOSIS — M62.511 MUSCLE WASTING AND ATROPHY, NOT ELSEWHERE CLASSIFIED, RIGHT SHOULDER: ICD-10-CM

## 2021-11-19 DIAGNOSIS — M25.611 SHOULDER STIFFNESS, RIGHT: ICD-10-CM

## 2021-11-19 DIAGNOSIS — M25.512 CHRONIC LEFT SHOULDER PAIN: ICD-10-CM

## 2021-11-19 DIAGNOSIS — M62.512 MUSCLE WASTING AND ATROPHY, NOT ELSEWHERE CLASSIFIED, LEFT SHOULDER: ICD-10-CM

## 2021-11-19 DIAGNOSIS — G89.29 CHRONIC LEFT SHOULDER PAIN: ICD-10-CM

## 2021-11-19 DIAGNOSIS — M25.511 CHRONIC RIGHT SHOULDER PAIN: ICD-10-CM

## 2021-11-19 PROCEDURE — 97110 THERAPEUTIC EXERCISES: CPT | Mod: CQ

## 2021-11-19 PROCEDURE — 97140 MANUAL THERAPY 1/> REGIONS: CPT | Mod: CQ

## 2021-11-22 ENCOUNTER — PATIENT OUTREACH (OUTPATIENT)
Dept: ADMINISTRATIVE | Facility: OTHER | Age: 71
End: 2021-11-22
Payer: MEDICARE

## 2021-11-23 ENCOUNTER — CLINICAL SUPPORT (OUTPATIENT)
Dept: REHABILITATION | Facility: HOSPITAL | Age: 71
End: 2021-11-23
Payer: MEDICARE

## 2021-11-23 ENCOUNTER — SPECIALTY PHARMACY (OUTPATIENT)
Dept: PHARMACY | Facility: CLINIC | Age: 71
End: 2021-11-23
Payer: MEDICARE

## 2021-11-23 DIAGNOSIS — M62.512 MUSCLE WASTING AND ATROPHY, NOT ELSEWHERE CLASSIFIED, LEFT SHOULDER: ICD-10-CM

## 2021-11-23 DIAGNOSIS — M25.611 SHOULDER STIFFNESS, RIGHT: ICD-10-CM

## 2021-11-23 DIAGNOSIS — M25.512 CHRONIC LEFT SHOULDER PAIN: ICD-10-CM

## 2021-11-23 DIAGNOSIS — G89.29 CHRONIC LEFT SHOULDER PAIN: ICD-10-CM

## 2021-11-23 DIAGNOSIS — G89.29 CHRONIC RIGHT SHOULDER PAIN: ICD-10-CM

## 2021-11-23 DIAGNOSIS — M25.511 CHRONIC RIGHT SHOULDER PAIN: ICD-10-CM

## 2021-11-23 DIAGNOSIS — M62.511 MUSCLE WASTING AND ATROPHY, NOT ELSEWHERE CLASSIFIED, RIGHT SHOULDER: ICD-10-CM

## 2021-11-23 PROCEDURE — 97140 MANUAL THERAPY 1/> REGIONS: CPT | Mod: CQ

## 2021-11-23 PROCEDURE — 97110 THERAPEUTIC EXERCISES: CPT | Mod: CQ

## 2021-11-24 ENCOUNTER — OFFICE VISIT (OUTPATIENT)
Dept: CARDIOLOGY | Facility: CLINIC | Age: 71
End: 2021-11-24
Payer: MEDICARE

## 2021-11-24 VITALS
BODY MASS INDEX: 27.15 KG/M2 | SYSTOLIC BLOOD PRESSURE: 120 MMHG | DIASTOLIC BLOOD PRESSURE: 70 MMHG | HEIGHT: 65 IN | HEART RATE: 74 BPM | WEIGHT: 162.94 LBS | OXYGEN SATURATION: 97 %

## 2021-11-24 DIAGNOSIS — R94.31 ABNORMAL EKG: ICD-10-CM

## 2021-11-24 DIAGNOSIS — Z87.891 EX-SMOKER: ICD-10-CM

## 2021-11-24 DIAGNOSIS — G47.33 OSA (OBSTRUCTIVE SLEEP APNEA): ICD-10-CM

## 2021-11-24 DIAGNOSIS — E78.00 HYPERCHOLESTEROLEMIA: ICD-10-CM

## 2021-11-24 DIAGNOSIS — E11.9 TYPE 2 DIABETES MELLITUS WITHOUT COMPLICATION, WITHOUT LONG-TERM CURRENT USE OF INSULIN: ICD-10-CM

## 2021-11-24 DIAGNOSIS — R93.1 ELEVATED CORONARY ARTERY CALCIUM SCORE: Primary | ICD-10-CM

## 2021-11-24 DIAGNOSIS — I10 ESSENTIAL HYPERTENSION: ICD-10-CM

## 2021-11-24 PROCEDURE — 99214 OFFICE O/P EST MOD 30 MIN: CPT | Mod: S$GLB,,, | Performed by: INTERNAL MEDICINE

## 2021-11-24 PROCEDURE — 3061F NEG MICROALBUMINURIA REV: CPT | Mod: CPTII,S$GLB,, | Performed by: INTERNAL MEDICINE

## 2021-11-24 PROCEDURE — 3061F PR NEG MICROALBUMINURIA RESULT DOCUMENTED/REVIEW: ICD-10-PCS | Mod: CPTII,S$GLB,, | Performed by: INTERNAL MEDICINE

## 2021-11-24 PROCEDURE — 3066F PR DOCUMENTATION OF TREATMENT FOR NEPHROPATHY: ICD-10-PCS | Mod: CPTII,S$GLB,, | Performed by: INTERNAL MEDICINE

## 2021-11-24 PROCEDURE — 99214 PR OFFICE/OUTPT VISIT, EST, LEVL IV, 30-39 MIN: ICD-10-PCS | Mod: S$GLB,,, | Performed by: INTERNAL MEDICINE

## 2021-11-24 PROCEDURE — 3066F NEPHROPATHY DOC TX: CPT | Mod: CPTII,S$GLB,, | Performed by: INTERNAL MEDICINE

## 2021-11-24 PROCEDURE — 99999 PR PBB SHADOW E&M-EST. PATIENT-LVL IV: ICD-10-PCS | Mod: PBBFAC,,, | Performed by: INTERNAL MEDICINE

## 2021-11-24 PROCEDURE — 99999 PR PBB SHADOW E&M-EST. PATIENT-LVL IV: CPT | Mod: PBBFAC,,, | Performed by: INTERNAL MEDICINE

## 2021-11-24 NOTE — TELEPHONE ENCOUNTER
Rinvoq prior auth on file submitted 2022 renewal prior auth via [facility]. Key: [id number].    Medicare Part D copay $1,377.47 at 004. Forwarding to FA team for assistance.

## 2021-11-24 NOTE — TELEPHONE ENCOUNTER
Rinvoq prior auth approved through 12/2022. FA activity pended.     Told patient and briefly discussed applying for assistance. Patient has Shingles and is not expected to start Rinvoq for several weeks when the infection resolves.

## 2021-12-01 ENCOUNTER — CLINICAL SUPPORT (OUTPATIENT)
Dept: REHABILITATION | Facility: HOSPITAL | Age: 71
End: 2021-12-01
Payer: MEDICARE

## 2021-12-01 DIAGNOSIS — M25.511 CHRONIC RIGHT SHOULDER PAIN: ICD-10-CM

## 2021-12-01 DIAGNOSIS — G89.29 CHRONIC LEFT SHOULDER PAIN: ICD-10-CM

## 2021-12-01 DIAGNOSIS — M25.512 CHRONIC LEFT SHOULDER PAIN: ICD-10-CM

## 2021-12-01 DIAGNOSIS — M62.512 MUSCLE WASTING AND ATROPHY, NOT ELSEWHERE CLASSIFIED, LEFT SHOULDER: ICD-10-CM

## 2021-12-01 DIAGNOSIS — M62.511 MUSCLE WASTING AND ATROPHY, NOT ELSEWHERE CLASSIFIED, RIGHT SHOULDER: ICD-10-CM

## 2021-12-01 DIAGNOSIS — M25.611 SHOULDER STIFFNESS, RIGHT: ICD-10-CM

## 2021-12-01 DIAGNOSIS — G89.29 CHRONIC RIGHT SHOULDER PAIN: ICD-10-CM

## 2021-12-01 PROCEDURE — 97110 THERAPEUTIC EXERCISES: CPT

## 2021-12-03 ENCOUNTER — TELEPHONE (OUTPATIENT)
Dept: RHEUMATOLOGY | Facility: CLINIC | Age: 71
End: 2021-12-03
Payer: MEDICARE

## 2021-12-06 ENCOUNTER — OFFICE VISIT (OUTPATIENT)
Dept: RHEUMATOLOGY | Facility: CLINIC | Age: 71
End: 2021-12-06
Payer: MEDICARE

## 2021-12-06 ENCOUNTER — TELEPHONE (OUTPATIENT)
Dept: RHEUMATOLOGY | Facility: CLINIC | Age: 71
End: 2021-12-06
Payer: MEDICARE

## 2021-12-06 ENCOUNTER — TELEPHONE (OUTPATIENT)
Dept: RHEUMATOLOGY | Facility: CLINIC | Age: 71
End: 2021-12-06

## 2021-12-06 DIAGNOSIS — M15.9 PRIMARY OSTEOARTHRITIS INVOLVING MULTIPLE JOINTS: ICD-10-CM

## 2021-12-06 DIAGNOSIS — M05.9 SEROPOSITIVE RHEUMATOID ARTHRITIS: Primary | ICD-10-CM

## 2021-12-06 DIAGNOSIS — D84.9 IMMUNOSUPPRESSED STATUS: ICD-10-CM

## 2021-12-06 DIAGNOSIS — Z79.899 HIGH RISK MEDICATION USE: ICD-10-CM

## 2021-12-06 PROBLEM — M62.511 MUSCLE WASTING AND ATROPHY, NOT ELSEWHERE CLASSIFIED, RIGHT SHOULDER: Status: RESOLVED | Noted: 2021-09-20 | Resolved: 2021-12-06

## 2021-12-06 PROBLEM — G89.29 CHRONIC LEFT SHOULDER PAIN: Status: RESOLVED | Noted: 2021-09-20 | Resolved: 2021-12-06

## 2021-12-06 PROBLEM — M15.0 PRIMARY OSTEOARTHRITIS INVOLVING MULTIPLE JOINTS: Status: ACTIVE | Noted: 2021-12-06

## 2021-12-06 PROBLEM — M25.611 SHOULDER STIFFNESS, RIGHT: Status: RESOLVED | Noted: 2021-09-20 | Resolved: 2021-12-06

## 2021-12-06 PROBLEM — M25.511 RIGHT SHOULDER PAIN: Status: RESOLVED | Noted: 2021-09-20 | Resolved: 2021-12-06

## 2021-12-06 PROBLEM — M62.512 MUSCLE WASTING AND ATROPHY, NOT ELSEWHERE CLASSIFIED, LEFT SHOULDER: Status: RESOLVED | Noted: 2021-09-20 | Resolved: 2021-12-06

## 2021-12-06 PROBLEM — M25.512 CHRONIC LEFT SHOULDER PAIN: Status: RESOLVED | Noted: 2021-09-20 | Resolved: 2021-12-06

## 2021-12-06 PROCEDURE — 3066F NEPHROPATHY DOC TX: CPT | Mod: CPTII,95,, | Performed by: INTERNAL MEDICINE

## 2021-12-06 PROCEDURE — 99214 PR OFFICE/OUTPT VISIT, EST, LEVL IV, 30-39 MIN: ICD-10-PCS | Mod: 95,,, | Performed by: INTERNAL MEDICINE

## 2021-12-06 PROCEDURE — 3066F PR DOCUMENTATION OF TREATMENT FOR NEPHROPATHY: ICD-10-PCS | Mod: CPTII,95,, | Performed by: INTERNAL MEDICINE

## 2021-12-06 PROCEDURE — 3061F NEG MICROALBUMINURIA REV: CPT | Mod: CPTII,95,, | Performed by: INTERNAL MEDICINE

## 2021-12-06 PROCEDURE — 3061F PR NEG MICROALBUMINURIA RESULT DOCUMENTED/REVIEW: ICD-10-PCS | Mod: CPTII,95,, | Performed by: INTERNAL MEDICINE

## 2021-12-06 PROCEDURE — 99214 OFFICE O/P EST MOD 30 MIN: CPT | Mod: 95,,, | Performed by: INTERNAL MEDICINE

## 2021-12-07 ENCOUNTER — PATIENT MESSAGE (OUTPATIENT)
Dept: INTERNAL MEDICINE | Facility: CLINIC | Age: 71
End: 2021-12-07
Payer: MEDICARE

## 2021-12-08 ENCOUNTER — LAB VISIT (OUTPATIENT)
Dept: LAB | Facility: HOSPITAL | Age: 71
End: 2021-12-08
Attending: INTERNAL MEDICINE
Payer: MEDICARE

## 2021-12-08 DIAGNOSIS — M05.9 SEROPOSITIVE RHEUMATOID ARTHRITIS: ICD-10-CM

## 2021-12-08 DIAGNOSIS — Z79.899 HIGH RISK MEDICATION USE: ICD-10-CM

## 2021-12-08 DIAGNOSIS — D84.9 IMMUNOSUPPRESSED STATUS: ICD-10-CM

## 2021-12-08 LAB
ALBUMIN SERPL BCP-MCNC: 4.2 G/DL (ref 3.5–5.2)
ALP SERPL-CCNC: 67 U/L (ref 55–135)
ALT SERPL W/O P-5'-P-CCNC: 40 U/L (ref 10–44)
ANION GAP SERPL CALC-SCNC: 7 MMOL/L (ref 8–16)
AST SERPL-CCNC: 21 U/L (ref 10–40)
BASOPHILS # BLD AUTO: 0.03 K/UL (ref 0–0.2)
BASOPHILS NFR BLD: 0.3 % (ref 0–1.9)
BILIRUB SERPL-MCNC: 0.5 MG/DL (ref 0.1–1)
BUN SERPL-MCNC: 11 MG/DL (ref 8–23)
CALCIUM SERPL-MCNC: 10.4 MG/DL (ref 8.7–10.5)
CHLORIDE SERPL-SCNC: 110 MMOL/L (ref 95–110)
CO2 SERPL-SCNC: 28 MMOL/L (ref 23–29)
CREAT SERPL-MCNC: 1 MG/DL (ref 0.5–1.4)
CRP SERPL-MCNC: 1 MG/L (ref 0–8.2)
DIFFERENTIAL METHOD: ABNORMAL
EOSINOPHIL # BLD AUTO: 0.1 K/UL (ref 0–0.5)
EOSINOPHIL NFR BLD: 0.5 % (ref 0–8)
ERYTHROCYTE [DISTWIDTH] IN BLOOD BY AUTOMATED COUNT: 14.6 % (ref 11.5–14.5)
ERYTHROCYTE [SEDIMENTATION RATE] IN BLOOD BY WESTERGREN METHOD: 7 MM/HR (ref 0–36)
EST. GFR  (AFRICAN AMERICAN): >60 ML/MIN/1.73 M^2
EST. GFR  (NON AFRICAN AMERICAN): 57 ML/MIN/1.73 M^2
GLUCOSE SERPL-MCNC: 98 MG/DL (ref 70–110)
HCT VFR BLD AUTO: 40.5 % (ref 37–48.5)
HGB BLD-MCNC: 13.2 G/DL (ref 12–16)
IMM GRANULOCYTES # BLD AUTO: 0.02 K/UL (ref 0–0.04)
IMM GRANULOCYTES NFR BLD AUTO: 0.2 % (ref 0–0.5)
LYMPHOCYTES # BLD AUTO: 1.3 K/UL (ref 1–4.8)
LYMPHOCYTES NFR BLD: 13.7 % (ref 18–48)
MCH RBC QN AUTO: 29 PG (ref 27–31)
MCHC RBC AUTO-ENTMCNC: 32.6 G/DL (ref 32–36)
MCV RBC AUTO: 89 FL (ref 82–98)
MONOCYTES # BLD AUTO: 1 K/UL (ref 0.3–1)
MONOCYTES NFR BLD: 10.8 % (ref 4–15)
NEUTROPHILS # BLD AUTO: 6.9 K/UL (ref 1.8–7.7)
NEUTROPHILS NFR BLD: 74.5 % (ref 38–73)
NRBC BLD-RTO: 0 /100 WBC
PLATELET # BLD AUTO: 153 K/UL (ref 150–450)
PMV BLD AUTO: 12.4 FL (ref 9.2–12.9)
POTASSIUM SERPL-SCNC: 4.1 MMOL/L (ref 3.5–5.1)
PROT SERPL-MCNC: 6.8 G/DL (ref 6–8.4)
RBC # BLD AUTO: 4.55 M/UL (ref 4–5.4)
SODIUM SERPL-SCNC: 145 MMOL/L (ref 136–145)
WBC # BLD AUTO: 9.27 K/UL (ref 3.9–12.7)

## 2021-12-08 PROCEDURE — 36415 COLL VENOUS BLD VENIPUNCTURE: CPT | Performed by: INTERNAL MEDICINE

## 2021-12-08 PROCEDURE — 85025 COMPLETE CBC W/AUTO DIFF WBC: CPT | Performed by: INTERNAL MEDICINE

## 2021-12-08 PROCEDURE — 86140 C-REACTIVE PROTEIN: CPT | Performed by: INTERNAL MEDICINE

## 2021-12-08 PROCEDURE — 80053 COMPREHEN METABOLIC PANEL: CPT | Performed by: INTERNAL MEDICINE

## 2021-12-08 PROCEDURE — 85652 RBC SED RATE AUTOMATED: CPT | Performed by: INTERNAL MEDICINE

## 2021-12-09 ENCOUNTER — PATIENT MESSAGE (OUTPATIENT)
Dept: INTERNAL MEDICINE | Facility: CLINIC | Age: 71
End: 2021-12-09
Payer: MEDICARE

## 2021-12-11 ENCOUNTER — PATIENT MESSAGE (OUTPATIENT)
Dept: INTERNAL MEDICINE | Facility: CLINIC | Age: 71
End: 2021-12-11
Payer: MEDICARE

## 2021-12-13 DIAGNOSIS — A60.00 GENITAL HERPES SIMPLEX, UNSPECIFIED SITE: ICD-10-CM

## 2021-12-13 RX ORDER — ACYCLOVIR 400 MG/1
400 TABLET ORAL 2 TIMES DAILY
Qty: 180 TABLET | Refills: 4 | Status: SHIPPED | OUTPATIENT
Start: 2021-12-13 | End: 2022-02-14 | Stop reason: SDUPTHER

## 2021-12-23 ENCOUNTER — PATIENT MESSAGE (OUTPATIENT)
Dept: CARDIOLOGY | Facility: CLINIC | Age: 71
End: 2021-12-23
Payer: MEDICARE

## 2021-12-23 ENCOUNTER — PATIENT MESSAGE (OUTPATIENT)
Dept: RHEUMATOLOGY | Facility: CLINIC | Age: 71
End: 2021-12-23
Payer: MEDICARE

## 2021-12-23 DIAGNOSIS — M05.9 SEROPOSITIVE RHEUMATOID ARTHRITIS: Primary | ICD-10-CM

## 2021-12-23 RX ORDER — METHYLPREDNISOLONE 4 MG/1
TABLET ORAL
Qty: 21 TABLET | Refills: 0 | Status: SHIPPED | OUTPATIENT
Start: 2021-12-23 | End: 2022-02-11

## 2022-01-13 ENCOUNTER — PATIENT MESSAGE (OUTPATIENT)
Dept: RHEUMATOLOGY | Facility: CLINIC | Age: 72
End: 2022-01-13
Payer: MEDICARE

## 2022-01-18 ENCOUNTER — PATIENT MESSAGE (OUTPATIENT)
Dept: RHEUMATOLOGY | Facility: CLINIC | Age: 72
End: 2022-01-18
Payer: MEDICARE

## 2022-01-24 ENCOUNTER — PES CALL (OUTPATIENT)
Dept: ADMINISTRATIVE | Facility: CLINIC | Age: 72
End: 2022-01-24
Payer: MEDICARE

## 2022-01-25 ENCOUNTER — OFFICE VISIT (OUTPATIENT)
Dept: INTERNAL MEDICINE | Facility: CLINIC | Age: 72
End: 2022-01-25
Payer: MEDICARE

## 2022-01-25 VITALS
HEIGHT: 65 IN | OXYGEN SATURATION: 96 % | HEART RATE: 68 BPM | DIASTOLIC BLOOD PRESSURE: 60 MMHG | TEMPERATURE: 97 F | SYSTOLIC BLOOD PRESSURE: 130 MMHG | WEIGHT: 163.13 LBS | BODY MASS INDEX: 27.18 KG/M2

## 2022-01-25 DIAGNOSIS — I10 ESSENTIAL HYPERTENSION: ICD-10-CM

## 2022-01-25 DIAGNOSIS — M85.89 OSTEOPENIA OF MULTIPLE SITES: ICD-10-CM

## 2022-01-25 DIAGNOSIS — D84.9 IMMUNOSUPPRESSED STATUS: ICD-10-CM

## 2022-01-25 DIAGNOSIS — F41.8 MIXED ANXIETY AND DEPRESSIVE DISORDER: ICD-10-CM

## 2022-01-25 DIAGNOSIS — Z78.0 ASYMPTOMATIC POSTMENOPAUSAL STATUS: ICD-10-CM

## 2022-01-25 DIAGNOSIS — M05.9 SEROPOSITIVE RHEUMATOID ARTHRITIS: ICD-10-CM

## 2022-01-25 DIAGNOSIS — F90.9 ADULT ADHD: ICD-10-CM

## 2022-01-25 DIAGNOSIS — E11.9 TYPE 2 DIABETES MELLITUS WITHOUT COMPLICATION, WITHOUT LONG-TERM CURRENT USE OF INSULIN: Primary | ICD-10-CM

## 2022-01-25 PROCEDURE — 3008F BODY MASS INDEX DOCD: CPT | Mod: CPTII,S$GLB,, | Performed by: FAMILY MEDICINE

## 2022-01-25 PROCEDURE — 1159F PR MEDICATION LIST DOCUMENTED IN MEDICAL RECORD: ICD-10-PCS | Mod: CPTII,S$GLB,, | Performed by: FAMILY MEDICINE

## 2022-01-25 PROCEDURE — 3075F PR MOST RECENT SYSTOLIC BLOOD PRESS GE 130-139MM HG: ICD-10-PCS | Mod: CPTII,S$GLB,, | Performed by: FAMILY MEDICINE

## 2022-01-25 PROCEDURE — 99499 RISK ADDL DX/OHS AUDIT: ICD-10-PCS | Mod: S$GLB,,, | Performed by: FAMILY MEDICINE

## 2022-01-25 PROCEDURE — 3008F PR BODY MASS INDEX (BMI) DOCUMENTED: ICD-10-PCS | Mod: CPTII,S$GLB,, | Performed by: FAMILY MEDICINE

## 2022-01-25 PROCEDURE — 1125F AMNT PAIN NOTED PAIN PRSNT: CPT | Mod: CPTII,S$GLB,, | Performed by: FAMILY MEDICINE

## 2022-01-25 PROCEDURE — 3078F PR MOST RECENT DIASTOLIC BLOOD PRESSURE < 80 MM HG: ICD-10-PCS | Mod: CPTII,S$GLB,, | Performed by: FAMILY MEDICINE

## 2022-01-25 PROCEDURE — 99999 PR PBB SHADOW E&M-EST. PATIENT-LVL V: ICD-10-PCS | Mod: PBBFAC,,, | Performed by: FAMILY MEDICINE

## 2022-01-25 PROCEDURE — 1101F PR PT FALLS ASSESS DOC 0-1 FALLS W/OUT INJ PAST YR: ICD-10-PCS | Mod: CPTII,S$GLB,, | Performed by: FAMILY MEDICINE

## 2022-01-25 PROCEDURE — 99499 UNLISTED E&M SERVICE: CPT | Mod: S$GLB,,, | Performed by: FAMILY MEDICINE

## 2022-01-25 PROCEDURE — 99999 PR PBB SHADOW E&M-EST. PATIENT-LVL V: CPT | Mod: PBBFAC,,, | Performed by: FAMILY MEDICINE

## 2022-01-25 PROCEDURE — 3288F FALL RISK ASSESSMENT DOCD: CPT | Mod: CPTII,S$GLB,, | Performed by: FAMILY MEDICINE

## 2022-01-25 PROCEDURE — 99214 OFFICE O/P EST MOD 30 MIN: CPT | Mod: S$GLB,,, | Performed by: FAMILY MEDICINE

## 2022-01-25 PROCEDURE — 3075F SYST BP GE 130 - 139MM HG: CPT | Mod: CPTII,S$GLB,, | Performed by: FAMILY MEDICINE

## 2022-01-25 PROCEDURE — 3288F PR FALLS RISK ASSESSMENT DOCUMENTED: ICD-10-PCS | Mod: CPTII,S$GLB,, | Performed by: FAMILY MEDICINE

## 2022-01-25 PROCEDURE — 1159F MED LIST DOCD IN RCRD: CPT | Mod: CPTII,S$GLB,, | Performed by: FAMILY MEDICINE

## 2022-01-25 PROCEDURE — 3078F DIAST BP <80 MM HG: CPT | Mod: CPTII,S$GLB,, | Performed by: FAMILY MEDICINE

## 2022-01-25 PROCEDURE — 99214 PR OFFICE/OUTPT VISIT, EST, LEVL IV, 30-39 MIN: ICD-10-PCS | Mod: S$GLB,,, | Performed by: FAMILY MEDICINE

## 2022-01-25 PROCEDURE — 1101F PT FALLS ASSESS-DOCD LE1/YR: CPT | Mod: CPTII,S$GLB,, | Performed by: FAMILY MEDICINE

## 2022-01-25 PROCEDURE — 1125F PR PAIN SEVERITY QUANTIFIED, PAIN PRESENT: ICD-10-PCS | Mod: CPTII,S$GLB,, | Performed by: FAMILY MEDICINE

## 2022-01-25 RX ORDER — PREDNISONE 5 MG/1
TABLET ORAL
COMMUNITY
Start: 2022-01-17 | End: 2022-02-11 | Stop reason: SDUPTHER

## 2022-01-25 RX ORDER — HYDROCODONE BITARTRATE AND ACETAMINOPHEN 5; 325 MG/1; MG/1
TABLET ORAL
Status: ON HOLD | COMMUNITY
Start: 2021-11-13 | End: 2023-03-27

## 2022-01-25 NOTE — TELEPHONE ENCOUNTER
BI 2022: Complete Rinvoq    RX [employer] Medicare Part D    Estimated Copay: $1868.37, pt is in the initial benefits stage, this rx will put pt in the coverage gap.   [facility] is in network  PA approved until 12/31/22    Forwarding to FA for review

## 2022-01-25 NOTE — TELEPHONE ENCOUNTER
Received PAP application and income verification from  from Meme Seals at [facility] in [place]. Faxed MD gonzales, awaiting return. Called pt to let her know the status and that [facility] also needs copies of the front/back of her Medicare Part B and D cards (they are not in [facility]). She said she will email the cards on 01/26/22. Will f/u tomorrow if do not receive.

## 2022-01-25 NOTE — PROGRESS NOTES
Subjective:      Patient ID: Smitha Salinas is a 71 y.o. female.    Chief Complaint: Multiple issues see below    HPI   Type 2 diabetes: a1c controlled. Tolerating medicine. No hypoglycemia; shine ozemp and wt loss  Hypertension: blood pressures at home normal. Tolerating medicine.   RA now seeing Dr England  Cor kolby scan + utd dr cristina  ADD: she asks resuming meds.has  trieda few w varying lack of success. D/wd concerns cardiac over 65 /cor calc scan etc    Past Medical History:   Diagnosis Date    ADHD (attention deficit hyperactivity disorder)     [facility]    Chronic rheumatic arthritis     on DMARD    Ex-smoker     Genital herpes     Hyperlipidemia     Hypertension     Immunosuppressed status     Lichen sclerosus et atrophicus     Morbid obesity 1/11/2021    Obesity, Class I, BMI 30-34.9 12/22/2015    SUKHDEEP (obstructive sleep apnea)     Osteopenia     5/16 wai 5/18(start fmax if on pred 3months or more)    Type 2 diabetes mellitus     dxd 9/20      Past Surgical History:   Procedure Laterality Date    BREAST BIOPSY Left     benign    COLONOSCOPY N/A 2/12/2020    Procedure: COLONOSCOPY;  Surgeon: Eloina Castro MD;  Location: [facility];  Service: Endoscopy;  Laterality: N/A;    CRYOTHERAPY  1980    ESOPHAGOGASTRODUODENOSCOPY N/A 2/12/2020    Procedure: EGD (ESOPHAGOGASTRODUODENOSCOPY);  Surgeon: Eloina Castro MD;  Location: [facility];  Service: Endoscopy;  Laterality: N/A;    INSERTION OF SACRAL NEUROSTIMULATOR GENERATOR Left 11/23/2020    Procedure: INSERTION, PULSE GENERATOR, NEUROSTIMULATOR, SACRAL;  Surgeon: Pablo Rawls MD;  Location: [facility] OR;  Service: Urology;  Laterality: Left;    INSERTION OF SACRAL NEUROSTIMULATOR, ELECTRODES, AND IMPLANTABLE PULSE GENERATOR (IPG) Left 11/23/2020    Procedure: INSERTION, ELECTRODE LEADS AND PULSE GENERATOR, NEUROSTIMULATOR, SACRAL;  Surgeon: Pablo Rawls MD;  Location: [facility] OR;  Service: Urology;   Laterality: Left;    SKIN GRAFT      laceration to right  fingers  from thigh     TONSILLECTOMY        Social History     Socioeconomic History    Marital status:     Number of children: 1   Occupational History    Occupation:      Employer: [employer]    Tobacco Use    Smoking status: Former Smoker     Packs/day: 0.25     Years: 15.00     Pack years: 3.75     Quit date: 2013     Years since quittin.5    Smokeless tobacco: Never Used    Tobacco comment: smokes for a few weeks per year - when under pressure. has been abstinent times years at a time. a pack lasts about a week   Substance and Sexual Activity    Alcohol use: Yes     Alcohol/week: 0.0 standard drinks    Drug use: No    Sexual activity: Not Currently     Birth control/protection: Post-menopausal   Social History Narrative    Lives alone. Caffeine intake rare -1-2 per week of coffee. Does not have a Living Will or Advanced Directive      Family History   Problem Relation Age of Onset    Heart disease Mother     Diabetes Mother     Peripheral vascular disease Mother         amputations    Stroke Father     Kidney failure Father     Breast cancer Sister 39    Cancer Other 68        breast    Stroke Maternal Grandmother     Stroke Paternal Grandmother     Stroke Paternal Grandfather     Colon cancer Neg Hx     Ovarian cancer Neg Hx       Review of Systems        Objective:     Physical Exam  Vitals and nursing note reviewed.   Constitutional:       Appearance: She is well-developed and well-nourished.   HENT:      Head: Normocephalic and atraumatic.   Neck:      Vascular: No carotid bruit.   Cardiovascular:      Rate and Rhythm: Normal rate and regular rhythm.   Pulmonary:      Effort: Pulmonary effort is normal.      Breath sounds: Normal breath sounds.   Abdominal:      General: Bowel sounds are normal. There is no distension.      Palpations: Abdomen is soft. There is no mass.      Tenderness:  There is no abdominal tenderness. There is no guarding or rebound.   Musculoskeletal:      Cervical back: Normal range of motion and neck supple.   Lymphadenopathy:      Cervical: No cervical adenopathy.   Skin:     General: Skin is warm and dry.   Neurological:      Mental Status: She is alert and oriented to person, place, and time.   Psychiatric:         Mood and Affect: Mood and affect normal.         Behavior: Behavior normal.         Judgment: Judgment normal.       Assessment:         ICD-10-CM ICD-9-CM   1. Type 2 diabetes mellitus without complication, without long-term current use of insulin  E11.9 250.00   2. Seropositive rheumatoid arthritis  M05.9 714.0   3. Osteopenia of multiple sites  M85.89 733.90   4. Essential hypertension  I10 401.9   5. Immunosuppressed status  D84.9 279.9   6. Adult ADHD  F90.9 314.01   7. Asymptomatic postmenopausal status  Z78.0 V49.81   8. Mixed anxiety and depressive disorder  F41.8 300.4      Plan:      *f/u 6months  Lab a1c alr kevin via dr cristina  F/u card rheum as sched**  Type 2 diabetes mellitus without complication, without long-term current use of insulin    Seropositive rheumatoid arthritis    Osteopenia of multiple sites    Essential hypertension    Immunosuppressed status    Adult ADHD  -     Ambulatory referral/consult to Psychiatry; Future; Expected date: 02/01/2022    Asymptomatic postmenopausal status  -     DXA Bone Density Spine And Hip; Future; Expected date: 01/25/2022    Mixed anxiety and depressive disorder

## 2022-01-25 NOTE — TELEPHONE ENCOUNTER
Made an outbound call to pt discuss FA options. Pt stated she filled out the PAP application herself and brought to the pharmacy in [place] on 01/17/22. Patient stated Meme Seals at [phone] was working on the PAP. Called Meme and she stated she has not been able to work on it. She is faxing to [facility] to start working on the PAP.     Made an outbound call to pt to let her know that Ms Seals will be sending over the PAP documents to [facility] to complete.

## 2022-01-26 NOTE — TELEPHONE ENCOUNTER
Received email from pt with copies of her Medicare Part A and Medicare Part D cards. Patient portion complete. Awaiting provider response.

## 2022-02-02 NOTE — TELEPHONE ENCOUNTER
2/2-sent 2nd staff message to Dr England's office to check status of MD portion of PAP application.

## 2022-02-03 NOTE — TELEPHONE ENCOUNTER
Received a message from Dr England' staff to send the PAP application to fax number [phone].Re-sent MD gonzales.

## 2022-02-04 ENCOUNTER — TELEPHONE (OUTPATIENT)
Dept: RHEUMATOLOGY | Facility: CLINIC | Age: 72
End: 2022-02-04
Payer: MEDICARE

## 2022-02-04 NOTE — TELEPHONE ENCOUNTER
Patient assistance from for R[facility]q [facility]

## 2022-02-04 NOTE — TELEPHONE ENCOUNTER
Received signed PAP application from [facility]. Submitted PAP application to [facility] ([phone]). Notified pt as well.

## 2022-02-10 ENCOUNTER — TELEPHONE (OUTPATIENT)
Dept: RHEUMATOLOGY | Facility: CLINIC | Age: 72
End: 2022-02-10
Payer: MEDICARE

## 2022-02-10 NOTE — TELEPHONE ENCOUNTER
Called Sarbjit for a status check of PAP application. Rep stated the application is in progress still and not missing any information. Will f/u.

## 2022-02-11 ENCOUNTER — OFFICE VISIT (OUTPATIENT)
Dept: RHEUMATOLOGY | Facility: CLINIC | Age: 72
End: 2022-02-11
Payer: MEDICARE

## 2022-02-11 ENCOUNTER — LAB VISIT (OUTPATIENT)
Dept: LAB | Facility: HOSPITAL | Age: 72
End: 2022-02-11
Attending: INTERNAL MEDICINE
Payer: MEDICARE

## 2022-02-11 VITALS
HEART RATE: 51 BPM | HEIGHT: 65 IN | WEIGHT: 157.88 LBS | BODY MASS INDEX: 26.3 KG/M2 | SYSTOLIC BLOOD PRESSURE: 127 MMHG | DIASTOLIC BLOOD PRESSURE: 77 MMHG

## 2022-02-11 DIAGNOSIS — Z79.52 LONG TERM (CURRENT) USE OF SYSTEMIC STEROIDS: ICD-10-CM

## 2022-02-11 DIAGNOSIS — M05.9 SEROPOSITIVE RHEUMATOID ARTHRITIS: Primary | ICD-10-CM

## 2022-02-11 DIAGNOSIS — G89.4 CHRONIC PAIN SYNDROME: ICD-10-CM

## 2022-02-11 DIAGNOSIS — M06.9 RHEUMATOID ARTHRITIS: ICD-10-CM

## 2022-02-11 DIAGNOSIS — M15.9 PRIMARY OSTEOARTHRITIS INVOLVING MULTIPLE JOINTS: ICD-10-CM

## 2022-02-11 DIAGNOSIS — Z71.89 COUNSELING ON HEALTH PROMOTION AND DISEASE PREVENTION: ICD-10-CM

## 2022-02-11 LAB
ALBUMIN SERPL BCP-MCNC: 4.1 G/DL (ref 3.5–5.2)
ALP SERPL-CCNC: 69 U/L (ref 55–135)
ALT SERPL W/O P-5'-P-CCNC: 43 U/L (ref 10–44)
ANION GAP SERPL CALC-SCNC: 8 MMOL/L (ref 8–16)
AST SERPL-CCNC: 25 U/L (ref 10–40)
BASOPHILS # BLD AUTO: 0.01 K/UL (ref 0–0.2)
BASOPHILS NFR BLD: 0.1 % (ref 0–1.9)
BILIRUB SERPL-MCNC: 0.5 MG/DL (ref 0.1–1)
BUN SERPL-MCNC: 17 MG/DL (ref 8–23)
CALCIUM SERPL-MCNC: 9.3 MG/DL (ref 8.7–10.5)
CHLORIDE SERPL-SCNC: 109 MMOL/L (ref 95–110)
CO2 SERPL-SCNC: 25 MMOL/L (ref 23–29)
CREAT SERPL-MCNC: 0.7 MG/DL (ref 0.5–1.4)
CRP SERPL-MCNC: 0.6 MG/L (ref 0–8.2)
DIFFERENTIAL METHOD: ABNORMAL
EOSINOPHIL # BLD AUTO: 0.1 K/UL (ref 0–0.5)
EOSINOPHIL NFR BLD: 0.6 % (ref 0–8)
ERYTHROCYTE [DISTWIDTH] IN BLOOD BY AUTOMATED COUNT: 14.6 % (ref 11.5–14.5)
ERYTHROCYTE [SEDIMENTATION RATE] IN BLOOD BY WESTERGREN METHOD: 20 MM/HR (ref 0–36)
EST. GFR  (AFRICAN AMERICAN): >60 ML/MIN/1.73 M^2
EST. GFR  (NON AFRICAN AMERICAN): >60 ML/MIN/1.73 M^2
GLUCOSE SERPL-MCNC: 87 MG/DL (ref 70–110)
HCT VFR BLD AUTO: 36.5 % (ref 37–48.5)
HGB BLD-MCNC: 11.8 G/DL (ref 12–16)
IMM GRANULOCYTES # BLD AUTO: 0.01 K/UL (ref 0–0.04)
IMM GRANULOCYTES NFR BLD AUTO: 0.1 % (ref 0–0.5)
LYMPHOCYTES # BLD AUTO: 2.7 K/UL (ref 1–4.8)
LYMPHOCYTES NFR BLD: 32.9 % (ref 18–48)
MCH RBC QN AUTO: 28.7 PG (ref 27–31)
MCHC RBC AUTO-ENTMCNC: 32.3 G/DL (ref 32–36)
MCV RBC AUTO: 89 FL (ref 82–98)
MONOCYTES # BLD AUTO: 0.9 K/UL (ref 0.3–1)
MONOCYTES NFR BLD: 11 % (ref 4–15)
NEUTROPHILS # BLD AUTO: 4.5 K/UL (ref 1.8–7.7)
NEUTROPHILS NFR BLD: 55.3 % (ref 38–73)
NRBC BLD-RTO: 0 /100 WBC
PLATELET # BLD AUTO: 171 K/UL (ref 150–450)
PMV BLD AUTO: 11 FL (ref 9.2–12.9)
POTASSIUM SERPL-SCNC: 4 MMOL/L (ref 3.5–5.1)
PROT SERPL-MCNC: 6.9 G/DL (ref 6–8.4)
RBC # BLD AUTO: 4.11 M/UL (ref 4–5.4)
SODIUM SERPL-SCNC: 142 MMOL/L (ref 136–145)
WBC # BLD AUTO: 8.21 K/UL (ref 3.9–12.7)

## 2022-02-11 PROCEDURE — 1125F PR PAIN SEVERITY QUANTIFIED, PAIN PRESENT: ICD-10-PCS | Mod: CPTII,S$GLB,, | Performed by: INTERNAL MEDICINE

## 2022-02-11 PROCEDURE — 1100F PR PT FALLS ASSESS DOC 2+ FALLS/FALL W/INJURY/YR: ICD-10-PCS | Mod: CPTII,S$GLB,, | Performed by: INTERNAL MEDICINE

## 2022-02-11 PROCEDURE — 1159F MED LIST DOCD IN RCRD: CPT | Mod: CPTII,S$GLB,, | Performed by: INTERNAL MEDICINE

## 2022-02-11 PROCEDURE — 36415 COLL VENOUS BLD VENIPUNCTURE: CPT | Performed by: INTERNAL MEDICINE

## 2022-02-11 PROCEDURE — 3008F BODY MASS INDEX DOCD: CPT | Mod: CPTII,S$GLB,, | Performed by: INTERNAL MEDICINE

## 2022-02-11 PROCEDURE — 99999 PR PBB SHADOW E&M-EST. PATIENT-LVL IV: ICD-10-PCS | Mod: PBBFAC,,, | Performed by: INTERNAL MEDICINE

## 2022-02-11 PROCEDURE — 85652 RBC SED RATE AUTOMATED: CPT | Performed by: INTERNAL MEDICINE

## 2022-02-11 PROCEDURE — 99215 PR OFFICE/OUTPT VISIT, EST, LEVL V, 40-54 MIN: ICD-10-PCS | Mod: S$GLB,,, | Performed by: INTERNAL MEDICINE

## 2022-02-11 PROCEDURE — 99215 OFFICE O/P EST HI 40 MIN: CPT | Mod: S$GLB,,, | Performed by: INTERNAL MEDICINE

## 2022-02-11 PROCEDURE — 3288F PR FALLS RISK ASSESSMENT DOCUMENTED: ICD-10-PCS | Mod: CPTII,S$GLB,, | Performed by: INTERNAL MEDICINE

## 2022-02-11 PROCEDURE — 3074F PR MOST RECENT SYSTOLIC BLOOD PRESSURE < 130 MM HG: ICD-10-PCS | Mod: CPTII,S$GLB,, | Performed by: INTERNAL MEDICINE

## 2022-02-11 PROCEDURE — 3078F DIAST BP <80 MM HG: CPT | Mod: CPTII,S$GLB,, | Performed by: INTERNAL MEDICINE

## 2022-02-11 PROCEDURE — 80053 COMPREHEN METABOLIC PANEL: CPT | Performed by: INTERNAL MEDICINE

## 2022-02-11 PROCEDURE — 86140 C-REACTIVE PROTEIN: CPT | Performed by: INTERNAL MEDICINE

## 2022-02-11 PROCEDURE — 1100F PTFALLS ASSESS-DOCD GE2>/YR: CPT | Mod: CPTII,S$GLB,, | Performed by: INTERNAL MEDICINE

## 2022-02-11 PROCEDURE — 3288F FALL RISK ASSESSMENT DOCD: CPT | Mod: CPTII,S$GLB,, | Performed by: INTERNAL MEDICINE

## 2022-02-11 PROCEDURE — 85025 COMPLETE CBC W/AUTO DIFF WBC: CPT | Performed by: INTERNAL MEDICINE

## 2022-02-11 PROCEDURE — 1159F PR MEDICATION LIST DOCUMENTED IN MEDICAL RECORD: ICD-10-PCS | Mod: CPTII,S$GLB,, | Performed by: INTERNAL MEDICINE

## 2022-02-11 PROCEDURE — 3074F SYST BP LT 130 MM HG: CPT | Mod: CPTII,S$GLB,, | Performed by: INTERNAL MEDICINE

## 2022-02-11 PROCEDURE — 3008F PR BODY MASS INDEX (BMI) DOCUMENTED: ICD-10-PCS | Mod: CPTII,S$GLB,, | Performed by: INTERNAL MEDICINE

## 2022-02-11 PROCEDURE — 99999 PR PBB SHADOW E&M-EST. PATIENT-LVL IV: CPT | Mod: PBBFAC,,, | Performed by: INTERNAL MEDICINE

## 2022-02-11 PROCEDURE — 1125F AMNT PAIN NOTED PAIN PRSNT: CPT | Mod: CPTII,S$GLB,, | Performed by: INTERNAL MEDICINE

## 2022-02-11 PROCEDURE — 3078F PR MOST RECENT DIASTOLIC BLOOD PRESSURE < 80 MM HG: ICD-10-PCS | Mod: CPTII,S$GLB,, | Performed by: INTERNAL MEDICINE

## 2022-02-11 RX ORDER — ALENDRONATE SODIUM 70 MG/1
35 TABLET ORAL
Qty: 4 TABLET | Refills: 3 | Status: SHIPPED | OUTPATIENT
Start: 2022-02-11 | End: 2022-05-13

## 2022-02-11 RX ORDER — VIT C/E/ZN/COPPR/LUTEIN/ZEAXAN 250MG-90MG
1000 CAPSULE ORAL DAILY
COMMUNITY
End: 2022-02-11

## 2022-02-11 RX ORDER — PREDNISONE 5 MG/1
TABLET ORAL
Qty: 93 TABLET | Refills: 0 | Status: SHIPPED | OUTPATIENT
Start: 2022-02-11 | End: 2022-03-30 | Stop reason: SDUPTHER

## 2022-02-11 RX ORDER — UPADACITINIB 30 MG/1
TABLET, EXTENDED RELEASE ORAL
COMMUNITY
End: 2022-02-11

## 2022-02-11 RX ORDER — MULTIVITAMIN
1 TABLET ORAL 2 TIMES DAILY
Qty: 60 TABLET | Refills: 3 | Status: SHIPPED | OUTPATIENT
Start: 2022-02-11 | End: 2024-02-08

## 2022-02-11 RX ORDER — UPADACITINIB 15 MG/1
15 TABLET, EXTENDED RELEASE ORAL DAILY
Qty: 30 TABLET | Refills: 3 | Status: SHIPPED | OUTPATIENT
Start: 2022-02-11 | End: 2022-03-13

## 2022-02-11 NOTE — PROGRESS NOTES
RHEUMATOLOGY OUTPATIENT CLINIC NOTE    2/11/2022    Attending Rheumatologist: Ab England  Primary Care Provider/Physician Requesting Consultation: Jayesh Jaramillo MD   Chief Complaint/Reason For Consultation:  Rheumatoid Arthritis      Subjective:     Smitha Salinas is a 71 y.o. Black or  female with positive rheumatoid arthritis for follow-up visit.    Main complaint of generalized arthralgias, worst on hands.  Inflammatory features of joint pain present.  Has been taking Rinvoq for the month.  Reports only getting relief of pain with systemic steroid use.  Currently without fever or active rashes.  Denies recurrent ulcerations, acute respiratory symptoms, /GI complaints.    Review of Systems   Constitutional: Positive for malaise/fatigue. Negative for fever.   Eyes: Negative for pain.   Genitourinary: Negative for dysuria and hematuria.   Musculoskeletal: Positive for joint pain and myalgias.   Skin: Negative for rash.   Neurological: Negative for focal weakness.       Chronic comorbid conditions affecting medical decision making today:  Past Medical History:   Diagnosis Date    ADHD (attention deficit hyperactivity disorder)     [facility]    Chronic rheumatic arthritis     on DMARD    Ex-smoker     Genital herpes     Hyperlipidemia     Hypertension     Immunosuppressed status     Lichen sclerosus et atrophicus     Morbid obesity 1/11/2021    Obesity, Class I, BMI 30-34.9 12/22/2015    SUKHDEEP (obstructive sleep apnea)     Osteopenia     5/16 wai 5/18(start fmax if on pred 3months or more)    Type 2 diabetes mellitus     dxd 9/20     Past Surgical History:   Procedure Laterality Date    BREAST BIOPSY Left     benign    COLONOSCOPY N/A 2/12/2020    Procedure: COLONOSCOPY;  Surgeon: Eloina Castro MD;  Location: [facility];  Service: Endoscopy;  Laterality: N/A;    CRYOTHERAPY  1980    ESOPHAGOGASTRODUODENOSCOPY N/A 2/12/2020    Procedure: EGD  (ESOPHAGOGASTRODUODENOSCOPY);  Surgeon: Eloina Castro MD;  Location: [facility] ENDO;  Service: Endoscopy;  Laterality: N/A;    INSERTION OF SACRAL NEUROSTIMULATOR GENERATOR Left 2020    Procedure: INSERTION, PULSE GENERATOR, NEUROSTIMULATOR, SACRAL;  Surgeon: Pablo Rawls MD;  Location: [facility] OR;  Service: Urology;  Laterality: Left;    INSERTION OF SACRAL NEUROSTIMULATOR, ELECTRODES, AND IMPLANTABLE PULSE GENERATOR (IPG) Left 2020    Procedure: INSERTION, ELECTRODE LEADS AND PULSE GENERATOR, NEUROSTIMULATOR, SACRAL;  Surgeon: Pablo Rawls MD;  Location: [facility] OR;  Service: Urology;  Laterality: Left;    SKIN GRAFT      laceration to right  fingers  from thigh     TONSILLECTOMY       Family History   Problem Relation Age of Onset    Heart disease Mother     Diabetes Mother     Peripheral vascular disease Mother         amputations    Stroke Father     Kidney failure Father     Breast cancer Sister 39    Cancer Other 68        breast    Stroke Maternal Grandmother     Stroke Paternal Grandmother     Stroke Paternal Grandfather     Colon cancer Neg Hx     Ovarian cancer Neg Hx      Social History     Substance and Sexual Activity   Alcohol Use Yes    Alcohol/week: 0.0 standard drinks     Social History     Tobacco Use   Smoking Status Former Smoker    Packs/day: 0.25    Years: 15.00    Pack years: 3.75    Quit date: 2013    Years since quittin.5   Smokeless Tobacco Never Used   Tobacco Comment    smokes for a few weeks per year - when under pressure. has been abstinent times years at a time. a pack lasts about a week     Social History     Substance and Sexual Activity   Drug Use No       Current Outpatient Medications:     acyclovir (ZOVIRAX) 400 MG tablet, Take 1 tablet (400 mg total) by mouth 2 (two) times daily., Disp: 180 tablet, Rfl: 4    amLODIPine (NORVASC) 5 MG tablet, Take 1 tablet (5 mg total) by mouth once daily., Disp: 90 tablet, Rfl:  4    aspirin (ECOTRIN) 81 MG EC tablet, Take 81 mg by mouth once daily., Disp: , Rfl:     cloNIDine (CATAPRES) 0.2 MG tablet, TAKE 1/2 TABLET IN THE MORNING, 1/2 TABLET AT NOON, THEN 2 TABLETS AT BEDTIME, Disp: 270 tablet, Rfl: 3    cyclobenzaprine (FLEXERIL) 10 MG tablet, TAKE 1/2 TO 1 (ONE-HALF TO ONE) TABLET BY MOUTH THREE TIMES DAILY AS NEEDED FOR MUSCLE SPASM, Disp: 60 tablet, Rfl: 11    diazePAM (VALIUM) 5 MG tablet, TAKE 1 TABLET BY MOUTH AT BEDTIME FOR ANXIETY, Disp: 30 tablet, Rfl: 5    diclofenac sodium (VOLTAREN) 1 % Gel, Apply 2 g topically 3 (three) times daily., Disp: 1 Tube, Rfl: 0    HYDROcodone-acetaminophen (NORCO) 5-325 mg per tablet, , Disp: , Rfl:     MYRBETRIQ 50 mg Tb24, Take 1 tablet by mouth once daily, Disp: 30 tablet, Rfl: 11    NUCYNTA ER 50 mg Tb12, Take 1 tablet by mouth every 12 (twelve) hours. prn, Disp: , Rfl:     ondansetron (ZOFRAN-ODT) 4 MG TbDL, Take 1 tablet (4 mg total) by mouth every 8 (eight) hours as needed (nausea)., Disp: 10 tablet, Rfl: 1    oxyCODONE-acetaminophen (PERCOCET) 5-325 mg per tablet, Take 1 tablet by mouth every 4 (four) hours as needed for Pain., Disp: , Rfl:     potassium chloride SA (K-DUR,KLOR-CON) 20 MEQ tablet, 1 tablet daily, Disp: 90 tablet, Rfl: 5    predniSONE (DELTASONE) 5 MG tablet, Take 4 tablets (20 mg total) by mouth once daily for 3 days, THEN 3 tablets (15 mg total) once daily for 3 days, THEN 2 tablets (10 mg total) once daily for 3 days, THEN 1.5 tablets (7.5 mg total) once daily for 4 days, THEN 1 tablet (5 mg total) once daily., Disp: 93 tablet, Rfl: 0    rosuvastatin (CRESTOR) 20 MG tablet, Take 1 tablet (20 mg total) by mouth once daily., Disp: 30 tablet, Rfl: 11    semaglutide (OZEMPIC) 0.25 mg or 0.5 mg(2 mg/1.5 mL) pen injector, Inject 0.5 mg into the skin every 7 days., Disp: 1 pen, Rfl: 11    traMADoL (ULTRAM) 50 mg tablet, Take 1 tablet (50 mg total) by mouth every 6 (six) hours as needed., Disp: 60 tablet, Rfl:  5    (Magic mouthwash) 1:1:1 Benadryl 12.5mg/5ml liq, aluminum & magnesium hydroxide-simehticone (Maalox), LIDOcaine viscous 2%, Swish and spit 10 mLs every 4 (four) hours as needed (prn). for mouth sores, Disp: 250 mL, Rfl: 0    alendronate (FOSAMAX) 70 MG tablet, Take 0.5 tablets (35 mg total) by mouth every 7 days., Disp: 4 tablet, Rfl: 3    calcium-vitamin D (CALCIUM 600 + D,3,) 600 mg-10 mcg (400 unit) Tab, Take 1 tablet by mouth 2 (two) times daily., Disp: 60 tablet, Rfl: 3    lancing device Misc, 1 each by Misc.(Non-Drug; Combo Route) route 3 (three) times daily as needed., Disp: 1 each, Rfl: 0    pantoprazole (PROTONIX) 40 MG tablet, Take 1 tablet (40 mg total) by mouth once daily., Disp: 30 tablet, Rfl: 2    PROLENSA 0.07 % Drop, , Disp: , Rfl:     sulfaSALAzine (AZULFIDINE) 500 mg Tab, , Disp: , Rfl:     upadacitinib (RINVOQ) 15 mg 24 hr tablet, Take 1 tablet (15 mg total) by mouth once daily., Disp: 30 tablet, Rfl: 3    Current Facility-Administered Medications:     ondansetron disintegrating tablet 4 mg, 4 mg, Oral, Once PRN, Perez De La Fuente MD     Objective:     Vitals:    02/11/22 0952   BP: 127/77   Pulse: (!) 51     Physical Exam   Constitutional: She does not appear ill.   Eyes: Conjunctivae are normal.   Pulmonary/Chest: No respiratory distress.   Musculoskeletal:         General: Swelling (Second and 3rd MCP right hand.  Mild warmth and erythema present.) and tenderness (Squeeze tenderness on PIP and MCP joints) present.   Skin: No rash noted.       Reviewed available old and all outside pertinent medical records available.    All lab results personally reviewed and interpreted by me.  Lab Results   Component Value Date    WBC 8.21 02/11/2022    HGB 11.8 (L) 02/11/2022    HCT 36.5 (L) 02/11/2022    MCV 89 02/11/2022    RDW 14.6 (H) 02/11/2022     02/11/2022    PLTEST Adequate 05/14/2013       Lab Results   Component Value Date     02/11/2022    K 4.0 02/11/2022      02/11/2022    CO2 25 02/11/2022    GLU 87 02/11/2022    BUN 17 02/11/2022    CALCIUM 9.3 02/11/2022    PROT 6.9 02/11/2022    ALBUMIN 4.1 02/11/2022    BILITOT 0.5 02/11/2022    AST 25 02/11/2022    ALKPHOS 69 02/11/2022    ALT 43 02/11/2022       Lab Results   Component Value Date    COLORU Yellow 01/11/2021    APPEARANCEUA Cloudy (A) 12/26/2013    SPECGRAV 1.025 01/11/2021    PHUR 5 01/11/2021    PROTEINUA Negative 12/26/2013    KETONESU neg 01/11/2021    LEUKOCYTESUR Negative 12/26/2013    NITRITE neg 01/11/2021    UROBILINOGEN neg 01/11/2021       No results found for: PTH    Lab Results   Component Value Date    URICACID 4.3 01/29/2020       Lab Results   Component Value Date    CRP 0.6 02/11/2022       No results found for: ANATITER    No components found for: TSPOTTB,  QUANTIFERON     ASSESSMENT:     History of seropositive rheumatoid arthritis with current arthralgias with mixed pattern of joint pain.  Challenging to determine source of patient's joint pain.  Background osteoarthritis and also exhibits symptoms characteristics of central pain syndrome like fibromyalgia that are likely contributing to her symptoms.  Since off systemic steroids, comes today with moderate swelling on PIP joints of right hand consistent with active synovitis.  Previously evaluated by other providers and documented for absent synovitis at time of evaluation.  Imaging on file (XR hand 2019) with documentation of stable probable well corticated erosion within the trapezium, swan-neck deformity of the 5th digit, as well as characteristic osteoarthritis findings.  Rheumatoid x-ray changes not reported on repeat imaging repeated this past December.  Has been taking Rinvoq for the past month without zoster flares.  Other than systemic steroids, reports no improvement to her symptoms.  If no response to current biologic after total of 3 months of therapy, will recommend MRI to determine presence of active synovitis or presence of  erosive changes for which would recommend therapy with biologic infusion to achieve better inflammatory arthritis control.  Clinical side effects therapy discussed in detail.  Recommend calcium and vitamin-D supplementation along with prophylactic Fosamax while on systemic steroids.  Repeat DEXA prior next visit along with repeat labs.    PLAN:     1. Seropositive rheumatoid arthritis    - upadacitinib (RINVOQ) 15 mg 24 hr tablet; Take 1 tablet (15 mg total) by mouth once daily.  Dispense: 30 tablet; Refill: 3  - predniSONE (DELTASONE) 5 MG tablet; Take 4 tablets (20 mg total) by mouth once daily for 3 days, THEN 3 tablets (15 mg total) once daily for 3 days, THEN 2 tablets (10 mg total) once daily for 3 days, THEN 1.5 tablets (7.5 mg total) once daily for 4 days, THEN 1 tablet (5 mg total) once daily.  Dispense: 93 tablet; Refill: 0    2. Long term (current) use of systemic steroids    - alendronate (FOSAMAX) 70 MG tablet; Take 0.5 tablets (35 mg total) by mouth every 7 days.  Dispense: 4 tablet; Refill: 3  - calcium-vitamin D (CALCIUM 600 + D,3,) 600 mg-10 mcg (400 unit) Tab; Take 1 tablet by mouth 2 (two) times daily.  Dispense: 60 tablet; Refill: 3    3. High risk medication use    4. Chronic pain syndrome    5. Osteoarthritis    Follow up in about 3 months (around 5/11/2022).      Disclaimer: This note is prepared using voice recognition software and as such is likely to have errors and has not been proof read. Please contact me for questions.     45 minutes of total time spent on the encounter, which includes face to face time and non-face to face time preparing to see the patient (eg, review of tests), Obtaining and/or reviewing separately obtained history, Documenting clinical information in the electronic or other health record, Independently interpreting results (not separately reported) and communicating results to the patient/family/caregiver, or Care coordination (not separately reported).     Ab  CHEL England.

## 2022-02-14 ENCOUNTER — PATIENT MESSAGE (OUTPATIENT)
Dept: INTERNAL MEDICINE | Facility: CLINIC | Age: 72
End: 2022-02-14
Payer: MEDICARE

## 2022-02-14 DIAGNOSIS — A60.00 GENITAL HERPES SIMPLEX, UNSPECIFIED SITE: ICD-10-CM

## 2022-02-14 RX ORDER — ACYCLOVIR 400 MG/1
400 TABLET ORAL 2 TIMES DAILY
Qty: 180 TABLET | Refills: 4 | Status: SHIPPED | OUTPATIENT
Start: 2022-02-14 | End: 2023-02-27

## 2022-02-14 NOTE — TELEPHONE ENCOUNTER
No new care gaps identified.  Powered by [employer]. Reference number: [id number].   2/14/2022 1:53:43 PM CST

## 2022-02-15 NOTE — TELEPHONE ENCOUNTER
Called [employer] for a status check of PAP application. Rep stated the application is in progress still and not missing any information. They are running behind, just finished applications from 1/31. They should get to her application later this week. Informed pt that her application is still pending.

## 2022-02-23 DIAGNOSIS — D84.9 IMMUNOSUPPRESSED STATUS: ICD-10-CM

## 2022-03-15 ENCOUNTER — PATIENT MESSAGE (OUTPATIENT)
Dept: INTERNAL MEDICINE | Facility: CLINIC | Age: 72
End: 2022-03-15
Payer: MEDICARE

## 2022-03-15 ENCOUNTER — PATIENT MESSAGE (OUTPATIENT)
Dept: RHEUMATOLOGY | Facility: CLINIC | Age: 72
End: 2022-03-15
Payer: MEDICARE

## 2022-03-17 DIAGNOSIS — G89.4 CHRONIC PAIN SYNDROME: Primary | ICD-10-CM

## 2022-03-27 ENCOUNTER — PATIENT MESSAGE (OUTPATIENT)
Dept: RHEUMATOLOGY | Facility: CLINIC | Age: 72
End: 2022-03-27
Payer: MEDICARE

## 2022-03-27 DIAGNOSIS — M05.9 SEROPOSITIVE RHEUMATOID ARTHRITIS: ICD-10-CM

## 2022-03-28 ENCOUNTER — PATIENT MESSAGE (OUTPATIENT)
Dept: RHEUMATOLOGY | Facility: CLINIC | Age: 72
End: 2022-03-28
Payer: MEDICARE

## 2022-03-28 DIAGNOSIS — M05.9 SEROPOSITIVE RHEUMATOID ARTHRITIS: ICD-10-CM

## 2022-03-31 RX ORDER — PREDNISONE 5 MG/1
TABLET ORAL
Qty: 60 TABLET | Refills: 2 | Status: SHIPPED | OUTPATIENT
Start: 2022-03-31 | End: 2022-09-13 | Stop reason: SDUPTHER

## 2022-04-01 ENCOUNTER — PATIENT MESSAGE (OUTPATIENT)
Dept: RHEUMATOLOGY | Facility: CLINIC | Age: 72
End: 2022-04-01
Payer: MEDICARE

## 2022-04-01 ENCOUNTER — PATIENT OUTREACH (OUTPATIENT)
Dept: ADMINISTRATIVE | Facility: HOSPITAL | Age: 72
End: 2022-04-01
Payer: MEDICARE

## 2022-04-01 NOTE — PROGRESS NOTES
Working [facility] eye exam report; Per  - Eye exam done 7/2021    MARIANO faxed to Dr. Jose De Jesus Mott 1x to request dm eye exam. Remind me set 1 week.

## 2022-04-01 NOTE — LETTER
AUTHORIZATION FOR RELEASE OF   CONFIDENTIAL INFORMATION        We are seeing Smitha Salinas, date of birth 1950, in the clinic at [facility] INTERNAL MEDICINE. Jayesh Jaramillo MD is the patient's PCP. Smitha Salinas has an outstanding lab/procedure at the time we reviewed her chart. In order to help keep her health information updated, she has authorized us to request the following medical record(s):        (  )  MAMMOGRAM                                      (  )  COLONOSCOPY      (  )  PAP SMEAR                                          (  )  OUTSIDE LAB RESULTS     (  )  DEXA SCAN                                          ( x ) DM EYE EXAM            (  )  FOOT EXAM                                          (  )  ENTIRE RECORD     (  )  OUTSIDE IMMUNIZATIONS                 (  )  _______________         Please fax records to [facility], Chad C. K. Braden, MD, [phone]     If you have any questions, please contact    Abby RIOJAS [facility] at [phone]        Patient Name: Smitha Salinas  : 1950  Patient Phone #: [phone]

## 2022-04-05 DIAGNOSIS — M15.9 PRIMARY OSTEOARTHRITIS INVOLVING MULTIPLE JOINTS: Primary | ICD-10-CM

## 2022-04-13 NOTE — PROGRESS NOTES
2nd attempt  MARIANO faxed to Dr. Jose De Jesus Mott 1x to request dm eye exam. Remind me set 1 week.

## 2022-04-18 ENCOUNTER — PATIENT MESSAGE (OUTPATIENT)
Dept: INTERNAL MEDICINE | Facility: CLINIC | Age: 72
End: 2022-04-18
Payer: MEDICARE

## 2022-05-09 ENCOUNTER — PATIENT MESSAGE (OUTPATIENT)
Dept: INTERNAL MEDICINE | Facility: CLINIC | Age: 72
End: 2022-05-09
Payer: MEDICARE

## 2022-05-13 ENCOUNTER — OFFICE VISIT (OUTPATIENT)
Dept: RHEUMATOLOGY | Facility: CLINIC | Age: 72
End: 2022-05-13
Payer: MEDICARE

## 2022-05-13 ENCOUNTER — LAB VISIT (OUTPATIENT)
Dept: LAB | Facility: HOSPITAL | Age: 72
End: 2022-05-13
Attending: INTERNAL MEDICINE
Payer: MEDICARE

## 2022-05-13 VITALS
WEIGHT: 151.69 LBS | DIASTOLIC BLOOD PRESSURE: 79 MMHG | HEIGHT: 65 IN | BODY MASS INDEX: 25.27 KG/M2 | HEART RATE: 53 BPM | SYSTOLIC BLOOD PRESSURE: 119 MMHG

## 2022-05-13 DIAGNOSIS — M15.9 PRIMARY OSTEOARTHRITIS INVOLVING MULTIPLE JOINTS: ICD-10-CM

## 2022-05-13 DIAGNOSIS — M05.9 SEROPOSITIVE RHEUMATOID ARTHRITIS: Primary | ICD-10-CM

## 2022-05-13 DIAGNOSIS — G89.4 CHRONIC PAIN SYNDROME: ICD-10-CM

## 2022-05-13 DIAGNOSIS — Z71.89 COUNSELING ON HEALTH PROMOTION AND DISEASE PREVENTION: ICD-10-CM

## 2022-05-13 DIAGNOSIS — M06.9 RHEUMATOID ARTHRITIS: ICD-10-CM

## 2022-05-13 DIAGNOSIS — Z79.899 HIGH RISK MEDICATION USE: ICD-10-CM

## 2022-05-13 LAB
ALBUMIN SERPL BCP-MCNC: 4.4 G/DL (ref 3.5–5.2)
ALP SERPL-CCNC: 56 U/L (ref 55–135)
ALT SERPL W/O P-5'-P-CCNC: 48 U/L (ref 10–44)
ANION GAP SERPL CALC-SCNC: 9 MMOL/L (ref 8–16)
AST SERPL-CCNC: 23 U/L (ref 10–40)
BASOPHILS # BLD AUTO: 0.02 K/UL (ref 0–0.2)
BASOPHILS NFR BLD: 0.3 % (ref 0–1.9)
BILIRUB SERPL-MCNC: 0.4 MG/DL (ref 0.1–1)
BUN SERPL-MCNC: 16 MG/DL (ref 8–23)
CALCIUM SERPL-MCNC: 9.4 MG/DL (ref 8.7–10.5)
CHLORIDE SERPL-SCNC: 109 MMOL/L (ref 95–110)
CO2 SERPL-SCNC: 24 MMOL/L (ref 23–29)
CREAT SERPL-MCNC: 0.8 MG/DL (ref 0.5–1.4)
CRP SERPL-MCNC: <0.1 MG/L (ref 0–8.2)
DIFFERENTIAL METHOD: ABNORMAL
EOSINOPHIL # BLD AUTO: 0 K/UL (ref 0–0.5)
EOSINOPHIL NFR BLD: 0.5 % (ref 0–8)
ERYTHROCYTE [DISTWIDTH] IN BLOOD BY AUTOMATED COUNT: 15.5 % (ref 11.5–14.5)
ERYTHROCYTE [SEDIMENTATION RATE] IN BLOOD BY WESTERGREN METHOD: 13 MM/HR (ref 0–36)
EST. GFR  (AFRICAN AMERICAN): >60 ML/MIN/1.73 M^2
EST. GFR  (NON AFRICAN AMERICAN): >60 ML/MIN/1.73 M^2
GLUCOSE SERPL-MCNC: 97 MG/DL (ref 70–110)
HCT VFR BLD AUTO: 38.1 % (ref 37–48.5)
HGB BLD-MCNC: 12.4 G/DL (ref 12–16)
IMM GRANULOCYTES # BLD AUTO: 0.01 K/UL (ref 0–0.04)
IMM GRANULOCYTES NFR BLD AUTO: 0.1 % (ref 0–0.5)
LYMPHOCYTES # BLD AUTO: 2 K/UL (ref 1–4.8)
LYMPHOCYTES NFR BLD: 26 % (ref 18–48)
MCH RBC QN AUTO: 28.8 PG (ref 27–31)
MCHC RBC AUTO-ENTMCNC: 32.5 G/DL (ref 32–36)
MCV RBC AUTO: 88 FL (ref 82–98)
MONOCYTES # BLD AUTO: 0.8 K/UL (ref 0.3–1)
MONOCYTES NFR BLD: 10.5 % (ref 4–15)
NEUTROPHILS # BLD AUTO: 4.9 K/UL (ref 1.8–7.7)
NEUTROPHILS NFR BLD: 62.6 % (ref 38–73)
NRBC BLD-RTO: 0 /100 WBC
PLATELET # BLD AUTO: 174 K/UL (ref 150–450)
PMV BLD AUTO: 11 FL (ref 9.2–12.9)
POTASSIUM SERPL-SCNC: 4.3 MMOL/L (ref 3.5–5.1)
PROT SERPL-MCNC: 6.6 G/DL (ref 6–8.4)
RBC # BLD AUTO: 4.31 M/UL (ref 4–5.4)
SODIUM SERPL-SCNC: 142 MMOL/L (ref 136–145)
WBC # BLD AUTO: 7.8 K/UL (ref 3.9–12.7)

## 2022-05-13 PROCEDURE — 1101F PT FALLS ASSESS-DOCD LE1/YR: CPT | Mod: CPTII,S$GLB,, | Performed by: INTERNAL MEDICINE

## 2022-05-13 PROCEDURE — 99214 OFFICE O/P EST MOD 30 MIN: CPT | Mod: S$GLB,,, | Performed by: INTERNAL MEDICINE

## 2022-05-13 PROCEDURE — 1159F PR MEDICATION LIST DOCUMENTED IN MEDICAL RECORD: ICD-10-PCS | Mod: CPTII,S$GLB,, | Performed by: INTERNAL MEDICINE

## 2022-05-13 PROCEDURE — 3288F FALL RISK ASSESSMENT DOCD: CPT | Mod: CPTII,S$GLB,, | Performed by: INTERNAL MEDICINE

## 2022-05-13 PROCEDURE — 85652 RBC SED RATE AUTOMATED: CPT | Performed by: INTERNAL MEDICINE

## 2022-05-13 PROCEDURE — 1125F AMNT PAIN NOTED PAIN PRSNT: CPT | Mod: CPTII,S$GLB,, | Performed by: INTERNAL MEDICINE

## 2022-05-13 PROCEDURE — 1159F MED LIST DOCD IN RCRD: CPT | Mod: CPTII,S$GLB,, | Performed by: INTERNAL MEDICINE

## 2022-05-13 PROCEDURE — 3008F PR BODY MASS INDEX (BMI) DOCUMENTED: ICD-10-PCS | Mod: CPTII,S$GLB,, | Performed by: INTERNAL MEDICINE

## 2022-05-13 PROCEDURE — 36415 COLL VENOUS BLD VENIPUNCTURE: CPT | Performed by: INTERNAL MEDICINE

## 2022-05-13 PROCEDURE — 80053 COMPREHEN METABOLIC PANEL: CPT | Performed by: INTERNAL MEDICINE

## 2022-05-13 PROCEDURE — 99214 PR OFFICE/OUTPT VISIT, EST, LEVL IV, 30-39 MIN: ICD-10-PCS | Mod: S$GLB,,, | Performed by: INTERNAL MEDICINE

## 2022-05-13 PROCEDURE — 3008F BODY MASS INDEX DOCD: CPT | Mod: CPTII,S$GLB,, | Performed by: INTERNAL MEDICINE

## 2022-05-13 PROCEDURE — 99999 PR PBB SHADOW E&M-EST. PATIENT-LVL V: CPT | Mod: PBBFAC,,, | Performed by: INTERNAL MEDICINE

## 2022-05-13 PROCEDURE — 3074F SYST BP LT 130 MM HG: CPT | Mod: CPTII,S$GLB,, | Performed by: INTERNAL MEDICINE

## 2022-05-13 PROCEDURE — 3078F PR MOST RECENT DIASTOLIC BLOOD PRESSURE < 80 MM HG: ICD-10-PCS | Mod: CPTII,S$GLB,, | Performed by: INTERNAL MEDICINE

## 2022-05-13 PROCEDURE — 3288F PR FALLS RISK ASSESSMENT DOCUMENTED: ICD-10-PCS | Mod: CPTII,S$GLB,, | Performed by: INTERNAL MEDICINE

## 2022-05-13 PROCEDURE — 86140 C-REACTIVE PROTEIN: CPT | Performed by: INTERNAL MEDICINE

## 2022-05-13 PROCEDURE — 85025 COMPLETE CBC W/AUTO DIFF WBC: CPT | Performed by: INTERNAL MEDICINE

## 2022-05-13 PROCEDURE — 99999 PR PBB SHADOW E&M-EST. PATIENT-LVL V: ICD-10-PCS | Mod: PBBFAC,,, | Performed by: INTERNAL MEDICINE

## 2022-05-13 PROCEDURE — 3078F DIAST BP <80 MM HG: CPT | Mod: CPTII,S$GLB,, | Performed by: INTERNAL MEDICINE

## 2022-05-13 PROCEDURE — 1125F PR PAIN SEVERITY QUANTIFIED, PAIN PRESENT: ICD-10-PCS | Mod: CPTII,S$GLB,, | Performed by: INTERNAL MEDICINE

## 2022-05-13 PROCEDURE — 1101F PR PT FALLS ASSESS DOC 0-1 FALLS W/OUT INJ PAST YR: ICD-10-PCS | Mod: CPTII,S$GLB,, | Performed by: INTERNAL MEDICINE

## 2022-05-13 PROCEDURE — 3074F PR MOST RECENT SYSTOLIC BLOOD PRESSURE < 130 MM HG: ICD-10-PCS | Mod: CPTII,S$GLB,, | Performed by: INTERNAL MEDICINE

## 2022-05-13 RX ORDER — UPADACITINIB 15 MG/1
15 TABLET, EXTENDED RELEASE ORAL DAILY
COMMUNITY
End: 2023-01-19

## 2022-05-13 NOTE — PROGRESS NOTES
RHEUMATOLOGY OUTPATIENT CLINIC NOTE    5/13/2022    Attending Rheumatologist: Ab England  Primary Care Provider/Physician Requesting Consultation: Jayesh Jaramillo MD   Chief Complaint/Reason For Consultation:  Rheumatoid Arthritis      Subjective:     Smitha Salinas is a 71 y.o. Black or  female with seropositive rheumatoid arthritis and chronic pain syndrome for follow-up visit.    No acute complaints.  Significant improvement to pain level compared to last encounter.  Excellent results to local corticosteroid injection administered by Pain Medicine for shoulder arthropathy.  Adherence with Rinvoq without side effects from therapy.    Review of Systems   Constitutional: Negative for fever.   Eyes: Negative for pain.   Respiratory: Negative for shortness of breath.    Genitourinary: Negative for dysuria and hematuria.   Musculoskeletal: Negative for joint pain.   Skin: Negative for rash.   Neurological: Negative for focal weakness.       Chronic comorbid conditions affecting medical decision making today:  Past Medical History:   Diagnosis Date    ADHD (attention deficit hyperactivity disorder)     [facility]    Chronic rheumatic arthritis     on DMARD    Ex-smoker     Genital herpes     Hyperlipidemia     Hypertension     Immunosuppressed status     Lichen sclerosus et atrophicus     Morbid obesity 1/11/2021    Obesity, Class I, BMI 30-34.9 12/22/2015    SUKHDEEP (obstructive sleep apnea)     Osteopenia     5/16 wai 5/18(start fmax if on pred 3months or more)    Type 2 diabetes mellitus     dxd 9/20     Past Surgical History:   Procedure Laterality Date    BREAST BIOPSY Left     benign    COLONOSCOPY N/A 2/12/2020    Procedure: COLONOSCOPY;  Surgeon: Eloina Castro MD;  Location: [facility];  Service: Endoscopy;  Laterality: N/A;    CRYOTHERAPY  1980    ESOPHAGOGASTRODUODENOSCOPY N/A 2/12/2020    Procedure: EGD (ESOPHAGOGASTRODUODENOSCOPY);  Surgeon:  Eloina Castro MD;  Location[facility] ENDO;  Service: Endoscopy;  Laterality: N/A;    INSERTION OF SACRAL NEUROSTIMULATOR GENERATOR Left 2020    Procedure: INSERTION, PULSE GENERATOR, NEUROSTIMULATOR, SACRAL;  Surgeon: Pablo Rawls MD;  Location: [facility] OR;  Service: Urology;  Laterality: Left;    INSERTION OF SACRAL NEUROSTIMULATOR, ELECTRODES, AND IMPLANTABLE PULSE GENERATOR (IPG) Left 2020    Procedure: INSERTION, ELECTRODE LEADS AND PULSE GENERATOR, NEUROSTIMULATOR, SACRAL;  Surgeon: Pablo Rawls MD;  Location: [facility] OR;  Service: Urology;  Laterality: Left;    SKIN GRAFT      laceration to right  fingers  from thigh     TONSILLECTOMY       Family History   Problem Relation Age of Onset    Heart disease Mother     Diabetes Mother     Peripheral vascular disease Mother         amputations    Stroke Father     Kidney failure Father     Breast cancer Sister 39    Cancer Other 68        breast    Stroke Maternal Grandmother     Stroke Paternal Grandmother     Stroke Paternal Grandfather     Colon cancer Neg Hx     Ovarian cancer Neg Hx      Social History     Substance and Sexual Activity   Alcohol Use Yes    Alcohol/week: 0.0 standard drinks     Social History     Tobacco Use   Smoking Status Former Smoker    Packs/day: 0.25    Years: 15.00    Pack years: 3.75    Quit date: 2013    Years since quittin.8   Smokeless Tobacco Never Used   Tobacco Comment    smokes for a few weeks per year - when under pressure. has been abstinent times years at a time. a pack lasts about a week     Social History     Substance and Sexual Activity   Drug Use No       Current Outpatient Medications:     acyclovir (ZOVIRAX) 400 MG tablet, Take 1 tablet (400 mg total) by mouth 2 (two) times daily., Disp: 180 tablet, Rfl: 4    amLODIPine (NORVASC) 5 MG tablet, Take 1 tablet (5 mg total) by mouth once daily., Disp: 90 tablet, Rfl: 4    aspirin (ECOTRIN) 81 MG EC tablet,  Take 81 mg by mouth once daily., Disp: , Rfl:     cloNIDine (CATAPRES) 0.2 MG tablet, TAKE 1/2 TABLET IN THE MORNING, 1/2 TABLET AT NOON, THEN 2 TABLETS AT BEDTIME, Disp: 270 tablet, Rfl: 3    diazePAM (VALIUM) 5 MG tablet, TAKE 1 TABLET BY MOUTH AT BEDTIME FOR ANXIETY, Disp: 30 tablet, Rfl: 5    diclofenac sodium (VOLTAREN) 1 % Gel, Apply 2 g topically 3 (three) times daily., Disp: 1 Tube, Rfl: 0    MYRBETRIQ 50 mg Tb24, Take 1 tablet by mouth once daily, Disp: 30 tablet, Rfl: 11    ondansetron (ZOFRAN-ODT) 4 MG TbDL, Take 1 tablet (4 mg total) by mouth every 8 (eight) hours as needed (nausea)., Disp: 10 tablet, Rfl: 1    potassium chloride SA (K-DUR,KLOR-CON) 20 MEQ tablet, 1 tablet daily, Disp: 90 tablet, Rfl: 5    rosuvastatin (CRESTOR) 20 MG tablet, Take 1 tablet (20 mg total) by mouth once daily., Disp: 30 tablet, Rfl: 11    semaglutide (OZEMPIC) 0.25 mg or 0.5 mg(2 mg/1.5 mL) pen injector, Inject 0.5 mg into the skin every 7 days., Disp: 1 pen, Rfl: 11    traMADoL (ULTRAM) 50 mg tablet, Take 1 tablet (50 mg total) by mouth every 6 (six) hours as needed., Disp: 60 tablet, Rfl: 5    upadacitinib (RINVOQ) 15 mg 24 hr tablet, Take 15 mg by mouth once daily., Disp: , Rfl:     (Magic mouthwash) 1:1:1 Benadryl 12.5mg/5ml liq, aluminum & magnesium hydroxide-simehticone (Maalox), LIDOcaine viscous 2%, Swish and spit 10 mLs every 4 (four) hours as needed (prn). for mouth sores, Disp: 250 mL, Rfl: 0    alendronate (FOSAMAX) 70 MG tablet, Take 0.5 tablets (35 mg total) by mouth every 7 days., Disp: 4 tablet, Rfl: 3    calcium-vitamin D (CALCIUM 600 + D,3,) 600 mg-10 mcg (400 unit) Tab, Take 1 tablet by mouth 2 (two) times daily., Disp: 60 tablet, Rfl: 3    cyclobenzaprine (FLEXERIL) 10 MG tablet, TAKE 1/2 TO 1 (ONE-HALF TO ONE) TABLET BY MOUTH THREE TIMES DAILY AS NEEDED FOR MUSCLE SPASM (Patient not taking: Reported on 5/13/2022), Disp: 60 tablet, Rfl: 11    HYDROcodone-acetaminophen (NORCO) 5-325 mg per  tablet, , Disp: , Rfl:     lancing device Misc, 1 each by Misc.(Non-Drug; Combo Route) route 3 (three) times daily as needed., Disp: 1 each, Rfl: 0    NUCYNTA ER 50 mg Tb12, Take 1 tablet by mouth every 12 (twelve) hours. prn, Disp: , Rfl:     oxyCODONE-acetaminophen (PERCOCET) 5-325 mg per tablet, Take 1 tablet by mouth every 4 (four) hours as needed for Pain., Disp: , Rfl:     pantoprazole (PROTONIX) 40 MG tablet, Take 1 tablet (40 mg total) by mouth once daily., Disp: 30 tablet, Rfl: 2    predniSONE (DELTASONE) 5 MG tablet, May take 5-15 mg (1-3 tablets) of prednisone daily for rheumatoid flares (Patient not taking: Reported on 5/13/2022), Disp: 60 tablet, Rfl: 2    PROLENSA 0.07 % Drop, , Disp: , Rfl:     sulfaSALAzine (AZULFIDINE) 500 mg Tab, , Disp: , Rfl:     Current Facility-Administered Medications:     ondansetron disintegrating tablet 4 mg, 4 mg, Oral, Once PRN, Perez De La Fuente MD     Objective:     Vitals:    05/13/22 0955   BP: 119/79   Pulse: (!) 53     Physical Exam   Eyes: Conjunctivae are normal.   Pulmonary/Chest: Effort normal. No respiratory distress.   Musculoskeletal:         General: No swelling or tenderness. Normal range of motion.   Neurological: She displays no weakness.   Skin: No rash noted.       Reviewed available old and all outside pertinent medical records available.    All lab results personally reviewed and interpreted by me.  Lab Results   Component Value Date    WBC 7.80 05/13/2022    HGB 12.4 05/13/2022    HCT 38.1 05/13/2022    MCV 88 05/13/2022    RDW 15.5 (H) 05/13/2022     05/13/2022    PLTEST Adequate 05/14/2013       Lab Results   Component Value Date     05/13/2022    K 4.3 05/13/2022     05/13/2022    CO2 24 05/13/2022    GLU 97 05/13/2022    BUN 16 05/13/2022    CALCIUM 9.4 05/13/2022    PROT 6.6 05/13/2022    ALBUMIN 4.4 05/13/2022    BILITOT 0.4 05/13/2022    AST 23 05/13/2022    ALKPHOS 56 05/13/2022    ALT 48 (H) 05/13/2022       Lab  Results   Component Value Date    COLORU Yellow 01/11/2021    APPEARANCEUA Cloudy (A) 12/26/2013    SPECGRAV 1.025 01/11/2021    PHUR 5 01/11/2021    PROTEINUA Negative 12/26/2013    KETONESU neg 01/11/2021    LEUKOCYTESUR Negative 12/26/2013    NITRITE neg 01/11/2021    UROBILINOGEN neg 01/11/2021       No results found for: PTH    Lab Results   Component Value Date    URICACID 4.3 01/29/2020       Lab Results   Component Value Date    CRP <0.1 05/13/2022       No results found for: ANATITER    No components found for: TSPOTTB,  QUANTIFERON     ASSESSMENT:     Seropositive rheumatoid arthritis for follow-up visit.  Adequate response to current regimen of Rinvoq with no obvious synovitis noted on today's exam.   Repeat x-rays documented with no erosive changes, probable cystic versus erosive change on trapezium Rt hand (9/2019).  Prior suboptimal response to MTX, SSZ (increased yung chemistires) Actemra, Rinvoq, Rinvoq, Humira.  Refractory pain likely related to background osteoarthritis and past RA damage.  Recommend to continue range of motion and flexibility exercises for shoulder arthropathy.  May utilize low-dose prednisone for RA flares only, use of systemic steroids for chronic pain from osteoarthritis not recommended.  Repeat labs prior next visit, including Vectra panel.  Last densitometry with low fragility fracture risk, may discontinue prophylactic dose of Fosamax if not taking chronic steroids.  Continue adequate calcium and vitamin-D dietary intake, may continue with supplementation.    PLAN:     1. Seropositive RA    2. Osteoarthritis    3. High risk medication use    4. Other specified counseling    5. Chronic pain syndrome      Disclaimer: This note is prepared using voice recognition software and as such is likely to have errors and has not been proof read. Please contact me for questions.         Ab England M.D.

## 2022-05-20 ENCOUNTER — PATIENT MESSAGE (OUTPATIENT)
Dept: RESEARCH | Facility: HOSPITAL | Age: 72
End: 2022-05-20
Payer: MEDICARE

## 2022-05-24 ENCOUNTER — OFFICE VISIT (OUTPATIENT)
Dept: INTERNAL MEDICINE | Facility: CLINIC | Age: 72
End: 2022-05-24
Payer: MEDICARE

## 2022-05-24 VITALS
OXYGEN SATURATION: 97 % | HEART RATE: 75 BPM | TEMPERATURE: 97 F | SYSTOLIC BLOOD PRESSURE: 114 MMHG | BODY MASS INDEX: 25.08 KG/M2 | WEIGHT: 150.56 LBS | HEIGHT: 65 IN | DIASTOLIC BLOOD PRESSURE: 52 MMHG | RESPIRATION RATE: 18 BRPM

## 2022-05-24 DIAGNOSIS — S61.219A CUT OF FINGER: Primary | ICD-10-CM

## 2022-05-24 DIAGNOSIS — I10 ESSENTIAL HYPERTENSION: ICD-10-CM

## 2022-05-24 PROCEDURE — 3078F DIAST BP <80 MM HG: CPT | Mod: CPTII,S$GLB,, | Performed by: PHYSICIAN ASSISTANT

## 2022-05-24 PROCEDURE — 99999 PR PBB SHADOW E&M-EST. PATIENT-LVL V: CPT | Mod: PBBFAC,,, | Performed by: PHYSICIAN ASSISTANT

## 2022-05-24 PROCEDURE — 3078F PR MOST RECENT DIASTOLIC BLOOD PRESSURE < 80 MM HG: ICD-10-PCS | Mod: CPTII,S$GLB,, | Performed by: PHYSICIAN ASSISTANT

## 2022-05-24 PROCEDURE — 3008F BODY MASS INDEX DOCD: CPT | Mod: CPTII,S$GLB,, | Performed by: PHYSICIAN ASSISTANT

## 2022-05-24 PROCEDURE — 1159F PR MEDICATION LIST DOCUMENTED IN MEDICAL RECORD: ICD-10-PCS | Mod: CPTII,S$GLB,, | Performed by: PHYSICIAN ASSISTANT

## 2022-05-24 PROCEDURE — 99214 OFFICE O/P EST MOD 30 MIN: CPT | Mod: 25,S$GLB,, | Performed by: PHYSICIAN ASSISTANT

## 2022-05-24 PROCEDURE — 1101F PR PT FALLS ASSESS DOC 0-1 FALLS W/OUT INJ PAST YR: ICD-10-PCS | Mod: CPTII,S$GLB,, | Performed by: PHYSICIAN ASSISTANT

## 2022-05-24 PROCEDURE — 3288F PR FALLS RISK ASSESSMENT DOCUMENTED: ICD-10-PCS | Mod: CPTII,S$GLB,, | Performed by: PHYSICIAN ASSISTANT

## 2022-05-24 PROCEDURE — 3008F PR BODY MASS INDEX (BMI) DOCUMENTED: ICD-10-PCS | Mod: CPTII,S$GLB,, | Performed by: PHYSICIAN ASSISTANT

## 2022-05-24 PROCEDURE — 99214 PR OFFICE/OUTPT VISIT, EST, LEVL IV, 30-39 MIN: ICD-10-PCS | Mod: 25,S$GLB,, | Performed by: PHYSICIAN ASSISTANT

## 2022-05-24 PROCEDURE — 99999 PR PBB SHADOW E&M-EST. PATIENT-LVL V: ICD-10-PCS | Mod: PBBFAC,,, | Performed by: PHYSICIAN ASSISTANT

## 2022-05-24 PROCEDURE — 3074F SYST BP LT 130 MM HG: CPT | Mod: CPTII,S$GLB,, | Performed by: PHYSICIAN ASSISTANT

## 2022-05-24 PROCEDURE — 1101F PT FALLS ASSESS-DOCD LE1/YR: CPT | Mod: CPTII,S$GLB,, | Performed by: PHYSICIAN ASSISTANT

## 2022-05-24 PROCEDURE — 3288F FALL RISK ASSESSMENT DOCD: CPT | Mod: CPTII,S$GLB,, | Performed by: PHYSICIAN ASSISTANT

## 2022-05-24 PROCEDURE — 1159F MED LIST DOCD IN RCRD: CPT | Mod: CPTII,S$GLB,, | Performed by: PHYSICIAN ASSISTANT

## 2022-05-24 PROCEDURE — 90471 TDAP VACCINE GREATER THAN OR EQUAL TO 7YO IM: ICD-10-PCS | Mod: AT,S$GLB,, | Performed by: PHYSICIAN ASSISTANT

## 2022-05-24 PROCEDURE — 90471 IMMUNIZATION ADMIN: CPT | Mod: AT,S$GLB,, | Performed by: PHYSICIAN ASSISTANT

## 2022-05-24 PROCEDURE — 90715 TDAP VACCINE GREATER THAN OR EQUAL TO 7YO IM: ICD-10-PCS | Mod: AT,S$GLB,, | Performed by: PHYSICIAN ASSISTANT

## 2022-05-24 PROCEDURE — 3074F PR MOST RECENT SYSTOLIC BLOOD PRESSURE < 130 MM HG: ICD-10-PCS | Mod: CPTII,S$GLB,, | Performed by: PHYSICIAN ASSISTANT

## 2022-05-24 PROCEDURE — 90715 TDAP VACCINE 7 YRS/> IM: CPT | Mod: AT,S$GLB,, | Performed by: PHYSICIAN ASSISTANT

## 2022-05-24 RX ORDER — MUPIROCIN 20 MG/G
OINTMENT TOPICAL 2 TIMES DAILY
Qty: 30 G | Refills: 0 | Status: SHIPPED | OUTPATIENT
Start: 2022-05-24 | End: 2022-05-31

## 2022-05-24 NOTE — PROGRESS NOTES
"Subjective:      Patient ID: Smitha Salinas is a 71 y.o. female.    Chief Complaint: Finger Injury    Patient is known to me, being seen today for finger injury.  Cut 4th finger with 's knife yesterday night.  Evaluated in Urgent Care cleaned area, did not need a stitch.   R hand dominant   Last tetanus May 2011    Baby aspirin     HTN- amlodipine 5mg, clonidine .2mg     Last visit Jan 2022 with PCP.    Review of Systems   Constitutional: Negative for chills, diaphoresis and fever.   HENT: Negative for congestion, rhinorrhea and sore throat.    Respiratory: Negative for cough, shortness of breath and wheezing.    Gastrointestinal: Negative for abdominal pain, constipation, diarrhea, nausea and vomiting.   Skin: Positive for wound. Negative for rash.   Neurological: Negative for dizziness, light-headedness and headaches.       Objective:   BP (!) 114/52   Pulse 75   Temp 97.2 °F (36.2 °C) (Temporal)   Resp 18   Ht 5' 5" (1.651 m)   Wt 68.3 kg (150 lb 9.2 oz)   SpO2 97%   BMI 25.06 kg/m²   Physical Exam  Constitutional:       General: She is not in acute distress.     Appearance: She is well-developed. She is not ill-appearing or diaphoretic.   HENT:      Head: Normocephalic and atraumatic.      Right Ear: External ear normal.      Left Ear: External ear normal.   Eyes:      General: Lids are normal.         Right eye: No discharge.         Left eye: No discharge.      Conjunctiva/sclera: Conjunctivae normal.      Right eye: Right conjunctiva is not injected.      Left eye: Left conjunctiva is not injected.   Pulmonary:      Effort: Pulmonary effort is normal. No respiratory distress.   Musculoskeletal:        Hands:    Skin:     General: Skin is warm and dry.      Findings: Signs of injury present. No rash.   Neurological:      Mental Status: She is alert and oriented to person, place, and time.   Psychiatric:         Speech: Speech normal.         Behavior: Behavior normal.         Thought Content: " Thought content normal.         Judgment: Judgment normal.       Assessment:      1. Cut of finger    2. Essential hypertension       Plan:   Cut of finger  -     (In Office Administered) Tdap Vaccine  -     mupirocin (BACTROBAN) 2 % ointment; Apply topically 2 (two) times daily. for 7 days  Dispense: 30 g; Refill: 0    Essential hypertension   Controlled     BP borderline low, denies symptoms, hold clonidine tonight     Keep upcoming labs and f/u with PCP    Discussed worsening signs/symptoms and when to return to clinic or go to ED.   Patient expresses understanding and agrees with treatment plan.

## 2022-06-01 ENCOUNTER — TELEPHONE (OUTPATIENT)
Dept: ALLERGY | Facility: CLINIC | Age: 72
End: 2022-06-01
Payer: MEDICARE

## 2022-06-01 ENCOUNTER — PATIENT MESSAGE (OUTPATIENT)
Dept: INTERNAL MEDICINE | Facility: CLINIC | Age: 72
End: 2022-06-01
Payer: MEDICARE

## 2022-06-01 DIAGNOSIS — T78.40XA ALLERGY, INITIAL ENCOUNTER: Primary | ICD-10-CM

## 2022-06-01 NOTE — TELEPHONE ENCOUNTER
Spoke with pt, she will contact Dr Jaramillo's office for referral. Once received, we will contact pt to schedule.  Pt agreed.

## 2022-06-01 NOTE — TELEPHONE ENCOUNTER
----- Message from Dalila Sullivan sent at 6/1/2022  1:44 PM CDT -----  Contact: Smitha  Type:  Sooner Apoointment Request    Caller is requesting a sooner appointment.  Caller declined first available appointment listed below.  Caller will not accept being placed on the waitlist and is requesting a message be sent to doctor.  Name of Caller: Smitha  When is the first available appointment? 7/2022  Symptoms: Possible food allergy, patient stated she isn't sure if it a specific food  Would the patient rather a call back or a response via [facility]? Call back  Best Call Back Number: [phone]  Additional Information:

## 2022-06-03 ENCOUNTER — TELEPHONE (OUTPATIENT)
Dept: ALLERGY | Facility: CLINIC | Age: 72
End: 2022-06-03
Payer: MEDICARE

## 2022-06-03 NOTE — TELEPHONE ENCOUNTER
----- Message from Roscoe Arevalo sent at 6/3/2022  1:29 PM CDT -----  Pt would like a return call to schedule a np appt with the provider. Please call back at [phone]

## 2022-06-06 ENCOUNTER — PATIENT MESSAGE (OUTPATIENT)
Dept: INTERNAL MEDICINE | Facility: CLINIC | Age: 72
End: 2022-06-06
Payer: MEDICARE

## 2022-06-10 ENCOUNTER — LAB VISIT (OUTPATIENT)
Dept: LAB | Facility: HOSPITAL | Age: 72
End: 2022-06-10
Attending: INTERNAL MEDICINE
Payer: MEDICARE

## 2022-06-10 DIAGNOSIS — E11.9 TYPE 2 DIABETES MELLITUS WITHOUT COMPLICATION, WITHOUT LONG-TERM CURRENT USE OF INSULIN: ICD-10-CM

## 2022-06-10 DIAGNOSIS — E78.00 HYPERCHOLESTEROLEMIA: ICD-10-CM

## 2022-06-10 LAB
ALBUMIN SERPL BCP-MCNC: 3.9 G/DL (ref 3.5–5.2)
ALP SERPL-CCNC: 57 U/L (ref 55–135)
ALT SERPL W/O P-5'-P-CCNC: 105 U/L (ref 10–44)
ANION GAP SERPL CALC-SCNC: 9 MMOL/L (ref 8–16)
AST SERPL-CCNC: 35 U/L (ref 10–40)
BILIRUB SERPL-MCNC: 0.4 MG/DL (ref 0.1–1)
BUN SERPL-MCNC: 13 MG/DL (ref 8–23)
CALCIUM SERPL-MCNC: 9.2 MG/DL (ref 8.7–10.5)
CHLORIDE SERPL-SCNC: 109 MMOL/L (ref 95–110)
CHOLEST SERPL-MCNC: 189 MG/DL (ref 120–199)
CHOLEST/HDLC SERPL: 3.4 {RATIO} (ref 2–5)
CO2 SERPL-SCNC: 24 MMOL/L (ref 23–29)
CREAT SERPL-MCNC: 0.7 MG/DL (ref 0.5–1.4)
EST. GFR  (AFRICAN AMERICAN): >60 ML/MIN/1.73 M^2
EST. GFR  (NON AFRICAN AMERICAN): >60 ML/MIN/1.73 M^2
ESTIMATED AVG GLUCOSE: 128 MG/DL (ref 68–131)
GLUCOSE SERPL-MCNC: 90 MG/DL (ref 70–110)
HBA1C MFR BLD: 6.1 % (ref 4–5.6)
HDLC SERPL-MCNC: 56 MG/DL (ref 40–75)
HDLC SERPL: 29.6 % (ref 20–50)
LDLC SERPL CALC-MCNC: 114.4 MG/DL (ref 63–159)
NONHDLC SERPL-MCNC: 133 MG/DL
POTASSIUM SERPL-SCNC: 4.2 MMOL/L (ref 3.5–5.1)
PROT SERPL-MCNC: 6.1 G/DL (ref 6–8.4)
SODIUM SERPL-SCNC: 142 MMOL/L (ref 136–145)
TRIGL SERPL-MCNC: 93 MG/DL (ref 30–150)

## 2022-06-10 PROCEDURE — 83036 HEMOGLOBIN GLYCOSYLATED A1C: CPT | Performed by: INTERNAL MEDICINE

## 2022-06-10 PROCEDURE — 80053 COMPREHEN METABOLIC PANEL: CPT | Performed by: INTERNAL MEDICINE

## 2022-06-10 PROCEDURE — 80061 LIPID PANEL: CPT | Performed by: INTERNAL MEDICINE

## 2022-06-10 PROCEDURE — 36415 COLL VENOUS BLD VENIPUNCTURE: CPT | Performed by: INTERNAL MEDICINE

## 2022-06-24 ENCOUNTER — PATIENT MESSAGE (OUTPATIENT)
Dept: RHEUMATOLOGY | Facility: CLINIC | Age: 72
End: 2022-06-24
Payer: MEDICARE

## 2022-06-28 DIAGNOSIS — M19.90 OSTEOARTHRITIS, UNSPECIFIED OSTEOARTHRITIS TYPE, UNSPECIFIED SITE: Primary | ICD-10-CM

## 2022-07-13 ENCOUNTER — OFFICE VISIT (OUTPATIENT)
Dept: CARDIOLOGY | Facility: CLINIC | Age: 72
End: 2022-07-13
Payer: MEDICARE

## 2022-07-13 VITALS
BODY MASS INDEX: 24.32 KG/M2 | SYSTOLIC BLOOD PRESSURE: 122 MMHG | DIASTOLIC BLOOD PRESSURE: 66 MMHG | WEIGHT: 145.94 LBS | HEIGHT: 65 IN | HEART RATE: 67 BPM | OXYGEN SATURATION: 98 %

## 2022-07-13 DIAGNOSIS — E11.9 TYPE 2 DIABETES MELLITUS WITHOUT COMPLICATION, WITHOUT LONG-TERM CURRENT USE OF INSULIN: ICD-10-CM

## 2022-07-13 DIAGNOSIS — R93.1 ELEVATED CORONARY ARTERY CALCIUM SCORE: Primary | ICD-10-CM

## 2022-07-13 DIAGNOSIS — F90.9 ADULT ADHD: ICD-10-CM

## 2022-07-13 DIAGNOSIS — Z87.891 EX-SMOKER: ICD-10-CM

## 2022-07-13 DIAGNOSIS — I10 ESSENTIAL HYPERTENSION: ICD-10-CM

## 2022-07-13 DIAGNOSIS — R94.31 ABNORMAL EKG: ICD-10-CM

## 2022-07-13 DIAGNOSIS — G47.33 OSA (OBSTRUCTIVE SLEEP APNEA): ICD-10-CM

## 2022-07-13 DIAGNOSIS — R13.10 DYSPHAGIA, UNSPECIFIED TYPE: ICD-10-CM

## 2022-07-13 DIAGNOSIS — F41.8 MIXED ANXIETY AND DEPRESSIVE DISORDER: ICD-10-CM

## 2022-07-13 PROCEDURE — 99999 PR PBB SHADOW E&M-EST. PATIENT-LVL III: CPT | Mod: PBBFAC,,, | Performed by: INTERNAL MEDICINE

## 2022-07-13 PROCEDURE — 99214 OFFICE O/P EST MOD 30 MIN: CPT | Mod: S$GLB,,, | Performed by: INTERNAL MEDICINE

## 2022-07-13 PROCEDURE — 1101F PT FALLS ASSESS-DOCD LE1/YR: CPT | Mod: CPTII,S$GLB,, | Performed by: INTERNAL MEDICINE

## 2022-07-13 PROCEDURE — 3008F BODY MASS INDEX DOCD: CPT | Mod: CPTII,S$GLB,, | Performed by: INTERNAL MEDICINE

## 2022-07-13 PROCEDURE — 3008F PR BODY MASS INDEX (BMI) DOCUMENTED: ICD-10-PCS | Mod: CPTII,S$GLB,, | Performed by: INTERNAL MEDICINE

## 2022-07-13 PROCEDURE — 3044F PR MOST RECENT HEMOGLOBIN A1C LEVEL <7.0%: ICD-10-PCS | Mod: CPTII,S$GLB,, | Performed by: INTERNAL MEDICINE

## 2022-07-13 PROCEDURE — 1125F AMNT PAIN NOTED PAIN PRSNT: CPT | Mod: CPTII,S$GLB,, | Performed by: INTERNAL MEDICINE

## 2022-07-13 PROCEDURE — 3074F PR MOST RECENT SYSTOLIC BLOOD PRESSURE < 130 MM HG: ICD-10-PCS | Mod: CPTII,S$GLB,, | Performed by: INTERNAL MEDICINE

## 2022-07-13 PROCEDURE — 1125F PR PAIN SEVERITY QUANTIFIED, PAIN PRESENT: ICD-10-PCS | Mod: CPTII,S$GLB,, | Performed by: INTERNAL MEDICINE

## 2022-07-13 PROCEDURE — 3288F PR FALLS RISK ASSESSMENT DOCUMENTED: ICD-10-PCS | Mod: CPTII,S$GLB,, | Performed by: INTERNAL MEDICINE

## 2022-07-13 PROCEDURE — 3078F DIAST BP <80 MM HG: CPT | Mod: CPTII,S$GLB,, | Performed by: INTERNAL MEDICINE

## 2022-07-13 PROCEDURE — 99999 PR PBB SHADOW E&M-EST. PATIENT-LVL III: ICD-10-PCS | Mod: PBBFAC,,, | Performed by: INTERNAL MEDICINE

## 2022-07-13 PROCEDURE — 1101F PR PT FALLS ASSESS DOC 0-1 FALLS W/OUT INJ PAST YR: ICD-10-PCS | Mod: CPTII,S$GLB,, | Performed by: INTERNAL MEDICINE

## 2022-07-13 PROCEDURE — 3074F SYST BP LT 130 MM HG: CPT | Mod: CPTII,S$GLB,, | Performed by: INTERNAL MEDICINE

## 2022-07-13 PROCEDURE — 3044F HG A1C LEVEL LT 7.0%: CPT | Mod: CPTII,S$GLB,, | Performed by: INTERNAL MEDICINE

## 2022-07-13 PROCEDURE — 3288F FALL RISK ASSESSMENT DOCD: CPT | Mod: CPTII,S$GLB,, | Performed by: INTERNAL MEDICINE

## 2022-07-13 PROCEDURE — 99214 PR OFFICE/OUTPT VISIT, EST, LEVL IV, 30-39 MIN: ICD-10-PCS | Mod: S$GLB,,, | Performed by: INTERNAL MEDICINE

## 2022-07-13 PROCEDURE — 3078F PR MOST RECENT DIASTOLIC BLOOD PRESSURE < 80 MM HG: ICD-10-PCS | Mod: CPTII,S$GLB,, | Performed by: INTERNAL MEDICINE

## 2022-07-13 NOTE — PROGRESS NOTES
Subjective:   Patient ID:  Smitha Salinas is a 71 y.o. female who presents for follow up of No chief complaint on file.      HPI  10/9/2020  A 69 yo female exsmoker htn hlp impaired glucose tolerance  soraida is referred from DR BARBOSA due to abnormal ekg. She has gained weight she does not exercise. She has been on the sedentary side exacerbated by arthritis has an episode last weekend of nausea projectile vomiting felt dehydrated was evaluated she feels better now however had epsiode of chest tightness difficulty swallowing her mother had diabetes pvd amputation mi. Has  hip pain from walking has rheumatoid arthritis she gest tired easily. She needs to have surgery and get sacral implants. Has abnormal ekg showing lvfh repolarization abnormalities and ischemic changes.      11/18/2020  Here for f/u test results. She has a slaty fatty  Dinner . She has no new complaints . Sleep study pending. Her echo showed lvh diastolic dysfunction. Her cardiolite negative had some dilatation with exercise. She has no angina no chest pain walks the dog. Her exercise is increased. She is trying to eat healthier adjusting her pantry.     5/14/2021  Here for f/u has been losing weight . Tries to be complaint with diet . She walks tries up 1 mile has no chest paian now has no shortness of breath . Her ld is out of control she is compliant with stains and improves diet. Has been taking meds regularily her a1c is 5.9.      11/24/2021  Elevated ca score has  No symptoms of chest pain and shortness of breath she is bruising a lot she has intermittently stopped asa. Has been exercising has lost weight dropped her weight lipids improved     7/13/2022   here for f/u on ozempic lost weight has bout of nausea projectile vomiting abdominal pain . Has the need of taking clonidine and amlodipine extra to control bp it is controlled today ./ she has significant elevation in her bp . Has no access to digital htn due to insurance. Has bleeding from  asa when she cuts herself .she is trying to minimize salt intake and fat intake.   Past Medical History:   Diagnosis Date    ADHD (attention deficit hyperactivity disorder)     [facility]    Chronic rheumatic arthritis     on DMARD    Ex-smoker     Genital herpes     Hyperlipidemia     Hypertension     Immunosuppressed status     Lichen sclerosus et atrophicus     Morbid obesity 2021    Obesity, Class I, BMI 30-34.9 2015    SUKHDEEP (obstructive sleep apnea)     Osteopenia      wai (start fmax if on pred 3months or more)    Type 2 diabetes mellitus     dxd        Past Surgical History:   Procedure Laterality Date    BREAST BIOPSY Left     benign    COLONOSCOPY N/A 2020    Procedure: COLONOSCOPY;  Surgeon: Eloina Castro MD;  Location: [facility];  Service: Endoscopy;  Laterality: N/A;    CRYOTHERAPY      ESOPHAGOGASTRODUODENOSCOPY N/A 2020    Procedure: EGD (ESOPHAGOGASTRODUODENOSCOPY);  Surgeon: Eloina Castro MD;  Location: [facility];  Service: Endoscopy;  Laterality: N/A;    INSERTION OF SACRAL NEUROSTIMULATOR GENERATOR Left 2020    Procedure: INSERTION, PULSE GENERATOR, NEUROSTIMULATOR, SACRAL;  Surgeon: Pablo Rawls MD;  Location: [facility] OR;  Service: Urology;  Laterality: Left;    INSERTION OF SACRAL NEUROSTIMULATOR, ELECTRODES, AND IMPLANTABLE PULSE GENERATOR (IPG) Left 2020    Procedure: INSERTION, ELECTRODE LEADS AND PULSE GENERATOR, NEUROSTIMULATOR, SACRAL;  Surgeon: Pablo Rawls MD;  Location: [facility] OR;  Service: Urology;  Laterality: Left;    SKIN GRAFT      laceration to right  fingers  from thigh     TONSILLECTOMY         Social History     Tobacco Use    Smoking status: Former Smoker     Packs/day: 0.25     Years: 15.00     Pack years: 3.75     Quit date: 2013     Years since quittin.9    Smokeless tobacco: Never Used    Tobacco comment: smokes for a few weeks per year - when  under pressure. has been abstinent times years at a time. a pack lasts about a week   Substance Use Topics    Alcohol use: Yes     Alcohol/week: 0.0 standard drinks    Drug use: No       Family History   Problem Relation Age of Onset    Heart disease Mother     Diabetes Mother     Peripheral vascular disease Mother         amputations    Stroke Father     Kidney failure Father     Breast cancer Sister 39    Cancer Other 68        breast    Stroke Maternal Grandmother     Stroke Paternal Grandmother     Stroke Paternal Grandfather     Colon cancer Neg Hx     Ovarian cancer Neg Hx        Current Outpatient Medications   Medication Sig    (Magic mouthwash) 1:1:1 Benadryl 12.5mg/5ml liq, aluminum & magnesium hydroxide-simehticone (Maalox), LIDOcaine viscous 2% Swish and spit 10 mLs every 4 (four) hours as needed (prn). for mouth sores    acyclovir (ZOVIRAX) 400 MG tablet Take 1 tablet (400 mg total) by mouth 2 (two) times daily.    amLODIPine (NORVASC) 5 MG tablet Take 1 tablet (5 mg total) by mouth once daily.    aspirin (ECOTRIN) 81 MG EC tablet Take 81 mg by mouth once daily.    calcium-vitamin D (CALCIUM 600 + D,3,) 600 mg-10 mcg (400 unit) Tab Take 1 tablet by mouth 2 (two) times daily.    cloNIDine (CATAPRES) 0.2 MG tablet TAKE 1/2 TABLET IN THE MORNING, 1/2 TABLET AT NOON, THEN 2 TABLETS AT BEDTIME    cyclobenzaprine (FLEXERIL) 10 MG tablet TAKE 1/2 TO 1 (ONE-HALF TO ONE) TABLET BY MOUTH THREE TIMES DAILY AS NEEDED FOR MUSCLE SPASM    diazePAM (VALIUM) 5 MG tablet TAKE 1 TABLET BY MOUTH AT BEDTIME FOR ANXIETY    diclofenac sodium (VOLTAREN) 1 % Gel Apply 2 g topically 3 (three) times daily.    HYDROcodone-acetaminophen (NORCO) 5-325 mg per tablet     lancing device Misc 1 each by Misc.(Non-Drug; Combo Route) route 3 (three) times daily as needed.    MYRBETRIQ 50 mg Tb24 Take 1 tablet by mouth once daily    NUCYNTA ER 50 mg Tb12 Take 1 tablet by mouth every 12 (twelve) hours. prn     ondansetron (ZOFRAN-ODT) 4 MG TbDL Take 1 tablet (4 mg total) by mouth every 8 (eight) hours as needed (nausea).    oxyCODONE-acetaminophen (PERCOCET) 5-325 mg per tablet Take 1 tablet by mouth every 4 (four) hours as needed for Pain.    pantoprazole (PROTONIX) 40 MG tablet Take 1 tablet (40 mg total) by mouth once daily.    potassium chloride SA (K-DUR,KLOR-CON) 20 MEQ tablet 1 tablet daily    predniSONE (DELTASONE) 5 MG tablet May take 5-15 mg (1-3 tablets) of prednisone daily for rheumatoid flares (Patient not taking: No sig reported)    PROLENSA 0.07 % Drop     rosuvastatin (CRESTOR) 20 MG tablet Take 1 tablet by mouth once daily    semaglutide (OZEMPIC) 0.25 mg or 0.5 mg(2 mg/1.5 mL) pen injector Inject 0.5 mg into the skin every 7 days.    traMADoL (ULTRAM) 50 mg tablet Take 1 tablet (50 mg total) by mouth every 6 (six) hours as needed.    upadacitinib (RINVOQ) 15 mg 24 hr tablet Take 15 mg by mouth once daily.     Current Facility-Administered Medications   Medication    ondansetron disintegrating tablet 4 mg     Current Outpatient Medications on File Prior to Visit   Medication Sig    (Magic mouthwash) 1:1:1 Benadryl 12.5mg/5ml liq, aluminum & magnesium hydroxide-simehticone (Maalox), LIDOcaine viscous 2% Swish and spit 10 mLs every 4 (four) hours as needed (prn). for mouth sores    acyclovir (ZOVIRAX) 400 MG tablet Take 1 tablet (400 mg total) by mouth 2 (two) times daily.    amLODIPine (NORVASC) 5 MG tablet Take 1 tablet (5 mg total) by mouth once daily.    aspirin (ECOTRIN) 81 MG EC tablet Take 81 mg by mouth once daily.    calcium-vitamin D (CALCIUM 600 + D,3,) 600 mg-10 mcg (400 unit) Tab Take 1 tablet by mouth 2 (two) times daily.    cloNIDine (CATAPRES) 0.2 MG tablet TAKE 1/2 TABLET IN THE MORNING, 1/2 TABLET AT NOON, THEN 2 TABLETS AT BEDTIME    cyclobenzaprine (FLEXERIL) 10 MG tablet TAKE 1/2 TO 1 (ONE-HALF TO ONE) TABLET BY MOUTH THREE TIMES DAILY AS NEEDED FOR MUSCLE SPASM     diazePAM (VALIUM) 5 MG tablet TAKE 1 TABLET BY MOUTH AT BEDTIME FOR ANXIETY    diclofenac sodium (VOLTAREN) 1 % Gel Apply 2 g topically 3 (three) times daily.    HYDROcodone-acetaminophen (NORCO) 5-325 mg per tablet     lancing device Misc 1 each by Misc.(Non-Drug; Combo Route) route 3 (three) times daily as needed.    MYRBETRIQ 50 mg Tb24 Take 1 tablet by mouth once daily    NUCYNTA ER 50 mg Tb12 Take 1 tablet by mouth every 12 (twelve) hours. prn    ondansetron (ZOFRAN-ODT) 4 MG TbDL Take 1 tablet (4 mg total) by mouth every 8 (eight) hours as needed (nausea).    oxyCODONE-acetaminophen (PERCOCET) 5-325 mg per tablet Take 1 tablet by mouth every 4 (four) hours as needed for Pain.    pantoprazole (PROTONIX) 40 MG tablet Take 1 tablet (40 mg total) by mouth once daily.    potassium chloride SA (K-DUR,KLOR-CON) 20 MEQ tablet 1 tablet daily    predniSONE (DELTASONE) 5 MG tablet May take 5-15 mg (1-3 tablets) of prednisone daily for rheumatoid flares (Patient not taking: No sig reported)    PROLENSA 0.07 % Drop     rosuvastatin (CRESTOR) 20 MG tablet Take 1 tablet by mouth once daily    semaglutide (OZEMPIC) 0.25 mg or 0.5 mg(2 mg/1.5 mL) pen injector Inject 0.5 mg into the skin every 7 days.    traMADoL (ULTRAM) 50 mg tablet Take 1 tablet (50 mg total) by mouth every 6 (six) hours as needed.    upadacitinib (RINVOQ) 15 mg 24 hr tablet Take 15 mg by mouth once daily.     Current Facility-Administered Medications on File Prior to Visit   Medication    ondansetron disintegrating tablet 4 mg     Review of patient's allergies indicates:   Allergen Reactions    Valdecoxib Other (See Comments)     palpitation     Review of Systems   Constitutional: Negative for diaphoresis, malaise/fatigue and weight gain.   HENT: Negative for hoarse voice.    Eyes: Negative for double vision and visual disturbance.   Cardiovascular: Negative for chest pain, claudication, cyanosis, dyspnea on exertion, irregular heartbeat,  leg swelling, near-syncope, orthopnea, palpitations, paroxysmal nocturnal dyspnea and syncope.   Respiratory: Negative for cough, hemoptysis, shortness of breath and snoring.    Hematologic/Lymphatic: Negative for bleeding problem. Does not bruise/bleed easily.   Skin: Negative for color change and poor wound healing.   Musculoskeletal: Negative for muscle cramps, muscle weakness and myalgias.   Gastrointestinal: Positive for nausea and vomiting. Negative for bloating, abdominal pain, change in bowel habit, diarrhea, heartburn, hematemesis, hematochezia and melena.   Neurological: Negative for excessive daytime sleepiness, dizziness, headaches, light-headedness, loss of balance, numbness and weakness.   Psychiatric/Behavioral: Negative for memory loss. The patient does not have insomnia.    Allergic/Immunologic: Negative for hives.       Objective:   Physical Exam  Constitutional:       General: She is not in acute distress.     Appearance: Normal appearance. She is well-developed. She is not ill-appearing.   HENT:      Head: Normocephalic and atraumatic.   Eyes:      General: No scleral icterus.     Pupils: Pupils are equal, round, and reactive to light.   Neck:      Thyroid: No thyromegaly.      Vascular: Normal carotid pulses. No carotid bruit, hepatojugular reflux or JVD.      Trachea: No tracheal deviation.   Cardiovascular:      Rate and Rhythm: Normal rate and regular rhythm.      Pulses: Normal pulses.      Heart sounds: Normal heart sounds. No murmur heard.    No friction rub. No gallop.   Pulmonary:      Effort: Pulmonary effort is normal. No respiratory distress.      Breath sounds: Normal breath sounds. No wheezing, rhonchi or rales.   Chest:      Chest wall: No tenderness.   Abdominal:      General: Bowel sounds are normal. There is no abdominal bruit.      Palpations: Abdomen is soft. There is no hepatomegaly or pulsatile mass.      Tenderness: There is no abdominal tenderness.   Musculoskeletal:       "Right shoulder: No deformity.      Cervical back: Normal range of motion and neck supple.      Right lower leg: No edema.      Left lower leg: No edema.   Skin:     General: Skin is warm and dry.      Findings: No erythema or rash.      Nails: There is no clubbing.   Neurological:      Mental Status: She is alert and oriented to person, place, and time.      Cranial Nerves: No cranial nerve deficit.      Coordination: Coordination normal.   Psychiatric:         Speech: Speech normal.         Behavior: Behavior normal.         Judgment: Judgment normal.       Vitals:    07/13/22 1313 07/13/22 1315   BP: 118/62 122/66   BP Location: Left arm Right arm   Patient Position: Sitting Sitting   BP Method: Medium (Manual) Medium (Manual)   Pulse: 67    SpO2: 98%    Weight: 66.2 kg (145 lb 15.1 oz)    Height: 5' 5" (1.651 m)      Lab Results   Component Value Date    CHOL 189 06/10/2022    CHOL 171 11/18/2021    CHOL 248 (H) 05/12/2021     Lab Results   Component Value Date    HDL 56 06/10/2022    HDL 61 11/18/2021    HDL 56 05/12/2021     Lab Results   Component Value Date    LDLCALC 114.4 06/10/2022    LDLCALC 93.8 11/18/2021    LDLCALC 169.2 (H) 05/12/2021     Lab Results   Component Value Date    TRIG 93 06/10/2022    TRIG 81 11/18/2021    TRIG 114 05/12/2021     Lab Results   Component Value Date    CHOLHDL 29.6 06/10/2022    CHOLHDL 35.7 11/18/2021    CHOLHDL 22.6 05/12/2021       Chemistry        Component Value Date/Time     06/10/2022 0939    K 4.2 06/10/2022 0939     06/10/2022 0939    CO2 24 06/10/2022 0939    BUN 13 06/10/2022 0939    CREATININE 0.7 06/10/2022 0939    GLU 90 06/10/2022 0939        Component Value Date/Time    CALCIUM 9.2 06/10/2022 0939    ALKPHOS 57 06/10/2022 0939    AST 35 06/10/2022 0939     (H) 06/10/2022 0939    BILITOT 0.4 06/10/2022 0939    ESTGFRAFRICA >60.0 06/10/2022 0939    EGFRNONAA >60.0 06/10/2022 0939          Lab Results   Component Value Date    TSH 2.071 " 03/05/2020     Lab Results   Component Value Date    INR 0.9 10/22/2021    INR 0.9 09/06/2013    INR 0.9 09/05/2013     Lab Results   Component Value Date    WBC 7.80 05/13/2022    HGB 12.4 05/13/2022    HCT 38.1 05/13/2022    MCV 88 05/13/2022     05/13/2022     BMP  Sodium   Date Value Ref Range Status   06/10/2022 142 136 - 145 mmol/L Final     Potassium   Date Value Ref Range Status   06/10/2022 4.2 3.5 - 5.1 mmol/L Final     Chloride   Date Value Ref Range Status   06/10/2022 109 95 - 110 mmol/L Final     CO2   Date Value Ref Range Status   06/10/2022 24 23 - 29 mmol/L Final     BUN   Date Value Ref Range Status   06/10/2022 13 8 - 23 mg/dL Final     Creatinine   Date Value Ref Range Status   06/10/2022 0.7 0.5 - 1.4 mg/dL Final     Calcium   Date Value Ref Range Status   06/10/2022 9.2 8.7 - 10.5 mg/dL Final     Anion Gap   Date Value Ref Range Status   06/10/2022 9 8 - 16 mmol/L Final     eGFR if    Date Value Ref Range Status   06/10/2022 >60.0 >60 mL/min/1.73 m^2 Final     eGFR if non    Date Value Ref Range Status   06/10/2022 >60.0 >60 mL/min/1.73 m^2 Final     Comment:     Calculation used to obtain the estimated glomerular filtration  rate (eGFR) is the CKD-EPI equation.        CrCl cannot be calculated (Patient's most recent lab result is older than the maximum 7 days allowed.).  Lab Results   Component Value Date    HGBA1C 6.1 (H) 06/10/2022       Assessment:     1. Elevated coronary artery calcium score    2. Adult ADHD    3. Mixed anxiety and depressive disorder    4. Abnormal EKG    5. Essential hypertension    6. Dysphagia, unspecified type    7. SUKHDEEP (obstructive sleep apnea)    8. Ex-smoker    9. Type 2 diabetes mellitus without complication, without long-term current use of insulin      Discussed her symptoms that are mostly gi in nature ? Etiology needs gi investigation.  Her sleep apnea is mild she is not sure about using cpap.   Still issues with sodium  intake causing labile htn counseled   hlp needs compliance with diet counseled.  As far as elevated ca score will get her on asa ec 81 mg po every other day.   Plan:   Continue current therapy  Cardiac low salt diet.  Risk factor modification and excercise program.  F/u in 6 months with lipid cmp a1c   bp ledger to review.

## 2022-07-25 ENCOUNTER — TELEPHONE (OUTPATIENT)
Dept: RHEUMATOLOGY | Facility: CLINIC | Age: 72
End: 2022-07-25
Payer: MEDICARE

## 2022-07-25 ENCOUNTER — PATIENT MESSAGE (OUTPATIENT)
Dept: INTERNAL MEDICINE | Facility: CLINIC | Age: 72
End: 2022-07-25
Payer: MEDICARE

## 2022-07-25 ENCOUNTER — TELEPHONE (OUTPATIENT)
Dept: INTERNAL MEDICINE | Facility: CLINIC | Age: 72
End: 2022-07-25
Payer: MEDICARE

## 2022-07-25 DIAGNOSIS — E78.00 HYPERCHOLESTEROLEMIA: ICD-10-CM

## 2022-07-25 RX ORDER — ROSUVASTATIN CALCIUM 20 MG/1
20 TABLET, COATED ORAL DAILY
Qty: 30 TABLET | Refills: 6 | OUTPATIENT
Start: 2022-07-25

## 2022-07-25 NOTE — TELEPHONE ENCOUNTER
----- Message from Juliane Coronel sent at 7/25/2022  9:39 AM CDT -----  Contact: Self [phone]  Patient is returning a phone call.    Who left a message for the patient: MA     Does patient know what this is regarding:  sooner appt     Would you like a call back, or a response through your [facility] portal?:  call back    Comments:

## 2022-07-25 NOTE — TELEPHONE ENCOUNTER
Spoke with patient . She has lost her order for [facility] lab. Informed her that I will speak with Dr. England and get another signed order for her to take to [facility].

## 2022-07-25 NOTE — TELEPHONE ENCOUNTER
----- Message from Juliane Coronel sent at 7/25/2022  9:42 AM CDT -----  Contact: Self [phone]  Would like to receive medical advice.    Would they like a call back or a response via [facility]:  call back    Additional information:  Calling to request an order for vectra testing pt states.

## 2022-07-28 ENCOUNTER — OFFICE VISIT (OUTPATIENT)
Dept: ALLERGY | Facility: CLINIC | Age: 72
End: 2022-07-28
Payer: MEDICARE

## 2022-07-28 ENCOUNTER — LAB VISIT (OUTPATIENT)
Dept: LAB | Facility: HOSPITAL | Age: 72
End: 2022-07-28
Attending: FAMILY MEDICINE
Payer: MEDICARE

## 2022-07-28 VITALS
TEMPERATURE: 97 F | SYSTOLIC BLOOD PRESSURE: 97 MMHG | DIASTOLIC BLOOD PRESSURE: 64 MMHG | BODY MASS INDEX: 23.06 KG/M2 | WEIGHT: 138.56 LBS | HEART RATE: 84 BPM

## 2022-07-28 DIAGNOSIS — T78.40XA ALLERGY, INITIAL ENCOUNTER: ICD-10-CM

## 2022-07-28 DIAGNOSIS — J31.0 CHRONIC RHINITIS: ICD-10-CM

## 2022-07-28 DIAGNOSIS — R53.81 MALAISE: ICD-10-CM

## 2022-07-28 DIAGNOSIS — R10.9 ABDOMINAL PAIN, UNSPECIFIED ABDOMINAL LOCATION: ICD-10-CM

## 2022-07-28 DIAGNOSIS — R63.4 WEIGHT LOSS: Primary | ICD-10-CM

## 2022-07-28 DIAGNOSIS — R63.4 WEIGHT LOSS: ICD-10-CM

## 2022-07-28 LAB
AMYLASE SERPL-CCNC: 48 U/L (ref 20–110)
C4 SERPL-MCNC: 38 MG/DL (ref 11–44)
IGA SERPL-MCNC: 205 MG/DL (ref 40–350)
IGE SERPL-ACNC: 111 IU/ML (ref 0–100)
IGG SERPL-MCNC: 816 MG/DL (ref 650–1600)
IGM SERPL-MCNC: 95 MG/DL (ref 50–300)
LIPASE SERPL-CCNC: 16 U/L (ref 4–60)

## 2022-07-28 PROCEDURE — 83690 ASSAY OF LIPASE: CPT | Performed by: SPECIALIST

## 2022-07-28 PROCEDURE — 83516 IMMUNOASSAY NONANTIBODY: CPT | Performed by: SPECIALIST

## 2022-07-28 PROCEDURE — 1160F PR REVIEW ALL MEDS BY PRESCRIBER/CLIN PHARMACIST DOCUMENTED: ICD-10-PCS | Mod: CPTII,S$GLB,, | Performed by: SPECIALIST

## 2022-07-28 PROCEDURE — 82785 ASSAY OF IGE: CPT | Performed by: SPECIALIST

## 2022-07-28 PROCEDURE — 83520 IMMUNOASSAY QUANT NOS NONAB: CPT | Performed by: SPECIALIST

## 2022-07-28 PROCEDURE — 99215 PR OFFICE/OUTPT VISIT, EST, LEVL V, 40-54 MIN: ICD-10-PCS | Mod: S$GLB,,, | Performed by: SPECIALIST

## 2022-07-28 PROCEDURE — 82784 ASSAY IGA/IGD/IGG/IGM EACH: CPT | Performed by: SPECIALIST

## 2022-07-28 PROCEDURE — 1159F PR MEDICATION LIST DOCUMENTED IN MEDICAL RECORD: ICD-10-PCS | Mod: CPTII,S$GLB,, | Performed by: SPECIALIST

## 2022-07-28 PROCEDURE — 1159F MED LIST DOCD IN RCRD: CPT | Mod: CPTII,S$GLB,, | Performed by: SPECIALIST

## 2022-07-28 PROCEDURE — 99999 PR PBB SHADOW E&M-EST. PATIENT-LVL V: ICD-10-PCS | Mod: PBBFAC,,, | Performed by: SPECIALIST

## 2022-07-28 PROCEDURE — 3074F SYST BP LT 130 MM HG: CPT | Mod: CPTII,S$GLB,, | Performed by: SPECIALIST

## 2022-07-28 PROCEDURE — 3008F PR BODY MASS INDEX (BMI) DOCUMENTED: ICD-10-PCS | Mod: CPTII,S$GLB,, | Performed by: SPECIALIST

## 2022-07-28 PROCEDURE — 1101F PT FALLS ASSESS-DOCD LE1/YR: CPT | Mod: CPTII,S$GLB,, | Performed by: SPECIALIST

## 2022-07-28 PROCEDURE — 82784 ASSAY IGA/IGD/IGG/IGM EACH: CPT | Mod: 59 | Performed by: SPECIALIST

## 2022-07-28 PROCEDURE — 3008F BODY MASS INDEX DOCD: CPT | Mod: CPTII,S$GLB,, | Performed by: SPECIALIST

## 2022-07-28 PROCEDURE — 99417 PR PROLONGED SVC, OUTPT, W/WO DIRECT PT CONTACT,  EA ADDTL 15 MIN: ICD-10-PCS | Mod: S$GLB,,, | Performed by: SPECIALIST

## 2022-07-28 PROCEDURE — 1126F PR PAIN SEVERITY QUANTIFIED, NO PAIN PRESENT: ICD-10-PCS | Mod: CPTII,S$GLB,, | Performed by: SPECIALIST

## 2022-07-28 PROCEDURE — 3078F DIAST BP <80 MM HG: CPT | Mod: CPTII,S$GLB,, | Performed by: SPECIALIST

## 2022-07-28 PROCEDURE — 86160 COMPLEMENT ANTIGEN: CPT | Mod: 59 | Performed by: SPECIALIST

## 2022-07-28 PROCEDURE — 3074F PR MOST RECENT SYSTOLIC BLOOD PRESSURE < 130 MM HG: ICD-10-PCS | Mod: CPTII,S$GLB,, | Performed by: SPECIALIST

## 2022-07-28 PROCEDURE — 99215 OFFICE O/P EST HI 40 MIN: CPT | Mod: S$GLB,,, | Performed by: SPECIALIST

## 2022-07-28 PROCEDURE — 3288F FALL RISK ASSESSMENT DOCD: CPT | Mod: CPTII,S$GLB,, | Performed by: SPECIALIST

## 2022-07-28 PROCEDURE — 99999 PR PBB SHADOW E&M-EST. PATIENT-LVL V: CPT | Mod: PBBFAC,,, | Performed by: SPECIALIST

## 2022-07-28 PROCEDURE — 3044F PR MOST RECENT HEMOGLOBIN A1C LEVEL <7.0%: ICD-10-PCS | Mod: CPTII,S$GLB,, | Performed by: SPECIALIST

## 2022-07-28 PROCEDURE — 36415 COLL VENOUS BLD VENIPUNCTURE: CPT | Performed by: SPECIALIST

## 2022-07-28 PROCEDURE — 86161 COMPLEMENT/FUNCTION ACTIVITY: CPT | Performed by: SPECIALIST

## 2022-07-28 PROCEDURE — 3078F PR MOST RECENT DIASTOLIC BLOOD PRESSURE < 80 MM HG: ICD-10-PCS | Mod: CPTII,S$GLB,, | Performed by: SPECIALIST

## 2022-07-28 PROCEDURE — 99417 PROLNG OP E/M EACH 15 MIN: CPT | Mod: S$GLB,,, | Performed by: SPECIALIST

## 2022-07-28 PROCEDURE — 82150 ASSAY OF AMYLASE: CPT | Performed by: SPECIALIST

## 2022-07-28 PROCEDURE — 3044F HG A1C LEVEL LT 7.0%: CPT | Mod: CPTII,S$GLB,, | Performed by: SPECIALIST

## 2022-07-28 PROCEDURE — 1101F PR PT FALLS ASSESS DOC 0-1 FALLS W/OUT INJ PAST YR: ICD-10-PCS | Mod: CPTII,S$GLB,, | Performed by: SPECIALIST

## 2022-07-28 PROCEDURE — 1126F AMNT PAIN NOTED NONE PRSNT: CPT | Mod: CPTII,S$GLB,, | Performed by: SPECIALIST

## 2022-07-28 PROCEDURE — 3288F PR FALLS RISK ASSESSMENT DOCUMENTED: ICD-10-PCS | Mod: CPTII,S$GLB,, | Performed by: SPECIALIST

## 2022-07-28 PROCEDURE — 86160 COMPLEMENT ANTIGEN: CPT | Performed by: SPECIALIST

## 2022-07-28 PROCEDURE — 1160F RVW MEDS BY RX/DR IN RCRD: CPT | Mod: CPTII,S$GLB,, | Performed by: SPECIALIST

## 2022-07-28 NOTE — PROGRESS NOTES
Subjective:       Patient ID: Smitha Salinas is a 71 y.o. female.    Chief Complaint:  Other (Stomach issues since 2016, symptoms include nausea, constipation, gas, abdominal pain and then vomiting. These episodes have been monthly since Sept 2021 and land her in the bed 72 hours to a week./Has seen gastro and had 2 endoscopies, stool studies, stomach emptying and ER visits./Dx with RA in 2005. Rheum, Dr England./Dx with herpes in 2006./)      HPI:  New patient visit. Had had--- 10 years  history of-- having recurrent swelling of the abdomen of 10 years duration. The episodes used to be once a year , then became twice a year, and once in 3 months. Since March 2022-she had had monthly episodes-- lasting 3- 4 days.  A typical episode involves abdominal pain, rumbling, constriction of the intestines, need to lay down, can not even drink fluids, can not take care of herself-- making her worry about getting dehydrated for the following 24 hours. Will have bloating, abdominal pain, nausea , bloating and constipation.  She is also concerned about her 40 plus Lbs WEIGHT LOSS in the past several months that was attributed to Ozempic..  She was treated for asymptomatic reflux.  Her GI Eloina Castro MD did gastric emptying studies, Upper and lower endoscopy, extensive stool studies and gastric emptying studies.  For asymptomatic acid reflux, PPI was tried unsuccessfully  Has a history of recurrent Herpes Zoster-- anterior abdominal wall and oral  Lesions- REAL BAD DIAGNOSED BY HER DENTIST, even after two Shingrix vaccinations.      HAS A HISTORY OF OA / RA--  By RHEUMATOLOGIST  Dr. England.  WAS TREATED IN THE PAST WITH HUMIRA AND RINVOQ.  Ms. Salinas is very concerned about her weight loss -- 40 Lbs since 05- 09- 2022    Outpatient Medications Marked as Taking for the 7/28/22 encounter (Office Visit) with Merrick Shields MD   Medication Sig Dispense Refill    acyclovir (ZOVIRAX) 400 MG tablet Take 1 tablet (400 mg total) by  mouth 2 (two) times daily. 180 tablet 4    amLODIPine (NORVASC) 5 MG tablet Take 1 tablet (5 mg total) by mouth once daily. 90 tablet 4    aspirin (ECOTRIN) 81 MG EC tablet Take 81 mg by mouth once daily.      calcium-vitamin D (CALCIUM 600 + D,3,) 600 mg-10 mcg (400 unit) Tab Take 1 tablet by mouth 2 (two) times daily. 60 tablet 3    cloNIDine (CATAPRES) 0.2 MG tablet TAKE 1/2 TABLET IN THE MORNING, 1/2 TABLET AT NOON, THEN 2 TABLETS AT BEDTIME 270 tablet 3    cyclobenzaprine (FLEXERIL) 10 MG tablet TAKE 1/2 TO 1 (ONE-HALF TO ONE) TABLET BY MOUTH THREE TIMES DAILY AS NEEDED FOR MUSCLE SPASM 60 tablet 11    diazePAM (VALIUM) 5 MG tablet TAKE 1 TABLET BY MOUTH AT BEDTIME FOR ANXIETY 30 tablet 5    diclofenac sodium (VOLTAREN) 1 % Gel Apply 2 g topically 3 (three) times daily. 1 Tube 0    MYRBETRIQ 50 mg Tb24 Take 1 tablet by mouth once daily 30 tablet 11    NUCYNTA ER 50 mg Tb12 Take 1 tablet by mouth every 12 (twelve) hours. prn      ondansetron (ZOFRAN-ODT) 4 MG TbDL Take 1 tablet (4 mg total) by mouth every 8 (eight) hours as needed (nausea). 10 tablet 1    oxyCODONE-acetaminophen (PERCOCET) 5-325 mg per tablet Take 1 tablet by mouth every 4 (four) hours as needed for Pain.      potassium chloride SA (K-DUR,KLOR-CON) 20 MEQ tablet 1 tablet daily 90 tablet 5    rosuvastatin (CRESTOR) 20 MG tablet Take 1 tablet (20 mg total) by mouth once daily. 30 tablet 6    semaglutide (OZEMPIC) 0.25 mg or 0.5 mg(2 mg/1.5 mL) pen injector Inject 0.5 mg into the skin every 7 days. 1 pen 11    traMADoL (ULTRAM) 50 mg tablet Take 1 tablet (50 mg total) by mouth every 6 (six) hours as needed. 60 tablet 5    upadacitinib (RINVOQ) 15 mg 24 hr tablet Take 15 mg by mouth once daily.       Current Facility-Administered Medications for the 7/28/22 encounter (Office Visit) with Merrick Shields MD   Medication Dose Route Frequency Provider Last Rate Last Admin    ondansetron disintegrating tablet 4 mg  4 mg Oral Once PRN  Perez De La Fuente MD            Valdecoxib     Past Medical History:   Diagnosis Date    ADHD (attention deficit hyperactivity disorder)     [facility]    Chronic rheumatic arthritis     on DMARD    Ex-smoker     Genital herpes     Hyperlipidemia     Hypertension     Immunosuppressed status     Lichen sclerosus et atrophicus     Morbid obesity 1/11/2021    Obesity, Class I, BMI 30-34.9 12/22/2015    SUKHDEEP (obstructive sleep apnea)     Osteopenia     5/16 wai 5/18(start fmax if on pred 3months or more)    Type 2 diabetes mellitus     dxd 9/20       Family History   Problem Relation Age of Onset    Heart disease Mother     Diabetes Mother     Peripheral vascular disease Mother         amputations    Stroke Father     Kidney failure Father     Breast cancer Sister 39    Cancer Other 68        breast    Stroke Maternal Grandmother     Stroke Paternal Grandmother     Stroke Paternal Grandfather     Colon cancer Neg Hx     Ovarian cancer Neg Hx        Environmental History: Dust Mite Controls: Dust mite controls are already in place.     Review of Systems   Constitutional: Positive for fatigue. Negative for fever.   HENT: Negative.  Negative for congestion, ear pain, postnasal drip, rhinorrhea, sinus pressure, sinus pain, sneezing and sore throat.    Eyes: Negative.  Negative for redness and itching.   Respiratory: Negative.  Negative for cough, chest tightness, shortness of breath and wheezing.    Cardiovascular: Negative.  Negative for chest pain.   Gastrointestinal: Positive for abdominal pain, constipation and nausea.   Endocrine: Negative.  Negative for cold intolerance.   Genitourinary: Negative.  Negative for frequency.   Musculoskeletal: Negative.  Negative for myalgias.   Skin: Negative.  Negative for rash.   Allergic/Immunologic: Negative for environmental allergies, food allergies and immunocompromised state.   Neurological: Negative.  Negative for dizziness and  headaches.   Hematological: Negative.  Negative for adenopathy.   Psychiatric/Behavioral: Negative.  Negative for sleep disturbance.       Objective:     Visit Vitals  BP 97/64 (BP Location: Left arm, Patient Position: Sitting)   Pulse 84   Temp 97 °F (36.1 °C)   Wt 62.9 kg (138 lb 9 oz)   BMI 23.06 kg/m²       Physical Exam  Constitutional:       Appearance: Normal appearance. She is normal weight.   HENT:      Head: Normocephalic and atraumatic.      Right Ear: Tympanic membrane, ear canal and external ear normal.      Left Ear: Tympanic membrane, ear canal and external ear normal.      Nose: Nose normal.      Mouth/Throat:      Mouth: Mucous membranes are moist.      Pharynx: Oropharynx is clear.   Eyes:      Extraocular Movements: Extraocular movements intact.      Conjunctiva/sclera: Conjunctivae normal.      Pupils: Pupils are equal, round, and reactive to light.   Cardiovascular:      Rate and Rhythm: Normal rate and regular rhythm.      Pulses: Normal pulses.      Heart sounds: Normal heart sounds.   Pulmonary:      Effort: Pulmonary effort is normal.      Breath sounds: Normal breath sounds.   Abdominal:      General: Abdomen is flat. Bowel sounds are normal.      Palpations: Abdomen is soft.   Musculoskeletal:         General: Normal range of motion.      Cervical back: Normal range of motion and neck supple.   Skin:     General: Skin is warm and dry.      Capillary Refill: Capillary refill takes less than 2 seconds.   Neurological:      General: No focal deficit present.      Mental Status: She is alert and oriented to person, place, and time. Mental status is at baseline.   Psychiatric:         Mood and Affect: Mood normal.         Behavior: Behavior normal.         Thought Content: Thought content normal.         Judgment: Judgment normal.           Assessment:      1. Weight loss    2. Allergy, initial encounter    3. Abdominal pain, unspecified abdominal location    4. Malaise    5. Chronic rhinitis      6        ? ABDOMINAL MIGRAINE  7         Recurrent Herpes zoster of the anterior abdominal wall and oral Herpes-- even after two doses of Shingrix.    Plan:     Order Serum IgG, A,  M and E, C2, C4, C1 Esterase Inhibitor, Celiac disease serology, Serum Amylare, lipase and CRP AND SERUM TRYPTASE--  Need to repeat serum Tryptase , three more times during acute episodes along with C4 AND MAY BE C1 INH levels-- total and functional.  Do ultrasound abdomen.  Do CT abdomen and pelvis , if episodes are ongoing.  Look for angioedema , intestines and screen for Mast Cell Activation Syndrome.  May look for-- if yellow  Or red coloring agents are responsible for acute episodes.  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  Stay on the current medications.  Will prescribe trial of AmitrIptyline 50 mg at nights.  DURING ACUTE EPISODES GET CT OF THE ABDOMEN.  MAY DO 24 - HOUR URINE HISTAMINE, CATECHOLAMINES AND 5- HIAA.  Follow up in 4 weeks                  Problems Address                                                 Amount and/or Complexity                                                                      Risk       3           [] 2 or more self-limited or minor problems                      [] Limited                                                                        [] Low                  [] 1 stable chronic illness                                                  Any combination of the two                                               OTC drugs                  []Acute, uncomplicated illness or injury                            Review of prior external notes from unique source           Minor surgery with no risk factors                                                                                                               [] 1 []2  []3+                                                                                                              Review of results from each unique test                                                                                                                [] 1 []2  [] 3+                                                                                                              Order of each unique test                                                                                                               [] 1 []2  [] 3+                                                                                                              Or                                                                                                             [] Assessment requiring an independent historian      4            [] One or more chronic illness with exacerbation,              [] Moderate                                                                      [] Moderate                 Progression, or side effects of treatment                            -test documents or independent historians                        Prescription drug management                []  2 or more sable chronic illnesses                                    [] Independent interpretation of tests                              Minor surgery with identifiable risk                [] 1 undiagnosed new problem with uncertain prognosis    [] Discussion or management of test results                    elective major surgery                [] 1 acute illness with                systemic symptoms                                                                                                                                                              [] 1 acute complicated injury                                                                                                                                          Elective major surgery                                                                                                                                                                                                                                                                                                                                                                                                   5            [] 1 or more chronic illnesses with severe exacerbation,     [] Extensive(two from below)                                         [] High                                                                                                               [] Independent interpretation of results                         Drug therapy requiring intensive                                                                                                               []Discussion of management or test interpretation           monitoring                                                                                                                                                                                                       Decision to de-escalate care                 [] 1 acute or chronic illness or injury that poses a threat                                                                                               Decision regarding hospitalization

## 2022-07-29 ENCOUNTER — PATIENT MESSAGE (OUTPATIENT)
Dept: ALLERGY | Facility: CLINIC | Age: 72
End: 2022-07-29
Payer: MEDICARE

## 2022-07-29 LAB
C2 COMPLEMENT INTERPRETATION: NORMAL
C2 SERPL-ACNC: 47 U/ML (ref 25–47)
TRYPTASE LEVEL: 2.3 NG/ML

## 2022-08-01 ENCOUNTER — PATIENT OUTREACH (OUTPATIENT)
Dept: ADMINISTRATIVE | Facility: HOSPITAL | Age: 72
End: 2022-08-01
Payer: MEDICARE

## 2022-08-01 ENCOUNTER — PATIENT MESSAGE (OUTPATIENT)
Dept: RHEUMATOLOGY | Facility: CLINIC | Age: 72
End: 2022-08-01
Payer: MEDICARE

## 2022-08-01 DIAGNOSIS — E11.9 TYPE 2 DIABETES MELLITUS WITHOUT COMPLICATION, WITHOUT LONG-TERM CURRENT USE OF INSULIN: Primary | ICD-10-CM

## 2022-08-01 LAB
C1INH SERPL-MCNC: 41 MG/DL (ref 21–39)
GLIADIN PEPTIDE IGA SER-ACNC: 4 UNITS
GLIADIN PEPTIDE IGG SER-ACNC: 2 UNITS
IGA SERPL-MCNC: 207 MG/DL (ref 70–400)
TTG IGA SER-ACNC: 6 UNITS
TTG IGG SER-ACNC: 3 UNITS

## 2022-08-02 ENCOUNTER — PATIENT MESSAGE (OUTPATIENT)
Dept: RHEUMATOLOGY | Facility: CLINIC | Age: 72
End: 2022-08-02
Payer: MEDICARE

## 2022-08-04 ENCOUNTER — PATIENT MESSAGE (OUTPATIENT)
Dept: UROLOGY | Facility: CLINIC | Age: 72
End: 2022-08-04
Payer: MEDICARE

## 2022-08-05 ENCOUNTER — HOSPITAL ENCOUNTER (OUTPATIENT)
Dept: RADIOLOGY | Facility: HOSPITAL | Age: 72
Discharge: HOME | End: 2022-08-05
Attending: SPECIALIST
Payer: MEDICARE

## 2022-08-05 ENCOUNTER — PATIENT MESSAGE (OUTPATIENT)
Dept: OBSTETRICS AND GYNECOLOGY | Facility: CLINIC | Age: 72
End: 2022-08-05
Payer: MEDICARE

## 2022-08-05 DIAGNOSIS — R10.9 ABDOMINAL PAIN, UNSPECIFIED ABDOMINAL LOCATION: ICD-10-CM

## 2022-08-05 DIAGNOSIS — R53.81 MALAISE: ICD-10-CM

## 2022-08-05 DIAGNOSIS — Z12.31 BREAST CANCER SCREENING BY MAMMOGRAM: Primary | ICD-10-CM

## 2022-08-05 DIAGNOSIS — R63.4 WEIGHT LOSS: ICD-10-CM

## 2022-08-05 PROCEDURE — 76700 US EXAM ABDOM COMPLETE: CPT | Mod: TC

## 2022-08-05 PROCEDURE — 76700 US EXAM ABDOM COMPLETE: CPT | Mod: 26,,, | Performed by: RADIOLOGY

## 2022-08-05 PROCEDURE — 76700 US ABDOMEN COMPLETE: ICD-10-PCS | Mod: 26,,, | Performed by: RADIOLOGY

## 2022-08-09 ENCOUNTER — PATIENT MESSAGE (OUTPATIENT)
Dept: OBSTETRICS AND GYNECOLOGY | Facility: CLINIC | Age: 72
End: 2022-08-09
Payer: MEDICARE

## 2022-08-17 ENCOUNTER — PATIENT MESSAGE (OUTPATIENT)
Dept: RHEUMATOLOGY | Facility: CLINIC | Age: 72
End: 2022-08-17
Payer: MEDICARE

## 2022-08-17 ENCOUNTER — OFFICE VISIT (OUTPATIENT)
Dept: INTERNAL MEDICINE | Facility: CLINIC | Age: 72
End: 2022-08-17
Payer: MEDICARE

## 2022-08-17 VITALS
BODY MASS INDEX: 24.18 KG/M2 | DIASTOLIC BLOOD PRESSURE: 66 MMHG | WEIGHT: 145.31 LBS | TEMPERATURE: 98 F | HEART RATE: 76 BPM | SYSTOLIC BLOOD PRESSURE: 130 MMHG | OXYGEN SATURATION: 98 %

## 2022-08-17 DIAGNOSIS — E11.59 HYPERTENSION ASSOCIATED WITH DIABETES: Primary | ICD-10-CM

## 2022-08-17 DIAGNOSIS — I15.2 HYPERTENSION ASSOCIATED WITH DIABETES: Primary | ICD-10-CM

## 2022-08-17 DIAGNOSIS — E11.9 TYPE 2 DIABETES MELLITUS WITHOUT COMPLICATION, WITHOUT LONG-TERM CURRENT USE OF INSULIN: ICD-10-CM

## 2022-08-17 PROCEDURE — 3288F FALL RISK ASSESSMENT DOCD: CPT | Mod: CPTII,S$GLB,, | Performed by: FAMILY MEDICINE

## 2022-08-17 PROCEDURE — 1101F PT FALLS ASSESS-DOCD LE1/YR: CPT | Mod: CPTII,S$GLB,, | Performed by: FAMILY MEDICINE

## 2022-08-17 PROCEDURE — 3078F DIAST BP <80 MM HG: CPT | Mod: CPTII,S$GLB,, | Performed by: FAMILY MEDICINE

## 2022-08-17 PROCEDURE — 1160F RVW MEDS BY RX/DR IN RCRD: CPT | Mod: CPTII,S$GLB,, | Performed by: FAMILY MEDICINE

## 2022-08-17 PROCEDURE — 1126F AMNT PAIN NOTED NONE PRSNT: CPT | Mod: CPTII,S$GLB,, | Performed by: FAMILY MEDICINE

## 2022-08-17 PROCEDURE — 99999 PR PBB SHADOW E&M-EST. PATIENT-LVL IV: CPT | Mod: PBBFAC,,, | Performed by: FAMILY MEDICINE

## 2022-08-17 PROCEDURE — 3288F PR FALLS RISK ASSESSMENT DOCUMENTED: ICD-10-PCS | Mod: CPTII,S$GLB,, | Performed by: FAMILY MEDICINE

## 2022-08-17 PROCEDURE — 99214 OFFICE O/P EST MOD 30 MIN: CPT | Mod: S$GLB,,, | Performed by: FAMILY MEDICINE

## 2022-08-17 PROCEDURE — 3075F SYST BP GE 130 - 139MM HG: CPT | Mod: CPTII,S$GLB,, | Performed by: FAMILY MEDICINE

## 2022-08-17 PROCEDURE — 1159F MED LIST DOCD IN RCRD: CPT | Mod: CPTII,S$GLB,, | Performed by: FAMILY MEDICINE

## 2022-08-17 PROCEDURE — 99214 PR OFFICE/OUTPT VISIT, EST, LEVL IV, 30-39 MIN: ICD-10-PCS | Mod: S$GLB,,, | Performed by: FAMILY MEDICINE

## 2022-08-17 PROCEDURE — 1159F PR MEDICATION LIST DOCUMENTED IN MEDICAL RECORD: ICD-10-PCS | Mod: CPTII,S$GLB,, | Performed by: FAMILY MEDICINE

## 2022-08-17 PROCEDURE — 99499 RISK ADDL DX/OHS AUDIT: ICD-10-PCS | Mod: S$GLB,,, | Performed by: FAMILY MEDICINE

## 2022-08-17 PROCEDURE — 1126F PR PAIN SEVERITY QUANTIFIED, NO PAIN PRESENT: ICD-10-PCS | Mod: CPTII,S$GLB,, | Performed by: FAMILY MEDICINE

## 2022-08-17 PROCEDURE — 3008F BODY MASS INDEX DOCD: CPT | Mod: CPTII,S$GLB,, | Performed by: FAMILY MEDICINE

## 2022-08-17 PROCEDURE — 3044F PR MOST RECENT HEMOGLOBIN A1C LEVEL <7.0%: ICD-10-PCS | Mod: CPTII,S$GLB,, | Performed by: FAMILY MEDICINE

## 2022-08-17 PROCEDURE — 3044F HG A1C LEVEL LT 7.0%: CPT | Mod: CPTII,S$GLB,, | Performed by: FAMILY MEDICINE

## 2022-08-17 PROCEDURE — 99999 PR PBB SHADOW E&M-EST. PATIENT-LVL IV: ICD-10-PCS | Mod: PBBFAC,,, | Performed by: FAMILY MEDICINE

## 2022-08-17 PROCEDURE — 3008F PR BODY MASS INDEX (BMI) DOCUMENTED: ICD-10-PCS | Mod: CPTII,S$GLB,, | Performed by: FAMILY MEDICINE

## 2022-08-17 PROCEDURE — 3078F PR MOST RECENT DIASTOLIC BLOOD PRESSURE < 80 MM HG: ICD-10-PCS | Mod: CPTII,S$GLB,, | Performed by: FAMILY MEDICINE

## 2022-08-17 PROCEDURE — 99499 UNLISTED E&M SERVICE: CPT | Mod: S$GLB,,, | Performed by: FAMILY MEDICINE

## 2022-08-17 PROCEDURE — 1160F PR REVIEW ALL MEDS BY PRESCRIBER/CLIN PHARMACIST DOCUMENTED: ICD-10-PCS | Mod: CPTII,S$GLB,, | Performed by: FAMILY MEDICINE

## 2022-08-17 PROCEDURE — 1101F PR PT FALLS ASSESS DOC 0-1 FALLS W/OUT INJ PAST YR: ICD-10-PCS | Mod: CPTII,S$GLB,, | Performed by: FAMILY MEDICINE

## 2022-08-17 PROCEDURE — 3075F PR MOST RECENT SYSTOLIC BLOOD PRESS GE 130-139MM HG: ICD-10-PCS | Mod: CPTII,S$GLB,, | Performed by: FAMILY MEDICINE

## 2022-08-17 RX ORDER — LOSARTAN POTASSIUM 50 MG/1
50 TABLET ORAL DAILY
Qty: 90 TABLET | Refills: 1 | Status: SHIPPED | OUTPATIENT
Start: 2022-08-17 | End: 2023-01-03

## 2022-08-17 NOTE — PROGRESS NOTES
Subjective:       Patient ID: Smitha Salinas is a 71 y.o. female.    Chief Complaint: No chief complaint on file.    71-year-old female patient Dr. Jaramillo with Patient Active Problem List:     Hypertension associated with diabetes     Hypercholesterolemia     Post herpetic neuralgia     Seropositive rheumatoid arthritis     Postmenopausal atrophic vaginitis     Lichen sclerosus et atrophicus     Diverticulosis     Herpes genitalis     Overactive bladder     Immunosuppressed status     Ex-smoker     SUKHDEEP (obstructive sleep apnea)     Mixed anxiety and depressive disorder     Colon polyps     Adult ADHD     Osteopenia of multiple sites     Dysphagia     Urge incontinence     Type 2 diabetes mellitus     Abnormal EKG     Osteoarthritis of foot joint     Osteoarthritis of thumb     Radicular low back pain     Poor sleep hygiene     Elevated coronary artery calcium score     Thrombocytopenia, unspecified     Primary osteoarthritis involving multiple joints     High risk medication use     Allergies     Abdominal pain     Weight loss     Malaise     Chronic rhinitis   Reports that patient has been having fluctuating blood pressures lately occasionally running up to 200/1 20s for which patient has increased clonidine from 0.2 mg 3 tablets daily at bedtime to 4 tablets daily at bedtime and has been taking amlodipine 5 mg in the morning.  Patient denies any extreme anxiety but was concerned about fluctuating blood pressures lately.   Sugars have been running in the range of 120s to 130s in the morning and patient has discontinued taking Ozempic as she has been losing weight more than she expected.       Review of Systems   Constitutional: Positive for unexpected weight change. Negative for fatigue.   Eyes: Negative for visual disturbance.   Respiratory: Negative for shortness of breath.    Cardiovascular: Negative for chest pain and leg swelling.   Gastrointestinal: Negative for abdominal pain, nausea and vomiting.    Musculoskeletal: Negative for myalgias.   Skin: Negative for rash.   Neurological: Negative for dizziness, light-headedness and headaches.   Psychiatric/Behavioral: Negative for sleep disturbance. The patient is not nervous/anxious.          /66 (BP Location: Left arm, Patient Position: Sitting, BP Method: Medium (Manual))   Pulse 76   Temp 98 °F (36.7 °C) (Tympanic)   Wt 65.9 kg (145 lb 4.5 oz)   SpO2 98%   BMI 24.18 kg/m²   Objective:      Physical Exam  Constitutional:       Appearance: She is well-developed.   HENT:      Head: Normocephalic and atraumatic.   Cardiovascular:      Rate and Rhythm: Normal rate and regular rhythm.      Heart sounds: Normal heart sounds. No murmur heard.  Pulmonary:      Effort: Pulmonary effort is normal.      Breath sounds: Normal breath sounds. No wheezing.   Abdominal:      General: Bowel sounds are normal.      Palpations: Abdomen is soft.      Tenderness: There is no abdominal tenderness.   Skin:     General: Skin is warm and dry.      Findings: No rash.   Neurological:      Mental Status: She is alert and oriented to person, place, and time.   Psychiatric:         Mood and Affect: Mood normal.           Assessment/Plan:   1. Hypertension associated with diabetes  - losartan (COZAAR) 50 MG tablet; Take 1 tablet (50 mg total) by mouth once daily.  Dispense: 90 tablet; Refill: 1  Secondary to underlying diabetes patient has not been on Ace/Arb-will get started on losartan 50 mg in the afternoon and patient to continue amlodipine 5 mg in the morning and decrease clonidine 0.2 mg 2 tablets in the evening  Continue monitoring blood pressure trends and readjust blood pressure medication if needed  Patient has appointment with PCP in 2 weeks  Encouraged avoid stress  Advised to keep blood pressures in the range less than 140/90  Restrict salt intake    2. Type 2 diabetes mellitus without complication, without long-term current use of insulin   Noted stable A1c at  6.1  Does not want to continue Ozempic secondary to weight loss  Will continue with diet modifications

## 2022-08-26 ENCOUNTER — PATIENT OUTREACH (OUTPATIENT)
Dept: ADMINISTRATIVE | Facility: HOSPITAL | Age: 72
End: 2022-08-26
Payer: MEDICARE

## 2022-08-26 NOTE — PROGRESS NOTES
[facility] STATIN REPORT: Called and spoke with patient. She confirmed she is taking rosuvastatin 20 mg daily. Called and verified with pharmacy that patient is filling medication. They confirmed last fill 8/24/22.

## 2022-08-29 ENCOUNTER — PATIENT MESSAGE (OUTPATIENT)
Dept: UROLOGY | Facility: CLINIC | Age: 72
End: 2022-08-29
Payer: MEDICARE

## 2022-08-31 ENCOUNTER — TELEPHONE (OUTPATIENT)
Dept: ALLERGY | Facility: CLINIC | Age: 72
End: 2022-08-31
Payer: MEDICARE

## 2022-08-31 ENCOUNTER — PATIENT MESSAGE (OUTPATIENT)
Dept: ALLERGY | Facility: CLINIC | Age: 72
End: 2022-08-31
Payer: MEDICARE

## 2022-08-31 NOTE — TELEPHONE ENCOUNTER
----- Message from Latasha Lamb sent at 8/31/2022  9:25 AM CDT -----  Type:  Patient Returning Call    Who Called: pt  Who Left Message for Patient:nurse  Does the patient know what this is regarding?:return call  Would the patient rather a call back or a response via [employer]? call  Best Call Back Number:[phone]  Additional Information: .    Thank you

## 2022-08-31 NOTE — TELEPHONE ENCOUNTER
Called pt back and left a vm informing her that the appt with Paulette was canceled today because she stated she didn't want to see a PA or pay one the same copay as a MD. Advised her to call back if she wanted to be added to the schedule again and it will not be a problem to do so.

## 2022-08-31 NOTE — TELEPHONE ENCOUNTER
----- Message from Patt Clinton sent at 8/31/2022  8:26 AM CDT -----  Contact: [phone]  Patient would like to consult with a nurse in regards to her scheduled appt that was canceled. She also stated she was scheduled for an appt this afternoon for a nurse visit that was canceled as well. Please give her a call back at [phone]. Thanks briana

## 2022-08-31 NOTE — TELEPHONE ENCOUNTER
----- Message from Dalila Sullivan sent at 8/31/2022  8:36 AM CDT -----  Contact: Smitha  Type:  Patient Returning Call    Who Called: Smitha  Who Left Message for Patient:Nurse  Does the patient know what this is regarding?: Getting put on schedule for today at 3:30when she was scheduled  Would the patient rather a call back or a response via [employer]? Call  Best Call Back Number: [phone]  Additional Information: She stated if she doesn't answer to call a second time

## 2022-09-01 ENCOUNTER — PATIENT OUTREACH (OUTPATIENT)
Dept: ADMINISTRATIVE | Facility: HOSPITAL | Age: 72
End: 2022-09-01
Payer: MEDICARE

## 2022-09-02 ENCOUNTER — TELEPHONE (OUTPATIENT)
Dept: UROLOGY | Facility: CLINIC | Age: 72
End: 2022-09-02
Payer: MEDICARE

## 2022-09-02 NOTE — TELEPHONE ENCOUNTER
"I returned call to pt as she requested. I asked pt how could I be of assistance to her and she began yelling and cursing at me, asking "did you read the note?  Did you read the note?".  I replied by telling her that I was reading the note and it only said that pt was requesting a call back.  She continued to yell and stated that I obviously did not read the note.  She kept overtalking me and would not let me speak. She was very upset.  I tried to diffuse her but she continued to shout. She continued cursing and I told her that I was sorry she was having a difficult time; & that I would be happy to help her. She would not hear me. I told her that I noticed that she had an appt in October and tried to ask her if she wanted a sooner appt.  She continued talking over me and continued to yell.  I politely told pt that I was not going to continue the call with her speaking to me in the manner that she was and ended the conversation.  "
negative - no pain, no limited range of motion

## 2022-09-06 ENCOUNTER — LAB VISIT (OUTPATIENT)
Dept: LAB | Facility: HOSPITAL | Age: 72
End: 2022-09-06
Attending: FAMILY MEDICINE
Payer: MEDICARE

## 2022-09-06 ENCOUNTER — OFFICE VISIT (OUTPATIENT)
Dept: INTERNAL MEDICINE | Facility: CLINIC | Age: 72
End: 2022-09-06
Payer: MEDICARE

## 2022-09-06 ENCOUNTER — OFFICE VISIT (OUTPATIENT)
Dept: ALLERGY | Facility: CLINIC | Age: 72
End: 2022-09-06
Payer: MEDICARE

## 2022-09-06 ENCOUNTER — TELEPHONE (OUTPATIENT)
Dept: UROLOGY | Facility: CLINIC | Age: 72
End: 2022-09-06
Payer: MEDICARE

## 2022-09-06 VITALS
HEIGHT: 65 IN | BODY MASS INDEX: 24.1 KG/M2 | HEART RATE: 63 BPM | DIASTOLIC BLOOD PRESSURE: 77 MMHG | SYSTOLIC BLOOD PRESSURE: 134 MMHG | TEMPERATURE: 98 F | WEIGHT: 144.63 LBS

## 2022-09-06 VITALS
DIASTOLIC BLOOD PRESSURE: 62 MMHG | WEIGHT: 145.5 LBS | OXYGEN SATURATION: 96 % | BODY MASS INDEX: 24.21 KG/M2 | HEART RATE: 68 BPM | TEMPERATURE: 98 F | SYSTOLIC BLOOD PRESSURE: 130 MMHG

## 2022-09-06 DIAGNOSIS — M05.9 SEROPOSITIVE RHEUMATOID ARTHRITIS: ICD-10-CM

## 2022-09-06 DIAGNOSIS — I15.2 HYPERTENSION ASSOCIATED WITH DIABETES: ICD-10-CM

## 2022-09-06 DIAGNOSIS — R10.9 ABDOMINAL PAIN, UNSPECIFIED ABDOMINAL LOCATION: ICD-10-CM

## 2022-09-06 DIAGNOSIS — E11.9 TYPE 2 DIABETES MELLITUS WITHOUT COMPLICATION, WITHOUT LONG-TERM CURRENT USE OF INSULIN: Primary | ICD-10-CM

## 2022-09-06 DIAGNOSIS — G43.D0 ABDOMINAL MIGRAINE, NOT INTRACTABLE: Primary | ICD-10-CM

## 2022-09-06 DIAGNOSIS — E11.59 HYPERTENSION ASSOCIATED WITH DIABETES: ICD-10-CM

## 2022-09-06 LAB
ANION GAP SERPL CALC-SCNC: 11 MMOL/L (ref 8–16)
BUN SERPL-MCNC: 11 MG/DL (ref 8–23)
CALCIUM SERPL-MCNC: 10.2 MG/DL (ref 8.7–10.5)
CHLORIDE SERPL-SCNC: 109 MMOL/L (ref 95–110)
CO2 SERPL-SCNC: 24 MMOL/L (ref 23–29)
CREAT SERPL-MCNC: 0.8 MG/DL (ref 0.5–1.4)
EST. GFR  (NO RACE VARIABLE): >60 ML/MIN/1.73 M^2
GLUCOSE SERPL-MCNC: 108 MG/DL (ref 70–110)
POTASSIUM SERPL-SCNC: 3.8 MMOL/L (ref 3.5–5.1)
SODIUM SERPL-SCNC: 144 MMOL/L (ref 136–145)

## 2022-09-06 PROCEDURE — 1125F AMNT PAIN NOTED PAIN PRSNT: CPT | Mod: CPTII,S$GLB,, | Performed by: ALLERGY & IMMUNOLOGY

## 2022-09-06 PROCEDURE — 4010F ACE/ARB THERAPY RXD/TAKEN: CPT | Mod: CPTII,S$GLB,, | Performed by: FAMILY MEDICINE

## 2022-09-06 PROCEDURE — 99214 OFFICE O/P EST MOD 30 MIN: CPT | Mod: S$GLB,,, | Performed by: ALLERGY & IMMUNOLOGY

## 2022-09-06 PROCEDURE — 1159F PR MEDICATION LIST DOCUMENTED IN MEDICAL RECORD: ICD-10-PCS | Mod: CPTII,S$GLB,, | Performed by: ALLERGY & IMMUNOLOGY

## 2022-09-06 PROCEDURE — 1159F PR MEDICATION LIST DOCUMENTED IN MEDICAL RECORD: ICD-10-PCS | Mod: CPTII,S$GLB,, | Performed by: FAMILY MEDICINE

## 2022-09-06 PROCEDURE — 3044F PR MOST RECENT HEMOGLOBIN A1C LEVEL <7.0%: ICD-10-PCS | Mod: CPTII,S$GLB,, | Performed by: FAMILY MEDICINE

## 2022-09-06 PROCEDURE — 99214 PR OFFICE/OUTPT VISIT, EST, LEVL IV, 30-39 MIN: ICD-10-PCS | Mod: S$GLB,,, | Performed by: ALLERGY & IMMUNOLOGY

## 2022-09-06 PROCEDURE — 1159F MED LIST DOCD IN RCRD: CPT | Mod: CPTII,S$GLB,, | Performed by: FAMILY MEDICINE

## 2022-09-06 PROCEDURE — 99214 OFFICE O/P EST MOD 30 MIN: CPT | Mod: S$GLB,,, | Performed by: FAMILY MEDICINE

## 2022-09-06 PROCEDURE — 3075F PR MOST RECENT SYSTOLIC BLOOD PRESS GE 130-139MM HG: ICD-10-PCS | Mod: CPTII,S$GLB,, | Performed by: ALLERGY & IMMUNOLOGY

## 2022-09-06 PROCEDURE — 3078F PR MOST RECENT DIASTOLIC BLOOD PRESSURE < 80 MM HG: ICD-10-PCS | Mod: CPTII,S$GLB,, | Performed by: FAMILY MEDICINE

## 2022-09-06 PROCEDURE — 99999 PR PBB SHADOW E&M-EST. PATIENT-LVL IV: ICD-10-PCS | Mod: PBBFAC,,, | Performed by: ALLERGY & IMMUNOLOGY

## 2022-09-06 PROCEDURE — 80048 BASIC METABOLIC PNL TOTAL CA: CPT | Mod: PO | Performed by: FAMILY MEDICINE

## 2022-09-06 PROCEDURE — 4010F PR ACE/ARB THEARPY RXD/TAKEN: ICD-10-PCS | Mod: CPTII,S$GLB,, | Performed by: FAMILY MEDICINE

## 2022-09-06 PROCEDURE — 4010F PR ACE/ARB THEARPY RXD/TAKEN: ICD-10-PCS | Mod: CPTII,S$GLB,, | Performed by: ALLERGY & IMMUNOLOGY

## 2022-09-06 PROCEDURE — 3044F HG A1C LEVEL LT 7.0%: CPT | Mod: CPTII,S$GLB,, | Performed by: FAMILY MEDICINE

## 2022-09-06 PROCEDURE — 3008F PR BODY MASS INDEX (BMI) DOCUMENTED: ICD-10-PCS | Mod: CPTII,S$GLB,, | Performed by: ALLERGY & IMMUNOLOGY

## 2022-09-06 PROCEDURE — 3008F BODY MASS INDEX DOCD: CPT | Mod: CPTII,S$GLB,, | Performed by: ALLERGY & IMMUNOLOGY

## 2022-09-06 PROCEDURE — 3075F SYST BP GE 130 - 139MM HG: CPT | Mod: CPTII,S$GLB,, | Performed by: ALLERGY & IMMUNOLOGY

## 2022-09-06 PROCEDURE — 3008F PR BODY MASS INDEX (BMI) DOCUMENTED: ICD-10-PCS | Mod: CPTII,S$GLB,, | Performed by: FAMILY MEDICINE

## 2022-09-06 PROCEDURE — 36415 COLL VENOUS BLD VENIPUNCTURE: CPT | Mod: PO | Performed by: FAMILY MEDICINE

## 2022-09-06 PROCEDURE — 3078F DIAST BP <80 MM HG: CPT | Mod: CPTII,S$GLB,, | Performed by: ALLERGY & IMMUNOLOGY

## 2022-09-06 PROCEDURE — 4010F ACE/ARB THERAPY RXD/TAKEN: CPT | Mod: CPTII,S$GLB,, | Performed by: ALLERGY & IMMUNOLOGY

## 2022-09-06 PROCEDURE — 1159F MED LIST DOCD IN RCRD: CPT | Mod: CPTII,S$GLB,, | Performed by: ALLERGY & IMMUNOLOGY

## 2022-09-06 PROCEDURE — 99214 PR OFFICE/OUTPT VISIT, EST, LEVL IV, 30-39 MIN: ICD-10-PCS | Mod: S$GLB,,, | Performed by: FAMILY MEDICINE

## 2022-09-06 PROCEDURE — 99999 PR PBB SHADOW E&M-EST. PATIENT-LVL V: ICD-10-PCS | Mod: PBBFAC,,, | Performed by: FAMILY MEDICINE

## 2022-09-06 PROCEDURE — 1126F AMNT PAIN NOTED NONE PRSNT: CPT | Mod: CPTII,S$GLB,, | Performed by: FAMILY MEDICINE

## 2022-09-06 PROCEDURE — 3078F DIAST BP <80 MM HG: CPT | Mod: CPTII,S$GLB,, | Performed by: FAMILY MEDICINE

## 2022-09-06 PROCEDURE — 1125F PR PAIN SEVERITY QUANTIFIED, PAIN PRESENT: ICD-10-PCS | Mod: CPTII,S$GLB,, | Performed by: ALLERGY & IMMUNOLOGY

## 2022-09-06 PROCEDURE — 3044F PR MOST RECENT HEMOGLOBIN A1C LEVEL <7.0%: ICD-10-PCS | Mod: CPTII,S$GLB,, | Performed by: ALLERGY & IMMUNOLOGY

## 2022-09-06 PROCEDURE — 3044F HG A1C LEVEL LT 7.0%: CPT | Mod: CPTII,S$GLB,, | Performed by: ALLERGY & IMMUNOLOGY

## 2022-09-06 PROCEDURE — 3288F PR FALLS RISK ASSESSMENT DOCUMENTED: ICD-10-PCS | Mod: CPTII,S$GLB,, | Performed by: ALLERGY & IMMUNOLOGY

## 2022-09-06 PROCEDURE — 1100F PR PT FALLS ASSESS DOC 2+ FALLS/FALL W/INJURY/YR: ICD-10-PCS | Mod: CPTII,S$GLB,, | Performed by: ALLERGY & IMMUNOLOGY

## 2022-09-06 PROCEDURE — 3078F PR MOST RECENT DIASTOLIC BLOOD PRESSURE < 80 MM HG: ICD-10-PCS | Mod: CPTII,S$GLB,, | Performed by: ALLERGY & IMMUNOLOGY

## 2022-09-06 PROCEDURE — 3075F SYST BP GE 130 - 139MM HG: CPT | Mod: CPTII,S$GLB,, | Performed by: FAMILY MEDICINE

## 2022-09-06 PROCEDURE — 3008F BODY MASS INDEX DOCD: CPT | Mod: CPTII,S$GLB,, | Performed by: FAMILY MEDICINE

## 2022-09-06 PROCEDURE — 99999 PR PBB SHADOW E&M-EST. PATIENT-LVL V: CPT | Mod: PBBFAC,,, | Performed by: FAMILY MEDICINE

## 2022-09-06 PROCEDURE — 1100F PTFALLS ASSESS-DOCD GE2>/YR: CPT | Mod: CPTII,S$GLB,, | Performed by: ALLERGY & IMMUNOLOGY

## 2022-09-06 PROCEDURE — 1101F PT FALLS ASSESS-DOCD LE1/YR: CPT | Mod: CPTII,S$GLB,, | Performed by: FAMILY MEDICINE

## 2022-09-06 PROCEDURE — 3075F PR MOST RECENT SYSTOLIC BLOOD PRESS GE 130-139MM HG: ICD-10-PCS | Mod: CPTII,S$GLB,, | Performed by: FAMILY MEDICINE

## 2022-09-06 PROCEDURE — 3288F PR FALLS RISK ASSESSMENT DOCUMENTED: ICD-10-PCS | Mod: CPTII,S$GLB,, | Performed by: FAMILY MEDICINE

## 2022-09-06 PROCEDURE — 1101F PR PT FALLS ASSESS DOC 0-1 FALLS W/OUT INJ PAST YR: ICD-10-PCS | Mod: CPTII,S$GLB,, | Performed by: FAMILY MEDICINE

## 2022-09-06 PROCEDURE — 3288F FALL RISK ASSESSMENT DOCD: CPT | Mod: CPTII,S$GLB,, | Performed by: ALLERGY & IMMUNOLOGY

## 2022-09-06 PROCEDURE — 99999 PR PBB SHADOW E&M-EST. PATIENT-LVL IV: CPT | Mod: PBBFAC,,, | Performed by: ALLERGY & IMMUNOLOGY

## 2022-09-06 PROCEDURE — 1126F PR PAIN SEVERITY QUANTIFIED, NO PAIN PRESENT: ICD-10-PCS | Mod: CPTII,S$GLB,, | Performed by: FAMILY MEDICINE

## 2022-09-06 PROCEDURE — 3288F FALL RISK ASSESSMENT DOCD: CPT | Mod: CPTII,S$GLB,, | Performed by: FAMILY MEDICINE

## 2022-09-06 RX ORDER — DOXEPIN HYDROCHLORIDE 10 MG/1
10 CAPSULE ORAL NIGHTLY
Qty: 30 CAPSULE | Refills: 11 | Status: ON HOLD | OUTPATIENT
Start: 2022-09-06 | End: 2023-03-27

## 2022-09-06 NOTE — PROGRESS NOTES
Subjective:      Patient ID: Smitha Salinas is a 72 y.o. female.    Chief Complaint: Follow-up    HPIhtn started losart couple weeks ago    Dm a1c ok couple months . Stopped ozempc sec to losing more wt than expected; stopped 0.25 mg weekly 3-4 w/a and some gain since. ; gluc stable     Years infreq left lat abd pain couple hours w n/v. Gi eval ok include abd u/s. Seeing allergy via her idea poss food related.;d/wd diff to eval cause sof sympt since happens every couple months.   Past Medical History:   Diagnosis Date    ADHD (attention deficit hyperactivity disorder)     Adult ADHD     [facility]    Chronic rheumatic arthritis     on DMARD    Ex-smoker     Genital herpes     Hyperlipidemia     Hypertension     Immunosuppressed status     Lichen sclerosus et atrophicus     Morbid obesity 1/11/2021    Obesity, Class I, BMI 30-34.9 12/22/2015    SUKHDEEP (obstructive sleep apnea)     Osteopenia     5/16 wai 5/18(start fmax if on pred 3months or more)    Type 2 diabetes mellitus     dxd 9/20      Past Surgical History:   Procedure Laterality Date    BREAST BIOPSY Left     benign    COLONOSCOPY N/A 2/12/2020    Procedure: COLONOSCOPY;  Surgeon: Eloina Castro MD;  Location: [facility];  Service: Endoscopy;  Laterality: N/A;    CRYOTHERAPY  1980    ESOPHAGOGASTRODUODENOSCOPY N/A 2/12/2020    Procedure: EGD (ESOPHAGOGASTRODUODENOSCOPY);  Surgeon: Eloina Castro MD;  Location: [facility];  Service: Endoscopy;  Laterality: N/A;    INSERTION OF SACRAL NEUROSTIMULATOR GENERATOR Left 11/23/2020    Procedure: INSERTION, PULSE GENERATOR, NEUROSTIMULATOR, SACRAL;  Surgeon: Pablo Rawls MD;  Location: [facility];  Service: Urology;  Laterality: Left;    INSERTION OF SACRAL NEUROSTIMULATOR, ELECTRODES, AND IMPLANTABLE PULSE GENERATOR (IPG) Left 11/23/2020    Procedure: INSERTION, ELECTRODE LEADS AND PULSE GENERATOR, NEUROSTIMULATOR, SACRAL;  Surgeon: Pablo Rawls MD;  Location: [facility];  Service: Urology;   Laterality: Left;    SKIN GRAFT      laceration to right  fingers  from thigh     TONSILLECTOMY        Social History     Socioeconomic History    Marital status:     Number of children: 1   Occupational History    Occupation:      Employer: [employer]    Tobacco Use    Smoking status: Former     Packs/day: 0.25     Years: 15.00     Pack years: 3.75     Types: Cigarettes     Quit date: 2013     Years since quittin.1    Smokeless tobacco: Never    Tobacco comments:     smokes for a few weeks per year - when under pressure. has been abstinent times years at a time. a pack lasts about a week   Substance and Sexual Activity    Alcohol use: Yes     Alcohol/week: 0.0 standard drinks    Drug use: No    Sexual activity: Not Currently     Birth control/protection: Post-menopausal   Social History Narrative    Lives alone. Caffeine intake rare -1-2 per week of coffee. Does not have a Living Will or Advanced Directive      Family History   Problem Relation Age of Onset    Heart disease Mother     Diabetes Mother     Peripheral vascular disease Mother         amputations    Stroke Father     Kidney failure Father     Breast cancer Sister 39    Cancer Other 68        breast    Stroke Maternal Grandmother     Stroke Paternal Grandmother     Stroke Paternal Grandfather     Colon cancer Neg Hx     Ovarian cancer Neg Hx       Review of Systems        Objective:     Physical Exam  Vitals and nursing note reviewed.   Constitutional:       Appearance: She is well-developed.   HENT:      Head: Normocephalic and atraumatic.   Neck:      Vascular: No carotid bruit.   Cardiovascular:      Rate and Rhythm: Normal rate and regular rhythm.   Pulmonary:      Effort: Pulmonary effort is normal.      Breath sounds: Normal breath sounds.   Abdominal:      General: Bowel sounds are normal. There is no distension.      Palpations: Abdomen is soft. There is no mass.      Tenderness: There is no abdominal  tenderness. There is no guarding or rebound.   Musculoskeletal:      Cervical back: Normal range of motion and neck supple.   Lymphadenopathy:      Cervical: No cervical adenopathy.   Skin:     General: Skin is warm and dry.   Neurological:      Mental Status: She is alert and oriented to person, place, and time.   Psychiatric:         Behavior: Behavior normal.         Judgment: Judgment normal.   Bilateral feet with normal monofilament testing and no lesions.  Bilat dors pedis pulses 2+     Assessment:         ICD-10-CM ICD-9-CM   1. Type 2 diabetes mellitus without complication, without long-term current use of insulin  E11.9 250.00   2. Hypertension associated with diabetes  E11.59 250.80    I15.2 401.9   3. Seropositive rheumatoid arthritis  M05.9 714.0      Plan:      F/u specialist as schd   See prior note at nov f/u . Note also has appt dr raymond next spring   Consider food diary    Type 2 diabetes mellitus without complication, without long-term current use of insulin    Hypertension associated with diabetes  -     Basic Metabolic Panel; Future; Expected date: 09/06/2022 s/p losrtan start    Seropositive rheumatoid arthritis    Abdominal pain, unspecified abdominal location  -     Urinalysis; Future; Expected date: 09/06/2022

## 2022-09-06 NOTE — PROGRESS NOTES
Subjective:       Patient ID: Smitha Salinas is a 72 y.o. female.    Chief Complaint:  Follow-up      HPI: 72 year old female here for follow up. She was last seen by Dr. Shields on 7/28/2022 and felt to have an abdominal migraine. Tests for pancreatic disease, celiac, MCAS, Hereditary angioedema and autoimmune disease were negative.  Abdominal pain and vomiting 2-3 times a week.  She reports taking tramadol intermittently.          Past Medical History:   Diagnosis Date    ADHD (attention deficit hyperactivity disorder)     Adult ADHD     [facility]    Chronic rheumatic arthritis     on DMARD    Ex-smoker     Genital herpes     Hyperlipidemia     Hypertension     Immunosuppressed status     Lichen sclerosus et atrophicus     Morbid obesity 1/11/2021    Obesity, Class I, BMI 30-34.9 12/22/2015    SUKHDEEP (obstructive sleep apnea)     Osteopenia     5/16 wai 5/18(start fmax if on pred 3months or more)    Type 2 diabetes mellitus     dxd 9/20        Family History   Problem Relation Age of Onset    Heart disease Mother     Diabetes Mother     Peripheral vascular disease Mother         amputations    Stroke Father     Kidney failure Father     Breast cancer Sister 39    Cancer Other 68        breast    Stroke Maternal Grandmother     Stroke Paternal Grandmother     Stroke Paternal Grandfather     Colon cancer Neg Hx     Ovarian cancer Neg Hx         Current Outpatient Medications on File Prior to Visit   Medication Sig Dispense Refill    acyclovir (ZOVIRAX) 400 MG tablet Take 1 tablet (400 mg total) by mouth 2 (two) times daily. 180 tablet 4    amLODIPine (NORVASC) 5 MG tablet Take 1 tablet (5 mg total) by mouth once daily. 90 tablet 4    aspirin (ECOTRIN) 81 MG EC tablet Take 81 mg by mouth once daily.      calcium-vitamin D (CALCIUM 600 + D,3,) 600 mg-10 mcg (400 unit) Tab Take 1 tablet by mouth 2 (two) times daily. 60 tablet 3    cloNIDine (CATAPRES) 0.2 MG tablet TAKE 1/2 TABLET IN THE MORNING, 1/2 TABLET AT NOON,  THEN 2 TABLETS AT BEDTIME 270 tablet 3    cyclobenzaprine (FLEXERIL) 10 MG tablet TAKE 1/2 TO 1 (ONE-HALF TO ONE) TABLET BY MOUTH THREE TIMES DAILY AS NEEDED FOR MUSCLE SPASM 60 tablet 11    diazePAM (VALIUM) 5 MG tablet TAKE 1 TABLET BY MOUTH AT BEDTIME FOR ANXIETY 30 tablet 5    diclofenac sodium (VOLTAREN) 1 % Gel Apply 2 g topically 3 (three) times daily. 1 Tube 0    HYDROcodone-acetaminophen (NORCO) 5-325 mg per tablet       lancing device Misc 1 each by Misc.(Non-Drug; Combo Route) route 3 (three) times daily as needed. 1 each 0    losartan (COZAAR) 50 MG tablet Take 1 tablet (50 mg total) by mouth once daily. 90 tablet 1    MYRBETRIQ 50 mg Tb24 Take 1 tablet by mouth once daily 30 tablet 11    NUCYNTA ER 50 mg Tb12 Take 1 tablet by mouth every 12 (twelve) hours. prn      ondansetron (ZOFRAN-ODT) 4 MG TbDL Take 1 tablet (4 mg total) by mouth every 8 (eight) hours as needed (nausea). 10 tablet 1    oxyCODONE-acetaminophen (PERCOCET) 5-325 mg per tablet Take 1 tablet by mouth every 4 (four) hours as needed for Pain.      potassium chloride SA (K-DUR,KLOR-CON) 20 MEQ tablet 1 tablet daily 90 tablet 5    predniSONE (DELTASONE) 5 MG tablet May take 5-15 mg (1-3 tablets) of prednisone daily for rheumatoid flares 60 tablet 2    PROLENSA 0.07 % Drop       rosuvastatin (CRESTOR) 20 MG tablet Take 1 tablet (20 mg total) by mouth once daily. 30 tablet 6    traMADoL (ULTRAM) 50 mg tablet Take 1 tablet (50 mg total) by mouth every 6 (six) hours as needed. 60 tablet 5    upadacitinib (RINVOQ) 15 mg 24 hr tablet Take 15 mg by mouth once daily.       Current Facility-Administered Medications on File Prior to Visit   Medication Dose Route Frequency Provider Last Rate Last Admin    ondansetron disintegrating tablet 4 mg  4 mg Oral Once PRN Perez De La Fuente MD            Review of patient's allergies indicates:   Allergen Reactions    Valdecoxib Other (See Comments)     palpitation        Environmental History: Pets in the  home: dogs (1) and cats (2).  Review of Systems   Constitutional:  Negative for chills and fever.   HENT:  Positive for congestion and rhinorrhea.    Eyes:  Positive for itching. Negative for discharge.   Respiratory:  Negative for shortness of breath and wheezing.    Cardiovascular:  Negative for chest pain and leg swelling.   Gastrointestinal:  Positive for abdominal pain, nausea and vomiting.   Allergic/Immunologic: Positive for environmental allergies. Negative for food allergies and immunocompromised state.   Neurological:  Negative for facial asymmetry and speech difficulty.   All other systems reviewed and are negative.     Objective:    Physical Exam  Vitals reviewed.   Constitutional:       General: She is not in acute distress.     Appearance: Normal appearance. She is well-developed. She is not ill-appearing, toxic-appearing or diaphoretic.   HENT:      Head: Normocephalic and atraumatic.      Left Ear: There is no impacted cerumen.   Eyes:      General: No scleral icterus.        Right eye: No discharge.         Left eye: No discharge.      Pupils: Pupils are equal, round, and reactive to light.   Neck:      Thyroid: No thyromegaly.   Cardiovascular:      Rate and Rhythm: Normal rate and regular rhythm.      Heart sounds: Normal heart sounds. No murmur heard.    No friction rub. No gallop.   Pulmonary:      Effort: Pulmonary effort is normal. No respiratory distress.      Breath sounds: Normal breath sounds. No stridor. No wheezing, rhonchi or rales.   Chest:      Chest wall: No tenderness.   Abdominal:      General: Bowel sounds are normal. There is no distension.      Palpations: Abdomen is soft. There is no mass.      Tenderness: There is no abdominal tenderness. There is no guarding.      Hernia: No hernia is present.   Musculoskeletal:         General: No swelling, tenderness, deformity or signs of injury. Normal range of motion.      Cervical back: Normal range of motion and neck supple. No  rigidity. No muscular tenderness.      Right lower leg: No edema.      Left lower leg: No edema.   Lymphadenopathy:      Cervical: No cervical adenopathy.   Skin:     General: Skin is warm.      Coloration: Skin is not jaundiced or pale.      Findings: No bruising or erythema.   Neurological:      Mental Status: She is alert and oriented to person, place, and time.      Gait: Gait normal.   Psychiatric:         Mood and Affect: Mood normal.         Behavior: Behavior normal.         Thought Content: Thought content normal.         Judgment: Judgment normal.          Assessment:       1. Abdominal migraine, not intractable         Plan:       Avoid tramadol and doxepin together.  Reviewed labs- negative, abdominal ultrasound- negative  Abdominal migraine, not intractable  -     doxepin (SINEQUAN) 10 MG capsule; Take 1 capsule (10 mg total) by mouth every evening.  Dispense: 30 capsule; Refill: 11          RTC 3 months or sooner, if needed     INDIANA SANFORD spent a total of 30 minutes on the day of the visit.  This includes face to face time and non-face to face time preparing to see the patient (eg, review of tests), obtaining and/or reviewing separately obtained history, documenting clinical information in the electronic or other health record, independently interpreting results and communicating results to the patient/family/caregiver, or care coordinator.

## 2022-09-11 ENCOUNTER — PATIENT MESSAGE (OUTPATIENT)
Dept: RHEUMATOLOGY | Facility: CLINIC | Age: 72
End: 2022-09-11
Payer: MEDICARE

## 2022-09-11 DIAGNOSIS — M05.9 SEROPOSITIVE RHEUMATOID ARTHRITIS: ICD-10-CM

## 2022-09-13 DIAGNOSIS — M05.9 SEROPOSITIVE RHEUMATOID ARTHRITIS: ICD-10-CM

## 2022-09-13 RX ORDER — PREDNISONE 5 MG/1
TABLET ORAL
Qty: 60 TABLET | Refills: 2 | Status: SHIPPED | OUTPATIENT
Start: 2022-09-13 | End: 2023-01-03 | Stop reason: SDUPTHER

## 2022-09-19 ENCOUNTER — TELEPHONE (OUTPATIENT)
Dept: UROLOGY | Facility: CLINIC | Age: 72
End: 2022-09-19
Payer: MEDICARE

## 2022-09-28 ENCOUNTER — PATIENT MESSAGE (OUTPATIENT)
Dept: INTERNAL MEDICINE | Facility: CLINIC | Age: 72
End: 2022-09-28
Payer: MEDICARE

## 2022-10-10 ENCOUNTER — PATIENT MESSAGE (OUTPATIENT)
Dept: RHEUMATOLOGY | Facility: CLINIC | Age: 72
End: 2022-10-10
Payer: MEDICARE

## 2022-10-12 ENCOUNTER — OFFICE VISIT (OUTPATIENT)
Dept: UROLOGY | Facility: CLINIC | Age: 72
End: 2022-10-12
Payer: MEDICARE

## 2022-10-12 VITALS
HEIGHT: 65 IN | HEART RATE: 55 BPM | SYSTOLIC BLOOD PRESSURE: 145 MMHG | RESPIRATION RATE: 18 BRPM | DIASTOLIC BLOOD PRESSURE: 75 MMHG | WEIGHT: 144 LBS | BODY MASS INDEX: 23.99 KG/M2

## 2022-10-12 DIAGNOSIS — N39.41 URGE INCONTINENCE: Primary | ICD-10-CM

## 2022-10-12 PROCEDURE — 3078F DIAST BP <80 MM HG: CPT | Mod: CPTII,S$GLB,, | Performed by: UROLOGY

## 2022-10-12 PROCEDURE — 3288F PR FALLS RISK ASSESSMENT DOCUMENTED: ICD-10-PCS | Mod: CPTII,S$GLB,, | Performed by: UROLOGY

## 2022-10-12 PROCEDURE — 1126F PR PAIN SEVERITY QUANTIFIED, NO PAIN PRESENT: ICD-10-PCS | Mod: CPTII,S$GLB,, | Performed by: UROLOGY

## 2022-10-12 PROCEDURE — 3077F PR MOST RECENT SYSTOLIC BLOOD PRESSURE >= 140 MM HG: ICD-10-PCS | Mod: CPTII,S$GLB,, | Performed by: UROLOGY

## 2022-10-12 PROCEDURE — 1159F PR MEDICATION LIST DOCUMENTED IN MEDICAL RECORD: ICD-10-PCS | Mod: CPTII,S$GLB,, | Performed by: UROLOGY

## 2022-10-12 PROCEDURE — 1101F PR PT FALLS ASSESS DOC 0-1 FALLS W/OUT INJ PAST YR: ICD-10-PCS | Mod: CPTII,S$GLB,, | Performed by: UROLOGY

## 2022-10-12 PROCEDURE — 4010F PR ACE/ARB THEARPY RXD/TAKEN: ICD-10-PCS | Mod: CPTII,S$GLB,, | Performed by: UROLOGY

## 2022-10-12 PROCEDURE — 99999 PR PBB SHADOW E&M-EST. PATIENT-LVL V: ICD-10-PCS | Mod: PBBFAC,,, | Performed by: UROLOGY

## 2022-10-12 PROCEDURE — 3078F PR MOST RECENT DIASTOLIC BLOOD PRESSURE < 80 MM HG: ICD-10-PCS | Mod: CPTII,S$GLB,, | Performed by: UROLOGY

## 2022-10-12 PROCEDURE — 3077F SYST BP >= 140 MM HG: CPT | Mod: CPTII,S$GLB,, | Performed by: UROLOGY

## 2022-10-12 PROCEDURE — 99214 PR OFFICE/OUTPT VISIT, EST, LEVL IV, 30-39 MIN: ICD-10-PCS | Mod: S$GLB,,, | Performed by: UROLOGY

## 2022-10-12 PROCEDURE — 3044F HG A1C LEVEL LT 7.0%: CPT | Mod: CPTII,S$GLB,, | Performed by: UROLOGY

## 2022-10-12 PROCEDURE — 99214 OFFICE O/P EST MOD 30 MIN: CPT | Mod: S$GLB,,, | Performed by: UROLOGY

## 2022-10-12 PROCEDURE — 1160F RVW MEDS BY RX/DR IN RCRD: CPT | Mod: CPTII,S$GLB,, | Performed by: UROLOGY

## 2022-10-12 PROCEDURE — 1126F AMNT PAIN NOTED NONE PRSNT: CPT | Mod: CPTII,S$GLB,, | Performed by: UROLOGY

## 2022-10-12 PROCEDURE — 99999 PR PBB SHADOW E&M-EST. PATIENT-LVL V: CPT | Mod: PBBFAC,,, | Performed by: UROLOGY

## 2022-10-12 PROCEDURE — 1101F PT FALLS ASSESS-DOCD LE1/YR: CPT | Mod: CPTII,S$GLB,, | Performed by: UROLOGY

## 2022-10-12 PROCEDURE — 3008F PR BODY MASS INDEX (BMI) DOCUMENTED: ICD-10-PCS | Mod: CPTII,S$GLB,, | Performed by: UROLOGY

## 2022-10-12 PROCEDURE — 3044F PR MOST RECENT HEMOGLOBIN A1C LEVEL <7.0%: ICD-10-PCS | Mod: CPTII,S$GLB,, | Performed by: UROLOGY

## 2022-10-12 PROCEDURE — 1160F PR REVIEW ALL MEDS BY PRESCRIBER/CLIN PHARMACIST DOCUMENTED: ICD-10-PCS | Mod: CPTII,S$GLB,, | Performed by: UROLOGY

## 2022-10-12 PROCEDURE — 4010F ACE/ARB THERAPY RXD/TAKEN: CPT | Mod: CPTII,S$GLB,, | Performed by: UROLOGY

## 2022-10-12 PROCEDURE — 3008F BODY MASS INDEX DOCD: CPT | Mod: CPTII,S$GLB,, | Performed by: UROLOGY

## 2022-10-12 PROCEDURE — 3288F FALL RISK ASSESSMENT DOCD: CPT | Mod: CPTII,S$GLB,, | Performed by: UROLOGY

## 2022-10-12 PROCEDURE — 1159F MED LIST DOCD IN RCRD: CPT | Mod: CPTII,S$GLB,, | Performed by: UROLOGY

## 2022-10-12 RX ORDER — SOLIFENACIN SUCCINATE 10 MG/1
10 TABLET, FILM COATED ORAL DAILY
Qty: 30 TABLET | Refills: 11 | Status: SHIPPED | OUTPATIENT
Start: 2022-10-12 | End: 2022-11-21

## 2022-10-12 NOTE — PROGRESS NOTES
Chief Complaint:   Encounter Diagnosis   Name Primary?    Urge incontinence Yes       HPI:   10/12/22- still having some issues with her InterStim, she is modifying appropriately.  Still taking Myrbetriq.    Allergies:  Valdecoxib    Medications: has a current medication list which includes the following prescription(s): acyclovir, amlodipine, aspirin, blood sugar diagnostic, calcium-vitamin d, clonidine, cyclobenzaprine, diazepam, diclofenac sodium, doxepin, hydrocodone-acetaminophen, losartan, myrbetriq, nucynta er, ondansetron, oxycodone-acetaminophen, potassium chloride sa, prednisone, prolensa, rosuvastatin, tramadol, rinvoq, and lancing device, and the following Facility-Administered Medications: ondansetron.    Review of Systems:  General: No fever, chills, fatigability, or weight loss.  Skin: No rashes, itching, or changes in color or texture of skin.  Chest: Denies ADLER, cyanosis, wheezing, cough, and sputum production.  Abdomen: Appetite fine. No weight loss. Denies diarrhea, abdominal pain, hematemesis, or blood in stool.  Musculoskeletal: No joint stiffness or swelling. Some back pain.  : As above.  All other review of systems negative.    PMH:   has a past medical history of ADHD (attention deficit hyperactivity disorder), Adult ADHD, Chronic rheumatic arthritis, Ex-smoker, Genital herpes, Hyperlipidemia, Hypertension, Immunosuppressed status, Lichen sclerosus et atrophicus, Morbid obesity (1/11/2021), Obesity, Class I, BMI 30-34.9 (12/22/2015), SUKHDEEP (obstructive sleep apnea), Osteopenia, and Type 2 diabetes mellitus.    PSH:   has a past surgical history that includes Tonsillectomy (1958); Skin graft (1965); Cryotherapy (1980); Breast biopsy (Left); Colonoscopy (N/A, 2/12/2020); Esophagogastroduodenoscopy (N/A, 2/12/2020); Insertion of sacral neurostimulator, electrodes, and implantable pulse generator (IPG) (Left, 11/23/2020); and Insertion of sacral neurostimulator generator (Left,  11/23/2020).    FamHx: family history includes Breast cancer (age of onset: 39) in her sister; Cancer (age of onset: 68) in an other family member; Diabetes in her mother; Heart disease in her mother; Kidney failure in her father; Peripheral vascular disease in her mother; Stroke in her father, maternal grandmother, paternal grandfather, and paternal grandmother.    SocHx:  reports that she quit smoking about 9 years ago. She has a 3.75 pack-year smoking history. She has never used smokeless tobacco. She reports current alcohol use. She reports that she does not use drugs.     Physical Exam:  Vitals:   Vitals:    10/12/22 0933   BP: (!) 145/75   Pulse: (!) 55   Resp: 18     General: A&Ox3. No apparent distress. No deformities.  Neck: No masses. Normal thyroid.  Lungs: CTA samia. No use of accessory muscles.  Heart: RRR. No arrhythmias.  Abdomen: Soft. NT. ND.  Incision is c/d/i  Skin: The skin is warm and dry. No jaundice.  Ext: No c/c/e.    Labs/Studies:   UDS- decreased capacity otherwise normal 9/20  Percutaneous InterStim 09/29/2020  pvr 12 ml 1/21  KUB/GENIE normal 8/20    Impression/Plan:       1.  Urge incontinence- InterStim 11/23/20    Will switch the Myrbetriq to VESIcare 10 mg instead, re-evaluate in 2 months with a postvoid residual.  Call with any complaints prior to the next appointment.  Otherwise we have again modified her programming, she is also modifying appropriately.  I will have the InterStim rep present at the next appointment as well.

## 2022-10-20 NOTE — TELEPHONE ENCOUNTER
"Contacted patient to discuss this with her. Patient stated that she is currently see Dr. Patsy Rodríguez at [facility]. She has had a couple procedures done with little to no relief. Dr. Patsy Rodríguez is actually the physician that sent her to Dr. Alvarado for evaluation. Advised patient that I will send this message to Dr. England to let him know. Patient verbalized understanding. All questions answered.     Radha Damon (Allye), [facility]  Rheumatology Department   "

## 2022-10-20 NOTE — TELEPHONE ENCOUNTER
"Dr. England, I have scanned in her MRI of right shoulder from Dr. Jakub Alvarado under media.    Radha Damon (Allye), [facility]  Rheumatology Department   "

## 2022-10-21 ENCOUNTER — HOSPITAL ENCOUNTER (OUTPATIENT)
Dept: RADIOLOGY | Facility: HOSPITAL | Age: 72
Discharge: HOME | End: 2022-10-21
Attending: OBSTETRICS & GYNECOLOGY
Payer: MEDICARE

## 2022-10-21 VITALS — WEIGHT: 143.94 LBS | HEIGHT: 65 IN | BODY MASS INDEX: 23.98 KG/M2

## 2022-10-21 DIAGNOSIS — Z12.31 BREAST CANCER SCREENING BY MAMMOGRAM: ICD-10-CM

## 2022-10-21 PROCEDURE — 77063 MAMMO DIGITAL SCREENING BILAT WITH TOMO: ICD-10-PCS | Mod: 26,,, | Performed by: RADIOLOGY

## 2022-10-21 PROCEDURE — 77067 SCR MAMMO BI INCL CAD: CPT | Mod: TC

## 2022-10-21 PROCEDURE — 77067 MAMMO DIGITAL SCREENING BILAT WITH TOMO: ICD-10-PCS | Mod: 26,,, | Performed by: RADIOLOGY

## 2022-10-21 PROCEDURE — 77063 BREAST TOMOSYNTHESIS BI: CPT | Mod: 26,,, | Performed by: RADIOLOGY

## 2022-10-21 PROCEDURE — 77063 BREAST TOMOSYNTHESIS BI: CPT | Mod: TC

## 2022-10-21 PROCEDURE — 77067 SCR MAMMO BI INCL CAD: CPT | Mod: 26,,, | Performed by: RADIOLOGY

## 2022-11-04 ENCOUNTER — PATIENT MESSAGE (OUTPATIENT)
Dept: RHEUMATOLOGY | Facility: CLINIC | Age: 72
End: 2022-11-04

## 2022-11-04 ENCOUNTER — OFFICE VISIT (OUTPATIENT)
Dept: RHEUMATOLOGY | Facility: CLINIC | Age: 72
End: 2022-11-04
Payer: MEDICARE

## 2022-11-04 VITALS
HEART RATE: 64 BPM | HEIGHT: 65 IN | SYSTOLIC BLOOD PRESSURE: 120 MMHG | DIASTOLIC BLOOD PRESSURE: 69 MMHG | BODY MASS INDEX: 24.65 KG/M2 | WEIGHT: 147.94 LBS

## 2022-11-04 DIAGNOSIS — Z79.899 HIGH RISK MEDICATION USE: ICD-10-CM

## 2022-11-04 DIAGNOSIS — Z51.81 MEDICATION MONITORING ENCOUNTER: ICD-10-CM

## 2022-11-04 DIAGNOSIS — M05.9 SEROPOSITIVE RHEUMATOID ARTHRITIS: Primary | ICD-10-CM

## 2022-11-04 PROCEDURE — 1126F PR PAIN SEVERITY QUANTIFIED, NO PAIN PRESENT: ICD-10-PCS | Mod: CPTII,S$GLB,, | Performed by: INTERNAL MEDICINE

## 2022-11-04 PROCEDURE — 3074F SYST BP LT 130 MM HG: CPT | Mod: CPTII,S$GLB,, | Performed by: INTERNAL MEDICINE

## 2022-11-04 PROCEDURE — 99999 PR PBB SHADOW E&M-EST. PATIENT-LVL IV: CPT | Mod: PBBFAC,,, | Performed by: INTERNAL MEDICINE

## 2022-11-04 PROCEDURE — 99999 PR PBB SHADOW E&M-EST. PATIENT-LVL IV: ICD-10-PCS | Mod: PBBFAC,,, | Performed by: INTERNAL MEDICINE

## 2022-11-04 PROCEDURE — 1126F AMNT PAIN NOTED NONE PRSNT: CPT | Mod: CPTII,S$GLB,, | Performed by: INTERNAL MEDICINE

## 2022-11-04 PROCEDURE — 99214 PR OFFICE/OUTPT VISIT, EST, LEVL IV, 30-39 MIN: ICD-10-PCS | Mod: S$GLB,,, | Performed by: INTERNAL MEDICINE

## 2022-11-04 PROCEDURE — 3288F FALL RISK ASSESSMENT DOCD: CPT | Mod: CPTII,S$GLB,, | Performed by: INTERNAL MEDICINE

## 2022-11-04 PROCEDURE — 4010F ACE/ARB THERAPY RXD/TAKEN: CPT | Mod: CPTII,S$GLB,, | Performed by: INTERNAL MEDICINE

## 2022-11-04 PROCEDURE — 3008F PR BODY MASS INDEX (BMI) DOCUMENTED: ICD-10-PCS | Mod: CPTII,S$GLB,, | Performed by: INTERNAL MEDICINE

## 2022-11-04 PROCEDURE — 3288F PR FALLS RISK ASSESSMENT DOCUMENTED: ICD-10-PCS | Mod: CPTII,S$GLB,, | Performed by: INTERNAL MEDICINE

## 2022-11-04 PROCEDURE — 1159F PR MEDICATION LIST DOCUMENTED IN MEDICAL RECORD: ICD-10-PCS | Mod: CPTII,S$GLB,, | Performed by: INTERNAL MEDICINE

## 2022-11-04 PROCEDURE — 1101F PR PT FALLS ASSESS DOC 0-1 FALLS W/OUT INJ PAST YR: ICD-10-PCS | Mod: CPTII,S$GLB,, | Performed by: INTERNAL MEDICINE

## 2022-11-04 PROCEDURE — 3044F HG A1C LEVEL LT 7.0%: CPT | Mod: CPTII,S$GLB,, | Performed by: INTERNAL MEDICINE

## 2022-11-04 PROCEDURE — 3078F PR MOST RECENT DIASTOLIC BLOOD PRESSURE < 80 MM HG: ICD-10-PCS | Mod: CPTII,S$GLB,, | Performed by: INTERNAL MEDICINE

## 2022-11-04 PROCEDURE — 1101F PT FALLS ASSESS-DOCD LE1/YR: CPT | Mod: CPTII,S$GLB,, | Performed by: INTERNAL MEDICINE

## 2022-11-04 PROCEDURE — 1159F MED LIST DOCD IN RCRD: CPT | Mod: CPTII,S$GLB,, | Performed by: INTERNAL MEDICINE

## 2022-11-04 PROCEDURE — 99214 OFFICE O/P EST MOD 30 MIN: CPT | Mod: S$GLB,,, | Performed by: INTERNAL MEDICINE

## 2022-11-04 PROCEDURE — 3008F BODY MASS INDEX DOCD: CPT | Mod: CPTII,S$GLB,, | Performed by: INTERNAL MEDICINE

## 2022-11-04 PROCEDURE — 4010F PR ACE/ARB THEARPY RXD/TAKEN: ICD-10-PCS | Mod: CPTII,S$GLB,, | Performed by: INTERNAL MEDICINE

## 2022-11-04 PROCEDURE — 3074F PR MOST RECENT SYSTOLIC BLOOD PRESSURE < 130 MM HG: ICD-10-PCS | Mod: CPTII,S$GLB,, | Performed by: INTERNAL MEDICINE

## 2022-11-04 PROCEDURE — 3044F PR MOST RECENT HEMOGLOBIN A1C LEVEL <7.0%: ICD-10-PCS | Mod: CPTII,S$GLB,, | Performed by: INTERNAL MEDICINE

## 2022-11-04 PROCEDURE — 3078F DIAST BP <80 MM HG: CPT | Mod: CPTII,S$GLB,, | Performed by: INTERNAL MEDICINE

## 2022-11-04 NOTE — PROGRESS NOTES
RHEUMATOLOGY OUTPATIENT CLINIC NOTE    11/4/2022    Attending Rheumatologist: Ab England  Primary Care Provider/Physician Requesting Consultation: Jayesh Jaramillo MD   Chief Complaint/Reason For Consultation:  Rheumatoid Arthritis and Osteoarthritis      Subjective:     Smitha Salinas is a 72 y.o. Black or  female with seropositive RA for follow up visit.    No acute complaints at present.  Chronic left shoulder pain improving.  Adherence with Rinvoq without refractory arthralgias with inflammatory features.    Review of Systems   Constitutional:  Negative for fever.   HENT:          Denies significant xerophthalmia, mild xerostomia.   Eyes:  Negative for photophobia and pain.   Respiratory:  Negative for shortness of breath.    Gastrointestinal:  Negative for blood in stool and melena.   Genitourinary:  Negative for hematuria.   Musculoskeletal:  Negative for joint pain.   Skin:  Negative for rash.     Chronic comorbid conditions affecting medical decision making today:  Past Medical History:   Diagnosis Date    ADHD (attention deficit hyperactivity disorder)     Adult ADHD     [facility]    Chronic rheumatic arthritis     on DMARD    Ex-smoker     Genital herpes     Hyperlipidemia     Hypertension     Immunosuppressed status     Lichen sclerosus et atrophicus     Morbid obesity 1/11/2021    Obesity, Class I, BMI 30-34.9 12/22/2015    SUKHDEEP (obstructive sleep apnea)     Osteopenia     5/16 wai 5/18(start fmax if on pred 3months or more)    Type 2 diabetes mellitus     dxd 9/20     Past Surgical History:   Procedure Laterality Date    BREAST BIOPSY Left     benign    COLONOSCOPY N/A 2/12/2020    Procedure: COLONOSCOPY;  Surgeon: Eloina Castro MD;  Location: [facility];  Service: Endoscopy;  Laterality: N/A;    CRYOTHERAPY  1980    ESOPHAGOGASTRODUODENOSCOPY N/A 2/12/2020    Procedure: EGD (ESOPHAGOGASTRODUODENOSCOPY);  Surgeon: Eloina Castro MD;  Location: [facility];  Service:  Endoscopy;  Laterality: N/A;    INSERTION OF SACRAL NEUROSTIMULATOR GENERATOR Left 2020    Procedure: INSERTION, PULSE GENERATOR, NEUROSTIMULATOR, SACRAL;  Surgeon: Pablo Rawls MD;  Location: [facility] OR;  Service: Urology;  Laterality: Left;    INSERTION OF SACRAL NEUROSTIMULATOR, ELECTRODES, AND IMPLANTABLE PULSE GENERATOR (IPG) Left 2020    Procedure: INSERTION, ELECTRODE LEADS AND PULSE GENERATOR, NEUROSTIMULATOR, SACRAL;  Surgeon: Pablo Rawls MD;  Location: [facility] OR;  Service: Urology;  Laterality: Left;    SKIN GRAFT      laceration to right  fingers  from thigh     TONSILLECTOMY       Family History   Problem Relation Age of Onset    Heart disease Mother     Diabetes Mother     Peripheral vascular disease Mother         amputations    Stroke Father     Kidney failure Father     Breast cancer Sister 39    Cancer Other 68        breast    Stroke Maternal Grandmother     Stroke Paternal Grandmother     Stroke Paternal Grandfather     Colon cancer Neg Hx     Ovarian cancer Neg Hx      Social History     Tobacco Use   Smoking Status Former    Packs/day: 0.25    Years: 15.00    Pack years: 3.75    Types: Cigarettes    Quit date: 2013    Years since quittin.2   Smokeless Tobacco Never   Tobacco Comments    smokes for a few weeks per year - when under pressure. has been abstinent times years at a time. a pack lasts about a week       Current Outpatient Medications:     acyclovir (ZOVIRAX) 400 MG tablet, Take 1 tablet (400 mg total) by mouth 2 (two) times daily., Disp: 180 tablet, Rfl: 4    amLODIPine (NORVASC) 5 MG tablet, Take 1 tablet (5 mg total) by mouth once daily., Disp: 90 tablet, Rfl: 4    aspirin (ECOTRIN) 81 MG EC tablet, Take 81 mg by mouth once daily., Disp: , Rfl:     blood sugar diagnostic (BLOOD GLUCOSE TEST) Strp, 1 strip by Misc.(Non-Drug; Combo Route) route 3 (three) times daily as needed., Disp: 1 each, Rfl: 11    calcium-vitamin D (CALCIUM 600 + D,3,) 600  mg-10 mcg (400 unit) Tab, Take 1 tablet by mouth 2 (two) times daily., Disp: 60 tablet, Rfl: 3    cloNIDine (CATAPRES) 0.2 MG tablet, TAKE 1/2 (ONE-HALF) TABLET BY MOUTH IN THE MORNING AND 1/2 (ONE-HALF) AT NOON AND 2 AT BEDTIME, Disp: 270 tablet, Rfl: 0    cyclobenzaprine (FLEXERIL) 10 MG tablet, TAKE 1/2 TO 1 (ONE-HALF TO ONE) TABLET BY MOUTH THREE TIMES DAILY AS NEEDED FOR MUSCLE SPASM, Disp: 60 tablet, Rfl: 11    diazePAM (VALIUM) 5 MG tablet, TAKE 1 TABLET BY MOUTH AT BEDTIME FOR ANXIETY, Disp: 30 tablet, Rfl: 5    diclofenac sodium (VOLTAREN) 1 % Gel, Apply 2 g topically 3 (three) times daily., Disp: 1 Tube, Rfl: 0    doxepin (SINEQUAN) 10 MG capsule, Take 1 capsule (10 mg total) by mouth every evening., Disp: 30 capsule, Rfl: 11    HYDROcodone-acetaminophen (NORCO) 5-325 mg per tablet, , Disp: , Rfl:     losartan (COZAAR) 50 MG tablet, Take 1 tablet (50 mg total) by mouth once daily., Disp: 90 tablet, Rfl: 1    NUCYNTA ER 50 mg Tb12, Take 1 tablet by mouth every 12 (twelve) hours. prn, Disp: , Rfl:     ondansetron (ZOFRAN-ODT) 4 MG TbDL, Take 1 tablet (4 mg total) by mouth every 8 (eight) hours as needed (nausea)., Disp: 10 tablet, Rfl: 1    oxyCODONE-acetaminophen (PERCOCET) 5-325 mg per tablet, Take 1 tablet by mouth every 4 (four) hours as needed for Pain., Disp: , Rfl:     potassium chloride SA (K-DUR,KLOR-CON) 20 MEQ tablet, 1 tablet daily, Disp: 90 tablet, Rfl: 5    PROLENSA 0.07 % Drop, , Disp: , Rfl:     rosuvastatin (CRESTOR) 20 MG tablet, Take 1 tablet (20 mg total) by mouth once daily., Disp: 30 tablet, Rfl: 6    solifenacin (VESICARE) 10 MG tablet, Take 1 tablet (10 mg total) by mouth once daily., Disp: 30 tablet, Rfl: 11    traMADoL (ULTRAM) 50 mg tablet, Take 1 tablet (50 mg total) by mouth every 6 (six) hours as needed., Disp: 60 tablet, Rfl: 5    upadacitinib (RINVOQ) 15 mg 24 hr tablet, Take 15 mg by mouth once daily., Disp: , Rfl:     lancing device Misc, 1 each by Misc.(Non-Drug; Combo  Route) route 3 (three) times daily as needed., Disp: 1 each, Rfl: 0    MYRBETRIQ 50 mg Tb24, Take 1 tablet by mouth once daily (Patient not taking: Reported on 11/4/2022), Disp: 30 tablet, Rfl: 11    predniSONE (DELTASONE) 5 MG tablet, May take 5-15 mg (1-3 tablets) of prednisone daily for rheumatoid flares (Patient not taking: Reported on 11/4/2022), Disp: 60 tablet, Rfl: 2    Current Facility-Administered Medications:     ondansetron disintegrating tablet 4 mg, 4 mg, Oral, Once PRN, Perez De La Fuente MD     Objective:     Vitals:    11/04/22 1201   BP: 120/69   Pulse: 64     Physical Exam   Eyes: Conjunctivae are normal.   Pulmonary/Chest: Effort normal. No respiratory distress.   Musculoskeletal:         General: No swelling or tenderness. Normal range of motion.   Neurological: She displays no weakness.   Skin: No rash noted.     Reviewed available old and all outside pertinent medical records available.    All lab results personally reviewed and interpreted by me.       ASSESSMENT:     Seropositive rheumatoid arthritis    High risk medication use    Medication monitoring encounter    PLAN:     Follow-up encounter for seropositive rheumatoid arthritis.  Main concern of refractory arthralgias with predominant mechanical pattern, worse on right shoulder.  Review of system negative for Raynaud's phenomena, sicca symptoms, or active rashes with connective tissue disease appearance.  Denies progressive respiratory symptoms.  Exam without reproducible muscle weakness or active squeeze tenderness suggestive of subclinical synovitis.  Labs without progressive cytopenias or evidence of hemolysis.  Intermittent elevation of liver chemistries below 3 times upper limit of normal. Adequate response to Rinvoq with no evidence of active rheumatoid arthritis.  Patient concern regarding slight elevation of liver chemistries in context of biologic therapy use.  Recommend repeating labs in 1 month to monitor trend and need to  discontinue medication.  Inadequate response to monotherapy with DMARDs.  Suboptimal response previously with Actemra and Humira.  Potential liver damage expected with all rheumatoid medications unfortunately.  List of alternative therapies provided for patient and she will decide if would like to try different medication although impossible to anticipate better outcome.    Disclaimer: This note is prepared using voice recognition software and as such is likely to have errors and has not been proof read. Please contact me for questions.       Ab England M.D.

## 2022-11-09 ENCOUNTER — TELEPHONE (OUTPATIENT)
Dept: INTERNAL MEDICINE | Facility: CLINIC | Age: 72
End: 2022-11-09
Payer: MEDICARE

## 2022-11-09 NOTE — TELEPHONE ENCOUNTER
Pt would like an order for her A1c and any other labs needed to be placed to be done in 12/2022. Last A1c was done in 6/2022. Please advise.//ddw

## 2022-11-14 ENCOUNTER — PATIENT MESSAGE (OUTPATIENT)
Dept: RHEUMATOLOGY | Facility: CLINIC | Age: 72
End: 2022-11-14
Payer: MEDICARE

## 2022-11-16 ENCOUNTER — TELEPHONE (OUTPATIENT)
Dept: RHEUMATOLOGY | Facility: CLINIC | Age: 72
End: 2022-11-16
Payer: MEDICARE

## 2022-11-16 NOTE — TELEPHONE ENCOUNTER
----- Message from Liza Baxter sent at 11/16/2022  1:20 PM CST -----  Contact: Smitha Bone would like a call back at [phone] , Regards to getting her orders for labs sent to [facility] please call fax it to [phone].    Thanks  Td

## 2022-11-21 ENCOUNTER — PATIENT MESSAGE (OUTPATIENT)
Dept: UROLOGY | Facility: CLINIC | Age: 72
End: 2022-11-21
Payer: MEDICARE

## 2022-11-22 ENCOUNTER — IMMUNIZATION (OUTPATIENT)
Dept: PRIMARY CARE CLINIC | Facility: CLINIC | Age: 72
End: 2022-11-22
Payer: MEDICARE

## 2022-11-22 DIAGNOSIS — Z23 NEED FOR VACCINATION: Primary | ICD-10-CM

## 2022-11-22 PROCEDURE — 91313 COVID-19, MRNA, LNP-S, BIVALENT BOOSTER, PF, 50 MCG/0.5 ML: CPT | Mod: PBBFAC | Performed by: FAMILY MEDICINE

## 2022-11-22 PROCEDURE — 0134A COVID-19, MRNA, LNP-S, BIVALENT BOOSTER, PF, 50 MCG/0.5 ML: CPT | Mod: CV19,PBBFAC | Performed by: FAMILY MEDICINE

## 2022-11-25 NOTE — PROGRESS NOTES
Basic Carbohydrate Counting   AMBULATORY CARE:   Carbohydrate counting  is a way to plan your meals by counting the amount of carbohydrate in foods  Carbohydrates are the sugars, starches, and fiber found in fruit, grains, vegetables, and milk products  Carbohydrates increase your blood sugar levels  Carbohydrate counting can help you eat the right amount of carbohydrate to keep your blood sugar levels under control  What you need to know about planning meals using carbohydrate counting:  · A dietitian or healthcare provider will help you develop a healthy meal plan that works best for you  You will be taught how much carbohydrate to eat or drink for each meal and snack  Your meal plan will be based on your age, weight, usual food intake, and physical activity level  If you have diabetes, it will also include your blood sugar levels and diabetes medicine  Once you know how much carbohydrate you should eat, you can decide what type of food you want to eat  · You will need to know what foods contain carbohydrate and how much they contain  Keep track of the amount of carbohydrate in meals and snacks in order to follow your meal plan  Do not avoid carbohydrates or skip meals  Your blood sugar may fall too low if you do not eat enough carbohydrate or you skip meals  Foods that contain carbohydrate:   · Breads:  Each serving of food listed below contains about 15 g of carbohydrate   ? 1 slice of bread (1 ounce) or 1 flour or corn tortilla (6 inch)    ? ½ of a hamburger bun or ¼ of a large bagel (about 1 ounce)    ? 1 pancake (about 4 inches across and ¼ inch thick)    · Cereals and grains:  Serving sizes of ready-to-eat cereals vary  Look at the serving size and the total carbohydrate amount listed on the food label  Each serving of food listed below contains about 15 g of carbohydrate   ? ¾ cup of dry, unsweetened, ready-to-eat cereal or ¼ cup of low-fat granola     ?  ½ cup of oatmeal or other cooked Subjective:       Patient ID: Smitha Salinas is a 67 y.o. female.    Chief Complaint: here for physical examination and issues  below  S: Hypertension: blood pressures at home normal. Tolerating medicine.   RA/immunosup utd dr tai  Hypercholesterolemia: controlled. Tolerating medicine.b/c insur needs to switch statin  intermitt anxiety prn infre bnzo    Past Medical History:   Diagnosis Date    Adult ADHD     Chronic rheumatic arthritis     dr tai    Colon polyps     Ex-smoker     Herpes zoster infection     Hyperlipidemia     Hypertension     Immunosuppressed status     Lichen sclerosus et atrophicus     SUKHDEEP (obstructive sleep apnea)     Osteopenia     5/16 wai 5/18(start fmax if on pred 3months or more)     Past Surgical History:   Procedure Laterality Date    BLADDER SURGERY  8/2012    Dr Claudia Lamar    CRYOTHERAPY  1980    SKIN GRAFT  1965    laceration to right  fingers  from thigh     TONSILLECTOMY  1958     Family History   Problem Relation Age of Onset    Heart disease Mother     Diabetes Mother     Stroke Father     Kidney disease Father     Cancer Sister 39     breast    Cancer Other 68     breast    Stroke Maternal Grandmother     Stroke Paternal Grandmother     Stroke Paternal Grandfather      Social History     Social History    Marital status:      Spouse name: N/A    Number of children: 1    Years of education: N/A     Occupational History     [employer]      Social History Main Topics    Smoking status: Former Smoker     Quit date: 7/22/2013    Smokeless tobacco: Never Used      Comment: smokes for a few weeks per year - when under pressure. has been abstinent times years at a time. a pack lasts about a week    Alcohol use 0.0 oz/week    Drug use: No    Sexual activity: Not Currently     Birth control/ protection: Post-menopausal     Other Topics Concern    None     Social History Narrative    Lives alone. Caffeine intake rare -1-2 per  cereal     ? ? cup of cooked rice or pasta    · Starchy vegetables and beans:  Each serving of food listed below contains about 15 g of carbohydrate   ? ½ cup of corn, green peas, sweet potatoes, or mashed potatoes    ? ¼ of a large baked potato    ? ½ cup of beans, lentils, and peas (garbanzo, mcginnis, kidney, white, split, black-eyed)    · Crackers and snacks:  Each serving of food listed below contains about 15 g of carbohydrate   ? 3 larry cracker squares or 8 animal crackers     ? 6 saltine-type crackers    ? 3 cups of popcorn or ¾ ounce of pretzels, potato chips, or tortilla chips    · Fruit:  Each serving of food listed below contains about 15 g of carbohydrate   ? 1 small (4 ounce) piece of fresh fruit or ¾ to 1 cup of fresh fruit    ? ½ cup of canned or frozen fruit, packed in natural juice    ? ½ cup (4 ounces) of unsweetened fruit juice    ? 2 tablespoons of dried fruit    · Desserts or sugary foods:  Each serving of food listed below contains about 15 g of carbohydrate   ? 2-inch square unfrosted cake or brownie     ? 2 small cookies    ? ½ cup of ice cream, frozen yogurt, or nondairy frozen yogurt    ? ¼ cup of sherbet or sorbet    ? 1 tablespoon of regular syrup, jam, or jelly    ? 2 tablespoons of light syrup    · Milk and yogurt:  Foods from the milk group contain about 12 g of carbohydrate per serving  ? 1 cup of fat-free or low-fat milk    ? 1 cup of soy milk    ? ? cup of fat-free, yogurt sweetened with artificial sweetener    · Non-starchy vegetables:  Each serving contains about 5 g of carbohydrate   Three servings of non-starch vegetables count as 1 carbohydrate serving  ? ½ cup of cooked vegetables or 1 cup of raw vegetables  This includes beets, broccoli, cabbage, cauliflower, cucumber, mushrooms, tomatoes, and zucchini    ?  ½ cup of vegetable juice    How to use carbohydrate counting to plan meals:   · Count carbohydrate amounts using serving sizes:      ? Pasta dinner week of coffee. Does not have a Living Will or Advanced Directive                   HPI  Review of Systems  Cardiovascular: no chest pain  Chest: no shortness of breath  Abd: no abd pain  Remainder review of systems negative    Objective:      Physical Exam   Constitutional: She is oriented to person, place, and time. She appears well-developed and well-nourished. No distress.   HENT:   Head: Atraumatic.   Right Ear: External ear normal.   Left Ear: External ear normal.   Nose: Nose normal.   Mouth/Throat: Oropharynx is clear and moist. No oropharyngeal exudate.   bilat tms nl   Eyes: Conjunctivae and EOM are normal. Pupils are equal, round, and reactive to light. No scleral icterus.   Neck: Normal range of motion. Neck supple. No thyromegaly present.   Cardiovascular: Normal rate, regular rhythm and normal heart sounds.    No murmur heard.  Pulmonary/Chest: Effort normal and breath sounds normal. No respiratory distress. She has no wheezes. She has no rales.   Abdominal: Soft. Bowel sounds are normal. She exhibits no distension and no mass. There is no hepatosplenomegaly. There is no tenderness. There is no rebound and no guarding.   Musculoskeletal: Normal range of motion. She exhibits no edema or tenderness.   Lymphadenopathy:     She has no cervical adenopathy.   Neurological: She is alert and oriented to person, place, and time. No cranial nerve deficit. She exhibits normal muscle tone. Coordination normal.   Skin: Skin is warm. No rash noted. No erythema. No pallor.   Psychiatric: She has a normal mood and affect. Her behavior is normal. Judgment and thought content normal.   Nursing note and vitals reviewed.      Assessment:       1. Routine health maintenance    2. Essential hypertension    3. Rheumatoid arthritis of hand, unspecified laterality, unspecified rheumatoid factor presence    4. Immunosuppressed status    5. Hyperlipidemia, unspecified hyperlipidemia type      osteopenia  Plan:       **f/u  6months  Flu shot  dexa prior to 6 month visit    Routine health maintenance    Essential hypertension    Rheumatoid arthritis of hand, unspecified laterality, unspecified rheumatoid factor presence    Immunosuppressed status    Hyperlipidemia, unspecified hyperlipidemia type    Osteopenia of multiple sites    Other orders  -     pravastatin (PRAVACHOL) 40 MG tablet; Take 1 tablet (40 mg total) by mouth once daily.  Dispense: 90 tablet; Refill: 3  -     (In Office Administered) Pneumococcal Polysaccharide Vaccine (23 Valent) (SQ/IM)       example: You plan to have pasta, tossed salad, and an 8-ounce glass of milk  Your healthcare provider tells you that you may have 4 carbohydrate servings for dinner  One carbohydrate serving of pasta is ? cup  One cup of pasta will equal 3 carbohydrate servings  An 8-ounce glass of milk will count as 1 carbohydrate serving  These amounts of food would equal 4 carbohydrate servings  One cup of tossed salad does not count toward your carbohydrate servings  · Count carbohydrate amounts using food labels:  Find the total amount of carbohydrate in a packaged food by reading the food label  Food labels tell you the serving size of the food and the total carbohydrate amount in each serving  Find the serving size on the food label and then decide how many servings you will eat  Multiply the number of servings you plan to eat by the carbohydrate amount per serving  ? Granola bar snack example: Your meal plan allows you to have 2 carbohydrate servings (30 grams) of carbohydrate for a snack  You plan to eat 1 package of granola bars, which contains 2 bars  According to the food label, the serving size of food in this package is 1 bar  Each serving (1 bar) contains 25 grams of carbohydrate  The total amount of carbohydrate in this package of granola bars would be 50 g  Based on your meal plan, you should eat only 1 bar  Follow up with your doctor as directed:  Write down your questions so you remember to ask them during your visits  © Copyright [employer] 2022 Information is for End User's use only and may not be sold, redistributed or otherwise used for commercial purposes  All illustrations and images included in CareNotes® are the copyrighted property of A D A M , Inc  or [facility] Celine Coronado   The above information is an  only  It is not intended as medical advice for individual conditions or treatments   Talk to your doctor, nurse or pharmacist before following any medical regimen to see if it is safe and effective for you

## 2022-11-29 LAB
AA PROT SER-MCNC: 1.4 UG/ML
CRP SERPL-MCNC: 0.27 MG/L
EGF SERPL-MCNC: 16 PG/ML
FOOTNOTE/HISTORY: NORMAL
IL6 SERPL-MCNC: 9.7 PG/ML
LEPTIN SERPL-MCNC: 10 NG/ML
Lab: NORMAL
MMP-1 SERPL-MCNC: 4.3 NG/ML
MMP-3 SERPL-MCNC: 48 NG/ML
RA DAS LEVEL QL VECTRADA: NORMAL
RA DAS SCORE CALC VECTRADA: 35
RESISTIN SERPL-MCNC: 7 NG/ML
RISK OF RADIOGRAPHIC PROGRESS: 4 %
TNFRSF1A SERPL-MCNC: 0.64 NG/ML
VAP-1 SERPL-MCNC: 0.38 UG/ML
VEGF-A SERPL-MCNC: 270 PG/ML
YKL-40 SER-MCNC: 33 NG/ML

## 2022-12-05 ENCOUNTER — PATIENT OUTREACH (OUTPATIENT)
Dept: ADMINISTRATIVE | Facility: HOSPITAL | Age: 72
End: 2022-12-05
Payer: MEDICARE

## 2022-12-07 NOTE — PROGRESS NOTES
"Ab England MD   Moderate risk of rheumatoid radiographic progression based on Vectra panel results.     Contacted patient to discuss lab results and recommendations with her per Dr. England. Patient stated that she is scheduled for more lab work this Friday 12/09/2022. She would like to think about the list of alternative medication that Dr. England gave her and let us know what she wants to do when we call her with the lab results that she is scheduled for this Friday. All questions answered.     Radha Damon (Allye), [facility]  Rheumatology Department    "

## 2022-12-09 ENCOUNTER — LAB VISIT (OUTPATIENT)
Dept: LAB | Facility: HOSPITAL | Age: 72
End: 2022-12-09
Attending: INTERNAL MEDICINE
Payer: MEDICARE

## 2022-12-09 DIAGNOSIS — R93.1 ELEVATED CORONARY ARTERY CALCIUM SCORE: ICD-10-CM

## 2022-12-09 DIAGNOSIS — E11.9 TYPE 2 DIABETES MELLITUS WITHOUT COMPLICATION, WITHOUT LONG-TERM CURRENT USE OF INSULIN: ICD-10-CM

## 2022-12-09 DIAGNOSIS — M05.9 SEROPOSITIVE RHEUMATOID ARTHRITIS: ICD-10-CM

## 2022-12-09 LAB
ALBUMIN SERPL BCP-MCNC: 3.7 G/DL (ref 3.5–5.2)
ALBUMIN SERPL BCP-MCNC: 3.7 G/DL (ref 3.5–5.2)
ALBUMIN/CREAT UR: 13 UG/MG (ref 0–30)
ALP SERPL-CCNC: 63 U/L (ref 55–135)
ALP SERPL-CCNC: 63 U/L (ref 55–135)
ALT SERPL W/O P-5'-P-CCNC: 128 U/L (ref 10–44)
ALT SERPL W/O P-5'-P-CCNC: 128 U/L (ref 10–44)
ANION GAP SERPL CALC-SCNC: 9 MMOL/L (ref 8–16)
ANION GAP SERPL CALC-SCNC: 9 MMOL/L (ref 8–16)
AST SERPL-CCNC: 58 U/L (ref 10–40)
AST SERPL-CCNC: 58 U/L (ref 10–40)
BASOPHILS # BLD AUTO: 0.02 K/UL (ref 0–0.2)
BASOPHILS NFR BLD: 0.3 % (ref 0–1.9)
BILIRUB SERPL-MCNC: 0.4 MG/DL (ref 0.1–1)
BILIRUB SERPL-MCNC: 0.4 MG/DL (ref 0.1–1)
BUN SERPL-MCNC: 17 MG/DL (ref 8–23)
BUN SERPL-MCNC: 17 MG/DL (ref 8–23)
CALCIUM SERPL-MCNC: 9.2 MG/DL (ref 8.7–10.5)
CALCIUM SERPL-MCNC: 9.2 MG/DL (ref 8.7–10.5)
CHLORIDE SERPL-SCNC: 112 MMOL/L (ref 95–110)
CHLORIDE SERPL-SCNC: 112 MMOL/L (ref 95–110)
CHOLEST SERPL-MCNC: 179 MG/DL (ref 120–199)
CHOLEST/HDLC SERPL: 2.4 {RATIO} (ref 2–5)
CO2 SERPL-SCNC: 22 MMOL/L (ref 23–29)
CO2 SERPL-SCNC: 22 MMOL/L (ref 23–29)
CREAT SERPL-MCNC: 0.7 MG/DL (ref 0.5–1.4)
CREAT SERPL-MCNC: 0.7 MG/DL (ref 0.5–1.4)
CREAT UR-MCNC: 123 MG/DL (ref 15–325)
DIFFERENTIAL METHOD: ABNORMAL
EOSINOPHIL # BLD AUTO: 0 K/UL (ref 0–0.5)
EOSINOPHIL NFR BLD: 0.3 % (ref 0–8)
ERYTHROCYTE [DISTWIDTH] IN BLOOD BY AUTOMATED COUNT: 14.9 % (ref 11.5–14.5)
EST. GFR  (NO RACE VARIABLE): >60 ML/MIN/1.73 M^2
EST. GFR  (NO RACE VARIABLE): >60 ML/MIN/1.73 M^2
ESTIMATED AVG GLUCOSE: 134 MG/DL (ref 68–131)
GLUCOSE SERPL-MCNC: 107 MG/DL (ref 70–110)
GLUCOSE SERPL-MCNC: 107 MG/DL (ref 70–110)
HBA1C MFR BLD: 6.3 % (ref 4–5.6)
HCT VFR BLD AUTO: 36.6 % (ref 37–48.5)
HDLC SERPL-MCNC: 75 MG/DL (ref 40–75)
HDLC SERPL: 41.9 % (ref 20–50)
HGB BLD-MCNC: 11.6 G/DL (ref 12–16)
IMM GRANULOCYTES # BLD AUTO: 0.03 K/UL (ref 0–0.04)
IMM GRANULOCYTES NFR BLD AUTO: 0.5 % (ref 0–0.5)
LDLC SERPL CALC-MCNC: 94.8 MG/DL (ref 63–159)
LYMPHOCYTES # BLD AUTO: 1.5 K/UL (ref 1–4.8)
LYMPHOCYTES NFR BLD: 22.3 % (ref 18–48)
MCH RBC QN AUTO: 28.1 PG (ref 27–31)
MCHC RBC AUTO-ENTMCNC: 31.7 G/DL (ref 32–36)
MCV RBC AUTO: 89 FL (ref 82–98)
MICROALBUMIN UR DL<=1MG/L-MCNC: 16 UG/ML
MONOCYTES # BLD AUTO: 0.8 K/UL (ref 0.3–1)
MONOCYTES NFR BLD: 12 % (ref 4–15)
NEUTROPHILS # BLD AUTO: 4.3 K/UL (ref 1.8–7.7)
NEUTROPHILS NFR BLD: 64.6 % (ref 38–73)
NONHDLC SERPL-MCNC: 104 MG/DL
NRBC BLD-RTO: 0 /100 WBC
PLATELET # BLD AUTO: 149 K/UL (ref 150–450)
PMV BLD AUTO: 11.3 FL (ref 9.2–12.9)
POTASSIUM SERPL-SCNC: 4.4 MMOL/L (ref 3.5–5.1)
POTASSIUM SERPL-SCNC: 4.4 MMOL/L (ref 3.5–5.1)
PROT SERPL-MCNC: 6.3 G/DL (ref 6–8.4)
PROT SERPL-MCNC: 6.3 G/DL (ref 6–8.4)
RBC # BLD AUTO: 4.13 M/UL (ref 4–5.4)
SODIUM SERPL-SCNC: 143 MMOL/L (ref 136–145)
SODIUM SERPL-SCNC: 143 MMOL/L (ref 136–145)
TRIGL SERPL-MCNC: 46 MG/DL (ref 30–150)
WBC # BLD AUTO: 6.65 K/UL (ref 3.9–12.7)

## 2022-12-09 PROCEDURE — 83036 HEMOGLOBIN GLYCOSYLATED A1C: CPT | Performed by: INTERNAL MEDICINE

## 2022-12-09 PROCEDURE — 80053 COMPREHEN METABOLIC PANEL: CPT | Performed by: INTERNAL MEDICINE

## 2022-12-09 PROCEDURE — 82043 UR ALBUMIN QUANTITATIVE: CPT | Performed by: FAMILY MEDICINE

## 2022-12-09 PROCEDURE — 80061 LIPID PANEL: CPT | Performed by: INTERNAL MEDICINE

## 2022-12-09 PROCEDURE — 36415 COLL VENOUS BLD VENIPUNCTURE: CPT | Performed by: INTERNAL MEDICINE

## 2022-12-09 PROCEDURE — 85025 COMPLETE CBC W/AUTO DIFF WBC: CPT | Performed by: INTERNAL MEDICINE

## 2022-12-09 NOTE — PROGRESS NOTES
"Contacted patient to discuss lab results and recommendations with her per Dr. England. Patient verbalized understanding. All questions answered.     Radha Damon (Allye), [facility]  Rheumatology Department    "

## 2022-12-10 ENCOUNTER — PATIENT MESSAGE (OUTPATIENT)
Dept: RHEUMATOLOGY | Facility: CLINIC | Age: 72
End: 2022-12-10
Payer: MEDICARE

## 2022-12-13 ENCOUNTER — PATIENT MESSAGE (OUTPATIENT)
Dept: RHEUMATOLOGY | Facility: CLINIC | Age: 72
End: 2022-12-13
Payer: MEDICARE

## 2022-12-13 ENCOUNTER — OFFICE VISIT (OUTPATIENT)
Dept: HEPATOLOGY | Facility: CLINIC | Age: 72
End: 2022-12-13
Payer: MEDICARE

## 2022-12-13 ENCOUNTER — LAB VISIT (OUTPATIENT)
Dept: LAB | Facility: HOSPITAL | Age: 72
End: 2022-12-13
Attending: INTERNAL MEDICINE
Payer: MEDICARE

## 2022-12-13 VITALS
HEIGHT: 65 IN | HEART RATE: 62 BPM | DIASTOLIC BLOOD PRESSURE: 80 MMHG | WEIGHT: 150.38 LBS | BODY MASS INDEX: 25.05 KG/M2 | SYSTOLIC BLOOD PRESSURE: 122 MMHG

## 2022-12-13 DIAGNOSIS — K75.9 HEPATITIS: ICD-10-CM

## 2022-12-13 DIAGNOSIS — K75.9 HEPATITIS: Primary | ICD-10-CM

## 2022-12-13 PROCEDURE — 99999 PR PBB SHADOW E&M-EST. PATIENT-LVL V: CPT | Mod: PBBFAC,,, | Performed by: INTERNAL MEDICINE

## 2022-12-13 PROCEDURE — 86038 ANTINUCLEAR ANTIBODIES: CPT | Performed by: INTERNAL MEDICINE

## 2022-12-13 PROCEDURE — 36415 COLL VENOUS BLD VENIPUNCTURE: CPT | Performed by: INTERNAL MEDICINE

## 2022-12-13 PROCEDURE — 3008F BODY MASS INDEX DOCD: CPT | Mod: CPTII,S$GLB,, | Performed by: INTERNAL MEDICINE

## 2022-12-13 PROCEDURE — 3288F PR FALLS RISK ASSESSMENT DOCUMENTED: ICD-10-PCS | Mod: CPTII,S$GLB,, | Performed by: INTERNAL MEDICINE

## 2022-12-13 PROCEDURE — 3288F FALL RISK ASSESSMENT DOCD: CPT | Mod: CPTII,S$GLB,, | Performed by: INTERNAL MEDICINE

## 2022-12-13 PROCEDURE — 4010F ACE/ARB THERAPY RXD/TAKEN: CPT | Mod: CPTII,S$GLB,, | Performed by: INTERNAL MEDICINE

## 2022-12-13 PROCEDURE — 3079F DIAST BP 80-89 MM HG: CPT | Mod: CPTII,S$GLB,, | Performed by: INTERNAL MEDICINE

## 2022-12-13 PROCEDURE — 99214 PR OFFICE/OUTPT VISIT, EST, LEVL IV, 30-39 MIN: ICD-10-PCS | Mod: S$GLB,,, | Performed by: INTERNAL MEDICINE

## 2022-12-13 PROCEDURE — 80321 ALCOHOLS BIOMARKERS 1OR 2: CPT | Performed by: INTERNAL MEDICINE

## 2022-12-13 PROCEDURE — 3074F PR MOST RECENT SYSTOLIC BLOOD PRESSURE < 130 MM HG: ICD-10-PCS | Mod: CPTII,S$GLB,, | Performed by: INTERNAL MEDICINE

## 2022-12-13 PROCEDURE — 1126F AMNT PAIN NOTED NONE PRSNT: CPT | Mod: CPTII,S$GLB,, | Performed by: INTERNAL MEDICINE

## 2022-12-13 PROCEDURE — 1101F PT FALLS ASSESS-DOCD LE1/YR: CPT | Mod: CPTII,S$GLB,, | Performed by: INTERNAL MEDICINE

## 2022-12-13 PROCEDURE — 3044F HG A1C LEVEL LT 7.0%: CPT | Mod: CPTII,S$GLB,, | Performed by: INTERNAL MEDICINE

## 2022-12-13 PROCEDURE — 86381 MITOCHONDRIAL ANTIBODY EACH: CPT | Performed by: INTERNAL MEDICINE

## 2022-12-13 PROCEDURE — 1159F MED LIST DOCD IN RCRD: CPT | Mod: CPTII,S$GLB,, | Performed by: INTERNAL MEDICINE

## 2022-12-13 PROCEDURE — 3079F PR MOST RECENT DIASTOLIC BLOOD PRESSURE 80-89 MM HG: ICD-10-PCS | Mod: CPTII,S$GLB,, | Performed by: INTERNAL MEDICINE

## 2022-12-13 PROCEDURE — 86015 ACTIN ANTIBODY EACH: CPT | Performed by: INTERNAL MEDICINE

## 2022-12-13 PROCEDURE — 3074F SYST BP LT 130 MM HG: CPT | Mod: CPTII,S$GLB,, | Performed by: INTERNAL MEDICINE

## 2022-12-13 PROCEDURE — 3044F PR MOST RECENT HEMOGLOBIN A1C LEVEL <7.0%: ICD-10-PCS | Mod: CPTII,S$GLB,, | Performed by: INTERNAL MEDICINE

## 2022-12-13 PROCEDURE — 3008F PR BODY MASS INDEX (BMI) DOCUMENTED: ICD-10-PCS | Mod: CPTII,S$GLB,, | Performed by: INTERNAL MEDICINE

## 2022-12-13 PROCEDURE — 1126F PR PAIN SEVERITY QUANTIFIED, NO PAIN PRESENT: ICD-10-PCS | Mod: CPTII,S$GLB,, | Performed by: INTERNAL MEDICINE

## 2022-12-13 PROCEDURE — 4010F PR ACE/ARB THEARPY RXD/TAKEN: ICD-10-PCS | Mod: CPTII,S$GLB,, | Performed by: INTERNAL MEDICINE

## 2022-12-13 PROCEDURE — 3066F PR DOCUMENTATION OF TREATMENT FOR NEPHROPATHY: ICD-10-PCS | Mod: CPTII,S$GLB,, | Performed by: INTERNAL MEDICINE

## 2022-12-13 PROCEDURE — 3061F PR NEG MICROALBUMINURIA RESULT DOCUMENTED/REVIEW: ICD-10-PCS | Mod: CPTII,S$GLB,, | Performed by: INTERNAL MEDICINE

## 2022-12-13 PROCEDURE — 82784 ASSAY IGA/IGD/IGG/IGM EACH: CPT | Performed by: INTERNAL MEDICINE

## 2022-12-13 PROCEDURE — 1101F PR PT FALLS ASSESS DOC 0-1 FALLS W/OUT INJ PAST YR: ICD-10-PCS | Mod: CPTII,S$GLB,, | Performed by: INTERNAL MEDICINE

## 2022-12-13 PROCEDURE — 3066F NEPHROPATHY DOC TX: CPT | Mod: CPTII,S$GLB,, | Performed by: INTERNAL MEDICINE

## 2022-12-13 PROCEDURE — 99214 OFFICE O/P EST MOD 30 MIN: CPT | Mod: S$GLB,,, | Performed by: INTERNAL MEDICINE

## 2022-12-13 PROCEDURE — 3061F NEG MICROALBUMINURIA REV: CPT | Mod: CPTII,S$GLB,, | Performed by: INTERNAL MEDICINE

## 2022-12-13 PROCEDURE — 99999 PR PBB SHADOW E&M-EST. PATIENT-LVL V: ICD-10-PCS | Mod: PBBFAC,,, | Performed by: INTERNAL MEDICINE

## 2022-12-13 PROCEDURE — 1159F PR MEDICATION LIST DOCUMENTED IN MEDICAL RECORD: ICD-10-PCS | Mod: CPTII,S$GLB,, | Performed by: INTERNAL MEDICINE

## 2022-12-13 NOTE — PROGRESS NOTES
Subjective:     Smitha Salinas is here for evaluation of abnormal LFTs    History of Present Illness:  Smitha Salinas is a  72-year-old female with seropositive rheumatoid arthritis, was treated with the Rinvoq, Actemra and Humira in the past, currently is also on prednisone.  She is referred for abnormal LFTs     RinvoQ, - 7/20- 11/2021   actimra- 0/22- 11/22  humira- 2005 was on for 2871-4919   prednisone -   Frequently in between other treatments     Duration of abnormality- she has had isolated abnormal LFTs since 2008, lately they have been consistently elevated  Medications/OTC/Herbal-as above  ETOH- social  Metabolic issues-diabetes , hypertension  BMI- 25    Review of Systems   Constitutional:  Negative for fatigue, fever and unexpected weight change.   Gastrointestinal:  Negative for abdominal distention, abdominal pain, blood in stool, nausea and vomiting.   Musculoskeletal:  Negative for arthralgias and gait problem.   Skin:  Negative for pallor and rash.   Neurological:  Negative for dizziness.   Hematological:  Does not bruise/bleed easily.   Psychiatric/Behavioral:  Negative for confusion, hallucinations and sleep disturbance.      Objective:     Physical Exam  Vitals and nursing note reviewed.   Constitutional:       Appearance: She is obese.   Eyes:      General: No scleral icterus.  Pulmonary:      Effort: Pulmonary effort is normal.      Breath sounds: No rhonchi.   Abdominal:      General: Bowel sounds are normal. There is no distension.      Palpations: There is no mass.      Tenderness: There is no abdominal tenderness.   Musculoskeletal:      Right lower leg: No edema.      Left lower leg: No edema.   Skin:     Coloration: Skin is not jaundiced.   Neurological:      Mental Status: She is alert and oriented to person, place, and time.      Coordination: Coordination normal.   Psychiatric:         Behavior: Behavior normal.         Thought Content: Thought content normal.       Computed MELD-Na  score unavailable. Necessary lab results were not found in the last year.  Computed MELD score unavailable. Necessary lab results were not found in the last year.    WBC   Date Value Ref Range Status   12/09/2022 6.65 3.90 - 12.70 K/uL Final     Hemoglobin   Date Value Ref Range Status   12/09/2022 11.6 (L) 12.0 - 16.0 g/dL Final     Hematocrit   Date Value Ref Range Status   12/09/2022 36.6 (L) 37.0 - 48.5 % Final     Platelets   Date Value Ref Range Status   12/09/2022 149 (L) 150 - 450 K/uL Final     BUN   Date Value Ref Range Status   12/09/2022 17 8 - 23 mg/dL Final   12/09/2022 17 8 - 23 mg/dL Final     Creatinine   Date Value Ref Range Status   12/09/2022 0.7 0.5 - 1.4 mg/dL Final   12/09/2022 0.7 0.5 - 1.4 mg/dL Final     Glucose   Date Value Ref Range Status   12/09/2022 107 70 - 110 mg/dL Final   12/09/2022 107 70 - 110 mg/dL Final     Calcium   Date Value Ref Range Status   12/09/2022 9.2 8.7 - 10.5 mg/dL Final   12/09/2022 9.2 8.7 - 10.5 mg/dL Final     Sodium   Date Value Ref Range Status   12/09/2022 143 136 - 145 mmol/L Final   12/09/2022 143 136 - 145 mmol/L Final     Potassium   Date Value Ref Range Status   12/09/2022 4.4 3.5 - 5.1 mmol/L Final   12/09/2022 4.4 3.5 - 5.1 mmol/L Final     Chloride   Date Value Ref Range Status   12/09/2022 112 (H) 95 - 110 mmol/L Final   12/09/2022 112 (H) 95 - 110 mmol/L Final     Magnesium   Date Value Ref Range Status   09/06/2013 2.1 1.6 - 2.6 mg/dL Final     AST   Date Value Ref Range Status   12/09/2022 58 (H) 10 - 40 U/L Final   12/09/2022 58 (H) 10 - 40 U/L Final     ALT   Date Value Ref Range Status   12/09/2022 128 (H) 10 - 44 U/L Final   12/09/2022 128 (H) 10 - 44 U/L Final     Alkaline Phosphatase   Date Value Ref Range Status   12/09/2022 63 55 - 135 U/L Final   12/09/2022 63 55 - 135 U/L Final     Total Bilirubin   Date Value Ref Range Status   12/09/2022 0.4 0.1 - 1.0 mg/dL Final     Comment:     For infants and newborns, interpretation of results  should be based  on gestational age, weight and in agreement with clinical  observations.    Premature Infant recommended reference ranges:  Up to 24 hours.............<8.0 mg/dL  Up to 48 hours............<12.0 mg/dL  3-5 days..................<15.0 mg/dL  6-29 days.................<15.0 mg/dL     12/09/2022 0.4 0.1 - 1.0 mg/dL Final     Comment:     For infants and newborns, interpretation of results should be based  on gestational age, weight and in agreement with clinical  observations.    Premature Infant recommended reference ranges:  Up to 24 hours.............<8.0 mg/dL  Up to 48 hours............<12.0 mg/dL  3-5 days..................<15.0 mg/dL  6-29 days.................<15.0 mg/dL       Albumin   Date Value Ref Range Status   12/09/2022 3.7 3.5 - 5.2 g/dL Final   12/09/2022 3.7 3.5 - 5.2 g/dL Final     INR   Date Value Ref Range Status   10/22/2021 0.9 0.8 - 1.2 Final     Comment:     Coumadin Therapy:  2.0 - 3.0 for INR for all indicators except mechanical heart valves  and antiphospholipid syndromes which should use 2.5 - 3.5.           Assessment/Plan:     1.Abnormal LFTs:  Like infliximab, adalimumab also could cause mild transient elevation of liver enzymes.  In the same way Jose 1 inhibitors also cause elevated liver enzymes, could be obtain 11% of patients.  We will obtain a FibroScan to assess underlying chronic inflammation with fibrosis  Will initiate work up to rule out autoimmune of the liver diseases, infectious serologies for hepatitis ABC are negative  Ultrasound of the abdomen on 08/05/2022 shows no liver lesions    2. Rheumatoid arthritis;    I have reviewed existing labs, imaging, procedures. Educated patient about disease process, prognosis. Ordered required labs, images and discussed treatment plan.     Maeve Baldwin MD  Transplant Hepatologist  Dept of Hepatology, [place] [facility]

## 2022-12-14 ENCOUNTER — SPECIALTY PHARMACY (OUTPATIENT)
Dept: PHARMACY | Facility: CLINIC | Age: 72
End: 2022-12-14
Payer: MEDICARE

## 2022-12-14 DIAGNOSIS — M05.9 SEROPOSITIVE RHEUMATOID ARTHRITIS: Primary | ICD-10-CM

## 2022-12-14 LAB
IGA SERPL-MCNC: 152 MG/DL (ref 40–350)
IGG SERPL-MCNC: 703 MG/DL (ref 650–1600)
IGM SERPL-MCNC: 67 MG/DL (ref 50–300)

## 2022-12-14 NOTE — TELEPHONE ENCOUNTER
"Contacted patient to discuss this with her. Patient would like to know if she should still be taking Rinvoq since she will be starting Enbrel once cleared. Also, she stated that she saw Dr. Baldwin with Hepatology yesterday 12/13/2022 and she is okay with her taking Enbrel. Patient would like to know if she still needs to see GI prior to starting Enbrel? Advised patient that I will route this message to Dr. England for further instruction. Patient verbalized understanding. All questions answered.     Radha Damon (Allye), [facility]  Rheumatology Department   "

## 2022-12-15 LAB — ANA SER QL IF: NORMAL

## 2022-12-16 ENCOUNTER — PATIENT MESSAGE (OUTPATIENT)
Dept: RHEUMATOLOGY | Facility: CLINIC | Age: 72
End: 2022-12-16
Payer: MEDICARE

## 2022-12-16 LAB
MITOCHONDRIA AB TITR SER IF: NORMAL {TITER}
SMOOTH MUSCLE AB TITR SER IF: NORMAL {TITER}

## 2022-12-17 ENCOUNTER — PATIENT MESSAGE (OUTPATIENT)
Dept: RHEUMATOLOGY | Facility: CLINIC | Age: 72
End: 2022-12-17
Payer: MEDICARE

## 2022-12-17 LAB
CLINICAL BIOCHEMIST REVIEW: NORMAL
PLPETH BLD-MCNC: <10 NG/ML
POPETH BLD-MCNC: <10 NG/ML

## 2022-12-21 ENCOUNTER — PATIENT MESSAGE (OUTPATIENT)
Dept: UROLOGY | Facility: CLINIC | Age: 72
End: 2022-12-21
Payer: MEDICARE

## 2022-12-22 ENCOUNTER — PATIENT MESSAGE (OUTPATIENT)
Dept: INTERNAL MEDICINE | Facility: CLINIC | Age: 72
End: 2022-12-22
Payer: MEDICARE

## 2022-12-22 ENCOUNTER — PATIENT OUTREACH (OUTPATIENT)
Dept: ADMINISTRATIVE | Facility: HOSPITAL | Age: 72
End: 2022-12-22
Payer: MEDICARE

## 2022-12-22 ENCOUNTER — PATIENT MESSAGE (OUTPATIENT)
Dept: UROLOGY | Facility: CLINIC | Age: 72
End: 2022-12-22
Payer: MEDICARE

## 2022-12-22 RX ORDER — AMLODIPINE BESYLATE 5 MG/1
5 TABLET ORAL DAILY
Qty: 90 TABLET | Refills: 4 | OUTPATIENT
Start: 2022-12-22

## 2022-12-22 RX ORDER — MIRABEGRON 50 MG/1
1 TABLET, FILM COATED, EXTENDED RELEASE ORAL DAILY
Qty: 30 TABLET | Refills: 11 | OUTPATIENT
Start: 2022-12-22

## 2022-12-22 NOTE — PROGRESS NOTES
Health Maintenance Updated   Immunization abstracted   Care Everywhere abstracted    Eye exam requested from Dr Mott

## 2022-12-22 NOTE — TELEPHONE ENCOUNTER
No new care gaps identified.  [facility] Embedded Care Gaps. Reference number: [id number]. 12/21/2022   7:28:24 PM CST

## 2022-12-22 NOTE — LETTER
AUTHORIZATION FOR RELEASE OF   CONFIDENTIAL INFORMATION        We are seeing Smitha Salinas, date of birth 1950, in the clinic at [facility]. Jayesh Jaramillo MD is the patient's PCP. Smitha Salinas has an outstanding lab/procedure at the time we reviewed her chart. In order to help keep her health information updated, she has authorized us to request the following medical record(s):        (  )  MAMMOGRAM                                      (  )  COLONOSCOPY      (  )  PAP SMEAR                                          (  )  OUTSIDE LAB RESULTS     (  )  DEXA SCAN                                          ( X )  DIABETIC EYE EXAM            (  )  FOOT EXAM                                          (  )  ENTIRE RECORD     (  )  OUTSIDE IMMUNIZATIONS                 (  )  _______________         Please fax records to [facility], Chad C. K. Braden, MD, [phone]     If you have any questions, please contact Demarcus Haley           Patient Name: Smitha Salinas  : 1950  Patient Phone #: [phone]

## 2022-12-22 NOTE — TELEPHONE ENCOUNTER
Refill Decision Note   Smitha Brad  is requesting a refill authorization.  Brief Assessment and Rationale for Refill:  Quick Discontinue     Medication Therapy Plan:       Medication Reconciliation Completed: No   Comments:     No Care Gaps recommended.     Note composed:12:08 AM 12/22/2022

## 2022-12-28 ENCOUNTER — PATIENT MESSAGE (OUTPATIENT)
Dept: INTERNAL MEDICINE | Facility: CLINIC | Age: 72
End: 2022-12-28
Payer: MEDICARE

## 2022-12-28 ENCOUNTER — OFFICE VISIT (OUTPATIENT)
Dept: UROLOGY | Facility: CLINIC | Age: 72
End: 2022-12-28
Payer: MEDICARE

## 2022-12-28 VITALS
TEMPERATURE: 97 F | HEIGHT: 65 IN | HEART RATE: 56 BPM | SYSTOLIC BLOOD PRESSURE: 118 MMHG | BODY MASS INDEX: 24.37 KG/M2 | DIASTOLIC BLOOD PRESSURE: 68 MMHG | RESPIRATION RATE: 18 BRPM | WEIGHT: 146.25 LBS

## 2022-12-28 DIAGNOSIS — N39.41 URGE INCONTINENCE: Primary | ICD-10-CM

## 2022-12-28 PROCEDURE — 1159F PR MEDICATION LIST DOCUMENTED IN MEDICAL RECORD: ICD-10-PCS | Mod: CPTII,S$GLB,, | Performed by: UROLOGY

## 2022-12-28 PROCEDURE — 3078F PR MOST RECENT DIASTOLIC BLOOD PRESSURE < 80 MM HG: ICD-10-PCS | Mod: CPTII,S$GLB,, | Performed by: UROLOGY

## 2022-12-28 PROCEDURE — 3061F PR NEG MICROALBUMINURIA RESULT DOCUMENTED/REVIEW: ICD-10-PCS | Mod: CPTII,S$GLB,, | Performed by: UROLOGY

## 2022-12-28 PROCEDURE — 3008F BODY MASS INDEX DOCD: CPT | Mod: CPTII,S$GLB,, | Performed by: UROLOGY

## 2022-12-28 PROCEDURE — 3008F PR BODY MASS INDEX (BMI) DOCUMENTED: ICD-10-PCS | Mod: CPTII,S$GLB,, | Performed by: UROLOGY

## 2022-12-28 PROCEDURE — 1100F PR PT FALLS ASSESS DOC 2+ FALLS/FALL W/INJURY/YR: ICD-10-PCS | Mod: CPTII,S$GLB,, | Performed by: UROLOGY

## 2022-12-28 PROCEDURE — 99214 OFFICE O/P EST MOD 30 MIN: CPT | Mod: S$GLB,,, | Performed by: UROLOGY

## 2022-12-28 PROCEDURE — 4010F ACE/ARB THERAPY RXD/TAKEN: CPT | Mod: CPTII,S$GLB,, | Performed by: UROLOGY

## 2022-12-28 PROCEDURE — 3288F FALL RISK ASSESSMENT DOCD: CPT | Mod: CPTII,S$GLB,, | Performed by: UROLOGY

## 2022-12-28 PROCEDURE — 1160F RVW MEDS BY RX/DR IN RCRD: CPT | Mod: CPTII,S$GLB,, | Performed by: UROLOGY

## 2022-12-28 PROCEDURE — 3061F NEG MICROALBUMINURIA REV: CPT | Mod: CPTII,S$GLB,, | Performed by: UROLOGY

## 2022-12-28 PROCEDURE — 3066F NEPHROPATHY DOC TX: CPT | Mod: CPTII,S$GLB,, | Performed by: UROLOGY

## 2022-12-28 PROCEDURE — 99999 PR PBB SHADOW E&M-EST. PATIENT-LVL V: ICD-10-PCS | Mod: PBBFAC,,, | Performed by: UROLOGY

## 2022-12-28 PROCEDURE — 3288F PR FALLS RISK ASSESSMENT DOCUMENTED: ICD-10-PCS | Mod: CPTII,S$GLB,, | Performed by: UROLOGY

## 2022-12-28 PROCEDURE — 3074F PR MOST RECENT SYSTOLIC BLOOD PRESSURE < 130 MM HG: ICD-10-PCS | Mod: CPTII,S$GLB,, | Performed by: UROLOGY

## 2022-12-28 PROCEDURE — 1159F MED LIST DOCD IN RCRD: CPT | Mod: CPTII,S$GLB,, | Performed by: UROLOGY

## 2022-12-28 PROCEDURE — 1126F AMNT PAIN NOTED NONE PRSNT: CPT | Mod: CPTII,S$GLB,, | Performed by: UROLOGY

## 2022-12-28 PROCEDURE — 1160F PR REVIEW ALL MEDS BY PRESCRIBER/CLIN PHARMACIST DOCUMENTED: ICD-10-PCS | Mod: CPTII,S$GLB,, | Performed by: UROLOGY

## 2022-12-28 PROCEDURE — 1100F PTFALLS ASSESS-DOCD GE2>/YR: CPT | Mod: CPTII,S$GLB,, | Performed by: UROLOGY

## 2022-12-28 PROCEDURE — 3044F PR MOST RECENT HEMOGLOBIN A1C LEVEL <7.0%: ICD-10-PCS | Mod: CPTII,S$GLB,, | Performed by: UROLOGY

## 2022-12-28 PROCEDURE — 3078F DIAST BP <80 MM HG: CPT | Mod: CPTII,S$GLB,, | Performed by: UROLOGY

## 2022-12-28 PROCEDURE — 1126F PR PAIN SEVERITY QUANTIFIED, NO PAIN PRESENT: ICD-10-PCS | Mod: CPTII,S$GLB,, | Performed by: UROLOGY

## 2022-12-28 PROCEDURE — 3074F SYST BP LT 130 MM HG: CPT | Mod: CPTII,S$GLB,, | Performed by: UROLOGY

## 2022-12-28 PROCEDURE — 4010F PR ACE/ARB THEARPY RXD/TAKEN: ICD-10-PCS | Mod: CPTII,S$GLB,, | Performed by: UROLOGY

## 2022-12-28 PROCEDURE — 99214 PR OFFICE/OUTPT VISIT, EST, LEVL IV, 30-39 MIN: ICD-10-PCS | Mod: S$GLB,,, | Performed by: UROLOGY

## 2022-12-28 PROCEDURE — 3066F PR DOCUMENTATION OF TREATMENT FOR NEPHROPATHY: ICD-10-PCS | Mod: CPTII,S$GLB,, | Performed by: UROLOGY

## 2022-12-28 PROCEDURE — 3044F HG A1C LEVEL LT 7.0%: CPT | Mod: CPTII,S$GLB,, | Performed by: UROLOGY

## 2022-12-28 PROCEDURE — 99999 PR PBB SHADOW E&M-EST. PATIENT-LVL V: CPT | Mod: PBBFAC,,, | Performed by: UROLOGY

## 2022-12-28 NOTE — PROGRESS NOTES
Chief Complaint:   Encounter Diagnosis   Name Primary?    Urge incontinence Yes       HPI:   12/28/22- patient notes worse symptoms with VESIcare over myrbetriq, still having persistent issues.    Allergies:  Valdecoxib    Medications: has a current medication list which includes the following prescription(s): acyclovir, amlodipine, aspirin, blood sugar diagnostic, calcium-vitamin d, clonidine, cyclobenzaprine, diazepam, diclofenac sodium, doxepin, etanercept, hydrocodone-acetaminophen, lancing device, losartan, myrbetriq, nucynta er, ondansetron, oxycodone-acetaminophen, potassium chloride sa, prednisone, prolensa, rosuvastatin, solifenacin, tramadol, and rinvoq, and the following Facility-Administered Medications: ondansetron.    Review of Systems:  General: No fever, chills, fatigability, or weight loss.  Skin: No rashes, itching, or changes in color or texture of skin.  Chest: Denies ADLER, cyanosis, wheezing, cough, and sputum production.  Abdomen: Appetite fine. No weight loss. Denies diarrhea, abdominal pain, hematemesis, or blood in stool.  Musculoskeletal: No joint stiffness or swelling. Some back pain.  : As above.  All other review of systems negative.    PMH:   has a past medical history of ADHD (attention deficit hyperactivity disorder), Adult ADHD, Chronic rheumatic arthritis, Ex-smoker, Genital herpes, Hyperlipidemia, Hypertension, Immunosuppressed status, Lichen sclerosus et atrophicus, Morbid obesity (1/11/2021), Obesity, Class I, BMI 30-34.9 (12/22/2015), SUKHDEEP (obstructive sleep apnea), Osteopenia, and Type 2 diabetes mellitus.    PSH:   has a past surgical history that includes Tonsillectomy (1958); Skin graft (1965); Cryotherapy (1980); Breast biopsy (Left); Colonoscopy (N/A, 2/12/2020); Esophagogastroduodenoscopy (N/A, 2/12/2020); Insertion of sacral neurostimulator, electrodes, and implantable pulse generator (IPG) (Left, 11/23/2020); and Insertion of sacral neurostimulator generator (Left,  11/23/2020).    FamHx: family history includes Breast cancer (age of onset: 39) in her sister; Cancer (age of onset: 68) in an other family member; Diabetes in her mother; Heart disease in her mother; Kidney failure in her father; Peripheral vascular disease in her mother; Stroke in her father, maternal grandmother, paternal grandfather, and paternal grandmother.    SocHx:  reports that she quit smoking about 9 years ago. She has a 3.75 pack-year smoking history. She has never used smokeless tobacco. She reports current alcohol use. She reports that she does not use drugs.     Physical Exam:  Vitals:   Vitals:    12/28/22 1337   Resp: 18     General: A&Ox3. No apparent distress. No deformities.  Neck: No masses. Normal thyroid.  Lungs: CTA samia. No use of accessory muscles.  Heart: RRR. No arrhythmias.  Abdomen: Soft. NT. ND.  Incision is c/d/i  Skin: The skin is warm and dry. No jaundice.  Ext: No c/c/e.    Labs/Studies:   UDS- decreased capacity otherwise normal 9/20  Percutaneous InterStim 09/29/2020  pvr 12 ml 1/21  KUB/GENIE normal 8/20    Impression/Plan:       1.  Urge incontinence- InterStim 11/23/20    Will switch back to Myrbetriq.  Our InterStim representative to interrogate the device, based on clinical findings she is actually improved from preoperatively.  Will hold course for now, she did recently have a fall with some sciatic pain, this could be complicating her course.  Re-evaluate in 1-2 months with a PVR.  She will manage expectations appropriately, if persistent though may consider Botox.

## 2022-12-30 ENCOUNTER — OFFICE VISIT (OUTPATIENT)
Dept: INTERNAL MEDICINE | Facility: CLINIC | Age: 72
End: 2022-12-30
Payer: MEDICARE

## 2022-12-30 ENCOUNTER — PATIENT MESSAGE (OUTPATIENT)
Dept: HEPATOLOGY | Facility: CLINIC | Age: 72
End: 2022-12-30
Payer: MEDICARE

## 2022-12-30 DIAGNOSIS — M54.31 SCIATICA OF RIGHT SIDE: Primary | ICD-10-CM

## 2022-12-30 PROCEDURE — 1159F PR MEDICATION LIST DOCUMENTED IN MEDICAL RECORD: ICD-10-PCS | Mod: CPTII,95,, | Performed by: PHYSICIAN ASSISTANT

## 2022-12-30 PROCEDURE — 1160F PR REVIEW ALL MEDS BY PRESCRIBER/CLIN PHARMACIST DOCUMENTED: ICD-10-PCS | Mod: CPTII,95,, | Performed by: PHYSICIAN ASSISTANT

## 2022-12-30 PROCEDURE — 99213 PR OFFICE/OUTPT VISIT, EST, LEVL III, 20-29 MIN: ICD-10-PCS | Mod: 95,,, | Performed by: PHYSICIAN ASSISTANT

## 2022-12-30 PROCEDURE — 3044F HG A1C LEVEL LT 7.0%: CPT | Mod: CPTII,95,, | Performed by: PHYSICIAN ASSISTANT

## 2022-12-30 PROCEDURE — 1159F MED LIST DOCD IN RCRD: CPT | Mod: CPTII,95,, | Performed by: PHYSICIAN ASSISTANT

## 2022-12-30 PROCEDURE — 3044F PR MOST RECENT HEMOGLOBIN A1C LEVEL <7.0%: ICD-10-PCS | Mod: CPTII,95,, | Performed by: PHYSICIAN ASSISTANT

## 2022-12-30 PROCEDURE — 3061F NEG MICROALBUMINURIA REV: CPT | Mod: CPTII,95,, | Performed by: PHYSICIAN ASSISTANT

## 2022-12-30 PROCEDURE — 3066F NEPHROPATHY DOC TX: CPT | Mod: CPTII,95,, | Performed by: PHYSICIAN ASSISTANT

## 2022-12-30 PROCEDURE — 3061F PR NEG MICROALBUMINURIA RESULT DOCUMENTED/REVIEW: ICD-10-PCS | Mod: CPTII,95,, | Performed by: PHYSICIAN ASSISTANT

## 2022-12-30 PROCEDURE — 99213 OFFICE O/P EST LOW 20 MIN: CPT | Mod: 95,,, | Performed by: PHYSICIAN ASSISTANT

## 2022-12-30 PROCEDURE — 4010F ACE/ARB THERAPY RXD/TAKEN: CPT | Mod: CPTII,95,, | Performed by: PHYSICIAN ASSISTANT

## 2022-12-30 PROCEDURE — 1160F RVW MEDS BY RX/DR IN RCRD: CPT | Mod: CPTII,95,, | Performed by: PHYSICIAN ASSISTANT

## 2022-12-30 PROCEDURE — 4010F PR ACE/ARB THEARPY RXD/TAKEN: ICD-10-PCS | Mod: CPTII,95,, | Performed by: PHYSICIAN ASSISTANT

## 2022-12-30 PROCEDURE — 3066F PR DOCUMENTATION OF TREATMENT FOR NEPHROPATHY: ICD-10-PCS | Mod: CPTII,95,, | Performed by: PHYSICIAN ASSISTANT

## 2022-12-30 RX ORDER — METHYLPREDNISOLONE 4 MG/1
TABLET ORAL
Qty: 1 EACH | Refills: 0 | Status: SHIPPED | OUTPATIENT
Start: 2022-12-30 | End: 2023-02-14

## 2022-12-30 RX ORDER — GABAPENTIN 300 MG/1
300 CAPSULE ORAL 3 TIMES DAILY
Qty: 30 CAPSULE | Refills: 0 | Status: SHIPPED | OUTPATIENT
Start: 2022-12-30 | End: 2023-07-31

## 2022-12-30 NOTE — PROGRESS NOTES
The patient location is: [place], [place]  The chief complaint leading to consultation is: sciatica    Visit type: audiovisual    Face to Face time with patient: 11 min  14 minutes of total time spent on the encounter, which includes face to face time and non-face to face time preparing to see the patient (eg, review of tests), Obtaining and/or reviewing separately obtained history, Documenting clinical information in the electronic or other health record, Independently interpreting results (not separately reported) and communicating results to the patient/family/caregiver, or Care coordination (not separately reported).         Each patient to whom he or she provides medical services by telemedicine is:  (1) informed of the relationship between the physician and patient and the respective role of any other health care provider with respect to management of the patient; and (2) notified that he or she may decline to receive medical services by telemedicine and may withdraw from such care at any time.    Notes:    Subjective:      Patient ID: Smitha Salinas is a 72 y.o. female.    Chief Complaint: No chief complaint on file.    Back Pain  This is a new problem. The current episode started in the past 7 days. The problem occurs constantly. The problem is unchanged. The pain is present in the gluteal. The quality of the pain is described as aching. The pain radiates to the right thigh. The pain is at a severity of 8/10. The pain is severe. The pain is Worse during the day. The symptoms are aggravated by bending. Stiffness is present All day. Associated symptoms include bladder incontinence and leg pain. Pertinent negatives include no abdominal pain, bowel incontinence, chest pain, dysuria, fever, headaches, numbness, paresis, paresthesias, pelvic pain, perianal numbness, tingling, weakness or weight loss. She has tried ice, NSAIDs, analgesics and muscle relaxant for the symptoms. The treatment provided mild relief.    Pain in buttock and radiates down back of right thigh. Worse with sitting on that side.     Patient Active Problem List   Diagnosis    Hypertension associated with diabetes    Hypercholesterolemia    Post herpetic neuralgia    Seropositive rheumatoid arthritis    Postmenopausal atrophic vaginitis    Lichen sclerosus et atrophicus    Diverticulosis    Herpes genitalis    Overactive bladder    Immunosuppressed status    Ex-smoker    SUKHDEEP (obstructive sleep apnea)    Mixed anxiety and depressive disorder    Colon polyps    Adult ADHD    Osteopenia of multiple sites    Dysphagia    Urge incontinence    Type 2 diabetes mellitus    Abnormal EKG    Osteoarthritis of foot joint    Osteoarthritis of thumb    Radicular low back pain    Poor sleep hygiene    Elevated coronary artery calcium score    Thrombocytopenia, unspecified    Primary osteoarthritis involving multiple joints    High risk medication use    Allergies    Abdominal pain    Weight loss    Malaise    Chronic rhinitis          Current Outpatient Medications:     acyclovir (ZOVIRAX) 400 MG tablet, Take 1 tablet (400 mg total) by mouth 2 (two) times daily. (Patient not taking: Reported on 12/13/2022), Disp: 180 tablet, Rfl: 4    amLODIPine (NORVASC) 5 MG tablet, Take 1 tablet by mouth once daily, Disp: 90 tablet, Rfl: 4    aspirin (ECOTRIN) 81 MG EC tablet, Take 81 mg by mouth once daily., Disp: , Rfl:     blood sugar diagnostic (BLOOD GLUCOSE TEST) Strp, 1 strip by Misc.(Non-Drug; Combo Route) route 3 (three) times daily as needed. (Patient not taking: Reported on 12/13/2022), Disp: 1 each, Rfl: 11    calcium-vitamin D (CALCIUM 600 + D,3,) 600 mg-10 mcg (400 unit) Tab, Take 1 tablet by mouth 2 (two) times daily. (Patient not taking: Reported on 12/13/2022), Disp: 60 tablet, Rfl: 3    cloNIDine (CATAPRES) 0.2 MG tablet, TAKE 1/2 (ONE-HALF) TABLET BY MOUTH IN THE MORNING AND 1/2 (ONE-HALF) AT NOON AND 2 AT BEDTIME, Disp: 270 tablet, Rfl: 0    cyclobenzaprine  (FLEXERIL) 10 MG tablet, TAKE 1/2 TO 1 (ONE-HALF TO ONE) TABLET BY MOUTH THREE TIMES DAILY AS NEEDED FOR MUSCLE SPASM (Patient not taking: Reported on 12/13/2022), Disp: 60 tablet, Rfl: 11    diazePAM (VALIUM) 5 MG tablet, TAKE 1 TABLET BY MOUTH AT BEDTIME FOR ANXIETY, Disp: 30 tablet, Rfl: 5    diclofenac sodium (VOLTAREN) 1 % Gel, Apply 2 g topically 3 (three) times daily., Disp: 1 Tube, Rfl: 0    doxepin (SINEQUAN) 10 MG capsule, Take 1 capsule (10 mg total) by mouth every evening., Disp: 30 capsule, Rfl: 11    entanercept (ENBREL) 50 mg/mL injection, Inject 1 mL (50 mg total) into the skin once a week., Disp: 4 mL, Rfl: 11    gabapentin (NEURONTIN) 300 MG capsule, Take 1 capsule (300 mg total) by mouth 3 (three) times daily. for 10 days, Disp: 30 capsule, Rfl: 0    HYDROcodone-acetaminophen (NORCO) 5-325 mg per tablet, , Disp: , Rfl:     lancing device Misc, 1 each by Misc.(Non-Drug; Combo Route) route 3 (three) times daily as needed., Disp: 1 each, Rfl: 0    losartan (COZAAR) 50 MG tablet, Take 1 tablet (50 mg total) by mouth once daily., Disp: 90 tablet, Rfl: 1    methylPREDNISolone (MEDROL DOSEPACK) 4 mg tablet, use as directed, Disp: 1 each, Rfl: 0    mirabegron (MYRBETRIQ) 50 mg Tb24, Take 1 tablet by mouth once daily, Disp: 90 tablet, Rfl: 4    NUCYNTA ER 50 mg Tb12, Take 1 tablet by mouth every 12 (twelve) hours. prn, Disp: , Rfl:     ondansetron (ZOFRAN-ODT) 4 MG TbDL, Take 1 tablet (4 mg total) by mouth every 8 (eight) hours as needed (nausea)., Disp: 10 tablet, Rfl: 1    oxyCODONE-acetaminophen (PERCOCET) 5-325 mg per tablet, Take 1 tablet by mouth every 4 (four) hours as needed for Pain., Disp: , Rfl:     potassium chloride SA (K-DUR,KLOR-CON) 20 MEQ tablet, 1 tablet daily, Disp: 90 tablet, Rfl: 5    predniSONE (DELTASONE) 5 MG tablet, May take 5-15 mg (1-3 tablets) of prednisone daily for rheumatoid flares, Disp: 60 tablet, Rfl: 2    PROLENSA 0.07 % Drop, , Disp: , Rfl:     rosuvastatin (CRESTOR) 20  MG tablet, Take 1 tablet (20 mg total) by mouth once daily., Disp: 30 tablet, Rfl: 6    traMADoL (ULTRAM) 50 mg tablet, Take 1 tablet (50 mg total) by mouth every 6 (six) hours as needed., Disp: 60 tablet, Rfl: 5    upadacitinib (RINVOQ) 15 mg 24 hr tablet, Take 15 mg by mouth once daily., Disp: , Rfl:     Current Facility-Administered Medications:     ondansetron disintegrating tablet 4 mg, 4 mg, Oral, Once PRN, Perez De La Fuente MD    Review of Systems   Constitutional:  Negative for activity change, appetite change, chills, diaphoresis, fatigue, fever, unexpected weight change and weight loss.   HENT: Negative.  Negative for congestion, hearing loss, postnasal drip, rhinorrhea, sore throat, trouble swallowing and voice change.    Eyes: Negative.  Negative for visual disturbance.   Respiratory: Negative.  Negative for cough, choking, chest tightness and shortness of breath.    Cardiovascular:  Negative for chest pain, palpitations and leg swelling.   Gastrointestinal:  Negative for abdominal distention, abdominal pain, blood in stool, bowel incontinence, constipation, diarrhea, nausea and vomiting.   Endocrine: Negative for cold intolerance, heat intolerance, polydipsia and polyuria.   Genitourinary:  Positive for bladder incontinence. Negative for decreased urine volume, difficulty urinating, dyspareunia, dysuria, enuresis, flank pain, frequency, genital sores, hematuria, menstrual problem, pelvic pain, urgency, vaginal bleeding, vaginal discharge and vaginal pain.   Musculoskeletal:  Positive for back pain and myalgias. Negative for arthralgias, gait problem and joint swelling.   Skin:  Negative for color change, pallor, rash and wound.   Neurological:  Negative for dizziness, tingling, tremors, weakness, light-headedness, numbness, headaches and paresthesias.   Hematological:  Negative for adenopathy.   Psychiatric/Behavioral:  Negative for behavioral problems, confusion, self-injury, sleep disturbance and  suicidal ideas. The patient is not nervous/anxious.    Objective:   There were no vitals taken for this visit.    Physical Exam  Constitutional:       General: She is not in acute distress.     Appearance: Normal appearance. She is not ill-appearing, toxic-appearing or diaphoretic.   Pulmonary:      Effort: Pulmonary effort is normal. No respiratory distress.   Neurological:      Mental Status: She is alert.       Assessment:     1. Sciatica of right side      Plan:   Sciatica of right side  -     gabapentin (NEURONTIN) 300 MG capsule; Take 1 capsule (300 mg total) by mouth 3 (three) times daily. for 10 days  Dispense: 30 capsule; Refill: 0  -     methylPREDNISolone (MEDROL DOSEPACK) 4 mg tablet; use as directed  Dispense: 1 each; Refill: 0    -EXERCISES SENT TO HER PATIENT PORTAL  -IF NO IMPROVEMENT WITH TREATMENT, PT/OT    Follow up if symptoms worsen or fail to improve.

## 2023-01-02 NOTE — PROGRESS NOTES
Subjective:       Patient ID: Smitha Salinas is a 72 y.o. female.    Chief Complaint:      HPI:    HAS A HISTORY OF HAVING ABDOMINAL PAIN AND SWELLING OF 10 YEARS -- HAS CONSTIPATION AND NAUSEA AND VOMITING.  TYPICAL ABDOMINAL PAIN HAS BEEN DEBILITATING-- BLOATING ,BELCHING, INABILITY TO EAT OR DRINK TO THE EXTENT OF RISK OF DEHYDRATION.  Weight loss is of concern.    WAS TREATED FOR ASYMPTOMATIC GERD IN THE PAST. GERD treatment with PPIs did not help.  GI Dr Benjamín SIERRA md DID UPPER AND gi ENDOSCOPY, GASTRIC EMPTYING STUDIES AND STOOL STUDIES.  ALL WERE NORMAL.  Currently undergoing hepatologist's work up by Maeve Baldwin MD.    HAS OA / RA-- WAS TREATED IN THE PAST BY Dr. England-- been treated with Humira and Rinvoq    CURRENTLY UNDER THE CARE OF HEPATOLOGIST     AA female with weight loss and abdominal swelling , seen by me on 07- 28- 2022 as a new patient and by Daisy Jackson MD on 09- 06- 2022.    Abdominal ultra sound was normal. Still having abdominal pain. She may have abdominal migraine.. Dr Jackson prescribed Sinequan 10 mg qd and a year of refills    Has RA-- with flared up arthritis- small joints of the hands- Has to stop Rinvoq dur=e to raised LFT- wILL TRY eMBREL.  For right Sciatica, recommend neurology consult.  The patient reports SUKHDEEP is mild with some daytime somnolence,..  Now having q 4 months episodes of abdominal pain, gas and constipation, diarrhea and vomiting episodes.  Per her GI -- NO diverticulosis- Has LUQ pain off and on 3- 4 per week.  Will follow up with the GI specialist.         Valdecoxib -- ALLERGY WAS REPORTED    Past Medical History:   Diagnosis Date    ADHD (attention deficit hyperactivity disorder)     Adult ADHD     [facility]    Chronic rheumatic arthritis     on DMARD    Ex-smoker     Genital herpes     Hyperlipidemia     Hypertension     Immunosuppressed status     Lichen sclerosus et atrophicus     Morbid obesity 1/11/2021    Obesity, Class I, BMI 30-34.9 12/22/2015     SUKHDEEP (obstructive sleep apnea)     Osteopenia     5/16 wai 5/18(start fmax if on pred 3months or more)    Type 2 diabetes mellitus     dxd 9/20       Family History   Problem Relation Age of Onset    Heart disease Mother     Diabetes Mother     Peripheral vascular disease Mother         amputations    Stroke Father     Kidney failure Father     Breast cancer Sister 39    Cancer Other 68        breast    Stroke Maternal Grandmother     Stroke Paternal Grandmother     Stroke Paternal Grandfather     Colon cancer Neg Hx     Ovarian cancer Neg Hx        Environmental History: Dust Mite Controls: Dust mite controls are already in place.     Review of Systems   Constitutional: Negative.  Negative for fatigue and fever.   HENT: Negative.  Negative for congestion, ear pain, postnasal drip, rhinorrhea, sinus pressure, sinus pain, sneezing and sore throat.    Eyes: Negative.  Negative for redness and itching.   Respiratory: Negative.  Negative for cough, chest tightness, shortness of breath and wheezing.    Cardiovascular: Negative.  Negative for chest pain.   Gastrointestinal: Negative.  Negative for nausea.   Endocrine: Negative.  Negative for cold intolerance.   Genitourinary: Negative.  Negative for frequency.   Musculoskeletal: Negative.  Negative for myalgias.   Skin: Negative.  Negative for rash.   Allergic/Immunologic: Negative.  Negative for environmental allergies, food allergies and immunocompromised state.   Neurological:  Negative for dizziness and headaches.        Right sciatica   Hematological: Negative.  Negative for adenopathy.   Psychiatric/Behavioral: Negative.  Negative for sleep disturbance.      Objective:         Physical Exam  Vitals and nursing note reviewed.   Constitutional:       Appearance: Normal appearance. She is normal weight.   HENT:      Head: Normocephalic and atraumatic.      Right Ear: Tympanic membrane, ear canal and external ear normal.      Left Ear: Tympanic membrane, ear canal and  external ear normal.      Nose: Congestion present.      Mouth/Throat:      Mouth: Mucous membranes are moist.      Pharynx: Oropharynx is clear.   Eyes:      Extraocular Movements: Extraocular movements intact.      Conjunctiva/sclera: Conjunctivae normal.      Pupils: Pupils are equal, round, and reactive to light.   Cardiovascular:      Rate and Rhythm: Normal rate and regular rhythm.      Pulses: Normal pulses.      Heart sounds: Normal heart sounds.   Pulmonary:      Effort: Pulmonary effort is normal.      Breath sounds: Normal breath sounds.   Abdominal:      General: Abdomen is flat. Bowel sounds are normal. There is distension.      Palpations: Abdomen is soft.      Tenderness: There is abdominal tenderness.      Comments:  BELCHING, BLOATING, ABDOMINAL PAIN AND CONSTIPATION   Musculoskeletal:         General: Swelling and tenderness present. Normal range of motion.      Cervical back: Normal range of motion and neck supple.      Comments: ARTHRALGIA , SMALL JOINTS OF THE HANDS   Skin:     General: Skin is warm and dry.      Capillary Refill: Capillary refill takes less than 2 seconds.   Neurological:      General: No focal deficit present.      Mental Status: She is alert and oriented to person, place, and time. Mental status is at baseline.   Psychiatric:         Mood and Affect: Mood normal.         Behavior: Behavior normal.         Thought Content: Thought content normal.         Judgment: Judgment normal.       Assessment:      1. Weight loss    2. Abdominal pain, unspecified abdominal location    3. Malaise and fatigue    4. Rhinitis, chronic    5. Abdominal migraine, not intractable    6       RECURRENT HERPES ZOSTER anterior abdominal wall and recurrent oral--           HAD 2 Shingrix   7       ADHD  8       No more weight loss after stoppin OZEEMPIC-- today's weight 65.9 KG and wt on 07- 28- 2022--- 62.69 Kg  9      Rinvoq -- upadacitinumab-- for RA raised LFT - Consulted Nate Baldwin MD- Will  switch to Embrel-- Having arthritis- small joints of the hands- Used to be on Humira.  10     Right lower extremity- Sciatica- Recommend Neurology consult  11     HAVING PROBLEMS TIMING THE DOXEPIN // Amitriptyline due to eating past 9 PM an sleeping late , after 2 AM, at nights.        Plan:       May order   LATER, IF NEEDED, 24 - hour Urine catecholamines, 5 HIAA and Histamine  Reviewed and discussed 12- 09- 2022-- laboratory test results of Dr Maeve Baldwin--   elevated ALT ( 128 K ) , AST 58 K ) , Cholesterol 248 mg / dl, Hb A1 C-- 6.3, NCNC anemia  Reviewed the angioedema work up ordered by me on 07- 28- 2022--- Normal C2, C2, C1 INH, AMYLASE, LIPASE, SERUM   TRYPTASE AND CELIAC DISEASE SEROLOGY.  May repeat Serum tryptase- within 2 - hour into another 2 bad episodes of abdominal pain.  May try Vistaril 25 mg at night-- The patient says she can not take doxepin  10 mg at night due to her late sleeping pattern- and excessive day time sleepiness  Did not do allergy work up  Had PCV- 13 ON 11- 08- 2016.  Had PPSV- 23 ON 01- 04- 2018 AND 10- 01- 2008.  May immunize with PCV- 20 through the PCP  YEARLY FOLLOW [employer]                    Problems Address                                                 Amount and/or Complexity                                                                      Risk       3           [] 2 or more self-limited or minor problems                      [] Limited                                                                        [] Low                  [] 1 stable chronic illness                                                  Any combination of the two                                               OTC drugs                  []Acute, uncomplicated illness or injury                            Review of prior external notes from unique source           Minor surgery with no risk factors                                                                                                                [] 1 []2  []3+                                                                                                              Review of results from each unique test                                                                                                               [] 1 []2  [] 3+                                                                                                              Order of each unique test                                                                                                               [] 1 []2  [] 3+                                                                                                              Or                                                                                                             [] Assessment requiring an independent historian      4            [] One or more chronic illness with exacerbation,              [] Moderate                                                                      [] Moderate                 Progression, or side effects of treatment                            -test documents or independent historians                        Prescription drug management                []  2 or more sable chronic illnesses                                    [] Independent interpretation of tests                              Minor surgery with identifiable risk                [] 1 undiagnosed new problem with uncertain prognosis    [] Discussion or management of test results                    elective major surgery                [] 1 acute illness with                systemic symptoms                                                                                                                                                              [] 1 acute complicated injury                                                                                                                                          Elective major surgery                                                                                                                                                                                                                                                                                                                                                                                                   5             1 or more chronic illnesses with severe exacerbation,     [x] Extensive(two from below)                                         [x] High                    []                                                                                           [] Independent interpretation of results                         Drug therapy requiring intensive                                                                                                               []Discussion of management or test interpretation           monitoring                                                                                                                                                                                                       Decision to de-escalate care                 [] 1 acute or chronic illness or injury that poses a threat                                                                                               Decision regarding hospitalization

## 2023-01-03 ENCOUNTER — PATIENT MESSAGE (OUTPATIENT)
Dept: RHEUMATOLOGY | Facility: CLINIC | Age: 73
End: 2023-01-03
Payer: MEDICARE

## 2023-01-03 DIAGNOSIS — M05.9 SEROPOSITIVE RHEUMATOID ARTHRITIS: ICD-10-CM

## 2023-01-03 RX ORDER — PREDNISONE 5 MG/1
TABLET ORAL
Qty: 60 TABLET | Refills: 2 | Status: SHIPPED | OUTPATIENT
Start: 2023-01-03 | End: 2023-02-13 | Stop reason: SDUPTHER

## 2023-01-03 NOTE — TELEPHONE ENCOUNTER
"See other portal message from today 01/03/2023.    Radha "Patrizia Damon, [facility]  Rheumatology Department   "

## 2023-01-03 NOTE — TELEPHONE ENCOUNTER
"Ab England MD      Prednisone refill provided, may take up to 15mg per day if needed.      Contacted patient to discuss this with her. Also recommended for patient to monitor her blood glucose closely per Dr. England. Patient verbalized understanding. All questions answered.     Radha Damon (Allye), [facility]  Rheumatology Department   "

## 2023-01-03 NOTE — TELEPHONE ENCOUNTER
"See other portal message from today 01/03/2023.    Radha "Patrizia Damon, [facility]  Rheumatology Department   "

## 2023-01-04 ENCOUNTER — PROCEDURE VISIT (OUTPATIENT)
Dept: HEPATOLOGY | Facility: CLINIC | Age: 73
End: 2023-01-04
Attending: INTERNAL MEDICINE
Payer: MEDICARE

## 2023-01-04 ENCOUNTER — OFFICE VISIT (OUTPATIENT)
Dept: ALLERGY | Facility: CLINIC | Age: 73
End: 2023-01-04
Payer: MEDICARE

## 2023-01-04 VITALS
BODY MASS INDEX: 24.21 KG/M2 | BODY MASS INDEX: 24.21 KG/M2 | SYSTOLIC BLOOD PRESSURE: 135 MMHG | TEMPERATURE: 97 F | HEIGHT: 65 IN | HEIGHT: 65 IN | WEIGHT: 145.31 LBS | WEIGHT: 145.31 LBS | HEART RATE: 53 BPM | DIASTOLIC BLOOD PRESSURE: 77 MMHG

## 2023-01-04 DIAGNOSIS — R53.81 MALAISE AND FATIGUE: ICD-10-CM

## 2023-01-04 DIAGNOSIS — K75.9 HEPATITIS: ICD-10-CM

## 2023-01-04 DIAGNOSIS — R63.4 WEIGHT LOSS: Primary | ICD-10-CM

## 2023-01-04 DIAGNOSIS — R53.83 MALAISE AND FATIGUE: ICD-10-CM

## 2023-01-04 DIAGNOSIS — G43.D0 ABDOMINAL MIGRAINE, NOT INTRACTABLE: ICD-10-CM

## 2023-01-04 DIAGNOSIS — R10.9 ABDOMINAL PAIN, UNSPECIFIED ABDOMINAL LOCATION: ICD-10-CM

## 2023-01-04 DIAGNOSIS — J31.0 RHINITIS, CHRONIC: ICD-10-CM

## 2023-01-04 PROCEDURE — 3075F SYST BP GE 130 - 139MM HG: CPT | Mod: CPTII,S$GLB,, | Performed by: SPECIALIST

## 2023-01-04 PROCEDURE — 3078F PR MOST RECENT DIASTOLIC BLOOD PRESSURE < 80 MM HG: ICD-10-PCS | Mod: CPTII,S$GLB,, | Performed by: SPECIALIST

## 2023-01-04 PROCEDURE — 1125F PR PAIN SEVERITY QUANTIFIED, PAIN PRESENT: ICD-10-PCS | Mod: CPTII,S$GLB,, | Performed by: SPECIALIST

## 2023-01-04 PROCEDURE — 4010F ACE/ARB THERAPY RXD/TAKEN: CPT | Mod: CPTII,S$GLB,, | Performed by: SPECIALIST

## 2023-01-04 PROCEDURE — 76981 PR US, ELASTOGRAPHY, PARENCHYMA: ICD-10-PCS | Mod: S$GLB,,, | Performed by: NURSE PRACTITIONER

## 2023-01-04 PROCEDURE — 4010F PR ACE/ARB THEARPY RXD/TAKEN: ICD-10-PCS | Mod: CPTII,S$GLB,, | Performed by: SPECIALIST

## 2023-01-04 PROCEDURE — 3008F BODY MASS INDEX DOCD: CPT | Mod: CPTII,S$GLB,, | Performed by: SPECIALIST

## 2023-01-04 PROCEDURE — 99999 PR PBB SHADOW E&M-EST. PATIENT-LVL V: CPT | Mod: PBBFAC,,, | Performed by: SPECIALIST

## 2023-01-04 PROCEDURE — 99215 PR OFFICE/OUTPT VISIT, EST, LEVL V, 40-54 MIN: ICD-10-PCS | Mod: S$GLB,,, | Performed by: SPECIALIST

## 2023-01-04 PROCEDURE — 1160F RVW MEDS BY RX/DR IN RCRD: CPT | Mod: CPTII,S$GLB,, | Performed by: SPECIALIST

## 2023-01-04 PROCEDURE — 1125F AMNT PAIN NOTED PAIN PRSNT: CPT | Mod: CPTII,S$GLB,, | Performed by: SPECIALIST

## 2023-01-04 PROCEDURE — 1101F PT FALLS ASSESS-DOCD LE1/YR: CPT | Mod: CPTII,S$GLB,, | Performed by: SPECIALIST

## 2023-01-04 PROCEDURE — 99999 PR PBB SHADOW E&M-EST. PATIENT-LVL V: ICD-10-PCS | Mod: PBBFAC,,, | Performed by: SPECIALIST

## 2023-01-04 PROCEDURE — 3075F PR MOST RECENT SYSTOLIC BLOOD PRESS GE 130-139MM HG: ICD-10-PCS | Mod: CPTII,S$GLB,, | Performed by: SPECIALIST

## 2023-01-04 PROCEDURE — 3008F PR BODY MASS INDEX (BMI) DOCUMENTED: ICD-10-PCS | Mod: CPTII,S$GLB,, | Performed by: SPECIALIST

## 2023-01-04 PROCEDURE — 99215 OFFICE O/P EST HI 40 MIN: CPT | Mod: S$GLB,,, | Performed by: SPECIALIST

## 2023-01-04 PROCEDURE — 1160F PR REVIEW ALL MEDS BY PRESCRIBER/CLIN PHARMACIST DOCUMENTED: ICD-10-PCS | Mod: CPTII,S$GLB,, | Performed by: SPECIALIST

## 2023-01-04 PROCEDURE — 3288F PR FALLS RISK ASSESSMENT DOCUMENTED: ICD-10-PCS | Mod: CPTII,S$GLB,, | Performed by: SPECIALIST

## 2023-01-04 PROCEDURE — 3078F DIAST BP <80 MM HG: CPT | Mod: CPTII,S$GLB,, | Performed by: SPECIALIST

## 2023-01-04 PROCEDURE — 1101F PR PT FALLS ASSESS DOC 0-1 FALLS W/OUT INJ PAST YR: ICD-10-PCS | Mod: CPTII,S$GLB,, | Performed by: SPECIALIST

## 2023-01-04 PROCEDURE — 1159F PR MEDICATION LIST DOCUMENTED IN MEDICAL RECORD: ICD-10-PCS | Mod: CPTII,S$GLB,, | Performed by: SPECIALIST

## 2023-01-04 PROCEDURE — 76981 USE PARENCHYMA: CPT | Mod: S$GLB,,, | Performed by: NURSE PRACTITIONER

## 2023-01-04 PROCEDURE — 3288F FALL RISK ASSESSMENT DOCD: CPT | Mod: CPTII,S$GLB,, | Performed by: SPECIALIST

## 2023-01-04 PROCEDURE — 1159F MED LIST DOCD IN RCRD: CPT | Mod: CPTII,S$GLB,, | Performed by: SPECIALIST

## 2023-01-04 NOTE — PROGRESS NOTES
Patient presented in clinic today for fibroscan examination of their liver. Patient verbalized that they have fasted 4 hours prior to their examination. Patient denies being pregnant or having any active implantable metal devices; such as a pacemaker, defibrillator, or pump.     Naila Tucker, JUANN, RN   Registered Nurse Lead- Hepatology   [facility]

## 2023-01-05 ENCOUNTER — PATIENT MESSAGE (OUTPATIENT)
Dept: RHEUMATOLOGY | Facility: CLINIC | Age: 73
End: 2023-01-05
Payer: MEDICARE

## 2023-01-05 NOTE — PROCEDURES
Fibroscan Procedure     Name: Smitha Salinas  Date of Procedure : 2023   :: SHASHI Rice  Diagnosis: other    Probe: M    Fibroscan readin.4 KPa    Fibrosis:F 0-1     CAP readin dB/m    Steatosis: :<S1       Interpretation:   No significant steatosis or fibrosis

## 2023-01-06 ENCOUNTER — PATIENT MESSAGE (OUTPATIENT)
Dept: RHEUMATOLOGY | Facility: CLINIC | Age: 73
End: 2023-01-06
Payer: MEDICARE

## 2023-01-06 ENCOUNTER — TELEPHONE (OUTPATIENT)
Dept: HEPATOLOGY | Facility: CLINIC | Age: 73
End: 2023-01-06
Payer: MEDICARE

## 2023-01-06 ENCOUNTER — PATIENT MESSAGE (OUTPATIENT)
Dept: HEPATOLOGY | Facility: CLINIC | Age: 73
End: 2023-01-06
Payer: MEDICARE

## 2023-01-06 NOTE — TELEPHONE ENCOUNTER
----- Message from SHASHI Harman sent at 1/5/2023  7:59 AM CST -----  No significant steatosis or fibrosis

## 2023-01-10 ENCOUNTER — PATIENT MESSAGE (OUTPATIENT)
Dept: INTERNAL MEDICINE | Facility: CLINIC | Age: 73
End: 2023-01-10
Payer: MEDICARE

## 2023-01-10 ENCOUNTER — PATIENT MESSAGE (OUTPATIENT)
Dept: PHARMACY | Facility: CLINIC | Age: 73
End: 2023-01-10
Payer: MEDICARE

## 2023-01-10 ENCOUNTER — PATIENT MESSAGE (OUTPATIENT)
Dept: RHEUMATOLOGY | Facility: CLINIC | Age: 73
End: 2023-01-10
Payer: MEDICARE

## 2023-01-10 DIAGNOSIS — I10 ESSENTIAL HYPERTENSION: Primary | ICD-10-CM

## 2023-01-10 DIAGNOSIS — R93.1 ELEVATED CORONARY ARTERY CALCIUM SCORE: ICD-10-CM

## 2023-01-10 NOTE — TELEPHONE ENCOUNTER
"Incoming call from pt stating she could not connect to [employer] though the [phone] due to "call cannot connect in this area."  Transferred the pt while she was on the phone to [employer]. Informed pt to call us back with any issues.   "
Complete Enbrel PAP application received from [employer]. Submitted to [employer] via fax. Will continue to follow up until final determination is made.   
Enbrel PA approved until 12/31/2023.    BI complete   Rx [facility] Medicare     Copay $1820.04   ( No LIS )  Pt is in initial phase.  Will be in coverage gap with this rx.  [facility] in network    Forwarding to FA.    
Enbrel PAP Renewal Approval     FOR DOCUMENTATION ONLY:  Financial Assistance for Enbrel is approved from 1/4/23 to 12/31/23  Source: [employer] Patient Assistance Program  Phone: [phone]  Fax: [phone]   Pharmacy: [place], [place]    Patient portal message sent to patient with above information. Routing to assigned Rph to close out.   
Incoming call from Betty at providers office.  She stated patient is experiencing a flare right now and is in need of her Enbrel Rx. Informed Betty that [facility] is awaiting a determination from [employer] on Enbrel PAP.  Informed Betty that [facility] can check the status of this PAP application.  Betty requested [facility] reach out and update patient as well.  Will alert assigned team.    
Incoming call from Jolene at [employer] regarding Enbrel. Advised new prescription for Enbrel was faxed to [facility] [employer] yesterday, 1/9. No other questions or concerns  
Incoming call from pt regarding Enbrel PAP.  Pt stated she still has not received email but was able to contact [employer] to have correct application sent to her.  Pt stated she will fill out application and try to fax documents to [facility] tomorrow.  Gave pt [facility] fax number.    
Incoming call from the patient regarding Enbrel. Patient is asking to schedule delivery of Enbrel. Advised to the patient [facility] is working on PAP and she would need to contact [employer] at [phone] to schedule delivery of Enbrel. Patient insist [employer] will mail Enbrel to [facility] and we would send to her. Advised this incorrect as the PAP will mail Enbrel directly to her. # to [employer] provided to the patient. [facility] not dispensing Enbrel do to high copay.   
Miri, this is Evonne Limon with [facility].  We are working on your prescription that your doctor has sent us. We will be working with your insurance to get this approved for you. We will be calling you along the way with updates on your medication.  If you have any questions, you can reach us at [phone].    Welcome call outcome: Patient/caregiver reached    New order for Enbrel received.  PA required.  Pt switching from Rinvoq.  Outgoing call to conduct welcome call.  Pt stated she did not want [facility] to submit PA and she was already in the process of filling out Enbrel PAP application.  Pt stated she did not want to file a claim with insurance for Enbrel.  Informed pt that a PA is an authorization but a claim is not file with plan.  Informed pt that PA letter is required to submit with PAP.  Explained PAP process to pt and that [facility] assists with the application.  Pt gave permission for [facility] to submit PA and assist with PAP.  Pt would like application be emailed to email on file.      Enbrel PA submitted via [facility] website (Key: BJAWANNITA LIVE Case ID: [id number]).    Emailed pt portion of PAP to email on file.    Provider portion of Enbrel PAP faxed.  
Ms. Salinas called stating she has not received the assistance application. She is requesting it be emailed to [email or web address]. Routing to Evonne Limon PharmD    
Outgoing call to [facility] for status check on Enbrel PAP. Recording says office is closed due to unforseen circumstances. Will continue to follow up. Outgoing call to patient to inform her. Patient verbalized understanding.   
Outgoing call to [employer] for status check on Enbrel PAP. Representative stated all documents have been received and she will push application to review process. Will continue to follow up.    
Outgoing call to [email or web address] for status check on Enbrel PAP. Representative stated that patient signature pages needed to be resent. Faxed to [email or web address]. Will continue to follow up.   
Per patient portal message to [facility], [employer] needs a new prescription for Enbrel faxed in. Re-faxed prescription to [employer] at [phone].   
Pt portion of Enbrel PAP resent via email.  
pt recived rom the night nurse .

## 2023-01-11 ENCOUNTER — PATIENT MESSAGE (OUTPATIENT)
Dept: INTERNAL MEDICINE | Facility: CLINIC | Age: 73
End: 2023-01-11
Payer: MEDICARE

## 2023-01-12 ENCOUNTER — PATIENT MESSAGE (OUTPATIENT)
Dept: INTERNAL MEDICINE | Facility: CLINIC | Age: 73
End: 2023-01-12
Payer: MEDICARE

## 2023-01-13 ENCOUNTER — OFFICE VISIT (OUTPATIENT)
Dept: CARDIOLOGY | Facility: CLINIC | Age: 73
End: 2023-01-13
Payer: MEDICARE

## 2023-01-13 ENCOUNTER — HOSPITAL ENCOUNTER (OUTPATIENT)
Dept: CARDIOLOGY | Facility: HOSPITAL | Age: 73
Discharge: HOME | End: 2023-01-13
Attending: INTERNAL MEDICINE
Payer: MEDICARE

## 2023-01-13 VITALS
BODY MASS INDEX: 24.5 KG/M2 | HEIGHT: 65 IN | SYSTOLIC BLOOD PRESSURE: 108 MMHG | OXYGEN SATURATION: 98 % | HEART RATE: 70 BPM | DIASTOLIC BLOOD PRESSURE: 64 MMHG | WEIGHT: 147.06 LBS

## 2023-01-13 DIAGNOSIS — E11.9 TYPE 2 DIABETES MELLITUS WITHOUT COMPLICATION, WITHOUT LONG-TERM CURRENT USE OF INSULIN: ICD-10-CM

## 2023-01-13 DIAGNOSIS — R94.31 ABNORMAL EKG: Primary | ICD-10-CM

## 2023-01-13 DIAGNOSIS — R93.1 ELEVATED CORONARY ARTERY CALCIUM SCORE: ICD-10-CM

## 2023-01-13 DIAGNOSIS — F90.9 ADULT ADHD: ICD-10-CM

## 2023-01-13 DIAGNOSIS — I10 ESSENTIAL HYPERTENSION: ICD-10-CM

## 2023-01-13 DIAGNOSIS — E11.59 HYPERTENSION ASSOCIATED WITH DIABETES: ICD-10-CM

## 2023-01-13 DIAGNOSIS — G47.33 OSA (OBSTRUCTIVE SLEEP APNEA): ICD-10-CM

## 2023-01-13 DIAGNOSIS — R63.4 WEIGHT LOSS: ICD-10-CM

## 2023-01-13 DIAGNOSIS — I15.2 HYPERTENSION ASSOCIATED WITH DIABETES: ICD-10-CM

## 2023-01-13 DIAGNOSIS — Z87.891 EX-SMOKER: ICD-10-CM

## 2023-01-13 PROCEDURE — 1126F AMNT PAIN NOTED NONE PRSNT: CPT | Mod: CPTII,S$GLB,, | Performed by: INTERNAL MEDICINE

## 2023-01-13 PROCEDURE — 1159F PR MEDICATION LIST DOCUMENTED IN MEDICAL RECORD: ICD-10-PCS | Mod: CPTII,S$GLB,, | Performed by: INTERNAL MEDICINE

## 2023-01-13 PROCEDURE — 3008F PR BODY MASS INDEX (BMI) DOCUMENTED: ICD-10-PCS | Mod: CPTII,S$GLB,, | Performed by: INTERNAL MEDICINE

## 2023-01-13 PROCEDURE — 3074F PR MOST RECENT SYSTOLIC BLOOD PRESSURE < 130 MM HG: ICD-10-PCS | Mod: CPTII,S$GLB,, | Performed by: INTERNAL MEDICINE

## 2023-01-13 PROCEDURE — 3078F PR MOST RECENT DIASTOLIC BLOOD PRESSURE < 80 MM HG: ICD-10-PCS | Mod: CPTII,S$GLB,, | Performed by: INTERNAL MEDICINE

## 2023-01-13 PROCEDURE — 3074F SYST BP LT 130 MM HG: CPT | Mod: CPTII,S$GLB,, | Performed by: INTERNAL MEDICINE

## 2023-01-13 PROCEDURE — 4010F ACE/ARB THERAPY RXD/TAKEN: CPT | Mod: CPTII,S$GLB,, | Performed by: INTERNAL MEDICINE

## 2023-01-13 PROCEDURE — 99999 PR PBB SHADOW E&M-EST. PATIENT-LVL V: ICD-10-PCS | Mod: PBBFAC,,, | Performed by: INTERNAL MEDICINE

## 2023-01-13 PROCEDURE — 3078F DIAST BP <80 MM HG: CPT | Mod: CPTII,S$GLB,, | Performed by: INTERNAL MEDICINE

## 2023-01-13 PROCEDURE — 4010F PR ACE/ARB THEARPY RXD/TAKEN: ICD-10-PCS | Mod: CPTII,S$GLB,, | Performed by: INTERNAL MEDICINE

## 2023-01-13 PROCEDURE — 99214 OFFICE O/P EST MOD 30 MIN: CPT | Mod: 25,S$GLB,, | Performed by: INTERNAL MEDICINE

## 2023-01-13 PROCEDURE — 99999 PR PBB SHADOW E&M-EST. PATIENT-LVL V: CPT | Mod: PBBFAC,,, | Performed by: INTERNAL MEDICINE

## 2023-01-13 PROCEDURE — 1159F MED LIST DOCD IN RCRD: CPT | Mod: CPTII,S$GLB,, | Performed by: INTERNAL MEDICINE

## 2023-01-13 PROCEDURE — 99214 PR OFFICE/OUTPT VISIT, EST, LEVL IV, 30-39 MIN: ICD-10-PCS | Mod: 25,S$GLB,, | Performed by: INTERNAL MEDICINE

## 2023-01-13 PROCEDURE — 3008F BODY MASS INDEX DOCD: CPT | Mod: CPTII,S$GLB,, | Performed by: INTERNAL MEDICINE

## 2023-01-13 PROCEDURE — 93005 ELECTROCARDIOGRAM TRACING: CPT

## 2023-01-13 PROCEDURE — 93010 ELECTROCARDIOGRAM REPORT: CPT | Mod: ,,, | Performed by: INTERNAL MEDICINE

## 2023-01-13 PROCEDURE — 1160F RVW MEDS BY RX/DR IN RCRD: CPT | Mod: CPTII,S$GLB,, | Performed by: INTERNAL MEDICINE

## 2023-01-13 PROCEDURE — 1160F PR REVIEW ALL MEDS BY PRESCRIBER/CLIN PHARMACIST DOCUMENTED: ICD-10-PCS | Mod: CPTII,S$GLB,, | Performed by: INTERNAL MEDICINE

## 2023-01-13 PROCEDURE — 93010 EKG 12-LEAD: ICD-10-PCS | Mod: ,,, | Performed by: INTERNAL MEDICINE

## 2023-01-13 PROCEDURE — 1126F PR PAIN SEVERITY QUANTIFIED, NO PAIN PRESENT: ICD-10-PCS | Mod: CPTII,S$GLB,, | Performed by: INTERNAL MEDICINE

## 2023-01-13 NOTE — PROGRESS NOTES
Subjective:   Patient ID:  Smitha Salinas is a 72 y.o. female who presents for follow up of No chief complaint on file.      HPI  10/9/2020  A 69 yo female exsmoker htn hlp impaired glucose tolerance  soraida is referred from DR BARBOSA due to abnormal ekg. She has gained weight she does not exercise. She has been on the sedentary side exacerbated by arthritis has an episode last weekend of nausea projectile vomiting felt dehydrated was evaluated she feels better now however had epsiode of chest tightness difficulty swallowing her mother had diabetes pvd amputation mi. Has  hip pain from walking has rheumatoid arthritis she gest tired easily. She needs to have surgery and get sacral implants. Has abnormal ekg showing lvfh repolarization abnormalities and ischemic changes.      11/18/2020  Here for f/u test results. She has a slaty fatty  Dinner . She has no new complaints . Sleep study pending. Her echo showed lvh diastolic dysfunction. Her cardiolite negative had some dilatation with exercise. She has no angina no chest pain walks the dog. Her exercise is increased. She is trying to eat healthier adjusting her pantry.     5/14/2021  Here for f/u has been losing weight . Tries to be complaint with diet . She walks tries up 1 mile has no chest paian now has no shortness of breath . Her ld is out of control she is compliant with stains and improves diet. Has been taking meds regularily her a1c is 5.9.      11/24/2021  Elevated ca score has  No symptoms of chest pain and shortness of breath she is bruising a lot she has intermittently stopped asa. Has been exercising has lost weight dropped her weight lipids improved      7/13/2022   here for f/u on ozempic lost weight has bout of nausea projectile vomiting abdominal pain . Has the need of taking clonidine and amlodipine extra to control bp it is controlled today ./ she has significant elevation in her bp . Has no access to digital htn due to insurance. Has bleeding from  asa when she cuts herself .she is trying to minimize salt intake and fat intake.     1/13/2023   Here frof /u has gained a little weight back has been on and offs teroid for her rheumatologic process lft increased medical therapy adjusted./ she is back on ozempic has no other issues clinically . Has abnormal ekg with lvh and reolparization changes unchanged from previous she is asymptomatic  Past Medical History:   Diagnosis Date    ADHD (attention deficit hyperactivity disorder)     Adult ADHD     [facility]    Chronic rheumatic arthritis     on DMARD    Ex-smoker     Genital herpes     Hyperlipidemia     Hypertension     Immunosuppressed status     Lichen sclerosus et atrophicus     Morbid obesity 1/11/2021    Obesity, Class I, BMI 30-34.9 12/22/2015    SUKHDEEP (obstructive sleep apnea)     Osteopenia     5/16 wai 5/18(start fmax if on pred 3months or more)    Type 2 diabetes mellitus     dxd 9/20       Past Surgical History:   Procedure Laterality Date    BREAST BIOPSY Left     benign    COLONOSCOPY N/A 2/12/2020    Procedure: COLONOSCOPY;  Surgeon: Eloina Castro MD;  Location: [facility];  Service: Endoscopy;  Laterality: N/A;    CRYOTHERAPY  1980    ESOPHAGOGASTRODUODENOSCOPY N/A 2/12/2020    Procedure: EGD (ESOPHAGOGASTRODUODENOSCOPY);  Surgeon: Eloina Castro MD;  Location: [facility];  Service: Endoscopy;  Laterality: N/A;    INSERTION OF SACRAL NEUROSTIMULATOR GENERATOR Left 11/23/2020    Procedure: INSERTION, PULSE GENERATOR, NEUROSTIMULATOR, SACRAL;  Surgeon: Pablo Rawls MD;  Location: [facility] OR;  Service: Urology;  Laterality: Left;    INSERTION OF SACRAL NEUROSTIMULATOR, ELECTRODES, AND IMPLANTABLE PULSE GENERATOR (IPG) Left 11/23/2020    Procedure: INSERTION, ELECTRODE LEADS AND PULSE GENERATOR, NEUROSTIMULATOR, SACRAL;  Surgeon: Pablo Rawls MD;  Location: [facility] OR;  Service: Urology;  Laterality: Left;    SKIN GRAFT  1965    laceration to right  fingers  from thigh      TONSILLECTOMY         Social History     Tobacco Use    Smoking status: Former     Packs/day: 0.25     Years: 15.00     Pack years: 3.75     Types: Cigarettes     Quit date: 2013     Years since quittin.4    Smokeless tobacco: Never    Tobacco comments:     smokes for a few weeks per year - when under pressure. has been abstinent times years at a time. a pack lasts about a week   Substance Use Topics    Alcohol use: Yes     Alcohol/week: 0.0 standard drinks    Drug use: No       Family History   Problem Relation Age of Onset    Heart disease Mother     Diabetes Mother     Peripheral vascular disease Mother         amputations    Stroke Father     Kidney failure Father     Breast cancer Sister 39    Cancer Other 68        breast    Stroke Maternal Grandmother     Stroke Paternal Grandmother     Stroke Paternal Grandfather     Colon cancer Neg Hx     Ovarian cancer Neg Hx        Current Outpatient Medications   Medication Sig    acyclovir (ZOVIRAX) 400 MG tablet Take 1 tablet (400 mg total) by mouth 2 (two) times daily.    amLODIPine (NORVASC) 5 MG tablet Take 1 tablet by mouth once daily    aspirin (ECOTRIN) 81 MG EC tablet Take 81 mg by mouth once daily.    blood sugar diagnostic (BLOOD GLUCOSE TEST) Strp 1 strip by Misc.(Non-Drug; Combo Route) route 3 (three) times daily as needed.    calcium-vitamin D (CALCIUM 600 + D,3,) 600 mg-10 mcg (400 unit) Tab Take 1 tablet by mouth 2 (two) times daily.    cloNIDine (CATAPRES) 0.2 MG tablet TAKE 1/2 (ONE-HALF) TABLET BY MOUTH IN THE MORNING AND 1/2 (ONE-HALF) AT NOON AND 2 AT BEDTIME    cyclobenzaprine (FLEXERIL) 10 MG tablet TAKE 1/2 TO 1 (ONE-HALF TO ONE) TABLET BY MOUTH THREE TIMES DAILY AS NEEDED FOR MUSCLE SPASM    diazePAM (VALIUM) 5 MG tablet TAKE 1 TABLET BY MOUTH AT BEDTIME FOR ANXIETY    diclofenac sodium (VOLTAREN) 1 % Gel Apply 2 g topically 3 (three) times daily.    doxepin (SINEQUAN) 10 MG capsule Take 1 capsule (10 mg total) by mouth every  evening.    entanercept (ENBREL) 50 mg/mL injection Inject 1 mL (50 mg total) into the skin once a week.    HYDROcodone-acetaminophen (NORCO) 5-325 mg per tablet     losartan (COZAAR) 50 MG tablet Take 1 tablet by mouth once daily    methylPREDNISolone (MEDROL DOSEPACK) 4 mg tablet use as directed    mirabegron (MYRBETRIQ) 50 mg Tb24 Take 1 tablet by mouth once daily    NUCYNTA ER 50 mg Tb12 Take 1 tablet by mouth every 12 (twelve) hours. prn    ondansetron (ZOFRAN-ODT) 4 MG TbDL Take 1 tablet (4 mg total) by mouth every 8 (eight) hours as needed (nausea).    oxyCODONE-acetaminophen (PERCOCET) 5-325 mg per tablet Take 1 tablet by mouth every 4 (four) hours as needed for Pain.    potassium chloride SA (K-DUR,KLOR-CON) 20 MEQ tablet 1 tablet daily    predniSONE (DELTASONE) 5 MG tablet May take 5 of prednisone daily for rheumatoid related pain    PROLENSA 0.07 % Drop     rosuvastatin (CRESTOR) 20 MG tablet Take 1 tablet (20 mg total) by mouth once daily.    traMADoL (ULTRAM) 50 mg tablet Take 1 tablet (50 mg total) by mouth every 6 (six) hours as needed.    upadacitinib (RINVOQ) 15 mg 24 hr tablet Take 15 mg by mouth once daily.    gabapentin (NEURONTIN) 300 MG capsule Take 1 capsule (300 mg total) by mouth 3 (three) times daily. for 10 days    lancing device Misc 1 each by Misc.(Non-Drug; Combo Route) route 3 (three) times daily as needed.     Current Facility-Administered Medications   Medication    ondansetron disintegrating tablet 4 mg     Current Outpatient Medications on File Prior to Visit   Medication Sig    acyclovir (ZOVIRAX) 400 MG tablet Take 1 tablet (400 mg total) by mouth 2 (two) times daily.    amLODIPine (NORVASC) 5 MG tablet Take 1 tablet by mouth once daily    aspirin (ECOTRIN) 81 MG EC tablet Take 81 mg by mouth once daily.    blood sugar diagnostic (BLOOD GLUCOSE TEST) Strp 1 strip by Misc.(Non-Drug; Combo Route) route 3 (three) times daily as needed.    calcium-vitamin D (CALCIUM 600 + D,3,) 600  mg-10 mcg (400 unit) Tab Take 1 tablet by mouth 2 (two) times daily.    cloNIDine (CATAPRES) 0.2 MG tablet TAKE 1/2 (ONE-HALF) TABLET BY MOUTH IN THE MORNING AND 1/2 (ONE-HALF) AT NOON AND 2 AT BEDTIME    cyclobenzaprine (FLEXERIL) 10 MG tablet TAKE 1/2 TO 1 (ONE-HALF TO ONE) TABLET BY MOUTH THREE TIMES DAILY AS NEEDED FOR MUSCLE SPASM    diazePAM (VALIUM) 5 MG tablet TAKE 1 TABLET BY MOUTH AT BEDTIME FOR ANXIETY    diclofenac sodium (VOLTAREN) 1 % Gel Apply 2 g topically 3 (three) times daily.    doxepin (SINEQUAN) 10 MG capsule Take 1 capsule (10 mg total) by mouth every evening.    entanercept (ENBREL) 50 mg/mL injection Inject 1 mL (50 mg total) into the skin once a week.    HYDROcodone-acetaminophen (NORCO) 5-325 mg per tablet     losartan (COZAAR) 50 MG tablet Take 1 tablet by mouth once daily    methylPREDNISolone (MEDROL DOSEPACK) 4 mg tablet use as directed    mirabegron (MYRBETRIQ) 50 mg Tb24 Take 1 tablet by mouth once daily    NUCYNTA ER 50 mg Tb12 Take 1 tablet by mouth every 12 (twelve) hours. prn    ondansetron (ZOFRAN-ODT) 4 MG TbDL Take 1 tablet (4 mg total) by mouth every 8 (eight) hours as needed (nausea).    oxyCODONE-acetaminophen (PERCOCET) 5-325 mg per tablet Take 1 tablet by mouth every 4 (four) hours as needed for Pain.    potassium chloride SA (K-DUR,KLOR-CON) 20 MEQ tablet 1 tablet daily    predniSONE (DELTASONE) 5 MG tablet May take 5 of prednisone daily for rheumatoid related pain    PROLENSA 0.07 % Drop     rosuvastatin (CRESTOR) 20 MG tablet Take 1 tablet (20 mg total) by mouth once daily.    traMADoL (ULTRAM) 50 mg tablet Take 1 tablet (50 mg total) by mouth every 6 (six) hours as needed.    upadacitinib (RINVOQ) 15 mg 24 hr tablet Take 15 mg by mouth once daily.    gabapentin (NEURONTIN) 300 MG capsule Take 1 capsule (300 mg total) by mouth 3 (three) times daily. for 10 days    lancing device Misc 1 each by Misc.(Non-Drug; Combo Route) route 3 (three) times daily as needed.      Current Facility-Administered Medications on File Prior to Visit   Medication    ondansetron disintegrating tablet 4 mg     Review of patient's allergies indicates:   Allergen Reactions    Valdecoxib Other (See Comments)     palpitation      Review of Systems   Constitutional: Positive for weight loss. Negative for diaphoresis, malaise/fatigue and weight gain.   HENT:  Negative for hoarse voice.    Eyes:  Negative for double vision and visual disturbance.   Cardiovascular:  Negative for chest pain, claudication, cyanosis, dyspnea on exertion, irregular heartbeat, leg swelling, near-syncope, orthopnea, palpitations, paroxysmal nocturnal dyspnea and syncope.   Respiratory:  Negative for cough, hemoptysis, shortness of breath and snoring.    Hematologic/Lymphatic: Negative for bleeding problem. Does not bruise/bleed easily.   Skin:  Negative for color change and poor wound healing.   Musculoskeletal:  Negative for muscle cramps, muscle weakness and myalgias.   Gastrointestinal:  Negative for bloating, abdominal pain, change in bowel habit, diarrhea, heartburn, hematemesis, hematochezia, melena and nausea.   Neurological:  Negative for excessive daytime sleepiness, dizziness, headaches, light-headedness, loss of balance, numbness and weakness.   Psychiatric/Behavioral:  Negative for memory loss. The patient does not have insomnia.    Allergic/Immunologic: Negative for hives.     Objective:   Physical Exam  Constitutional:       General: She is not in acute distress.     Appearance: Normal appearance. She is well-developed. She is not ill-appearing.   HENT:      Head: Normocephalic and atraumatic.   Eyes:      General: No scleral icterus.     Pupils: Pupils are equal, round, and reactive to light.   Neck:      Thyroid: No thyromegaly.      Vascular: Normal carotid pulses. No carotid bruit, hepatojugular reflux or JVD.      Trachea: No tracheal deviation.   Cardiovascular:      Rate and Rhythm: Normal rate and regular  "rhythm.      Pulses: Normal pulses.      Heart sounds: Normal heart sounds. No murmur heard.    No friction rub. No gallop.   Pulmonary:      Effort: Pulmonary effort is normal. No respiratory distress.      Breath sounds: Normal breath sounds. No wheezing, rhonchi or rales.   Chest:      Chest wall: No tenderness.   Abdominal:      General: Bowel sounds are normal. There is no abdominal bruit.      Palpations: Abdomen is soft. There is no hepatomegaly or pulsatile mass.      Tenderness: There is no abdominal tenderness.   Musculoskeletal:      Right shoulder: No deformity.      Cervical back: Normal range of motion and neck supple.   Skin:     General: Skin is warm and dry.      Findings: No erythema or rash.      Nails: There is no clubbing.   Neurological:      Mental Status: She is alert and oriented to person, place, and time.      Cranial Nerves: No cranial nerve deficit.      Coordination: Coordination normal.   Psychiatric:         Speech: Speech normal.         Behavior: Behavior normal.     Vitals:    01/13/23 1312 01/13/23 1313   BP: 112/66 108/64   BP Location: Right arm Left arm   Patient Position: Sitting    Pulse: 70    SpO2: 98%    Weight: 66.7 kg (147 lb 0.8 oz)    Height: 5' 5" (1.651 m)      Lab Results   Component Value Date    CHOL 179 12/09/2022    CHOL 189 06/10/2022    CHOL 171 11/18/2021     Lab Results   Component Value Date    HDL 75 12/09/2022    HDL 56 06/10/2022    HDL 61 11/18/2021     Lab Results   Component Value Date    LDLCALC 94.8 12/09/2022    LDLCALC 114.4 06/10/2022    LDLCALC 93.8 11/18/2021     Lab Results   Component Value Date    TRIG 46 12/09/2022    TRIG 93 06/10/2022    TRIG 81 11/18/2021     Lab Results   Component Value Date    CHOLHDL 41.9 12/09/2022    CHOLHDL 29.6 06/10/2022    CHOLHDL 35.7 11/18/2021       Chemistry        Component Value Date/Time     12/09/2022 1029     12/09/2022 1029    K 4.4 12/09/2022 1029    K 4.4 12/09/2022 1029     (H) " 12/09/2022 1029     (H) 12/09/2022 1029    CO2 22 (L) 12/09/2022 1029    CO2 22 (L) 12/09/2022 1029    BUN 17 12/09/2022 1029    BUN 17 12/09/2022 1029    CREATININE 0.7 12/09/2022 1029    CREATININE 0.7 12/09/2022 1029     12/09/2022 1029     12/09/2022 1029        Component Value Date/Time    CALCIUM 9.2 12/09/2022 1029    CALCIUM 9.2 12/09/2022 1029    ALKPHOS 63 12/09/2022 1029    ALKPHOS 63 12/09/2022 1029    AST 58 (H) 12/09/2022 1029    AST 58 (H) 12/09/2022 1029     (H) 12/09/2022 1029     (H) 12/09/2022 1029    BILITOT 0.4 12/09/2022 1029    BILITOT 0.4 12/09/2022 1029    ESTGFRAFRICA >60.0 06/10/2022 0939    EGFRNONAA >60.0 06/10/2022 0939        Lab Results   Component Value Date    HGBA1C 6.3 (H) 12/09/2022       Lab Results   Component Value Date    TSH 2.071 03/05/2020     Lab Results   Component Value Date    INR 0.9 10/22/2021    INR 0.9 09/06/2013    INR 0.9 09/05/2013     Lab Results   Component Value Date    WBC 6.65 12/09/2022    HGB 11.6 (L) 12/09/2022    HCT 36.6 (L) 12/09/2022    MCV 89 12/09/2022     (L) 12/09/2022     BMP  Sodium   Date Value Ref Range Status   12/09/2022 143 136 - 145 mmol/L Final   12/09/2022 143 136 - 145 mmol/L Final     Potassium   Date Value Ref Range Status   12/09/2022 4.4 3.5 - 5.1 mmol/L Final   12/09/2022 4.4 3.5 - 5.1 mmol/L Final     Chloride   Date Value Ref Range Status   12/09/2022 112 (H) 95 - 110 mmol/L Final   12/09/2022 112 (H) 95 - 110 mmol/L Final     CO2   Date Value Ref Range Status   12/09/2022 22 (L) 23 - 29 mmol/L Final   12/09/2022 22 (L) 23 - 29 mmol/L Final     BUN   Date Value Ref Range Status   12/09/2022 17 8 - 23 mg/dL Final   12/09/2022 17 8 - 23 mg/dL Final     Creatinine   Date Value Ref Range Status   12/09/2022 0.7 0.5 - 1.4 mg/dL Final   12/09/2022 0.7 0.5 - 1.4 mg/dL Final     Calcium   Date Value Ref Range Status   12/09/2022 9.2 8.7 - 10.5 mg/dL Final   12/09/2022 9.2 8.7 - 10.5 mg/dL Final      Anion Gap   Date Value Ref Range Status   12/09/2022 9 8 - 16 mmol/L Final   12/09/2022 9 8 - 16 mmol/L Final     eGFR if    Date Value Ref Range Status   06/10/2022 >60.0 >60 mL/min/1.73 m^2 Final     eGFR if non    Date Value Ref Range Status   06/10/2022 >60.0 >60 mL/min/1.73 m^2 Final     Comment:     Calculation used to obtain the estimated glomerular filtration  rate (eGFR) is the CKD-EPI equation.        CrCl cannot be calculated (Patient's most recent lab result is older than the maximum 7 days allowed.).    Assessment:     1. Abnormal EKG    2. Adult ADHD    3. Elevated coronary artery calcium score    4. Hypertension associated with diabetes    5. Type 2 diabetes mellitus without complication, without long-term current use of insulin    6. Weight loss    7. SUKHDEEP (obstructive sleep apnea)    8. Ex-smoker      Asymptomatic with multiple risk factors for cad with elevated ca score no ischemia clinically by cardiolite with controlled htn now back on ozempic to regulate diabetes and lose the lora in order to get lipids and continue a1c on target.counseled about active lifestyle and exercise.   Counsled about the importance of achieving ldl less than 70 due to coronary calcfication.  Plan:   Continue current therapy  Cardiac low salt diet.  Risk factor modification and excercise program./weight loss  F/u in 6 months with lipid cmp a1c

## 2023-01-15 RX ORDER — DICLOFENAC SODIUM 10 MG/G
2 GEL TOPICAL 3 TIMES DAILY
Qty: 1 EACH | Refills: 0 | Status: CANCELLED | OUTPATIENT
Start: 2023-01-15

## 2023-01-16 ENCOUNTER — PATIENT MESSAGE (OUTPATIENT)
Dept: RHEUMATOLOGY | Facility: CLINIC | Age: 73
End: 2023-01-16
Payer: MEDICARE

## 2023-01-17 RX ORDER — DICLOFENAC SODIUM 10 MG/G
2 GEL TOPICAL 3 TIMES DAILY
Qty: 1 EACH | Refills: 0 | Status: ON HOLD | OUTPATIENT
Start: 2023-01-17 | End: 2023-03-27

## 2023-01-17 RX ORDER — DICLOFENAC SODIUM 10 MG/G
2 GEL TOPICAL 3 TIMES DAILY
Qty: 1 EACH | Refills: 3 | Status: SHIPPED | OUTPATIENT
Start: 2023-01-17 | End: 2023-01-31

## 2023-01-18 ENCOUNTER — PATIENT OUTREACH (OUTPATIENT)
Dept: ADMINISTRATIVE | Facility: HOSPITAL | Age: 73
End: 2023-01-18
Payer: MEDICARE

## 2023-01-18 ENCOUNTER — PATIENT MESSAGE (OUTPATIENT)
Dept: RHEUMATOLOGY | Facility: CLINIC | Age: 73
End: 2023-01-18
Payer: MEDICARE

## 2023-01-19 RX ORDER — ETANERCEPT 50 MG/ML
50 SOLUTION SUBCUTANEOUS WEEKLY
Qty: 4 ML | Refills: 11 | Status: SHIPPED | OUTPATIENT
Start: 2023-01-19 | End: 2023-04-06 | Stop reason: SDUPTHER

## 2023-01-20 ENCOUNTER — SPECIALTY PHARMACY (OUTPATIENT)
Dept: PHARMACY | Facility: CLINIC | Age: 73
End: 2023-01-20
Payer: MEDICARE

## 2023-01-20 NOTE — TELEPHONE ENCOUNTER
Incoming call from Jolene from Dr England's office regarding Enbrel. Pt was approved for PAP and received 3 months of the syringes from [id number]. Pt does not want the syringes. She wants the pens. Jolene tried to e-send a prescription to [facility] in [place], [place] but received a message that they do not dispense that medication. Jolene wanted to confirm with me that [facility] in [place] was the correct pharmacy and if I could double check the fax they sent. Confirmed with [facility] FA team that [facility] is the correct pharmacy and the fax does say that they do not dispense that medication. Will call [id number] for further assistance.

## 2023-01-20 NOTE — TELEPHONE ENCOUNTER
Pulled up the PAP application on [employer]. It says to send e-rx's to [facility]. Filled out new prescription from with pens selected, Faxed to Dr England to sign and fax back. Notified Alley that the e-scripts must be sent to [facility] if she wants to re-send but we can also send a paper prescription to cover both bases.

## 2023-01-20 NOTE — TELEPHONE ENCOUNTER
Received updated PAP rx for Enbrel sure click pens from [facility].     Faxed prescription form to [id number].

## 2023-01-24 ENCOUNTER — OFFICE VISIT (OUTPATIENT)
Dept: INTERNAL MEDICINE | Facility: CLINIC | Age: 73
End: 2023-01-24
Payer: MEDICARE

## 2023-01-24 VITALS
DIASTOLIC BLOOD PRESSURE: 58 MMHG | WEIGHT: 142.19 LBS | TEMPERATURE: 97 F | OXYGEN SATURATION: 98 % | BODY MASS INDEX: 23.66 KG/M2 | HEART RATE: 66 BPM | SYSTOLIC BLOOD PRESSURE: 100 MMHG

## 2023-01-24 DIAGNOSIS — E11.9 TYPE 2 DIABETES MELLITUS WITHOUT COMPLICATION, WITHOUT LONG-TERM CURRENT USE OF INSULIN: ICD-10-CM

## 2023-01-24 DIAGNOSIS — D84.9 IMMUNOSUPPRESSED STATUS: ICD-10-CM

## 2023-01-24 DIAGNOSIS — E11.59 HYPERTENSION ASSOCIATED WITH DIABETES: Primary | ICD-10-CM

## 2023-01-24 DIAGNOSIS — I15.2 HYPERTENSION ASSOCIATED WITH DIABETES: Primary | ICD-10-CM

## 2023-01-24 PROCEDURE — 3288F FALL RISK ASSESSMENT DOCD: CPT | Mod: CPTII,S$GLB,, | Performed by: FAMILY MEDICINE

## 2023-01-24 PROCEDURE — 1159F MED LIST DOCD IN RCRD: CPT | Mod: CPTII,S$GLB,, | Performed by: FAMILY MEDICINE

## 2023-01-24 PROCEDURE — 99999 PR PBB SHADOW E&M-EST. PATIENT-LVL V: CPT | Mod: PBBFAC,,, | Performed by: FAMILY MEDICINE

## 2023-01-24 PROCEDURE — 1126F AMNT PAIN NOTED NONE PRSNT: CPT | Mod: CPTII,S$GLB,, | Performed by: FAMILY MEDICINE

## 2023-01-24 PROCEDURE — 3288F PR FALLS RISK ASSESSMENT DOCUMENTED: ICD-10-PCS | Mod: CPTII,S$GLB,, | Performed by: FAMILY MEDICINE

## 2023-01-24 PROCEDURE — 4010F PR ACE/ARB THEARPY RXD/TAKEN: ICD-10-PCS | Mod: CPTII,S$GLB,, | Performed by: FAMILY MEDICINE

## 2023-01-24 PROCEDURE — 1101F PT FALLS ASSESS-DOCD LE1/YR: CPT | Mod: CPTII,S$GLB,, | Performed by: FAMILY MEDICINE

## 2023-01-24 PROCEDURE — 3078F PR MOST RECENT DIASTOLIC BLOOD PRESSURE < 80 MM HG: ICD-10-PCS | Mod: CPTII,S$GLB,, | Performed by: FAMILY MEDICINE

## 2023-01-24 PROCEDURE — 4010F ACE/ARB THERAPY RXD/TAKEN: CPT | Mod: CPTII,S$GLB,, | Performed by: FAMILY MEDICINE

## 2023-01-24 PROCEDURE — 3008F BODY MASS INDEX DOCD: CPT | Mod: CPTII,S$GLB,, | Performed by: FAMILY MEDICINE

## 2023-01-24 PROCEDURE — 99214 PR OFFICE/OUTPT VISIT, EST, LEVL IV, 30-39 MIN: ICD-10-PCS | Mod: S$GLB,,, | Performed by: FAMILY MEDICINE

## 2023-01-24 PROCEDURE — 3074F PR MOST RECENT SYSTOLIC BLOOD PRESSURE < 130 MM HG: ICD-10-PCS | Mod: CPTII,S$GLB,, | Performed by: FAMILY MEDICINE

## 2023-01-24 PROCEDURE — 99214 OFFICE O/P EST MOD 30 MIN: CPT | Mod: S$GLB,,, | Performed by: FAMILY MEDICINE

## 2023-01-24 PROCEDURE — 3074F SYST BP LT 130 MM HG: CPT | Mod: CPTII,S$GLB,, | Performed by: FAMILY MEDICINE

## 2023-01-24 PROCEDURE — 1101F PR PT FALLS ASSESS DOC 0-1 FALLS W/OUT INJ PAST YR: ICD-10-PCS | Mod: CPTII,S$GLB,, | Performed by: FAMILY MEDICINE

## 2023-01-24 PROCEDURE — 99999 PR PBB SHADOW E&M-EST. PATIENT-LVL V: ICD-10-PCS | Mod: PBBFAC,,, | Performed by: FAMILY MEDICINE

## 2023-01-24 PROCEDURE — 1159F PR MEDICATION LIST DOCUMENTED IN MEDICAL RECORD: ICD-10-PCS | Mod: CPTII,S$GLB,, | Performed by: FAMILY MEDICINE

## 2023-01-24 PROCEDURE — 3078F DIAST BP <80 MM HG: CPT | Mod: CPTII,S$GLB,, | Performed by: FAMILY MEDICINE

## 2023-01-24 PROCEDURE — 1126F PR PAIN SEVERITY QUANTIFIED, NO PAIN PRESENT: ICD-10-PCS | Mod: CPTII,S$GLB,, | Performed by: FAMILY MEDICINE

## 2023-01-24 PROCEDURE — 3008F PR BODY MASS INDEX (BMI) DOCUMENTED: ICD-10-PCS | Mod: CPTII,S$GLB,, | Performed by: FAMILY MEDICINE

## 2023-01-24 NOTE — PROGRESS NOTES
Subjective:      Patient ID: Smitha Salinas is a 72 y.o. female.    Chief Complaint: Follow-up    HPI htn no home bps recently but has noticed some lightheadedness. Takes clonidine 0.2 three qhs. On amlod and losart; poss orthostas relatd to ozmp wt loss    Dm back on 0.25mg ozempic one month  Past Medical History:   Diagnosis Date    ADHD (attention deficit hyperactivity disorder)     Adult ADHD     [facility]    Chronic rheumatic arthritis     on DMARD    Ex-smoker     Genital herpes     Hyperlipidemia     Hypertension     Immunosuppressed status     Lichen sclerosus et atrophicus     Morbid obesity 1/11/2021    Obesity, Class I, BMI 30-34.9 12/22/2015    SUKHDEEP (obstructive sleep apnea)     Osteopenia     5/16 wai 5/18(start fmax if on pred 3months or more)    Type 2 diabetes mellitus     dxd 9/20      Past Surgical History:   Procedure Laterality Date    BREAST BIOPSY Left     benign    COLONOSCOPY N/A 2/12/2020    Procedure: COLONOSCOPY;  Surgeon: Eloina Castro MD;  Location: [facility];  Service: Endoscopy;  Laterality: N/A;    CRYOTHERAPY  1980    ESOPHAGOGASTRODUODENOSCOPY N/A 2/12/2020    Procedure: EGD (ESOPHAGOGASTRODUODENOSCOPY);  Surgeon: Eloina Castro MD;  Location: [facility];  Service: Endoscopy;  Laterality: N/A;    INSERTION OF SACRAL NEUROSTIMULATOR GENERATOR Left 11/23/2020    Procedure: INSERTION, PULSE GENERATOR, NEUROSTIMULATOR, SACRAL;  Surgeon: Pablo Rawls MD;  Location: [facility] OR;  Service: Urology;  Laterality: Left;    INSERTION OF SACRAL NEUROSTIMULATOR, ELECTRODES, AND IMPLANTABLE PULSE GENERATOR (IPG) Left 11/23/2020    Procedure: INSERTION, ELECTRODE LEADS AND PULSE GENERATOR, NEUROSTIMULATOR, SACRAL;  Surgeon: Pablo Rawls MD;  Location: [facility] OR;  Service: Urology;  Laterality: Left;    SKIN GRAFT  1965    laceration to right  fingers  from thigh     TONSILLECTOMY  1958      Social History     Socioeconomic History    Marital status:     Number of  children: 1   Occupational History    Occupation:      Employer: [employer]    Tobacco Use    Smoking status: Former     Packs/day: 0.25     Years: 15.00     Pack years: 3.75     Types: Cigarettes     Quit date: 2013     Years since quittin.5    Smokeless tobacco: Never    Tobacco comments:     smokes for a few weeks per year - when under pressure. has been abstinent times years at a time. a pack lasts about a week   Substance and Sexual Activity    Alcohol use: Yes     Alcohol/week: 0.0 standard drinks    Drug use: No    Sexual activity: Not Currently     Birth control/protection: Post-menopausal   Social History Narrative    Lives alone. Caffeine intake rare -1-2 per week of coffee. Does not have a Living Will or Advanced Directive      Family History   Problem Relation Age of Onset    Heart disease Mother     Diabetes Mother     Peripheral vascular disease Mother         amputations    Stroke Father     Kidney failure Father     Breast cancer Sister 39    Cancer Other 68        breast    Stroke Maternal Grandmother     Stroke Paternal Grandmother     Stroke Paternal Grandfather     Colon cancer Neg Hx     Ovarian cancer Neg Hx       Review of Systems        Objective:     Physical Examgen nad  cvrrr  Assessment:         ICD-10-CM ICD-9-CM   1. Hypertension associated with diabetes  E11.59 250.80    I15.2 401.9   2. Type 2 diabetes mellitus without complication, without long-term current use of insulin  E11.9 250.00   3. Immunosuppressed status  D84.9 279.9      Plan:      Dec clonidine to two qhs  Tid home bps  Either keep our f/u one week or with dr raymond mid feb (6 month f/u ); d/wd [facility] see one pcp team so can consider changing pcp to dr raymond if preferred  Hypertension associated with diabetes    Type 2 diabetes mellitus without complication, without long-term current use of insulin    Immunosuppressed status

## 2023-01-30 NOTE — MR AVS SNAPSHOT
[facility]  [address] [facility] [place] [address]  Phone: [phone]  Fax: [phone]                  Smitha Salinas   2017 3:00 PM   Office Visit    Description:  Female : 1950   Provider:  Xiao Quiles PA-C   Department:  [facility]           Reason for Visit     Cough     Sinus Problem           Diagnoses this Visit        Comments    Sinobronchitis    -  Primary            To Do List           Goals (5 Years of Data)     None      Follow-Up and Disposition     Return if symptoms worsen or fail to improve.       These Medications        Disp Refills Start End    guaifenesin-codeine 100-10 mg/5 ml (TUSSI-ORGANIDIN NR)  mg/5 mL syrup 120 mL 0 2017     Take 10 mLs by mouth every 6 (six) hours as needed for Cough. - Oral    Pharmacy: [facility] [address] [place] [address] [address] [facility] Ph #: [phone]       azithromycin (ZITHROMAX Z-ATIF) 250 MG tablet 6 tablet 0 2017     Take pack as directed    Pharmacy: [address] [facility] Ph #: [phone]       benzonatate (TESSALON) 100 MG capsule 60 capsule 0 2017    Take 2 capsules (200 mg total) by mouth 3 (three) times daily as needed for Cough. - Oral    Pharmacy: [address] [facility] Ph #: [phone]         [facility]     [facility] Nurse Care Line -  Assistance  Registered nurses in the [facility] provide clinical advisement, health education, appointment booking, and other advisory services.  Call for this free service at [phone].             Medications           Message regarding Medications     Verify the changes and/or additions to your medication regime listed below are the same as discussed with your clinician today.  If any of these changes or additions are incorrect, please notify your healthcare provider.        START taking these NEW medications         Refills    guaifenesin-codeine 100-10 mg/5 ml (TUSSI-ORGANIDIN NR)  mg/5 mL syrup 0    Sig: Take 10 mLs by mouth every 6 (six) hours as needed for Cough.    Class: Print    Route: Oral    azithromycin (ZITHROMAX Z-ATIF) 250 MG tablet 0    Sig: Take pack as directed    Class: Normal    benzonatate (TESSALON) 100 MG capsule 0    Sig: Take 2 capsules (200 mg total) by mouth 3 (three) times daily as needed for Cough.    Class: Normal    Route: Oral      These medications were administered today        Dose Freq    betamethasone acetate-betamethasone sodium phosphate injection 6 mg 6 mg Clinic/HOD 1 time    Sig: Inject 1 mL (6 mg total) into the muscle one time.    Class: Normal    Route: Intramuscular    Cosign for Ordering: Required by Jayesh Jaramillo MD           Verify that the below list of medications is an accurate representation of the medications you are currently taking.  If none reported, the list may be blank. If incorrect, please contact your healthcare provider. Carry this list with you in case of emergency.           Current Medications     amlodipine (NORVASC) 5 MG tablet Take 5 mg by mouth once daily.     ascorbic acid (VITAMIN C) 500 MG tablet Take by mouth. 1 Tablet Oral Every day    clobetasol 0.05% (TEMOVATE) 0.05 % Oint Apply topically daily prn vulvar irritation    cloNIDine (CATAPRES) 0.2 MG tablet 1/2 tab in the am, 1/2 tab at noon, 2 tabs nightly.    cyclobenzaprine (FLEXERIL) 10 MG tablet Take 10 mg by mouth every evening. 1 Tablet Oral Twice a day    estradiol (ESTRACE) 0.01 % (0.1 mg/gram) vaginal cream Place 1 g vaginally once daily.    hydrOXYzine (ATARAX) 50 MG tablet Take 1 tablet (50 mg total) by mouth 3 (three) times daily as needed for Itching.    mirabegron (MYRBETRIQ) 50 mg Tb24 Take by mouth once daily.    modafinil (PROVIGIL) 100 MG Tab Take 200 mg by mouth once daily.    oxycodone-acetaminophen (PERCOCET)  mg per tablet Take 1 tablet by mouth every 4 (four) hours as  "needed for Pain.    pitavastatin (LIVALO) 2 mg Tab tablet Take 2 mg by mouth once daily.    potassium chloride SA (K-DUR,KLOR-CON) 20 MEQ tablet Take 20 mEq by mouth once daily. 1 tablet,ER particles/crystals Oral Twice a day    tocilizumab (ACTEMRA) 162 mg/0.9 mL Syrg Inject 162 mg into the skin.    tramadol (ULTRAM) 50 mg tablet Take 100 mg by mouth.    azithromycin (ZITHROMAX Z-ATIF) 250 MG tablet Take pack as directed    benzonatate (TESSALON) 100 MG capsule Take 2 capsules (200 mg total) by mouth 3 (three) times daily as needed for Cough.    cyanocobalamin, vitamin B-12, 1,000 mcg TbSR Take by mouth. 1 Tablet Extended Release Oral Every day    diazepam (VALIUM) 5 MG tablet TAKE ONE TABLET BY MOUTH BY MOUTH AT BEDTIME FOR ANXIETY    duloxetine (CYMBALTA) 30 MG capsule Take 30 mg by mouth.    guaifenesin-codeine 100-10 mg/5 ml (TUSSI-ORGANIDIN NR)  mg/5 mL syrup Take 10 mLs by mouth every 6 (six) hours as needed for Cough.    promethazine (PHENERGAN) 25 MG tablet Take 1 tablet (25 mg total) by mouth every 6 (six) hours as needed for Nausea.           Clinical Reference Information           Vital Signs - Last Recorded  Most recent update: 1/18/2017  3:13 PM by Ivelisse Faith LPN    BP Pulse Temp Resp    (!) 140/66 (BP Location: Right arm, Patient Position: Sitting, BP Method: Manual) 77 97.5 °F (36.4 °C) (Tympanic) 20    Ht Wt SpO2 BMI    5' 5" (1.651 m) 80.9 kg (178 lb 5.6 oz) 98% 29.68 kg/m2      Blood Pressure          Most Recent Value    BP  (!)  140/66      Allergies as of 1/18/2017     Bextra [Valdecoxib]    Gabapentin    Hydrocodone-acetaminophen      Immunizations Administered on Date of Encounter - 1/18/2017     None      Instructions      Bronchitis, Antibiotic Treatment (Adult)    Bronchitis is an infection of the air passages (bronchial tubes) in your lungs. It often occurs when you have a cold. This illness is contagious during the first few days and is spread through the air by coughing and " sneezing, or by direct contact (touching the sick person and then touching your own eyes, nose, or mouth).  Symptoms of bronchitis include cough with mucus (phlegm) and low-grade fever. Bronchitis usually lasts 7 to 14 days. Mild cases can be treated with simple home remedies. More severe infection is treated with an antibiotic.  Home care  Follow these guidelines when caring for yourself at home:  · If your symptoms are severe, rest at home for the first 2 to 3 days. When you go back to your usual activities, don't let yourself get too tired.  · Do not smoke. Also avoid being exposed to secondhand smoke.  · You may use over-the-counter medicines to control fever or pain, unless another medicine was prescribed. (Note: If you have chronic liver or kidney disease or have ever had a stomach ulcer or gastrointestinal bleeding, talk with your healthcare provider before using these medicines. Also talk to your provider if you are taking medicine to prevent blood clots.) Aspirin should never be given to anyone younger than 18 years of age who is ill with a viral infection or fever. It may cause severe liver or brain damage.  · Your appetite may be poor, so a light diet is fine. Avoid dehydration by drinking 6 to 8 glasses of fluids per day (such as water, soft drinks, sports drinks, juices, tea, or soup). Extra fluids will help loosen secretions in the nose and lungs.  · Over-the-counter cough, cold, and sore-throat medicines will not shorten the length of the illness, but they may be helpful to reduce symptoms. (Note: Do not use decongestants if you have high blood pressure.)  · Finish all antibiotic medicine. Do this even if you are feeling better after only a few days.  Follow-up care  Follow up with your healthcare provider, or as advised. If you had an X-ray or ECG (electrocardiogram), a specialist will review it. You will be notified of any new findings that may affect your care.  Note: If you are age 65 or older, or  if you have a chronic lung disease or condition that affects your immune system, or you smoke, talk to your healthcare provider about having pneumococcal vaccinations and a yearly influenza vaccination (flu shot).  When to seek medical advice  Call your healthcare provider right away if any of these occur:  · Fever of 100.4°F (38°C) or higher  · Coughing up increased amounts of colored sputum  · Weakness, drowsiness, headache, facial pain, ear pain, or a stiff neck  Call 911, or get immediate medical care  Contact emergency services right away if any of these occur.  · Coughing up blood  · Worsening weakness, drowsiness, headache, or stiff neck  · Trouble breathing, wheezing, or pain with breathing  © 2385-3971 [employer]. [address] [place] [address], [place] [address]. All rights reserved. This information is not intended as a substitute for professional medical care. Always follow your healthcare professional's instructions.      Complete antibiotic course.  Rest  Drink plenty of clear fluids--at least 64 ounces of water/juice  Normal saline nasal wash (example Ocean spray) to irrigate sinuses and for congestion/runny nose  Ibuprofen for fever, headache and body aches  Tessalon pearls to help with cough during daytime  Cough syrup to help with cough at night  Cool mist humidifier/vaporizer  Warm salt water gargles for throat comfort  Chloraseptic spray or lozenges for throat comfort  Warm tea with honey  Practice good handwashing    See your PCP or go to ER if symptoms worsen or fail to improve with treatment.           Bilateral Helical Rim Advancement Flap Text: The defect edges were debeveled with a #15 blade scalpel.  Given the location of the defect and the proximity to free margins (helical rim) a bilateral helical rim advancement flap was deemed most appropriate.  Using a sterile surgical marker, the appropriate advancement flaps were drawn incorporating the defect and placing the expected incisions between the helical rim and antihelix where possible.  The area thus outlined was incised through and through with a #15 scalpel blade.  With a skin hook and iris scissors, the flaps were gently and sharply undermined and freed up.

## 2023-01-31 ENCOUNTER — PATIENT MESSAGE (OUTPATIENT)
Dept: INTERNAL MEDICINE | Facility: CLINIC | Age: 73
End: 2023-01-31

## 2023-01-31 ENCOUNTER — OFFICE VISIT (OUTPATIENT)
Dept: INTERNAL MEDICINE | Facility: CLINIC | Age: 73
End: 2023-01-31
Payer: MEDICARE

## 2023-01-31 VITALS
TEMPERATURE: 98 F | OXYGEN SATURATION: 97 % | HEART RATE: 63 BPM | BODY MASS INDEX: 23.74 KG/M2 | DIASTOLIC BLOOD PRESSURE: 58 MMHG | SYSTOLIC BLOOD PRESSURE: 110 MMHG | WEIGHT: 142.63 LBS

## 2023-01-31 DIAGNOSIS — M05.9 SEROPOSITIVE RHEUMATOID ARTHRITIS: ICD-10-CM

## 2023-01-31 DIAGNOSIS — Z12.11 SCREEN FOR COLON CANCER: ICD-10-CM

## 2023-01-31 DIAGNOSIS — E11.9 TYPE 2 DIABETES MELLITUS WITHOUT COMPLICATION, WITHOUT LONG-TERM CURRENT USE OF INSULIN: ICD-10-CM

## 2023-01-31 DIAGNOSIS — I15.2 HYPERTENSION ASSOCIATED WITH DIABETES: Primary | ICD-10-CM

## 2023-01-31 DIAGNOSIS — E11.59 HYPERTENSION ASSOCIATED WITH DIABETES: Primary | ICD-10-CM

## 2023-01-31 PROCEDURE — 99999 PR PBB SHADOW E&M-EST. PATIENT-LVL IV: ICD-10-PCS | Mod: PBBFAC,,, | Performed by: FAMILY MEDICINE

## 2023-01-31 PROCEDURE — 3288F PR FALLS RISK ASSESSMENT DOCUMENTED: ICD-10-PCS | Mod: CPTII,S$GLB,, | Performed by: FAMILY MEDICINE

## 2023-01-31 PROCEDURE — 1159F PR MEDICATION LIST DOCUMENTED IN MEDICAL RECORD: ICD-10-PCS | Mod: CPTII,S$GLB,, | Performed by: FAMILY MEDICINE

## 2023-01-31 PROCEDURE — 3008F PR BODY MASS INDEX (BMI) DOCUMENTED: ICD-10-PCS | Mod: CPTII,S$GLB,, | Performed by: FAMILY MEDICINE

## 2023-01-31 PROCEDURE — 3078F DIAST BP <80 MM HG: CPT | Mod: CPTII,S$GLB,, | Performed by: FAMILY MEDICINE

## 2023-01-31 PROCEDURE — 3074F SYST BP LT 130 MM HG: CPT | Mod: CPTII,S$GLB,, | Performed by: FAMILY MEDICINE

## 2023-01-31 PROCEDURE — 3008F BODY MASS INDEX DOCD: CPT | Mod: CPTII,S$GLB,, | Performed by: FAMILY MEDICINE

## 2023-01-31 PROCEDURE — 99999 PR PBB SHADOW E&M-EST. PATIENT-LVL IV: CPT | Mod: PBBFAC,,, | Performed by: FAMILY MEDICINE

## 2023-01-31 PROCEDURE — 3074F PR MOST RECENT SYSTOLIC BLOOD PRESSURE < 130 MM HG: ICD-10-PCS | Mod: CPTII,S$GLB,, | Performed by: FAMILY MEDICINE

## 2023-01-31 PROCEDURE — 99214 OFFICE O/P EST MOD 30 MIN: CPT | Mod: S$GLB,,, | Performed by: FAMILY MEDICINE

## 2023-01-31 PROCEDURE — 1125F PR PAIN SEVERITY QUANTIFIED, PAIN PRESENT: ICD-10-PCS | Mod: CPTII,S$GLB,, | Performed by: FAMILY MEDICINE

## 2023-01-31 PROCEDURE — 1125F AMNT PAIN NOTED PAIN PRSNT: CPT | Mod: CPTII,S$GLB,, | Performed by: FAMILY MEDICINE

## 2023-01-31 PROCEDURE — 1100F PR PT FALLS ASSESS DOC 2+ FALLS/FALL W/INJURY/YR: ICD-10-PCS | Mod: CPTII,S$GLB,, | Performed by: FAMILY MEDICINE

## 2023-01-31 PROCEDURE — 99214 PR OFFICE/OUTPT VISIT, EST, LEVL IV, 30-39 MIN: ICD-10-PCS | Mod: S$GLB,,, | Performed by: FAMILY MEDICINE

## 2023-01-31 PROCEDURE — 3288F FALL RISK ASSESSMENT DOCD: CPT | Mod: CPTII,S$GLB,, | Performed by: FAMILY MEDICINE

## 2023-01-31 PROCEDURE — 4010F ACE/ARB THERAPY RXD/TAKEN: CPT | Mod: CPTII,S$GLB,, | Performed by: FAMILY MEDICINE

## 2023-01-31 PROCEDURE — 3078F PR MOST RECENT DIASTOLIC BLOOD PRESSURE < 80 MM HG: ICD-10-PCS | Mod: CPTII,S$GLB,, | Performed by: FAMILY MEDICINE

## 2023-01-31 PROCEDURE — 1159F MED LIST DOCD IN RCRD: CPT | Mod: CPTII,S$GLB,, | Performed by: FAMILY MEDICINE

## 2023-01-31 PROCEDURE — 1100F PTFALLS ASSESS-DOCD GE2>/YR: CPT | Mod: CPTII,S$GLB,, | Performed by: FAMILY MEDICINE

## 2023-01-31 PROCEDURE — 4010F PR ACE/ARB THEARPY RXD/TAKEN: ICD-10-PCS | Mod: CPTII,S$GLB,, | Performed by: FAMILY MEDICINE

## 2023-01-31 RX ORDER — SEMAGLUTIDE 1.34 MG/ML
0.25 INJECTION, SOLUTION SUBCUTANEOUS
Qty: 1 PEN | Refills: 5
Start: 2023-01-31 | End: 2023-07-31 | Stop reason: SDUPTHER

## 2023-01-31 RX ORDER — TRAMADOL HYDROCHLORIDE 50 MG/1
50 TABLET ORAL EVERY 6 HOURS PRN
Qty: 60 TABLET | Refills: 5 | Status: SHIPPED | OUTPATIENT
Start: 2023-01-31 | End: 2023-11-06

## 2023-01-31 NOTE — PROGRESS NOTES
Subjective:      Patient ID: Smitha Salinas is a 72 y.o. female.    Chief Complaint: Follow-up    HPI follow-up hypertension with  decreased weight with Ozempic decreased need for clonidine she reduced it by 1 at night home blood pressures intermittently elevated still unsure if blood pressure machine is accurate    Diabetes she wants to continue Ozempic 0.25 and not increase it    Chronic pain rheumatoid arthritis needs refill tramadol  Past Medical History:   Diagnosis Date    ADHD (attention deficit hyperactivity disorder)     Adult ADHD     [facility]    Chronic rheumatic arthritis     on DMARD    Ex-smoker     Genital herpes     Hyperlipidemia     Hypertension     Immunosuppressed status     Lichen sclerosus et atrophicus     Morbid obesity 1/11/2021    Obesity, Class I, BMI 30-34.9 12/22/2015    SUKHDEEP (obstructive sleep apnea)     Osteopenia     5/16 wai 5/18(start fmax if on pred 3months or more)    Type 2 diabetes mellitus     dxd 9/20      Past Surgical History:   Procedure Laterality Date    BREAST BIOPSY Left     benign    COLONOSCOPY N/A 2/12/2020    Procedure: COLONOSCOPY;  Surgeon: Eloina Castro MD;  Location: [facility];  Service: Endoscopy;  Laterality: N/A;    CRYOTHERAPY  1980    ESOPHAGOGASTRODUODENOSCOPY N/A 2/12/2020    Procedure: EGD (ESOPHAGOGASTRODUODENOSCOPY);  Surgeon: Eloina Castro MD;  Location: [facility];  Service: Endoscopy;  Laterality: N/A;    INSERTION OF SACRAL NEUROSTIMULATOR GENERATOR Left 11/23/2020    Procedure: INSERTION, PULSE GENERATOR, NEUROSTIMULATOR, SACRAL;  Surgeon: Pablo Rawls MD;  Location: [facility] OR;  Service: Urology;  Laterality: Left;    INSERTION OF SACRAL NEUROSTIMULATOR, ELECTRODES, AND IMPLANTABLE PULSE GENERATOR (IPG) Left 11/23/2020    Procedure: INSERTION, ELECTRODE LEADS AND PULSE GENERATOR, NEUROSTIMULATOR, SACRAL;  Surgeon: Pablo Rawls MD;  Location: [facility] OR;  Service: Urology;  Laterality: Left;    SKIN GRAFT  1965     laceration to right  fingers  from thigh     TONSILLECTOMY        Social History     Socioeconomic History    Marital status:     Number of children: 1   Occupational History    Occupation:      Employer: [employer]    Tobacco Use    Smoking status: Former     Packs/day: 0.25     Years: 15.00     Pack years: 3.75     Types: Cigarettes     Quit date: 2013     Years since quittin.5    Smokeless tobacco: Never    Tobacco comments:     smokes for a few weeks per year - when under pressure. has been abstinent times years at a time. a pack lasts about a week   Substance and Sexual Activity    Alcohol use: Yes     Alcohol/week: 0.0 standard drinks    Drug use: No    Sexual activity: Not Currently     Birth control/protection: Post-menopausal   Social History Narrative    Lives alone. Caffeine intake rare -1-2 per week of coffee. Does not have a Living Will or Advanced Directive      Family History   Problem Relation Age of Onset    Heart disease Mother     Diabetes Mother     Peripheral vascular disease Mother         amputations    Stroke Father     Kidney failure Father     Breast cancer Sister 39    Cancer Other 68        breast    Stroke Maternal Grandmother     Stroke Paternal Grandmother     Stroke Paternal Grandfather     Colon cancer Neg Hx     Ovarian cancer Neg Hx       Review of Systems        Objective:     Physical Exam  Constitutional:       Appearance: Normal appearance.   Cardiovascular:      Rate and Rhythm: Normal rate and regular rhythm.   Neurological:      Mental Status: She is alert.     Assessment:         ICD-10-CM ICD-9-CM   1. Hypertension associated with diabetes  E11.59 250.80    I15.2 401.9   2. Type 2 diabetes mellitus without complication, without long-term current use of insulin  E11.9 250.00   3. Seropositive rheumatoid arthritis  M05.9 714.0   4. Screen for colon cancer  Z12.11 V76.51      Plan:      Eye dr externl info  F/u 6months  F/u specialists  See  prior notes at f/u  Hypertension associated with diabetes    Type 2 diabetes mellitus without complication, without long-term current use of insulin    Seropositive rheumatoid arthritis    Screen for colon cancer  -     Ambulatory referral/consult to Endo Procedure ; Future; Expected date: 02/01/2023    Other orders  -     traMADoL (ULTRAM) 50 mg tablet; Take 1 tablet (50 mg total) by mouth every 6 (six) hours as needed.  Dispense: 60 tablet; Refill: 5  -     semaglutide (OZEMPIC) 0.25 mg or 0.5 mg(2 mg/1.5 mL) pen injector; Inject 0.25 mg into the skin every 7 days.  Dispense: 1 pen; Refill: 5           Nurse bp machine check here same day urol appt 2/9

## 2023-01-31 NOTE — TELEPHONE ENCOUNTER
I spoke with the patient stated that she forgot to discuss with Dr Jaramillo a knot behind her knee. Patient stated that she do have pain when the are is touch. Patient is concern about blood clots. The patient was schedule an appointment with Mr Dumont on 2/1/23 at 11:40 am.

## 2023-02-01 ENCOUNTER — LAB VISIT (OUTPATIENT)
Dept: LAB | Facility: HOSPITAL | Age: 73
End: 2023-02-01
Attending: NURSE PRACTITIONER
Payer: MEDICARE

## 2023-02-01 ENCOUNTER — OFFICE VISIT (OUTPATIENT)
Dept: INTERNAL MEDICINE | Facility: CLINIC | Age: 73
End: 2023-02-01
Payer: MEDICARE

## 2023-02-01 VITALS
TEMPERATURE: 98 F | BODY MASS INDEX: 24.46 KG/M2 | HEIGHT: 65 IN | HEART RATE: 66 BPM | SYSTOLIC BLOOD PRESSURE: 146 MMHG | OXYGEN SATURATION: 99 % | RESPIRATION RATE: 16 BRPM | DIASTOLIC BLOOD PRESSURE: 73 MMHG | WEIGHT: 146.81 LBS

## 2023-02-01 DIAGNOSIS — I15.2 HYPERTENSION ASSOCIATED WITH DIABETES: ICD-10-CM

## 2023-02-01 DIAGNOSIS — S80.11XA CONTUSION OF RIGHT LOWER LEG, INITIAL ENCOUNTER: Primary | ICD-10-CM

## 2023-02-01 DIAGNOSIS — S80.11XA CONTUSION OF RIGHT LOWER LEG, INITIAL ENCOUNTER: ICD-10-CM

## 2023-02-01 DIAGNOSIS — E11.9 TYPE 2 DIABETES MELLITUS WITHOUT COMPLICATION, WITHOUT LONG-TERM CURRENT USE OF INSULIN: ICD-10-CM

## 2023-02-01 DIAGNOSIS — E11.59 HYPERTENSION ASSOCIATED WITH DIABETES: ICD-10-CM

## 2023-02-01 DIAGNOSIS — M79.604 PAIN IN RIGHT LEG: ICD-10-CM

## 2023-02-01 LAB
APTT BLDCRRT: 23.8 SEC (ref 21–32)
INR PPP: 0.9 (ref 0.8–1.2)
PROTHROMBIN TIME: 10.3 SEC (ref 9–12.5)

## 2023-02-01 PROCEDURE — 3288F PR FALLS RISK ASSESSMENT DOCUMENTED: ICD-10-PCS | Mod: CPTII,S$GLB,, | Performed by: NURSE PRACTITIONER

## 2023-02-01 PROCEDURE — 99999 PR PBB SHADOW E&M-EST. PATIENT-LVL V: CPT | Mod: PBBFAC,,, | Performed by: NURSE PRACTITIONER

## 2023-02-01 PROCEDURE — 1101F PT FALLS ASSESS-DOCD LE1/YR: CPT | Mod: CPTII,S$GLB,, | Performed by: NURSE PRACTITIONER

## 2023-02-01 PROCEDURE — 3008F PR BODY MASS INDEX (BMI) DOCUMENTED: ICD-10-PCS | Mod: CPTII,S$GLB,, | Performed by: NURSE PRACTITIONER

## 2023-02-01 PROCEDURE — 3008F BODY MASS INDEX DOCD: CPT | Mod: CPTII,S$GLB,, | Performed by: NURSE PRACTITIONER

## 2023-02-01 PROCEDURE — 85730 THROMBOPLASTIN TIME PARTIAL: CPT | Performed by: NURSE PRACTITIONER

## 2023-02-01 PROCEDURE — 3077F PR MOST RECENT SYSTOLIC BLOOD PRESSURE >= 140 MM HG: ICD-10-PCS | Mod: CPTII,S$GLB,, | Performed by: NURSE PRACTITIONER

## 2023-02-01 PROCEDURE — 3078F PR MOST RECENT DIASTOLIC BLOOD PRESSURE < 80 MM HG: ICD-10-PCS | Mod: CPTII,S$GLB,, | Performed by: NURSE PRACTITIONER

## 2023-02-01 PROCEDURE — 4010F ACE/ARB THERAPY RXD/TAKEN: CPT | Mod: CPTII,S$GLB,, | Performed by: NURSE PRACTITIONER

## 2023-02-01 PROCEDURE — 85610 PROTHROMBIN TIME: CPT | Performed by: NURSE PRACTITIONER

## 2023-02-01 PROCEDURE — 4010F PR ACE/ARB THEARPY RXD/TAKEN: ICD-10-PCS | Mod: CPTII,S$GLB,, | Performed by: NURSE PRACTITIONER

## 2023-02-01 PROCEDURE — 3078F DIAST BP <80 MM HG: CPT | Mod: CPTII,S$GLB,, | Performed by: NURSE PRACTITIONER

## 2023-02-01 PROCEDURE — 1101F PR PT FALLS ASSESS DOC 0-1 FALLS W/OUT INJ PAST YR: ICD-10-PCS | Mod: CPTII,S$GLB,, | Performed by: NURSE PRACTITIONER

## 2023-02-01 PROCEDURE — 36415 COLL VENOUS BLD VENIPUNCTURE: CPT | Performed by: NURSE PRACTITIONER

## 2023-02-01 PROCEDURE — 1159F MED LIST DOCD IN RCRD: CPT | Mod: CPTII,S$GLB,, | Performed by: NURSE PRACTITIONER

## 2023-02-01 PROCEDURE — 85025 COMPLETE CBC W/AUTO DIFF WBC: CPT | Performed by: NURSE PRACTITIONER

## 2023-02-01 PROCEDURE — 3288F FALL RISK ASSESSMENT DOCD: CPT | Mod: CPTII,S$GLB,, | Performed by: NURSE PRACTITIONER

## 2023-02-01 PROCEDURE — 99214 OFFICE O/P EST MOD 30 MIN: CPT | Mod: S$GLB,,, | Performed by: NURSE PRACTITIONER

## 2023-02-01 PROCEDURE — 99214 PR OFFICE/OUTPT VISIT, EST, LEVL IV, 30-39 MIN: ICD-10-PCS | Mod: S$GLB,,, | Performed by: NURSE PRACTITIONER

## 2023-02-01 PROCEDURE — 99999 PR PBB SHADOW E&M-EST. PATIENT-LVL V: ICD-10-PCS | Mod: PBBFAC,,, | Performed by: NURSE PRACTITIONER

## 2023-02-01 PROCEDURE — 1159F PR MEDICATION LIST DOCUMENTED IN MEDICAL RECORD: ICD-10-PCS | Mod: CPTII,S$GLB,, | Performed by: NURSE PRACTITIONER

## 2023-02-01 PROCEDURE — 1126F AMNT PAIN NOTED NONE PRSNT: CPT | Mod: CPTII,S$GLB,, | Performed by: NURSE PRACTITIONER

## 2023-02-01 PROCEDURE — 3077F SYST BP >= 140 MM HG: CPT | Mod: CPTII,S$GLB,, | Performed by: NURSE PRACTITIONER

## 2023-02-01 PROCEDURE — 1126F PR PAIN SEVERITY QUANTIFIED, NO PAIN PRESENT: ICD-10-PCS | Mod: CPTII,S$GLB,, | Performed by: NURSE PRACTITIONER

## 2023-02-01 NOTE — PROGRESS NOTES
Chief Complaint  Chief Complaint   Patient presents with    Cyst         HPI     HPI  Smitha Salinas is a 72 y.o. female with medical diagnoses as listed in the medical history and problem list that presents for Bruising of Right Lower Leg.  This patient is new to me.     Bruising of Right Lower Leg: Noticed bruising 3 days ago. Bruising is located at the posterior-medial aspect of the right leg. Reports pain on palpation during physical examination is 5/10. Usually takes ASA every day however, her cardiologist advised her to take ASA every other day due to bruising episodes she reported to him in the past. She reports a fall radha junito night that did not result in any bruising. During the [place] junito night fall she can not recall trauma directly at the region of concern she is reporting today. Vitals are with in acceptable limits.      Pertinent negatives are chest pain/palpitations/tightness/discomfort, SOB, GI upset, urinary/bowel changes, unexplained weight loss/gain, dizziness/headaches/syncope.       History     PAST MEDICAL HISTORY:  Past Medical History:   Diagnosis Date    ADHD (attention deficit hyperactivity disorder)     Adult ADHD     [facility]    Chronic rheumatic arthritis     on DMARD    Ex-smoker     Genital herpes     Hyperlipidemia     Hypertension     Immunosuppressed status     Lichen sclerosus et atrophicus     Morbid obesity 1/11/2021    Obesity, Class I, BMI 30-34.9 12/22/2015    SUKHDEEP (obstructive sleep apnea)     Osteopenia     5/16 wai 5/18(start fmax if on pred 3months or more)    Type 2 diabetes mellitus     dxd 9/20       PAST SURGICAL HISTORY:  Past Surgical History:   Procedure Laterality Date    BREAST BIOPSY Left     benign    COLONOSCOPY N/A 2/12/2020    Procedure: COLONOSCOPY;  Surgeon: Eloina Castro MD;  Location: [facility];  Service: Endoscopy;  Laterality: N/A;    CRYOTHERAPY  1980    ESOPHAGOGASTRODUODENOSCOPY N/A 2/12/2020    Procedure: EGD  (ESOPHAGOGASTRODUODENOSCOPY);  Surgeon: Eloina Castro MD;  Location: [facility] ENDO;  Service: Endoscopy;  Laterality: N/A;    INSERTION OF SACRAL NEUROSTIMULATOR GENERATOR Left 2020    Procedure: INSERTION, PULSE GENERATOR, NEUROSTIMULATOR, SACRAL;  Surgeon: Pablo Rawls MD;  Location: [facility] OR;  Service: Urology;  Laterality: Left;    INSERTION OF SACRAL NEUROSTIMULATOR, ELECTRODES, AND IMPLANTABLE PULSE GENERATOR (IPG) Left 2020    Procedure: INSERTION, ELECTRODE LEADS AND PULSE GENERATOR, NEUROSTIMULATOR, SACRAL;  Surgeon: Pablo Rawls MD;  Location: [facility] OR;  Service: Urology;  Laterality: Left;    SKIN GRAFT      laceration to right  fingers  from thigh     TONSILLECTOMY         SOCIAL HISTORY:  Social History     Socioeconomic History    Marital status:     Number of children: 1   Occupational History    Occupation:      Employer: [employer]    Tobacco Use    Smoking status: Former     Packs/day: 0.25     Years: 15.00     Pack years: 3.75     Types: Cigarettes     Quit date: 2013     Years since quittin.5    Smokeless tobacco: Never    Tobacco comments:     smokes for a few weeks per year - when under pressure. has been abstinent times years at a time. a pack lasts about a week   Substance and Sexual Activity    Alcohol use: Yes     Alcohol/week: 0.0 standard drinks    Drug use: No    Sexual activity: Not Currently     Birth control/protection: Post-menopausal   Social History Narrative    Lives alone. Caffeine intake rare -1-2 per week of coffee. Does not have a Living Will or Advanced Directive       FAMILY HISTORY:  Family History   Problem Relation Age of Onset    Heart disease Mother     Diabetes Mother     Peripheral vascular disease Mother         amputations    Stroke Father     Kidney failure Father     Breast cancer Sister 39    Cancer Other 68        breast    Stroke Maternal Grandmother     Stroke Paternal Grandmother     Stroke  Paternal Grandfather     Colon cancer Neg Hx     Ovarian cancer Neg Hx        ALLERGIES AND MEDICATIONS: updated and reviewed.  Review of patient's allergies indicates:   Allergen Reactions    Valdecoxib Other (See Comments)     palpitation     Current Outpatient Medications   Medication Sig Dispense Refill    acyclovir (ZOVIRAX) 400 MG tablet Take 1 tablet (400 mg total) by mouth 2 (two) times daily. 180 tablet 4    amLODIPine (NORVASC) 5 MG tablet Take 1 tablet by mouth once daily 90 tablet 4    aspirin (ECOTRIN) 81 MG EC tablet Take 81 mg by mouth once daily.      blood sugar diagnostic (BLOOD GLUCOSE TEST) Strp 1 strip by Misc.(Non-Drug; Combo Route) route 3 (three) times daily as needed. 1 each 11    calcium-vitamin D (CALCIUM 600 + D,3,) 600 mg-10 mcg (400 unit) Tab Take 1 tablet by mouth 2 (two) times daily. 60 tablet 3    cloNIDine (CATAPRES) 0.2 MG tablet TAKE 1/2 (ONE-HALF) TABLET BY MOUTH IN THE MORNING AND 1/2 (ONE-HALF) AT NOON AND 2 AT BEDTIME 270 tablet 4    cyclobenzaprine (FLEXERIL) 10 MG tablet TAKE 1/2 TO 1 (ONE-HALF TO ONE) TABLET BY MOUTH THREE TIMES DAILY AS NEEDED FOR MUSCLE SPASM 60 tablet 11    diazePAM (VALIUM) 5 MG tablet TAKE 1 TABLET BY MOUTH AT BEDTIME FOR ANXIETY 30 tablet 5    diclofenac sodium (VOLTAREN) 1 % Gel Apply 2 g topically 3 (three) times daily. 1 each 0    doxepin (SINEQUAN) 10 MG capsule Take 1 capsule (10 mg total) by mouth every evening. 30 capsule 11    etanercept (ENBREL SURECLICK) 50 mg/mL (1 mL) Inject 1 mL (50 mg total) into the skin once a week. 4 mL 11    HYDROcodone-acetaminophen (NORCO) 5-325 mg per tablet       losartan (COZAAR) 50 MG tablet Take 1 tablet by mouth once daily 90 tablet 4    methylPREDNISolone (MEDROL DOSEPACK) 4 mg tablet use as directed 1 each 0    mirabegron (MYRBETRIQ) 50 mg Tb24 Take 1 tablet by mouth once daily 90 tablet 4    NUCYNTA ER 50 mg Tb12 Take 1 tablet by mouth every 12 (twelve) hours. prn      ondansetron (ZOFRAN-ODT) 4 MG TbDL  Take 1 tablet (4 mg total) by mouth every 8 (eight) hours as needed (nausea). 10 tablet 1    oxyCODONE-acetaminophen (PERCOCET) 5-325 mg per tablet Take 1 tablet by mouth every 4 (four) hours as needed for Pain.      potassium chloride SA (K-DUR,KLOR-CON) 20 MEQ tablet 1 tablet daily 90 tablet 5    predniSONE (DELTASONE) 5 MG tablet May take 5 of prednisone daily for rheumatoid related pain 60 tablet 2    PROLENSA 0.07 % Drop       rosuvastatin (CRESTOR) 20 MG tablet Take 1 tablet (20 mg total) by mouth once daily. 30 tablet 6    semaglutide (OZEMPIC) 0.25 mg or 0.5 mg(2 mg/1.5 mL) pen injector Inject 0.25 mg into the skin every 7 days. 1 pen 5    traMADoL (ULTRAM) 50 mg tablet Take 1 tablet (50 mg total) by mouth every 6 (six) hours as needed. 60 tablet 5    gabapentin (NEURONTIN) 300 MG capsule Take 1 capsule (300 mg total) by mouth 3 (three) times daily. for 10 days 30 capsule 0    lancing device Misc 1 each by Misc.(Non-Drug; Combo Route) route 3 (three) times daily as needed. 1 each 0     Current Facility-Administered Medications   Medication Dose Route Frequency Provider Last Rate Last Admin    ondansetron disintegrating tablet 4 mg  4 mg Oral Once PRN Perez De La Fuente MD               Exam     ROS  Review of Systems   Constitutional:  Negative for appetite change, chills, fatigue and fever.   HENT:  Negative for congestion, ear pain, postnasal drip, rhinorrhea, sinus pressure, sneezing and sore throat.    Respiratory:  Negative for shortness of breath.    Cardiovascular:  Negative for chest pain and palpitations.   Gastrointestinal:  Negative for abdominal pain, constipation, diarrhea, nausea and vomiting.   Genitourinary:  Negative for dysuria.   Musculoskeletal:  Negative for arthralgias.   Skin:  Positive for color change.   Neurological:  Negative for headaches.   Psychiatric/Behavioral:  Negative for sleep disturbance.          Physical Exam  Vitals:    02/01/23 1138   BP: (!) 146/73   BP Location:  "Right arm   Patient Position: Sitting   BP Method: Medium (Manual)   Pulse: 66   Resp: 16   Temp: 97.8 °F (36.6 °C)   TempSrc: Tympanic   SpO2: 99%   Weight: 66.6 kg (146 lb 13.2 oz)   Height: 5' 5" (1.651 m)    Body mass index is 24.43 kg/m².  Weight: 66.6 kg (146 lb 13.2 oz)   Height: 5' 5" (165.1 cm)   Physical Exam  Constitutional:       General: She is not in acute distress.     Appearance: Normal appearance.   HENT:      Head: Normocephalic and atraumatic.      Right Ear: External ear normal.      Left Ear: External ear normal.      Nose: Nose normal.   Eyes:      Pupils: Pupils are equal, round, and reactive to light.   Cardiovascular:      Rate and Rhythm: Normal rate and regular rhythm.      Pulses: Normal pulses.   Pulmonary:      Effort: Pulmonary effort is normal. No respiratory distress.      Breath sounds: Normal breath sounds. No wheezing.   Abdominal:      General: Bowel sounds are normal.      Palpations: Abdomen is soft.   Musculoskeletal:      Cervical back: Neck supple.   Lymphadenopathy:      Cervical: No cervical adenopathy.   Skin:     General: Skin is warm and dry.      Capillary Refill: Capillary refill takes less than 2 seconds.      Findings: Bruising present.          Neurological:      Mental Status: She is alert and oriented to person, place, and time.   Psychiatric:         Mood and Affect: Mood normal.           Health Maintenance         Date Due Completion Date    Eye Exam 11/17/2022 11/17/2021    Colorectal Cancer Screening 02/12/2023 2/12/2020    Hemoglobin A1c 06/09/2023 12/9/2022    Foot Exam 09/06/2023 9/6/2022 (Done)    Override on 9/6/2022: Done    Mammogram 10/21/2023 10/21/2022    Override on 10/17/2012: Done    Diabetes Urine Screening 12/09/2023 12/9/2022    Lipid Panel 12/09/2023 12/9/2022    Low Dose Statin 01/31/2024 1/31/2023    DEXA Scan 02/11/2025 2/11/2022    TETANUS VACCINE 05/24/2032 5/24/2022              Assessment & Plan     Assessment & Plan  Problem List " Items Addressed This Visit          Cardiac/Vascular    Hypertension associated with diabetes  - Systolic blood pressure mildly elevated likely due to current pain level  - Patient advised to continue monitoring blood pressures at home.   - We discussed safe B/P parameters with precautions to call clinic for visit if B/Ps are constant elevated with warning signs    Overview     Overview:   ICD-10 Transition            Endocrine    Type 2 diabetes mellitus  -The current medical regimen is effective;  continue present plan and medications.     Overview     dxd 9/20          Other Visit Diagnoses       Contusion of right lower leg, initial encounter    -  Primary  -Labs and imaging to screen for acute changes  - Stop ASA  - Patient has statin and ozempic for cardio protection    Relevant Orders    US Lower Extremity Veins Right    APTT (Completed)    Protime-INR (Completed)    CBC Auto Differential    Pain in right leg      - Patient advised to use Tylenol for pain instead of NSAIDs    Relevant Orders    US Lower Extremity Veins Right              Health Maintenance reviewed: Deferred per patient    Follow-up: Approx 6 months with Dr. Jaramillo    30+ minutes of total time spent on the encounter, which includes face to face time and non-face to face time preparing to see the patient (eg, review of tests), Obtaining and/or reviewing separately obtained history, documenting clinical information in the electronic or other health record, independently interpreting results (not separately reported) and communicating results to the patient/family/caregiver, or Care coordination (not separately reported).

## 2023-02-02 ENCOUNTER — HOSPITAL ENCOUNTER (OUTPATIENT)
Dept: RADIOLOGY | Facility: HOSPITAL | Age: 73
Discharge: HOME | End: 2023-02-02
Attending: NURSE PRACTITIONER
Payer: MEDICARE

## 2023-02-02 DIAGNOSIS — S80.11XA CONTUSION OF RIGHT LOWER LEG, INITIAL ENCOUNTER: ICD-10-CM

## 2023-02-02 DIAGNOSIS — M79.604 PAIN IN RIGHT LEG: ICD-10-CM

## 2023-02-02 LAB
BASOPHILS # BLD AUTO: 0.03 K/UL (ref 0–0.2)
BASOPHILS NFR BLD: 0.3 % (ref 0–1.9)
DIFFERENTIAL METHOD: ABNORMAL
EOSINOPHIL # BLD AUTO: 0.1 K/UL (ref 0–0.5)
EOSINOPHIL NFR BLD: 0.6 % (ref 0–8)
ERYTHROCYTE [DISTWIDTH] IN BLOOD BY AUTOMATED COUNT: 14.6 % (ref 11.5–14.5)
HCT VFR BLD AUTO: 39.4 % (ref 37–48.5)
HGB BLD-MCNC: 12.2 G/DL (ref 12–16)
IMM GRANULOCYTES # BLD AUTO: 0.06 K/UL (ref 0–0.04)
IMM GRANULOCYTES NFR BLD AUTO: 0.6 % (ref 0–0.5)
LYMPHOCYTES # BLD AUTO: 1.7 K/UL (ref 1–4.8)
LYMPHOCYTES NFR BLD: 16.9 % (ref 18–48)
MCH RBC QN AUTO: 27.7 PG (ref 27–31)
MCHC RBC AUTO-ENTMCNC: 31 G/DL (ref 32–36)
MCV RBC AUTO: 90 FL (ref 82–98)
MONOCYTES # BLD AUTO: 1 K/UL (ref 0.3–1)
MONOCYTES NFR BLD: 10 % (ref 4–15)
NEUTROPHILS # BLD AUTO: 7.2 K/UL (ref 1.8–7.7)
NEUTROPHILS NFR BLD: 71.6 % (ref 38–73)
NRBC BLD-RTO: 0 /100 WBC
PLATELET # BLD AUTO: 196 K/UL (ref 150–450)
PMV BLD AUTO: 13.2 FL (ref 9.2–12.9)
RBC # BLD AUTO: 4.4 M/UL (ref 4–5.4)
WBC # BLD AUTO: 10.01 K/UL (ref 3.9–12.7)

## 2023-02-02 PROCEDURE — 93971 EXTREMITY STUDY: CPT | Mod: 26,RT,, | Performed by: RADIOLOGY

## 2023-02-02 PROCEDURE — 93971 US LOWER EXTREMITY VEINS RIGHT: ICD-10-PCS | Mod: 26,RT,, | Performed by: RADIOLOGY

## 2023-02-02 PROCEDURE — 93971 EXTREMITY STUDY: CPT | Mod: TC,RT

## 2023-02-02 NOTE — PROGRESS NOTES
Team,     1. Notify Mrs. Salinas her blood count remains at overall baseline  2. Clotting studies are normal  3. Advise her to stop ASA  4. Follow-up for any additional bruising    Buddy Dumont NP

## 2023-02-03 NOTE — PROGRESS NOTES
Team,    Notify patient of the below message.    1. There is no DVT at this time  2. There is a possible Baker's cyst located at the region of concern or a possible fluid collection.  3. I can place a referral for you to visit with Orthopedics for assessment I the event there is a true baker's cyst located at the region of concern.  4. Baker's Cyst are common usually are not worrisome however, can be removed if they cause pain. Let me know what you decide.   5. Remember, no more aspirin or aspirin containing products for now  6. Apply cold packs to area of pain    Buddy Dumont NP

## 2023-02-08 ENCOUNTER — PATIENT MESSAGE (OUTPATIENT)
Dept: UROLOGY | Facility: CLINIC | Age: 73
End: 2023-02-08
Payer: MEDICARE

## 2023-02-10 ENCOUNTER — HOSPITAL ENCOUNTER (OUTPATIENT)
Dept: PREADMISSION TESTING | Facility: HOSPITAL | Age: 73
Discharge: HOME | End: 2023-02-10
Attending: INTERNAL MEDICINE
Payer: MEDICARE

## 2023-02-10 ENCOUNTER — PATIENT MESSAGE (OUTPATIENT)
Dept: RHEUMATOLOGY | Facility: CLINIC | Age: 73
End: 2023-02-10
Payer: MEDICARE

## 2023-02-10 DIAGNOSIS — Z12.11 SCREEN FOR COLON CANCER: Primary | ICD-10-CM

## 2023-02-10 DIAGNOSIS — M05.9 SEROPOSITIVE RHEUMATOID ARTHRITIS: ICD-10-CM

## 2023-02-13 ENCOUNTER — PATIENT MESSAGE (OUTPATIENT)
Dept: INTERNAL MEDICINE | Facility: CLINIC | Age: 73
End: 2023-02-13
Payer: MEDICARE

## 2023-02-14 ENCOUNTER — PATIENT MESSAGE (OUTPATIENT)
Dept: RHEUMATOLOGY | Facility: CLINIC | Age: 73
End: 2023-02-14
Payer: MEDICARE

## 2023-02-14 RX ORDER — PREDNISONE 5 MG/1
TABLET ORAL
Qty: 60 TABLET | Refills: 2 | Status: SHIPPED | OUTPATIENT
Start: 2023-02-14 | End: 2023-08-28

## 2023-02-15 ENCOUNTER — PATIENT MESSAGE (OUTPATIENT)
Dept: GASTROENTEROLOGY | Facility: CLINIC | Age: 73
End: 2023-02-15
Payer: MEDICARE

## 2023-02-16 ENCOUNTER — PATIENT MESSAGE (OUTPATIENT)
Dept: PREADMISSION TESTING | Facility: HOSPITAL | Age: 73
End: 2023-02-16
Payer: MEDICARE

## 2023-02-23 ENCOUNTER — TELEPHONE (OUTPATIENT)
Dept: HEPATOLOGY | Facility: CLINIC | Age: 73
End: 2023-02-23
Payer: MEDICARE

## 2023-02-28 ENCOUNTER — OFFICE VISIT (OUTPATIENT)
Dept: HEPATOLOGY | Facility: CLINIC | Age: 73
End: 2023-02-28
Payer: MEDICARE

## 2023-02-28 ENCOUNTER — PATIENT MESSAGE (OUTPATIENT)
Dept: GASTROENTEROLOGY | Facility: CLINIC | Age: 73
End: 2023-02-28
Payer: MEDICARE

## 2023-02-28 VITALS — BODY MASS INDEX: 24.16 KG/M2 | HEIGHT: 65 IN | WEIGHT: 145 LBS

## 2023-02-28 DIAGNOSIS — R79.89 ABNORMAL LFTS: ICD-10-CM

## 2023-02-28 DIAGNOSIS — K75.9 HEPATITIS: Primary | ICD-10-CM

## 2023-02-28 PROCEDURE — 3008F PR BODY MASS INDEX (BMI) DOCUMENTED: ICD-10-PCS | Mod: CPTII,95,, | Performed by: INTERNAL MEDICINE

## 2023-02-28 PROCEDURE — 1101F PT FALLS ASSESS-DOCD LE1/YR: CPT | Mod: CPTII,95,, | Performed by: INTERNAL MEDICINE

## 2023-02-28 PROCEDURE — 1126F PR PAIN SEVERITY QUANTIFIED, NO PAIN PRESENT: ICD-10-PCS | Mod: CPTII,95,, | Performed by: INTERNAL MEDICINE

## 2023-02-28 PROCEDURE — 1159F PR MEDICATION LIST DOCUMENTED IN MEDICAL RECORD: ICD-10-PCS | Mod: CPTII,95,, | Performed by: INTERNAL MEDICINE

## 2023-02-28 PROCEDURE — 3008F BODY MASS INDEX DOCD: CPT | Mod: CPTII,95,, | Performed by: INTERNAL MEDICINE

## 2023-02-28 PROCEDURE — 3288F FALL RISK ASSESSMENT DOCD: CPT | Mod: CPTII,95,, | Performed by: INTERNAL MEDICINE

## 2023-02-28 PROCEDURE — 1126F AMNT PAIN NOTED NONE PRSNT: CPT | Mod: CPTII,95,, | Performed by: INTERNAL MEDICINE

## 2023-02-28 PROCEDURE — 1160F RVW MEDS BY RX/DR IN RCRD: CPT | Mod: CPTII,95,, | Performed by: INTERNAL MEDICINE

## 2023-02-28 PROCEDURE — 4010F PR ACE/ARB THEARPY RXD/TAKEN: ICD-10-PCS | Mod: CPTII,95,, | Performed by: INTERNAL MEDICINE

## 2023-02-28 PROCEDURE — 99213 PR OFFICE/OUTPT VISIT, EST, LEVL III, 20-29 MIN: ICD-10-PCS | Mod: 95,,, | Performed by: INTERNAL MEDICINE

## 2023-02-28 PROCEDURE — 99213 OFFICE O/P EST LOW 20 MIN: CPT | Mod: 95,,, | Performed by: INTERNAL MEDICINE

## 2023-02-28 PROCEDURE — 4010F ACE/ARB THERAPY RXD/TAKEN: CPT | Mod: CPTII,95,, | Performed by: INTERNAL MEDICINE

## 2023-02-28 PROCEDURE — 1160F PR REVIEW ALL MEDS BY PRESCRIBER/CLIN PHARMACIST DOCUMENTED: ICD-10-PCS | Mod: CPTII,95,, | Performed by: INTERNAL MEDICINE

## 2023-02-28 PROCEDURE — 3288F PR FALLS RISK ASSESSMENT DOCUMENTED: ICD-10-PCS | Mod: CPTII,95,, | Performed by: INTERNAL MEDICINE

## 2023-02-28 PROCEDURE — 1159F MED LIST DOCD IN RCRD: CPT | Mod: CPTII,95,, | Performed by: INTERNAL MEDICINE

## 2023-02-28 PROCEDURE — 1101F PR PT FALLS ASSESS DOC 0-1 FALLS W/OUT INJ PAST YR: ICD-10-PCS | Mod: CPTII,95,, | Performed by: INTERNAL MEDICINE

## 2023-02-28 RX ORDER — POLYETHYLENE GLYCOL-3350 AND ELECTROLYTES WITH FLAVOR PACK 240; 5.84; 2.98; 6.72; 22.72 G/278.26G; G/278.26G; G/278.26G; G/278.26G; G/278.26G
4000 POWDER, FOR SOLUTION ORAL ONCE
COMMUNITY
Start: 2023-02-10 | End: 2023-04-25 | Stop reason: SDUPTHER

## 2023-02-28 RX ORDER — PREGABALIN 150 MG/1
150 CAPSULE ORAL 2 TIMES DAILY
Status: ON HOLD | COMMUNITY
Start: 2023-01-16 | End: 2023-03-27

## 2023-02-28 NOTE — PROGRESS NOTES
Subjective:     Smitha Salinas is here for evaluation of abnormal LFTs    History of Present Illness:  Smitha Salinas is a  72-year-old female with seropositive rheumatoid arthritis, was treated with the Rinvoq, Actemra and Humira in the past, currently is also on prednisone.  She is referred for abnormal LFTs     RinvoQ, - 7/20- 11/2021   actimra- 0/22- 11/22  humira- 2005 was on for [id number]   prednisone -   Frequently in between other treatments     Duration of abnormality- she has had isolated abnormal LFTs since 2008, lately they have been consistently elevated  Medications/OTC/Herbal-as above  ETOH- social  Metabolic issues-diabetes , hypertension  BMI- 25    2/28/23  No significant complaints since last visit.  Says this morning she was trying to catch someone else's dog that was running away and stumbled on her feet, had a fall with scraped knees but denies serious injuries    Review of Systems   Constitutional:  Negative for fatigue, fever and unexpected weight change.   Gastrointestinal:  Negative for abdominal distention, abdominal pain, blood in stool, nausea and vomiting.   Musculoskeletal:  Negative for arthralgias and gait problem.   Skin:  Negative for pallor and rash.   Neurological:  Negative for dizziness.   Hematological:  Does not bruise/bleed easily.   Psychiatric/Behavioral:  Negative for confusion, hallucinations and sleep disturbance.      Objective:     Physical Exam  Vitals and nursing note reviewed.   Constitutional:       Appearance: She is obese.   Eyes:      General: No scleral icterus.  Pulmonary:      Effort: Pulmonary effort is normal.      Breath sounds: No rhonchi.   Abdominal:      General: Bowel sounds are normal. There is no distension.      Palpations: There is no mass.      Tenderness: There is no abdominal tenderness.   Musculoskeletal:      Right lower leg: No edema.      Left lower leg: No edema.   Skin:     Coloration: Skin is not jaundiced.   Neurological:      Mental  Status: She is alert and oriented to person, place, and time.      Coordination: Coordination normal.   Psychiatric:         Behavior: Behavior normal.         Thought Content: Thought content normal.       Computed MELD-Na score unavailable. Necessary lab results were not found in the last year.  Computed MELD score unavailable. Necessary lab results were not found in the last year.    WBC   Date Value Ref Range Status   02/01/2023 10.01 3.90 - 12.70 K/uL Final     Hemoglobin   Date Value Ref Range Status   02/01/2023 12.2 12.0 - 16.0 g/dL Final     Hematocrit   Date Value Ref Range Status   02/01/2023 39.4 37.0 - 48.5 % Final     Platelets   Date Value Ref Range Status   02/01/2023 196 150 - 450 K/uL Final     BUN   Date Value Ref Range Status   01/13/2023 12 8 - 23 mg/dL Final     Creatinine   Date Value Ref Range Status   01/13/2023 0.8 0.5 - 1.4 mg/dL Final     Glucose   Date Value Ref Range Status   01/13/2023 100 70 - 110 mg/dL Final     Calcium   Date Value Ref Range Status   01/13/2023 10.0 8.7 - 10.5 mg/dL Final     Sodium   Date Value Ref Range Status   01/13/2023 140 136 - 145 mmol/L Final     Potassium   Date Value Ref Range Status   01/13/2023 4.5 3.5 - 5.1 mmol/L Final     Chloride   Date Value Ref Range Status   01/13/2023 109 95 - 110 mmol/L Final     Magnesium   Date Value Ref Range Status   09/06/2013 2.1 1.6 - 2.6 mg/dL Final     AST   Date Value Ref Range Status   01/13/2023 16 10 - 40 U/L Final     ALT   Date Value Ref Range Status   01/13/2023 24 10 - 44 U/L Final     Alkaline Phosphatase   Date Value Ref Range Status   01/13/2023 69 55 - 135 U/L Final     Total Bilirubin   Date Value Ref Range Status   01/13/2023 0.5 0.1 - 1.0 mg/dL Final     Comment:     For infants and newborns, interpretation of results should be based  on gestational age, weight and in agreement with clinical  observations.    Premature Infant recommended reference ranges:  Up to 24 hours.............<8.0 mg/dL  Up to 48  hours............<12.0 mg/dL  3-5 days..................<15.0 mg/dL  6-29 days.................<15.0 mg/dL       Albumin   Date Value Ref Range Status   2023 4.0 3.5 - 5.2 g/dL Final     INR   Date Value Ref Range Status   2023 0.9 0.8 - 1.2 Final     Comment:     Coumadin Therapy:  2.0 - 3.0 for INR for all indicators except mechanical heart valves  and antiphospholipid syndromes which should use 2.5 - 3.5.           Assessment/Plan:     1.Abnormal LFTs:  Like infliximab, adalimumab also could cause mild transient elevation of liver enzymes.  In the same way Jose 1 inhibitors also cause elevated liver enzymes, could be obtain 11% of patients.  We will obtain a FibroScan to assess underlying chronic inflammation with fibrosis  Will initiate work up to rule out autoimmune of the liver diseases, infectious serologies for hepatitis ABC are negative  Ultrasound of the abdomen on 2022 shows no liver lesions    2. Rheumatoid arthritis    2023  Now her liver enzymes have normalized, I suspect elevated liver enzymes likely secondary to tocilizumab (Actemra)  FibroScan shows no significant scar tissue indicating no chronic inflammation in spite of transaminases being elevated.  Now that she is on Enbrel, which also could elevate liver enzymes without actual underlying liver injury, will monitor liver enzymes 6 months from now   Lost 40 lbs on Ozempic. Lost 10 lbs on her own. Which also could have have contributed to normalization of liver enzymes    Fibroscan readin.4 KPa  Fibrosis:F 0-1   CAP readin dB/m  Steatosis: :<S1     I have reviewed existing labs, imaging, procedures. Educated patient about disease process, prognosis. Ordered required labs, images and discussed treatment plan.     Maeve Baldwin MD  Transplant Hepatologist  Dept of Hepatology, [place] [facility]

## 2023-03-01 ENCOUNTER — HOSPITAL ENCOUNTER (OUTPATIENT)
Dept: RADIOLOGY | Facility: HOSPITAL | Age: 73
Discharge: HOME | End: 2023-03-01
Attending: UROLOGY
Payer: MEDICARE

## 2023-03-01 ENCOUNTER — OFFICE VISIT (OUTPATIENT)
Dept: UROLOGY | Facility: CLINIC | Age: 73
End: 2023-03-01
Payer: MEDICARE

## 2023-03-01 VITALS
DIASTOLIC BLOOD PRESSURE: 62 MMHG | HEART RATE: 60 BPM | BODY MASS INDEX: 24.17 KG/M2 | HEIGHT: 65 IN | WEIGHT: 145.06 LBS | SYSTOLIC BLOOD PRESSURE: 94 MMHG | TEMPERATURE: 98 F

## 2023-03-01 DIAGNOSIS — N39.41 URGE INCONTINENCE: ICD-10-CM

## 2023-03-01 DIAGNOSIS — Z01.818 PRE-OP EXAM: ICD-10-CM

## 2023-03-01 DIAGNOSIS — Z01.818 PRE-OP EXAM: Primary | ICD-10-CM

## 2023-03-01 PROCEDURE — 99999 PR PBB SHADOW E&M-EST. PATIENT-LVL IV: CPT | Mod: PBBFAC,,, | Performed by: UROLOGY

## 2023-03-01 PROCEDURE — 99214 OFFICE O/P EST MOD 30 MIN: CPT | Mod: S$GLB,,, | Performed by: UROLOGY

## 2023-03-01 PROCEDURE — 1126F PR PAIN SEVERITY QUANTIFIED, NO PAIN PRESENT: ICD-10-PCS | Mod: CPTII,S$GLB,, | Performed by: UROLOGY

## 2023-03-01 PROCEDURE — 4010F PR ACE/ARB THEARPY RXD/TAKEN: ICD-10-PCS | Mod: CPTII,S$GLB,, | Performed by: UROLOGY

## 2023-03-01 PROCEDURE — 71046 X-RAY EXAM CHEST 2 VIEWS: CPT | Mod: TC

## 2023-03-01 PROCEDURE — 3288F PR FALLS RISK ASSESSMENT DOCUMENTED: ICD-10-PCS | Mod: CPTII,S$GLB,, | Performed by: UROLOGY

## 2023-03-01 PROCEDURE — 3074F PR MOST RECENT SYSTOLIC BLOOD PRESSURE < 130 MM HG: ICD-10-PCS | Mod: CPTII,S$GLB,, | Performed by: UROLOGY

## 2023-03-01 PROCEDURE — 3078F DIAST BP <80 MM HG: CPT | Mod: CPTII,S$GLB,, | Performed by: UROLOGY

## 2023-03-01 PROCEDURE — 3288F FALL RISK ASSESSMENT DOCD: CPT | Mod: CPTII,S$GLB,, | Performed by: UROLOGY

## 2023-03-01 PROCEDURE — 1101F PT FALLS ASSESS-DOCD LE1/YR: CPT | Mod: CPTII,S$GLB,, | Performed by: UROLOGY

## 2023-03-01 PROCEDURE — 3008F BODY MASS INDEX DOCD: CPT | Mod: CPTII,S$GLB,, | Performed by: UROLOGY

## 2023-03-01 PROCEDURE — 1160F RVW MEDS BY RX/DR IN RCRD: CPT | Mod: CPTII,S$GLB,, | Performed by: UROLOGY

## 2023-03-01 PROCEDURE — 3074F SYST BP LT 130 MM HG: CPT | Mod: CPTII,S$GLB,, | Performed by: UROLOGY

## 2023-03-01 PROCEDURE — 1101F PR PT FALLS ASSESS DOC 0-1 FALLS W/OUT INJ PAST YR: ICD-10-PCS | Mod: CPTII,S$GLB,, | Performed by: UROLOGY

## 2023-03-01 PROCEDURE — 99999 PR PBB SHADOW E&M-EST. PATIENT-LVL IV: ICD-10-PCS | Mod: PBBFAC,,, | Performed by: UROLOGY

## 2023-03-01 PROCEDURE — 3078F PR MOST RECENT DIASTOLIC BLOOD PRESSURE < 80 MM HG: ICD-10-PCS | Mod: CPTII,S$GLB,, | Performed by: UROLOGY

## 2023-03-01 PROCEDURE — 4010F ACE/ARB THERAPY RXD/TAKEN: CPT | Mod: CPTII,S$GLB,, | Performed by: UROLOGY

## 2023-03-01 PROCEDURE — 3008F PR BODY MASS INDEX (BMI) DOCUMENTED: ICD-10-PCS | Mod: CPTII,S$GLB,, | Performed by: UROLOGY

## 2023-03-01 PROCEDURE — 99214 PR OFFICE/OUTPT VISIT, EST, LEVL IV, 30-39 MIN: ICD-10-PCS | Mod: S$GLB,,, | Performed by: UROLOGY

## 2023-03-01 PROCEDURE — 1159F MED LIST DOCD IN RCRD: CPT | Mod: CPTII,S$GLB,, | Performed by: UROLOGY

## 2023-03-01 PROCEDURE — 1126F AMNT PAIN NOTED NONE PRSNT: CPT | Mod: CPTII,S$GLB,, | Performed by: UROLOGY

## 2023-03-01 PROCEDURE — 71046 XR CHEST PA AND LATERAL: ICD-10-PCS | Mod: 26,,, | Performed by: RADIOLOGY

## 2023-03-01 PROCEDURE — 71046 X-RAY EXAM CHEST 2 VIEWS: CPT | Mod: 26,,, | Performed by: RADIOLOGY

## 2023-03-01 PROCEDURE — 1159F PR MEDICATION LIST DOCUMENTED IN MEDICAL RECORD: ICD-10-PCS | Mod: CPTII,S$GLB,, | Performed by: UROLOGY

## 2023-03-01 PROCEDURE — 1160F PR REVIEW ALL MEDS BY PRESCRIBER/CLIN PHARMACIST DOCUMENTED: ICD-10-PCS | Mod: CPTII,S$GLB,, | Performed by: UROLOGY

## 2023-03-01 NOTE — PROGRESS NOTES
Chief Complaint:   Encounter Diagnosis   Name Primary?    Urge incontinence Yes       HPI:   3/1/23- while better than before the InterStim, the InterStim and Motegrity are still not controlling her symptoms and she is considering other options.      Allergies:  Valdecoxib    Medications: has a current medication list which includes the following prescription(s): acyclovir, amlodipine, aspirin, blood sugar diagnostic, calcium-vitamin d, clonidine, cyclobenzaprine, diazepam, diclofenac sodium, doxepin, enbrel sureclick, gabapentin, gavilyte-c, hydrocodone-acetaminophen, lancing device, losartan, myrbetriq, nucynta er, ondansetron, oxycodone-acetaminophen, potassium chloride sa, prednisone, pregabalin, prolensa, rosuvastatin, ozempic, and tramadol, and the following Facility-Administered Medications: ondansetron.    Review of Systems:  General: No fever, chills, fatigability, or weight loss.  Skin: No rashes, itching, or changes in color or texture of skin.  Chest: Denies ADLER, cyanosis, wheezing, cough, and sputum production.  Abdomen: Appetite fine. No weight loss. Denies diarrhea, abdominal pain, hematemesis, or blood in stool.  Musculoskeletal: No joint stiffness or swelling. Some back pain.  : As above.  All other review of systems negative.    PMH:   has a past medical history of ADHD (attention deficit hyperactivity disorder), Adult ADHD, Chronic rheumatic arthritis, Ex-smoker, Genital herpes, Hyperlipidemia, Hypertension, Immunosuppressed status, Lichen sclerosus et atrophicus, Morbid obesity (1/11/2021), Obesity, Class I, BMI 30-34.9 (12/22/2015), SUKHDEEP (obstructive sleep apnea), Osteopenia, and Type 2 diabetes mellitus.    PSH:   has a past surgical history that includes Tonsillectomy (1958); Skin graft (1965); Cryotherapy (1980); Breast biopsy (Left); Colonoscopy (N/A, 2/12/2020); Esophagogastroduodenoscopy (N/A, 2/12/2020); Insertion of sacral neurostimulator, electrodes, and implantable pulse generator  (IPG) (Left, 11/23/2020); and Insertion of sacral neurostimulator generator (Left, 11/23/2020).    FamHx: family history includes Breast cancer (age of onset: 39) in her sister; Cancer (age of onset: 68) in an other family member; Diabetes in her mother; Heart disease in her mother; Kidney failure in her father; Peripheral vascular disease in her mother; Stroke in her father, maternal grandmother, paternal grandfather, and paternal grandmother.    SocHx:  reports that she quit smoking about 9 years ago. Her smoking use included cigarettes. She has a 3.75 pack-year smoking history. She has never used smokeless tobacco. She reports current alcohol use. She reports that she does not use drugs.     Physical Exam:  Vitals:   There were no vitals filed for this visit.    General: A&Ox3. No apparent distress. No deformities.  Neck: No masses. Normal thyroid.  Lungs: CTA samia. No use of accessory muscles.  Heart: RRR. No arrhythmias.  Abdomen: Soft. NT. ND.  Incision is c/d/i  Skin: The skin is warm and dry. No jaundice.  Ext: No c/c/e.    Labs/Studies:   Prevoid 65 ml 3/23  UDS- decreased capacity otherwise normal 9/20  Percutaneous InterStim 09/29/2020  pvr 12 ml 1/21  KUB/GENIE normal 8/20    Impression/Plan:       1.  Urge incontinence- InterStim 11/23/20    The combination of InterStim and myrbetriq are still not completely controlling her symptoms.  She does state that she is better than before the InterStim.  No gross hematuria, she would like to pursue other options.  Therefore will pursue cystoscopy with Botox injection.      Patient understands the risks, benefits and alternatives of the above-stated procedure.  These include but are not limited to damage to the surrounding structures including the urethra, ureters and bladder.  Risk of need for stents or possible catheterization if she has retention and cannot void postprocedure.  Risk of infection, hematuria, discomfort and pain with burning.  Risk of persistent  incontinence.  Understanding that this will need to be repeated once the Botox wears off.  Risk of heart attack, stroke, death, DVT and PE.  Patient understanding of all the above has elected to pursue the procedure as stated.

## 2023-03-02 ENCOUNTER — PATIENT MESSAGE (OUTPATIENT)
Dept: INTERNAL MEDICINE | Facility: CLINIC | Age: 73
End: 2023-03-02
Payer: MEDICARE

## 2023-03-03 DIAGNOSIS — Z12.11 SCREEN FOR COLON CANCER: Primary | ICD-10-CM

## 2023-03-06 ENCOUNTER — TELEPHONE (OUTPATIENT)
Dept: INTERNAL MEDICINE | Facility: CLINIC | Age: 73
End: 2023-03-06
Payer: MEDICARE

## 2023-03-06 ENCOUNTER — PATIENT MESSAGE (OUTPATIENT)
Dept: RHEUMATOLOGY | Facility: CLINIC | Age: 73
End: 2023-03-06
Payer: MEDICARE

## 2023-03-06 NOTE — TELEPHONE ENCOUNTER
----- Message from Sydnee Lincoln sent at 3/6/2023  8:50 AM CST -----  Contact: Smitha  Type:  Patient Returning Call    Who Called:Smitha   Who Left Message for Patient:  Does the patient know what this is regarding?:  Would the patient rather a call back or a response via [facility]? Call back   Best Call Back Number:[phone]  Additional Information:     Thanks  CF

## 2023-03-06 NOTE — TELEPHONE ENCOUNTER
I spoke with the patient stated that she was calling to clarify where is  her application for Ozempic for financial assistance. The patient stated that she left her paperwork at the . The patient is being very rude on the phone, I informed the patient that I will send her message to the pharmacy department.

## 2023-03-07 ENCOUNTER — OFFICE VISIT (OUTPATIENT)
Dept: RHEUMATOLOGY | Facility: CLINIC | Age: 73
End: 2023-03-07
Payer: MEDICARE

## 2023-03-07 DIAGNOSIS — M79.7 FIBROMYALGIA: ICD-10-CM

## 2023-03-07 DIAGNOSIS — M54.10 RADICULAR LOW BACK PAIN: ICD-10-CM

## 2023-03-07 DIAGNOSIS — E11.9 TYPE 2 DIABETES MELLITUS WITHOUT COMPLICATION, WITHOUT LONG-TERM CURRENT USE OF INSULIN: ICD-10-CM

## 2023-03-07 DIAGNOSIS — M15.9 PRIMARY OSTEOARTHRITIS INVOLVING MULTIPLE JOINTS: ICD-10-CM

## 2023-03-07 DIAGNOSIS — J31.0 RHINITIS, CHRONIC: ICD-10-CM

## 2023-03-07 DIAGNOSIS — M79.18 MYOFASCIAL PAIN: ICD-10-CM

## 2023-03-07 DIAGNOSIS — M05.9 SEROPOSITIVE RHEUMATOID ARTHRITIS: Primary | ICD-10-CM

## 2023-03-07 DIAGNOSIS — F90.9 ADULT ADHD: ICD-10-CM

## 2023-03-07 DIAGNOSIS — Z79.899 HIGH RISK MEDICATION USE: ICD-10-CM

## 2023-03-07 DIAGNOSIS — M54.2 NECK PAIN: ICD-10-CM

## 2023-03-07 DIAGNOSIS — Z51.81 MEDICATION MONITORING ENCOUNTER: ICD-10-CM

## 2023-03-07 DIAGNOSIS — F41.8 MIXED ANXIETY AND DEPRESSIVE DISORDER: ICD-10-CM

## 2023-03-07 PROCEDURE — 99214 PR OFFICE/OUTPT VISIT, EST, LEVL IV, 30-39 MIN: ICD-10-PCS | Mod: 95,,, | Performed by: INTERNAL MEDICINE

## 2023-03-07 PROCEDURE — 4010F ACE/ARB THERAPY RXD/TAKEN: CPT | Mod: CPTII,95,, | Performed by: INTERNAL MEDICINE

## 2023-03-07 PROCEDURE — 99214 OFFICE O/P EST MOD 30 MIN: CPT | Mod: 95,,, | Performed by: INTERNAL MEDICINE

## 2023-03-07 PROCEDURE — 4010F PR ACE/ARB THEARPY RXD/TAKEN: ICD-10-PCS | Mod: CPTII,95,, | Performed by: INTERNAL MEDICINE

## 2023-03-07 RX ORDER — TIZANIDINE 4 MG/1
4 TABLET ORAL 3 TIMES DAILY PRN
Qty: 90 TABLET | Refills: 0 | Status: SHIPPED | OUTPATIENT
Start: 2023-03-07 | End: 2023-03-17

## 2023-03-07 NOTE — PROGRESS NOTES
The patient location is: [place]  The chief complaint leading to consultation is: RA    Visit type: audiovisual    Face to Face time with patient: 12  30 minutes of total time spent on the encounter, which includes face to face time and non-face to face time preparing to see the patient (eg, review of tests), Obtaining and/or reviewing separately obtained history, Documenting clinical information in the electronic or other health record, Independently interpreting results (not separately reported) and communicating results to the patient/family/caregiver, or Care coordination (not separately reported).         Each patient to whom he or she provides medical services by telemedicine is:  (1) informed of the relationship between the physician and patient and the respective role of any other health care provider with respect to management of the patient; and (2) notified that he or she may decline to receive medical services by telemedicine and may withdraw from such care at any time.    Notes:     RHEUMATOLOGY OUTPATIENT CLINIC NOTE    3/7/2023    Attending Rheumatologist: Ab England  Primary Care Provider/Physician Requesting Consultation: Jayesh Jaramillo MD   Chief Complaint/Reason For Consultation:  No chief complaint on file.      Subjective:     Smitha Salinas is a 72 y.o. Black or  female with RA for f/u visit.    Main concern of myofascial pain and Rt hand pain s/p mechanical fall 2 weeks ago.  Started Enbrel 1 month ago    Addendum 3/24: Sent to [facility] per patient request: AutoTouch Connect Autoinjector for enbrel. SureClick Autoinjector as second option if former n/a      Review of Systems   Constitutional:  Negative for fever.   Eyes:  Negative for photophobia and pain.   Respiratory:  Negative for shortness of breath.    Gastrointestinal:  Negative for blood in stool.   Genitourinary:  Negative for hematuria.   Musculoskeletal:  Positive for falls, joint pain and neck pain.   Skin:  Negative  for rash.   Neurological:  Negative for focal weakness and headaches.     Chronic comorbid conditions affecting medical decision making today:  Past Medical History:   Diagnosis Date    ADHD (attention deficit hyperactivity disorder)     Adult ADHD     [facility]    Chronic rheumatic arthritis     on DMARD    Ex-smoker     Genital herpes     Hyperlipidemia     Hypertension     Immunosuppressed status     Lichen sclerosus et atrophicus     Morbid obesity 01/11/2021    Obesity, Class I, BMI 30-34.9 12/22/2015    SUKHDEEP (obstructive sleep apnea)     Osteopenia     5/16 wai 5/18(start fmax if on pred 3months or more)    Type 2 diabetes mellitus     dxd 9/20     Past Surgical History:   Procedure Laterality Date    BREAST BIOPSY Left     benign    COLONOSCOPY N/A 2/12/2020    Procedure: COLONOSCOPY;  Surgeon: Eloina Castro MD;  Location: [facility];  Service: Endoscopy;  Laterality: N/A;    CRYOTHERAPY  1980    ESOPHAGOGASTRODUODENOSCOPY N/A 2/12/2020    Procedure: EGD (ESOPHAGOGASTRODUODENOSCOPY);  Surgeon: Eloina Castro MD;  Location: [facility];  Service: Endoscopy;  Laterality: N/A;    INSERTION OF SACRAL NEUROSTIMULATOR GENERATOR Left 11/23/2020    Procedure: INSERTION, PULSE GENERATOR, NEUROSTIMULATOR, SACRAL;  Surgeon: Pablo Rawls MD;  Location: [facility] OR;  Service: Urology;  Laterality: Left;    INSERTION OF SACRAL NEUROSTIMULATOR, ELECTRODES, AND IMPLANTABLE PULSE GENERATOR (IPG) Left 11/23/2020    Procedure: INSERTION, ELECTRODE LEADS AND PULSE GENERATOR, NEUROSTIMULATOR, SACRAL;  Surgeon: Pablo Rawls MD;  Location: [facility] OR;  Service: Urology;  Laterality: Left;    SKIN GRAFT  1965    laceration to right  fingers  from thigh     TONSILLECTOMY  1958     Family History   Problem Relation Age of Onset    Heart disease Mother     Diabetes Mother     Peripheral vascular disease Mother         amputations    Stroke Father     Kidney failure Father     Breast cancer Sister 39    Cancer Other  68        breast    Stroke Maternal Grandmother     Stroke Paternal Grandmother     Stroke Paternal Grandfather     Colon cancer Neg Hx     Ovarian cancer Neg Hx      Social History     Tobacco Use   Smoking Status Former    Packs/day: 0.25    Years: 15.00    Pack years: 3.75    Types: Cigarettes    Quit date: 2013    Years since quittin.6   Smokeless Tobacco Never   Tobacco Comments    smokes for a few weeks per year - when under pressure. has been abstinent times years at a time. a pack lasts about a week       Current Outpatient Medications:     acyclovir (ZOVIRAX) 400 MG tablet, Take 1 tablet by mouth twice daily, Disp: 180 tablet, Rfl: 3    amLODIPine (NORVASC) 5 MG tablet, Take 1 tablet by mouth once daily, Disp: 90 tablet, Rfl: 4    aspirin (ECOTRIN) 81 MG EC tablet, Take 81 mg by mouth once daily., Disp: , Rfl:     blood sugar diagnostic (BLOOD GLUCOSE TEST) Strp, 1 strip by Misc.(Non-Drug; Combo Route) route 3 (three) times daily as needed. (Patient not taking: Reported on 2023), Disp: 1 each, Rfl: 11    calcium-vitamin D (CALCIUM 600 + D,3,) 600 mg-10 mcg (400 unit) Tab, Take 1 tablet by mouth 2 (two) times daily., Disp: 60 tablet, Rfl: 3    cloNIDine (CATAPRES) 0.2 MG tablet, TAKE 1/2 (ONE-HALF) TABLET BY MOUTH IN THE MORNING AND 1/2 (ONE-HALF) AT NOON AND 2 AT BEDTIME, Disp: 270 tablet, Rfl: 4    cyclobenzaprine (FLEXERIL) 10 MG tablet, TAKE 1/2 TO 1 (ONE-HALF TO ONE) TABLET BY MOUTH THREE TIMES DAILY AS NEEDED FOR MUSCLE SPASM, Disp: 60 tablet, Rfl: 11    diazePAM (VALIUM) 5 MG tablet, TAKE 1 TABLET BY MOUTH AT BEDTIME FOR ANXIETY (Patient not taking: Reported on 2023), Disp: 30 tablet, Rfl: 5    diclofenac sodium (VOLTAREN) 1 % Gel, Apply 2 g topically 3 (three) times daily. (Patient not taking: Reported on 2023), Disp: 1 each, Rfl: 0    doxepin (SINEQUAN) 10 MG capsule, Take 1 capsule (10 mg total) by mouth every evening., Disp: 30 capsule, Rfl: 11    etanercept (ENBREL  SURECLICK) 50 mg/mL (1 mL), Inject 1 mL (50 mg total) into the skin once a week., Disp: 4 mL, Rfl: 11    gabapentin (NEURONTIN) 300 MG capsule, Take 1 capsule (300 mg total) by mouth 3 (three) times daily. for 10 days (Patient not taking: Reported on 2/28/2023), Disp: 30 capsule, Rfl: 0    GAVILYTE-C 240-22.72-6.72 -5.84 gram SolR, Take 4,000 mLs by mouth once., Disp: , Rfl:     HYDROcodone-acetaminophen (NORCO) 5-325 mg per tablet, , Disp: , Rfl:     lancing device Misc, 1 each by Misc.(Non-Drug; Combo Route) route 3 (three) times daily as needed., Disp: 1 each, Rfl: 0    losartan (COZAAR) 50 MG tablet, Take 1 tablet by mouth once daily, Disp: 90 tablet, Rfl: 4    mirabegron (MYRBETRIQ) 50 mg Tb24, Take 1 tablet by mouth once daily, Disp: 90 tablet, Rfl: 4    NUCYNTA ER 50 mg Tb12, Take 1 tablet by mouth every 12 (twelve) hours. prn, Disp: , Rfl:     ondansetron (ZOFRAN-ODT) 4 MG TbDL, Take 1 tablet (4 mg total) by mouth every 8 (eight) hours as needed (nausea). (Patient not taking: Reported on 2/28/2023), Disp: 10 tablet, Rfl: 1    oxyCODONE-acetaminophen (PERCOCET) 5-325 mg per tablet, Take 1 tablet by mouth every 4 (four) hours as needed for Pain., Disp: , Rfl:     potassium chloride SA (K-DUR,KLOR-CON) 20 MEQ tablet, 1 tablet daily, Disp: 90 tablet, Rfl: 5    predniSONE (DELTASONE) 5 MG tablet, May take 5 of prednisone daily for rheumatoid related pain (Patient not taking: Reported on 2/28/2023), Disp: 60 tablet, Rfl: 2    pregabalin (LYRICA) 150 MG capsule, Take 150 mg by mouth 2 (two) times daily., Disp: , Rfl:     PROLENSA 0.07 % Drop, , Disp: , Rfl:     rosuvastatin (CRESTOR) 20 MG tablet, Take 1 tablet (20 mg total) by mouth once daily., Disp: 30 tablet, Rfl: 6    semaglutide (OZEMPIC) 0.25 mg or 0.5 mg(2 mg/1.5 mL) pen injector, Inject 0.25 mg into the skin every 7 days., Disp: 1 pen, Rfl: 5    traMADoL (ULTRAM) 50 mg tablet, Take 1 tablet (50 mg total) by mouth every 6 (six) hours as needed., Disp: 60  tablet, Rfl: 5    Current Facility-Administered Medications:     ondansetron disintegrating tablet 4 mg, 4 mg, Oral, Once PRN, Perez De La Fuente MD     Objective:     There were no vitals filed for this visit.  Physical Exam   Eyes: Conjunctivae are normal.   Pulmonary/Chest: No respiratory distress.   Musculoskeletal:         General: Swelling present. No tenderness.   Skin: No rash noted.     Reviewed available old and all outside pertinent medical records available.    All lab results personally reviewed and interpreted by me.       ASSESSMENT      Encounter Diagnoses   Name Primary?    Mixed anxiety and depressive disorder     Adult ADHD     Rhinitis, chronic     Seropositive rheumatoid arthritis Yes    Type 2 diabetes mellitus without complication, without long-term current use of insulin     Primary osteoarthritis involving multiple joints     Radicular low back pain     High risk medication use     Medication monitoring encounter       PLAN     CDAI: low-moderate disease activity.  Describes adequate response to Enbrel, started approx 1 month ago (better than Rinvoq previously, per patient).  Persistent Rt hand arthralgias s/p mechanical fall recently, along with chronic myofascial pain worsening after fall.  Labs w/o toxicity from medication use.  Normal liver chemistries since off Rinvoq.  Recommend XR to monitor for AA instability, and to rule out sequela from trauma.  Might get benefit from alternate muscle relaxant, Zanaflex script provided.  Side effects discussed.  C/w Enbrel unchanged, may take low dose PDN PRN.  Repeat labs close to f/u visit.      Ab England M.D.

## 2023-03-10 ENCOUNTER — HOSPITAL ENCOUNTER (OUTPATIENT)
Dept: RADIOLOGY | Facility: HOSPITAL | Age: 73
Discharge: HOME | End: 2023-03-10
Attending: INTERNAL MEDICINE
Payer: MEDICARE

## 2023-03-10 DIAGNOSIS — M15.9 PRIMARY OSTEOARTHRITIS INVOLVING MULTIPLE JOINTS: ICD-10-CM

## 2023-03-10 DIAGNOSIS — M05.9 SEROPOSITIVE RHEUMATOID ARTHRITIS: ICD-10-CM

## 2023-03-10 PROCEDURE — 72050 XR CERVICAL SPINE AP LAT WITH FLEX EXTEN: ICD-10-PCS | Mod: 26,,, | Performed by: RADIOLOGY

## 2023-03-10 PROCEDURE — 73130 X-RAY EXAM OF HAND: CPT | Mod: TC,50

## 2023-03-10 PROCEDURE — 73130 X-RAY EXAM OF HAND: CPT | Mod: 26,50,, | Performed by: RADIOLOGY

## 2023-03-10 PROCEDURE — 73130 XR HAND COMPLETE 3 VIEWS BILATERAL: ICD-10-PCS | Mod: 26,50,, | Performed by: RADIOLOGY

## 2023-03-10 PROCEDURE — 72050 X-RAY EXAM NECK SPINE 4/5VWS: CPT | Mod: 26,,, | Performed by: RADIOLOGY

## 2023-03-10 PROCEDURE — 72050 X-RAY EXAM NECK SPINE 4/5VWS: CPT | Mod: TC

## 2023-03-14 ENCOUNTER — HOSPITAL ENCOUNTER (OUTPATIENT)
Dept: RADIOLOGY | Facility: HOSPITAL | Age: 73
Discharge: HOME | End: 2023-03-14
Attending: ALLERGY & IMMUNOLOGY
Payer: MEDICARE

## 2023-03-14 ENCOUNTER — ANESTHESIA EVENT (OUTPATIENT)
Dept: SURGERY | Facility: HOSPITAL | Age: 73
End: 2023-03-14
Payer: MEDICARE

## 2023-03-14 ENCOUNTER — OFFICE VISIT (OUTPATIENT)
Dept: ALLERGY | Facility: CLINIC | Age: 73
End: 2023-03-14
Payer: MEDICARE

## 2023-03-14 VITALS
BODY MASS INDEX: 23.52 KG/M2 | DIASTOLIC BLOOD PRESSURE: 69 MMHG | TEMPERATURE: 97 F | HEART RATE: 64 BPM | SYSTOLIC BLOOD PRESSURE: 115 MMHG | WEIGHT: 141.31 LBS

## 2023-03-14 DIAGNOSIS — Z79.60 LONG-TERM USE OF IMMUNOSUPPRESSANT MEDICATION: ICD-10-CM

## 2023-03-14 DIAGNOSIS — R10.9 ABDOMINAL PAIN, UNSPECIFIED ABDOMINAL LOCATION: ICD-10-CM

## 2023-03-14 DIAGNOSIS — J31.0 RHINITIS, UNSPECIFIED TYPE: ICD-10-CM

## 2023-03-14 DIAGNOSIS — Z79.60 LONG-TERM USE OF IMMUNOSUPPRESSANT MEDICATION: Primary | ICD-10-CM

## 2023-03-14 PROCEDURE — 99999 PR PBB SHADOW E&M-EST. PATIENT-LVL III: CPT | Mod: PBBFAC,,, | Performed by: ALLERGY & IMMUNOLOGY

## 2023-03-14 PROCEDURE — 1159F PR MEDICATION LIST DOCUMENTED IN MEDICAL RECORD: ICD-10-PCS | Mod: CPTII,S$GLB,, | Performed by: ALLERGY & IMMUNOLOGY

## 2023-03-14 PROCEDURE — 3078F DIAST BP <80 MM HG: CPT | Mod: CPTII,S$GLB,, | Performed by: ALLERGY & IMMUNOLOGY

## 2023-03-14 PROCEDURE — 70220 X-RAY EXAM OF SINUSES: CPT | Mod: TC

## 2023-03-14 PROCEDURE — 3288F PR FALLS RISK ASSESSMENT DOCUMENTED: ICD-10-PCS | Mod: CPTII,S$GLB,, | Performed by: ALLERGY & IMMUNOLOGY

## 2023-03-14 PROCEDURE — 4010F PR ACE/ARB THEARPY RXD/TAKEN: ICD-10-PCS | Mod: CPTII,S$GLB,, | Performed by: ALLERGY & IMMUNOLOGY

## 2023-03-14 PROCEDURE — 3078F PR MOST RECENT DIASTOLIC BLOOD PRESSURE < 80 MM HG: ICD-10-PCS | Mod: CPTII,S$GLB,, | Performed by: ALLERGY & IMMUNOLOGY

## 2023-03-14 PROCEDURE — 1126F PR PAIN SEVERITY QUANTIFIED, NO PAIN PRESENT: ICD-10-PCS | Mod: CPTII,S$GLB,, | Performed by: ALLERGY & IMMUNOLOGY

## 2023-03-14 PROCEDURE — 1159F MED LIST DOCD IN RCRD: CPT | Mod: CPTII,S$GLB,, | Performed by: ALLERGY & IMMUNOLOGY

## 2023-03-14 PROCEDURE — 4010F ACE/ARB THERAPY RXD/TAKEN: CPT | Mod: CPTII,S$GLB,, | Performed by: ALLERGY & IMMUNOLOGY

## 2023-03-14 PROCEDURE — 3008F PR BODY MASS INDEX (BMI) DOCUMENTED: ICD-10-PCS | Mod: CPTII,S$GLB,, | Performed by: ALLERGY & IMMUNOLOGY

## 2023-03-14 PROCEDURE — 3288F FALL RISK ASSESSMENT DOCD: CPT | Mod: CPTII,S$GLB,, | Performed by: ALLERGY & IMMUNOLOGY

## 2023-03-14 PROCEDURE — 3074F SYST BP LT 130 MM HG: CPT | Mod: CPTII,S$GLB,, | Performed by: ALLERGY & IMMUNOLOGY

## 2023-03-14 PROCEDURE — 99214 PR OFFICE/OUTPT VISIT, EST, LEVL IV, 30-39 MIN: ICD-10-PCS | Mod: S$GLB,,, | Performed by: ALLERGY & IMMUNOLOGY

## 2023-03-14 PROCEDURE — 99214 OFFICE O/P EST MOD 30 MIN: CPT | Mod: S$GLB,,, | Performed by: ALLERGY & IMMUNOLOGY

## 2023-03-14 PROCEDURE — 70220 X-RAY EXAM OF SINUSES: CPT | Mod: 26,,, | Performed by: RADIOLOGY

## 2023-03-14 PROCEDURE — 1100F PR PT FALLS ASSESS DOC 2+ FALLS/FALL W/INJURY/YR: ICD-10-PCS | Mod: CPTII,S$GLB,, | Performed by: ALLERGY & IMMUNOLOGY

## 2023-03-14 PROCEDURE — 99999 PR PBB SHADOW E&M-EST. PATIENT-LVL III: ICD-10-PCS | Mod: PBBFAC,,, | Performed by: ALLERGY & IMMUNOLOGY

## 2023-03-14 PROCEDURE — 70220 XR SINUSES MIN 3 VIEWS: ICD-10-PCS | Mod: 26,,, | Performed by: RADIOLOGY

## 2023-03-14 PROCEDURE — 1126F AMNT PAIN NOTED NONE PRSNT: CPT | Mod: CPTII,S$GLB,, | Performed by: ALLERGY & IMMUNOLOGY

## 2023-03-14 PROCEDURE — 3008F BODY MASS INDEX DOCD: CPT | Mod: CPTII,S$GLB,, | Performed by: ALLERGY & IMMUNOLOGY

## 2023-03-14 PROCEDURE — 1100F PTFALLS ASSESS-DOCD GE2>/YR: CPT | Mod: CPTII,S$GLB,, | Performed by: ALLERGY & IMMUNOLOGY

## 2023-03-14 PROCEDURE — 3074F PR MOST RECENT SYSTOLIC BLOOD PRESSURE < 130 MM HG: ICD-10-PCS | Mod: CPTII,S$GLB,, | Performed by: ALLERGY & IMMUNOLOGY

## 2023-03-14 RX ORDER — IPRATROPIUM BROMIDE 21 UG/1
2 SPRAY, METERED NASAL 3 TIMES DAILY
Qty: 20 ML | Refills: 5 | Status: ON HOLD | OUTPATIENT
Start: 2023-03-14 | End: 2023-03-27

## 2023-03-14 NOTE — PROGRESS NOTES
Subjective:       Patient ID: Smitha Salinas is a 72 y.o. female.    Chief Complaint:  Other (Concerned that she may have sinus infections, since she is on immunosuppressant medication)      HPI today 3/14/2023- last seen by Allergy Dr. Shields 1/4/2023. Doxepin was changed to Visteril. Labs were reviewed. She denies taking Visteril.  Seen by Dr. Baldwin for hepatitis and elevated LFTs  She reports that her Rheumatologist sent her to be evaluated for a sinus infection. She is on Embrel.  Runny nose- clear  No cough  She denies fever.  She denies sinus pain or pressure.  Watery eyes.    Intermittent episodes- last 2 weeks ago- abdominal pain, diarrhea/constipation, only relief is to empty her stomach  Plan for tryptase within 2 hours of an episode. She was not able to have it done.          HPI 1/4/2023: 72 year old female here for follow up. She was last seen by Dr. Shields on 7/28/2022 and felt to have an abdominal migraine. Tests for pancreatic disease, celiac, MCAS, Hereditary angioedema and autoimmune disease were negative.  Abdominal pain and vomiting 2-3 times a week.  She reports taking tramadol intermittently.          Past Medical History:   Diagnosis Date    ADHD (attention deficit hyperactivity disorder)     Adult ADHD     [facility]    Chronic rheumatic arthritis     on DMARD    Ex-smoker     Genital herpes     Hyperlipidemia     Hypertension     Immunosuppressed status     Lichen sclerosus et atrophicus     Morbid obesity 01/11/2021    Obesity, Class I, BMI 30-34.9 12/22/2015    SUKHDEEP (obstructive sleep apnea)     Osteopenia     5/16 wai 5/18(start fmax if on pred 3months or more)    Type 2 diabetes mellitus     dxd 9/20        Family History   Problem Relation Age of Onset    Heart disease Mother     Diabetes Mother     Peripheral vascular disease Mother         amputations    Stroke Father     Kidney failure Father     Breast cancer Sister 39    Cancer Other 68        breast    Stroke Maternal  Grandmother     Stroke Paternal Grandmother     Stroke Paternal Grandfather     Colon cancer Neg Hx     Ovarian cancer Neg Hx         Current Outpatient Medications on File Prior to Visit   Medication Sig Dispense Refill    acyclovir (ZOVIRAX) 400 MG tablet Take 1 tablet by mouth twice daily 180 tablet 3    amLODIPine (NORVASC) 5 MG tablet Take 1 tablet by mouth once daily 90 tablet 4    blood sugar diagnostic (BLOOD GLUCOSE TEST) Strp 1 strip by Misc.(Non-Drug; Combo Route) route 3 (three) times daily as needed. 1 each 11    calcium-vitamin D (CALCIUM 600 + D,3,) 600 mg-10 mcg (400 unit) Tab Take 1 tablet by mouth 2 (two) times daily. 60 tablet 3    cloNIDine (CATAPRES) 0.2 MG tablet TAKE 1/2 (ONE-HALF) TABLET BY MOUTH IN THE MORNING AND 1/2 (ONE-HALF) AT NOON AND 2 AT BEDTIME 270 tablet 4    etanercept (ENBREL SURECLICK) 50 mg/mL (1 mL) Inject 1 mL (50 mg total) into the skin once a week. 4 mL 11    GAVILYTE-C 240-22.72-6.72 -5.84 gram SolR Take 4,000 mLs by mouth once.      losartan (COZAAR) 50 MG tablet Take 1 tablet by mouth once daily 90 tablet 4    mirabegron (MYRBETRIQ) 50 mg Tb24 Take 1 tablet by mouth once daily 90 tablet 4    potassium chloride SA (K-DUR,KLOR-CON) 20 MEQ tablet 1 tablet daily 90 tablet 5    rosuvastatin (CRESTOR) 20 MG tablet Take 1 tablet (20 mg total) by mouth once daily. 30 tablet 6    semaglutide (OZEMPIC) 0.25 mg or 0.5 mg(2 mg/1.5 mL) pen injector Inject 0.25 mg into the skin every 7 days. 1 pen 5    traMADoL (ULTRAM) 50 mg tablet Take 1 tablet (50 mg total) by mouth every 6 (six) hours as needed. 60 tablet 5    aspirin (ECOTRIN) 81 MG EC tablet Take 81 mg by mouth once daily.      diazePAM (VALIUM) 5 MG tablet TAKE 1 TABLET BY MOUTH AT BEDTIME FOR ANXIETY (Patient not taking: Reported on 2/28/2023) 30 tablet 5    diclofenac sodium (VOLTAREN) 1 % Gel Apply 2 g topically 3 (three) times daily. (Patient not taking: Reported on 2/28/2023) 1 each 0    doxepin (SINEQUAN) 10 MG capsule  Take 1 capsule (10 mg total) by mouth every evening. (Patient not taking: Reported on 3/14/2023) 30 capsule 11    gabapentin (NEURONTIN) 300 MG capsule Take 1 capsule (300 mg total) by mouth 3 (three) times daily. for 10 days (Patient not taking: Reported on 2/28/2023) 30 capsule 0    HYDROcodone-acetaminophen (NORCO) 5-325 mg per tablet       lancing device Misc 1 each by Misc.(Non-Drug; Combo Route) route 3 (three) times daily as needed. 1 each 0    NUCYNTA ER 50 mg Tb12 Take 1 tablet by mouth every 12 (twelve) hours. prn      ondansetron (ZOFRAN-ODT) 4 MG TbDL Take 1 tablet (4 mg total) by mouth every 8 (eight) hours as needed (nausea). (Patient not taking: Reported on 2/28/2023) 10 tablet 1    oxyCODONE-acetaminophen (PERCOCET) 5-325 mg per tablet Take 1 tablet by mouth every 4 (four) hours as needed for Pain.      predniSONE (DELTASONE) 5 MG tablet May take 5 of prednisone daily for rheumatoid related pain (Patient not taking: Reported on 2/28/2023) 60 tablet 2    pregabalin (LYRICA) 150 MG capsule Take 150 mg by mouth 2 (two) times daily.      PROLENSA 0.07 % Drop       tiZANidine (ZANAFLEX) 4 MG tablet Take 1 tablet (4 mg total) by mouth 3 (three) times daily as needed (pain / stiffness / spasms). (Patient not taking: Reported on 3/14/2023) 90 tablet 0     Current Facility-Administered Medications on File Prior to Visit   Medication Dose Route Frequency Provider Last Rate Last Admin    ondansetron disintegrating tablet 4 mg  4 mg Oral Once PRN Perez De La Fuente MD            Review of patient's allergies indicates:   Allergen Reactions    Valdecoxib Other (See Comments)     palpitation        Environmental History: Pets in the home: dogs (1) and cats (2).  Review of Systems   Constitutional:  Negative for chills and fever.   HENT:  Positive for congestion and rhinorrhea.    Eyes:  Positive for itching. Negative for discharge.   Respiratory:  Negative for shortness of breath and wheezing.    Cardiovascular:   Negative for chest pain and leg swelling.   Gastrointestinal:  Positive for abdominal pain, nausea and vomiting.   Allergic/Immunologic: Positive for environmental allergies. Negative for food allergies and immunocompromised state.   Neurological:  Negative for facial asymmetry and speech difficulty.   All other systems reviewed and are negative.     Objective:    Physical Exam  Vitals reviewed.   Constitutional:       General: She is not in acute distress.     Appearance: Normal appearance. She is well-developed. She is not ill-appearing, toxic-appearing or diaphoretic.   HENT:      Head: Normocephalic and atraumatic.      Left Ear: There is no impacted cerumen.   Eyes:      General: No scleral icterus.        Right eye: No discharge.         Left eye: No discharge.      Pupils: Pupils are equal, round, and reactive to light.   Neck:      Thyroid: No thyromegaly.   Cardiovascular:      Rate and Rhythm: Normal rate and regular rhythm.      Heart sounds: Normal heart sounds. No murmur heard.    No friction rub. No gallop.   Pulmonary:      Effort: Pulmonary effort is normal. No respiratory distress.      Breath sounds: Normal breath sounds. No stridor. No wheezing, rhonchi or rales.   Chest:      Chest wall: No tenderness.   Abdominal:      General: Bowel sounds are normal. There is no distension.      Palpations: Abdomen is soft. There is no mass.      Tenderness: There is no abdominal tenderness. There is no guarding.      Hernia: No hernia is present.   Musculoskeletal:         General: No swelling, tenderness, deformity or signs of injury. Normal range of motion.      Cervical back: Normal range of motion and neck supple. No rigidity. No muscular tenderness.      Right lower leg: No edema.      Left lower leg: No edema.   Lymphadenopathy:      Cervical: No cervical adenopathy.   Skin:     General: Skin is warm.      Coloration: Skin is not jaundiced or pale.      Findings: No bruising or erythema.   Neurological:       Mental Status: She is alert and oriented to person, place, and time.      Gait: Gait normal.   Psychiatric:         Mood and Affect: Mood normal.         Behavior: Behavior normal.         Thought Content: Thought content normal.         Judgment: Judgment normal.          Assessment:       1. Long-term use of immunosuppressant medication    2. Rhinitis, unspecified type    3. Abdominal pain, unspecified abdominal location           Plan:       Avoid tramadol and doxepin together.  Reviewed labs- negative, abdominal ultrasound- negative  Long-term use of immunosuppressant medication  -     X-Ray Sinuses Min 3 Views; Future; Expected date: 03/14/2023  -     IMMUNOGLOBULINS (IGG, IGA, IGM) QUANTITATIVE; Future; Expected date: 03/14/2023  -     Streptococcus pneumoniae IgG Antibody (23 Serotypes), MAID; Future; Expected date: 03/14/2023  -     CBC Auto Differential; Future; Expected date: 03/14/2023  -     DIPHTHERIA / TETANUS ANTIBODY PANEL; Future; Expected date: 03/14/2023    Rhinitis, unspecified type  -     Allergen, Pecan Tree IgE; Future; Expected date: 03/14/2023  -     [place], black IgE; Future; Expected date: 03/14/2023  -     [place], bald IgE; Future; Expected date: 03/14/2023  -     Oak, white IgE; Future; Expected date: 03/14/2023  -     Allergen, Cocklebur; Future; Expected date: 03/14/2023  -     Cat epithelium IgE; Future; Expected date: 03/14/2023  -     IgE; Future; Expected date: 03/14/2023  -     Bahia grass IgE; Future; Expected date: 03/14/2023  -     Aspergillus fumagatus IgE; Future; Expected date: 03/14/2023  -     Chaetomium globosum IgE; Future; Expected date: 03/14/2023  -     Cockroach, American IgE; Future; Expected date: 03/14/2023  -     Cladosporium IgE; Future; Expected date: 03/14/2023  -     Curvularia lunata IgE; Future; Expected date: 03/14/2023  -     D. farinae IgE; Future; Expected date: 03/14/2023  -     D. pteronyssinus IgE; Future; Expected date: 03/14/2023  -     Dog  dander IgE; Future; Expected date: 03/14/2023  -     Plantain, English IgE; Future; Expected date: 03/14/2023  -     Eucalyptus IgE; Future; Expected date: 03/14/2023  -     Stacy elder, rough IgE; Future; Expected date: 03/14/2023  -     Mugwort IgE; Future; Expected date: 03/14/2023  -     Nettle IgE; Future; Expected date: 03/14/2023  -     Orchard grass IgE; Future; Expected date: 03/14/2023  -     [place], western white IgE; Future; Expected date: 03/14/2023  -     Privet, common IgE; Future; Expected date: 03/14/2023  -     Ragweed, short, common IgE; Future; Expected date: 03/14/2023  -     Red top grass IgE; Future; Expected date: 03/14/2023  -     Rye grass, cultivated IgE; Future; Expected date: 03/14/2023  -     Thistle, Russian IgE; Future; Expected date: 03/14/2023  -     Stemphyllium IgE; Future; Expected date: 03/14/2023  -     Dmitri IgE; Future; Expected date: 03/14/2023  -     Efe grass IgE; Future; Expected date: 03/14/2023  -     Allergen, Hackberry Celtis; Future; Expected date: 03/14/2023  -     Allergen, Elm Cedar; Future; Expected date: 03/14/2023  -     Allergen-[place]; Future; Expected date: 03/14/2023  -     RAST Allergen for [place]; Future; Expected date: 03/14/2023  -     RAST Allergen Maple ([place]); Future; Expected date: 03/14/2023  -     Allergen, Meadow Grass ([place]); Future; Expected date: 03/14/2023  -     Allergen-Silver Birch; Future; Expected date: 03/14/2023  -     RAST Allergen [place]; Future; Expected date: 03/14/2023  -     RAST Allergen, Sheep [place]); Future; Expected date: 03/14/2023  -     Allergen-Alternaria Alternata; Future; Expected date: 03/14/2023  -     Allergen-Maple [place]; Future; Expected date: 03/14/2023  -     Allergen, White [place]; Future; Expected date: 03/14/2023  -     ipratropium (ATROVENT) 21 mcg (0.03 %) nasal spray; 2 sprays by Each Nostril route 3 (three) times daily.  Dispense: 20 mL; Refill:  5    Abdominal pain, unspecified abdominal location  -     5-HIAA Plasma (Neuroendocrine); Future; Expected date: 03/14/2023  -     HISTAMINE; Future; Expected date: 03/14/2023  -     Tryptase; Future; Expected date: 03/14/2023  -     Catecholamines, fractionated, Urine 24 Hours; Future              RTC 4-6 weeks or sooner, if needed     [place]                        Problems Address                                                 Amount and/or Complexity                                                                      Risk       3           [] 2 or more self-limited or minor problems                      [] Limited                                                                        [] Low                  [] 1 stable chronic illness                                                  Any combination of the two                                               OTC drugs                  []Acute, uncomplicated illness or injury                            Review of prior external notes from unique source           Minor surgery with no risk factors                                                                                                               [] 1 []2  []3+                                                                                                              Review of results from each unique test                                                                                                               [] 1 []2  [] 3+                                                                                                              Order of each unique test                                                                                                               [] 1 []2  [] 3+                                                                                                              Or                                                                                                             []  Assessment requiring an independent historian      4            [] One or more chronic illness with exacerbation,              [] Moderate                                                                      [x] Moderate                 Progression, or side effects of treatment                            -test documents or independent historians                        Prescription drug management                [x]  2 or more stable chronic illnesses                                    [] Independent interpretation of tests                              Minor surgery with identifiable risk                [] 1 undiagnosed new problem with uncertain prognosis    [] Discussion or management of test results                    elective major surgery                [] 1 acute illness with                systemic symptoms                                                                                                                                                              [] 1 acute complicated injury                                                                                                                                          Elective major surgery                                                                                                                                                                                                                                                                                                                                                                                                  5            [] 1 or more chronic illnesses with severe exacerbation,     [] Extensive(two from below)                                         [] High                                                                                                               [] Independent interpretation of results                         Drug therapy requiring intensive                                                                                                                []Discussion of management or test interpretation           monitoring                                                                                                                                                                                                       Decision to de-escalate care                 [] 1 acute or chronic illness or injury that poses a threat                                                                                               Decision regarding hospitalization

## 2023-03-17 ENCOUNTER — PATIENT MESSAGE (OUTPATIENT)
Dept: ALLERGY | Facility: CLINIC | Age: 73
End: 2023-03-17
Payer: MEDICARE

## 2023-03-20 ENCOUNTER — LAB VISIT (OUTPATIENT)
Dept: LAB | Facility: HOSPITAL | Age: 73
End: 2023-03-20
Attending: ALLERGY & IMMUNOLOGY
Payer: MEDICARE

## 2023-03-20 DIAGNOSIS — R10.9 ABDOMINAL PAIN, UNSPECIFIED ABDOMINAL LOCATION: ICD-10-CM

## 2023-03-20 PROCEDURE — 82384 ASSAY THREE CATECHOLAMINES: CPT | Performed by: ALLERGY & IMMUNOLOGY

## 2023-03-21 ENCOUNTER — HOSPITAL ENCOUNTER (OUTPATIENT)
Dept: PREADMISSION TESTING | Facility: HOSPITAL | Age: 73
Discharge: HOME | End: 2023-03-21
Attending: UROLOGY
Payer: MEDICARE

## 2023-03-21 VITALS
SYSTOLIC BLOOD PRESSURE: 125 MMHG | TEMPERATURE: 98 F | OXYGEN SATURATION: 98 % | HEART RATE: 62 BPM | RESPIRATION RATE: 14 BRPM | DIASTOLIC BLOOD PRESSURE: 68 MMHG

## 2023-03-21 DIAGNOSIS — R94.31 ABNORMAL EKG: ICD-10-CM

## 2023-03-21 DIAGNOSIS — E11.9 TYPE 2 DIABETES MELLITUS WITHOUT COMPLICATION, WITHOUT LONG-TERM CURRENT USE OF INSULIN: ICD-10-CM

## 2023-03-21 DIAGNOSIS — N39.41 URGE INCONTINENCE: ICD-10-CM

## 2023-03-21 DIAGNOSIS — Z01.818 PREOP EXAMINATION: Primary | ICD-10-CM

## 2023-03-21 DIAGNOSIS — M05.9 SEROPOSITIVE RHEUMATOID ARTHRITIS: ICD-10-CM

## 2023-03-21 DIAGNOSIS — E78.00 HYPERCHOLESTEROLEMIA: ICD-10-CM

## 2023-03-21 DIAGNOSIS — I15.2 HYPERTENSION ASSOCIATED WITH DIABETES: ICD-10-CM

## 2023-03-21 DIAGNOSIS — E11.59 HYPERTENSION ASSOCIATED WITH DIABETES: ICD-10-CM

## 2023-03-21 DIAGNOSIS — F12.90 MARIJUANA USE: ICD-10-CM

## 2023-03-21 NOTE — ASSESSMENT & PLAN NOTE
- Currently asymptomatic, denying CP and/or SOB.  - Followed outpatient by Dr. Peterson; EKG unchanged from prior.    Nuclear stress test in 2021 showed Normal myocardial perfusion scan. There is no evidence of myocardial ischemia or infarction.    The gated perfusion images showed an ejection fraction of 74% at rest. The gated perfusion images showed an ejection fraction of 73% post stress.    The EKG portion of this study is negative for ischemia.    The patient reported no chest pain during the stress test.    There were no arrhythmias during stress.    - Continue outpatient f/u with Dr. Peterson as directed.

## 2023-03-21 NOTE — ASSESSMENT & PLAN NOTE
"- Patient initially denied any drug use, however, when reviewing pre-op instructions she asked if she could continue smoking marijuana daily.  When questioned further, as patient initially denied any recreational drug use, she became very angry, raised her voice and threatened that I "had better not put that in my chart as it's my personal business and none of yours, and now I'm getting very angry with you".  Patient was notified that ingestion of marijuana is important to know prior to surgery as it can cause N/V and interfere with anesthesia, to which she continued to display aggressive behavior.  I am noting it here, as it is pertinent to her review of history and upcoming surgery.   "

## 2023-03-21 NOTE — PROGRESS NOTES
To confirm, your doctor has instructed you that surgery is scheduled for 3.27.2023.       Pre admit office will call the afternoon prior to surgery between 1PM and 3PM with arrival time.    Surgery will be at [facility],  The address is [address] [address]. [place]  [id number].      IMPORTANT INSTRUCTIONS!    Do not eat or drink after 12 midnight, including water.   Do not smoke or use chewing tobacco after 12 midnight  OK to brush teeth, but no gum, candy, or mints!      Take only these medicines with a small swallow of water-morning of surgery.     Norvasc, clonidine, myrbetric, crestor         ____ Stop Aspirin, Ibuprofen, Motrin and Aleve at least 5-7 days before surgery, unless otherwise instructed by your doctor, or the nurse.   You MAY use Tylenol/acetaminophen until day of surgery.      ____  If you take diabetic medication, do NOT take morning of surgery unless instructed by Doctor. Metformin must be stopped 24 hrs prior to surgery time.       ____ Stop taking any Fish Oil supplements or Vitamins at least 5 days prior to surgery, unless instructed otherwise by your Doctor.       Please notify MD office if you have an active infection, currently taking antibiotics or received a vaccination within the past 7 days.      Bathing Instructions: The night before surgery and the morning prior to coming to the hospital:    - Shower & rinse your body as usual with anti-bacterial Soap (Dial or Lever 2000)   -Hibiclens (if indicated) use AFTER anti-bacterial soap; 1 packet PM/1 packet in AM on surgical site only   -Do not use hibiclens on your head, face, or genitals.    -Do not wash with anti-bacterial soap after you use the hibiclens.    -Do not shave surgical site 5-7 days prior to surgery.    -Pubic hair 7 days prior to surgery (gyn pt's).      Pediatric patients do not need to use anti-bacterial soap or Hibiclens.             After Bathing:   __ No powder, lotions, creams, or body spray to skin      __No deodorant for any breast procedure, PORT, or upper arm surgery     __ No makeup, mascara, nail polish or artificial nails        **SURGERY WILL BE CANCELLED IF ARTIFICIAL/NAIL POLISH IS PRESENT!!!**    __ Please remove all piercings and leave all jewelry at home.    **SURGERY WILL BE CANCELLED IF PIERCINGS ARE PRESENT!!!**      __ Dentures, Hearing Aids and Contact Lens need to be removed prior to the start of surgery.      __ Wear clean, loose-fitting clothing. Allow for dressings/bandages/surgical equipment     __ You must have transportation, and they MUST stay the entire time.       [facility] Visitor/Ride Policy:   Only 1 adult allowed (over the age of 18) to accompany you into Pre-op/Recovery Surgery Dept and must stay through the entire length of admission.     Must have a ride home from a responsible adult that you know and trust.      Pediatric Patients are allowed 2 adult visitors.     Medical Transport, Uber or Lyft can only be used if patient has a responsible adult to accompany them during ride home.         Post-Op Instructions: You will receive surgery post-op instructions by your Discharge Nurse prior to going home.     Surgical Site Infection:   Prevention of surgical site infections:   -Keep incisions clean and dry.   -Do not soak/submerge incisions in water until completely healed.   -Do not apply lotions, powders, creams, or deodorants to site.   -Always make sure hands are cleaned with antibacterial soap/ alcohol-based   prior to touching the surgical site.       Signs and symptoms:               -Redness and pain around the area where you had surgery               -Drainage of cloudy fluid from your surgical wound               -Fever over 100.4 or chills     >>>Call Surgeon office/on-call Surgeon if you experience any of these signs & symptoms post-surgery @ [phone].      *Please Call [facility] Pre-Admit Department for surgery instruction  questions:  [phone]    *Payment questions:  [phone]    *Billing questions:  [phone]

## 2023-03-21 NOTE — ASSESSMENT & PLAN NOTE
- Patient presents today at the request of Dr. Rawls who plans on performing a cystoscopy with botulinum injection on March 27th.    Known risk factors for perioperative complications: Diabetes mellitus    Difficulty with intubation is not anticipated.    Cardiac Risk Estimation: Based on the Revised Cardiac Risk index, patient is a Class 1 risk with a 3.9% risk of a major cardiac event in a low risk procedure.    1.) Preoperative workup as follows: ECG, hemoglobin, hematocrit, electrolytes, creatinine, glucose, liver function studies.  2.) Change in medication regimen before surgery: discontinue ASA 6 days before surgery, discontinue NSAIDs 5 days before surgery, hold Losartan the morning of surgery.  3.) Prophylaxis for cardiac events with perioperative beta-blockers: not indicated.  4.) Invasive hemodynamic monitoring perioperatively: not indicated.  5.) Deep vein thrombosis prophylaxis postoperatively: intermittent pneumatic compression boots and regimen to be chosen by surgical team.  6.) Surveillance for postoperative MI with ECG immediately postoperatively and on postoperati ve days 1 and 2 AND troponin levels 24 hours postoperatively and on day 4 or hospital discharge (whichever comes first): not indicated.  7.) Current medications which may produce withdrawal symptoms if withheld perioperatively: None.  8.) Other measures: None.

## 2023-03-21 NOTE — H&P
Preoperative History and Physical  [facility]                                                                   Chief Complaint: Preoperative evaluation     History of Present Illness:      Smitha Salinas is a 72 y.o. female with a PMhx of ADHS, RA, HLD, HTN, SUKHDEEP (non-compliant with CPAP), Osteopenia, DM, and Urge Incontinence who presents to the office today for a preoperative consultation at the request of Dr. Rawls who plans on performing a Cystoscope with botulinum injection on March 27.     Functional Status:      The patient is able to climb a flight of stairs. The patient is able to ambulate without difficulty. The patient's functional status is affected by the surgical problem. The patient's functional status is not affected by shortness of breath, chest pain, dyspnea on exertion and fatigue.      MET score greater than 4    Past Medical History:      Past Medical History:   Diagnosis Date    ADHD (attention deficit hyperactivity disorder)     Adult ADHD     [facility]    Chronic rheumatic arthritis     on DMARD    Ex-smoker     Genital herpes     Hyperlipidemia     Hypertension     Immunosuppressed status     Lichen sclerosus et atrophicus     Morbid obesity 01/11/2021    Obesity, Class I, BMI 30-34.9 12/22/2015    SUKHDEEP (obstructive sleep apnea)     Osteopenia     5/16 wai 5/18(start fmax if on pred 3months or more)    Type 2 diabetes mellitus     dxd 9/20        Past Surgical History:      Past Surgical History:   Procedure Laterality Date    BREAST BIOPSY Left     benign    COLONOSCOPY N/A 2/12/2020    Procedure: COLONOSCOPY;  Surgeon: Eloina Castro MD;  Location: [facility];  Service: Endoscopy;  Laterality: N/A;    CRYOTHERAPY  1980    ESOPHAGOGASTRODUODENOSCOPY N/A 2/12/2020    Procedure: EGD (ESOPHAGOGASTRODUODENOSCOPY);  Surgeon: Eloina Castro MD;  Location: [facility];  Service: Endoscopy;  Laterality: N/A;    INSERTION OF SACRAL  NEUROSTIMULATOR GENERATOR Left 2020    Procedure: INSERTION, PULSE GENERATOR, NEUROSTIMULATOR, SACRAL;  Surgeon: Pablo Rawls MD;  Location: [facility] OR;  Service: Urology;  Laterality: Left;    INSERTION OF SACRAL NEUROSTIMULATOR, ELECTRODES, AND IMPLANTABLE PULSE GENERATOR (IPG) Left 2020    Procedure: INSERTION, ELECTRODE LEADS AND PULSE GENERATOR, NEUROSTIMULATOR, SACRAL;  Surgeon: Pablo Rawls MD;  Location: [facility] OR;  Service: Urology;  Laterality: Left;    SKIN GRAFT      laceration to right  fingers  from thigh     TONSILLECTOMY          Social History:      Social History     Socioeconomic History    Marital status:     Number of children: 1   Occupational History    Occupation:      Employer: [employer]    Tobacco Use    Smoking status: Former     Packs/day: 0.25     Years: 15.00     Pack years: 3.75     Types: Cigarettes     Quit date: 2013     Years since quittin.6    Smokeless tobacco: Never    Tobacco comments:     smokes for a few weeks per year - when under pressure. has been abstinent times years at a time. a pack lasts about a week   Substance and Sexual Activity    Alcohol use: Yes     Comment: Occasional    Drug use: Yes    Sexual activity: Not Currently     Birth control/protection: Post-menopausal   Social History Narrative    Lives alone. Caffeine intake rare -1-2 per week of coffee. Does not have a Living Will or Advanced Directive        Family History:      Family History   Problem Relation Age of Onset    Heart disease Mother     Diabetes Mother     Peripheral vascular disease Mother         amputations    Stroke Father     Kidney failure Father     Hypertension Father     Breast cancer Sister 39    Stroke Maternal Grandmother     Stroke Paternal Grandmother     Stroke Paternal Grandfather     No Known Problems Daughter     Cancer Other 68        breast    Coronary artery disease Maternal Grandfather     Colon cancer Neg Hx      Ovarian cancer Neg Hx        Allergies:      Review of patient's allergies indicates:   Allergen Reactions    Valdecoxib Other (See Comments)     palpitation       Medications:      Current Outpatient Medications   Medication Sig    acyclovir (ZOVIRAX) 400 MG tablet Take 1 tablet by mouth twice daily    amLODIPine (NORVASC) 5 MG tablet Take 1 tablet by mouth once daily    calcium-vitamin D (CALCIUM 600 + D,3,) 600 mg-10 mcg (400 unit) Tab Take 1 tablet by mouth 2 (two) times daily.    cloNIDine (CATAPRES) 0.2 MG tablet TAKE 1/2 (ONE-HALF) TABLET BY MOUTH IN THE MORNING AND 1/2 (ONE-HALF) AT NOON AND 2 AT BEDTIME    etanercept (ENBREL SURECLICK) 50 mg/mL (1 mL) Inject 1 mL (50 mg total) into the skin once a week.    losartan (COZAAR) 50 MG tablet Take 1 tablet by mouth once daily    mirabegron (MYRBETRIQ) 50 mg Tb24 Take 1 tablet by mouth once daily    predniSONE (DELTASONE) 5 MG tablet May take 5 of prednisone daily for rheumatoid related pain    rosuvastatin (CRESTOR) 20 MG tablet Take 1 tablet (20 mg total) by mouth once daily.    semaglutide (OZEMPIC) 0.25 mg or 0.5 mg(2 mg/1.5 mL) pen injector Inject 0.25 mg into the skin every 7 days.    aspirin (ECOTRIN) 81 MG EC tablet Take 81 mg by mouth once daily.    blood sugar diagnostic (BLOOD GLUCOSE TEST) Strp 1 strip by Misc.(Non-Drug; Combo Route) route 3 (three) times daily as needed.    diazePAM (VALIUM) 5 MG tablet TAKE 1 TABLET BY MOUTH AT BEDTIME FOR ANXIETY (Patient not taking: Reported on 2/28/2023)    diclofenac sodium (VOLTAREN) 1 % Gel Apply 2 g topically 3 (three) times daily. (Patient not taking: Reported on 2/28/2023)    doxepin (SINEQUAN) 10 MG capsule Take 1 capsule (10 mg total) by mouth every evening. (Patient not taking: Reported on 3/14/2023)    gabapentin (NEURONTIN) 300 MG capsule Take 1 capsule (300 mg total) by mouth 3 (three) times daily. for 10 days (Patient not taking: Reported on 2/28/2023)    GAVILYTE-C 240-22.72-6.72 -5.84 gram SolR Take  4,000 mLs by mouth once.    HYDROcodone-acetaminophen (NORCO) 5-325 mg per tablet     ipratropium (ATROVENT) 21 mcg (0.03 %) nasal spray 2 sprays by Each Nostril route 3 (three) times daily.    lancing device Misc 1 each by Misc.(Non-Drug; Combo Route) route 3 (three) times daily as needed.    NUCYNTA ER 50 mg Tb12 Take 1 tablet by mouth every 12 (twelve) hours. prn    ondansetron (ZOFRAN-ODT) 4 MG TbDL Take 1 tablet (4 mg total) by mouth every 8 (eight) hours as needed (nausea). (Patient not taking: Reported on 2/28/2023)    oxyCODONE-acetaminophen (PERCOCET) 5-325 mg per tablet Take 1 tablet by mouth every 4 (four) hours as needed for Pain.    potassium chloride SA (K-DUR,KLOR-CON) 20 MEQ tablet 1 tablet daily (Patient not taking: Reported on 3/21/2023)    pregabalin (LYRICA) 150 MG capsule Take 150 mg by mouth 2 (two) times daily.    PROLENSA 0.07 % Drop     traMADoL (ULTRAM) 50 mg tablet Take 1 tablet (50 mg total) by mouth every 6 (six) hours as needed. (Patient not taking: Reported on 3/21/2023)     Current Facility-Administered Medications   Medication    ondansetron disintegrating tablet 4 mg       Vitals:      Vitals:    03/21/23 1104   BP: 125/68   Pulse: 62   Resp: 14   Temp: 97.7 °F (36.5 °C)       Review of Systems:        Constitutional: Negative for fever, chills, weight loss, malaise/fatigue and diaphoresis.   HENT: Negative for hearing loss, ear pain, nosebleeds, congestion, sore throat, neck pain, tinnitus and ear discharge.    Eyes: Negative for blurred vision, double vision, photophobia, pain, discharge and redness.   Respiratory: Negative for cough, hemoptysis, sputum production, shortness of breath, wheezing and stridor.    Cardiovascular: Negative for chest pain, palpitations, orthopnea, claudication, leg swelling and PND.   Gastrointestinal: Negative for heartburn, vomiting, abdominal pain, diarrhea, constipation, blood in stool and melena. Positive for intermittent nausea.  Genitourinary:  Negative for dysuria, hematuria and flank pain. Positive for urinary urgency, frequency,   Musculoskeletal: Negative for myalgias, back pain, joint pain and falls.   Skin: Negative for itching and rash.   Neurological: Negative for dizziness, tingling, tremors, sensory change, speech change, focal weakness, seizures, loss of consciousness, weakness and headaches.   Endo/Heme/Allergies: Negative for environmental allergies and polydipsia. Does not bruise/bleed easily.   Psychiatric/Behavioral: Negative for depression, suicidal ideas, hallucinations, memory loss and substance abuse. The patient is not nervous/anxious and does not have insomnia.    All 14 systems reviewed and negative except as noted above.    Physical Exam:      Constitutional: Appears well-developed, well-nourished and in no acute distress.  Patient is oriented to person, place, and time.   Head: Normocephalic and atraumatic. Mucous membranes moist.  Neck: Neck supple no mass.   Cardiovascular: Normal rate and regular rhythm.  S1 S2 appreciated by ascultation.  Pulmonary/Chest: Effort normal and clear to auscultation bilaterally. No respiratory distress.   Abdomen: Soft. Non-tender and non-distended. Bowel sounds are normal.   Neurological: Patient is alert and oriented to person, place and time. Moves all extremities.  Skin: Warm and dry. No lesions.  Extremities: No clubbing, cyanosis or edema.    Laboratory data:      Reviewed and noted in plan where applicable. Please see chart for full laboratory data.    No results for input(s): CPK, CPKMB, TROPONINI, MB in the last 24 hours. No results for input(s): POCTGLUCOSE in the last 24 hours.     Lab Results   Component Value Date    INR 0.9 02/01/2023    INR 0.9 10/22/2021    INR 0.9 09/06/2013       Lab Results   Component Value Date    WBC 6.56 03/14/2023    HGB 12.3 03/14/2023    HCT 40.1 03/14/2023    MCV 89 03/14/2023     03/14/2023       No results for input(s): GLU, NA, K, CL, CO2, BUN,  CREATININE, CALCIUM, MG in the last 24 hours.    Predictors of intubation difficulty:       Morbid obesity? no   Anatomically abnormal facies? no   Prominent incisors? no   Receding mandible? no   Short, thick neck? yes    Neck range of motion: normal   Dentition:  Partial dentures to upper and lower.   Based on the Modified Mallampati, patient is a mallampati score: I (soft palate, uvula, fauces, and tonsillar pillars visible)    Cardiographics:      ECG: Normal sinus rhythm   LVH with repolarization abnormality   Abnormal ECG   When compared with ECG of 18-NOV-2021 12:38,   Previous ECG has undetermined rhythm, needs review   ST more depressed in Lateral leads   Confirmed by BRAYDON SANFORD, ABBIE ([id number]) on 1/14/2023 12:31:18 AM     Echocardiogram in 2020 showed;    There is moderate left ventricular concentric hypertrophy.  The left ventricle is normal in size with normal systolic function. The estimated ejection fraction is 60%.  Indeterminate diastolic function.  Normal right ventricular systolic function.  Mild tricuspid regurgitation.  Normal central venous pressure (3 mmHg).  The estimated PA systolic pressure is 25 mmHg.    Imaging:      Chest x-ray: not indicated    Assessment and Plan:      Urge incontinence  - Patient presents today at the request of Dr. Rawls who plans on performing a cystoscopy with botulinum injection on March 27th.    Known risk factors for perioperative complications: Diabetes mellitus    Difficulty with intubation is not anticipated.    Cardiac Risk Estimation: Based on the Revised Cardiac Risk index, patient is a Class 1 risk with a 3.9% risk of a major cardiac event in a low risk procedure.    1.) Preoperative workup as follows: ECG, hemoglobin, hematocrit, electrolytes, creatinine, glucose, liver function studies.  2.) Change in medication regimen before surgery: discontinue ASA 6 days before surgery, discontinue NSAIDs 5 days before surgery, hold Losartan the morning of  surgery.  3.) Prophylaxis for cardiac events with perioperative beta-blockers: not indicated.  4.) Invasive hemodynamic monitoring perioperatively: not indicated.  5.) Deep vein thrombosis prophylaxis postoperatively: intermittent pneumatic compression boots and regimen to be chosen by surgical team.  6.) Surveillance for postoperative MI with ECG immediately postoperatively and on postoperati ve days 1 and 2 AND troponin levels 24 hours postoperatively and on day 4 or hospital discharge (whichever comes first): not indicated.  7.) Current medications which may produce withdrawal symptoms if withheld perioperatively: None.  8.) Other measures: None.    Type 2 diabetes mellitus  - A1c 6.3 in December, repeat pending.  - Continue Ozempic.  - ADA diet.    Hypertension associated with diabetes  - BP well controlled.  - Continue Norvasc, Clonidine  - Patient was instructed to continue Losartan for now but hold the morning of surgery to avoid hypotension associated with anesthesia, and resume postoperatively as directed.    Seropositive rheumatoid arthritis  - Continue Prednisone PRN.  - Followed outpatient by Dr. SKINNER    Hypercholesterolemia  - Continue Statin.    Abnormal EKG  - Currently asymptomatic, denying CP and/or SOB.  - Followed outpatient by Dr. Peterson; EKG unchanged from prior.    Nuclear stress test in 2021 showed Normal myocardial perfusion scan. There is no evidence of myocardial ischemia or infarction.    The gated perfusion images showed an ejection fraction of 74% at rest. The gated perfusion images showed an ejection fraction of 73% post stress.    The EKG portion of this study is negative for ischemia.    The patient reported no chest pain during the stress test.    There were no arrhythmias during stress.    - Continue outpatient f/u with Dr. Peterson as directed.    Marijuana use  - Patient initially denied any drug use, however, when reviewing pre-op instructions she asked if she could continue smoking  "marijuana daily.  When questioned further, as patient initially denied any recreational drug use, she became very angry, raised her voice and threatened that I "had better not put that in my chart as it's my personal business and none of yours, and now I'm getting very angry with you".  Patient was notified that ingestion of marijuana is important to know prior to surgery as it can cause N/V and interfere with anesthesia, to which she continued to display aggressive behavior.  I am noting it here, as it is pertinent to her review of history and upcoming surgery.         Electronically signed by Ana Bhat DNP, ACNP on 3/21/2023 at 11:18 AM.   "

## 2023-03-21 NOTE — DISCHARGE INSTRUCTIONS
To confirm, your doctor has instructed you that surgery is scheduled for 3.27.2023.       Pre admit office will call the afternoon prior to surgery between 1PM and 3PM with arrival time.    Surgery will be at [facility],  The address is [address]. [place]  [id number].      IMPORTANT INSTRUCTIONS!    Do not eat or drink after 12 midnight, including water.   Do not smoke or use chewing tobacco after 12 midnight  OK to brush teeth, but no gum, candy, or mints!      Take only these medicines with a small swallow of water-morning of surgery.     Norvasc, clonidine, myrbetric, crestor         ____ Stop Aspirin, Ibuprofen, Motrin and Aleve at least 5-7 days before surgery, unless otherwise instructed by your doctor, or the nurse.   You MAY use Tylenol/acetaminophen until day of surgery.      ____  If you take diabetic medication, do NOT take morning of surgery unless instructed by Doctor. Metformin must be stopped 24 hrs prior to surgery time.       ____ Stop taking any Fish Oil supplements or Vitamins at least 5 days prior to surgery, unless instructed otherwise by your Doctor.       Please notify MD office if you have an active infection, currently taking antibiotics or received a vaccination within the past 7 days.      Bathing Instructions: The night before surgery and the morning prior to coming to the hospital:    - Shower & rinse your body as usual with anti-bacterial Soap (Dial or Lever 2000)   -Hibiclens (if indicated) use AFTER anti-bacterial soap; 1 packet PM/1 packet in AM on surgical site only   -Do not use hibiclens on your head, face, or genitals.    -Do not wash with anti-bacterial soap after you use the hibiclens.    -Do not shave surgical site 5-7 days prior to surgery.    -Pubic hair 7 days prior to surgery (gyn pt's).      Pediatric patients do not need to use anti-bacterial soap or Hibiclens.             After Bathing:   __ No powder, lotions, creams, or body spray to skin      __No deodorant for any breast procedure, PORT, or upper arm surgery     __ No makeup, mascara, nail polish or artificial nails        **SURGERY WILL BE CANCELLED IF ARTIFICIAL/NAIL POLISH IS PRESENT!!!**    __ Please remove all piercings and leave all jewelry at home.    **SURGERY WILL BE CANCELLED IF PIERCINGS ARE PRESENT!!!**      __ Dentures, Hearing Aids and Contact Lens need to be removed prior to the start of surgery.      __ Wear clean, loose-fitting clothing. Allow for dressings/bandages/surgical equipment     __ You must have transportation, and they MUST stay the entire time.       [facility] Visitor/Ride Policy:   Only 1 adult allowed (over the age of 18) to accompany you into Pre-op/Recovery Surgery Dept and must stay through the entire length of admission.     Must have a ride home from a responsible adult that you know and trust.      Pediatric Patients are allowed 2 adult visitors.     Medical Transport, Uber or Lyft can only be used if patient has a responsible adult to accompany them during ride home.         Post-Op Instructions: You will receive surgery post-op instructions by your Discharge Nurse prior to going home.     Surgical Site Infection:   Prevention of surgical site infections:   -Keep incisions clean and dry.   -Do not soak/submerge incisions in water until completely healed.   -Do not apply lotions, powders, creams, or deodorants to site.   -Always make sure hands are cleaned with antibacterial soap/ alcohol-based   prior to touching the surgical site.       Signs and symptoms:               -Redness and pain around the area where you had surgery               -Drainage of cloudy fluid from your surgical wound               -Fever over 100.4 or chills     >>>Call Surgeon office/on-call Surgeon if you experience any of these signs & symptoms post-surgery @ [phone].      *Please Call [facility] Pre-Admit Department for surgery instruction  questions:  [phone]    *Payment questions:  [phone]    *Billing questions:  [phone]

## 2023-03-21 NOTE — ASSESSMENT & PLAN NOTE
- BP well controlled.  - Continue Norvasc, Clonidine  - Patient was instructed to continue Losartan for now but hold the morning of surgery to avoid hypotension associated with anesthesia, and resume postoperatively as directed.

## 2023-03-22 ENCOUNTER — PATIENT MESSAGE (OUTPATIENT)
Dept: RHEUMATOLOGY | Facility: CLINIC | Age: 73
End: 2023-03-22
Payer: MEDICARE

## 2023-03-22 NOTE — TELEPHONE ENCOUNTER
"Dr. England, this is the first time Mrs. Bone is needing a refill on Enbrel since the prefilled syringes were placed for her. She DOES NOT want the prefilled syringes and would really like the Enbrel that is "Blue tooth" enabled for her sarahi. Is this something you can prescribe?    Radha Damon (Allye), [facility]  Rheumatology Department   "

## 2023-03-23 NOTE — ANESTHESIA PREPROCEDURE EVALUATION
03/23/2023   Past Medical History:   Diagnosis Date    ADHD (attention deficit hyperactivity disorder)     Adult [facility]     [facility]    Chronic rheumatic arthritis     on DMARD    Ex-smoker     Genital herpes     Hyperlipidemia     Hypertension     Immunosuppressed status     Lichen sclerosus et atrophicus     Morbid obesity 01/11/2021    Obesity, Class I, BMI 30-34.9 12/22/2015    SUKHDEEP (obstructive sleep apnea)     Osteopenia     5/16 wai 5/18(start fmax if on pred 3months or more)    Type 2 diabetes mellitus     dxd 9/20     Past Surgical History:   Procedure Laterality Date    BREAST BIOPSY Left     benign    COLONOSCOPY N/A 2/12/2020    Procedure: COLONOSCOPY;  Surgeon: Eloina Castro MD;  Location: [facility];  Service: Endoscopy;  Laterality: N/A;    CRYOTHERAPY  1980    ESOPHAGOGASTRODUODENOSCOPY N/A 2/12/2020    Procedure: EGD (ESOPHAGOGASTRODUODENOSCOPY);  Surgeon: Eloina Castro MD;  Location: [facility];  Service: Endoscopy;  Laterality: N/A;    INSERTION OF SACRAL NEUROSTIMULATOR GENERATOR Left 11/23/2020    Procedure: INSERTION, PULSE GENERATOR, NEUROSTIMULATOR, SACRAL;  Surgeon: Pablo Rawls MD;  Location: [facility] OR;  Service: Urology;  Laterality: Left;    INSERTION OF SACRAL NEUROSTIMULATOR, ELECTRODES, AND IMPLANTABLE PULSE GENERATOR (IPG) Left 11/23/2020    Procedure: INSERTION, ELECTRODE LEADS AND PULSE GENERATOR, NEUROSTIMULATOR, SACRAL;  Surgeon: Pablo Rawls MD;  Location: [facility] OR;  Service: Urology;  Laterality: Left;    SKIN GRAFT  1965    laceration to right  fingers  from thigh     TONSILLECTOMY  1958       Smitha Salinas is a 72 y.o., female.    Pre-op Assessment    I have reviewed the Patient Summary Reports.    I have reviewed the Nursing Notes. I have reviewed the NPO Status.   I have reviewed the Medications.   Prednisone    Review  of Systems  Anesthesia Hx:  No problems with previous Anesthesia  History of prior surgery of interest to airway management or planning: Previous anesthesia: General Denies Family Hx of Anesthesia complications.   Denies Personal Hx of Anesthesia complications.   Social:  Former Smoker, Social Alcohol Use    Hematology/Oncology:  Hematology Normal   Oncology Normal     EENT/Dental:EENT/Dental Normal   Cardiovascular:   Hypertension hyperlipidemia ECG has been reviewed. Had abnl ECG, seen and cleared per cards.  Echo WNL with diastolic dysfxn.  NEG perfusion scan.  F/U in 6 months to further evaluate overall longterm risk.   Pulmonary:   Sleep Apnea    Education provided regarding risk of obstructive sleep apnea     Renal/:  Renal/ Normal  Genital herpes   Hepatic/GI:   dysphagia   Musculoskeletal:   Arthritis  Rheumatoid arthritis, immunotherapy.   Neurological:   Post herpetic neuralgia   Endocrine:   Diabetes, type 2    Dermatological:   Lichen sclerosus et atrophicus   Psych:   anxiety depression ADHD         Physical Exam  General:  Obesity      Airway/Jaw/Neck:  Airway Findings: Mouth Opening: Normal   Tongue: Normal   General Airway Assessment: Adult Mallampati: III  TM Distance: Normal, at least 6 cm   Jaw/Neck Findings:  Neck ROM: Normal ROM   Neck Findings:     Eyes/Ears/Nose:  Eyes/Ears/Nose Findings:    Dental:  Dental Findings: In tact  Pt states no loose teeth   Chest/Lungs:  Chest/Lungs Findings: Clear to auscultation, Normal Respiratory Rate      Heart/Vascular:  Heart Findings: Rate: Normal  Rhythm: Regular Rhythm  Sounds: Normal  Heart murmur: negative Vascular Findings: Normal    Abdomen:  Abdomen Findings: Normal    Musculoskeletal:  Musculoskeletal Findings: Normal   Skin:  Skin Findings: Normal    Mental Status:  Mental Status Findings:  Alert and Oriented         Anesthesia Plan  Type of Anesthesia, risks & benefits discussed:  Anesthesia Type:  MAC    Patient's Preference:   Plan  Factors:          Intra-op Monitoring Plan: standard ASA monitors  Intra-op Monitoring Plan Comments:   Post Op Pain Control Plan: per primary service following discharge from PACU and multimodal analgesia  Post Op Pain Control Plan Comments:     Induction:   IV  Beta Blocker:  Patient is not currently on a Beta-Blocker (No further documentation required).       Informed Consent: Informed consent signed with the Patient and all parties understand the risks and agree with anesthesia plan.  All questions answered.  Anesthesia consent signed with patient.  ASA Score: 3     Day of Surgery Review of History & Physical:  There are no significant changes.  H&P Update referred to the surgeon/provider.          Ready For Surgery From Anesthesia Perspective.           Physical Exam  General: Obesity    Airway:  Mallampati: III   Mouth Opening: Normal  TM Distance: Normal, at least 6 cm  Tongue: Normal  Neck ROM: Normal ROM    Dental:  In tact    Chest/Lungs:  Clear to auscultation, Normal Respiratory Rate    Heart:  Rate: Normal  Rhythm: Regular Rhythm  Sounds: Normal          Anesthesia Plan  Type of Anesthesia, risks & benefits discussed:    Anesthesia Type: MAC  Intra-op Monitoring Plan: standard ASA monitors  Post Op Pain Control Plan: per primary service following discharge from PACU and multimodal analgesia  Induction:  IV  Informed Consent: Informed consent signed with the Patient and all parties understand the risks and agree with anesthesia plan.  All questions answered.   ASA Score: 3  Day of Surgery Review of History & Physical: H&P Update referred to the surgeon/provider.    Ready For Surgery From Anesthesia Perspective.       .

## 2023-03-24 ENCOUNTER — TELEPHONE (OUTPATIENT)
Dept: PREADMISSION TESTING | Facility: HOSPITAL | Age: 73
End: 2023-03-24
Payer: MEDICARE

## 2023-03-24 ENCOUNTER — PATIENT MESSAGE (OUTPATIENT)
Dept: HEPATOLOGY | Facility: CLINIC | Age: 73
End: 2023-03-24
Payer: MEDICARE

## 2023-03-24 ENCOUNTER — PATIENT MESSAGE (OUTPATIENT)
Dept: RHEUMATOLOGY | Facility: CLINIC | Age: 73
End: 2023-03-24
Payer: MEDICARE

## 2023-03-24 NOTE — TELEPHONE ENCOUNTER
Called and spoke with the patient about the following:     Your Surgery arrival time is at 0530 on 3.27.2023 at [facility] location.   The address is [address] [place]  [address].      Only one adult (over 18) is to accompany you to surgery, unless it is a Pediatric patient, then 2 adults are encouraged to accompany them to the surgery center.     Your ride MUST STAY the entire time until you are discharged.      Please come to the main lobby and be prepared to show your photo ID and insurance card.      Nothing to eat or drink after midnight, unless you were instructed to take specific medications discussed with the Pre-admit Nurse.      Please call with any questions or concerns.     [phone]      Thanks.

## 2023-03-27 ENCOUNTER — ANESTHESIA (OUTPATIENT)
Dept: SURGERY | Facility: HOSPITAL | Age: 73
End: 2023-03-27
Payer: MEDICARE

## 2023-03-27 ENCOUNTER — HOSPITAL ENCOUNTER (OUTPATIENT)
Facility: HOSPITAL | Age: 73
Discharge: HOME | End: 2023-03-27
Attending: UROLOGY | Admitting: UROLOGY
Payer: MEDICARE

## 2023-03-27 VITALS
HEIGHT: 65 IN | BODY MASS INDEX: 23.91 KG/M2 | TEMPERATURE: 98 F | DIASTOLIC BLOOD PRESSURE: 77 MMHG | SYSTOLIC BLOOD PRESSURE: 150 MMHG | HEART RATE: 60 BPM | OXYGEN SATURATION: 99 % | WEIGHT: 143.5 LBS | RESPIRATION RATE: 14 BRPM

## 2023-03-27 DIAGNOSIS — N39.41 URGE INCONTINENCE: ICD-10-CM

## 2023-03-27 LAB
POCT GLUCOSE: 118 MG/DL (ref 70–110)
POCT GLUCOSE: 99 MG/DL (ref 70–110)

## 2023-03-27 PROCEDURE — D9220A PRA ANESTHESIA: Mod: ,,, | Performed by: NURSE ANESTHETIST, CERTIFIED REGISTERED

## 2023-03-27 PROCEDURE — 37000009 HC ANESTHESIA EA ADD 15 MINS: Performed by: UROLOGY

## 2023-03-27 PROCEDURE — 36000706: Performed by: UROLOGY

## 2023-03-27 PROCEDURE — 71000015 HC POSTOP RECOV 1ST HR: Performed by: UROLOGY

## 2023-03-27 PROCEDURE — 25000003 PHARM REV CODE 250: Performed by: NURSE ANESTHETIST, CERTIFIED REGISTERED

## 2023-03-27 PROCEDURE — 52287 CYSTOSCOPY CHEMODENERVATION: CPT | Mod: ,,, | Performed by: UROLOGY

## 2023-03-27 PROCEDURE — 63600175 PHARM REV CODE 636 W HCPCS: Performed by: UROLOGY

## 2023-03-27 PROCEDURE — D9220A PRA ANESTHESIA: ICD-10-PCS | Mod: ,,, | Performed by: NURSE ANESTHETIST, CERTIFIED REGISTERED

## 2023-03-27 PROCEDURE — 36000707: Performed by: UROLOGY

## 2023-03-27 PROCEDURE — 63600175 PHARM REV CODE 636 W HCPCS: Performed by: NURSE ANESTHETIST, CERTIFIED REGISTERED

## 2023-03-27 PROCEDURE — 71000033 HC RECOVERY, INTIAL HOUR: Performed by: UROLOGY

## 2023-03-27 PROCEDURE — 37000008 HC ANESTHESIA 1ST 15 MINUTES: Performed by: UROLOGY

## 2023-03-27 PROCEDURE — 52287 PR CYSTOURETHROSCOPY WITH INJ FOR CHEMODENERVATION: ICD-10-PCS | Mod: ,,, | Performed by: UROLOGY

## 2023-03-27 PROCEDURE — 63600175 PHARM REV CODE 636 W HCPCS: Mod: JZ,JG | Performed by: UROLOGY

## 2023-03-27 PROCEDURE — 82962 GLUCOSE BLOOD TEST: CPT | Performed by: UROLOGY

## 2023-03-27 PROCEDURE — 63600175 PHARM REV CODE 636 W HCPCS: Performed by: ANESTHESIOLOGY

## 2023-03-27 RX ORDER — MEPERIDINE HYDROCHLORIDE 25 MG/ML
12.5 INJECTION INTRAMUSCULAR; INTRAVENOUS; SUBCUTANEOUS ONCE
Status: DISCONTINUED | OUTPATIENT
Start: 2023-03-27 | End: 2023-03-27 | Stop reason: HOSPADM

## 2023-03-27 RX ORDER — PHENAZOPYRIDINE HYDROCHLORIDE 200 MG/1
200 TABLET, FILM COATED ORAL 3 TIMES DAILY PRN
Qty: 15 TABLET | Refills: 0 | Status: SHIPPED | OUTPATIENT
Start: 2023-03-27 | End: 2023-04-25

## 2023-03-27 RX ORDER — FENTANYL CITRATE 50 UG/ML
INJECTION, SOLUTION INTRAMUSCULAR; INTRAVENOUS
Status: DISCONTINUED | OUTPATIENT
Start: 2023-03-27 | End: 2023-03-27

## 2023-03-27 RX ORDER — ONDANSETRON 2 MG/ML
4 INJECTION INTRAMUSCULAR; INTRAVENOUS ONCE AS NEEDED
Status: DISCONTINUED | OUTPATIENT
Start: 2023-03-27 | End: 2023-03-27 | Stop reason: HOSPADM

## 2023-03-27 RX ORDER — ONDANSETRON 2 MG/ML
INJECTION INTRAMUSCULAR; INTRAVENOUS
Status: DISCONTINUED | OUTPATIENT
Start: 2023-03-27 | End: 2023-03-27

## 2023-03-27 RX ORDER — FENTANYL CITRATE 50 UG/ML
25 INJECTION, SOLUTION INTRAMUSCULAR; INTRAVENOUS EVERY 5 MIN PRN
Status: DISCONTINUED | OUTPATIENT
Start: 2023-03-27 | End: 2023-03-27 | Stop reason: HOSPADM

## 2023-03-27 RX ORDER — MIDAZOLAM HYDROCHLORIDE 1 MG/ML
INJECTION INTRAMUSCULAR; INTRAVENOUS
Status: DISCONTINUED | OUTPATIENT
Start: 2023-03-27 | End: 2023-03-27

## 2023-03-27 RX ORDER — CEFAZOLIN SODIUM 2 G/50ML
2 SOLUTION INTRAVENOUS
Status: COMPLETED | OUTPATIENT
Start: 2023-03-27 | End: 2023-03-27

## 2023-03-27 RX ORDER — DIPHENHYDRAMINE HYDROCHLORIDE 50 MG/ML
25 INJECTION INTRAMUSCULAR; INTRAVENOUS EVERY 6 HOURS PRN
Status: DISCONTINUED | OUTPATIENT
Start: 2023-03-27 | End: 2023-03-27 | Stop reason: HOSPADM

## 2023-03-27 RX ORDER — LIDOCAINE HYDROCHLORIDE 20 MG/ML
INJECTION INTRAVENOUS
Status: DISCONTINUED | OUTPATIENT
Start: 2023-03-27 | End: 2023-03-27

## 2023-03-27 RX ORDER — OXYCODONE AND ACETAMINOPHEN 5; 325 MG/1; MG/1
1 TABLET ORAL EVERY 4 HOURS PRN
Qty: 10 TABLET | Refills: 0 | Status: SHIPPED | OUTPATIENT
Start: 2023-03-27 | End: 2024-02-08

## 2023-03-27 RX ORDER — PROPOFOL 10 MG/ML
INJECTION, EMULSION INTRAVENOUS
Status: DISCONTINUED | OUTPATIENT
Start: 2023-03-27 | End: 2023-03-27

## 2023-03-27 RX ORDER — HYDROCODONE BITARTRATE AND ACETAMINOPHEN 5; 325 MG/1; MG/1
1 TABLET ORAL
Status: DISCONTINUED | OUTPATIENT
Start: 2023-03-27 | End: 2023-03-27 | Stop reason: HOSPADM

## 2023-03-27 RX ORDER — CEFDINIR 300 MG/1
300 CAPSULE ORAL 2 TIMES DAILY
Qty: 6 CAPSULE | Refills: 0 | Status: SHIPPED | OUTPATIENT
Start: 2023-03-27 | End: 2023-03-30

## 2023-03-27 RX ORDER — DEXMEDETOMIDINE HYDROCHLORIDE 100 UG/ML
INJECTION, SOLUTION INTRAVENOUS
Status: DISCONTINUED | OUTPATIENT
Start: 2023-03-27 | End: 2023-03-27

## 2023-03-27 RX ORDER — SODIUM CHLORIDE, SODIUM LACTATE, POTASSIUM CHLORIDE, CALCIUM CHLORIDE 600; 310; 30; 20 MG/100ML; MG/100ML; MG/100ML; MG/100ML
INJECTION, SOLUTION INTRAVENOUS CONTINUOUS
Status: ACTIVE | OUTPATIENT
Start: 2023-03-27

## 2023-03-27 RX ADMIN — CEFAZOLIN SODIUM 2 G: 2 SOLUTION INTRAVENOUS at 06:03

## 2023-03-27 RX ADMIN — PROPOFOL 25 MG: 10 INJECTION, EMULSION INTRAVENOUS at 07:03

## 2023-03-27 RX ADMIN — MIDAZOLAM HYDROCHLORIDE 2 MG: 1 INJECTION INTRAMUSCULAR; INTRAVENOUS at 06:03

## 2023-03-27 RX ADMIN — FENTANYL CITRATE 50 MCG: 50 INJECTION, SOLUTION INTRAMUSCULAR; INTRAVENOUS at 06:03

## 2023-03-27 RX ADMIN — PROPOFOL 25 MG: 10 INJECTION, EMULSION INTRAVENOUS at 06:03

## 2023-03-27 RX ADMIN — LIDOCAINE HYDROCHLORIDE 50 MG: 20 INJECTION INTRAVENOUS at 06:03

## 2023-03-27 RX ADMIN — DEXMEDETOMIDINE HYDROCHLORIDE 4 MCG: 100 INJECTION, SOLUTION INTRAVENOUS at 07:03

## 2023-03-27 RX ADMIN — SODIUM CHLORIDE, SODIUM LACTATE, POTASSIUM CHLORIDE, AND CALCIUM CHLORIDE: 600; 310; 30; 20 INJECTION, SOLUTION INTRAVENOUS at 06:03

## 2023-03-27 RX ADMIN — ONDANSETRON 4 MG: 2 INJECTION INTRAMUSCULAR; INTRAVENOUS at 07:03

## 2023-03-27 NOTE — PLAN OF CARE
Pt lying in bed in NAD drinking juice,VSS,RR equal and unlabored. Bed is locked and low. Side rails up x 2.

## 2023-03-27 NOTE — TRANSFER OF CARE
"Anesthesia Transfer of Care Note    Patient: Smitha Salinas    Procedure(s) Performed: Procedure(s) (LRB):  CYSTOSCOPY,WITH BOTULINUM TOXIN INJECTION (N/A)    Patient location: PACU    Anesthesia Type: MAC    Transport from OR: Transported from OR on room air with adequate spontaneous ventilation    Post pain: adequate analgesia    Post assessment: no apparent anesthetic complications and tolerated procedure well    Post vital signs: stable    Level of consciousness: awake, alert and oriented    Nausea/Vomiting: no nausea/vomiting    Complications: none    Transfer of care protocol was followed      Last vitals:   Visit Vitals  BP (!) 149/70 (BP Location: Right arm, Patient Position: Sitting)   Pulse (!) 50   Temp 35.9 °C (96.7 °F) (Temporal)   Resp 16   Ht 5' 5" (1.651 m)   Wt 65.1 kg (143 lb 8.3 oz)   SpO2 98%   Breastfeeding No   BMI 23.88 kg/m²     "

## 2023-03-27 NOTE — OP NOTE
Date of Procedure: 03/27/2023    PREOP DIAGNOSIS:  Urge incontinence.    POSTOP DIAGNOSIS:  Urge incontinence.    PROCEDURES:      1. Cystoscopy with injection of botox.    2. 100 units of botox    SURGEON:  Pablo Rawls M.D.    Assistants: None    Specimen: None    ANESTHESIA:  MAC.    BLOOD LOSS:  None.    FINDINGS:  20 injections given for a total of 100 units of botox, cystoscopy unremarkable.    PROCEDURE IN DETAIL:  Patient was brought to the operative suite and placed under general anesthesia and positioned into the dorsal lithotomy position.  After being sterilely prepped and draped a 21 British sheath cystoscope was inserted into a normal urethra.  Bladder was examined, no mucosal abnormalities.  Bilateral ureteral orifices are normal in size, shape, caliber and location.  Otherwise unremarkable cystoscopic examination.  We then began injections of botox, for a total of 20 injections.  These were divided into 5 vertical rows of 4 injections a piece.  Lateral rows were lateral to each ureteral orifice.  0.5 ml within each injection.  At the conclusion at total of 100 units of botox was injected within the bladder.  We then emptied the bladder and there was no evidence of bleeding, hemostasis was assessed and maintained.  Ureteral orifices were well away from injection sites.  Patient was transferred to the PACU in stable condition.  Patient will return in 1 month with a pvr to assess and possibly schedule another treatment.    COMPLICATIONS: None

## 2023-03-27 NOTE — ANESTHESIA POSTPROCEDURE EVALUATION
Anesthesia Post Evaluation    Patient: Smitha Salinas    Procedure(s) Performed: Procedure(s) (LRB):  CYSTOSCOPY,WITH BOTULINUM TOXIN INJECTION (N/A)    Final Anesthesia Type: MAC      Patient location during evaluation: PACU  Patient participation: Yes- Able to Participate  Level of consciousness: awake and alert and oriented  Post-procedure vital signs: reviewed and stable  Pain management: adequate  Airway patency: patent    PONV status at discharge: No PONV  Anesthetic complications: no      Cardiovascular status: hemodynamically stable  Respiratory status: unassisted  Hydration status: euvolemic  Follow-up not needed.          Vitals Value Taken Time   /77 03/27/23 0815   Temp 36.6 °C (97.9 °F) 03/27/23 0815   Pulse 60 03/27/23 0815   Resp 14 03/27/23 0815   SpO2 99 % 03/27/23 0815         Event Time   Out of Recovery 07:52:00         Pain/Bianka Score: Bianka Score: 10 (3/27/2023  8:15 AM)

## 2023-03-27 NOTE — DISCHARGE SUMMARY
[facility]  Discharge Note  Short Stay    Procedure(s) (LRB):  CYSTOSCOPY,WITH BOTULINUM TOXIN INJECTION (N/A)      OUTCOME: Patient tolerated treatment/procedure well without complication and is now ready for discharge.    DISPOSITION: Home or Self Care    FINAL DIAGNOSIS:  <principal problem not specified>    FOLLOWUP: In clinic    DISCHARGE INSTRUCTIONS:    Discharge Procedure Orders   Diet Adult Regular     Notify your health care provider if you experience any of the following:  severe uncontrolled pain     Notify your health care provider if you experience any of the following:  persistent nausea and vomiting or diarrhea     Notify your health care provider if you experience any of the following:  temperature >100.4     Activity as tolerated        TIME SPENT ON DISCHARGE: 5 minutes

## 2023-03-27 NOTE — PLAN OF CARE
Patient assisted to bathroom in wheelchair by RN, patient AAOx3, in NAD, VSS, ambulatory with steady gait.

## 2023-03-28 ENCOUNTER — TELEPHONE (OUTPATIENT)
Dept: UROLOGY | Facility: CLINIC | Age: 73
End: 2023-03-28
Payer: MEDICARE

## 2023-03-28 LAB
COLLECT DURATION TIME UR: 24 H
DOPAMINE 24H UR-MRATE: 124 MCG/24 H (ref 65–400)
EPINEPH 24H UR-MRATE: 6.6 MCG/24 H
NOREPINEPH 24H UR-MRATE: 36 MCG/24 H (ref 15–80)
SPECIMEN VOL 24H UR: 300 ML

## 2023-03-29 ENCOUNTER — PATIENT MESSAGE (OUTPATIENT)
Dept: RHEUMATOLOGY | Facility: CLINIC | Age: 73
End: 2023-03-29
Payer: MEDICARE

## 2023-03-29 ENCOUNTER — OFFICE VISIT (OUTPATIENT)
Dept: HEPATOLOGY | Facility: CLINIC | Age: 73
End: 2023-03-29
Payer: MEDICARE

## 2023-03-29 VITALS — HEIGHT: 65 IN | WEIGHT: 135 LBS | BODY MASS INDEX: 22.49 KG/M2

## 2023-03-29 DIAGNOSIS — R79.89 ABNORMAL LFTS: Primary | ICD-10-CM

## 2023-03-29 PROCEDURE — 3008F PR BODY MASS INDEX (BMI) DOCUMENTED: ICD-10-PCS | Mod: CPTII,95,, | Performed by: INTERNAL MEDICINE

## 2023-03-29 PROCEDURE — 4010F ACE/ARB THERAPY RXD/TAKEN: CPT | Mod: CPTII,95,, | Performed by: INTERNAL MEDICINE

## 2023-03-29 PROCEDURE — 1160F PR REVIEW ALL MEDS BY PRESCRIBER/CLIN PHARMACIST DOCUMENTED: ICD-10-PCS | Mod: CPTII,95,, | Performed by: INTERNAL MEDICINE

## 2023-03-29 PROCEDURE — 1160F RVW MEDS BY RX/DR IN RCRD: CPT | Mod: CPTII,95,, | Performed by: INTERNAL MEDICINE

## 2023-03-29 PROCEDURE — 3008F BODY MASS INDEX DOCD: CPT | Mod: CPTII,95,, | Performed by: INTERNAL MEDICINE

## 2023-03-29 PROCEDURE — 99213 PR OFFICE/OUTPT VISIT, EST, LEVL III, 20-29 MIN: ICD-10-PCS | Mod: 95,,, | Performed by: INTERNAL MEDICINE

## 2023-03-29 PROCEDURE — 99213 OFFICE O/P EST LOW 20 MIN: CPT | Mod: 95,,, | Performed by: INTERNAL MEDICINE

## 2023-03-29 PROCEDURE — 4010F PR ACE/ARB THEARPY RXD/TAKEN: ICD-10-PCS | Mod: CPTII,95,, | Performed by: INTERNAL MEDICINE

## 2023-03-29 PROCEDURE — 1126F AMNT PAIN NOTED NONE PRSNT: CPT | Mod: CPTII,95,, | Performed by: INTERNAL MEDICINE

## 2023-03-29 PROCEDURE — 3044F PR MOST RECENT HEMOGLOBIN A1C LEVEL <7.0%: ICD-10-PCS | Mod: CPTII,95,, | Performed by: INTERNAL MEDICINE

## 2023-03-29 PROCEDURE — 1159F PR MEDICATION LIST DOCUMENTED IN MEDICAL RECORD: ICD-10-PCS | Mod: CPTII,95,, | Performed by: INTERNAL MEDICINE

## 2023-03-29 PROCEDURE — 3044F HG A1C LEVEL LT 7.0%: CPT | Mod: CPTII,95,, | Performed by: INTERNAL MEDICINE

## 2023-03-29 PROCEDURE — 3288F PR FALLS RISK ASSESSMENT DOCUMENTED: ICD-10-PCS | Mod: CPTII,95,, | Performed by: INTERNAL MEDICINE

## 2023-03-29 PROCEDURE — 3288F FALL RISK ASSESSMENT DOCD: CPT | Mod: CPTII,95,, | Performed by: INTERNAL MEDICINE

## 2023-03-29 PROCEDURE — 1101F PT FALLS ASSESS-DOCD LE1/YR: CPT | Mod: CPTII,95,, | Performed by: INTERNAL MEDICINE

## 2023-03-29 PROCEDURE — 1159F MED LIST DOCD IN RCRD: CPT | Mod: CPTII,95,, | Performed by: INTERNAL MEDICINE

## 2023-03-29 PROCEDURE — 1101F PR PT FALLS ASSESS DOC 0-1 FALLS W/OUT INJ PAST YR: ICD-10-PCS | Mod: CPTII,95,, | Performed by: INTERNAL MEDICINE

## 2023-03-29 PROCEDURE — 1126F PR PAIN SEVERITY QUANTIFIED, NO PAIN PRESENT: ICD-10-PCS | Mod: CPTII,95,, | Performed by: INTERNAL MEDICINE

## 2023-03-29 NOTE — TELEPHONE ENCOUNTER
----- Message from Daksha Han sent at 3/29/2023  3:49 PM CDT -----  Patient is requesting a call back at [phone].Thanks

## 2023-03-29 NOTE — PROGRESS NOTES
Subjective:     Smitha Salinas is here for evaluation of abnormal LFTs    History of Present Illness:  Smitha Salinas is a  72-year-old female with seropositive rheumatoid arthritis, was treated with the Rinvoq, Actemra and Humira in the past, currently is also on prednisone.  She is referred for abnormal LFTs     RinvoQ, - 7/20- 11/2021   actimra- 0/22- 11/22  humira- 2005 was on for [id number]   prednisone -   Frequently in between other treatments     Duration of abnormality- she has had isolated abnormal LFTs since 2008, lately they have been consistently elevated  Medications/OTC/Herbal-as above  ETOH- social  Metabolic issues-diabetes , hypertension  BMI- 25    2/28/23  No significant complaints since last visit.  Says this morning she was trying to catch someone else's dog that was running away and stumbled on her feet, had a fall with scraped knees but denies serious injuries    3/29/2023       Visit type: audiovisual     Face to Face time with patient: 25 minutes  35 minutes of total time spent on the encounter, which includes face to face time and non-face to face time preparing to see the patient (eg, review of tests), Obtaining and/or reviewing separately obtained history, Documenting clinical information in the electronic or other health record, Independently interpreting results (not separately reported) and communicating results to the patient/family/caregiver, or Care coordination (not separately reported).      Each patient to whom he or she provides medical services by telemedicine is:  (1) informed of the relationship between the physician and patient and the respective role of any other health care provider with respect to management of the patient; and (2) notified that he or she may decline to receive medical services by telemedicine and may withdraw from such care at any time.      Started enbrel aprox  4 month ago for RA.  She had repeat liver enzymes which are elevated.  She denies any  liver related complaints    Review of Systems   Constitutional:  Negative for fatigue, fever and unexpected weight change.   Gastrointestinal:  Negative for abdominal distention, abdominal pain, blood in stool, nausea and vomiting.   Musculoskeletal:  Negative for arthralgias and gait problem.   Skin:  Negative for pallor and rash.   Neurological:  Negative for dizziness.   Hematological:  Does not bruise/bleed easily.   Psychiatric/Behavioral:  Negative for confusion, hallucinations and sleep disturbance.      Objective:     Physical Exam  Vitals and nursing note reviewed.   Constitutional:       Appearance: She is obese.   Eyes:      General: No scleral icterus.  Pulmonary:      Effort: Pulmonary effort is normal.      Breath sounds: No rhonchi.   Abdominal:      General: Bowel sounds are normal. There is no distension.      Palpations: There is no mass.      Tenderness: There is no abdominal tenderness.   Musculoskeletal:      Right lower leg: No edema.      Left lower leg: No edema.   Skin:     Coloration: Skin is not jaundiced.   Neurological:      Mental Status: She is alert and oriented to person, place, and time.      Coordination: Coordination normal.   Psychiatric:         Behavior: Behavior normal.         Thought Content: Thought content normal.       Computed MELD-Na score unavailable. Necessary lab results were not found in the last year.  Computed MELD score unavailable. Necessary lab results were not found in the last year.    WBC   Date Value Ref Range Status   03/14/2023 6.56 3.90 - 12.70 K/uL Final     Hemoglobin   Date Value Ref Range Status   03/14/2023 12.3 12.0 - 16.0 g/dL Final     Hematocrit   Date Value Ref Range Status   03/14/2023 40.1 37.0 - 48.5 % Final     Platelets   Date Value Ref Range Status   03/14/2023 176 150 - 450 K/uL Final     BUN   Date Value Ref Range Status   03/21/2023 11 8 - 23 mg/dL Final     Creatinine   Date Value Ref Range Status   03/21/2023 0.7 0.5 - 1.4 mg/dL  Final     Glucose   Date Value Ref Range Status   03/21/2023 76 70 - 110 mg/dL Final     Calcium   Date Value Ref Range Status   03/21/2023 9.9 8.7 - 10.5 mg/dL Final     Sodium   Date Value Ref Range Status   03/21/2023 141 136 - 145 mmol/L Final     Potassium   Date Value Ref Range Status   03/21/2023 3.8 3.5 - 5.1 mmol/L Final     Chloride   Date Value Ref Range Status   03/21/2023 107 95 - 110 mmol/L Final     Magnesium   Date Value Ref Range Status   09/06/2013 2.1 1.6 - 2.6 mg/dL Final     AST   Date Value Ref Range Status   03/21/2023 49 (H) 10 - 40 U/L Final     ALT   Date Value Ref Range Status   03/21/2023 99 (H) 10 - 44 U/L Final     Alkaline Phosphatase   Date Value Ref Range Status   03/21/2023 75 55 - 135 U/L Final     Total Bilirubin   Date Value Ref Range Status   03/21/2023 0.5 0.1 - 1.0 mg/dL Final     Comment:     For infants and newborns, interpretation of results should be based  on gestational age, weight and in agreement with clinical  observations.    Premature Infant recommended reference ranges:  Up to 24 hours.............<8.0 mg/dL  Up to 48 hours............<12.0 mg/dL  3-5 days..................<15.0 mg/dL  6-29 days.................<15.0 mg/dL       Albumin   Date Value Ref Range Status   03/21/2023 4.4 3.5 - 5.2 g/dL Final     INR   Date Value Ref Range Status   02/01/2023 0.9 0.8 - 1.2 Final     Comment:     Coumadin Therapy:  2.0 - 3.0 for INR for all indicators except mechanical heart valves  and antiphospholipid syndromes which should use 2.5 - 3.5.           Assessment/Plan:     1.Abnormal LFTs:  Like infliximab, adalimumab also could cause mild transient elevation of liver enzymes.  In the same way Jose 1 inhibitors also cause elevated liver enzymes, could be obtain 11% of patients.  We will obtain a FibroScan to assess underlying chronic inflammation with fibrosis  Will initiate work up to rule out autoimmune of the liver diseases, infectious serologies for hepatitis ABC are  negative  Ultrasound of the abdomen on 2022 shows no liver lesions    2. Rheumatoid arthritis    2023  Now her liver enzymes have normalized, I suspect elevated liver enzymes likely secondary to tocilizumab (Actemra)  FibroScan shows no significant scar tissue indicating no chronic inflammation in spite of transaminases being elevated.  Now that she is on Enbrel, which also could elevate liver enzymes without actual underlying liver injury, will monitor liver enzymes 6 months from now   Lost 40 lbs on Ozempic. Lost 10 lbs on her own. Which also could have have contributed to normalization of liver enzymes    Fibroscan readin.4 KPa  Fibrosis:F 0-1   CAP readin dB/m  Steatosis: :<S1     2023    Suspect mild elevation of liver enzymes secondary to Enbrel, however still less than twice the upper limit of normal which is expected mild elevation of transaminases.  Requested patient to continue Enbrel especially as her symptoms of rheumatoid arthritis seem to be improving, she joined gym.  We will monitor LFTs monthly, if transaminases continue to get worse then may have to stop enbrel and yet again consider alternative.     I have reviewed existing labs, imaging, procedures. Educated patient about disease process, prognosis. Ordered required labs, images and discussed treatment plan.     Maeve Baldwin MD  Transplant Hepatologist  Dept of Hepatology, [place] [facility]

## 2023-03-29 NOTE — TELEPHONE ENCOUNTER
"Contacted patient to discuss this with her. Prescription called in to [employer] per patient request. Patient thanked me for everything and verbalized understanding. All questions answered.    Radha Damon (Allye), [facility]  Rheumatology Department     "

## 2023-04-05 ENCOUNTER — PATIENT MESSAGE (OUTPATIENT)
Dept: RHEUMATOLOGY | Facility: CLINIC | Age: 73
End: 2023-04-05
Payer: MEDICARE

## 2023-04-06 ENCOUNTER — TELEPHONE (OUTPATIENT)
Dept: RHEUMATOLOGY | Facility: CLINIC | Age: 73
End: 2023-04-06
Payer: MEDICARE

## 2023-04-06 DIAGNOSIS — M05.9 SEROPOSITIVE RHEUMATOID ARTHRITIS: Primary | ICD-10-CM

## 2023-04-06 RX ORDER — ETANERCEPT 50 MG/ML
50 SOLUTION SUBCUTANEOUS WEEKLY
Qty: 12 ML | Refills: 3 | Status: SHIPPED | OUTPATIENT
Start: 2023-04-06 | End: 2023-04-13

## 2023-04-13 DIAGNOSIS — M05.9 SEROPOSITIVE RHEUMATOID ARTHRITIS: Primary | ICD-10-CM

## 2023-04-13 RX ORDER — ETANERCEPT 50 MG/ML
1 SOLUTION SUBCUTANEOUS
Qty: 12 ML | Refills: 3 | Status: SHIPPED | OUTPATIENT
Start: 2023-04-13 | End: 2023-12-13 | Stop reason: SDUPTHER

## 2023-04-17 ENCOUNTER — PATIENT MESSAGE (OUTPATIENT)
Dept: RHEUMATOLOGY | Facility: CLINIC | Age: 73
End: 2023-04-17
Payer: MEDICARE

## 2023-04-17 PROBLEM — I25.10 CORONARY ARTERY DISEASE INVOLVING NATIVE CORONARY ARTERY OF NATIVE HEART: Status: ACTIVE | Noted: 2023-04-17

## 2023-04-17 PROBLEM — I27.20 PULMONARY HYPERTENSION: Status: ACTIVE | Noted: 2023-04-17

## 2023-04-17 PROBLEM — F32.0 MAJOR DEPRESSIVE DISORDER, SINGLE EPISODE, MILD: Status: ACTIVE | Noted: 2023-04-17

## 2023-04-17 PROBLEM — I70.0 AORTIC ATHEROSCLEROSIS: Status: ACTIVE | Noted: 2023-04-17

## 2023-04-17 PROBLEM — F11.20 OPIOID DEPENDENCE, DAILY USE: Status: ACTIVE | Noted: 2023-04-17

## 2023-04-17 NOTE — ASSESSMENT & PLAN NOTE
10/22/2021 CTA chest   -Heart/Aorta: Heart size is enlarged.  Mild coronary artery disease.  No pericardial effusion. Aorta normal caliber. Aorta demonstrates atherosclerotic disease.

## 2023-04-17 NOTE — ASSESSMENT & PLAN NOTE
10/22/2021 CTA chest  -Pulmonary arteries: Pulmonary arteries are well opacified.  No evidence of pulmonary embolism.  Prominence of pulmonary artery suggests chronic pulmonary hypertension.  No right heart strain is identified with RV/LV ratio < 0.9.  10/09/2020 Echo-  Summary     There is moderate left ventricular concentric hypertrophy.   The left ventricle is normal in size with normal systolic function. The estimated ejection fraction is 60%.   Indeterminate diastolic function.   Normal right ventricular systolic function.   Mild tricuspid regurgitation.   Normal central venous pressure (3 mmHg).   The estimated PA systolic pressure is 25 mmHg.

## 2023-04-18 ENCOUNTER — TELEPHONE (OUTPATIENT)
Dept: PHARMACY | Facility: CLINIC | Age: 73
End: 2023-04-18
Payer: MEDICARE

## 2023-04-18 NOTE — TELEPHONE ENCOUNTER
I have informed Smitha Salinas she has been approved in the [employer] Patient Assistance Program through 12/31/2023. A 120 day supply of Ozempic will be shipped to [facility] OFFICE in 7-14 business days.

## 2023-04-25 ENCOUNTER — OFFICE VISIT (OUTPATIENT)
Dept: INTERNAL MEDICINE | Facility: CLINIC | Age: 73
End: 2023-04-25
Payer: MEDICARE

## 2023-04-25 ENCOUNTER — PATIENT MESSAGE (OUTPATIENT)
Dept: RHEUMATOLOGY | Facility: CLINIC | Age: 73
End: 2023-04-25
Payer: MEDICARE

## 2023-04-25 ENCOUNTER — PATIENT MESSAGE (OUTPATIENT)
Dept: ALLERGY | Facility: CLINIC | Age: 73
End: 2023-04-25
Payer: MEDICARE

## 2023-04-25 ENCOUNTER — LAB VISIT (OUTPATIENT)
Dept: LAB | Facility: HOSPITAL | Age: 73
End: 2023-04-25
Attending: INTERNAL MEDICINE
Payer: MEDICARE

## 2023-04-25 VITALS
WEIGHT: 140.88 LBS | HEART RATE: 60 BPM | SYSTOLIC BLOOD PRESSURE: 138 MMHG | RESPIRATION RATE: 20 BRPM | BODY MASS INDEX: 23.47 KG/M2 | DIASTOLIC BLOOD PRESSURE: 74 MMHG | HEIGHT: 65 IN

## 2023-04-25 DIAGNOSIS — I70.0 AORTIC ATHEROSCLEROSIS: ICD-10-CM

## 2023-04-25 DIAGNOSIS — K63.5 POLYP OF COLON, UNSPECIFIED PART OF COLON, UNSPECIFIED TYPE: ICD-10-CM

## 2023-04-25 DIAGNOSIS — F32.0 MAJOR DEPRESSIVE DISORDER, SINGLE EPISODE, MILD: ICD-10-CM

## 2023-04-25 DIAGNOSIS — K57.90 DIVERTICULOSIS: ICD-10-CM

## 2023-04-25 DIAGNOSIS — E11.36 DM CATARACT: ICD-10-CM

## 2023-04-25 DIAGNOSIS — Z00.00 ENCOUNTER FOR PREVENTATIVE ADULT HEALTH CARE EXAMINATION: Primary | ICD-10-CM

## 2023-04-25 DIAGNOSIS — E11.69 TYPE 2 DIABETES MELLITUS WITH HYPERCHOLESTEROLEMIA: ICD-10-CM

## 2023-04-25 DIAGNOSIS — I27.20 PULMONARY HYPERTENSION: ICD-10-CM

## 2023-04-25 DIAGNOSIS — F90.9 ADULT ADHD: ICD-10-CM

## 2023-04-25 DIAGNOSIS — R79.89 ABNORMAL LFTS: ICD-10-CM

## 2023-04-25 DIAGNOSIS — M54.10 RADICULAR LOW BACK PAIN: ICD-10-CM

## 2023-04-25 DIAGNOSIS — I25.10 CORONARY ARTERY DISEASE INVOLVING NATIVE CORONARY ARTERY OF NATIVE HEART, UNSPECIFIED WHETHER ANGINA PRESENT: ICD-10-CM

## 2023-04-25 DIAGNOSIS — E11.59 HYPERTENSION ASSOCIATED WITH DIABETES: ICD-10-CM

## 2023-04-25 DIAGNOSIS — M15.9 PRIMARY OSTEOARTHRITIS INVOLVING MULTIPLE JOINTS: ICD-10-CM

## 2023-04-25 DIAGNOSIS — I15.2 HYPERTENSION ASSOCIATED WITH DIABETES: ICD-10-CM

## 2023-04-25 DIAGNOSIS — N32.81 OVERACTIVE BLADDER: ICD-10-CM

## 2023-04-25 DIAGNOSIS — D69.6 THROMBOCYTOPENIA, UNSPECIFIED: ICD-10-CM

## 2023-04-25 DIAGNOSIS — E78.00 TYPE 2 DIABETES MELLITUS WITH HYPERCHOLESTEROLEMIA: ICD-10-CM

## 2023-04-25 DIAGNOSIS — M19.079 OSTEOARTHRITIS OF FOOT, UNSPECIFIED LATERALITY, UNSPECIFIED OSTEOARTHRITIS TYPE: ICD-10-CM

## 2023-04-25 DIAGNOSIS — F12.90 MARIJUANA USE: ICD-10-CM

## 2023-04-25 DIAGNOSIS — N39.41 URGE INCONTINENCE: ICD-10-CM

## 2023-04-25 DIAGNOSIS — F41.8 MIXED ANXIETY AND DEPRESSIVE DISORDER: ICD-10-CM

## 2023-04-25 DIAGNOSIS — G47.33 OSA (OBSTRUCTIVE SLEEP APNEA): ICD-10-CM

## 2023-04-25 DIAGNOSIS — E11.69 TYPE 2 DIABETES MELLITUS WITH OTHER SPECIFIED COMPLICATION, WITHOUT LONG-TERM CURRENT USE OF INSULIN: ICD-10-CM

## 2023-04-25 DIAGNOSIS — D84.9 IMMUNOSUPPRESSED STATUS: ICD-10-CM

## 2023-04-25 DIAGNOSIS — M85.89 OSTEOPENIA OF MULTIPLE SITES: ICD-10-CM

## 2023-04-25 DIAGNOSIS — M05.9 SEROPOSITIVE RHEUMATOID ARTHRITIS: ICD-10-CM

## 2023-04-25 DIAGNOSIS — F11.20 OPIOID DEPENDENCE, DAILY USE: ICD-10-CM

## 2023-04-25 DIAGNOSIS — R93.1 ELEVATED CORONARY ARTERY CALCIUM SCORE: ICD-10-CM

## 2023-04-25 LAB
ALBUMIN SERPL BCP-MCNC: 4.2 G/DL (ref 3.5–5.2)
ALP SERPL-CCNC: 77 U/L (ref 55–135)
ALT SERPL W/O P-5'-P-CCNC: 25 U/L (ref 10–44)
ANION GAP SERPL CALC-SCNC: 8 MMOL/L (ref 8–16)
AST SERPL-CCNC: 19 U/L (ref 10–40)
BILIRUB SERPL-MCNC: 0.3 MG/DL (ref 0.1–1)
BUN SERPL-MCNC: 12 MG/DL (ref 8–23)
CALCIUM SERPL-MCNC: 9.9 MG/DL (ref 8.7–10.5)
CHLORIDE SERPL-SCNC: 110 MMOL/L (ref 95–110)
CO2 SERPL-SCNC: 26 MMOL/L (ref 23–29)
CREAT SERPL-MCNC: 0.8 MG/DL (ref 0.5–1.4)
EST. GFR  (NO RACE VARIABLE): >60 ML/MIN/1.73 M^2
GLUCOSE SERPL-MCNC: 115 MG/DL (ref 70–110)
POTASSIUM SERPL-SCNC: 4.4 MMOL/L (ref 3.5–5.1)
PROT SERPL-MCNC: 6.9 G/DL (ref 6–8.4)
SODIUM SERPL-SCNC: 144 MMOL/L (ref 136–145)

## 2023-04-25 PROCEDURE — 4010F PR ACE/ARB THEARPY RXD/TAKEN: ICD-10-PCS | Mod: CPTII,S$GLB,, | Performed by: NURSE PRACTITIONER

## 2023-04-25 PROCEDURE — 3288F FALL RISK ASSESSMENT DOCD: CPT | Mod: CPTII,S$GLB,, | Performed by: NURSE PRACTITIONER

## 2023-04-25 PROCEDURE — 99999 PR PBB SHADOW E&M-EST. PATIENT-LVL V: CPT | Mod: PBBFAC,,, | Performed by: NURSE PRACTITIONER

## 2023-04-25 PROCEDURE — 3044F PR MOST RECENT HEMOGLOBIN A1C LEVEL <7.0%: ICD-10-PCS | Mod: CPTII,S$GLB,, | Performed by: NURSE PRACTITIONER

## 2023-04-25 PROCEDURE — 36415 COLL VENOUS BLD VENIPUNCTURE: CPT | Performed by: INTERNAL MEDICINE

## 2023-04-25 PROCEDURE — 1160F PR REVIEW ALL MEDS BY PRESCRIBER/CLIN PHARMACIST DOCUMENTED: ICD-10-PCS | Mod: CPTII,S$GLB,, | Performed by: NURSE PRACTITIONER

## 2023-04-25 PROCEDURE — 80053 COMPREHEN METABOLIC PANEL: CPT | Performed by: INTERNAL MEDICINE

## 2023-04-25 PROCEDURE — 3078F PR MOST RECENT DIASTOLIC BLOOD PRESSURE < 80 MM HG: ICD-10-PCS | Mod: CPTII,S$GLB,, | Performed by: NURSE PRACTITIONER

## 2023-04-25 PROCEDURE — 1170F PR FUNCTIONAL STATUS ASSESSED: ICD-10-PCS | Mod: CPTII,S$GLB,, | Performed by: NURSE PRACTITIONER

## 2023-04-25 PROCEDURE — 1159F MED LIST DOCD IN RCRD: CPT | Mod: CPTII,S$GLB,, | Performed by: NURSE PRACTITIONER

## 2023-04-25 PROCEDURE — 3288F PR FALLS RISK ASSESSMENT DOCUMENTED: ICD-10-PCS | Mod: CPTII,S$GLB,, | Performed by: NURSE PRACTITIONER

## 2023-04-25 PROCEDURE — 3044F HG A1C LEVEL LT 7.0%: CPT | Mod: CPTII,S$GLB,, | Performed by: NURSE PRACTITIONER

## 2023-04-25 PROCEDURE — 1100F PR PT FALLS ASSESS DOC 2+ FALLS/FALL W/INJURY/YR: ICD-10-PCS | Mod: CPTII,S$GLB,, | Performed by: NURSE PRACTITIONER

## 2023-04-25 PROCEDURE — G0439 PPPS, SUBSEQ VISIT: HCPCS | Mod: S$GLB,,, | Performed by: NURSE PRACTITIONER

## 2023-04-25 PROCEDURE — 4010F ACE/ARB THERAPY RXD/TAKEN: CPT | Mod: CPTII,S$GLB,, | Performed by: NURSE PRACTITIONER

## 2023-04-25 PROCEDURE — 99999 PR PBB SHADOW E&M-EST. PATIENT-LVL V: ICD-10-PCS | Mod: PBBFAC,,, | Performed by: NURSE PRACTITIONER

## 2023-04-25 PROCEDURE — 1100F PTFALLS ASSESS-DOCD GE2>/YR: CPT | Mod: CPTII,S$GLB,, | Performed by: NURSE PRACTITIONER

## 2023-04-25 PROCEDURE — 3078F DIAST BP <80 MM HG: CPT | Mod: CPTII,S$GLB,, | Performed by: NURSE PRACTITIONER

## 2023-04-25 PROCEDURE — 1170F FXNL STATUS ASSESSED: CPT | Mod: CPTII,S$GLB,, | Performed by: NURSE PRACTITIONER

## 2023-04-25 PROCEDURE — 1160F RVW MEDS BY RX/DR IN RCRD: CPT | Mod: CPTII,S$GLB,, | Performed by: NURSE PRACTITIONER

## 2023-04-25 PROCEDURE — 3008F BODY MASS INDEX DOCD: CPT | Mod: CPTII,S$GLB,, | Performed by: NURSE PRACTITIONER

## 2023-04-25 PROCEDURE — 1159F PR MEDICATION LIST DOCUMENTED IN MEDICAL RECORD: ICD-10-PCS | Mod: CPTII,S$GLB,, | Performed by: NURSE PRACTITIONER

## 2023-04-25 PROCEDURE — 3075F PR MOST RECENT SYSTOLIC BLOOD PRESS GE 130-139MM HG: ICD-10-PCS | Mod: CPTII,S$GLB,, | Performed by: NURSE PRACTITIONER

## 2023-04-25 PROCEDURE — G0439 PR MEDICARE ANNUAL WELLNESS SUBSEQUENT VISIT: ICD-10-PCS | Mod: S$GLB,,, | Performed by: NURSE PRACTITIONER

## 2023-04-25 PROCEDURE — 3008F PR BODY MASS INDEX (BMI) DOCUMENTED: ICD-10-PCS | Mod: CPTII,S$GLB,, | Performed by: NURSE PRACTITIONER

## 2023-04-25 PROCEDURE — 3075F SYST BP GE 130 - 139MM HG: CPT | Mod: CPTII,S$GLB,, | Performed by: NURSE PRACTITIONER

## 2023-04-25 NOTE — PROGRESS NOTES
"Smitha Salinas presented for a  Medicare AWV and comprehensive Health Risk Assessment today. The following components were reviewed and updated:    Medical history  Family History  Social history  Allergies and Current Medications  Health Risk Assessment  Health Maintenance  Care Team         ** See Completed Assessments for Annual Wellness Visit within the encounter summary.**         The following assessments were completed:  Living Situation  CAGE  Depression Screening  Timed Get Up and Go  Whisper Test  Cognitive Function Screening  Nutrition Screening  ADL Screening  PAQ Screening        Vitals:    04/25/23 1220   BP: 138/74   BP Location: Right arm   Patient Position: Sitting   Pulse: 60   Resp: 20   Weight: 63.9 kg (140 lb 14 oz)   Height:    Pain scale 5' 5" (1.651 m)    0  today     Body mass index is 23.44 kg/m².  Physical Exam  Constitutional:       General: She is not in acute distress.     Appearance: She is not ill-appearing or diaphoretic.   HENT:      Head: Normocephalic and atraumatic.      Mouth/Throat:      Mouth: Mucous membranes are moist.   Eyes:      General:         Right eye: No discharge.         Left eye: No discharge.      Extraocular Movements: Extraocular movements intact.      Conjunctiva/sclera: Conjunctivae normal.   Cardiovascular:      Rate and Rhythm: Normal rate and regular rhythm.   Pulmonary:      Effort: Pulmonary effort is normal. No respiratory distress.   Skin:     General: Skin is warm and dry.   Neurological:      Mental Status: She is alert and oriented to person, place, and time. Mental status is at baseline.   Psychiatric:         Mood and Affect: Mood normal.         Behavior: Behavior normal.         Thought Content: Thought content normal.         Judgment: Judgment normal.           Diagnoses and health risks identified today and associated recommendations/orders:    1. Encounter for preventative adult health care examination  Review for opioid screening: Patient " does  have Rx for Opioids- Percocet, ultram  Patient on chronic opioids-. Percocet, ultram  Followed by physician  Risk factors reviewed. Risk factors may include Sleep-disordered breathing, renal or hepatic insufficiency, increased risk for falls and fractures, risk of opioid misuse/overdose/opioid use disorder.  Pain evaluated during visit, documented under vitals.  Discussed nonpharmacologic treatments options, will follow up with prescribing provider to discuss further    Review for substance use disorder: Patient does not use substance per chart    Patient states she does not drink alcohol    I offered to discuss advanced care planning, including how to pick a person who would make decisions for you if you were unable to make them for yourself, called a health care power of , and what kind of decisions you might make such as use of life sustaining treatments such as ventilators and tube feeding when faced with a life limiting illness recorded on a living will that they will need to know. (How you want to be cared for as you near the end of your natural life)     X Patient is interested in learning more about how to make advanced directives.  I provided them paperwork and offered to discuss this with them.    2. Thrombocytopenia, unspecified  Monitor  Follow up as directed    3. Hypertension associated with diabetes  Monitor  Stable  Continue home Norvasc, catapres, losartan    4. Immunosuppressed status- Enbrel, Seropositive rheumatoid arthritis, Osteoarthritis of foot, unspecified laterality, unspecified osteoarthritis type, Primary osteoarthritis involving multiple joints, Radicular low back pain, Opioid dependence, daily use- percocet, ultram  Monitor  Follow up with Rheumatology as directed  Continue home enbrel, neurontin, percocet, ultram, prednisone    5. Type 2 diabetes mellitus with other specified complication, without long-term current use of insulin  Monitor  Stable  Continue home  ozempic    6. Coronary artery disease involving native coronary artery of native heart, unspecified whether angina present, Elevated coronary artery calcium score, Aortic atherosclerosis, Type 2 diabetes mellitus with hypercholesterolemia, Pulmonary hypertension  Monitor  Follow up with Cardiology as directed  Continue home asa, crestor    7. DM cataract  Monitor  Follow up with Ophtho    8. Marijuana use  Monitor  Follow up with PCP    9. Major depressive disorder, single episode, mild,  Mixed anxiety and depressive disorder, Adult ADHD  Monitor  Stable  Continue home valium    10. Osteopenia of multiple sites  Monitor  Follow up with PCP  Continue home calcium with Vit D     11. Overactive bladder, Urge incontinence  Monitor  Continue home myrbetriq    12. Diverticulosis, Polyp of colon, unspecified part of colon, unspecified type   Monitor  Follow up with GI as directed                   Provided Smitha with a 5-10 year written screening schedule and personal prevention plan. Recommendations were developed using the USPSTF age appropriate recommendations. Education, counseling, and referrals were provided as needed. After Visit Summary printed and given to patient which includes a list of additional screenings\tests needed.    Follow up in about 1 year (around 4/25/2024) for Annual wellness visit.    Kay Loya, SHASHI

## 2023-04-25 NOTE — PATIENT INSTRUCTIONS
Counseling and Referral of Other Preventative  (Italic type indicates deductible and co-insurance are waived)    Patient Name: Smitha Salinas  Today's Date: 4/25/2023    Health Maintenance       Date Due Completion Date    Colorectal Cancer Screening 02/12/2023 2/12/2020    Foot Exam 09/06/2023 9/6/2022 (Done)    Override on 9/6/2022: Done    Hemoglobin A1c 09/21/2023 3/21/2023    Mammogram 10/21/2023 10/21/2022    Override on 10/17/2012: Done    Diabetes Urine Screening 12/09/2023 12/9/2022    Lipid Panel 12/09/2023 12/9/2022    Eye Exam 01/18/2024 1/18/2023    DEXA Scan 02/11/2025 2/11/2022    TETANUS VACCINE 05/24/2032 5/24/2022        No orders of the defined types were placed in this encounter.    The following information is provided to all patients.  This information is to help you find resources for any of the problems found today that may be affecting your health:                Living healthy guide: www.[facility].[place].[facility]      Understanding Diabetes: www.diabetes.org      Eating healthy: www.cdc.gov/healthyweight      CDC home safety checklist: www.cdc.gov/steadi/patient.html      Agency on Aging: www.goea.[place]      Alcoholics anonymous (AA): www.aa.org      Physical Activity: www.zacarias.nih.gov/ow4znmd      Tobacco use: www.quitwithusla.org     Counseling and Referral of Other Preventative  (Italic type indicates deductible and co-insurance are waived)    Patient Name: Smitha Salinas  Today's Date: 4/25/2023    Health Maintenance       Date Due Completion Date    Colorectal Cancer Screening 02/12/2023 2/12/2020    Foot Exam 09/06/2023 9/6/2022 (Done)    Override on 9/6/2022: Done    Hemoglobin A1c 09/21/2023 3/21/2023    Mammogram 10/21/2023 10/21/2022    Override on 10/17/2012: Done    Diabetes Urine Screening 12/09/2023 12/9/2022    Lipid Panel 12/09/2023 12/9/2022    Eye Exam 01/18/2024 1/18/2023    DEXA Scan 02/11/2025 2/11/2022    TETANUS VACCINE 05/24/2032 5/24/2022        No orders of the  defined types were placed in this encounter.    The following information is provided to all patients.  This information is to help you find resources for any of the problems found today that may be affecting your health:                Living healthy guide: www.[facility].[place].[facility]      Understanding Diabetes: www.diabetes.org      Eating healthy: www.cdc.gov/healthyweight      CDC home safety checklist: www.cdc.gov/steadi/patient.html      Agency on Aging: www.goea.[place]      Alcoholics anonymous (AA): www.aa.org      Physical Activity: www.zacarias.nih.gov/jf0gnta      Tobacco use: www.quitwithusla.org

## 2023-04-26 ENCOUNTER — PATIENT MESSAGE (OUTPATIENT)
Dept: INTERNAL MEDICINE | Facility: CLINIC | Age: 73
End: 2023-04-26
Payer: MEDICARE

## 2023-04-26 RX ORDER — IPRATROPIUM BROMIDE 21 UG/1
2 SPRAY, METERED NASAL 3 TIMES DAILY
Qty: 20 ML | Refills: 5 | Status: SHIPPED | OUTPATIENT
Start: 2023-04-26 | End: 2023-12-20

## 2023-04-28 ENCOUNTER — PATIENT MESSAGE (OUTPATIENT)
Dept: RHEUMATOLOGY | Facility: CLINIC | Age: 73
End: 2023-04-28
Payer: MEDICARE

## 2023-04-28 ENCOUNTER — OFFICE VISIT (OUTPATIENT)
Dept: UROLOGY | Facility: CLINIC | Age: 73
End: 2023-04-28
Payer: MEDICARE

## 2023-04-28 VITALS
TEMPERATURE: 97 F | WEIGHT: 144.31 LBS | BODY MASS INDEX: 24.04 KG/M2 | RESPIRATION RATE: 18 BRPM | DIASTOLIC BLOOD PRESSURE: 73 MMHG | HEART RATE: 57 BPM | HEIGHT: 65 IN | SYSTOLIC BLOOD PRESSURE: 131 MMHG

## 2023-04-28 DIAGNOSIS — N39.41 URGE INCONTINENCE: Primary | ICD-10-CM

## 2023-04-28 LAB
BILIRUB SERPL-MCNC: NEGATIVE MG/DL
BLOOD URINE, POC: NORMAL
CLARITY, POC UA: CLEAR
COLOR, POC UA: YELLOW
GLUCOSE UR QL STRIP: NEGATIVE
KETONES UR QL STRIP: NORMAL
LEUKOCYTE ESTERASE URINE, POC: NEGATIVE
NITRITE, POC UA: NEGATIVE
PH, POC UA: 6
PROTEIN, POC: NEGATIVE
SPECIFIC GRAVITY, POC UA: 1.03
UROBILINOGEN, POC UA: 0.2

## 2023-04-28 PROCEDURE — 3288F FALL RISK ASSESSMENT DOCD: CPT | Mod: CPTII,S$GLB,, | Performed by: UROLOGY

## 2023-04-28 PROCEDURE — 99024 PR POST-OP FOLLOW-UP VISIT: ICD-10-PCS | Mod: S$GLB,,, | Performed by: UROLOGY

## 2023-04-28 PROCEDURE — 1126F AMNT PAIN NOTED NONE PRSNT: CPT | Mod: CPTII,S$GLB,, | Performed by: UROLOGY

## 2023-04-28 PROCEDURE — 99999 PR PBB SHADOW E&M-EST. PATIENT-LVL V: ICD-10-PCS | Mod: PBBFAC,,, | Performed by: UROLOGY

## 2023-04-28 PROCEDURE — 1159F MED LIST DOCD IN RCRD: CPT | Mod: CPTII,S$GLB,, | Performed by: UROLOGY

## 2023-04-28 PROCEDURE — 81002 POCT URINE DIPSTICK WITHOUT MICROSCOPE: ICD-10-PCS | Mod: S$GLB,,, | Performed by: UROLOGY

## 2023-04-28 PROCEDURE — 3075F SYST BP GE 130 - 139MM HG: CPT | Mod: CPTII,S$GLB,, | Performed by: UROLOGY

## 2023-04-28 PROCEDURE — 3078F PR MOST RECENT DIASTOLIC BLOOD PRESSURE < 80 MM HG: ICD-10-PCS | Mod: CPTII,S$GLB,, | Performed by: UROLOGY

## 2023-04-28 PROCEDURE — 1101F PT FALLS ASSESS-DOCD LE1/YR: CPT | Mod: CPTII,S$GLB,, | Performed by: UROLOGY

## 2023-04-28 PROCEDURE — 1160F RVW MEDS BY RX/DR IN RCRD: CPT | Mod: CPTII,S$GLB,, | Performed by: UROLOGY

## 2023-04-28 PROCEDURE — 4010F ACE/ARB THERAPY RXD/TAKEN: CPT | Mod: CPTII,S$GLB,, | Performed by: UROLOGY

## 2023-04-28 PROCEDURE — 99999 PR PBB SHADOW E&M-EST. PATIENT-LVL V: CPT | Mod: PBBFAC,,, | Performed by: UROLOGY

## 2023-04-28 PROCEDURE — 1160F PR REVIEW ALL MEDS BY PRESCRIBER/CLIN PHARMACIST DOCUMENTED: ICD-10-PCS | Mod: CPTII,S$GLB,, | Performed by: UROLOGY

## 2023-04-28 PROCEDURE — 3008F PR BODY MASS INDEX (BMI) DOCUMENTED: ICD-10-PCS | Mod: CPTII,S$GLB,, | Performed by: UROLOGY

## 2023-04-28 PROCEDURE — 1101F PR PT FALLS ASSESS DOC 0-1 FALLS W/OUT INJ PAST YR: ICD-10-PCS | Mod: CPTII,S$GLB,, | Performed by: UROLOGY

## 2023-04-28 PROCEDURE — 4010F PR ACE/ARB THEARPY RXD/TAKEN: ICD-10-PCS | Mod: CPTII,S$GLB,, | Performed by: UROLOGY

## 2023-04-28 PROCEDURE — 1159F PR MEDICATION LIST DOCUMENTED IN MEDICAL RECORD: ICD-10-PCS | Mod: CPTII,S$GLB,, | Performed by: UROLOGY

## 2023-04-28 PROCEDURE — 99024 POSTOP FOLLOW-UP VISIT: CPT | Mod: S$GLB,,, | Performed by: UROLOGY

## 2023-04-28 PROCEDURE — 3008F BODY MASS INDEX DOCD: CPT | Mod: CPTII,S$GLB,, | Performed by: UROLOGY

## 2023-04-28 PROCEDURE — 3044F HG A1C LEVEL LT 7.0%: CPT | Mod: CPTII,S$GLB,, | Performed by: UROLOGY

## 2023-04-28 PROCEDURE — 3288F PR FALLS RISK ASSESSMENT DOCUMENTED: ICD-10-PCS | Mod: CPTII,S$GLB,, | Performed by: UROLOGY

## 2023-04-28 PROCEDURE — 3075F PR MOST RECENT SYSTOLIC BLOOD PRESS GE 130-139MM HG: ICD-10-PCS | Mod: CPTII,S$GLB,, | Performed by: UROLOGY

## 2023-04-28 PROCEDURE — 1126F PR PAIN SEVERITY QUANTIFIED, NO PAIN PRESENT: ICD-10-PCS | Mod: CPTII,S$GLB,, | Performed by: UROLOGY

## 2023-04-28 PROCEDURE — 3044F PR MOST RECENT HEMOGLOBIN A1C LEVEL <7.0%: ICD-10-PCS | Mod: CPTII,S$GLB,, | Performed by: UROLOGY

## 2023-04-28 PROCEDURE — 3078F DIAST BP <80 MM HG: CPT | Mod: CPTII,S$GLB,, | Performed by: UROLOGY

## 2023-04-28 PROCEDURE — 81002 URINALYSIS NONAUTO W/O SCOPE: CPT | Mod: S$GLB,,, | Performed by: UROLOGY

## 2023-04-28 NOTE — PROGRESS NOTES
Chief Complaint:   Encounter Diagnosis   Name Primary?    Urge incontinence Yes       HPI:   4/28/23- patient returns today postoperatively find the Botox placed, she is noticed a slight improvement, still present incontinence though.      Allergies:  Valdecoxib    Medications: has a current medication list which includes the following prescription(s): acyclovir, amlodipine, aspirin, blood sugar diagnostic, calcium-vitamin d, clonidine, diazepam, enbrel mini, ipratropium, lancing device, losartan, myrbetriq, miscellaneous medical supply, oxycodone-acetaminophen, potassium chloride sa, prednisone, rosuvastatin, ozempic, tramadol, and gabapentin, and the following Facility-Administered Medications: lactated ringers.    Review of Systems:  General: No fever, chills, fatigability, or weight loss.  Skin: No rashes, itching, or changes in color or texture of skin.  Chest: Denies ADLER, cyanosis, wheezing, cough, and sputum production.  Abdomen: Appetite fine. No weight loss. Denies diarrhea, abdominal pain, hematemesis, or blood in stool.  Musculoskeletal: No joint stiffness or swelling. Some back pain.  : As above.  All other review of systems negative.    PMH:   has a past medical history of ADHD (attention deficit hyperactivity disorder), Adult ADHD, Chronic rheumatic arthritis, Coronary artery disease involving native coronary artery of native heart (4/17/2023), Ex-smoker, Genital herpes, Hyperlipidemia, Hypertension, Immunosuppressed status, Lichen sclerosus et atrophicus, Morbid obesity (01/11/2021), Obesity, Class I, BMI 30-34.9 (12/22/2015), SUKHDEEP (obstructive sleep apnea), Osteopenia, and Type 2 diabetes mellitus.    PSH:   has a past surgical history that includes Tonsillectomy (1958); Skin graft (1965); Cryotherapy (1980); Breast biopsy (Left); Colonoscopy (N/A, 2/12/2020); Esophagogastroduodenoscopy (N/A, 2/12/2020); Insertion of sacral neurostimulator, electrodes, and implantable pulse generator (IPG) (Left,  11/23/2020); Insertion of sacral neurostimulator generator (Left, 11/23/2020); and cystoscopy,with botulinum toxin injection (N/A, 3/27/2023).    FamHx: family history includes Breast cancer (age of onset: 39) in her sister; Cancer (age of onset: 68) in an other family member; Coronary artery disease in her maternal grandfather; Diabetes in her mother; Heart disease in her mother; Hypertension in her father; Kidney failure in her father; No Known Problems in her daughter; Peripheral vascular disease in her mother; Stroke in her father, maternal grandmother, paternal grandfather, and paternal grandmother.    SocHx:  reports that she quit smoking about 9 years ago. Her smoking use included cigarettes. She has a 3.75 pack-year smoking history. She has never used smokeless tobacco. She reports current alcohol use. She reports that she does not currently use drugs.     Physical Exam:  Vitals:   Vitals:    04/28/23 1428   BP: 131/73   Pulse: (!) 57   Resp: 18   Temp: 97.4 °F (36.3 °C)       General: A&Ox3. No apparent distress. No deformities.  Neck: No masses. Normal thyroid.  Lungs: CTA samia. No use of accessory muscles.  Heart: RRR. No arrhythmias.  Abdomen: Soft. NT. ND.  Incision is c/d/i  Skin: The skin is warm and dry. No jaundice.  Ext: No c/c/e.    Labs/Studies:   UA trace blood 4/23  Pvr 53 ml 4/23  Prevoid 65 ml 3/23  UDS- decreased capacity otherwise normal 9/20  Percutaneous InterStim 09/29/2020  pvr 12 ml 1/21  KUB/GENIE normal 8/20    Impression/Plan:       1.  Urge incontinence-  botox  3/27/23,  InterStim 11/23/20    The combination of Botox, InterStim and myrbetriq have assisted a little bit more.  She will attempt to modify the InterStim now that she has had the Botox.  Will continue to give this a little more time and have her return in 3 months with a PVR to hopefully determine her interval and see if she wants to re-inject.  Call with any complaints in the meantime.

## 2023-05-01 ENCOUNTER — TELEPHONE (OUTPATIENT)
Dept: PHARMACY | Facility: CLINIC | Age: 73
End: 2023-05-01
Payer: MEDICARE

## 2023-05-01 NOTE — TELEPHONE ENCOUNTER
Incoming call from patient regarding her Enbrel. Patient is currently enrolled in PAP, but is switching to the Enbrel Autoinjector with the mini cartridges. Notified her that [facility] does not dispense the Autoinejctor device and it would come directly from [facility]. Patient verbalized understanding and said she will reach back out to her provider's office.

## 2023-05-01 NOTE — TELEPHONE ENCOUNTER
Spoke with pt and she states that since the RX for the Enbrel cartridges were sent in that is all the pharmacy will send her. States that she does not have the auto injector device. Advised patient that we have a device here at the office we can give her, pt will come to pick it up tomorrow, 5.2.23. States that she will get the cartridges from the pharmacy on Wednesday.

## 2023-05-02 NOTE — TELEPHONE ENCOUNTER
"----- Message from Kristinbernadette Mary Beth sent at 5/2/2023 10:06 AM CDT -----  Contact: [phone]  Patient would like to consult with a nurse in regards to paperwork pt wants to know where she should . Please call to advise at [phone]. Thanks         Returned patients phone call. All questions answered.      Radha Damon (Allye), [facility]  Rheumatology Department    "

## 2023-05-02 NOTE — TELEPHONE ENCOUNTER
Fax received from the [facility] requesting a new prescription. I faxed this request to the [facility] at [phone], Attn: Dr. ODALYS England and the [facility] at [phone], Attn: Raine Bae PA-C.

## 2023-05-02 NOTE — TELEPHONE ENCOUNTER
----- Message from Patt Clinton sent at 5/2/2023 10:06 AM CDT -----  Contact: [phone]  Patient would like to consult with a nurse in regards to paperwork pt wants to know where she should . Please call to advise at [phone]. Thanks

## 2023-05-03 ENCOUNTER — PATIENT MESSAGE (OUTPATIENT)
Dept: HEPATOLOGY | Facility: CLINIC | Age: 73
End: 2023-05-03
Payer: MEDICARE

## 2023-05-15 ENCOUNTER — PATIENT MESSAGE (OUTPATIENT)
Dept: ALLERGY | Facility: CLINIC | Age: 73
End: 2023-05-15
Payer: MEDICARE

## 2023-05-16 ENCOUNTER — CLINICAL SUPPORT (OUTPATIENT)
Dept: ALLERGY | Facility: CLINIC | Age: 73
End: 2023-05-16
Payer: MEDICARE

## 2023-05-16 ENCOUNTER — PATIENT MESSAGE (OUTPATIENT)
Dept: INTERNAL MEDICINE | Facility: CLINIC | Age: 73
End: 2023-05-16
Payer: MEDICARE

## 2023-05-16 DIAGNOSIS — Z79.60 LONG-TERM USE OF IMMUNOSUPPRESSANT MEDICATION: Primary | ICD-10-CM

## 2023-05-16 PROCEDURE — G0009 PNEUMOCOCCAL CONJUGATE VACCINE 20-VALENT: ICD-10-PCS | Mod: ,,, | Performed by: ALLERGY & IMMUNOLOGY

## 2023-05-16 PROCEDURE — G0009 ADMIN PNEUMOCOCCAL VACCINE: HCPCS | Mod: ,,, | Performed by: ALLERGY & IMMUNOLOGY

## 2023-05-16 PROCEDURE — 90677 PCV20 VACCINE IM: CPT | Mod: ,,, | Performed by: ALLERGY & IMMUNOLOGY

## 2023-05-16 PROCEDURE — 90677 PNEUMOCOCCAL CONJUGATE VACCINE 20-VALENT: ICD-10-PCS | Mod: ,,, | Performed by: ALLERGY & IMMUNOLOGY

## 2023-07-05 ENCOUNTER — OFFICE VISIT (OUTPATIENT)
Dept: HEPATOLOGY | Facility: CLINIC | Age: 73
End: 2023-07-05
Payer: MEDICARE

## 2023-07-05 VITALS — HEIGHT: 65 IN | BODY MASS INDEX: 24.16 KG/M2 | WEIGHT: 145 LBS

## 2023-07-05 DIAGNOSIS — R79.89 ABNORMAL LFTS: Primary | ICD-10-CM

## 2023-07-05 PROCEDURE — 3008F BODY MASS INDEX DOCD: CPT | Mod: CPTII,95,, | Performed by: INTERNAL MEDICINE

## 2023-07-05 PROCEDURE — 3044F PR MOST RECENT HEMOGLOBIN A1C LEVEL <7.0%: ICD-10-PCS | Mod: CPTII,95,, | Performed by: INTERNAL MEDICINE

## 2023-07-05 PROCEDURE — 1159F MED LIST DOCD IN RCRD: CPT | Mod: CPTII,95,, | Performed by: INTERNAL MEDICINE

## 2023-07-05 PROCEDURE — 3044F HG A1C LEVEL LT 7.0%: CPT | Mod: CPTII,95,, | Performed by: INTERNAL MEDICINE

## 2023-07-05 PROCEDURE — 99213 OFFICE O/P EST LOW 20 MIN: CPT | Mod: 95,,, | Performed by: INTERNAL MEDICINE

## 2023-07-05 PROCEDURE — 4010F ACE/ARB THERAPY RXD/TAKEN: CPT | Mod: CPTII,95,, | Performed by: INTERNAL MEDICINE

## 2023-07-05 PROCEDURE — 3288F PR FALLS RISK ASSESSMENT DOCUMENTED: ICD-10-PCS | Mod: CPTII,95,, | Performed by: INTERNAL MEDICINE

## 2023-07-05 PROCEDURE — 4010F PR ACE/ARB THEARPY RXD/TAKEN: ICD-10-PCS | Mod: CPTII,95,, | Performed by: INTERNAL MEDICINE

## 2023-07-05 PROCEDURE — 1101F PT FALLS ASSESS-DOCD LE1/YR: CPT | Mod: CPTII,95,, | Performed by: INTERNAL MEDICINE

## 2023-07-05 PROCEDURE — 1160F RVW MEDS BY RX/DR IN RCRD: CPT | Mod: CPTII,95,, | Performed by: INTERNAL MEDICINE

## 2023-07-05 PROCEDURE — 1101F PR PT FALLS ASSESS DOC 0-1 FALLS W/OUT INJ PAST YR: ICD-10-PCS | Mod: CPTII,95,, | Performed by: INTERNAL MEDICINE

## 2023-07-05 PROCEDURE — 3288F FALL RISK ASSESSMENT DOCD: CPT | Mod: CPTII,95,, | Performed by: INTERNAL MEDICINE

## 2023-07-05 PROCEDURE — 99213 PR OFFICE/OUTPT VISIT, EST, LEVL III, 20-29 MIN: ICD-10-PCS | Mod: 95,,, | Performed by: INTERNAL MEDICINE

## 2023-07-05 PROCEDURE — 1159F PR MEDICATION LIST DOCUMENTED IN MEDICAL RECORD: ICD-10-PCS | Mod: CPTII,95,, | Performed by: INTERNAL MEDICINE

## 2023-07-05 PROCEDURE — 3008F PR BODY MASS INDEX (BMI) DOCUMENTED: ICD-10-PCS | Mod: CPTII,95,, | Performed by: INTERNAL MEDICINE

## 2023-07-05 PROCEDURE — 1160F PR REVIEW ALL MEDS BY PRESCRIBER/CLIN PHARMACIST DOCUMENTED: ICD-10-PCS | Mod: CPTII,95,, | Performed by: INTERNAL MEDICINE

## 2023-07-05 RX ORDER — HYOSCYAMINE SULFATE 0.12 MG/1
.125-.25 TABLET, ORALLY DISINTEGRATING ORAL EVERY 4 HOURS PRN
COMMUNITY
Start: 2023-05-04

## 2023-07-05 RX ORDER — VIT C/E/ZN/COPPR/LUTEIN/ZEAXAN 250MG-90MG
1 CAPSULE ORAL EVERY MORNING
COMMUNITY

## 2023-07-05 NOTE — PROGRESS NOTES
Subjective:     Smitha Salinas is here for evaluation of abnormal LFTs    History of Present Illness:  Smitha Salinas is a  72-year-old female with seropositive rheumatoid arthritis, was treated with the Rinvoq, Actemra and Humira in the past, currently is also on prednisone.  She is referred for abnormal LFTs     RinvoQ, - 7/20- 11/2021   actimra- 0/22- 11/22  humira- 2005 was on for 6018-1369   prednisone -   Frequently in between other treatments     Duration of abnormality- she has had isolated abnormal LFTs since 2008, lately they have been consistently elevated  Medications/OTC/Herbal-as above  ETOH- social  Metabolic issues-diabetes , hypertension  BMI- 25    2/28/23  No significant complaints since last visit.  Says this morning she was trying to catch someone else's dog that was running away and stumbled on her feet, had a fall with scraped knees but denies serious injuries    3/29/2023    Visit type: audiovisual     Face to Face time with patient: 25 minutes  35 minutes of total time spent on the encounter, which includes face to face time and non-face to face time preparing to see the patient (eg, review of tests), Obtaining and/or reviewing separately obtained history, Documenting clinical information in the electronic or other health record, Independently interpreting results (not separately reported) and communicating results to the patient/family/caregiver, or Care coordination (not separately reported).      Each patient to whom he or she provides medical services by telemedicine is:  (1) informed of the relationship between the physician and patient and the respective role of any other health care provider with respect to management of the patient; and (2) notified that he or she may decline to receive medical services by telemedicine and may withdraw from such care at any time.    Started enbrel aprox  4 month ago for RA.  She had repeat liver enzymes which are elevated.  She denies any liver  related complaints    7/5/2023     Visit type: audiovisual     Face to Face time with patient: 25 minutes  35 minutes of total time spent on the encounter, which includes face to face time and non-face to face time preparing to see the patient (eg, review of tests), Obtaining and/or reviewing separately obtained history, Documenting clinical information in the electronic or other health record, Independently interpreting results (not separately reported) and communicating results to the patient/family/caregiver, or Care coordination (not separately reported).     Each patient to whom he or she provides medical services by telemedicine is:  (1) informed of the relationship between the physician and patient and the respective role of any other health care provider with respect to management of the patient; and (2) notified that he or she may decline to receive medical services by telemedicine and may withdraw from such care at any time.    Still on enbrel has had no flare, also on prednisone 5 mg daily only as PRN. Had an episode of abdominal pain, vomiting and some nausea, started on levsin, following [facility].  On Ozempic, A1C decreased.     Review of Systems   Constitutional:  Negative for fatigue, fever and unexpected weight change.   Gastrointestinal:  Negative for abdominal distention, abdominal pain, blood in stool, nausea and vomiting.   Musculoskeletal:  Negative for arthralgias and gait problem.   Skin:  Negative for pallor and rash.   Neurological:  Negative for dizziness.   Hematological:  Does not bruise/bleed easily.   Psychiatric/Behavioral:  Negative for confusion, hallucinations and sleep disturbance.      Objective:     Physical Exam  Vitals and nursing note reviewed.   Constitutional:       Appearance: She is obese.   Eyes:      General: No scleral icterus.  Pulmonary:      Effort: Pulmonary effort is normal.      Breath sounds: No rhonchi.   Abdominal:      General: Bowel sounds are normal.  There is no distension.      Palpations: There is no mass.      Tenderness: There is no abdominal tenderness.   Musculoskeletal:      Right lower leg: No edema.      Left lower leg: No edema.   Skin:     Coloration: Skin is not jaundiced.   Neurological:      Mental Status: She is alert and oriented to person, place, and time.      Coordination: Coordination normal.   Psychiatric:         Behavior: Behavior normal.         Thought Content: Thought content normal.       Computed MELD-Na unavailable. Necessary lab results were not found in the last year.  Computed MELD unavailable. Necessary lab results were not found in the last year.    WBC   Date Value Ref Range Status   03/14/2023 6.56 3.90 - 12.70 K/uL Final     Hemoglobin   Date Value Ref Range Status   03/14/2023 12.3 12.0 - 16.0 g/dL Final     Hematocrit   Date Value Ref Range Status   03/14/2023 40.1 37.0 - 48.5 % Final     Platelets   Date Value Ref Range Status   03/14/2023 176 150 - 450 K/uL Final     BUN   Date Value Ref Range Status   04/25/2023 12 8 - 23 mg/dL Final     Creatinine   Date Value Ref Range Status   04/25/2023 0.8 0.5 - 1.4 mg/dL Final     Glucose   Date Value Ref Range Status   04/25/2023 115 (H) 70 - 110 mg/dL Final     Calcium   Date Value Ref Range Status   04/25/2023 9.9 8.7 - 10.5 mg/dL Final     Sodium   Date Value Ref Range Status   04/25/2023 144 136 - 145 mmol/L Final     Potassium   Date Value Ref Range Status   04/25/2023 4.4 3.5 - 5.1 mmol/L Final     Chloride   Date Value Ref Range Status   04/25/2023 110 95 - 110 mmol/L Final     Magnesium   Date Value Ref Range Status   09/06/2013 2.1 1.6 - 2.6 mg/dL Final     AST   Date Value Ref Range Status   04/25/2023 19 10 - 40 U/L Final     ALT   Date Value Ref Range Status   04/25/2023 25 10 - 44 U/L Final     Alkaline Phosphatase   Date Value Ref Range Status   04/25/2023 77 55 - 135 U/L Final     Total Bilirubin   Date Value Ref Range Status   04/25/2023 0.3 0.1 - 1.0 mg/dL Final      Comment:     For infants and newborns, interpretation of results should be based  on gestational age, weight and in agreement with clinical  observations.    Premature Infant recommended reference ranges:  Up to 24 hours.............<8.0 mg/dL  Up to 48 hours............<12.0 mg/dL  3-5 days..................<15.0 mg/dL  6-29 days.................<15.0 mg/dL       Albumin   Date Value Ref Range Status   2023 4.2 3.5 - 5.2 g/dL Final     INR   Date Value Ref Range Status   2023 0.9 0.8 - 1.2 Final     Comment:     Coumadin Therapy:  2.0 - 3.0 for INR for all indicators except mechanical heart valves  and antiphospholipid syndromes which should use 2.5 - 3.5.           Assessment/Plan:     1.Abnormal LFTs:  Like infliximab, adalimumab also could cause mild transient elevation of liver enzymes.  In the same way Jose 1 inhibitors also cause elevated liver enzymes, could be obtain 11% of patients.  We will obtain a FibroScan to assess underlying chronic inflammation with fibrosis  Will initiate work up to rule out autoimmune of the liver diseases, infectious serologies for hepatitis ABC are negative  Ultrasound of the abdomen on 2022 shows no liver lesions    2. Rheumatoid arthritis    2023  Now her liver enzymes have normalized, I suspect elevated liver enzymes likely secondary to tocilizumab (Actemra)  FibroScan shows no significant scar tissue indicating no chronic inflammation in spite of transaminases being elevated.  Now that she is on Enbrel, which also could elevate liver enzymes without actual underlying liver injury, will monitor liver enzymes 6 months from now   Lost 40 lbs on Ozempic. Lost 10 lbs on her own. Which also could have have contributed to normalization of liver enzymes    Fibroscan readin.4 KPa  Fibrosis:F 0-1   CAP readin dB/m  Steatosis: :<S1     2023    Suspect mild elevation of liver enzymes secondary to Enbrel, however still less than twice the upper  limit of normal which is expected mild elevation of transaminases.  Requested patient to continue Enbrel especially as her symptoms of rheumatoid arthritis seem to be improving, she joined gym.  We will monitor LFTs monthly, if transaminases continue to get worse then may have to stop enbrel and yet again consider alternative.     7/5/2023  Last LFTs are in normal range, had a drop of 40 lbs in the last 1 year. She was instructed to continue low carb, high protein diet. Can just monitor transaminases yearly but she is concerned and would like to follow often.     Maeve Baldwin MD  Dept of Hepatology, [place]

## 2023-07-17 ENCOUNTER — NURSE TRIAGE (OUTPATIENT)
Dept: ADMINISTRATIVE | Facility: CLINIC | Age: 73
End: 2023-07-17
Payer: MEDICARE

## 2023-07-17 NOTE — TELEPHONE ENCOUNTER
Pt reports she cut finger 10-15 mins ago with a pair of garden lazaro pt reports holding pressure at this time. Pt agreed to triage during call pt states she would rather not continue triage and ends call.   Reason for Disposition   Caller hangs up    Additional Information   Negative: Major bleeding (e.g., actively dripping or spurting) and can't be stopped   Negative: Amputation   Negative: Shock suspected (e.g., cold/pale/clammy skin, too weak to stand, low BP, rapid pulse)   Negative: Knife wound (or other possibly deep cut) and to chest, abdomen, back, neck, or head   Negative: Self-injury (e.g., cutting, self-harm) and suicidal or out-of-control   Negative: Sounds like a life-threatening emergency to the triager   Negative: Bleeding and won't stop after 10 minutes of direct pressure (using correct technique)   Negative: Skin is split open or gaping (or length > 1/2 inch or 12 mm on the skin, 1/4 inch or 6 mm on the face)   Negative: Deep cut and can see bone or tendons    Protocols used: Cuts and [id number]-TS, Difficult Caller-A-AH

## 2023-07-26 DIAGNOSIS — R94.31 ABNORMAL EKG: Primary | ICD-10-CM

## 2023-07-28 ENCOUNTER — LAB VISIT (OUTPATIENT)
Dept: LAB | Facility: HOSPITAL | Age: 73
End: 2023-07-28
Attending: INTERNAL MEDICINE
Payer: MEDICARE

## 2023-07-28 DIAGNOSIS — E11.9 TYPE 2 DIABETES MELLITUS WITHOUT COMPLICATION, WITHOUT LONG-TERM CURRENT USE OF INSULIN: ICD-10-CM

## 2023-07-28 LAB
ALBUMIN SERPL BCP-MCNC: 4 G/DL (ref 3.5–5.2)
ALP SERPL-CCNC: 71 U/L (ref 55–135)
ALT SERPL W/O P-5'-P-CCNC: 26 U/L (ref 10–44)
ANION GAP SERPL CALC-SCNC: 9 MMOL/L (ref 8–16)
AST SERPL-CCNC: 20 U/L (ref 10–40)
BILIRUB SERPL-MCNC: 0.5 MG/DL (ref 0.1–1)
BUN SERPL-MCNC: 12 MG/DL (ref 8–23)
CALCIUM SERPL-MCNC: 9.1 MG/DL (ref 8.7–10.5)
CHLORIDE SERPL-SCNC: 109 MMOL/L (ref 95–110)
CHOLEST SERPL-MCNC: 162 MG/DL (ref 120–199)
CHOLEST/HDLC SERPL: 2.8 {RATIO} (ref 2–5)
CO2 SERPL-SCNC: 22 MMOL/L (ref 23–29)
CREAT SERPL-MCNC: 0.7 MG/DL (ref 0.5–1.4)
EST. GFR  (NO RACE VARIABLE): >60 ML/MIN/1.73 M^2
GLUCOSE SERPL-MCNC: 78 MG/DL (ref 70–110)
HDLC SERPL-MCNC: 58 MG/DL (ref 40–75)
HDLC SERPL: 35.8 % (ref 20–50)
LDLC SERPL CALC-MCNC: 86.2 MG/DL (ref 63–159)
NONHDLC SERPL-MCNC: 104 MG/DL
POTASSIUM SERPL-SCNC: 4.1 MMOL/L (ref 3.5–5.1)
PROT SERPL-MCNC: 6.6 G/DL (ref 6–8.4)
SODIUM SERPL-SCNC: 140 MMOL/L (ref 136–145)
TRIGL SERPL-MCNC: 89 MG/DL (ref 30–150)

## 2023-07-28 PROCEDURE — 80053 COMPREHEN METABOLIC PANEL: CPT | Performed by: INTERNAL MEDICINE

## 2023-07-28 PROCEDURE — 36415 COLL VENOUS BLD VENIPUNCTURE: CPT | Performed by: INTERNAL MEDICINE

## 2023-07-28 PROCEDURE — 80061 LIPID PANEL: CPT | Performed by: INTERNAL MEDICINE

## 2023-07-28 PROCEDURE — 83036 HEMOGLOBIN GLYCOSYLATED A1C: CPT | Performed by: INTERNAL MEDICINE

## 2023-07-29 LAB
ESTIMATED AVG GLUCOSE: 117 MG/DL (ref 68–131)
HBA1C MFR BLD: 5.7 % (ref 4–5.6)

## 2023-07-31 ENCOUNTER — OFFICE VISIT (OUTPATIENT)
Dept: INTERNAL MEDICINE | Facility: CLINIC | Age: 73
End: 2023-07-31
Payer: MEDICARE

## 2023-07-31 VITALS
WEIGHT: 142 LBS | TEMPERATURE: 99 F | SYSTOLIC BLOOD PRESSURE: 126 MMHG | BODY MASS INDEX: 23.66 KG/M2 | HEART RATE: 67 BPM | OXYGEN SATURATION: 98 % | DIASTOLIC BLOOD PRESSURE: 64 MMHG | HEIGHT: 65 IN

## 2023-07-31 DIAGNOSIS — D84.9 IMMUNOSUPPRESSED STATUS: ICD-10-CM

## 2023-07-31 DIAGNOSIS — L84 CALLUS OF FOOT: ICD-10-CM

## 2023-07-31 DIAGNOSIS — I15.2 HYPERTENSION ASSOCIATED WITH DIABETES: ICD-10-CM

## 2023-07-31 DIAGNOSIS — E11.69 TYPE 2 DIABETES MELLITUS WITH OTHER SPECIFIED COMPLICATION, WITHOUT LONG-TERM CURRENT USE OF INSULIN: ICD-10-CM

## 2023-07-31 DIAGNOSIS — M85.89 OSTEOPENIA OF MULTIPLE SITES: ICD-10-CM

## 2023-07-31 DIAGNOSIS — E11.59 HYPERTENSION ASSOCIATED WITH DIABETES: ICD-10-CM

## 2023-07-31 DIAGNOSIS — F41.8 MIXED ANXIETY AND DEPRESSIVE DISORDER: ICD-10-CM

## 2023-07-31 DIAGNOSIS — M05.9 SEROPOSITIVE RHEUMATOID ARTHRITIS: Primary | ICD-10-CM

## 2023-07-31 PROCEDURE — 1126F PR PAIN SEVERITY QUANTIFIED, NO PAIN PRESENT: ICD-10-PCS | Mod: CPTII,S$GLB,, | Performed by: FAMILY MEDICINE

## 2023-07-31 PROCEDURE — 3008F PR BODY MASS INDEX (BMI) DOCUMENTED: ICD-10-PCS | Mod: CPTII,S$GLB,, | Performed by: FAMILY MEDICINE

## 2023-07-31 PROCEDURE — 1101F PT FALLS ASSESS-DOCD LE1/YR: CPT | Mod: CPTII,S$GLB,, | Performed by: FAMILY MEDICINE

## 2023-07-31 PROCEDURE — 1159F MED LIST DOCD IN RCRD: CPT | Mod: CPTII,S$GLB,, | Performed by: FAMILY MEDICINE

## 2023-07-31 PROCEDURE — 99214 PR OFFICE/OUTPT VISIT, EST, LEVL IV, 30-39 MIN: ICD-10-PCS | Mod: S$GLB,,, | Performed by: FAMILY MEDICINE

## 2023-07-31 PROCEDURE — 4010F ACE/ARB THERAPY RXD/TAKEN: CPT | Mod: CPTII,S$GLB,, | Performed by: FAMILY MEDICINE

## 2023-07-31 PROCEDURE — 99214 OFFICE O/P EST MOD 30 MIN: CPT | Mod: S$GLB,,, | Performed by: FAMILY MEDICINE

## 2023-07-31 PROCEDURE — 99999 PR PBB SHADOW E&M-EST. PATIENT-LVL V: CPT | Mod: PBBFAC,,, | Performed by: FAMILY MEDICINE

## 2023-07-31 PROCEDURE — 4010F PR ACE/ARB THEARPY RXD/TAKEN: ICD-10-PCS | Mod: CPTII,S$GLB,, | Performed by: FAMILY MEDICINE

## 2023-07-31 PROCEDURE — 1159F PR MEDICATION LIST DOCUMENTED IN MEDICAL RECORD: ICD-10-PCS | Mod: CPTII,S$GLB,, | Performed by: FAMILY MEDICINE

## 2023-07-31 PROCEDURE — 3078F PR MOST RECENT DIASTOLIC BLOOD PRESSURE < 80 MM HG: ICD-10-PCS | Mod: CPTII,S$GLB,, | Performed by: FAMILY MEDICINE

## 2023-07-31 PROCEDURE — 3044F HG A1C LEVEL LT 7.0%: CPT | Mod: CPTII,S$GLB,, | Performed by: FAMILY MEDICINE

## 2023-07-31 PROCEDURE — 3008F BODY MASS INDEX DOCD: CPT | Mod: CPTII,S$GLB,, | Performed by: FAMILY MEDICINE

## 2023-07-31 PROCEDURE — 3074F PR MOST RECENT SYSTOLIC BLOOD PRESSURE < 130 MM HG: ICD-10-PCS | Mod: CPTII,S$GLB,, | Performed by: FAMILY MEDICINE

## 2023-07-31 PROCEDURE — 3288F PR FALLS RISK ASSESSMENT DOCUMENTED: ICD-10-PCS | Mod: CPTII,S$GLB,, | Performed by: FAMILY MEDICINE

## 2023-07-31 PROCEDURE — 1101F PR PT FALLS ASSESS DOC 0-1 FALLS W/OUT INJ PAST YR: ICD-10-PCS | Mod: CPTII,S$GLB,, | Performed by: FAMILY MEDICINE

## 2023-07-31 PROCEDURE — 99999 PR PBB SHADOW E&M-EST. PATIENT-LVL V: ICD-10-PCS | Mod: PBBFAC,,, | Performed by: FAMILY MEDICINE

## 2023-07-31 PROCEDURE — 1126F AMNT PAIN NOTED NONE PRSNT: CPT | Mod: CPTII,S$GLB,, | Performed by: FAMILY MEDICINE

## 2023-07-31 PROCEDURE — 3074F SYST BP LT 130 MM HG: CPT | Mod: CPTII,S$GLB,, | Performed by: FAMILY MEDICINE

## 2023-07-31 PROCEDURE — 3044F PR MOST RECENT HEMOGLOBIN A1C LEVEL <7.0%: ICD-10-PCS | Mod: CPTII,S$GLB,, | Performed by: FAMILY MEDICINE

## 2023-07-31 PROCEDURE — 3288F FALL RISK ASSESSMENT DOCD: CPT | Mod: CPTII,S$GLB,, | Performed by: FAMILY MEDICINE

## 2023-07-31 PROCEDURE — 3078F DIAST BP <80 MM HG: CPT | Mod: CPTII,S$GLB,, | Performed by: FAMILY MEDICINE

## 2023-07-31 RX ORDER — SEMAGLUTIDE 1.34 MG/ML
0.5 INJECTION, SOLUTION SUBCUTANEOUS
Qty: 1 EACH | Refills: 5 | Status: SHIPPED | OUTPATIENT
Start: 2023-07-31 | End: 2024-02-06 | Stop reason: SDUPTHER

## 2023-07-31 NOTE — PROGRESS NOTES
Subjective:      Patient ID: Smitha Salinas is a o. female.    Chief Complaint: Follow-up    HPI follow-up hypertension stable    Diabetes she wants to continue Ozempic 0.25 and now ready to increase it to 0.5    Chronic pain rheumatoid arthritis ; prn tramadol; low dose pred    Lfts now nl. Was attrib to enbrel    Now seeing dr shelby gi     Elev cor calc score/cad dr [email or web address]       Past Medical History:   Diagnosis Date    ADHD (attention deficit hyperactivity disorder)     Adult ADHD     [facility]    Chronic rheumatic arthritis     on DMARD    Coronary artery disease involving native coronary artery of native heart 04/17/2023    elev cor calc score    Elevated LFTs     hepatology; poss enbrel    Ex-smoker     Genital herpes     Hyperlipidemia     Hypertension     Immunosuppressed status     Lichen sclerosus et atrophicus     Morbid obesity 01/11/2021    Obesity, Class I, BMI 30-34.9 12/22/2015    SUKHDEEP (obstructive sleep apnea)     Osteopenia     5/16 wai 5/18(start fmax if on pred 3months or more)    Type 2 diabetes mellitus     dxd 9/20     Past Surgical History:   Procedure Laterality Date    BREAST BIOPSY Left     benign    COLONOSCOPY N/A 2/12/2020    Procedure: COLONOSCOPY;  Surgeon: Eloina Castro MD;  Location: [facility];  Service: Endoscopy;  Laterality: N/A;    CRYOTHERAPY  1980    CYSTOSCOPY,WITH BOTULINUM TOXIN INJECTION N/A 3/27/2023    Procedure: CYSTOSCOPY,WITH BOTULINUM TOXIN INJECTION;  Surgeon: Pablo Rawls MD;  Location: [facility] OR;  Service: Urology;  Laterality: N/A;    ESOPHAGOGASTRODUODENOSCOPY N/A 2/12/2020    Procedure: EGD (ESOPHAGOGASTRODUODENOSCOPY);  Surgeon: Eloina Castro MD;  Location: [facility];  Service: Endoscopy;  Laterality: N/A;    INSERTION OF SACRAL NEUROSTIMULATOR GENERATOR Left 11/23/2020    Procedure: INSERTION, PULSE GENERATOR, NEUROSTIMULATOR, SACRAL;  Surgeon: Pablo Rawls MD;  Location: [facility] OR;  Service: Urology;  Laterality: Left;     INSERTION OF SACRAL NEUROSTIMULATOR, ELECTRODES, AND IMPLANTABLE PULSE GENERATOR (IPG) Left 11/23/2020    Procedure: INSERTION, ELECTRODE LEADS AND PULSE GENERATOR, NEUROSTIMULATOR, SACRAL;  Surgeon: Pablo Rawls MD;  Location: [facility];  Service: Urology;  Laterality: Left;    SKIN GRAFT  1965    laceration to right  fingers  from thigh     TONSILLECTOMY  1958     Family History   Problem Relation Age of Onset    Heart disease Mother     Diabetes Mother     Peripheral vascular disease Mother         amputations    Stroke Father     Kidney failure Father     Hypertension Father     Breast cancer Sister 39    Stroke Maternal Grandmother     Stroke Paternal Grandmother     Stroke Paternal Grandfather     No Known Problems Daughter     Cancer Other 68        breast    Coronary artery disease Maternal Grandfather     Colon cancer Neg Hx     Ovarian cancer Neg Hx      Social History     Socioeconomic History    Marital status:     Number of children: 1   Occupational History    Occupation:      Employer: [employer]    Tobacco Use    Smoking status: Former     Current packs/day: 0.00     Average packs/day: 0.3 packs/day for 15.0 years (3.8 ttl pk-yrs)     Types: Cigarettes     Start date: 7/22/1998     Quit date: 7/22/2013     Years since quitting: 10.0    Smokeless tobacco: Former    Tobacco comments:     smokes for a few weeks per year - when under pressure. has been abstinent times years at a time. a pack lasts about a week   Substance and Sexual Activity    Alcohol use: Yes     Comment: Occasional    Drug use: Not Currently    Sexual activity: Not Currently     Birth control/protection: Post-menopausal   Social History Narrative    Lives alone. Caffeine intake rare -1-2 per week of coffee. Does not have a Living Will or Advanced Directive     Social Determinants of Health     Financial Resource Strain: Medium Risk (4/25/2023)    Overall Financial Resource Strain (CARDIA)     Difficulty of  Paying Living Expenses: Somewhat hard   Food Insecurity: No Food Insecurity (4/25/2023)    Hunger Vital Sign     Worried About Running Out of Food in the Last Year: Never true     Ran Out of Food in the Last Year: Never true   Transportation Needs: No Transportation Needs (4/25/2023)    PRAPARE - Transportation     Lack of Transportation (Medical): No     Lack of Transportation (Non-Medical): No   Physical Activity: Insufficiently Active (4/25/2023)    Exercise Vital Sign     Days of Exercise per Week: 3 days     Minutes of Exercise per Session: 30 min   Stress: Stress Concern Present (4/25/2023)    [employer] [place] [employer] - Occupational Stress Questionnaire     Feeling of Stress : To some extent   Social Connections: Moderately Integrated (4/25/2023)    Social Connection and Isolation Panel [NHANES]     Frequency of Communication with Friends and Family: Three times a week     Frequency of Social Gatherings with Friends and Family: Once a week     Attends Rastafari Services: More than 4 times per year     Active Member of Clubs or Organizations: Yes     Attends Club or Organization Meetings: 1 to 4 times per year     Marital Status:    Housing Stability: Low Risk  (4/25/2023)    Housing Stability Vital Sign     Unable to Pay for Housing in the Last Year: No     Number of Places Lived in the Last Year: 1     Unstable Housing in the Last Year: No       Review of Systems      No cp sob   Objective:     Physical Exam  Constitutional:       Appearance: Normal appearance.   Cardiovascular:      Rate and Rhythm: Normal rate and regular rhythm.   Neurological:      Mental Status: She is alert.   Bilateral feet with normal monofilament testing and light call. Bilat 5thmtp.  Bilat dors pedis pulses 2+     Assessment:         ICD-10-CM ICD-9-CM   1. Hypertension associated with diabetes  E11.59 250.80    I15.2 401.9   2. Type 2 diabetes mellitus without complication, without long-term current use of  insulin  E11.9 250.00   3. Seropositive rheumatoid arthritis  M05.9 714.0          Chr pain  Foot callous  cad  Plan:     Lab and follow up after in 6 months       F/u specialists;she is nowseeing dr shelby for gi and has cscope sched 8/23  Seropositive rheumatoid arthritis    Type 2 diabetes mellitus with other specified complication, without long-term current use of insulin  -     Hemoglobin A1C; Future; Expected date: 01/27/2024  -     Ambulatory referral/consult to Podiatry; Future; Expected date: 08/07/2023    Immunosuppressed status    Hypertension associated with diabetes    Callus of foot  -     Ambulatory referral/consult to Podiatry; Future; Expected date: 08/07/2023    Mixed anxiety and depressive disorder    Osteopenia of multiple sites    Other orders  -     semaglutide (OZEMPIC) 0.25 mg or 0.5 mg(2 mg/1.5 mL) pen injector; Inject 0.5 mg into the skin every 7 days.  Dispense: 1 each; Refill: 5;she may want to dec if any more wt loss. Defers changing to sgl

## 2023-08-09 ENCOUNTER — OFFICE VISIT (OUTPATIENT)
Dept: UROLOGY | Facility: CLINIC | Age: 73
End: 2023-08-09
Payer: MEDICARE

## 2023-08-09 DIAGNOSIS — N39.41 URGE INCONTINENCE: Primary | ICD-10-CM

## 2023-08-09 PROBLEM — F11.20 OPIOID DEPENDENCE, DAILY USE: Status: RESOLVED | Noted: 2023-04-17 | Resolved: 2023-08-09

## 2023-08-09 PROCEDURE — 99213 PR OFFICE/OUTPT VISIT, EST, LEVL III, 20-29 MIN: ICD-10-PCS | Mod: 95,,, | Performed by: UROLOGY

## 2023-08-09 PROCEDURE — 1159F PR MEDICATION LIST DOCUMENTED IN MEDICAL RECORD: ICD-10-PCS | Mod: CPTII,95,, | Performed by: UROLOGY

## 2023-08-09 PROCEDURE — 4010F PR ACE/ARB THEARPY RXD/TAKEN: ICD-10-PCS | Mod: CPTII,95,, | Performed by: UROLOGY

## 2023-08-09 PROCEDURE — 99213 OFFICE O/P EST LOW 20 MIN: CPT | Mod: 95,,, | Performed by: UROLOGY

## 2023-08-09 PROCEDURE — 4010F ACE/ARB THERAPY RXD/TAKEN: CPT | Mod: CPTII,95,, | Performed by: UROLOGY

## 2023-08-09 PROCEDURE — 1159F MED LIST DOCD IN RCRD: CPT | Mod: CPTII,95,, | Performed by: UROLOGY

## 2023-08-09 PROCEDURE — 1160F PR REVIEW ALL MEDS BY PRESCRIBER/CLIN PHARMACIST DOCUMENTED: ICD-10-PCS | Mod: CPTII,95,, | Performed by: UROLOGY

## 2023-08-09 PROCEDURE — 3044F HG A1C LEVEL LT 7.0%: CPT | Mod: CPTII,95,, | Performed by: UROLOGY

## 2023-08-09 PROCEDURE — 1160F RVW MEDS BY RX/DR IN RCRD: CPT | Mod: CPTII,95,, | Performed by: UROLOGY

## 2023-08-09 PROCEDURE — 3044F PR MOST RECENT HEMOGLOBIN A1C LEVEL <7.0%: ICD-10-PCS | Mod: CPTII,95,, | Performed by: UROLOGY

## 2023-08-09 NOTE — PROGRESS NOTES
Chief Complaint:   Encounter Diagnosis   Name Primary?    Urge incontinence Yes       HPI:   8/9/23- patient is still doing well on the Botox, currently at 4-5 months.  No other complaints today.  This is a virtual visit.      Allergies:  Valdecoxib    Medications: has a current medication list which includes the following prescription(s): acyclovir, amlodipine, aspirin, blood sugar diagnostic, calcium-vitamin d, cholecalciferol (vitamin d3), clonidine, diazepam, enbrel mini, ipratropium, lancing device, levsin/sl, losartan, myrbetriq, miscellaneous medical supply, oxycodone-acetaminophen, potassium chloride sa, prednisone, rosuvastatin, ozempic, and tramadol, and the following Facility-Administered Medications: lactated ringers.    Review of Systems:  General: No fever, chills, fatigability, or weight loss.  Skin: No rashes, itching, or changes in color or texture of skin.  Chest: Denies ADLER, cyanosis, wheezing, cough, and sputum production.  Abdomen: Appetite fine. No weight loss. Denies diarrhea, abdominal pain, hematemesis, or blood in stool.  Musculoskeletal: No joint stiffness or swelling. Some back pain.  : As above.  All other review of systems negative.    PMH:   has a past medical history of ADHD (attention deficit hyperactivity disorder), Adult ADHD, Chronic rheumatic arthritis, Coronary artery disease involving native coronary artery of native heart (04/17/2023), Elevated LFTs, Ex-smoker, Genital herpes, Hyperlipidemia, Hypertension, Immunosuppressed status, Lichen sclerosus et atrophicus, Morbid obesity (01/11/2021), Obesity, Class I, BMI 30-34.9 (12/22/2015), SUKHDEEP (obstructive sleep apnea), Osteopenia, and Type 2 diabetes mellitus.    PSH:   has a past surgical history that includes Tonsillectomy (1958); Skin graft (1965); Cryotherapy (1980); Breast biopsy (Left); Colonoscopy (N/A, 2/12/2020); Esophagogastroduodenoscopy (N/A, 2/12/2020); Insertion of sacral neurostimulator, electrodes, and implantable  pulse generator (IPG) (Left, 11/23/2020); Insertion of sacral neurostimulator generator (Left, 11/23/2020); and cystoscopy,with botulinum toxin injection (N/A, 3/27/2023).    FamHx: family history includes Breast cancer (age of onset: 39) in her sister; Cancer (age of onset: 68) in an other family member; Coronary artery disease in her maternal grandfather; Diabetes in her mother; Heart disease in her mother; Hypertension in her father; Kidney failure in her father; No Known Problems in her daughter; Peripheral vascular disease in her mother; Stroke in her father, maternal grandmother, paternal grandfather, and paternal grandmother.    SocHx:  reports that she quit smoking about 10 years ago. Her smoking use included cigarettes. She started smoking about 25 years ago. She has a 3.8 pack-year smoking history. She has quit using smokeless tobacco. She reports current alcohol use. She reports that she does not currently use drugs.     Physical Exam:  Vitals:   There were no vitals filed for this visit.    General: A&Ox3. No apparent distress. No deformities.  Neck: No masses.   Lungs: No use of accessory muscles.  Ext: No c/c/e.    Labs/Studies:   Pvr 53 ml 4/23  Prevoid 65 ml 3/23  UDS- decreased capacity otherwise normal 9/20  Percutaneous InterStim 09/29/2020  pvr 12 ml 1/21  KUB/GENIE normal 8/20    Impression/Plan:       1.  Urge incontinence-  botox  3/27/23,  InterStim 11/23/20    The combination of Botox, InterStim and myrbetriq are still assisting, currently at 4-5 months with the Botox.  I will see her back in 3 months, call if symptoms progress prior to that time.  At which point we will determine if she needs a repeat Botox injection, call with any other issues in the meantime.  This was a virtual visit.      The patient location is: home  Visit type: Virtual visit with synchronous audio and video  Total time spent with patient: 10 min, nurse time with pt after 10 min  Each patient to whom he or she provides  medical services by telemedicine is:  (1) informed of the relationship between the physician and patient and the respective role of any other health care provider with respect to management of the patient; and (2) notified that he or she may decline to receive medical services by telemedicine and may withdraw from such care at any time.

## 2023-08-10 ENCOUNTER — OFFICE VISIT (OUTPATIENT)
Dept: PODIATRY | Facility: CLINIC | Age: 73
End: 2023-08-10
Payer: MEDICARE

## 2023-08-10 VITALS — BODY MASS INDEX: 23.49 KG/M2 | WEIGHT: 141 LBS | HEIGHT: 65 IN

## 2023-08-10 DIAGNOSIS — E11.69 TYPE 2 DIABETES MELLITUS WITH OTHER SPECIFIED COMPLICATION, WITHOUT LONG-TERM CURRENT USE OF INSULIN: ICD-10-CM

## 2023-08-10 DIAGNOSIS — L84 CALLUS OF FOOT: ICD-10-CM

## 2023-08-10 PROCEDURE — 3044F PR MOST RECENT HEMOGLOBIN A1C LEVEL <7.0%: ICD-10-PCS | Mod: CPTII,S$GLB,, | Performed by: PODIATRIST

## 2023-08-10 PROCEDURE — 99999 PR PBB SHADOW E&M-EST. PATIENT-LVL III: ICD-10-PCS | Mod: PBBFAC,,, | Performed by: PODIATRIST

## 2023-08-10 PROCEDURE — 1126F AMNT PAIN NOTED NONE PRSNT: CPT | Mod: CPTII,S$GLB,, | Performed by: PODIATRIST

## 2023-08-10 PROCEDURE — 99999 PR PBB SHADOW E&M-EST. PATIENT-LVL III: CPT | Mod: PBBFAC,,, | Performed by: PODIATRIST

## 2023-08-10 PROCEDURE — 3008F PR BODY MASS INDEX (BMI) DOCUMENTED: ICD-10-PCS | Mod: CPTII,S$GLB,, | Performed by: PODIATRIST

## 2023-08-10 PROCEDURE — 1101F PR PT FALLS ASSESS DOC 0-1 FALLS W/OUT INJ PAST YR: ICD-10-PCS | Mod: CPTII,S$GLB,, | Performed by: PODIATRIST

## 2023-08-10 PROCEDURE — 1159F MED LIST DOCD IN RCRD: CPT | Mod: CPTII,S$GLB,, | Performed by: PODIATRIST

## 2023-08-10 PROCEDURE — 3044F HG A1C LEVEL LT 7.0%: CPT | Mod: CPTII,S$GLB,, | Performed by: PODIATRIST

## 2023-08-10 PROCEDURE — 1126F PR PAIN SEVERITY QUANTIFIED, NO PAIN PRESENT: ICD-10-PCS | Mod: CPTII,S$GLB,, | Performed by: PODIATRIST

## 2023-08-10 PROCEDURE — 4010F PR ACE/ARB THEARPY RXD/TAKEN: ICD-10-PCS | Mod: CPTII,S$GLB,, | Performed by: PODIATRIST

## 2023-08-10 PROCEDURE — 99203 OFFICE O/P NEW LOW 30 MIN: CPT | Mod: S$GLB,,, | Performed by: PODIATRIST

## 2023-08-10 PROCEDURE — 1101F PT FALLS ASSESS-DOCD LE1/YR: CPT | Mod: CPTII,S$GLB,, | Performed by: PODIATRIST

## 2023-08-10 PROCEDURE — 4010F ACE/ARB THERAPY RXD/TAKEN: CPT | Mod: CPTII,S$GLB,, | Performed by: PODIATRIST

## 2023-08-10 PROCEDURE — 3008F BODY MASS INDEX DOCD: CPT | Mod: CPTII,S$GLB,, | Performed by: PODIATRIST

## 2023-08-10 PROCEDURE — 1159F PR MEDICATION LIST DOCUMENTED IN MEDICAL RECORD: ICD-10-PCS | Mod: CPTII,S$GLB,, | Performed by: PODIATRIST

## 2023-08-10 PROCEDURE — 3288F PR FALLS RISK ASSESSMENT DOCUMENTED: ICD-10-PCS | Mod: CPTII,S$GLB,, | Performed by: PODIATRIST

## 2023-08-10 PROCEDURE — 3288F FALL RISK ASSESSMENT DOCD: CPT | Mod: CPTII,S$GLB,, | Performed by: PODIATRIST

## 2023-08-10 PROCEDURE — 99203 PR OFFICE/OUTPT VISIT, NEW, LEVL III, 30-44 MIN: ICD-10-PCS | Mod: S$GLB,,, | Performed by: PODIATRIST

## 2023-08-10 NOTE — PROGRESS NOTES
[facility]  PODIATRIC MEDICINE AND SURGERY      CHIEF COMPLAINT  Chief Complaint   Patient presents with    Diabetic Foot Exam     Diabetic routine exam, diabetic, wears sandals, last seen PCP Dr. Jaramillo on 07/31/23         HPI    SUBJECTIVE: Smitha Salinas is a 72 y.o. female who  has a past medical history of ADHD (attention deficit hyperactivity disorder), Adult ADHD, Chronic rheumatic arthritis, Coronary artery disease involving native coronary artery of native heart (04/17/2023), Elevated LFTs, Ex-smoker, Genital herpes, Hyperlipidemia, Hypertension, Immunosuppressed status, Lichen sclerosus et atrophicus, Morbid obesity (01/11/2021), Obesity, Class I, BMI 30-34.9 (12/22/2015), SUKHDEEP (obstructive sleep apnea), Osteopenia, and Type 2 diabetes mellitus. Светланаepresents to clinic for high risk diabetic foot exam and care.  Smitha admits numbness, burning, and/or tingling sensations in their feet. Patient relates blood sugars normally run around 110. Patient has no other pedal complaints at this time      HgA1c:   Hemoglobin A1C   Date Value Ref Range Status   07/28/2023 5.7 (H) 4.0 - 5.6 % Final     Comment:     ADA Screening Guidelines:  5.7-6.4%  Consistent with prediabetes  >or=6.5%  Consistent with diabetes    High levels of fetal hemoglobin interfere with the HbA1C  assay. Heterozygous hemoglobin variants (HbS, HgC, etc)do  not significantly interfere with this assay.   However, presence of multiple variants may affect accuracy.     06/01/2023 5.9 <=6.5 % Final     Comment:       This assay method is useful in the diagnosis of diabetes  mellitus, identification of patients at risk for developing  diabetes, and monitoring of patients with diabetes  mellitus.  Reference range information and glycemic goals  are based on recommendations from the ADA (American  Diabetes Association).    Hgb A1C Value                Glycemic Goal      <8%                          Less Stringent  <7%                           General (Non-Pregnant Adults)  <6.5%                        More Stringent  5.7% - 6.4%                  Increased risk for diabetes   03/21/2023 5.6 4.0 - 5.6 % Final     Comment:     ADA Screening Guidelines:  5.7-6.4%  Consistent with prediabetes  >or=6.5%  Consistent with diabetes    High levels of fetal hemoglobin interfere with the HbA1C  assay. Heterozygous hemoglobin variants (HbS, HgC, etc)do  not significantly interfere with this assay.   However, presence of multiple variants may affect accuracy.     12/09/2022 6.3 (H) 4.0 - 5.6 % Final     Comment:     ADA Screening Guidelines:  5.7-6.4%  Consistent with prediabetes  >or=6.5%  Consistent with diabetes    High levels of fetal hemoglobin interfere with the HbA1C  assay. Heterozygous hemoglobin variants (HbS, HgC, etc)do  not significantly interfere with this assay.   However, presence of multiple variants may affect accuracy.           PMH  Past Medical History:   Diagnosis Date    ADHD (attention deficit hyperactivity disorder)     Adult ADHD     [facility]    Chronic rheumatic arthritis     on DMARD    Coronary artery disease involving native coronary artery of native heart 04/17/2023    elev cor calc score    Elevated LFTs     hepatology; poss enbrel    Ex-smoker     Genital herpes     Hyperlipidemia     Hypertension     Immunosuppressed status     Lichen sclerosus et atrophicus     Morbid obesity 01/11/2021    Obesity, Class I, BMI 30-34.9 12/22/2015    SUKHDEEP (obstructive sleep apnea)     Osteopenia     5/16 wai 5/18(start fmax if on pred 3months or more)    Type 2 diabetes mellitus     dxd 9/20       MEDS  Current Outpatient Medications on File Prior to Visit   Medication Sig Dispense Refill    acyclovir (ZOVIRAX) 400 MG tablet Take 1 tablet by mouth twice daily 180 tablet 3    amLODIPine (NORVASC) 5 MG tablet Take 1 tablet by mouth once daily 90 tablet 4    blood sugar diagnostic (BLOOD GLUCOSE TEST) Strp 1 strip by Misc.(Non-Drug; Combo  Route) route 3 (three) times daily as needed. 1 each 11    calcium-vitamin D (CALCIUM 600 + D,3,) 600 mg-10 mcg (400 unit) Tab Take 1 tablet by mouth 2 (two) times daily. 60 tablet 3    cholecalciferol, vitamin D3, (VITAMIN D3) 25 mcg (1,000 unit) capsule Take 1 capsule by mouth every morning.      cloNIDine (CATAPRES) 0.2 MG tablet TAKE 1/2 (ONE-HALF) TABLET BY MOUTH IN THE MORNING AND 1/2 (ONE-HALF) AT NOON AND 2 AT BEDTIME 270 tablet 4    diazePAM (VALIUM) 5 MG tablet TAKE 1 TABLET BY MOUTH AT BEDTIME FOR ANXIETY 30 tablet 5    etanercept (ENBREL MINI) 50 mg/mL (1 mL) Crtg Inject 1 mL into the skin every 7 days. 12 mL 3    ipratropium (ATROVENT) 21 mcg (0.03 %) nasal spray 2 sprays by Each Nostril route 3 (three) times daily. 20 mL 5    lancing device Misc 1 each by Misc.(Non-Drug; Combo Route) route 3 (three) times daily as needed. 1 each 0    LEVSIN/SL 0.125 mg Subl Place 0.125-0.25 mg under the tongue every 4 (four) hours as needed.      losartan (COZAAR) 50 MG tablet Take 1 tablet by mouth once daily 90 tablet 4    mirabegron (MYRBETRIQ) 50 mg Tb24 Take 1 tablet by mouth once daily 90 tablet 4    miscellaneous medical supply Kit 1 kit by Misc.(Non-Drug; Combo Route) route once a week. 1 kit 1    oxyCODONE-acetaminophen (PERCOCET) 5-325 mg per tablet Take 1 tablet by mouth every 4 (four) hours as needed for Pain. 10 tablet 0    potassium chloride SA (K-DUR,KLOR-CON) 20 MEQ tablet 1 tablet daily 90 tablet 5    predniSONE (DELTASONE) 5 MG tablet May take 5 of prednisone daily for rheumatoid related pain 60 tablet 2    rosuvastatin (CRESTOR) 20 MG tablet Take 1 tablet by mouth once daily 30 tablet 6    semaglutide (OZEMPIC) 0.25 mg or 0.5 mg(2 mg/1.5 mL) pen injector Inject 0.5 mg into the skin every 7 days. 1 each 5    traMADoL (ULTRAM) 50 mg tablet Take 1 tablet (50 mg total) by mouth every 6 (six) hours as needed. 60 tablet 5    aspirin (ECOTRIN) 81 MG EC tablet Take 81 mg by mouth once daily.       Current  Facility-Administered Medications on File Prior to Visit   Medication Dose Route Frequency Provider Last Rate Last Admin    lactated ringers infusion   Intravenous Continuous Sylvia Bojorquez MD   Stopped at 03/27/23 0714       [facility]     Past Surgical History:   Procedure Laterality Date    BREAST BIOPSY Left     benign    COLONOSCOPY N/A 2/12/2020    Procedure: COLONOSCOPY;  Surgeon: Eloina Castro MD;  Location: [facility];  Service: Endoscopy;  Laterality: N/A;    CRYOTHERAPY  1980    CYSTOSCOPY,WITH BOTULINUM TOXIN INJECTION N/A 3/27/2023    Procedure: CYSTOSCOPY,WITH BOTULINUM TOXIN INJECTION;  Surgeon: Pablo Rawls MD;  Location: [facility];  Service: Urology;  Laterality: N/A;    ESOPHAGOGASTRODUODENOSCOPY N/A 2/12/2020    Procedure: EGD (ESOPHAGOGASTRODUODENOSCOPY);  Surgeon: Eloina Castro MD;  Location: [facility];  Service: Endoscopy;  Laterality: N/A;    INSERTION OF SACRAL NEUROSTIMULATOR GENERATOR Left 11/23/2020    Procedure: INSERTION, PULSE GENERATOR, NEUROSTIMULATOR, SACRAL;  Surgeon: Pablo Rawls MD;  Location: [facility];  Service: Urology;  Laterality: Left;    INSERTION OF SACRAL NEUROSTIMULATOR, ELECTRODES, AND IMPLANTABLE PULSE GENERATOR (IPG) Left 11/23/2020    Procedure: INSERTION, ELECTRODE LEADS AND PULSE GENERATOR, NEUROSTIMULATOR, SACRAL;  Surgeon: Pablo Rawls MD;  Location: [facility];  Service: Urology;  Laterality: Left;    SKIN GRAFT  1965    laceration to right  fingers  from thigh     TONSILLECTOMY  1958        ALL  Review of patient's allergies indicates:   Allergen Reactions    Valdecoxib Other (See Comments)     palpitation       SOC     Social History     Tobacco Use    Smoking status: Former     Current packs/day: 0.00     Average packs/day: 0.3 packs/day for 15.0 years (3.8 ttl pk-yrs)     Types: Cigarettes     Start date: 7/22/1998     Quit date: 7/22/2013     Years since quitting: 10.0    Smokeless tobacco: Former    Tobacco comments:     smokes for a  "few weeks per year - when under pressure. has been abstinent times years at a time. a pack lasts about a week   Substance Use Topics    Alcohol use: Yes     Comment: Occasional    Drug use: Not Currently         Family HX    Family History   Problem Relation Age of Onset    Heart disease Mother     Diabetes Mother     Peripheral vascular disease Mother         amputations    Stroke Father     Kidney failure Father     Hypertension Father     Breast cancer Sister 39    Stroke Maternal Grandmother     Stroke Paternal Grandmother     Stroke Paternal Grandfather     No Known Problems Daughter     Cancer Other 68        breast    Coronary artery disease Maternal Grandfather     Colon cancer Neg Hx     Ovarian cancer Neg Hx             REVIEW OF SYSTEMS  General: Denies any fever or chills  Chest: Denies shortness of breath, wheezing, coughing, or sputum production  Heart: Denies chest pain.  As noted above and per history of current illness above, otherwise negative in the remainder of the 14 systems.     PHYSICAL EXAM  Vitals:    08/10/23 1020   Weight: 64 kg (141 lb)   Height: 5' 5" (1.651 m)   PainSc: 0-No pain       GEN:  This patient is well-developed, well-nourished and appears stated age, well-oriented to person, place and time, and cooperative and pleasant on today's visit.      LOWER EXTREMITY PHYSICAL EXAMINATION   VASCULAR  DP pedal pulse 2/4 RIGHT, LEFT2/4   PT pedal pulse 2/4 RIGHT, LEFT2/4  Capillary refill time immediate to the toes.   Feet are warm to the touch. Skin temperature warm to warm from proximally to distally   There are varicosities, telangiectasias noted to bilateral foot and ankle regions.   There are no ecchymoses noted to bilateral foot and ankle regions.   There is mild gross lower extremity edema.    DERMATOLOGIC  Skin moist with healthy texture and turgor.  There are no open ulcerations, lacerations, or fissures to bilateral foot and ankle regions. There are no signs of infection as " there is no erythema, no proximal-extending lymphangiitis, no fluctuance, or crepitus noted on palpation to bilateral foot and ankle regions.   There is no interdigital maceration.   There are diffuse  hyperkeratotic lesions noted to feet. Nails are well-trimmed.    NEUROLOGIC  Protective sensation intact at 10/10 sites upon examination with Heath Weinsten 5.07 g monofilament.  Propioception intact at 1st MTPJ b/l.  Achilles and patellar deep tendon reflexes intact  Babinski reflex absent    ORTHOPEDIC/BIOMECHANICAL  No symptomatic structural abnormalities noted. Muscle strength is 5/5 for foot inverters, everters, plantarflexors, and dorsiflexors. Muscle tone is normal.  Inspection/palpation of bone, joints and muscles unremarkable.      ASSESSMENT  Encounter Diagnoses   Name Primary?    Type 2 diabetes mellitus with other specified complication, without long-term current use of insulin     Callus of foot          Plan:  -Initial patient evaluation with H&P was performed  -Discuss presenting problems, etiology, pathologic processes and management options with patient today.     I counseled the patient on his/her Diabetic Mellitus regarding today's clinical examination and objection findings. We did also discuss recent medication changes, pertinent labs and imaging evaluations and other medical consultation notes and progress notes. Greater than 50% of this visit was spent on counseling and coordination of care. Greater than 20 minutes of this appt. was spent on education about the diabetic foot, in relation to PVD and/or neuropathy, and the prevention of limb loss. Patient received these instructions in written format and discussed verbally.     Comprehensive in depth discussion provided in written format in regards to diabetic/at risk foot health and amputation prevention. Summary as noted below:  1. Anti-diabetic medications should be taken regularly to prevent complications.  2. Feet should be washed daily.  3.  Lukewarm water should be used to wash feet.   4. The temperature of the water should be checked before washing feet.   5. Feet should be dried completely after washing and in between toes.  6. Talcum powder (Zeasorb ( should be used to keep the areas betweent he toes dry.   7. Lotion or moisturizing cream should be applied to the fet daily to prevent dryness of the skin. Examples provided to patient OTC.  8. Lotion should not be applied between the toes.  9. Socks should be changed daily. I recommend white socks in order to be able to note any drainage or bleeding if injury occurs.  10. Toenails should be trimmed straight across (if applicable).  11. Feet should be inspected at least once a day.  12. Diabetic patients should wear comfortable shoes. Examples provided.   13. The inside of the shoes should be inspected before wearing them.  14. Diabetic patients should not walk barefoot.   15. Diabetic patients should consult their podiatrist if their feet have redness, blisters, cuts, or wounds.       Shoe gear is inspected and wear and proper fit/type. If applicable/covered, pt was provided prescription for diabetic shoes and/or custom molded inserts.  Shoe Fitting recommendations:  Have foot measured while standing.  Try on both shoes and walk around to be sure shoes are comfortable.  Allow at least a thumbs width of space at end of longest toe in shoes. Make sure you can wiggle toes inside shoe.  Try on shoes at end of the day due to foot swelling.   Look for shoes with a round and wide flexible  toe box, extra cushion, and thick/stiff heel. Check inside shoes and avoid thick seams.   Breathable anti-microbial or moisture wicking fabric is preferred. Leather uppers are optimal.     Patient is reminded of the importance of good nutrition and blood sugar control to help prevent podiatric complications of diabetes. I discussed proper blood glucose control and co-management with diabetes education team and patient's  PCP and/or Endocrinology Advanced Practice Provider.  Patient  will continue to monitor the areas daily, inspect feet, wear protective shoe gear when ambulatory, moisturizer to maintain skin integrity.    Follow up as scheduled below or sooner if any problem arises with foot and/or ankle.       Disclaimer: This note was partially prepared using a voice recognition system and is likely to have sound alike errors within the text.        Future Appointments   Date Time Provider Department Center   8/28/2023  9:30 AM Maeve Baldwin MD [facility] HEPAT [facility]   8/28/2023 10:25 AM LABORATORY, [facility]   9/1/2023 10:00 AM Ab England MD [facility] RHEUM [facility]   9/22/2023  1:00 PM EKG, [facility] SPECCPR [facility]   9/22/2023  1:20 PM Mirian Peterson MD [facility] CARDIO [facility]   11/6/2023 10:05 AM LABORATORY, [facility]   11/21/2023  9:30 AM Shyanne Lemos FNP [facility] HEPAT [facility]   1/4/2024  2:00 PM Merrick Shields MD [facility] ALLERGY [facility]   1/31/2024  9:40 AM LABORATORY, [facility]   2/8/2024  9:20 AM [facility]       Report Electronically Signed By:     Violetta High DPM   Podiatry  [facility]  8/10/2023

## 2023-08-28 ENCOUNTER — LAB VISIT (OUTPATIENT)
Dept: LAB | Facility: HOSPITAL | Age: 73
End: 2023-08-28
Attending: INTERNAL MEDICINE
Payer: MEDICARE

## 2023-08-28 ENCOUNTER — OFFICE VISIT (OUTPATIENT)
Dept: HEPATOLOGY | Facility: CLINIC | Age: 73
End: 2023-08-28
Payer: MEDICARE

## 2023-08-28 VITALS
HEIGHT: 65 IN | DIASTOLIC BLOOD PRESSURE: 64 MMHG | BODY MASS INDEX: 21.89 KG/M2 | HEART RATE: 84 BPM | SYSTOLIC BLOOD PRESSURE: 97 MMHG | WEIGHT: 131.38 LBS

## 2023-08-28 DIAGNOSIS — M05.9 SEROPOSITIVE RHEUMATOID ARTHRITIS: ICD-10-CM

## 2023-08-28 DIAGNOSIS — R79.89 ABNORMAL LFTS: Primary | ICD-10-CM

## 2023-08-28 LAB
ALBUMIN SERPL BCP-MCNC: 4.4 G/DL (ref 3.5–5.2)
ALP SERPL-CCNC: 78 U/L (ref 55–135)
ALT SERPL W/O P-5'-P-CCNC: 27 U/L (ref 10–44)
ANION GAP SERPL CALC-SCNC: 14 MMOL/L (ref 8–16)
AST SERPL-CCNC: 19 U/L (ref 10–40)
BASOPHILS # BLD AUTO: 0.03 K/UL (ref 0–0.2)
BASOPHILS NFR BLD: 0.4 % (ref 0–1.9)
BILIRUB SERPL-MCNC: 0.5 MG/DL (ref 0.1–1)
BUN SERPL-MCNC: 33 MG/DL (ref 8–23)
CALCIUM SERPL-MCNC: 10.4 MG/DL (ref 8.7–10.5)
CHLORIDE SERPL-SCNC: 104 MMOL/L (ref 95–110)
CO2 SERPL-SCNC: 23 MMOL/L (ref 23–29)
CREAT SERPL-MCNC: 1.4 MG/DL (ref 0.5–1.4)
DIFFERENTIAL METHOD: ABNORMAL
EOSINOPHIL # BLD AUTO: 0.1 K/UL (ref 0–0.5)
EOSINOPHIL NFR BLD: 0.6 % (ref 0–8)
ERYTHROCYTE [DISTWIDTH] IN BLOOD BY AUTOMATED COUNT: 14.6 % (ref 11.5–14.5)
EST. GFR  (NO RACE VARIABLE): 40 ML/MIN/1.73 M^2
GLUCOSE SERPL-MCNC: 133 MG/DL (ref 70–110)
HCT VFR BLD AUTO: 44.9 % (ref 37–48.5)
HGB BLD-MCNC: 14.5 G/DL (ref 12–16)
IMM GRANULOCYTES # BLD AUTO: 0.02 K/UL (ref 0–0.04)
IMM GRANULOCYTES NFR BLD AUTO: 0.2 % (ref 0–0.5)
LYMPHOCYTES # BLD AUTO: 1.8 K/UL (ref 1–4.8)
LYMPHOCYTES NFR BLD: 21.9 % (ref 18–48)
MCH RBC QN AUTO: 27.6 PG (ref 27–31)
MCHC RBC AUTO-ENTMCNC: 32.3 G/DL (ref 32–36)
MCV RBC AUTO: 86 FL (ref 82–98)
MONOCYTES # BLD AUTO: 1.1 K/UL (ref 0.3–1)
MONOCYTES NFR BLD: 12.7 % (ref 4–15)
NEUTROPHILS # BLD AUTO: 5.3 K/UL (ref 1.8–7.7)
NEUTROPHILS NFR BLD: 64.2 % (ref 38–73)
NRBC BLD-RTO: 0 /100 WBC
PLATELET # BLD AUTO: 187 K/UL (ref 150–450)
PMV BLD AUTO: 11.3 FL (ref 9.2–12.9)
POTASSIUM SERPL-SCNC: 3.9 MMOL/L (ref 3.5–5.1)
PROT SERPL-MCNC: 7.4 G/DL (ref 6–8.4)
RBC # BLD AUTO: 5.25 M/UL (ref 4–5.4)
SODIUM SERPL-SCNC: 141 MMOL/L (ref 136–145)
WBC # BLD AUTO: 8.27 K/UL (ref 3.9–12.7)

## 2023-08-28 PROCEDURE — 99999 PR PBB SHADOW E&M-EST. PATIENT-LVL IV: CPT | Mod: PBBFAC,,, | Performed by: INTERNAL MEDICINE

## 2023-08-28 PROCEDURE — 3288F PR FALLS RISK ASSESSMENT DOCUMENTED: ICD-10-PCS | Mod: CPTII,S$GLB,, | Performed by: INTERNAL MEDICINE

## 2023-08-28 PROCEDURE — 3078F PR MOST RECENT DIASTOLIC BLOOD PRESSURE < 80 MM HG: ICD-10-PCS | Mod: CPTII,S$GLB,, | Performed by: INTERNAL MEDICINE

## 2023-08-28 PROCEDURE — 1160F PR REVIEW ALL MEDS BY PRESCRIBER/CLIN PHARMACIST DOCUMENTED: ICD-10-PCS | Mod: CPTII,S$GLB,, | Performed by: INTERNAL MEDICINE

## 2023-08-28 PROCEDURE — 3074F SYST BP LT 130 MM HG: CPT | Mod: CPTII,S$GLB,, | Performed by: INTERNAL MEDICINE

## 2023-08-28 PROCEDURE — 3044F PR MOST RECENT HEMOGLOBIN A1C LEVEL <7.0%: ICD-10-PCS | Mod: CPTII,S$GLB,, | Performed by: INTERNAL MEDICINE

## 2023-08-28 PROCEDURE — 3044F HG A1C LEVEL LT 7.0%: CPT | Mod: CPTII,S$GLB,, | Performed by: INTERNAL MEDICINE

## 2023-08-28 PROCEDURE — 1126F AMNT PAIN NOTED NONE PRSNT: CPT | Mod: CPTII,S$GLB,, | Performed by: INTERNAL MEDICINE

## 2023-08-28 PROCEDURE — 80053 COMPREHEN METABOLIC PANEL: CPT | Performed by: INTERNAL MEDICINE

## 2023-08-28 PROCEDURE — 85025 COMPLETE CBC W/AUTO DIFF WBC: CPT | Performed by: INTERNAL MEDICINE

## 2023-08-28 PROCEDURE — 4010F PR ACE/ARB THEARPY RXD/TAKEN: ICD-10-PCS | Mod: CPTII,S$GLB,, | Performed by: INTERNAL MEDICINE

## 2023-08-28 PROCEDURE — 1160F RVW MEDS BY RX/DR IN RCRD: CPT | Mod: CPTII,S$GLB,, | Performed by: INTERNAL MEDICINE

## 2023-08-28 PROCEDURE — 99999 PR PBB SHADOW E&M-EST. PATIENT-LVL IV: ICD-10-PCS | Mod: PBBFAC,,, | Performed by: INTERNAL MEDICINE

## 2023-08-28 PROCEDURE — 3078F DIAST BP <80 MM HG: CPT | Mod: CPTII,S$GLB,, | Performed by: INTERNAL MEDICINE

## 2023-08-28 PROCEDURE — 1126F PR PAIN SEVERITY QUANTIFIED, NO PAIN PRESENT: ICD-10-PCS | Mod: CPTII,S$GLB,, | Performed by: INTERNAL MEDICINE

## 2023-08-28 PROCEDURE — 99212 PR OFFICE/OUTPT VISIT, EST, LEVL II, 10-19 MIN: ICD-10-PCS | Mod: S$GLB,,, | Performed by: INTERNAL MEDICINE

## 2023-08-28 PROCEDURE — 4010F ACE/ARB THERAPY RXD/TAKEN: CPT | Mod: CPTII,S$GLB,, | Performed by: INTERNAL MEDICINE

## 2023-08-28 PROCEDURE — 36415 COLL VENOUS BLD VENIPUNCTURE: CPT | Performed by: INTERNAL MEDICINE

## 2023-08-28 PROCEDURE — 1101F PR PT FALLS ASSESS DOC 0-1 FALLS W/OUT INJ PAST YR: ICD-10-PCS | Mod: CPTII,S$GLB,, | Performed by: INTERNAL MEDICINE

## 2023-08-28 PROCEDURE — 99212 OFFICE O/P EST SF 10 MIN: CPT | Mod: S$GLB,,, | Performed by: INTERNAL MEDICINE

## 2023-08-28 PROCEDURE — 3074F PR MOST RECENT SYSTOLIC BLOOD PRESSURE < 130 MM HG: ICD-10-PCS | Mod: CPTII,S$GLB,, | Performed by: INTERNAL MEDICINE

## 2023-08-28 PROCEDURE — 3008F PR BODY MASS INDEX (BMI) DOCUMENTED: ICD-10-PCS | Mod: CPTII,S$GLB,, | Performed by: INTERNAL MEDICINE

## 2023-08-28 PROCEDURE — 1159F MED LIST DOCD IN RCRD: CPT | Mod: CPTII,S$GLB,, | Performed by: INTERNAL MEDICINE

## 2023-08-28 PROCEDURE — 1159F PR MEDICATION LIST DOCUMENTED IN MEDICAL RECORD: ICD-10-PCS | Mod: CPTII,S$GLB,, | Performed by: INTERNAL MEDICINE

## 2023-08-28 PROCEDURE — 1101F PT FALLS ASSESS-DOCD LE1/YR: CPT | Mod: CPTII,S$GLB,, | Performed by: INTERNAL MEDICINE

## 2023-08-28 PROCEDURE — 3008F BODY MASS INDEX DOCD: CPT | Mod: CPTII,S$GLB,, | Performed by: INTERNAL MEDICINE

## 2023-08-28 PROCEDURE — 3288F FALL RISK ASSESSMENT DOCD: CPT | Mod: CPTII,S$GLB,, | Performed by: INTERNAL MEDICINE

## 2023-08-28 RX ORDER — HYDROCODONE BITARTRATE AND ACETAMINOPHEN 5; 325 MG/1; MG/1
TABLET ORAL
COMMUNITY
End: 2023-11-06 | Stop reason: ALTCHOICE

## 2023-08-28 RX ORDER — PRAVASTATIN SODIUM 40 MG/1
TABLET ORAL
COMMUNITY
End: 2023-12-20

## 2023-08-28 NOTE — PROGRESS NOTES
Subjective:     Smitha Salinas is here for evaluation of abnormal LFTs    History of Present Illness:  Smitha Salinas is a  72-year-old female with seropositive rheumatoid arthritis, was treated with the Rinvoq, Actemra and Humira in the past, currently is also on prednisone.  She is referred for abnormal LFTs     RinvoQ, - 7/20- 11/2021   actimra- 0/22- 11/22  humira- 2005 was on for [id number]   prednisone -   Frequently in between other treatments     Duration of abnormality- she has had isolated abnormal LFTs since 2008, lately they have been consistently elevated  Medications/OTC/Herbal-as above  ETOH- social  Metabolic issues-diabetes , hypertension  BMI- 25    2/28/23  No significant complaints since last visit.  Says this morning she was trying to catch someone else's dog that was running away and stumbled on her feet, had a fall with scraped knees but denies serious injuries    3/29/2023    Visit type: audiovisual     Face to Face time with patient: 25 minutes  35 minutes of total time spent on the encounter, which includes face to face time and non-face to face time preparing to see the patient (eg, review of tests), Obtaining and/or reviewing separately obtained history, Documenting clinical information in the electronic or other health record, Independently interpreting results (not separately reported) and communicating results to the patient/family/caregiver, or Care coordination (not separately reported).      Each patient to whom he or she provides medical services by telemedicine is:  (1) informed of the relationship between the physician and patient and the respective role of any other health care provider with respect to management of the patient; and (2) notified that he or she may decline to receive medical services by telemedicine and may withdraw from such care at any time.    Started enbrel aprox  4 month ago for RA.  She had repeat liver enzymes which are elevated.  She denies any liver  related complaints    7/5/2023     Visit type: audiovisual     Face to Face time with patient: 25 minutes  35 minutes of total time spent on the encounter, which includes face to face time and non-face to face time preparing to see the patient (eg, review of tests), Obtaining and/or reviewing separately obtained history, Documenting clinical information in the electronic or other health record, Independently interpreting results (not separately reported) and communicating results to the patient/family/caregiver, or Care coordination (not separately reported).     Each patient to whom he or she provides medical services by telemedicine is:  (1) informed of the relationship between the physician and patient and the respective role of any other health care provider with respect to management of the patient; and (2) notified that he or she may decline to receive medical services by telemedicine and may withdraw from such care at any time.    Still on enbrel has had no flare, also on prednisone 5 mg daily only as PRN. Had an episode of abdominal pain, vomiting and some nausea, started on levsin, following [facility].  On Ozempic, A1C decreased.     8/28/2023  Denies any significant events since last visit.  Thuy has not recovered from colonoscopy that she had 3 days ago,  had weight loss of 8 lb since last week, continues to be on semaglutide.  She is disappointed with her weight los.       Review of Systems   Constitutional:  Negative for fatigue, fever and unexpected weight change.   Gastrointestinal:  Negative for abdominal distention, abdominal pain, blood in stool, nausea and vomiting.   Musculoskeletal:  Negative for arthralgias and gait problem.   Skin:  Negative for pallor and rash.   Neurological:  Negative for dizziness.   Hematological:  Does not bruise/bleed easily.   Psychiatric/Behavioral:  Negative for confusion, hallucinations and sleep disturbance.        Objective:     Physical Exam  Vitals and  nursing note reviewed.   Constitutional:       Appearance: She is obese.   Eyes:      General: No scleral icterus.  Pulmonary:      Effort: Pulmonary effort is normal.      Breath sounds: No rhonchi.   Abdominal:      General: Bowel sounds are normal. There is no distension.      Palpations: There is no mass.      Tenderness: There is no abdominal tenderness.   Musculoskeletal:      Right lower leg: No edema.      Left lower leg: No edema.   Skin:     Coloration: Skin is not jaundiced.   Neurological:      Mental Status: She is alert and oriented to person, place, and time.      Coordination: Coordination normal.   Psychiatric:         Behavior: Behavior normal.         Thought Content: Thought content normal.         Computed MELD 3.0 unavailable. Necessary lab results were not found in the last year.  Computed MELD-Na unavailable. Necessary lab results were not found in the last year.    WBC   Date Value Ref Range Status   08/28/2023 8.27 3.90 - 12.70 K/uL Final     Hemoglobin   Date Value Ref Range Status   08/28/2023 14.5 12.0 - 16.0 g/dL Final     Hematocrit   Date Value Ref Range Status   08/28/2023 44.9 37.0 - 48.5 % Final     Platelets   Date Value Ref Range Status   08/28/2023 187 150 - 450 K/uL Final     BUN   Date Value Ref Range Status   07/28/2023 12 8 - 23 mg/dL Final     Creatinine   Date Value Ref Range Status   07/28/2023 0.7 0.5 - 1.4 mg/dL Final     Glucose   Date Value Ref Range Status   07/28/2023 78 70 - 110 mg/dL Final     Calcium   Date Value Ref Range Status   07/28/2023 9.1 8.7 - 10.5 mg/dL Final     Sodium   Date Value Ref Range Status   07/28/2023 140 136 - 145 mmol/L Final     Potassium   Date Value Ref Range Status   07/28/2023 4.1 3.5 - 5.1 mmol/L Final     Chloride   Date Value Ref Range Status   07/28/2023 109 95 - 110 mmol/L Final     Magnesium   Date Value Ref Range Status   09/06/2013 2.1 1.6 - 2.6 mg/dL Final     AST   Date Value Ref Range Status   07/28/2023 20 10 - 40 U/L Final      ALT   Date Value Ref Range Status   07/28/2023 26 10 - 44 U/L Final     Alkaline Phosphatase   Date Value Ref Range Status   07/28/2023 71 55 - 135 U/L Final     Total Bilirubin   Date Value Ref Range Status   07/28/2023 0.5 0.1 - 1.0 mg/dL Final     Comment:     For infants and newborns, interpretation of results should be based  on gestational age, weight and in agreement with clinical  observations.    Premature Infant recommended reference ranges:  Up to 24 hours.............<8.0 mg/dL  Up to 48 hours............<12.0 mg/dL  3-5 days..................<15.0 mg/dL  6-29 days.................<15.0 mg/dL       Albumin   Date Value Ref Range Status   07/28/2023 4.0 3.5 - 5.2 g/dL Final     INR   Date Value Ref Range Status   02/01/2023 0.9 0.8 - 1.2 Final     Comment:     Coumadin Therapy:  2.0 - 3.0 for INR for all indicators except mechanical heart valves  and antiphospholipid syndromes which should use 2.5 - 3.5.           Assessment/Plan:     1.Abnormal LFTs:  Like infliximab, adalimumab also could cause mild transient elevation of liver enzymes.  In the same way Jose 1 inhibitors also cause elevated liver enzymes, could be obtain 11% of patients.  We will obtain a FibroScan to assess underlying chronic inflammation with fibrosis  Will initiate work up to rule out autoimmune of the liver diseases, infectious serologies for hepatitis ABC are negative  Ultrasound of the abdomen on 08/05/2022 shows no liver lesions    2. Rheumatoid arthritis    2/28/2023  Now her liver enzymes have normalized, I suspect elevated liver enzymes likely secondary to tocilizumab (Actemra)  FibroScan shows no significant scar tissue indicating no chronic inflammation in spite of transaminases being elevated.  Now that she is on Enbrel, which also could elevate liver enzymes without actual underlying liver injury, will monitor liver enzymes 6 months from now   Lost 40 lbs on Ozempic. Lost 10 lbs on her own. Which also could have have  contributed to normalization of liver enzymes    Fibroscan readin.4 KPa  Fibrosis:F 0-1   CAP readin dB/m  Steatosis: :<S1     2023    Suspect mild elevation of liver enzymes secondary to Enbrel, however still less than twice the upper limit of normal which is expected mild elevation of transaminases.  Requested patient to continue Enbrel especially as her symptoms of rheumatoid arthritis seem to be improving, she joined gym.  We will monitor LFTs monthly, if transaminases continue to get worse then may have to stop enbrel and yet again consider alternative.     2023  Last LFTs are in normal range, had a drop of 40 lbs in the last 1 year. She was instructed to continue low carb, high protein diet. Can just monitor transaminases yearly but she is concerned and would like to follow often.     23  Continue healthy low carb, high-protein diet.  LFTs are in normal range.  Requested her to follow-up with liver Clinic as needed, however says she would like to continue follow-up every 6 months.  Also she prefers to continue to stay on semaglutide in spite of overt weight loss she had.     Maeve Baldwin MD  Dept of Hepatology, [facility]

## 2023-09-01 ENCOUNTER — OFFICE VISIT (OUTPATIENT)
Dept: RHEUMATOLOGY | Facility: CLINIC | Age: 73
End: 2023-09-01
Payer: MEDICARE

## 2023-09-01 ENCOUNTER — PATIENT MESSAGE (OUTPATIENT)
Dept: RHEUMATOLOGY | Facility: CLINIC | Age: 73
End: 2023-09-01

## 2023-09-01 DIAGNOSIS — M50.30 DDD (DEGENERATIVE DISC DISEASE), CERVICAL: ICD-10-CM

## 2023-09-01 DIAGNOSIS — D84.821 IMMUNOSUPPRESSION DUE TO DRUG THERAPY: ICD-10-CM

## 2023-09-01 DIAGNOSIS — E11.69 TYPE 2 DIABETES MELLITUS WITH OTHER SPECIFIED COMPLICATION, WITHOUT LONG-TERM CURRENT USE OF INSULIN: ICD-10-CM

## 2023-09-01 DIAGNOSIS — M85.89 OSTEOPENIA OF MULTIPLE SITES: ICD-10-CM

## 2023-09-01 DIAGNOSIS — Z79.899 HIGH RISK MEDICATION USE: ICD-10-CM

## 2023-09-01 DIAGNOSIS — F90.9 ADULT ADHD: ICD-10-CM

## 2023-09-01 DIAGNOSIS — K57.90 DIVERTICULOSIS: ICD-10-CM

## 2023-09-01 DIAGNOSIS — M79.18 MYOFASCIAL PAIN: ICD-10-CM

## 2023-09-01 DIAGNOSIS — M79.7 FIBROMYALGIA: ICD-10-CM

## 2023-09-01 DIAGNOSIS — F41.8 MIXED ANXIETY AND DEPRESSIVE DISORDER: ICD-10-CM

## 2023-09-01 DIAGNOSIS — M05.9 SEROPOSITIVE RHEUMATOID ARTHRITIS: Primary | ICD-10-CM

## 2023-09-01 DIAGNOSIS — L90.0 LICHEN SCLEROSUS ET ATROPHICUS: ICD-10-CM

## 2023-09-01 DIAGNOSIS — M15.9 PRIMARY OSTEOARTHRITIS INVOLVING MULTIPLE JOINTS: ICD-10-CM

## 2023-09-01 DIAGNOSIS — Z79.899 IMMUNOSUPPRESSION DUE TO DRUG THERAPY: ICD-10-CM

## 2023-09-01 DIAGNOSIS — M54.2 NECK PAIN: ICD-10-CM

## 2023-09-01 PROCEDURE — 4010F PR ACE/ARB THEARPY RXD/TAKEN: ICD-10-PCS | Mod: CPTII,95,, | Performed by: INTERNAL MEDICINE

## 2023-09-01 PROCEDURE — 3044F PR MOST RECENT HEMOGLOBIN A1C LEVEL <7.0%: ICD-10-PCS | Mod: CPTII,95,, | Performed by: INTERNAL MEDICINE

## 2023-09-01 PROCEDURE — 99214 OFFICE O/P EST MOD 30 MIN: CPT | Mod: 95,,, | Performed by: INTERNAL MEDICINE

## 2023-09-01 PROCEDURE — 99214 PR OFFICE/OUTPT VISIT, EST, LEVL IV, 30-39 MIN: ICD-10-PCS | Mod: 95,,, | Performed by: INTERNAL MEDICINE

## 2023-09-01 PROCEDURE — 4010F ACE/ARB THERAPY RXD/TAKEN: CPT | Mod: CPTII,95,, | Performed by: INTERNAL MEDICINE

## 2023-09-01 PROCEDURE — 3044F HG A1C LEVEL LT 7.0%: CPT | Mod: CPTII,95,, | Performed by: INTERNAL MEDICINE

## 2023-09-01 NOTE — PROGRESS NOTES
The patient location is: [place]  The chief complaint leading to consultation is: RA    Visit type: audiovisual    Face to Face time with patient: 15  30 minutes of total time spent on the encounter, which includes face to face time and non-face to face time preparing to see the patient (eg, review of tests), Obtaining and/or reviewing separately obtained history, Documenting clinical information in the electronic or other health record, Independently interpreting results (not separately reported) and communicating results to the patient/family/caregiver, or Care coordination (not separately reported).         Each patient to whom he or she provides medical services by telemedicine is:  (1) informed of the relationship between the physician and patient and the respective role of any other health care provider with respect to management of the patient; and (2) notified that he or she may decline to receive medical services by telemedicine and may withdraw from such care at any time.       RHEUMATOLOGY OUTPATIENT CLINIC NOTE    9/1/2023    Attending Rheumatologist: Ab England  Primary Care Provider/Physician Requesting Consultation: Jayesh Jaramillo MD   Chief Complaint/Reason For Consultation:  No chief complaint on file.      Subjective:     Smitha Salinas is a 72 y.o. Black or  female with SPRA for f/u    Denies RA flares since last visit.  Adherence w/ Enbrel (2/2023-) w/o side effects.    Addendum 2/21: mechanical fall, requesting XR to ensure no rib fractures.  Refills on CS provided per request.    Review of Systems   Constitutional:  Negative for fever.   Eyes:  Negative for double vision and photophobia.   Respiratory:  Negative for cough and shortness of breath.    Cardiovascular:  Negative for chest pain.   Gastrointestinal:  Positive for abdominal pain. Negative for blood in stool.   Musculoskeletal:  Negative for joint pain.   Skin:  Negative for rash.   Neurological:  Negative for focal  weakness and weakness.       Chronic comorbid conditions affecting medical decision making today:  Past Medical History:   Diagnosis Date    ADHD (attention deficit hyperactivity disorder)     Adult ADHD     [facility]    Chronic rheumatic arthritis     on DMARD    Coronary artery disease involving native coronary artery of native heart 04/17/2023    elev cor calc score    Elevated LFTs     hepatology; poss enbrel    Ex-smoker     Genital herpes     Hyperlipidemia     Hypertension     Immunosuppressed status     Lichen sclerosus et atrophicus     Morbid obesity 01/11/2021    Obesity, Class I, BMI 30-34.9 12/22/2015    SUKHDEEP (obstructive sleep apnea)     Osteopenia     5/16 wai 5/18(start fmax if on pred 3months or more)    Type 2 diabetes mellitus     dxd 9/20     Past Surgical History:   Procedure Laterality Date    BREAST BIOPSY Left     benign    COLONOSCOPY N/A 2/12/2020    Procedure: COLONOSCOPY;  Surgeon: Eloina Castor MD;  Location: [facility];  Service: Endoscopy;  Laterality: N/A;    CRYOTHERAPY  1980    CYSTOSCOPY,WITH BOTULINUM TOXIN INJECTION N/A 3/27/2023    Procedure: CYSTOSCOPY,WITH BOTULINUM TOXIN INJECTION;  Surgeon: Pablo Rawls MD;  Location: [facility];  Service: Urology;  Laterality: N/A;    ESOPHAGOGASTRODUODENOSCOPY N/A 2/12/2020    Procedure: EGD (ESOPHAGOGASTRODUODENOSCOPY);  Surgeon: Eloina Castro MD;  Location: [facility];  Service: Endoscopy;  Laterality: N/A;    INSERTION OF SACRAL NEUROSTIMULATOR GENERATOR Left 11/23/2020    Procedure: INSERTION, PULSE GENERATOR, NEUROSTIMULATOR, SACRAL;  Surgeon: Pablo Rawls MD;  Location: [facility] OR;  Service: Urology;  Laterality: Left;    INSERTION OF SACRAL NEUROSTIMULATOR, ELECTRODES, AND IMPLANTABLE PULSE GENERATOR (IPG) Left 11/23/2020    Procedure: INSERTION, ELECTRODE LEADS AND PULSE GENERATOR, NEUROSTIMULATOR, SACRAL;  Surgeon: Pablo Rawls MD;  Location: [facility] OR;  Service: Urology;  Laterality: Left;    SKIN  GRAFT  1965    laceration to right  fingers  from thigh     TONSILLECTOMY  1958     Family History   Problem Relation Age of Onset    Heart disease Mother     Diabetes Mother     Peripheral vascular disease Mother         amputations    Stroke Father     Kidney failure Father     Hypertension Father     Breast cancer Sister 39    Stroke Maternal Grandmother     Stroke Paternal Grandmother     Stroke Paternal Grandfather     No Known Problems Daughter     Cancer Other 68        breast    Coronary artery disease Maternal Grandfather     Colon cancer Neg Hx     Ovarian cancer Neg Hx      Social History     Tobacco Use   Smoking Status Former    Current packs/day: 0.00    Average packs/day: 0.3 packs/day for 15.0 years (3.8 ttl pk-yrs)    Types: Cigarettes    Start date: 7/22/1998    Quit date: 7/22/2013    Years since quitting: 10.1   Smokeless Tobacco Former   Tobacco Comments    smokes for a few weeks per year - when under pressure. has been abstinent times years at a time. a pack lasts about a week       Current Outpatient Medications:     acyclovir (ZOVIRAX) 400 MG tablet, Take 1 tablet by mouth twice daily (Patient not taking: Reported on 8/28/2023), Disp: 180 tablet, Rfl: 3    amLODIPine (NORVASC) 5 MG tablet, Take 1 tablet by mouth once daily, Disp: 90 tablet, Rfl: 4    blood sugar diagnostic (BLOOD GLUCOSE TEST) Strp, 1 strip by Misc.(Non-Drug; Combo Route) route 3 (three) times daily as needed. (Patient not taking: Reported on 8/28/2023), Disp: 1 each, Rfl: 11    calcium-vitamin D (CALCIUM 600 + D,3,) 600 mg-10 mcg (400 unit) Tab, Take 1 tablet by mouth 2 (two) times daily., Disp: 60 tablet, Rfl: 3    cholecalciferol, vitamin D3, (VITAMIN D3) 25 mcg (1,000 unit) capsule, Take 1 capsule by mouth every morning., Disp: , Rfl:     cloNIDine (CATAPRES) 0.2 MG tablet, TAKE 1/2 (ONE-HALF) TABLET BY MOUTH IN THE MORNING AND 1/2 (ONE-HALF) AT NOON AND 2 AT BEDTIME, Disp: 270 tablet, Rfl: 4    diazePAM (VALIUM) 5 MG  tablet, TAKE 1 TABLET BY MOUTH AT BEDTIME FOR ANXIETY (Patient not taking: Reported on 8/28/2023), Disp: 30 tablet, Rfl: 5    etanercept (ENBREL MINI) 50 mg/mL (1 mL) Crtg, Inject 1 mL into the skin every 7 days. (Patient not taking: Reported on 8/28/2023), Disp: 12 mL, Rfl: 3    HYDROcodone-acetaminophen (NORCO) 5-325 mg per tablet, , Disp: , Rfl:     ipratropium (ATROVENT) 21 mcg (0.03 %) nasal spray, 2 sprays by Each Nostril route 3 (three) times daily. (Patient not taking: Reported on 8/28/2023), Disp: 20 mL, Rfl: 5    lancing device Misc, 1 each by Misc.(Non-Drug; Combo Route) route 3 (three) times daily as needed. (Patient not taking: Reported on 8/28/2023), Disp: 1 each, Rfl: 0    LEVSIN/SL 0.125 mg Subl, Place 0.125-0.25 mg under the tongue every 4 (four) hours as needed., Disp: , Rfl:     losartan (COZAAR) 50 MG tablet, Take 1 tablet by mouth once daily, Disp: 90 tablet, Rfl: 4    mirabegron (MYRBETRIQ) 50 mg Tb24, Take 1 tablet by mouth once daily, Disp: 90 tablet, Rfl: 4    miscellaneous medical supply Kit, 1 kit by Misc.(Non-Drug; Combo Route) route once a week., Disp: 1 kit, Rfl: 1    oxyCODONE-acetaminophen (PERCOCET) 5-325 mg per tablet, Take 1 tablet by mouth every 4 (four) hours as needed for Pain., Disp: 10 tablet, Rfl: 0    potassium chloride SA (K-DUR,KLOR-CON) 20 MEQ tablet, 1 tablet daily, Disp: 90 tablet, Rfl: 5    pravastatin (PRAVACHOL) 40 MG tablet, , Disp: , Rfl:     rosuvastatin (CRESTOR) 20 MG tablet, Take 1 tablet by mouth once daily, Disp: 30 tablet, Rfl: 6    semaglutide (OZEMPIC) 0.25 mg or 0.5 mg(2 mg/1.5 mL) pen injector, Inject 0.5 mg into the skin every 7 days., Disp: 1 each, Rfl: 5    traMADoL (ULTRAM) 50 mg tablet, Take 1 tablet (50 mg total) by mouth every 6 (six) hours as needed., Disp: 60 tablet, Rfl: 5  No current facility-administered medications for this visit.    Facility-Administered Medications Ordered in Other Visits:     lactated ringers infusion, , Intravenous,  Continuous, Sylvia Bojorquez MD, Stopped at 03/27/23 0714     Objective:     There were no vitals filed for this visit.  Physical Exam   Eyes: Conjunctivae are normal.   Pulmonary/Chest: Effort normal. No respiratory distress.   Musculoskeletal:         General: Deformity present. No swelling. Normal range of motion.   Neurological: She displays no weakness.   Skin: No rash noted.       Reviewed available old and all outside pertinent medical records available.    All lab results personally reviewed and interpreted by me.       ASSESSMENT      Encounter Diagnoses   Name Primary?    Myofascial pain     Neck pain     Seropositive rheumatoid arthritis Yes    Mixed anxiety and depressive disorder     Adult ADHD     Lichen sclerosus et atrophicus     Type 2 diabetes mellitus with other specified complication, without long-term current use of insulin     Diverticulosis     Osteopenia of multiple sites     Fibromyalgia     Immunosuppression due to drug therapy     High risk medication use     Primary osteoarthritis involving multiple joints     DDD (degenerative disc disease), cervical       PLAN     CDAI; low disease activity.  Score influenced by non inflammatory pain.  Chronic neck pain w/ mechanical pattern and no alarm s/s.  Describes excellent response to Enbrel (2/2023-).  Denies squeeze tenderness on PIP/MCPj at present.  Labs w/o concerning cytopenias.  Liver and kidney function stable.  DJD/DDD changes on imaging.  No marginal erosive changes or AAA reported.  DXA w/ osteopenia and low FRAX.  Plan to c/w Enbrel monotherapy q/weekly unchanged.  Might get sx relief of myofascial neck/DDD sx w/ dry needling.  Pt not amenable for PT otherwise reported lack of efficacy on the past.  Labs close to f/u visit.    Ab England M.D.

## 2023-09-05 ENCOUNTER — PATIENT MESSAGE (OUTPATIENT)
Dept: INTERNAL MEDICINE | Facility: CLINIC | Age: 73
End: 2023-09-05
Payer: MEDICARE

## 2023-09-08 RX ORDER — TRAMADOL HYDROCHLORIDE 50 MG/1
50 TABLET ORAL EVERY 6 HOURS PRN
Qty: 60 TABLET | Refills: 5 | Status: SHIPPED | OUTPATIENT
Start: 2023-09-08

## 2023-09-26 ENCOUNTER — LAB VISIT (OUTPATIENT)
Dept: LAB | Facility: HOSPITAL | Age: 73
End: 2023-09-26
Attending: INTERNAL MEDICINE
Payer: MEDICARE

## 2023-09-26 DIAGNOSIS — M54.2 NECK PAIN: ICD-10-CM

## 2023-09-26 DIAGNOSIS — M79.18 MYOFASCIAL PAIN: ICD-10-CM

## 2023-09-26 DIAGNOSIS — R79.89 ABNORMAL LFTS: ICD-10-CM

## 2023-09-26 LAB
ALBUMIN SERPL BCP-MCNC: 4.1 G/DL (ref 3.5–5.2)
ALBUMIN SERPL BCP-MCNC: 4.1 G/DL (ref 3.5–5.2)
ALP SERPL-CCNC: 60 U/L (ref 55–135)
ALP SERPL-CCNC: 60 U/L (ref 55–135)
ALT SERPL W/O P-5'-P-CCNC: 29 U/L (ref 10–44)
ALT SERPL W/O P-5'-P-CCNC: 29 U/L (ref 10–44)
ANION GAP SERPL CALC-SCNC: 11 MMOL/L (ref 8–16)
ANION GAP SERPL CALC-SCNC: 11 MMOL/L (ref 8–16)
AST SERPL-CCNC: 18 U/L (ref 10–40)
AST SERPL-CCNC: 18 U/L (ref 10–40)
BASOPHILS # BLD AUTO: 0.04 K/UL (ref 0–0.2)
BASOPHILS NFR BLD: 0.7 % (ref 0–1.9)
BILIRUB SERPL-MCNC: 0.5 MG/DL (ref 0.1–1)
BILIRUB SERPL-MCNC: 0.5 MG/DL (ref 0.1–1)
BUN SERPL-MCNC: 12 MG/DL (ref 8–23)
BUN SERPL-MCNC: 12 MG/DL (ref 8–23)
CALCIUM SERPL-MCNC: 10 MG/DL (ref 8.7–10.5)
CALCIUM SERPL-MCNC: 10 MG/DL (ref 8.7–10.5)
CHLORIDE SERPL-SCNC: 110 MMOL/L (ref 95–110)
CHLORIDE SERPL-SCNC: 110 MMOL/L (ref 95–110)
CO2 SERPL-SCNC: 23 MMOL/L (ref 23–29)
CO2 SERPL-SCNC: 23 MMOL/L (ref 23–29)
CREAT SERPL-MCNC: 0.8 MG/DL (ref 0.5–1.4)
CREAT SERPL-MCNC: 0.8 MG/DL (ref 0.5–1.4)
CRP SERPL-MCNC: <0.1 MG/L (ref 0–8.2)
DIFFERENTIAL METHOD: ABNORMAL
EOSINOPHIL # BLD AUTO: 0.1 K/UL (ref 0–0.5)
EOSINOPHIL NFR BLD: 1.2 % (ref 0–8)
ERYTHROCYTE [DISTWIDTH] IN BLOOD BY AUTOMATED COUNT: 14.6 % (ref 11.5–14.5)
EST. GFR  (NO RACE VARIABLE): >60 ML/MIN/1.73 M^2
EST. GFR  (NO RACE VARIABLE): >60 ML/MIN/1.73 M^2
GLUCOSE SERPL-MCNC: 103 MG/DL (ref 70–110)
GLUCOSE SERPL-MCNC: 103 MG/DL (ref 70–110)
HCT VFR BLD AUTO: 37.8 % (ref 37–48.5)
HGB BLD-MCNC: 12.1 G/DL (ref 12–16)
IMM GRANULOCYTES # BLD AUTO: 0.01 K/UL (ref 0–0.04)
IMM GRANULOCYTES NFR BLD AUTO: 0.2 % (ref 0–0.5)
LYMPHOCYTES # BLD AUTO: 1.6 K/UL (ref 1–4.8)
LYMPHOCYTES NFR BLD: 26.8 % (ref 18–48)
MCH RBC QN AUTO: 27.6 PG (ref 27–31)
MCHC RBC AUTO-ENTMCNC: 32 G/DL (ref 32–36)
MCV RBC AUTO: 86 FL (ref 82–98)
MONOCYTES # BLD AUTO: 0.8 K/UL (ref 0.3–1)
MONOCYTES NFR BLD: 13.2 % (ref 4–15)
NEUTROPHILS # BLD AUTO: 3.4 K/UL (ref 1.8–7.7)
NEUTROPHILS NFR BLD: 57.9 % (ref 38–73)
NRBC BLD-RTO: 0 /100 WBC
PLATELET # BLD AUTO: 136 K/UL (ref 150–450)
PMV BLD AUTO: 10.8 FL (ref 9.2–12.9)
POTASSIUM SERPL-SCNC: 4.2 MMOL/L (ref 3.5–5.1)
POTASSIUM SERPL-SCNC: 4.2 MMOL/L (ref 3.5–5.1)
PROT SERPL-MCNC: 6.5 G/DL (ref 6–8.4)
PROT SERPL-MCNC: 6.5 G/DL (ref 6–8.4)
RBC # BLD AUTO: 4.38 M/UL (ref 4–5.4)
SODIUM SERPL-SCNC: 144 MMOL/L (ref 136–145)
SODIUM SERPL-SCNC: 144 MMOL/L (ref 136–145)
WBC # BLD AUTO: 5.93 K/UL (ref 3.9–12.7)

## 2023-09-26 PROCEDURE — 36415 COLL VENOUS BLD VENIPUNCTURE: CPT | Performed by: INTERNAL MEDICINE

## 2023-09-26 PROCEDURE — 86140 C-REACTIVE PROTEIN: CPT | Performed by: INTERNAL MEDICINE

## 2023-09-26 PROCEDURE — 85025 COMPLETE CBC W/AUTO DIFF WBC: CPT | Performed by: INTERNAL MEDICINE

## 2023-09-26 PROCEDURE — 80053 COMPREHEN METABOLIC PANEL: CPT | Performed by: INTERNAL MEDICINE

## 2023-10-18 ENCOUNTER — PATIENT MESSAGE (OUTPATIENT)
Dept: RHEUMATOLOGY | Facility: CLINIC | Age: 73
End: 2023-10-18
Payer: MEDICARE

## 2023-10-19 ENCOUNTER — PATIENT MESSAGE (OUTPATIENT)
Dept: PULMONOLOGY | Facility: CLINIC | Age: 73
End: 2023-10-19
Payer: MEDICARE

## 2023-10-20 DIAGNOSIS — M05.9 SEROPOSITIVE RHEUMATOID ARTHRITIS: Primary | ICD-10-CM

## 2023-10-20 RX ORDER — PREDNISONE 10 MG/1
TABLET ORAL
Qty: 30 TABLET | Refills: 1 | Status: SHIPPED | OUTPATIENT
Start: 2023-10-20 | End: 2024-02-20 | Stop reason: SDUPTHER

## 2023-10-20 NOTE — TELEPHONE ENCOUNTER
"Contacted patient to let her know that Dr. England refilled her Prednisone for her and it went to [facility] at [facility]. Patient thanked me for the return phone call and verbalized understanding. All questions answered.     Radha Damon (Allye), [facility]  Rheumatology Department   "

## 2023-10-31 ENCOUNTER — PATIENT MESSAGE (OUTPATIENT)
Dept: UROLOGY | Facility: CLINIC | Age: 73
End: 2023-10-31
Payer: MEDICARE

## 2023-10-31 DIAGNOSIS — N39.41 URGE INCONTINENCE: Primary | ICD-10-CM

## 2023-11-02 ENCOUNTER — TELEPHONE (OUTPATIENT)
Dept: PREADMISSION TESTING | Facility: HOSPITAL | Age: 73
End: 2023-11-02
Payer: MEDICARE

## 2023-11-02 NOTE — TELEPHONE ENCOUNTER
Pre op instructions reviewed with pt over telephone, verbalized understanding.    To confirm, Surgery is scheduled on 11/7/23. We will call you late afternoon the business day prior to surgery with your arrival time.    *Please report to the [facility] (1st Floor) located off of [facility] (2nd Entrance/Building on the left, in front of the flag pole).  Address: [address] [place] [address] [place]. [address]    Testing/Clearances needed before Surgery: Labs & EKG appt on 11/6/23 @ [facility]      INSTRUCTIONS IMPORTANT!!!  DO NOT Eat, Drink, or Smoke after 12 midnight unless instructed otherwise by your Surgeon. OK to brush teeth, no gum, candy or mints!    >>>MEDICATION INSTRUCTIONS<<<: Morning of Surgery, take small sip of water with ONLY these medications:  Amlodipine     *Diabetic Patients: If you take diabetic or weight loss medication, Do NOT take morning of surgery unless instructed by Doctor. Metformin to be stopped 24 hrs prior to surgery. Ozempic/ Mounjaro/ Wegovy/ Trulicity/ Semaglutide injections or weight loss medication to be stopped 7 days prior to surgery. DO NOT take long-acting insulin the evening before surgery. Blood sugars will be checked in pre-op by Nurse.    *Patients should HOLD all vitamins, herbal supplements, weight loss medication, aspirin products & NSAIDS 7 days prior to surgery, as these can thin the blood. Ok to take Tylenol.    ____  Avoid Alcoholic beverages 3 days prior to surgery, as it can thin the blood.  ____  NO Acrylic/fake nails or nail polish worn day of surgery (specifically hand/arm & foot surgeries).  ____  NO powder, lotions, deodorants, oils or cream on body.  ____  Remove all jewelry & piercings & foreign objects before arrival & leave at home.  ____  Remove Dentures, Hearing Aids & Contact Lens prior to surgery.  ____  Bring photo ID and insurance information to hospital (Leave Valuables at Home).  ____  If going home the same day, arrange for a ride  home. You will not be able to drive for 24 hrs if Anesthesia was used.   ____  Females (ages 11-60): may need to give a urine sample the morning of surgery; please see Pre op Nurse prior to using the restroom.  ____  Males: Stop ED medications (Viagra, Cialis) 24 hrs prior to surgery.  ____  Wear clean, loose fitting clothing to allow for dressings/ bandages.      Bathing Instructions:    -Shower with anti-bacterial Soap (Hibiclens or Dial) the night before surgery and the morning of.   -Do not use Hibiclens on your face or genitals.   -Apply clean clothes after shower.  -Do not shave your face or body 2 days prior to surgery unless instructed otherwise by your Surgeon.  -Do not shave pubic hair 7 days prior to surgery (gyn pt's).    [facility] Vis[facility]/Ride Policy:  Only 2 adults allowed in pre op/recovery area during your procedure. You MUST HAVE A RIDE HOME from a responsible adult that you know and trust. Medical Transport, Uber or Lyft can ONLY be used if patient has a responsible adult to accompany them during ride home.       *Signs and symptoms of Infection Before or After Surgery:               !!!If you experience any fever, chills, nausea/ vomiting, foul odor/ excessive drainage from surgical site, flu-like symptoms, new wounds or cuts, PLEASE CALL THE SURGEON OFFICE at [phone] or SEND MESSAGE THROUGH LOC&ALL PORTAL!!!       *If you are running late or have questions the morning of surgery, please call the Hospital Surgery Dept @ [phone].     *Billing questions:  [phone]       Thank you,  -[facility] Pre Admit Dept.  [phone] or [phone]  M-F 7:30 am-4:00 pm (Closed Major Holidays)

## 2023-11-05 ENCOUNTER — PATIENT MESSAGE (OUTPATIENT)
Dept: PULMONOLOGY | Facility: CLINIC | Age: 73
End: 2023-11-05
Payer: MEDICARE

## 2023-11-06 ENCOUNTER — TELEPHONE (OUTPATIENT)
Dept: UROLOGY | Facility: CLINIC | Age: 73
End: 2023-11-06
Payer: MEDICARE

## 2023-11-06 ENCOUNTER — PATIENT MESSAGE (OUTPATIENT)
Dept: SURGERY | Facility: HOSPITAL | Age: 73
End: 2023-11-06
Payer: MEDICARE

## 2023-11-06 ENCOUNTER — HOSPITAL ENCOUNTER (OUTPATIENT)
Dept: CARDIOLOGY | Facility: HOSPITAL | Age: 73
Discharge: HOME | End: 2023-11-06
Attending: ANESTHESIOLOGY
Payer: MEDICARE

## 2023-11-06 ENCOUNTER — TELEPHONE (OUTPATIENT)
Dept: PREADMISSION TESTING | Facility: HOSPITAL | Age: 73
End: 2023-11-06
Payer: MEDICARE

## 2023-11-06 ENCOUNTER — OFFICE VISIT (OUTPATIENT)
Dept: PULMONOLOGY | Facility: CLINIC | Age: 73
End: 2023-11-06
Payer: MEDICARE

## 2023-11-06 VITALS
BODY MASS INDEX: 23.88 KG/M2 | SYSTOLIC BLOOD PRESSURE: 136 MMHG | WEIGHT: 143.31 LBS | RESPIRATION RATE: 16 BRPM | HEART RATE: 55 BPM | HEIGHT: 65 IN | DIASTOLIC BLOOD PRESSURE: 80 MMHG | OXYGEN SATURATION: 98 %

## 2023-11-06 DIAGNOSIS — G47.34 NOCTURNAL HYPOXEMIA: Primary | ICD-10-CM

## 2023-11-06 DIAGNOSIS — Z01.818 PREOP TESTING: ICD-10-CM

## 2023-11-06 DIAGNOSIS — Z72.821 POOR SLEEP HYGIENE: ICD-10-CM

## 2023-11-06 DIAGNOSIS — I27.20 PULMONARY HYPERTENSION: ICD-10-CM

## 2023-11-06 DIAGNOSIS — G47.33 OSA (OBSTRUCTIVE SLEEP APNEA): ICD-10-CM

## 2023-11-06 PROCEDURE — 99214 PR OFFICE/OUTPT VISIT, EST, LEVL IV, 30-39 MIN: ICD-10-PCS | Mod: 25,S$GLB,, | Performed by: INTERNAL MEDICINE

## 2023-11-06 PROCEDURE — 3008F PR BODY MASS INDEX (BMI) DOCUMENTED: ICD-10-PCS | Mod: CPTII,S$GLB,, | Performed by: INTERNAL MEDICINE

## 2023-11-06 PROCEDURE — 99214 OFFICE O/P EST MOD 30 MIN: CPT | Mod: 25,S$GLB,, | Performed by: INTERNAL MEDICINE

## 2023-11-06 PROCEDURE — 4010F PR ACE/ARB THEARPY RXD/TAKEN: ICD-10-PCS | Mod: CPTII,S$GLB,, | Performed by: INTERNAL MEDICINE

## 2023-11-06 PROCEDURE — 3288F PR FALLS RISK ASSESSMENT DOCUMENTED: ICD-10-PCS | Mod: CPTII,S$GLB,, | Performed by: INTERNAL MEDICINE

## 2023-11-06 PROCEDURE — 3008F BODY MASS INDEX DOCD: CPT | Mod: CPTII,S$GLB,, | Performed by: INTERNAL MEDICINE

## 2023-11-06 PROCEDURE — 3075F PR MOST RECENT SYSTOLIC BLOOD PRESS GE 130-139MM HG: ICD-10-PCS | Mod: CPTII,S$GLB,, | Performed by: INTERNAL MEDICINE

## 2023-11-06 PROCEDURE — 93005 ELECTROCARDIOGRAM TRACING: CPT

## 2023-11-06 PROCEDURE — 3044F HG A1C LEVEL LT 7.0%: CPT | Mod: CPTII,S$GLB,, | Performed by: INTERNAL MEDICINE

## 2023-11-06 PROCEDURE — 3075F SYST BP GE 130 - 139MM HG: CPT | Mod: CPTII,S$GLB,, | Performed by: INTERNAL MEDICINE

## 2023-11-06 PROCEDURE — 3044F PR MOST RECENT HEMOGLOBIN A1C LEVEL <7.0%: ICD-10-PCS | Mod: CPTII,S$GLB,, | Performed by: INTERNAL MEDICINE

## 2023-11-06 PROCEDURE — 99999 PR PBB SHADOW E&M-EST. PATIENT-LVL V: CPT | Mod: PBBFAC,,, | Performed by: INTERNAL MEDICINE

## 2023-11-06 PROCEDURE — 3288F FALL RISK ASSESSMENT DOCD: CPT | Mod: CPTII,S$GLB,, | Performed by: INTERNAL MEDICINE

## 2023-11-06 PROCEDURE — 3079F DIAST BP 80-89 MM HG: CPT | Mod: CPTII,S$GLB,, | Performed by: INTERNAL MEDICINE

## 2023-11-06 PROCEDURE — 1159F MED LIST DOCD IN RCRD: CPT | Mod: CPTII,S$GLB,, | Performed by: INTERNAL MEDICINE

## 2023-11-06 PROCEDURE — 1159F PR MEDICATION LIST DOCUMENTED IN MEDICAL RECORD: ICD-10-PCS | Mod: CPTII,S$GLB,, | Performed by: INTERNAL MEDICINE

## 2023-11-06 PROCEDURE — 1101F PT FALLS ASSESS-DOCD LE1/YR: CPT | Mod: CPTII,S$GLB,, | Performed by: INTERNAL MEDICINE

## 2023-11-06 PROCEDURE — 93010 EKG 12-LEAD: ICD-10-PCS | Mod: ,,, | Performed by: INTERNAL MEDICINE

## 2023-11-06 PROCEDURE — 99999 PR PBB SHADOW E&M-EST. PATIENT-LVL V: ICD-10-PCS | Mod: PBBFAC,,, | Performed by: INTERNAL MEDICINE

## 2023-11-06 PROCEDURE — 1101F PR PT FALLS ASSESS DOC 0-1 FALLS W/OUT INJ PAST YR: ICD-10-PCS | Mod: CPTII,S$GLB,, | Performed by: INTERNAL MEDICINE

## 2023-11-06 PROCEDURE — 93010 ELECTROCARDIOGRAM REPORT: CPT | Mod: ,,, | Performed by: INTERNAL MEDICINE

## 2023-11-06 PROCEDURE — 3079F PR MOST RECENT DIASTOLIC BLOOD PRESSURE 80-89 MM HG: ICD-10-PCS | Mod: CPTII,S$GLB,, | Performed by: INTERNAL MEDICINE

## 2023-11-06 PROCEDURE — 4010F ACE/ARB THERAPY RXD/TAKEN: CPT | Mod: CPTII,S$GLB,, | Performed by: INTERNAL MEDICINE

## 2023-11-06 NOTE — TELEPHONE ENCOUNTER
Received message from pre admit testing stating that she called pt with her surgery arrival time and was told that her insurance said that the procedure is still in the auth process; teams message sent to Xiomara Dickinson in preservice for assistance.

## 2023-11-06 NOTE — PATIENT INSTRUCTIONS
Inspire is an alternative to CPAP that works inside your body while you sleep. Its a small device placed during a same-day, outpatient procedure.  When youre ready for bed, simply click the remote to turn Inspire on. While you sleep, Inspire opens your airway, allowing you to breathe normally and sleep peacefully.    When patients with moderate to severe SUKHDEEP do not respond to conservative treatments or cannot tolerate CPAP therapy, the ear, nose and throat specialists at [facility] may recommend Inspire surgery as a treatment option.    Also known as hypoglossal nerve stimulation therapy, Inspire involves a surgery in which a device is implanted in the neck and upper chest just below the collarbone (clavicle). The device stimulates the hypoglossal nerve, which controls the muscles of the tongue. While you are sleeping, a sensing lead monitors your breathing, and it delivers mild stimulation to the hypoglossal nerve, causing the tongue to move and open the airway with each breath.    This FDA-approved implantable upper airway stimulation device functions like a pacemaker and stabilizes a patient's throat during sleep in order to prevent obstruction. The device consists of three components: a programmable neuro-stimulator located in a chest pocket, a pressure sensing lead that detects patient's breathing, and a stimulator lead that delivers mild stimulation to the tongue nerve. The stimulator is controlled via the patients handheld remote control. Patients turn on the device before going to bed to gently stimulate the throat muscles while sleeping.    Inspire surgery is generally performed as an outpatient surgery, although rarely patients may require a one-night stay in the hospital for monitoring. The operation itself usually takes about two hours and recovery time is typically shorter than with other types of sleep surgery.    Side effects are usually minimal and may include:  Pain and/or swelling at  the incision site, which is usually mild and temporary  Tongue weakness/soreness, which improves over time  Most patients are able to return to their normal activities after a few days. Approximately one month after implantation, patients will meet with their physician to establish their personal stimulation settings and learn how to use the Inspire sleep remote.        Qualifications:  [] You have moderate to severe SUKHDEEP with a diagnostic sleep study in the past 2 years.   [] You tried CPAP therapy and it didn't work.  [] You do not have significant trouble falling asleep.  [] You have a body mass index (BMI) of 32-35 or less.    You will need to have a sleep study performed in the hospital to exclude central sleep apnea. ( Central sleep apnea is a special type of sleep apnea where the brain does not transmit signals to your breathing muscles - it is different from Obstructive Sleep Apnea ). The INSPIRE device does not treat central sleep apnea. I have placed an order to have a sleep study performed in the hospital.  If you want to discuss this with an ENT physician first - that is fine , but they will probably need a recent polysomnogram from the hospital to prepare you for the surgery. A home sleep study is inadequate to diagnosis central sleep apnea    Referral to an ENT specialist trained in placement of the INSPIRE device is indicated.  ENT physicians trained for this procedure in the area include:    If you meet these criteria a referral to an ENT specialist trained in placement of the INSPIRE device is indicated.  Contact: Jas Henderson  [address] [facility] [address], [place] [address]  Phone: [phone]  Beatrice is Board Certified in Otolaryngology- Head and Neck Surgery. He is a member of the American Academy of Otolaryngology - Head and Neck Surgery.  or  Neo Zheng MD  Otolaryngologist in [place], [place]  Address: [address] [place] [address]  Phone: [phone]          [facility]  CPAP/Oxygen/Nebulizer supplies.  Customer Service: [phone]  Call: [phone]  Fax: [phone]  Billing Inquiries: [phone] or [phone]

## 2023-11-06 NOTE — PROGRESS NOTES
Subjective:     Patient ID: Smitha Salinas is a 73 y.o. female.    Chief Complaint:  Chronic sleep deprivation , memory problems    HPI    Sleep Apnea  She presents for a sleep evaluation. She complains of snoring, periods of not breathing, decreased memory, decreased concentration, excessive daytime sleepiness, falling asleep while reading, feels sleepy during the day, take naps during the day.  Symptoms began 10 years ago, rapidly worsening since that time.  She goes to sleep at  2- 3 am weekdays and  weekends. She awakens 6;30  weekdays and  Weekends - very irregular. She falls asleep in 15 minutes after sleeping pills.  Collar size na. She denies knees buckling with laughing, completely or partially paralyzed while falling asleep or waking up. Previous evaluation and treatment has included PSG.  Patient is here today to review results of sleep study.   She does not want to try Continuous Positive Airway Pressure  Non Continuous Positive Airway Pressure treatment of Obstructive Sleep Apnea discussed.'     Hx of night terrors since teenage/child [place] - rarely now  Pain keeps her awake - takes meds for rheumatoid arthritis  history of ADHD   Sinus congestion - most nights  Fatigue due to RA   history of  ADHD - longstanding      Past Medical History:   Diagnosis Date    ADHD (attention deficit hyperactivity disorder)     Adult ADHD     [facility]    Chronic rheumatic arthritis     on DMARD    Coronary artery disease involving native coronary artery of native heart 04/17/2023    elev cor calc score    Elevated LFTs     hepatology; poss enbrel    Ex-smoker     Genital herpes     Hyperlipidemia     Hypertension     Immunosuppressed status     Lichen sclerosus et atrophicus     Morbid obesity 01/11/2021    Obesity, Class I, BMI 30-34.9 12/22/2015    SUKHDEEP (obstructive sleep apnea)     Osteopenia     5/16 wai 5/18(start fmax if on pred 3months or more)    Type 2 diabetes mellitus     dxd 9/20     Past Surgical  History:   Procedure Laterality Date    BREAST BIOPSY Left     benign    COLONOSCOPY N/A 2/12/2020    Procedure: COLONOSCOPY;  Surgeon: Eloina Castro MD;  Location: [facility] ENDO;  Service: Endoscopy;  Laterality: N/A;    CRYOTHERAPY  1980    CYSTOSCOPY,WITH BOTULINUM TOXIN INJECTION N/A 3/27/2023    Procedure: CYSTOSCOPY,WITH BOTULINUM TOXIN INJECTION;  Surgeon: Pablo Rawls MD;  Location: [facility] OR;  Service: Urology;  Laterality: N/A;    ESOPHAGOGASTRODUODENOSCOPY N/A 2/12/2020    Procedure: EGD (ESOPHAGOGASTRODUODENOSCOPY);  Surgeon: Eloina Castro MD;  Location: [facility] ENDO;  Service: Endoscopy;  Laterality: N/A;    INSERTION OF SACRAL NEUROSTIMULATOR GENERATOR Left 11/23/2020    Procedure: INSERTION, PULSE GENERATOR, NEUROSTIMULATOR, SACRAL;  Surgeon: Pablo Rawls MD;  Location: [facility] OR;  Service: Urology;  Laterality: Left;    INSERTION OF SACRAL NEUROSTIMULATOR, ELECTRODES, AND IMPLANTABLE PULSE GENERATOR (IPG) Left 11/23/2020    Procedure: INSERTION, ELECTRODE LEADS AND PULSE GENERATOR, NEUROSTIMULATOR, SACRAL;  Surgeon: Pablo Rawls MD;  Location: [facility] OR;  Service: Urology;  Laterality: Left;    SKIN GRAFT  1965    laceration to right  fingers  from thigh     TONSILLECTOMY  1958     Review of patient's allergies indicates:   Allergen Reactions    Gabapentin Other (See Comments)     Disoriented  Other reaction(s): Not available    Valdecoxib Other (See Comments)     palpitation     Current Outpatient Medications on File Prior to Visit   Medication Sig Dispense Refill    acyclovir (ZOVIRAX) 400 MG tablet Take 1 tablet by mouth twice daily 180 tablet 3    amLODIPine (NORVASC) 5 MG tablet Take 1 tablet by mouth once daily 90 tablet 4    blood sugar diagnostic (BLOOD GLUCOSE TEST) Strp 1 strip by Misc.(Non-Drug; Combo Route) route 3 (three) times daily as needed. 1 each 11    calcium-vitamin D (CALCIUM 600 + D,3,) 600 mg-10 mcg (400 unit) Tab Take 1 tablet by mouth 2 (two) times  daily. 60 tablet 3    cholecalciferol, vitamin D3, (VITAMIN D3) 25 mcg (1,000 unit) capsule Take 1 capsule by mouth every morning.      cloNIDine (CATAPRES) 0.2 MG tablet TAKE 1/2 (ONE-HALF) TABLET BY MOUTH IN THE MORNING AND 1/2 (ONE-HALF) AT NOON AND 2 AT BEDTIME 270 tablet 4    diazePAM (VALIUM) 5 MG tablet TAKE 1 TABLET BY MOUTH AT BEDTIME FOR ANXIETY 30 tablet 5    etanercept (ENBREL MINI) 50 mg/mL (1 mL) Crtg Inject 1 mL into the skin every 7 days. 12 mL 3    ipratropium (ATROVENT) 21 mcg (0.03 %) nasal spray 2 sprays by Each Nostril route 3 (three) times daily. 20 mL 5    lancing device Misc 1 each by Misc.(Non-Drug; Combo Route) route 3 (three) times daily as needed. 1 each 0    LEVSIN/SL 0.125 mg Subl Place 0.125-0.25 mg under the tongue every 4 (four) hours as needed.      losartan (COZAAR) 50 MG tablet Take 1 tablet by mouth once daily 90 tablet 4    mirabegron (MYRBETRIQ) 50 mg Tb24 Take 1 tablet by mouth once daily 90 tablet 4    miscellaneous medical supply Kit 1 kit by Misc.(Non-Drug; Combo Route) route once a week. 1 kit 1    oxyCODONE-acetaminophen (PERCOCET) 5-325 mg per tablet Take 1 tablet by mouth every 4 (four) hours as needed for Pain. 10 tablet 0    potassium chloride SA (K-DUR,KLOR-CON) 20 MEQ tablet 1 tablet daily 90 tablet 5    pravastatin (PRAVACHOL) 40 MG tablet       predniSONE (DELTASONE) 10 MG tablet May take 5-10mg of prednisone for rheumatoid related pain.  If need to take more than 2 weeks of the month, suggest follow up evaluation. 30 tablet 1    rosuvastatin (CRESTOR) 20 MG tablet Take 1 tablet by mouth once daily 30 tablet 6    semaglutide (OZEMPIC) 0.25 mg or 0.5 mg(2 mg/1.5 mL) pen injector Inject 0.5 mg into the skin every 7 days. 1 each 5    traMADoL (ULTRAM) 50 mg tablet Take 1 tablet (50 mg total) by mouth every 6 (six) hours as needed. 60 tablet 5    [DISCONTINUED] HYDROcodone-acetaminophen (NORCO) 5-325 mg per tablet       [DISCONTINUED] traMADoL (ULTRAM) 50 mg tablet  Take 1 tablet (50 mg total) by mouth every 6 (six) hours as needed. 60 tablet 5     Current Facility-Administered Medications on File Prior to Visit   Medication Dose Route Frequency Provider Last Rate Last Admin    lactated ringers infusion   Intravenous Continuous Sylvia Bojorquez MD   Stopped at 03/27/23 0714     Social History     Socioeconomic History    Marital status:     Number of children: 1   Occupational History    Occupation:      Employer: [employer]    Tobacco Use    Smoking status: Former     Current packs/day: 0.00     Average packs/day: 0.3 packs/day for 15.0 years (3.8 ttl pk-yrs)     Types: Cigarettes     Start date: 7/22/1998     Quit date: 7/22/2013     Years since quitting: 10.2    Smokeless tobacco: Former    Tobacco comments:     smokes for a few weeks per year - when under pressure. has been abstinent times years at a time. a pack lasts about a week   Substance and Sexual Activity    Alcohol use: Yes     Comment: Occasional    Drug use: Not Currently    Sexual activity: Not Currently     Birth control/protection: Post-menopausal   Social History Narrative    Lives alone. Caffeine intake rare -1-2 per week of coffee. Does not have a Living Will or Advanced Directive     Social Determinants of Health     Financial Resource Strain: Medium Risk (4/25/2023)    Overall Financial Resource Strain (CARDIA)     Difficulty of Paying Living Expenses: Somewhat hard   Food Insecurity: No Food Insecurity (4/25/2023)    Hunger Vital Sign     Worried About Running Out of Food in the Last Year: Never true     Ran Out of Food in the Last Year: Never true   Transportation Needs: No Transportation Needs (4/25/2023)    PRAPARE - Transportation     Lack of Transportation (Medical): No     Lack of Transportation (Non-Medical): No   Physical Activity: Insufficiently Active (4/25/2023)    Exercise Vital Sign     Days of Exercise per Week: 3 days     Minutes of Exercise per Session: 30 min  "  Stress: Stress Concern Present (4/25/2023)    [employer] - Occupational Stress Questionnaire     Feeling of Stress : To some extent   Social Connections: Moderately Integrated (4/25/2023)    Social Connection and Isolation Panel [NHANES]     Frequency of Communication with Friends and Family: Three times a week     Frequency of Social Gatherings with Friends and Family: Once a week     Attends Buddhism Services: More than 4 times per year     Active Member of Clubs or Organizations: Yes     Attends Club or Organization Meetings: 1 to 4 times per year     Marital Status:    Housing Stability: Low Risk  (4/25/2023)    Housing Stability Vital Sign     Unable to Pay for Housing in the Last Year: No     Number of Places Lived in the Last Year: 1     Unstable Housing in the Last Year: No     Family History   Problem Relation Age of Onset    Heart disease Mother     Diabetes Mother     Peripheral vascular disease Mother         amputations    Stroke Father     Kidney failure Father     Hypertension Father     Breast cancer Sister 39    Stroke Maternal Grandmother     Stroke Paternal Grandmother     Stroke Paternal Grandfather     No Known Problems Daughter     Cancer Other 68        breast    Coronary artery disease Maternal Grandfather     Colon cancer Neg Hx     Ovarian cancer Neg Hx        Review of Systems   Constitutional:  Positive for fatigue.   HENT: Negative.     Respiratory:  Positive for apnea.    Cardiovascular: Negative.    Genitourinary: Negative.    Endocrine: endocrine negative    Musculoskeletal: Negative.    Skin: Negative.    Gastrointestinal: Negative.    Neurological:  Positive for headaches.   Psychiatric/Behavioral:  Positive for sleep disturbance.        Objective:      /80   Pulse (!) 55   Resp 16   Ht 5' 5" (1.651 m)   Wt 65 kg (143 lb 4.8 oz)   SpO2 98%   BMI 23.85 kg/m²   Physical Exam  Vitals and nursing note reviewed.   Constitutional:       " "Appearance: Normal appearance. She is well-developed.   HENT:      Head: Normocephalic and atraumatic.      Nose: Nose normal.   Eyes:      Conjunctiva/sclera: Conjunctivae normal.      Pupils: Pupils are equal, round, and reactive to light.   Neck:      Thyroid: No thyromegaly.      Vascular: No JVD.      Trachea: No tracheal deviation.   Cardiovascular:      Rate and Rhythm: Normal rate and regular rhythm.      Heart sounds: Normal heart sounds.   Pulmonary:      Effort: Pulmonary effort is normal. No respiratory distress.      Breath sounds: Normal breath sounds. No wheezing or rales.   Chest:      Chest wall: No tenderness.   Abdominal:      General: Bowel sounds are normal.      Palpations: Abdomen is soft.   Musculoskeletal:         General: Normal range of motion.      Cervical back: Neck supple.   Lymphadenopathy:      Cervical: No cervical adenopathy.   Skin:     General: Skin is warm and dry.   Neurological:      Mental Status: She is alert and oriented to person, place, and time.       Personal Diagnostic Review  none pertinent    Home Sleep Studies   Date/Time: 12/11/2020 8:00 AM  Performed by: Melvin Kinney MD  Authorized by: Yong Michelle MD        PHYSICIAN INTERPRETATION AND COMMENTS: Findings are consistent with mild, positional obstructive sleep apnea  (SUKHDEEP). There is insufficient non-supine study time to assess the severity of non-positional obstructive sleep apnea (SUKHDEEP). Night #1  AHI was 6.0/hr with 6.3 hours sleep. CPAP indicated, proceed with titration or trial of AutoPAP 4-20 cmwp  CLINICAL HISTORY: 70 year old female presented with: 15.8 inch neck, BMI of 30, an [place] sleepiness score of 7, history  of hypertension, a previous diagnosis of SUKHDEEP and symptoms of nocturnal snoring. Based on the clinical history, the patient has a high  pre-test probability of having moderate SUKHDEEP.        11/6/2023    11:18 AM   Pulmonary Studies Review   SpO2 98 %   Height 5' 5" (1.651 m)   Weight 65 kg " "(143 lb 4.8 oz)   BMI (Calculated) 23.8   Predicted Distance 309.73   Predicted Distance Meters (Calculated) 450.35 meters       X-Ray Sinuses Min 3 Views  Narrative: EXAMINATION:  XR SINUSES MIN 3 VIEWS    CLINICAL HISTORY:  Long term (current) use of unspecified immunomodulators and immunosuppressants    TECHNIQUE:  AP, lateral, and Vasquez views of the paranasal sinuses were performed    COMPARISON:  None.    FINDINGS:  No fluid levels are noted.  No mucoperiosteal thickening identified.  Impression: No evidence of sinus opacification    Electronically signed by: Raphael Wren DO  Date:    03/14/2023  Time:    10:22      Office Spirometry Results:         11/6/2023    11:18 AM 11/2/2023    10:43 AM 8/28/2023     9:10 AM 8/10/2023    10:20 AM 7/31/2023    10:44 AM 7/5/2023    10:37 AM 4/28/2023     2:28 PM   Pulmonary Function Tests   SpO2 98 %    98 %     Height 5' 5" (1.651 m) 5' 5" (1.651 m) 5' 5" (1.651 m) 5' 5" (1.651 m) 5' 5" (1.651 m) 5' 5" (1.651 m) 5' 5" (1.651 m)   Weight 65 kg (143 lb 4.8 oz) 59.4 kg (131 lb) 59.6 kg (131 lb 6.3 oz) 64 kg (141 lb) 64.4 kg (141 lb 15.6 oz) 65.8 kg (145 lb) 65.5 kg (144 lb 4.7 oz)   BMI (Calculated) 23.8 21.8 21.9 23.5 23.6 24.1 24         11/6/2023    11:18 AM   Pulmonary Studies Review   SpO2 98 %   Height 5' 5" (1.651 m)   Weight 65 kg (143 lb 4.8 oz)   BMI (Calculated) 23.8   Predicted Distance 309.73   Predicted Distance Meters (Calculated) 450.35 meters         Assessment:       SUKHDEEP (obstructive sleep apnea)  Mild , does not meet criteria for INSPIRE    Nocturnal hypoxemia  -     PULSE OXIMETRY OVERNIGHT; Future    SUKHDEEP (obstructive sleep apnea)  -     PULSE OXIMETRY OVERNIGHT; Future    Pulmonary hypertension    Poor sleep hygiene          Outpatient Encounter Medications as of 11/6/2023   Medication Sig Dispense Refill    acyclovir (ZOVIRAX) 400 MG tablet Take 1 tablet by mouth twice daily 180 tablet 3    amLODIPine (NORVASC) 5 MG tablet Take 1 tablet by mouth once " daily 90 tablet 4    blood sugar diagnostic (BLOOD GLUCOSE TEST) Strp 1 strip by Misc.(Non-Drug; Combo Route) route 3 (three) times daily as needed. 1 each 11    calcium-vitamin D (CALCIUM 600 + D,3,) 600 mg-10 mcg (400 unit) Tab Take 1 tablet by mouth 2 (two) times daily. 60 tablet 3    cholecalciferol, vitamin D3, (VITAMIN D3) 25 mcg (1,000 unit) capsule Take 1 capsule by mouth every morning.      cloNIDine (CATAPRES) 0.2 MG tablet TAKE 1/2 (ONE-HALF) TABLET BY MOUTH IN THE MORNING AND 1/2 (ONE-HALF) AT NOON AND 2 AT BEDTIME 270 tablet 4    diazePAM (VALIUM) 5 MG tablet TAKE 1 TABLET BY MOUTH AT BEDTIME FOR ANXIETY 30 tablet 5    etanercept (ENBREL MINI) 50 mg/mL (1 mL) Crtg Inject 1 mL into the skin every 7 days. 12 mL 3    ipratropium (ATROVENT) 21 mcg (0.03 %) nasal spray 2 sprays by Each Nostril route 3 (three) times daily. 20 mL 5    lancing device Misc 1 each by Misc.(Non-Drug; Combo Route) route 3 (three) times daily as needed. 1 each 0    LEVSIN/SL 0.125 mg Subl Place 0.125-0.25 mg under the tongue every 4 (four) hours as needed.      losartan (COZAAR) 50 MG tablet Take 1 tablet by mouth once daily 90 tablet 4    mirabegron (MYRBETRIQ) 50 mg Tb24 Take 1 tablet by mouth once daily 90 tablet 4    miscellaneous medical supply Kit 1 kit by Misc.(Non-Drug; Combo Route) route once a week. 1 kit 1    oxyCODONE-acetaminophen (PERCOCET) 5-325 mg per tablet Take 1 tablet by mouth every 4 (four) hours as needed for Pain. 10 tablet 0    potassium chloride SA (K-DUR,KLOR-CON) 20 MEQ tablet 1 tablet daily 90 tablet 5    pravastatin (PRAVACHOL) 40 MG tablet       predniSONE (DELTASONE) 10 MG tablet May take 5-10mg of prednisone for rheumatoid related pain.  If need to take more than 2 weeks of the month, suggest follow up evaluation. 30 tablet 1    rosuvastatin (CRESTOR) 20 MG tablet Take 1 tablet by mouth once daily 30 tablet 6    semaglutide (OZEMPIC) 0.25 mg or 0.5 mg(2 mg/1.5 mL) pen injector Inject 0.5 mg into the  skin every 7 days. 1 each 5    traMADoL (ULTRAM) 50 mg tablet Take 1 tablet (50 mg total) by mouth every 6 (six) hours as needed. 60 tablet 5    [DISCONTINUED] HYDROcodone-acetaminophen (NORCO) 5-325 mg per tablet       [DISCONTINUED] traMADoL (ULTRAM) 50 mg tablet Take 1 tablet (50 mg total) by mouth every 6 (six) hours as needed. 60 tablet 5     Facility-Administered Encounter Medications as of 11/6/2023   Medication Dose Route Frequency Provider Last Rate Last Admin    lactated ringers infusion   Intravenous Continuous Sylvia Bojorquez MD   Stopped at 03/27/23 0714     Plan:       Requested Prescriptions      No prescriptions requested or ordered in this encounter     Problem List Items Addressed This Visit       SUKHDEEP (obstructive sleep apnea)    Overview     Mild Obstructive Sleep Apnea on home sleep study.  Patient does not want to try Continuous Positive Airway Pressure          Current Assessment & Plan     Mild , does not meet criteria for INSPIRE         Relevant Orders    PULSE OXIMETRY OVERNIGHT    Poor sleep hygiene    Pulmonary hypertension    Overview     10/22/2021 CTA chest  -Pulmonary arteries: Pulmonary arteries are well opacified.  No evidence of pulmonary embolism.  Prominence of pulmonary artery suggests chronic pulmonary hypertension.  No right heart strain is identified with RV/LV ratio < 0.9.  10/09/2020 Echo-  Summary    There is moderate left ventricular concentric hypertrophy.  The left ventricle is normal in size with normal systolic function. The estimated ejection fraction is 60%.  Indeterminate diastolic function.  Normal right ventricular systolic function.  Mild tricuspid regurgitation.  Normal central venous pressure (3 mmHg).  The estimated PA systolic pressure is 25 mmHg.           .          Other Visit Diagnoses       Nocturnal hypoxemia    -  Primary    Relevant Orders    PULSE OXIMETRY OVERNIGHT               Follow up in about 1 year (around 11/6/2024) for Overnight O2 Sat  ASAP, Review progress.    MEDICAL DECISION MAKING: Moderate to high complexity.  Overall, the multiple problems listed are of moderate to high severity that may impact quality of life and activities of daily living. Side effects of medications, treatment plan as well as options and alternatives reviewed and discussed with patient. There was counseling of patient concerning these issues.    Total time spent in counseling and coordination of care - 35  minutes of total time spent on the encounter, which includes face to face time and non-face to face time preparing to see the patient (eg, review of tests), Obtaining and/or reviewing separately obtained history, Documenting clinical information in the electronic or other health record, Independently interpreting results (not separately reported) and communicating results to the patient/family/caregiver, or Care coordination (not separately reported).    Time was used in discussion of prognosis, risks, benefits of treatment, instructions and compliance with regimen . Discussion with other physicians and/or health care providers - home health or for use of durable medical equipment (oxygen, nebulizers, CPAP, BiPAP) occurred.

## 2023-11-07 ENCOUNTER — PATIENT MESSAGE (OUTPATIENT)
Dept: PULMONOLOGY | Facility: CLINIC | Age: 73
End: 2023-11-07
Payer: MEDICARE

## 2023-11-07 NOTE — TELEPHONE ENCOUNTER
Contacted pt this morning to inform her that her insurance has not approved the procedure for this afternoon; per [employer], it could take up to 14 days for approval.  Informed Dr. Rawls and surgery postponed to 11/13/23.   Harjit, in surgery informed.

## 2023-11-10 ENCOUNTER — ANESTHESIA EVENT (OUTPATIENT)
Dept: SURGERY | Facility: HOSPITAL | Age: 73
End: 2023-11-10
Payer: MEDICARE

## 2023-11-10 ENCOUNTER — TELEPHONE (OUTPATIENT)
Dept: PREADMISSION TESTING | Facility: HOSPITAL | Age: 73
End: 2023-11-10
Payer: MEDICARE

## 2023-11-10 ENCOUNTER — PATIENT MESSAGE (OUTPATIENT)
Dept: PREADMISSION TESTING | Facility: HOSPITAL | Age: 73
End: 2023-11-10
Payer: MEDICARE

## 2023-11-10 NOTE — TELEPHONE ENCOUNTER
Spoke with patient via phone call. Patient is aware that surgery on 11/13 is being moved from the [facility] location to [facility]. Patient was informed someone will call with arrival time the day before surgery between 1PM-3PM, and verbalized understanding. All questions answered. PAT instructions previously completed by [facility] PAT department.

## 2023-11-10 NOTE — ANESTHESIA PREPROCEDURE EVALUATION
11/10/2023   Past Medical History:   Diagnosis Date    ADHD (attention deficit hyperactivity disorder)     Adult ADHD     [facility]    Chronic rheumatic arthritis     on DMARD    Coronary artery disease involving native coronary artery of native heart 04/17/2023    elev cor calc score    Elevated LFTs     hepatology; poss enbrel    Ex-smoker     Genital herpes     Hyperlipidemia     Hypertension     Immunosuppressed status     Lichen sclerosus et atrophicus     Morbid obesity 01/11/2021    Obesity, Class I, BMI 30-34.9 12/22/2015    SUKHDEEP (obstructive sleep apnea)     Osteopenia     5/16 wai 5/18(start fmax if on pred 3months or more)    Type 2 diabetes mellitus     dxd 9/20     Past Surgical History:   Procedure Laterality Date    BREAST BIOPSY Left     benign    COLONOSCOPY N/A 2/12/2020    Procedure: COLONOSCOPY;  Surgeon: Eloina Castro MD;  Location: [facility];  Service: Endoscopy;  Laterality: N/A;    CRYOTHERAPY  1980    CYSTOSCOPY,WITH BOTULINUM TOXIN INJECTION N/A 3/27/2023    Procedure: CYSTOSCOPY,WITH BOTULINUM TOXIN INJECTION;  Surgeon: Pablo Rawls MD;  Location: [facility] OR;  Service: Urology;  Laterality: N/A;    ESOPHAGOGASTRODUODENOSCOPY N/A 2/12/2020    Procedure: EGD (ESOPHAGOGASTRODUODENOSCOPY);  Surgeon: Eloina Castro MD;  Location: [facility];  Service: Endoscopy;  Laterality: N/A;    INSERTION OF SACRAL NEUROSTIMULATOR GENERATOR Left 11/23/2020    Procedure: INSERTION, PULSE GENERATOR, NEUROSTIMULATOR, SACRAL;  Surgeon: Pablo Rawls MD;  Location: [facility] OR;  Service: Urology;  Laterality: Left;    INSERTION OF SACRAL NEUROSTIMULATOR, ELECTRODES, AND IMPLANTABLE PULSE GENERATOR (IPG) Left 11/23/2020    Procedure: INSERTION, ELECTRODE LEADS AND PULSE GENERATOR, NEUROSTIMULATOR, SACRAL;  Surgeon: Pablo Rawls MD;  Location: [facility] OR;  Service: Urology;   Laterality: Left;    SKIN GRAFT  1965    laceration to right  fingers  from thigh     TONSILLECTOMY  1958       Smitha Salinas is a 73 y.o., female.    Pre-op Assessment    I have reviewed the Patient Summary Reports.     I have reviewed the Nursing Notes. I have reviewed the NPO Status.   I have reviewed the Medications.   Prednisone    Review of Systems  Anesthesia Hx:  No problems with previous Anesthesia  Had muscle pain after GA in past. History of prior surgery of interest to airway management or planning:  Previous anesthesia: General        Denies Family Hx of Anesthesia complications.    Denies Personal Hx of Anesthesia complications.                    Social:  Former Smoker, Social Alcohol Use       Hematology/Oncology:  Hematology Normal   Oncology Normal                                   EENT/Dental:  EENT/Dental Normal           Cardiovascular:     Hypertension           hyperlipidemia   ECG has been reviewed. Had abnl ECG, seen and cleared per cards.  Echo WNL with diastolic dysfxn.  NEG perfusion scan.  F/U in 6 months to further evaluate overall longterm risk.                         Pulmonary:        Sleep Apnea           Education provided regarding risk of obstructive sleep apnea            Renal/:  Renal/ Normal    Genital herpes             Hepatic/GI:        dysphagia          Musculoskeletal:  Arthritis   Rheumatoid arthritis, immunotherapy.            Neurological:      Headaches     Post herpetic neuralgia                            Endocrine:  Diabetes, type 2           Dermatological:  Lichen sclerosus et atrophicus   Psych:   anxiety depression ADHD               Physical Exam  General:  Obesity       Airway/Jaw/Neck:  Airway Findings: Mouth Opening: Normal     Tongue: Normal      General Airway Assessment: Adult      Mallampati: III   TM Distance: Normal, at least 6 cm   Jaw/Neck Findings:     Neck ROM: Normal ROM   Neck Findings:       Eyes/Ears/Nose:  Eyes/Ears/Nose Findings:      Dental:  Dental Findings: In tact   Pt states no loose teeth   Chest/Lungs:  Chest/Lungs Findings:  Clear to auscultation, Normal Respiratory Rate       Heart/Vascular:  Heart Findings: Rate: Normal  Rhythm: Regular Rhythm  Sounds: Normal  Heart murmur: negative       Vascular Findings: Normal           Abdomen:  Abdomen Findings: Normal      Musculoskeletal:  Musculoskeletal Findings: Normal   Skin:  Skin Findings: Normal      Mental Status:  Mental Status Findings:  Alert and Oriented         Anesthesia Plan  Type of Anesthesia, risks & benefits discussed:  Anesthesia Type:  MAC, general    Patient's Preference:   Plan Factors:          Intra-op Monitoring Plan: standard ASA monitors  Intra-op Monitoring Plan Comments:   Post Op Pain Control Plan: per primary service following discharge from PACU and multimodal analgesia  Post Op Pain Control Plan Comments:     Induction:   IV  Beta Blocker:  Patient is not currently on a Beta-Blocker (No further documentation required).       Informed Consent: Informed consent signed with the Patient and all parties understand the risks and agree with anesthesia plan.  All questions answered.  Anesthesia consent signed with patient.  ASA Score: 3     Day of Surgery Review of History & Physical:  There are no significant changes.  H&P Update referred to the surgeon/provider.          Ready For Surgery From Anesthesia Perspective.             Physical Exam  General: Obesity    Airway:  Mallampati: III   Mouth Opening: Normal  TM Distance: Normal, at least 6 cm  Tongue: Normal  Neck ROM: Normal ROM    Dental:  In tact    Chest/Lungs:  Clear to auscultation, Normal Respiratory Rate    Heart:  Rate: Normal  Rhythm: Regular Rhythm  Sounds: Normal          Anesthesia Plan  Type of Anesthesia, risks & benefits discussed:    Anesthesia Type: MAC, general  Intra-op Monitoring Plan: standard ASA monitors  Post Op Pain Control Plan: per primary service following discharge from PACU and  multimodal analgesia  Induction:  IV  Informed Consent: Informed consent signed with the Patient and all parties understand the risks and agree with anesthesia plan.  All questions answered.   ASA Score: 3  Day of Surgery Review of History & Physical: H&P Update referred to the surgeon/provider.    Ready For Surgery From Anesthesia Perspective.       .

## 2023-11-10 NOTE — TELEPHONE ENCOUNTER
Called and spoke with the patient about the following:     Your Surgery arrival time is at 0715 on 11/13 at [facility] location.   The [place] is [address]. [place] [address].      Only 1 adult allowed (over the age of 18) to accompany you and MUST STAY through the entire length of admission.     Must have a ride home from a responsible adult that you know and trust.    Medical Transport, Uber or Lyft can only be used if patient has a responsible adult to accompany them during ride home.  Pediatric patients are encouraged to have 2 adults accompany them to the surgery center.     ~Your ride MUST STAY the entire time until you are discharged~    You may be required to provide a urine sample prior to procedure;   Please ask  for a specimen cup if you need to use the restroom prior to being called into pre-op.    Please come to the main lobby and be prepared to show your photo ID and insurance card.      Nothing to eat or drink after midnight, unless you were instructed to take specific medications discussed with the Pre-admit Nurse.      Please call with any questions or concerns.     [phone]      Thanks.

## 2023-11-13 ENCOUNTER — ANESTHESIA (OUTPATIENT)
Dept: SURGERY | Facility: HOSPITAL | Age: 73
End: 2023-11-13
Payer: MEDICARE

## 2023-11-13 ENCOUNTER — HOSPITAL ENCOUNTER (OUTPATIENT)
Facility: HOSPITAL | Age: 73
Discharge: HOME | End: 2023-11-13
Attending: UROLOGY | Admitting: UROLOGY
Payer: MEDICARE

## 2023-11-13 DIAGNOSIS — N39.41 URGE INCONTINENCE: ICD-10-CM

## 2023-11-13 DIAGNOSIS — Z01.818 PREOP TESTING: Primary | ICD-10-CM

## 2023-11-13 LAB
POCT GLUCOSE: 86 MG/DL (ref 70–110)
POCT GLUCOSE: 96 MG/DL (ref 70–110)

## 2023-11-13 PROCEDURE — 63600175 PHARM REV CODE 636 W HCPCS: Performed by: ANESTHESIOLOGY

## 2023-11-13 PROCEDURE — 37000009 HC ANESTHESIA EA ADD 15 MINS: Performed by: UROLOGY

## 2023-11-13 PROCEDURE — D9220A PRA ANESTHESIA: ICD-10-PCS | Mod: ,,, | Performed by: NURSE ANESTHETIST, CERTIFIED REGISTERED

## 2023-11-13 PROCEDURE — 37000008 HC ANESTHESIA 1ST 15 MINUTES: Performed by: UROLOGY

## 2023-11-13 PROCEDURE — 52287 PR CYSTOURETHROSCOPY WITH INJ FOR CHEMODENERVATION: ICD-10-PCS | Mod: ,,, | Performed by: UROLOGY

## 2023-11-13 PROCEDURE — 25000003 PHARM REV CODE 250: Performed by: NURSE ANESTHETIST, CERTIFIED REGISTERED

## 2023-11-13 PROCEDURE — 36000706: Performed by: UROLOGY

## 2023-11-13 PROCEDURE — 63600175 PHARM REV CODE 636 W HCPCS: Performed by: NURSE ANESTHETIST, CERTIFIED REGISTERED

## 2023-11-13 PROCEDURE — 71000015 HC POSTOP RECOV 1ST HR: Performed by: UROLOGY

## 2023-11-13 PROCEDURE — 63600175 PHARM REV CODE 636 W HCPCS: Mod: JZ,JG | Performed by: UROLOGY

## 2023-11-13 PROCEDURE — 82962 GLUCOSE BLOOD TEST: CPT | Performed by: UROLOGY

## 2023-11-13 PROCEDURE — A4216 STERILE WATER/SALINE, 10 ML: HCPCS | Performed by: UROLOGY

## 2023-11-13 PROCEDURE — 52287 CYSTOSCOPY CHEMODENERVATION: CPT | Mod: ,,, | Performed by: UROLOGY

## 2023-11-13 PROCEDURE — 25000003 PHARM REV CODE 250: Performed by: UROLOGY

## 2023-11-13 PROCEDURE — D9220A PRA ANESTHESIA: Mod: ,,, | Performed by: NURSE ANESTHETIST, CERTIFIED REGISTERED

## 2023-11-13 PROCEDURE — 71000033 HC RECOVERY, INTIAL HOUR: Performed by: UROLOGY

## 2023-11-13 PROCEDURE — 36000707: Performed by: UROLOGY

## 2023-11-13 RX ORDER — FENTANYL CITRATE 50 UG/ML
INJECTION, SOLUTION INTRAMUSCULAR; INTRAVENOUS
Status: DISCONTINUED | OUTPATIENT
Start: 2023-11-13 | End: 2023-11-13

## 2023-11-13 RX ORDER — MIDAZOLAM HYDROCHLORIDE 1 MG/ML
INJECTION INTRAMUSCULAR; INTRAVENOUS
Status: DISCONTINUED | OUTPATIENT
Start: 2023-11-13 | End: 2023-11-13

## 2023-11-13 RX ORDER — ONDANSETRON 2 MG/ML
INJECTION INTRAMUSCULAR; INTRAVENOUS
Status: DISCONTINUED | OUTPATIENT
Start: 2023-11-13 | End: 2023-11-13

## 2023-11-13 RX ORDER — CEFDINIR 300 MG/1
300 CAPSULE ORAL 2 TIMES DAILY
Qty: 6 CAPSULE | Refills: 0 | Status: SHIPPED | OUTPATIENT
Start: 2023-11-13 | End: 2023-11-16

## 2023-11-13 RX ORDER — LIDOCAINE HYDROCHLORIDE 20 MG/ML
INJECTION, SOLUTION EPIDURAL; INFILTRATION; INTRACAUDAL; PERINEURAL
Status: DISCONTINUED | OUTPATIENT
Start: 2023-11-13 | End: 2023-11-13

## 2023-11-13 RX ORDER — SODIUM CHLORIDE, SODIUM LACTATE, POTASSIUM CHLORIDE, CALCIUM CHLORIDE 600; 310; 30; 20 MG/100ML; MG/100ML; MG/100ML; MG/100ML
INJECTION, SOLUTION INTRAVENOUS CONTINUOUS
Status: DISCONTINUED | OUTPATIENT
Start: 2023-11-13 | End: 2023-11-13 | Stop reason: HOSPADM

## 2023-11-13 RX ORDER — PROPOFOL 10 MG/ML
VIAL (ML) INTRAVENOUS
Status: DISCONTINUED | OUTPATIENT
Start: 2023-11-13 | End: 2023-11-13

## 2023-11-13 RX ORDER — SODIUM CHLORIDE 9 MG/ML
INJECTION, SOLUTION INTRAMUSCULAR; INTRAVENOUS; SUBCUTANEOUS
Status: DISCONTINUED | OUTPATIENT
Start: 2023-11-13 | End: 2023-11-13 | Stop reason: HOSPADM

## 2023-11-13 RX ORDER — PHENAZOPYRIDINE HYDROCHLORIDE 200 MG/1
200 TABLET, FILM COATED ORAL 3 TIMES DAILY PRN
Qty: 15 TABLET | Refills: 0 | Status: SHIPPED | OUTPATIENT
Start: 2023-11-13 | End: 2023-12-20

## 2023-11-13 RX ADMIN — FENTANYL CITRATE 50 MCG: 50 INJECTION, SOLUTION INTRAMUSCULAR; INTRAVENOUS at 09:11

## 2023-11-13 RX ADMIN — MIDAZOLAM HYDROCHLORIDE 2 MG: 1 INJECTION INTRAMUSCULAR; INTRAVENOUS at 08:11

## 2023-11-13 RX ADMIN — PROPOFOL 20 MG: 10 INJECTION, EMULSION INTRAVENOUS at 09:11

## 2023-11-13 RX ADMIN — SODIUM CHLORIDE, SODIUM LACTATE, POTASSIUM CHLORIDE, AND CALCIUM CHLORIDE: 600; 310; 30; 20 INJECTION, SOLUTION INTRAVENOUS at 08:11

## 2023-11-13 RX ADMIN — ONDANSETRON 4 MG: 2 INJECTION INTRAMUSCULAR; INTRAVENOUS at 09:11

## 2023-11-13 RX ADMIN — PROPOFOL 10 MG: 10 INJECTION, EMULSION INTRAVENOUS at 09:11

## 2023-11-13 RX ADMIN — LIDOCAINE HYDROCHLORIDE 60 MG: 20 INJECTION, SOLUTION EPIDURAL; INFILTRATION; INTRACAUDAL; PERINEURAL at 09:11

## 2023-11-13 RX ADMIN — DEXTROSE 2 G: 50 INJECTION, SOLUTION INTRAVENOUS at 09:11

## 2023-11-13 RX ADMIN — PROPOFOL 30 MG: 10 INJECTION, EMULSION INTRAVENOUS at 09:11

## 2023-11-13 NOTE — INTERVAL H&P NOTE
The patient has been examined and the H&P has been reviewed:    I concur with the findings and no changes have occurred since H&P was written.    Anesthesia/Surgery risks, benefits and alternative options discussed and understood by patient/family.    Patient presents ready to proceed with reinjection of botox with cystoscopy.    Patient understands the risks, benefits and alternatives of the above-stated procedure.  These include but are not limited to damage to the surrounding structures including the urethra, ureters and bladder.  Risk of need for stents or possible catheterization if she has retention and cannot void postprocedure.  Risk of infection, hematuria, discomfort and pain with burning.  Risk of persistent incontinence.  Understanding that this will need to be repeated once the Botox wears off.  Risk of heart attack, stroke, death, DVT and PE.  Patient understanding of all the above has elected to pursue the procedure as stated.            There are no hospital problems to display for this patient.

## 2023-11-13 NOTE — TRANSFER OF CARE
"Anesthesia Transfer of Care Note    Patient: Smitha Salinas    Procedure(s) Performed: Procedure(s) (LRB):  CYSTOSCOPY,WITH BOTULINUM TOXIN INJECTION (N/A)    Patient location: PACU    Anesthesia Type: MAC    Transport from OR: Transported from OR on room air with adequate spontaneous ventilation    Post pain: adequate analgesia    Post assessment: no apparent anesthetic complications    Post vital signs: stable    Level of consciousness: awake    Nausea/Vomiting: no nausea/vomiting    Complications: none    Transfer of care protocol was followed      Last vitals: Visit Vitals  /67 (BP Location: Right arm, Patient Position: Sitting)   Pulse 68   Temp 36.4 °C (97.5 °F) (Temporal)   Resp 18   Ht 5' 5" (1.651 m)   Wt 66.4 kg (146 lb 7.9 oz)   SpO2 99%   BMI 24.38 kg/m²     "

## 2023-11-13 NOTE — DISCHARGE SUMMARY
[facility]  Discharge Note  Short Stay    Procedure(s) (LRB):  CYSTOSCOPY,WITH BOTULINUM TOXIN INJECTION (N/A)      OUTCOME: Patient tolerated treatment/procedure well without complication and is now ready for discharge.    DISPOSITION: Home or Self Care    FINAL DIAGNOSIS:  urge incontinence    FOLLOWUP: In clinic    DISCHARGE INSTRUCTIONS:    Discharge Procedure Orders   CBC Auto Differential   Standing Status: Future Number of Occurrences: 1 Standing Exp. Date: 12/31/24     Diet Adult Regular     Notify your health care provider if you experience any of the following:  severe uncontrolled pain     Notify your health care provider if you experience any of the following:  persistent nausea and vomiting or diarrhea     Notify your health care provider if you experience any of the following:  temperature >100.4     EKG 12-lead   Standing Status: Future Number of Occurrences: 1 Standing Exp. Date: 11/02/24     Activity as tolerated        TIME SPENT ON DISCHARGE: 5 minutes

## 2023-11-13 NOTE — ANESTHESIA POSTPROCEDURE EVALUATION
Anesthesia Post Evaluation    Patient: Smitha Salinas    Procedure(s) Performed: Procedure(s) (LRB):  CYSTOSCOPY,WITH BOTULINUM TOXIN INJECTION (N/A)    Final Anesthesia Type: general      Patient location during evaluation: PACU  Patient participation: Yes- Able to Participate  Level of consciousness: awake  Post-procedure vital signs: reviewed and stable  Pain management: adequate  Airway patency: patent    PONV status at discharge: No PONV  Anesthetic complications: no      Cardiovascular status: stable  Respiratory status: unassisted  Hydration status: euvolemic  Follow-up not needed.          Vitals Value Taken Time   /57 11/13/23 0946   Temp 36.6 °C (97.9 °F) 11/13/23 0926   Pulse 52 11/13/23 0946   Resp 17 11/13/23 0946   SpO2 94 % 11/13/23 0946   Vitals shown include unvalidated device data.      Event Time   Out of Recovery 09:45:00         Pain/Bianka Score: Bianka Score: 9 (11/13/2023  9:30 AM)

## 2023-11-13 NOTE — OP NOTE
Date of Procedure: 11/13/2023    PREOP DIAGNOSIS:  Urge incontinence.    POSTOP DIAGNOSIS:  Urge incontinence.    PROCEDURES:      1. Cystoscopy with injection of botox.    2. 100 units of botox    SURGEON:  Pablo Rawls M.D.    Assistants: None    Specimen: None    ANESTHESIA:  MAC.    BLOOD LOSS:  None.    FINDINGS:  20 injections given for a total of 100 units of botox, cystoscopy unremarkable.    PROCEDURE IN DETAIL:  Patient was brought to the operative suite and placed under general anesthesia and positioned into the dorsal lithotomy position.  After being sterilely prepped and draped a 21 Marshallese sheath cystoscope was inserted into a normal urethra.  Bladder was examined, no mucosal abnormalities.  Bilateral ureteral orifices are normal in size, shape, caliber and location.  Otherwise unremarkable cystoscopic examination.  We then began injections of botox, for a total of 20 injections.  These were divided into 5 vertical rows of 4 injections a piece.  Lateral rows were lateral to each ureteral orifice.  0.5 ml within each injection.  At the conclusion at total of 100 units of botox was injected within the bladder.  We then emptied the bladder and there was no evidence of bleeding, hemostasis was assessed and maintained.  Ureteral orifices were well away from injection sites.  Patient was transferred to the PACU in stable condition.  Patient will return in 5 months with a pvr to assess and possibly schedule another treatment.    COMPLICATIONS: None

## 2023-11-13 NOTE — H&P
Chief Complaint:        Encounter Diagnosis   Name Primary?    Urge incontinence Yes         HPI:   8/9/23- patient is still doing well on the Botox, currently at 4-5 months.  No other complaints today.  This is a virtual visit.        Allergies:  Valdecoxib     Medications: has a current medication list which includes the following prescription(s): acyclovir, amlodipine, aspirin, blood sugar diagnostic, calcium-vitamin d, cholecalciferol (vitamin d3), clonidine, diazepam, enbrel mini, ipratropium, lancing device, levsin/sl, losartan, myrbetriq, miscellaneous medical supply, oxycodone-acetaminophen, potassium chloride sa, prednisone, rosuvastatin, ozempic, and tramadol, and the following Facility-Administered Medications: lactated ringers.     Review of Systems:  General: No fever, chills, fatigability, or weight loss.  Skin: No rashes, itching, or changes in color or texture of skin.  Chest: Denies ADLER, cyanosis, wheezing, cough, and sputum production.  Abdomen: Appetite fine. No weight loss. Denies diarrhea, abdominal pain, hematemesis, or blood in stool.  Musculoskeletal: No joint stiffness or swelling. Some back pain.  : As above.  All other review of systems negative.     PMH:   has a past medical history of ADHD (attention deficit hyperactivity disorder), Adult ADHD, Chronic rheumatic arthritis, Coronary artery disease involving native coronary artery of native heart (04/17/2023), Elevated LFTs, Ex-smoker, Genital herpes, Hyperlipidemia, Hypertension, Immunosuppressed status, Lichen sclerosus et atrophicus, Morbid obesity (01/11/2021), Obesity, Class I, BMI 30-34.9 (12/22/2015), SUKHDEEP (obstructive sleep apnea), Osteopenia, and Type 2 diabetes mellitus.     PSH:   has a past surgical history that includes Tonsillectomy (1958); Skin graft (1965); Cryotherapy (1980); Breast biopsy (Left); Colonoscopy (N/A, 2/12/2020); Esophagogastroduodenoscopy (N/A, 2/12/2020); Insertion of sacral neurostimulator,  electrodes, and implantable pulse generator (IPG) (Left, 11/23/2020); Insertion of sacral neurostimulator generator (Left, 11/23/2020); and cystoscopy,with botulinum toxin injection (N/A, 3/27/2023).     FamHx: family history includes Breast cancer (age of onset: 39) in her sister; Cancer (age of onset: 68) in an other family member; Coronary artery disease in her maternal grandfather; Diabetes in her mother; Heart disease in her mother; Hypertension in her father; Kidney failure in her father; No Known Problems in her daughter; Peripheral vascular disease in her mother; Stroke in her father, maternal grandmother, paternal grandfather, and paternal grandmother.     SocHx:  reports that she quit smoking about 10 years ago. Her smoking use included cigarettes. She started smoking about 25 years ago. She has a 3.8 pack-year smoking history. She has quit using smokeless tobacco. She reports current alcohol use. She reports that she does not currently use drugs.      Physical Exam:  Vitals:   There were no vitals filed for this visit.     General: A&Ox3. No apparent distress. No deformities.  Neck: No masses.   Lungs: No use of accessory muscles.  Ext: No c/c/e.     Labs/Studies:   Pvr 53 ml 4/23  Prevoid 65 ml 3/23  UDS- decreased capacity otherwise normal 9/20  Percutaneous InterStim 09/29/2020  pvr 12 ml 1/21  KUB/GENIE normal 8/20     Impression/Plan:        1.  Urge incontinence-  botox  3/27/23,  InterStim 11/23/20     The combination of Botox, InterStim and myrbetriq are still assisting, currently at 4-5 months with the Botox.  I will see her back in 3 months, call if symptoms progress prior to that time.  At which point we will determine if she needs a repeat Botox injection, call with any other issues in the meantime.  This was a virtual visit.

## 2023-11-14 VITALS
RESPIRATION RATE: 16 BRPM | SYSTOLIC BLOOD PRESSURE: 127 MMHG | TEMPERATURE: 98 F | HEIGHT: 65 IN | BODY MASS INDEX: 24.41 KG/M2 | WEIGHT: 146.5 LBS | DIASTOLIC BLOOD PRESSURE: 64 MMHG | HEART RATE: 53 BPM | OXYGEN SATURATION: 99 %

## 2023-11-16 NOTE — TELEPHONE ENCOUNTER
----- Message from Alex Oleary sent at 9/17/2019  8:44 AM CDT -----  Contact: pt   Pt calling in reference to prescriber form for pt's tocilizumab (ACTEMRA) 162 mg/0.9 mL Syrg. Pt needs form completed and faxed to    Fax [phone]    Pt would like cb once completed         ..[phone]   
Spoke with Mrs. Salinas informed her that the provider portion of the form is on Dr. Aguila desk for completion. I will contact her once the forms are completed and faxed. Mrs. Salinas voiced understanding  
Her/She

## 2023-11-29 ENCOUNTER — PATIENT MESSAGE (OUTPATIENT)
Dept: RHEUMATOLOGY | Facility: CLINIC | Age: 73
End: 2023-11-29
Payer: MEDICARE

## 2023-11-29 ENCOUNTER — PATIENT MESSAGE (OUTPATIENT)
Dept: INTERNAL MEDICINE | Facility: CLINIC | Age: 73
End: 2023-11-29
Payer: MEDICARE

## 2023-12-03 DIAGNOSIS — E78.00 HYPERCHOLESTEROLEMIA: ICD-10-CM

## 2023-12-04 ENCOUNTER — HOSPITAL ENCOUNTER (OUTPATIENT)
Dept: RADIOLOGY | Facility: HOSPITAL | Age: 73
Discharge: HOME | End: 2023-12-04
Attending: FAMILY MEDICINE
Payer: MEDICARE

## 2023-12-04 VITALS — HEIGHT: 65 IN | WEIGHT: 146.38 LBS | BODY MASS INDEX: 24.39 KG/M2

## 2023-12-04 DIAGNOSIS — Z12.31 ENCOUNTER FOR SCREENING MAMMOGRAM FOR BREAST CANCER: ICD-10-CM

## 2023-12-04 PROCEDURE — 77063 BREAST TOMOSYNTHESIS BI: CPT | Mod: 26,,, | Performed by: RADIOLOGY

## 2023-12-04 PROCEDURE — 77067 SCR MAMMO BI INCL CAD: CPT | Mod: TC

## 2023-12-04 PROCEDURE — 77063 MAMMO DIGITAL SCREENING BILAT WITH TOMO: ICD-10-PCS | Mod: 26,,, | Performed by: RADIOLOGY

## 2023-12-04 PROCEDURE — 77067 SCR MAMMO BI INCL CAD: CPT | Mod: 26,,, | Performed by: RADIOLOGY

## 2023-12-04 PROCEDURE — 77067 MAMMO DIGITAL SCREENING BILAT WITH TOMO: ICD-10-PCS | Mod: 26,,, | Performed by: RADIOLOGY

## 2023-12-04 RX ORDER — ROSUVASTATIN CALCIUM 20 MG/1
TABLET, COATED ORAL
Qty: 30 TABLET | Refills: 6 | Status: SHIPPED | OUTPATIENT
Start: 2023-12-04

## 2023-12-13 ENCOUNTER — TELEPHONE (OUTPATIENT)
Dept: RHEUMATOLOGY | Facility: CLINIC | Age: 73
End: 2023-12-13
Payer: MEDICARE

## 2023-12-13 DIAGNOSIS — M05.9 SEROPOSITIVE RHEUMATOID ARTHRITIS: ICD-10-CM

## 2023-12-13 RX ORDER — ETANERCEPT 50 MG/ML
1 SOLUTION SUBCUTANEOUS
Qty: 12 ML | Refills: 3 | Status: ACTIVE | OUTPATIENT
Start: 2023-12-13

## 2023-12-13 NOTE — TELEPHONE ENCOUNTER
----- Message from Santana Liu PharmD sent at 12/11/2023  3:37 PM CST -----  Smitha Slainas is continuing on Enbrel therapy. Please send [facility] a new order so that we may investigate patient's insurance benefits and financial assistance for the new year.

## 2023-12-20 ENCOUNTER — OFFICE VISIT (OUTPATIENT)
Dept: CARDIOLOGY | Facility: CLINIC | Age: 73
End: 2023-12-20
Payer: MEDICARE

## 2023-12-20 ENCOUNTER — HOSPITAL ENCOUNTER (OUTPATIENT)
Dept: CARDIOLOGY | Facility: HOSPITAL | Age: 73
Discharge: HOME | End: 2023-12-20
Attending: INTERNAL MEDICINE
Payer: MEDICARE

## 2023-12-20 VITALS
DIASTOLIC BLOOD PRESSURE: 68 MMHG | HEART RATE: 61 BPM | BODY MASS INDEX: 23.66 KG/M2 | HEIGHT: 65 IN | SYSTOLIC BLOOD PRESSURE: 127 MMHG | WEIGHT: 142 LBS | OXYGEN SATURATION: 98 %

## 2023-12-20 DIAGNOSIS — F90.9 ADULT ADHD: ICD-10-CM

## 2023-12-20 DIAGNOSIS — E78.00 HYPERCHOLESTEROLEMIA: ICD-10-CM

## 2023-12-20 DIAGNOSIS — R94.31 ABNORMAL EKG: ICD-10-CM

## 2023-12-20 DIAGNOSIS — R93.1 ELEVATED CORONARY ARTERY CALCIUM SCORE: ICD-10-CM

## 2023-12-20 DIAGNOSIS — I70.0 AORTIC ATHEROSCLEROSIS: ICD-10-CM

## 2023-12-20 DIAGNOSIS — E11.36 DM CATARACT: ICD-10-CM

## 2023-12-20 DIAGNOSIS — E11.59 HYPERTENSION ASSOCIATED WITH DIABETES: Primary | ICD-10-CM

## 2023-12-20 DIAGNOSIS — M05.9 SEROPOSITIVE RHEUMATOID ARTHRITIS: ICD-10-CM

## 2023-12-20 DIAGNOSIS — Z87.891 EX-SMOKER: ICD-10-CM

## 2023-12-20 DIAGNOSIS — Z79.899 HIGH RISK MEDICATION USE: ICD-10-CM

## 2023-12-20 DIAGNOSIS — B02.29 POST HERPETIC NEURALGIA: ICD-10-CM

## 2023-12-20 DIAGNOSIS — G43.D0 ABDOMINAL MIGRAINE, NOT INTRACTABLE: ICD-10-CM

## 2023-12-20 DIAGNOSIS — I25.10 CORONARY ARTERY DISEASE INVOLVING NATIVE CORONARY ARTERY OF NATIVE HEART, UNSPECIFIED WHETHER ANGINA PRESENT: ICD-10-CM

## 2023-12-20 DIAGNOSIS — G47.33 OSA (OBSTRUCTIVE SLEEP APNEA): ICD-10-CM

## 2023-12-20 DIAGNOSIS — I15.2 HYPERTENSION ASSOCIATED WITH DIABETES: Primary | ICD-10-CM

## 2023-12-20 DIAGNOSIS — E11.69 TYPE 2 DIABETES MELLITUS WITH OTHER SPECIFIED COMPLICATION, WITHOUT LONG-TERM CURRENT USE OF INSULIN: ICD-10-CM

## 2023-12-20 DIAGNOSIS — I27.20 PULMONARY HYPERTENSION: ICD-10-CM

## 2023-12-20 PROCEDURE — 3044F HG A1C LEVEL LT 7.0%: CPT | Mod: CPTII,S$GLB,, | Performed by: INTERNAL MEDICINE

## 2023-12-20 PROCEDURE — 3044F PR MOST RECENT HEMOGLOBIN A1C LEVEL <7.0%: ICD-10-PCS | Mod: CPTII,S$GLB,, | Performed by: INTERNAL MEDICINE

## 2023-12-20 PROCEDURE — 3078F PR MOST RECENT DIASTOLIC BLOOD PRESSURE < 80 MM HG: ICD-10-PCS | Mod: CPTII,S$GLB,, | Performed by: INTERNAL MEDICINE

## 2023-12-20 PROCEDURE — 4010F ACE/ARB THERAPY RXD/TAKEN: CPT | Mod: CPTII,S$GLB,, | Performed by: INTERNAL MEDICINE

## 2023-12-20 PROCEDURE — 3008F BODY MASS INDEX DOCD: CPT | Mod: CPTII,S$GLB,, | Performed by: INTERNAL MEDICINE

## 2023-12-20 PROCEDURE — 1125F PR PAIN SEVERITY QUANTIFIED, PAIN PRESENT: ICD-10-PCS | Mod: CPTII,S$GLB,, | Performed by: INTERNAL MEDICINE

## 2023-12-20 PROCEDURE — 1100F PR PT FALLS ASSESS DOC 2+ FALLS/FALL W/INJURY/YR: ICD-10-PCS | Mod: CPTII,S$GLB,, | Performed by: INTERNAL MEDICINE

## 2023-12-20 PROCEDURE — 3074F PR MOST RECENT SYSTOLIC BLOOD PRESSURE < 130 MM HG: ICD-10-PCS | Mod: CPTII,S$GLB,, | Performed by: INTERNAL MEDICINE

## 2023-12-20 PROCEDURE — 99999 PR PBB SHADOW E&M-EST. PATIENT-LVL V: ICD-10-PCS | Mod: PBBFAC,,, | Performed by: INTERNAL MEDICINE

## 2023-12-20 PROCEDURE — 1159F MED LIST DOCD IN RCRD: CPT | Mod: CPTII,S$GLB,, | Performed by: INTERNAL MEDICINE

## 2023-12-20 PROCEDURE — 1159F PR MEDICATION LIST DOCUMENTED IN MEDICAL RECORD: ICD-10-PCS | Mod: CPTII,S$GLB,, | Performed by: INTERNAL MEDICINE

## 2023-12-20 PROCEDURE — 3288F FALL RISK ASSESSMENT DOCD: CPT | Mod: CPTII,S$GLB,, | Performed by: INTERNAL MEDICINE

## 2023-12-20 PROCEDURE — 3078F DIAST BP <80 MM HG: CPT | Mod: CPTII,S$GLB,, | Performed by: INTERNAL MEDICINE

## 2023-12-20 PROCEDURE — 1100F PTFALLS ASSESS-DOCD GE2>/YR: CPT | Mod: CPTII,S$GLB,, | Performed by: INTERNAL MEDICINE

## 2023-12-20 PROCEDURE — 4010F PR ACE/ARB THEARPY RXD/TAKEN: ICD-10-PCS | Mod: CPTII,S$GLB,, | Performed by: INTERNAL MEDICINE

## 2023-12-20 PROCEDURE — 99213 PR OFFICE/OUTPT VISIT, EST, LEVL III, 20-29 MIN: ICD-10-PCS | Mod: S$GLB,,, | Performed by: INTERNAL MEDICINE

## 2023-12-20 PROCEDURE — 3288F PR FALLS RISK ASSESSMENT DOCUMENTED: ICD-10-PCS | Mod: CPTII,S$GLB,, | Performed by: INTERNAL MEDICINE

## 2023-12-20 PROCEDURE — 99213 OFFICE O/P EST LOW 20 MIN: CPT | Mod: S$GLB,,, | Performed by: INTERNAL MEDICINE

## 2023-12-20 PROCEDURE — 3074F SYST BP LT 130 MM HG: CPT | Mod: CPTII,S$GLB,, | Performed by: INTERNAL MEDICINE

## 2023-12-20 PROCEDURE — 93010 ELECTROCARDIOGRAM REPORT: CPT | Mod: ,,, | Performed by: STUDENT IN AN ORGANIZED HEALTH CARE EDUCATION/TRAINING PROGRAM

## 2023-12-20 PROCEDURE — 3008F PR BODY MASS INDEX (BMI) DOCUMENTED: ICD-10-PCS | Mod: CPTII,S$GLB,, | Performed by: INTERNAL MEDICINE

## 2023-12-20 PROCEDURE — 1125F AMNT PAIN NOTED PAIN PRSNT: CPT | Mod: CPTII,S$GLB,, | Performed by: INTERNAL MEDICINE

## 2023-12-20 PROCEDURE — 93010 EKG 12-LEAD: ICD-10-PCS | Mod: ,,, | Performed by: STUDENT IN AN ORGANIZED HEALTH CARE EDUCATION/TRAINING PROGRAM

## 2023-12-20 PROCEDURE — 93005 ELECTROCARDIOGRAM TRACING: CPT

## 2023-12-20 PROCEDURE — 99999 PR PBB SHADOW E&M-EST. PATIENT-LVL V: CPT | Mod: PBBFAC,,, | Performed by: INTERNAL MEDICINE
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
## 2024-01-02 NOTE — TELEPHONE ENCOUNTER
Care Due:                  Date            Visit Type   Department     Provider  --------------------------------------------------------------------------------                                EP -                              PRIMARY      [facility] INTERNAL  Last Visit: 07-      CARE ([facility])   SHOAIB Jaramillo                              EP -                              PRIMARY      [facility] INTERNAL  Next Visit: 02-      CARE ([facility])   SHOAIB Jaramillo                                                            Last  Test          Frequency    Reason                     Performed    Due Date  --------------------------------------------------------------------------------    HBA1C.......  6 months...  semaglutide..............  07- 01-    [facility] Embedded Care Due Messages. Reference number: [id number].   1/02/2024 5:31:06 AM CST

## 2024-01-03 RX ORDER — AMLODIPINE BESYLATE 5 MG/1
5 TABLET ORAL DAILY
Qty: 90 TABLET | Refills: 1 | Status: SHIPPED | OUTPATIENT
Start: 2024-01-03

## 2024-01-03 NOTE — TELEPHONE ENCOUNTER
No care due was identified.  [facility] Embedded Care Due Messages. Reference number: [id number].   1/03/2024 11:35:49 AM CST

## 2024-01-03 NOTE — TELEPHONE ENCOUNTER
Refill Decision Note   Smitharainer Salinas  is requesting a refill authorization.  Brief Assessment and Rationale for Refill:  Approve     Medication Therapy Plan:  FLOS 1.31.2024      Comments:     Note composed:8:51 AM 01/03/2024

## 2024-01-04 ENCOUNTER — TELEPHONE (OUTPATIENT)
Dept: RHEUMATOLOGY | Facility: CLINIC | Age: 74
End: 2024-01-04
Payer: MEDICARE

## 2024-01-04 ENCOUNTER — PATIENT MESSAGE (OUTPATIENT)
Dept: RHEUMATOLOGY | Facility: CLINIC | Age: 74
End: 2024-01-04
Payer: MEDICARE

## 2024-01-04 DIAGNOSIS — M05.9 SEROPOSITIVE RHEUMATOID ARTHRITIS: Primary | ICD-10-CM

## 2024-01-04 DIAGNOSIS — M05.9 SEROPOSITIVE RHEUMATOID ARTHRITIS: ICD-10-CM

## 2024-01-04 RX ORDER — ETANERCEPT 50 MG/ML
50 SOLUTION SUBCUTANEOUS
Qty: 12 EACH | Refills: 3 | OUTPATIENT
Start: 2024-01-04 | End: 2024-01-04 | Stop reason: SDUPTHER

## 2024-01-04 RX ORDER — ETANERCEPT 50 MG/ML
50 SOLUTION SUBCUTANEOUS
Qty: 12 EACH | Refills: 3 | Status: SHIPPED | OUTPATIENT
Start: 2024-01-04 | End: 2024-04-03

## 2024-01-04 RX ORDER — MIRABEGRON 50 MG/1
TABLET, FILM COATED, EXTENDED RELEASE ORAL
Qty: 90 TABLET | Refills: 1 | Status: SHIPPED | OUTPATIENT
Start: 2024-01-04

## 2024-01-04 NOTE — TELEPHONE ENCOUNTER
Refill Decision Note   Smitha Salinas  is requesting a refill authorization.  Brief Assessment and Rationale for Refill:  Approve     Medication Therapy Plan:         Comments:     Note composed:6:37 AM 01/04/2024
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
## 2024-01-04 NOTE — TELEPHONE ENCOUNTER
----- Message from Shaheed Ordonez sent at 1/4/2024 12:56 PM CST -----  Contact: [facility]  [facility] is calling in regards to getting a verbal authorization to for 8 of etanercept (ENBREL MINI) 50 mg/mL (1 mL) Crtg needs to be replace or a new prescription can be sent in for the replacements due to temperature issues.  Please respond by calling [phone] or e-scribed (case# [id number])      Thanks

## 2024-01-10 ENCOUNTER — TELEPHONE (OUTPATIENT)
Dept: RHEUMATOLOGY | Facility: CLINIC | Age: 74
End: 2024-01-10
Payer: MEDICARE

## 2024-01-10 NOTE — TELEPHONE ENCOUNTER
[employer] [facility] has approved patient to receive Enbrel free of charge until 12-.

## 2024-01-18 ENCOUNTER — PATIENT MESSAGE (OUTPATIENT)
Dept: RHEUMATOLOGY | Facility: CLINIC | Age: 74
End: 2024-01-18
Payer: MEDICARE

## 2024-01-19 ENCOUNTER — PATIENT MESSAGE (OUTPATIENT)
Dept: INTERNAL MEDICINE | Facility: CLINIC | Age: 74
End: 2024-01-19
Payer: MEDICARE

## 2024-01-19 NOTE — TELEPHONE ENCOUNTER
Pt became very irritated during call. States that she does not have time for our mistakes and she will find another doctor.//ddw

## 2024-01-23 ENCOUNTER — TELEPHONE (OUTPATIENT)
Dept: RHEUMATOLOGY | Facility: CLINIC | Age: 74
End: 2024-01-23
Payer: MEDICARE
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
## 2024-01-31 ENCOUNTER — LAB VISIT (OUTPATIENT)
Dept: LAB | Facility: HOSPITAL | Age: 74
End: 2024-01-31
Attending: FAMILY MEDICINE
Payer: MEDICARE

## 2024-01-31 DIAGNOSIS — E11.69 TYPE 2 DIABETES MELLITUS WITH OTHER SPECIFIED COMPLICATION, WITHOUT LONG-TERM CURRENT USE OF INSULIN: ICD-10-CM

## 2024-01-31 LAB
ESTIMATED AVG GLUCOSE: 117 MG/DL (ref 68–131)
HBA1C MFR BLD: 5.7 % (ref 4–5.6)

## 2024-01-31 PROCEDURE — 36415 COLL VENOUS BLD VENIPUNCTURE: CPT | Performed by: FAMILY MEDICINE

## 2024-01-31 PROCEDURE — 83036 HEMOGLOBIN GLYCOSYLATED A1C: CPT | Performed by: FAMILY MEDICINE

## 2024-02-06 RX ORDER — SEMAGLUTIDE 0.68 MG/ML
0.5 INJECTION, SOLUTION SUBCUTANEOUS
Qty: 5 EACH | Refills: 5 | Status: SHIPPED | OUTPATIENT
Start: 2024-02-06 | End: 2025-02-05

## 2024-02-07 ENCOUNTER — TELEPHONE (OUTPATIENT)
Dept: INTERNAL MEDICINE | Facility: CLINIC | Age: 74
End: 2024-02-07
Payer: MEDICARE

## 2024-02-07 NOTE — TELEPHONE ENCOUNTER
----- Message from Jayesh Jaramillo MD sent at 2/6/2024  5:29 PM CST -----  Regarding: RE: Ozempic 0.25mg/0.5mg Patient Assistance Program  erxd  ----- Message -----  From: Wiliam Gardner  Sent: 2/6/2024  12:31 PM CST  To: Jayesh Jaramillo MD  Subject: Ozempic 0.25mg/0.5mg Patient Assistance Prog#    [facility] has received medication from [facility] patient assistance program for your patient Smitha Salinas ([id number]).  At your earliest convenience please send to [facility] escript for.   Ozempic 0.25mg/0.5mg (qty 5 pens) Thanks

## 2024-02-08 ENCOUNTER — OFFICE VISIT (OUTPATIENT)
Dept: INTERNAL MEDICINE | Facility: CLINIC | Age: 74
End: 2024-02-08
Payer: MEDICARE

## 2024-02-08 VITALS
OXYGEN SATURATION: 98 % | HEART RATE: 60 BPM | WEIGHT: 137.38 LBS | SYSTOLIC BLOOD PRESSURE: 120 MMHG | DIASTOLIC BLOOD PRESSURE: 70 MMHG | TEMPERATURE: 98 F | HEIGHT: 65 IN | BODY MASS INDEX: 22.89 KG/M2

## 2024-02-08 DIAGNOSIS — I15.2 HYPERTENSION ASSOCIATED WITH DIABETES: ICD-10-CM

## 2024-02-08 DIAGNOSIS — F41.8 MIXED ANXIETY AND DEPRESSIVE DISORDER: ICD-10-CM

## 2024-02-08 DIAGNOSIS — E11.59 HYPERTENSION ASSOCIATED WITH DIABETES: ICD-10-CM

## 2024-02-08 DIAGNOSIS — I70.0 AORTIC ATHEROSCLEROSIS: ICD-10-CM

## 2024-02-08 DIAGNOSIS — M05.9 SEROPOSITIVE RHEUMATOID ARTHRITIS: ICD-10-CM

## 2024-02-08 DIAGNOSIS — D69.6 THROMBOCYTOPENIA, UNSPECIFIED: ICD-10-CM

## 2024-02-08 DIAGNOSIS — D84.9 IMMUNOSUPPRESSED STATUS: ICD-10-CM

## 2024-02-08 DIAGNOSIS — I27.20 PULMONARY HYPERTENSION: ICD-10-CM

## 2024-02-08 DIAGNOSIS — F32.0 MAJOR DEPRESSIVE DISORDER, SINGLE EPISODE, MILD: ICD-10-CM

## 2024-02-08 DIAGNOSIS — M85.89 OSTEOPENIA OF MULTIPLE SITES: ICD-10-CM

## 2024-02-08 DIAGNOSIS — E11.69 TYPE 2 DIABETES MELLITUS WITH OTHER SPECIFIED COMPLICATION, WITHOUT LONG-TERM CURRENT USE OF INSULIN: Primary | ICD-10-CM

## 2024-02-08 PROBLEM — E11.36 DM CATARACT: Status: RESOLVED | Noted: 2023-04-25 | Resolved: 2024-02-08

## 2024-02-08 PROCEDURE — 99214 OFFICE O/P EST MOD 30 MIN: CPT | Mod: S$GLB,,, | Performed by: FAMILY MEDICINE

## 2024-02-08 PROCEDURE — 99999 PR PBB SHADOW E&M-EST. PATIENT-LVL IV: CPT | Mod: PBBFAC,,, | Performed by: FAMILY MEDICINE

## 2024-02-08 NOTE — PROGRESS NOTES
Subjective:      Patient ID: Smitha Salinas is a o. female.    Chief Complaint: Follow-up    HPI follow-up hypertension stable    Diabetes she wants to continue Ozempic 0.25 q oweek but sometimes 0.5; she adjusts per wt to keep close to upper 130s/140    Chronic pain rheumatoid arthritis ; prn tramadol; low dose pred    [facility] elev hx Was attrib to enbrel    Now seeing dr shelby gi ; Gi issues preceded glp1 by yrs    Elev cor calc score/cad dr cristina.    States had memory eval at [facility] and  ok ;similar to same test as around 2010           Past Medical History:   Diagnosis Date    ADHD (attention deficit hyperactivity disorder)     Adult ADHD     [facility]    Chronic rheumatic arthritis     on DMARD    Coronary artery disease involving native coronary artery of native heart 04/17/2023    elev cor calc score    Elevated LFTs     hepatology; poss enbrel    Ex-smoker     Genital herpes     Hyperlipidemia     Hypertension     Immunosuppressed status     Lichen sclerosus et atrophicus     Morbid obesity 01/11/2021    Obesity, Class I, BMI 30-34.9 12/22/2015    SUKHDEEP (obstructive sleep apnea)     Osteopenia     5/16 wai 5/18(start fmax if on pred 3months or more)    Type 2 diabetes mellitus     dxd 9/20     Past Surgical History:   Procedure Laterality Date    BREAST BIOPSY Left     benign    COLONOSCOPY N/A 2/12/2020    Procedure: COLONOSCOPY;  Surgeon: Eloina Castro MD;  Location: [facility];  Service: Endoscopy;  Laterality: N/A;    CRYOTHERAPY  1980    CYSTOSCOPY,WITH BOTULINUM TOXIN INJECTION N/A 3/27/2023    Procedure: CYSTOSCOPY,WITH BOTULINUM TOXIN INJECTION;  Surgeon: Pablo Rawls MD;  Location: [facility] OR;  Service: Urology;  Laterality: N/A;    CYSTOSCOPY,WITH BOTULINUM TOXIN INJECTION N/A 11/13/2023    Procedure: CYSTOSCOPY,WITH BOTULINUM TOXIN INJECTION;  Surgeon: Pablo Rawls MD;  Location: [facility] OR;  Service: Urology;  Laterality: N/A;    ESOPHAGOGASTRODUODENOSCOPY N/A 2/12/2020     Procedure: EGD (ESOPHAGOGASTRODUODENOSCOPY);  Surgeon: Eloina Castro MD;  Location: [facility] ENDO;  Service: Endoscopy;  Laterality: N/A;    INSERTION OF SACRAL NEUROSTIMULATOR GENERATOR Left 11/23/2020    Procedure: INSERTION, PULSE GENERATOR, NEUROSTIMULATOR, SACRAL;  Surgeon: Pablo Rawls MD;  Location: [facility] OR;  Service: Urology;  Laterality: Left;    INSERTION OF SACRAL NEUROSTIMULATOR, ELECTRODES, AND IMPLANTABLE PULSE GENERATOR (IPG) Left 11/23/2020    Procedure: INSERTION, ELECTRODE LEADS AND PULSE GENERATOR, NEUROSTIMULATOR, SACRAL;  Surgeon: Pablo Rawls MD;  Location: [facility] OR;  Service: Urology;  Laterality: Left;    SKIN GRAFT  1965    laceration to right  fingers  from thigh     TONSILLECTOMY  1958     Family History   Problem Relation Age of Onset    Heart disease Mother     Diabetes Mother     Peripheral vascular disease Mother         amputations    Stroke Father     Kidney failure Father     Hypertension Father     Breast cancer Sister 39    Stroke Maternal Grandmother     Stroke Paternal Grandmother     Stroke Paternal Grandfather     No Known Problems Daughter     Cancer Other 68        breast    Coronary artery disease Maternal Grandfather     Colon cancer Neg Hx     Ovarian cancer Neg Hx      Social History     Socioeconomic History    Marital status:     Number of children: 1   Occupational History    Occupation:      Employer: [employer]    Tobacco Use    Smoking status: Former     Current packs/day: 0.00     Average packs/day: 0.3 packs/day for 15.0 years (3.8 ttl pk-yrs)     Types: Cigarettes     Start date: 7/22/1998     Quit date: 7/22/2013     Years since quitting: 10.5    Smokeless tobacco: Former    Tobacco comments:     smokes for a few weeks per year - when under pressure. has been abstinent times years at a time. a pack lasts about a week   Substance and Sexual Activity    Alcohol use: Yes     Comment: Occasional    Drug use: Not Currently     Sexual activity: Not Currently     Birth control/protection: Post-menopausal   Social History Narrative    Lives alone. Caffeine intake rare -1-2 per week of coffee. Does not have a Living Will or Advanced Directive     Social Determinants of Health     Financial Resource Strain: Medium Risk (4/25/2023)    Overall Financial Resource Strain (CARDIA)     Difficulty of Paying Living Expenses: Somewhat hard   Food Insecurity: No Food Insecurity (4/25/2023)    Hunger Vital Sign     Worried About Running Out of Food in the Last Year: Never true     Ran Out of Food in the Last Year: Never true   Transportation Needs: No Transportation Needs (4/25/2023)    PRAPARE - Transportation     Lack of Transportation (Medical): No     Lack of Transportation (Non-Medical): No   Physical Activity: Insufficiently Active (4/25/2023)    Exercise Vital Sign     Days of Exercise per Week: 3 days     Minutes of Exercise per Session: 30 min   Stress: Stress Concern Present (4/25/2023)    [place] [employer] - Occupational Stress Questionnaire     Feeling of Stress : To some extent   Social Connections: Moderately Integrated (4/25/2023)    Social Connection and Isolation Panel [NHANES]     Frequency of Communication with Friends and Family: Three times a week     Frequency of Social Gatherings with Friends and Family: Once a week     Attends Restoration Services: More than 4 times per year     Active Member of Clubs or Organizations: Yes     Attends Club or Organization Meetings: 1 to 4 times per year     Marital Status:    Housing Stability: Low Risk  (4/25/2023)    Housing Stability Vital Sign     Unable to Pay for Housing in the Last Year: No     Number of Places Lived in the Last Year: 1     Unstable Housing in the Last Year: No         Review of Systems      No cp sob   Objective:     Physical Exam  Constitutional:       Appearance: Normal appearance.   Heent tms ok  Cardiovascular:      Rate and Rhythm: Normal  rate and regular rhythm.   Chest ctabilat  Abd +bs soft ntnd no hsm  Neurological:      Mental Status: She is alert.   B    Assessment:         ICD-10-CM ICD-9-CM   1. Hypertension associated with diabetes  E11.59 250.80    I15.2 401.9   2. Type 2 diabetes mellitus without complication, without long-term current use of insulin  E11.9 250.00   3. Seropositive rheumatoid arthritis  M05.9 714.0          Chr pain    Cad  osteopenia  Plan:     Lab and follow up after in 6 months Kayli      F/u specialists;she is nowseeing dr shelby for gi     Type 2 diabetes mellitus with other specified complication, without long-term current use of insulin  -     Hemoglobin A1C; Future; Expected date: 08/06/2024  -     Comprehensive Metabolic Panel; Future; Expected date: 08/06/2024  -     Lipid Panel; Future; Expected date: 08/06/2024  -     Microalbumin/Creatinine Ratio, Urine; Future; Expected date: 08/06/2024    Osteopenia of multiple sites    Immunosuppressed status    Hypertension associated with diabetes    Seropositive rheumatoid arthritis    Mixed anxiety and depressive disorder    Aortic atherosclerosis    Thrombocytopenia, unspecified    Pulmonary hypertension    Major depressive disorder, single episode, mild;hx of

## 2024-02-09 ENCOUNTER — PATIENT MESSAGE (OUTPATIENT)
Dept: INTERNAL MEDICINE | Facility: CLINIC | Age: 74
End: 2024-02-09
Payer: MEDICARE

## 2024-02-16 PROBLEM — M85.80 OSTEOPENIA: Status: ACTIVE | Noted: 2018-01-04

## 2024-02-16 PROBLEM — R53.82 CHRONIC FATIGUE AND MALAISE: Status: ACTIVE | Noted: 2022-07-28

## 2024-02-17 ENCOUNTER — OFFICE VISIT (OUTPATIENT)
Dept: ALLERGY | Facility: CLINIC | Age: 74
End: 2024-02-17
Payer: MEDICARE

## 2024-02-17 VITALS
BODY MASS INDEX: 23.85 KG/M2 | DIASTOLIC BLOOD PRESSURE: 69 MMHG | WEIGHT: 143.31 LBS | SYSTOLIC BLOOD PRESSURE: 145 MMHG | TEMPERATURE: 97 F | HEART RATE: 60 BPM

## 2024-02-17 DIAGNOSIS — Z87.891 EX-CIGARETTE SMOKER: ICD-10-CM

## 2024-02-17 DIAGNOSIS — R10.9 ABDOMINAL PAIN, UNSPECIFIED ABDOMINAL LOCATION: ICD-10-CM

## 2024-02-17 DIAGNOSIS — M85.80 OSTEOPENIA, UNSPECIFIED LOCATION: ICD-10-CM

## 2024-02-17 DIAGNOSIS — R53.81 CHRONIC FATIGUE AND MALAISE: ICD-10-CM

## 2024-02-17 DIAGNOSIS — G47.33 OSA ON CPAP: ICD-10-CM

## 2024-02-17 DIAGNOSIS — M06.9 RHEUMATOID ARTHRITIS, INVOLVING UNSPECIFIED SITE, UNSPECIFIED WHETHER RHEUMATOID FACTOR PRESENT: ICD-10-CM

## 2024-02-17 DIAGNOSIS — J31.0 PERENNIAL NON-ALLERGIC RHINITIS: Primary | ICD-10-CM

## 2024-02-17 DIAGNOSIS — F90.9 ADULT ADHD: ICD-10-CM

## 2024-02-17 DIAGNOSIS — R53.82 CHRONIC FATIGUE AND MALAISE: ICD-10-CM

## 2024-02-17 DIAGNOSIS — G43.D0 ABDOMINAL MIGRAINE, NOT INTRACTABLE: ICD-10-CM

## 2024-02-17 PROCEDURE — 99999 PR PBB SHADOW E&M-EST. PATIENT-LVL IV: CPT | Mod: PBBFAC,,, | Performed by: SPECIALIST

## 2024-02-17 PROCEDURE — 99215 OFFICE O/P EST HI 40 MIN: CPT | Mod: S$GLB,,, | Performed by: SPECIALIST

## 2024-02-17 PROCEDURE — 99417 PROLNG OP E/M EACH 15 MIN: CPT | Mod: S$GLB,,, | Performed by: SPECIALIST

## 2024-02-17 RX ORDER — AZELASTINE 1 MG/ML
2 SPRAY, METERED NASAL 2 TIMES DAILY
Qty: 30 ML | Refills: 7 | Status: SHIPPED | OUTPATIENT
Start: 2024-02-17 | End: 2024-03-18

## 2024-02-17 NOTE — PROGRESS NOTES
Subjective:       Patient ID: Smitha Salinas is a 73 y.o. female.    Chief Complaint:  Follow up on year round allergy symptoms, chronic long standing abdominal pain, abdominal migraine and long- standing Rheumatoid arthritis    HPI:  VERY FRUSTRATED ABOUT DAILY ONGOING NASAL SYMPTOMS- left more than right-- and watery eyes- left.  ONLY OTC DAILY MEDICATIONS ARE WORKING AND NEEDING THEM DAILY AND MULTIPLE TIMES- GUAIFENESIN in high doses  // WITH OR WITHOUT DM is needed - per the patient.  Was not helped by  RX medications----Allegra, Zyrtec, Flonase or Atrovent nose sprays    AA female with the above complaints for follow up. She was last seen by me on 07- 28- 2022.-- and had been following up with Daisy Jackson MD - MULTIPLE VISITS-- LAST ONE ON 03- 14- 2023.  ON THAT DAY ALLERGY WORK UP BY [place] [id number]  INHALANT AERO ALLERGENS WAS NORMAL.  On March 14, 2023, humoral immune evaluation was normal.  She was under the care of Daisy Jackson MD- who did IgE Immuno CAP [place] [id number] inhalant aero allergens- ALL WERE NORMAL  .She may have abdominal migraine.   She has a long history of generalized abdominal pain IBS - symptoms, and history of nausea and vomiting. Extensive work up ruled out pancreatic and celiac disease / gluten sensitivity, MCAS and HAE.  Hyperlipidemia and hypertension are treated.  For rheumatoid arthritis, she is under the care of rheumatologist ,Dr ALVAREZ.  Has long standing history of ADHD. SHE IS AN EX- CIGARETTE SMOKER.  Smitha has been chronically fatigued. Polysomnogram was done by Yong Michelle MD-- Mild SUKHDEEP was diagnosed. Intermittent POSITIVE PRESSURE VENTILATION WAS RECOMMENDED BY Bre Michelle.         Gabapentin and Valdecoxib     Past Medical History:   Diagnosis Date    ADHD (attention deficit hyperactivity disorder)     Adult ADHD     [facility]    Chronic rheumatic arthritis     on DMARD    Coronary artery disease involving native coronary artery of native heart 04/17/2023    elev cor calc score     Elevated LFTs     hepatology; poss enbrel    Ex-smoker     Genital herpes     Hyperlipidemia     Hypertension     Immunosuppressed status     Lichen sclerosus et atrophicus     Morbid obesity 01/11/2021    Obesity, Class I, BMI 30-34.9 12/22/2015    SUKHDEEP (obstructive sleep apnea)     Osteopenia     5/16 wai 5/18(start fmax if on pred 3months or more)    Type 2 diabetes mellitus     dxd 9/20       Family History   Problem Relation Age of Onset    Heart disease Mother     Diabetes Mother     Peripheral vascular disease Mother         amputations    Stroke Father     Kidney failure Father     Hypertension Father     Breast cancer Sister 39    Stroke Maternal Grandmother     Stroke Paternal Grandmother     Stroke Paternal Grandfather     No Known Problems Daughter     Cancer Other 68        breast    Coronary artery disease Maternal Grandfather     Colon cancer Neg Hx     Ovarian cancer Neg Hx        Environmental History: Dust Mite Controls: Dust mite controls are already in place.     Review of Systems   Constitutional:  Positive for fatigue. Negative for fever.   HENT:  Positive for congestion. Negative for ear pain, postnasal drip, rhinorrhea, sinus pressure, sinus pain, sneezing and sore throat.    Eyes: Negative.  Negative for redness and itching.   Respiratory: Negative.  Negative for cough, chest tightness, shortness of breath and wheezing.    Cardiovascular: Negative.  Negative for chest pain.   Gastrointestinal:  Positive for abdominal pain. Negative for nausea.   Endocrine: Negative.  Negative for cold intolerance.   Genitourinary: Negative.  Negative for frequency.   Musculoskeletal: Negative.  Negative for myalgias.   Skin: Negative.  Negative for rash.   Allergic/Immunologic: Negative.  Negative for environmental allergies, food allergies and immunocompromised state.   Neurological: Negative.  Negative for dizziness and headaches.   Hematological: Negative.  Negative for adenopathy.    Psychiatric/Behavioral: Negative.  Negative for sleep disturbance.      Objective:     There were no vitals taken for this visit.    Physical Exam  Vitals and nursing note reviewed.   Constitutional:       Appearance: Normal appearance. She is normal weight.   HENT:      Head: Normocephalic and atraumatic.      Right Ear: Tympanic membrane, ear canal and external ear normal.      Left Ear: Tympanic membrane, ear canal and external ear normal.      Nose: Congestion present.      Mouth/Throat:      Mouth: Mucous membranes are moist.      Pharynx: Oropharynx is clear.   Eyes:      Extraocular Movements: Extraocular movements intact.      Conjunctiva/sclera: Conjunctivae normal.      Pupils: Pupils are equal, round, and reactive to light.   Cardiovascular:      Rate and Rhythm: Normal rate and regular rhythm.      Pulses: Normal pulses.      Heart sounds: Normal heart sounds.   Pulmonary:      Effort: Pulmonary effort is normal.      Breath sounds: Normal breath sounds.   Abdominal:      General: Abdomen is flat. Bowel sounds are normal.      Palpations: Abdomen is soft.   Musculoskeletal:         General: Normal range of motion.      Cervical back: Normal range of motion and neck supple.   Skin:     General: Skin is warm and dry.      Capillary Refill: Capillary refill takes less than 2 seconds.   Neurological:      General: No focal deficit present.      Mental Status: She is alert and oriented to person, place, and time. Mental status is at baseline.   Psychiatric:         Mood and Affect: Mood normal.         Behavior: Behavior normal.         Thought Content: Thought content normal.         Judgment: Judgment normal.       Assessment:      1. Perennial non-allergic rhinitis    2. Abdominal pain, unspecified abdominal location    3. Abdominal migraine, not intractable    4. Chronic fatigue and malaise    5. SUKHDEEP on CPAP    6. Ex-cigarette smoker    7. Rheumatoid arthritis, involving unspecified site, unspecified  whether rheumatoid factor present    8. Osteopenia, unspecified location    9. Adult ADHD    10----Non Allergic Rhinitis-- per Immuno [facility] Region -6 on 03- 14- 2023-- Daisy pedroza MD ordered the tests          Rhinorrhea daily left more than right and tearing of the left eye      Plan:     ON DAILY OTC MEDICATIONS---  Recommended the following.  ----------------------------------------  Eye symptoms : Refrigerated--Thera Tears- 2 drops bid or tid- ophthalmology check up.  ----------------------------------------------------------------------------------------------------------  Fore the nasal symptoms :-  Azelastine 137 mcg and Ipratropium bromide 0.03 %-- Two sprays per nares bid.- everyday recommended  Proper technique of nose spray demonstrated.In the past Flonase and Atrovent nose sprays did not work.  Taking Afrin- Oxymetazoline -- 2 sprays bid prn.  --------------------------------------------------------------------------------------------------------------------------------------  Already on Guaifenesin 1200 mg with or without DM, Chlortrimeton 4 mg bid or tid prn, Benadryl / diphenhydramine and Afrin   Nose sprays - bid prn.  ---------------------------------------------------------------------------------------------------------------------------------------------------------------------    Humoral immune evaluation on 03- 14- 2023-- was normal--   Normal serum Immunoglobulins- G , A, M and E   And  TETANUS , DIPHTHERIA AND PNEUMOCOCCAL ANTIBODY TITERS- WERE PROTECTIVE ALL 14 SEROTYPES.  Up to date on adult immunizations--- including PCV- 20 on 05- 06- 2023, AREXVY on 11- 21- 2023.  Had T dap on 05- 24- 2022, also was vaccinated with two -- PCV- 13 and 1 - PPSV- 23 - and Zoster vaccine.and AREXVY- RSV vaccine  Annual influenza vaccinations  Had PCV- 20 in May 2023  Non continuous positive pressure ventilation== Pay attention to Sleep hygiene.    The patient is still concerned about the nausea and GI  symptoms.  Treatment plan discussed.  Follow up in 6 months.                Problems Address                                                 Amount and/or Complexity                                                                      Risk       3           [] 2 or more self-limited or minor problems                      [] Limited                                                                        [] Low                  [] 1 stable chronic illness                                                  Any combination of the two                                               OTC drugs                  []Acute, uncomplicated illness or injury                            Review of prior external notes from unique source           Minor surgery with no risk factors                                                                                                               [] 1 []2  []3+                                                                                                              Review of results from each unique test                                                                                                               [] 1 []2  [] 3+                                                                                                              Order of each unique test                                                                                                               [] 1 []2  [] 3+                                                                                                              Or                                                                                                             [] Assessment requiring an independent historian      4            [] One or more chronic illness with exacerbation,              [] Moderate                                                                      [] Moderate                 Progression, or side effects of treatment                             -test documents or independent historians                        Prescription drug management                []  2 or more sable chronic illnesses                                    [] Independent interpretation of tests                              Minor surgery with identifiable risk                [] 1 undiagnosed new problem with uncertain prognosis    [] Discussion or management of test results                    elective major surgery                [] 1 acute illness with                systemic symptoms                                                                                                                                                              [] 1 acute complicated injury                                                                                                                                          Elective major surgery                                                                                                                                                                                                                                                                                                                                                                                                  5            [] 1 or more chronic illnesses with severe exacerbation,     [] Extensive(two from below)                                         [] High                                                                                                               [] Independent interpretation of results                         Drug therapy requiring intensive                                                                                                               []Discussion of management or test interpretation           monitoring                                                                                                                                                                                                        Decision to de-escalate care                 [] 1 acute or chronic illness or injury that poses a threat                                                                                               Decision regarding hospitalization

## 2024-02-19 ENCOUNTER — HOSPITAL ENCOUNTER (OUTPATIENT)
Dept: RADIOLOGY | Facility: CLINIC | Age: 74
Discharge: HOME | End: 2024-02-19
Attending: NURSE PRACTITIONER
Payer: MEDICARE

## 2024-02-19 ENCOUNTER — OFFICE VISIT (OUTPATIENT)
Dept: URGENT CARE | Facility: CLINIC | Age: 74
End: 2024-02-19
Payer: MEDICARE

## 2024-02-19 ENCOUNTER — PATIENT MESSAGE (OUTPATIENT)
Dept: RHEUMATOLOGY | Facility: CLINIC | Age: 74
End: 2024-02-19
Payer: MEDICARE

## 2024-02-19 ENCOUNTER — PATIENT MESSAGE (OUTPATIENT)
Dept: INTERNAL MEDICINE | Facility: CLINIC | Age: 74
End: 2024-02-19
Payer: MEDICARE

## 2024-02-19 VITALS
TEMPERATURE: 98 F | OXYGEN SATURATION: 100 % | DIASTOLIC BLOOD PRESSURE: 66 MMHG | RESPIRATION RATE: 18 BRPM | SYSTOLIC BLOOD PRESSURE: 148 MMHG | WEIGHT: 143.5 LBS | BODY MASS INDEX: 24.5 KG/M2 | HEART RATE: 59 BPM | HEIGHT: 64 IN

## 2024-02-19 DIAGNOSIS — I10 ELEVATED BLOOD PRESSURE READING IN OFFICE WITH DIAGNOSIS OF HYPERTENSION: ICD-10-CM

## 2024-02-19 DIAGNOSIS — S29.9XXA RIB INJURY: ICD-10-CM

## 2024-02-19 DIAGNOSIS — S29.9XXA RIB INJURY: Primary | ICD-10-CM
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
## 2024-02-19 PROCEDURE — 71101 X-RAY EXAM UNILAT RIBS/CHEST: CPT | Mod: RT,S$GLB,, | Performed by: RADIOLOGY

## 2024-02-19 PROCEDURE — 99213 OFFICE O/P EST LOW 20 MIN: CPT | Mod: S$GLB,,, | Performed by: NURSE PRACTITIONER

## 2024-02-19 NOTE — PROGRESS NOTES
"Subjective:      Patient ID: Smitha Salinas is a 73 y.o. female.    Vitals:  height is 5' 4.33" (1.634 m) and weight is 65.1 kg (143 lb 8.3 oz). Her tympanic temperature is 97.5 °F (36.4 °C). Her blood pressure is 148/66 (abnormal) and her pulse is 59 (abnormal). Her respiration is 18 and oxygen saturation is 100%.     Chief Complaint: Fall    Patient is a 73-year-old female who states she had a fall resulting in rib pain.  She states last night she was stepping over a pack gait tripping and falling landing on her robotic vacuum  hitting her right side.  She was treating with tramadol.  Reports pain to the right rib region.  Pain is worse if she coughs or breathe very deeply.  Denies hitting her head or loss of consciousness.  Reports no open wound abrasions or rashes.  Denies any other joint pain or injury.  No other concerns voiced    Fall  The accident occurred 12 to 24 hours ago. The fall occurred while walking. She fell from a height of 1 to 2 ft. Point of impact: right ribs. The pain is at a severity of 8/10. Pertinent negatives include no fever, nausea or vomiting.       Constitution: Negative for fever.   Neck: Negative for neck pain.   Respiratory:  Negative for chest tightness, sputum production, bloody sputum, shortness of breath and wheezing.    Gastrointestinal:  Negative for nausea and vomiting.   Musculoskeletal:  Positive for pain. Negative for back pain.   Skin:  Negative for rash, wound, abrasion, erythema and bruising.      Objective:     Physical Exam   Constitutional: She is oriented to person, place, and time. She appears well-developed. She is cooperative.   HENT:   Head: Normocephalic and atraumatic.   Ears:   Right Ear: Hearing and external ear normal.   Left Ear: Hearing and external ear normal.   Nose: Nose normal. No mucosal edema or nasal deformity. No epistaxis. Right sinus exhibits no maxillary sinus tenderness and no frontal sinus tenderness. Left sinus exhibits no maxillary " sinus tenderness and no frontal sinus tenderness.   Mouth/Throat: Uvula is midline, oropharynx is clear and moist and mucous membranes are normal. Mucous membranes are moist. No trismus in the jaw. Normal dentition. No uvula swelling. Oropharynx is clear.   Eyes: Conjunctivae and lids are normal.   Neck: Trachea normal and phonation normal. Neck supple.   Cardiovascular: Normal rate, regular rhythm, normal heart sounds and normal pulses.   Pulmonary/Chest: Effort normal and breath sounds normal. She exhibits tenderness (rigth anterior lateral over ribs 9-12).   Abdominal: Normal appearance and bowel sounds are normal. Soft.   Musculoskeletal: Normal range of motion.         General: Normal range of motion.   Neurological: She is alert and oriented to person, place, and time. She exhibits normal muscle tone.   Skin: Skin is warm, dry and intact. No erythema   Psychiatric: Her speech is normal and behavior is normal. Judgment and thought content normal.   Nursing note and vitals reviewed.      Assessment:     1. Rib injury    2. Fall, initial encounter    3. Elevated blood pressure reading in office with diagnosis of hypertension        Plan:       Rib injury  -     XR RIB RIGHT W/ PA CHEST; Future; Expected date: 02/19/2024    Fall, initial encounter    Elevated blood pressure reading in office with diagnosis of hypertension    Other orders  -     spirometers and accessories Buffy; 1 Device by Misc.(Non-Drug; Combo Route) route every 3 (three) hours.  Dispense: 1 each; Refill: 0          Medical Decision Making:   Initial Assessment:   Nontoxic appearing 75 yo female c/o rib pain.  Patient presents with pain of the right ribs . This was not the result of a traumatic injury. Based upon the history and physical examination my clinical impression is rib contusion I have low suspicion for fracture, dislocation, significant ligamentous injury, septic arthritis, gout flare, new autoimmune arthropathy, or gonococcal  arthropathy. Low suspicion for a pneumothorax, exam is negative for decreased breath sounds or adventitious lung sounds. X-ray is negative in clinic. Recommend supportive care. Rest, ice and OTC tylenol/motrin. I advised deep breathing and pulmonary exercise using an incentive spirometer.  Patient to treat pain with Tylenol/ibuprofen. Patient to follow up with PCP/ORTHO as needed. Provided with return instructions and ER precautions.   Independently Interpreted Test(s):   I have ordered and independently interpreted X-rays - see summary below.       <> Summary of X-Ray Reading(s): No acute findings  Patient is seen in clinic with known history of essential hypertension noted to be elevated above goal today in clinic.  Patient was counseled that blood pressure was elevated. I advised adherence to current medication regime, low-salt heart smart diet, exercise and self-monitoring.  Patient follow up with primary physician for long-term management adjustments as needed.         XR RIB RIGHT W/ PA CHEST    Result Date: 2/19/2024  EXAMINATION: XR RIBS MIN 3 VIEWS W/ PA CHEST RIGHT CLINICAL HISTORY: Unspecified injury of thorax, initial encounter TECHNIQUE: Three views of the right-sided ribs were obtained.  PA view of the chest was included. COMPARISON: 03/01/2023 FINDINGS: No acute displaced rib fracture deformities demonstrated.  Lungs appear clear bilaterally.  Cardiac and mediastinal silhouettes are within normal limits..     No acute findings Electronically signed by: Aaron Tran MD Date:    02/19/2024 Time:    16:47   Patient Instructions   You have a contusion of your ribs.    To treat your pain:  600mg ibuprofen every 6 hours or tylenol 650mg every 6 hours as needed for pain. If needed, you can alternate these medications so that you take one medication every 3 hours. For instance, at noon take ibuprofen, then at 3pm take tylenol, then at 6pm take ibuprofen.    It is important to take deep breaths to avoid  allowing your lungs to build fluid in the bases and result in a pneumonia. Be sure to breath deeply 5-10 times every 30 minutes. Do not apply any binding or wrap your chest with an ace wrap as this will constrain your ability to breath deeply and is not medically advised. One way to be sure your are taking deep breaths is to use an INCENTIVE SPIROMETER which can be bought over the counter.     Apply cold compresses intermittently as needed.    As pain recedes, begin normal activities slowly as tolerated.      Please arrange follow up with your primary medical clinic as soon as possible. You must understand that you've received an Urgent Care treatment only and that you may be released before all of your medical problems are known or treated. You, the patient, will arrange for follow up as instructed. If your symptoms worsen or fail to improve you should go to the Emergency Room.    WE CANNOT RULE OUT ALL POSSIBLE CAUSES OF YOUR SYMPTOMS IN THE URGENT CARE SETTING PLEASE GO TO THE ER IF YOU FEELS YOUR CONDITION IS WORSENING OR YOU WOULD LIKE EMERGENT EVALUATION

## 2024-02-19 NOTE — PATIENT INSTRUCTIONS
You have a contusion of your ribs.    To treat your pain:  600mg ibuprofen every 6 hours or tylenol 650mg every 6 hours as needed for pain. If needed, you can alternate these medications so that you take one medication every 3 hours. For instance, at noon take ibuprofen, then at 3pm take tylenol, then at 6pm take ibuprofen.    It is important to take deep breaths to avoid allowing your lungs to build fluid in the bases and result in a pneumonia. Be sure to breath deeply 5-10 times every 30 minutes. Do not apply any binding or wrap your chest with an ace wrap as this will constrain your ability to breath deeply and is not medically advised. One way to be sure your are taking deep breaths is to use an INCENTIVE SPIROMETER which can be bought over the counter.     Apply cold compresses intermittently as needed.    As pain recedes, begin normal activities slowly as tolerated.      Please arrange follow up with your primary medical clinic as soon as possible. You must understand that you've received an Urgent Care treatment only and that you may be released before all of your medical problems are known or treated. You, the patient, will arrange for follow up as instructed. If your symptoms worsen or fail to improve you should go to the Emergency Room.    WE CANNOT RULE OUT ALL POSSIBLE CAUSES OF YOUR SYMPTOMS IN THE URGENT CARE SETTING PLEASE GO TO THE ER IF YOU FEELS YOUR CONDITION IS WORSENING OR YOU WOULD LIKE EMERGENT EVALUATION

## 2024-02-20 DIAGNOSIS — M05.9 SEROPOSITIVE RHEUMATOID ARTHRITIS: ICD-10-CM

## 2024-02-20 NOTE — TELEPHONE ENCOUNTER
----- Message from Latasha Lamb sent at 2/20/2024 10:34 AM CST -----  Type:  RX Refill Request    Who Called: pt  Refill or New Rx:Refill  RX Name and Strength:prednisone  How is the patient currently taking it? (ex. 1XDay):?  Is this a 30 day or 90 day RX:?  Preferred Pharmacy with phone number:.  [facility] [address] [place] [address] [address] [facility]  [address] [facility] [address]  Phone: [phone] Fax: [phone]  Local or Mail Order:Local  Ordering Provider:Dr England  Would the patient rather a call back or a response via [facility]? call  Best Call Back Number: [phone]  Additional Information: .    Thank you

## 2024-02-20 NOTE — TELEPHONE ENCOUNTER
----- Message from Eduardo Dela Cruz sent at 2/20/2024  3:44 PM CST -----  Regarding: Self .[phone]   Type: RX Refill Request    Who Called: Self     Have you contacted your pharmacy:    Refill    RX Name and Strength:predniSONE (DELTASONE) 10 MG tablet    Preferred Pharmacy with phone number: [facility]  [facility] [address] [place] [address] [address] [facility]  [address] [facility] [address]  Phone: [phone] Fax: [phone]        Local or Mail Order: local     Would the patient rather a call back or a response via [facility]? Call back     Best Call Back Number:.[phone]      Additional Information:  Pt has been trying to get in contact with staff for two days     Thank you.

## 2024-02-20 NOTE — TELEPHONE ENCOUNTER
Patient wanted to let you know that she is falling about once every couple of months.      Follow up scheduled for 3-15.
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
## 2024-02-21 RX ORDER — PREDNISONE 10 MG/1
TABLET ORAL
Qty: 30 TABLET | Refills: 1 | Status: SHIPPED | OUTPATIENT
Start: 2024-02-21

## 2024-02-21 NOTE — TELEPHONE ENCOUNTER
"Contacted patient to discuss this with her. All questions answered.     Radha Damon (Allye), [facility] [address]  Rheumatology Department   "

## 2024-02-21 NOTE — TELEPHONE ENCOUNTER
Ab England MD [facility] Staff4 minutes ago (11:36 AM)       Systemic steroid refill provided.  Rib series XR ordered.

## 2024-02-27 ENCOUNTER — PATIENT MESSAGE (OUTPATIENT)
Dept: UROLOGY | Facility: CLINIC | Age: 74
End: 2024-02-27
Payer: MEDICARE

## 2024-02-27 ENCOUNTER — TELEPHONE (OUTPATIENT)
Dept: UROLOGY | Facility: CLINIC | Age: 74
End: 2024-02-27
Payer: MEDICARE

## 2024-02-27 NOTE — TELEPHONE ENCOUNTER
Returned call to pt; states her interstim has stopped charging; I told her that I sent a message to Aubrie asking her to call pt.

## 2024-02-28 ENCOUNTER — LAB VISIT (OUTPATIENT)
Dept: LAB | Facility: HOSPITAL | Age: 74
End: 2024-02-28
Attending: INTERNAL MEDICINE
Payer: MEDICARE

## 2024-02-28 ENCOUNTER — PATIENT MESSAGE (OUTPATIENT)
Dept: UROLOGY | Facility: CLINIC | Age: 74
End: 2024-02-28
Payer: MEDICARE

## 2024-02-28 DIAGNOSIS — R79.89 ABNORMAL LFTS: ICD-10-CM

## 2024-02-28 LAB
ALBUMIN SERPL BCP-MCNC: 3.9 G/DL (ref 3.5–5.2)
ALP SERPL-CCNC: 96 U/L (ref 55–135)
ALT SERPL W/O P-5'-P-CCNC: 40 U/L (ref 10–44)
ANION GAP SERPL CALC-SCNC: 8 MMOL/L (ref 8–16)
AST SERPL-CCNC: 22 U/L (ref 10–40)
BILIRUB SERPL-MCNC: 0.4 MG/DL (ref 0.1–1)
BUN SERPL-MCNC: 10 MG/DL (ref 8–23)
CALCIUM SERPL-MCNC: 9.7 MG/DL (ref 8.7–10.5)
CHLORIDE SERPL-SCNC: 109 MMOL/L (ref 95–110)
CO2 SERPL-SCNC: 27 MMOL/L (ref 23–29)
CREAT SERPL-MCNC: 0.8 MG/DL (ref 0.5–1.4)
EST. GFR  (NO RACE VARIABLE): >60 ML/MIN/1.73 M^2
GLUCOSE SERPL-MCNC: 86 MG/DL (ref 70–110)
POTASSIUM SERPL-SCNC: 4.2 MMOL/L (ref 3.5–5.1)
PROT SERPL-MCNC: 6.9 G/DL (ref 6–8.4)
SODIUM SERPL-SCNC: 144 MMOL/L (ref 136–145)

## 2024-02-28 PROCEDURE — 36415 COLL VENOUS BLD VENIPUNCTURE: CPT | Performed by: INTERNAL MEDICINE

## 2024-02-28 PROCEDURE — 80053 COMPREHEN METABOLIC PANEL: CPT | Performed by: INTERNAL MEDICINE

## 2024-03-05 ENCOUNTER — TELEPHONE (OUTPATIENT)
Dept: RHEUMATOLOGY | Facility: CLINIC | Age: 74
End: 2024-03-05
Payer: MEDICARE

## 2024-03-05 ENCOUNTER — TELEPHONE (OUTPATIENT)
Dept: OBSTETRICS AND GYNECOLOGY | Facility: CLINIC | Age: 74
End: 2024-03-05
Payer: MEDICARE

## 2024-03-05 NOTE — TELEPHONE ENCOUNTER
"Returned patients phone call. All questions answered.      Radha Damon (Allye), [facility]  Rheumatology Department    "

## 2024-03-05 NOTE — TELEPHONE ENCOUNTER
----- Message from Radha Crystal LPN sent at 3/5/2024  8:56 AM CST -----  Contact: Pt  Message sent to wrong dept, Pt  was very upset     Francine  ----- Message -----  From: Mark Dominguez  Sent: 3/5/2024   7:44 AM CST  To: Tomás Morse Staff    Type:  Sooner Apoointment Request    Caller is requesting a sooner appointment.  Caller declined first available appointment listed below.  Caller will not accept being placed on the waitlist and is requesting a message be sent to doctor.  Name of Caller: pt   When is the first available appointment?   Symptoms: follow up   Would the patient rather a call back or a response via [facility]? phone  Best Call Back Number:[phone]  Additional Information:

## 2024-03-06 ENCOUNTER — PATIENT MESSAGE (OUTPATIENT)
Dept: UROLOGY | Facility: CLINIC | Age: 74
End: 2024-03-06
Payer: MEDICARE

## 2024-03-12 ENCOUNTER — LAB VISIT (OUTPATIENT)
Dept: LAB | Facility: HOSPITAL | Age: 74
End: 2024-03-12
Attending: INTERNAL MEDICINE
Payer: MEDICARE

## 2024-03-12 DIAGNOSIS — M79.18 MYOFASCIAL PAIN: ICD-10-CM

## 2024-03-12 DIAGNOSIS — M54.2 NECK PAIN: ICD-10-CM

## 2024-03-12 LAB
ALBUMIN SERPL BCP-MCNC: 3.9 G/DL (ref 3.5–5.2)
ALP SERPL-CCNC: 97 U/L (ref 55–135)
ALT SERPL W/O P-5'-P-CCNC: 40 U/L (ref 10–44)
ANION GAP SERPL CALC-SCNC: 4 MMOL/L (ref 8–16)
AST SERPL-CCNC: 23 U/L (ref 10–40)
BASOPHILS # BLD AUTO: 0.03 K/UL (ref 0–0.2)
BASOPHILS NFR BLD: 0.5 % (ref 0–1.9)
BILIRUB SERPL-MCNC: 0.4 MG/DL (ref 0.1–1)
BUN SERPL-MCNC: 12 MG/DL (ref 8–23)
CALCIUM SERPL-MCNC: 9.3 MG/DL (ref 8.7–10.5)
CHLORIDE SERPL-SCNC: 112 MMOL/L (ref 95–110)
CO2 SERPL-SCNC: 27 MMOL/L (ref 23–29)
CREAT SERPL-MCNC: 0.8 MG/DL (ref 0.5–1.4)
CRP SERPL-MCNC: 0.1 MG/L (ref 0–8.2)
DIFFERENTIAL METHOD BLD: ABNORMAL
EOSINOPHIL # BLD AUTO: 0.1 K/UL (ref 0–0.5)
EOSINOPHIL NFR BLD: 1.4 % (ref 0–8)
ERYTHROCYTE [DISTWIDTH] IN BLOOD BY AUTOMATED COUNT: 15.3 % (ref 11.5–14.5)
EST. GFR  (NO RACE VARIABLE): >60 ML/MIN/1.73 M^2
GLUCOSE SERPL-MCNC: 88 MG/DL (ref 70–110)
HCT VFR BLD AUTO: 36.2 % (ref 37–48.5)
HGB BLD-MCNC: 11.9 G/DL (ref 12–16)
IMM GRANULOCYTES # BLD AUTO: 0.02 K/UL (ref 0–0.04)
IMM GRANULOCYTES NFR BLD AUTO: 0.3 % (ref 0–0.5)
LYMPHOCYTES # BLD AUTO: 1.7 K/UL (ref 1–4.8)
LYMPHOCYTES NFR BLD: 25.7 % (ref 18–48)
MCH RBC QN AUTO: 28.1 PG (ref 27–31)
MCHC RBC AUTO-ENTMCNC: 32.9 G/DL (ref 32–36)
MCV RBC AUTO: 85 FL (ref 82–98)
MONOCYTES # BLD AUTO: 0.7 K/UL (ref 0.3–1)
MONOCYTES NFR BLD: 10.6 % (ref 4–15)
NEUTROPHILS # BLD AUTO: 4.1 K/UL (ref 1.8–7.7)
NEUTROPHILS NFR BLD: 61.5 % (ref 38–73)
NRBC BLD-RTO: 0 /100 WBC
PLATELET # BLD AUTO: 197 K/UL (ref 150–450)
PMV BLD AUTO: 10 FL (ref 9.2–12.9)
POTASSIUM SERPL-SCNC: 4.1 MMOL/L (ref 3.5–5.1)
PROT SERPL-MCNC: 6.5 G/DL (ref 6–8.4)
RBC # BLD AUTO: 4.24 M/UL (ref 4–5.4)
SODIUM SERPL-SCNC: 143 MMOL/L (ref 136–145)
WBC # BLD AUTO: 6.61 K/UL (ref 3.9–12.7)

## 2024-03-12 PROCEDURE — 80053 COMPREHEN METABOLIC PANEL: CPT | Performed by: INTERNAL MEDICINE

## 2024-03-12 PROCEDURE — 85025 COMPLETE CBC W/AUTO DIFF WBC: CPT | Performed by: INTERNAL MEDICINE

## 2024-03-12 PROCEDURE — 36415 COLL VENOUS BLD VENIPUNCTURE: CPT | Performed by: INTERNAL MEDICINE

## 2024-03-12 PROCEDURE — 86140 C-REACTIVE PROTEIN: CPT | Performed by: INTERNAL MEDICINE

## 2024-03-15 ENCOUNTER — TELEPHONE (OUTPATIENT)
Dept: RHEUMATOLOGY | Facility: CLINIC | Age: 74
End: 2024-03-15
Payer: MEDICARE

## 2024-03-15 NOTE — TELEPHONE ENCOUNTER
"Returned patients phone call. All questions answered.      Radha Damon (Allye), [facility]  Rheumatology Department    "

## 2024-03-15 NOTE — TELEPHONE ENCOUNTER
----- Message from Sondra Wilks sent at 3/15/2024 11:13 AM CDT -----  Contact: Smitha Bone is calling to speak to the nurse regarding her virtual appointment, she stated she been signed in since 10am and no one never came to appointment, please give her a call at [phone]    Thanks  LJ

## 2024-03-18 ENCOUNTER — TELEPHONE (OUTPATIENT)
Dept: UROLOGY | Facility: CLINIC | Age: 74
End: 2024-03-18
Payer: MEDICARE

## 2024-03-18 NOTE — TELEPHONE ENCOUNTER
Spoke with [employer] about patient's  card not working and patient also needed instructions on what needs to be done before having an MRI done. Representative stated that a kit would be sent out to patient and I was also given a number to give patient for instructions on having an MRI done. Called patient gave her that information, patient verbalized understanding.

## 2024-03-19 ENCOUNTER — PATIENT MESSAGE (OUTPATIENT)
Dept: INTERNAL MEDICINE | Facility: CLINIC | Age: 74
End: 2024-03-19
Payer: MEDICARE

## 2024-03-19 NOTE — TELEPHONE ENCOUNTER
No care due was identified.  [facility] Embedded Care Due Messages. Reference number: [id number].   3/19/2024 2:07:51 PM CDT

## 2024-03-19 NOTE — TELEPHONE ENCOUNTER
Refill Routing Note   Medication(s) are not appropriate for processing by [facility] for the following reason(s):        Outside of protocol    [facility] action(s):  Route               Appointments  past 12m or future 3m with PCP    Date Provider   Last Visit   2/8/2024 Jayesh Jaramillo MD   Next Visit   Visit date not found Jayesh Jaramillo MD   ED visits in past 90 days: 0        Note composed:3:31 PM 03/19/2024

## 2024-03-21 RX ORDER — CLONIDINE HYDROCHLORIDE 0.2 MG/1
TABLET ORAL
Qty: 270 TABLET | Refills: 4 | Status: SHIPPED | OUTPATIENT
Start: 2024-03-21

## 2024-03-28 DIAGNOSIS — E11.59 HYPERTENSION ASSOCIATED WITH DIABETES: ICD-10-CM

## 2024-03-28 DIAGNOSIS — I15.2 HYPERTENSION ASSOCIATED WITH DIABETES: ICD-10-CM

## 2024-03-28 RX ORDER — LOSARTAN POTASSIUM 50 MG/1
50 TABLET ORAL DAILY
Qty: 90 TABLET | Refills: 3 | Status: SHIPPED | OUTPATIENT
Start: 2024-03-28 | End: 2024-05-02 | Stop reason: SDUPTHER

## 2024-03-28 NOTE — TELEPHONE ENCOUNTER
No care due was identified.  [facility] Embedded Care Due Messages. Reference number: [id number].   3/28/2024 5:30:59 AM CDT

## 2024-03-28 NOTE — TELEPHONE ENCOUNTER
Refill Routing Note   Medication(s) are not appropriate for processing by [facility] for the following reason(s):        Required vitals abnormal  BP: 02/19/24 (!) 148/66       [facility] action(s):  Defer               Appointments  past 12m or future 3m with PCP    Date Provider   Last Visit   2/8/2024 Jayesh Jaramillo MD   Next Visit   Visit date not found Jayesh Jaramillo MD   ED visits in past 90 days: 0        Note composed:3:48 PM 03/28/2024

## 2024-04-03 ENCOUNTER — PATIENT MESSAGE (OUTPATIENT)
Dept: PULMONOLOGY | Facility: CLINIC | Age: 74
End: 2024-04-03
Payer: MEDICARE

## 2024-04-10 ENCOUNTER — TELEPHONE (OUTPATIENT)
Dept: UROLOGY | Facility: CLINIC | Age: 74
End: 2024-04-10
Payer: MEDICARE

## 2024-04-10 NOTE — TELEPHONE ENCOUNTER
----- Message from Krysten Marin sent at 4/10/2024 12:47 PM CDT -----  Regarding: concerns  Name of who is calling:   Smitha      What is the request in detail: Pt is requesting a call back in ref to changing appt to a virtual appt on 04/17/2024 also why is patient scheduled with Dr Mandel on 04/18/2024 she is not aware of this dr at all      Can the clinic reply by [facility]:yes      What number to call back if not [facility]: [phone]

## 2024-04-10 NOTE — TELEPHONE ENCOUNTER
----- Message from Liss Aggarwal sent at 4/10/2024  3:22 PM CDT -----  Type: Patient Call Back    Who called: self     What is the request in detail: patient stated that someone is calling from the office and dropping the call. She's requesting to have someone to call her back. Please call    Can the clinic reply by [facility]? no    Would the patient rather a call back or a response via [facility]?  call    Best call back number: .[phone] (home)      Additional Information:

## 2024-04-10 NOTE — TELEPHONE ENCOUNTER
Called the patient, after verification of name and , the patient stated that she received a call and her appt was rescheduled and she  cancelled the appt with Dr Mandel. Adised pt to give us a call back if she has any other questions or concerns, she voiced understanding       ----- Message from Vesta Mark sent at 4/10/2024  3:27 PM CDT -----  Contact: [phone]  Pt would like a call to discuss appts she scheduled earlier. She declined any other details. Please call pt. Thanks    She states a call dropped and if someone needs to speak with her please call her back.

## 2024-04-17 ENCOUNTER — TELEPHONE (OUTPATIENT)
Dept: RHEUMATOLOGY | Facility: CLINIC | Age: 74
End: 2024-04-17
Payer: MEDICARE

## 2024-04-17 NOTE — TELEPHONE ENCOUNTER
1st call message left to reschedule Colonoscopy    Case message notification sent to the surgery center, procedure cancelled.     Spoke with patient. Inquired about the Digital Medicine Program in regards to diabetes. Informed patient once the provider place the order for to Digital Medicine she will receive a call. Also informed the patient she must have a smart phone and that she would receive supplies on the day she sets up her account at the [facility]. Verbalized understanding.//ddw

## 2024-04-17 NOTE — TELEPHONE ENCOUNTER
----- Message from Bela Aggarwal sent at 4/17/2024  2:37 PM CDT -----  Type:  Virtual Appointment Request    Name of Caller:NEHA CONNOR [[id number]]  When is the first available appointment?No access  Symptoms:virtual   Would the patient rather a call back or a response via [facility]? Call back   Best Call Back Number:[phone]  Additional Information: Patient states she missed an appointment with the provider and needs to reschedule patient states she would like to reschedule her appointment for the soonest available if possible. Please call back with further assistance and more information.

## 2024-05-01 ENCOUNTER — PATIENT MESSAGE (OUTPATIENT)
Dept: RHEUMATOLOGY | Facility: CLINIC | Age: 74
End: 2024-05-01

## 2024-05-01 ENCOUNTER — OFFICE VISIT (OUTPATIENT)
Dept: RHEUMATOLOGY | Facility: CLINIC | Age: 74
End: 2024-05-01
Payer: MEDICARE

## 2024-05-01 DIAGNOSIS — M79.7 FIBROMYALGIA: ICD-10-CM

## 2024-05-01 DIAGNOSIS — M05.9 SEROPOSITIVE RHEUMATOID ARTHRITIS: Primary | ICD-10-CM

## 2024-05-01 DIAGNOSIS — Z79.52 LONG TERM CURRENT USE OF SYSTEMIC STEROIDS: ICD-10-CM

## 2024-05-01 DIAGNOSIS — Z51.81 MEDICATION MONITORING ENCOUNTER: ICD-10-CM

## 2024-05-01 DIAGNOSIS — M19.011 LOCALIZED OSTEOARTHRITIS OF BOTH SHOULDER REGIONS: ICD-10-CM

## 2024-05-01 DIAGNOSIS — Z79.899 IMMUNOSUPPRESSION DUE TO DRUG THERAPY: ICD-10-CM

## 2024-05-01 DIAGNOSIS — E11.69 TYPE 2 DIABETES MELLITUS WITH OTHER SPECIFIED COMPLICATION, WITHOUT LONG-TERM CURRENT USE OF INSULIN: ICD-10-CM

## 2024-05-01 DIAGNOSIS — M19.012 LOCALIZED OSTEOARTHRITIS OF BOTH SHOULDER REGIONS: ICD-10-CM

## 2024-05-01 DIAGNOSIS — D84.821 IMMUNOSUPPRESSION DUE TO DRUG THERAPY: ICD-10-CM

## 2024-05-01 PROCEDURE — 3044F HG A1C LEVEL LT 7.0%: CPT | Mod: CPTII,95,, | Performed by: INTERNAL MEDICINE

## 2024-05-01 PROCEDURE — 4010F ACE/ARB THERAPY RXD/TAKEN: CPT | Mod: CPTII,95,, | Performed by: INTERNAL MEDICINE

## 2024-05-01 PROCEDURE — 99215 OFFICE O/P EST HI 40 MIN: CPT | Mod: 95,,, | Performed by: INTERNAL MEDICINE

## 2024-05-01 RX ORDER — SULFASALAZINE 500 MG/1
500 TABLET, DELAYED RELEASE ORAL 2 TIMES DAILY
Qty: 180 TABLET | Refills: 1 | Status: SHIPPED | OUTPATIENT
Start: 2024-05-01 | End: 2024-10-28

## 2024-05-01 RX ORDER — ETANERCEPT 50 MG/ML
50 SOLUTION SUBCUTANEOUS WEEKLY
Qty: 12 ML | Refills: 1 | Status: ACTIVE | OUTPATIENT
Start: 2024-05-01 | End: 2024-10-28

## 2024-05-01 NOTE — PROGRESS NOTES
The patient location is: [place]  The chief complaint leading to consultation is: RA    Visit type: audiovisual    Face to Face time with patient: 30  40 minutes of total time spent on the encounter, which includes face to face time and non-face to face time preparing to see the patient (eg, review of tests), Obtaining and/or reviewing separately obtained history, Documenting clinical information in the electronic or other health record, Independently interpreting results (not separately reported) and communicating results to the patient/family/caregiver, or Care coordination (not separately reported).         Each patient to whom he or she provides medical services by telemedicine is:  (1) informed of the relationship between the physician and patient and the respective role of any other health care provider with respect to management of the patient; and (2) notified that he or she may decline to receive medical services by telemedicine and may withdraw from such care at any time.      RHEUMATOLOGY OUTPATIENT CLINIC NOTE    5/1/2024    Attending Rheumatologist: Ab England  Primary Care Provider/Physician Requesting Consultation: Jayesh Jaramillo MD   Chief Complaint/Reason For Consultation:  No chief complaint on file.      Subjective:     Smitha Salinas is a 73 y.o. Black or  female with SPRA    No acute complaints.  Recurrent shoulder pain w/ mechanical pattern, self limited.  Reports using systemic steroids seldomly.  Adherence w/ Enbrel w/ great results.  Reports pain with enbrel administration with auto injector.    Review of Systems   Respiratory:  Negative for shortness of breath.    Cardiovascular:  Negative for chest pain.   Gastrointestinal:  Negative for melena.   Genitourinary:  Negative for hematuria.   Musculoskeletal:  Positive for joint pain.   Skin:  Negative for rash.   Neurological:  Negative for focal weakness.       Chronic comorbid conditions affecting medical decision making  today:  Past Medical History:   Diagnosis Date    ADHD (attention deficit hyperactivity disorder)     Adult ADHD     [facility]    Chronic rheumatic arthritis     on DMARD    Coronary artery disease involving native coronary artery of native heart 04/17/2023    elev cor calc score    Elevated LFTs     hepatology; poss enbrel    Ex-smoker     Genital herpes     Hyperlipidemia     Hypertension     Immunosuppressed status     Lichen sclerosus et atrophicus     Morbid obesity 01/11/2021    Obesity, Class I, BMI 30-34.9 12/22/2015    SUKHDEEP (obstructive sleep apnea)     Osteopenia     5/16 wai 5/18(start fmax if on pred 3months or more)    Type 2 diabetes mellitus     dxd 9/20     Past Surgical History:   Procedure Laterality Date    BREAST BIOPSY Left     benign    CATARACT EXTRACTION Bilateral     COLONOSCOPY N/A 02/12/2020    Procedure: COLONOSCOPY;  Surgeon: Eloina Castro MD;  Location: [facility];  Service: Endoscopy;  Laterality: N/A;    CRYOTHERAPY  1980    CYSTOSCOPY,WITH BOTULINUM TOXIN INJECTION N/A 03/27/2023    Procedure: CYSTOSCOPY,WITH BOTULINUM TOXIN INJECTION;  Surgeon: Pablo Rawls MD;  Location: [facility] OR;  Service: Urology;  Laterality: N/A;    CYSTOSCOPY,WITH BOTULINUM TOXIN INJECTION N/A 11/13/2023    Procedure: CYSTOSCOPY,WITH BOTULINUM TOXIN INJECTION;  Surgeon: Pablo Rawls MD;  Location: [facility] OR;  Service: Urology;  Laterality: N/A;    ESOPHAGOGASTRODUODENOSCOPY N/A 02/12/2020    Procedure: EGD (ESOPHAGOGASTRODUODENOSCOPY);  Surgeon: Eloina Castro MD;  Location: [facility];  Service: Endoscopy;  Laterality: N/A;    INSERTION OF SACRAL NEUROSTIMULATOR GENERATOR Left 11/23/2020    Procedure: INSERTION, PULSE GENERATOR, NEUROSTIMULATOR, SACRAL;  Surgeon: Pablo Rawls MD;  Location: [facility] OR;  Service: Urology;  Laterality: Left;    INSERTION OF SACRAL NEUROSTIMULATOR, ELECTRODES, AND IMPLANTABLE PULSE GENERATOR (IPG) Left 11/23/2020    Procedure: INSERTION, ELECTRODE  LEADS AND PULSE GENERATOR, NEUROSTIMULATOR, SACRAL;  Surgeon: Pablo Rawls MD;  Location: [facility] OR;  Service: Urology;  Laterality: Left;    SKIN GRAFT  1965    laceration to right  fingers  from thigh     TONSILLECTOMY  1958     Family History   Problem Relation Name Age of Onset    Heart disease Mother      Diabetes Mother      Peripheral vascular disease Mother          amputations    Stroke Father      Kidney failure Father      Hypertension Father      Breast cancer Sister  39    Stroke Maternal Grandmother      Stroke Paternal Grandmother      Stroke Paternal Grandfather      No Known Problems Daughter      Cancer Other geat aunt 68        breast    Coronary artery disease Maternal Grandfather      Colon cancer Neg Hx      Ovarian cancer Neg Hx       Social History     Tobacco Use   Smoking Status Former    Current packs/day: 0.00    Average packs/day: 0.3 packs/day for 15.0 years (3.8 ttl pk-yrs)    Types: Cigarettes    Start date: 7/22/1998    Quit date: 7/22/2013    Years since quitting: 10.7   Smokeless Tobacco Former   Tobacco Comments    smokes for a few weeks per year - when under pressure. has been abstinent times years at a time. a pack lasts about a week       Current Outpatient Medications:     acyclovir (ZOVIRAX) 400 MG tablet, Take 1 tablet by mouth twice daily, Disp: 180 tablet, Rfl: 3    amLODIPine (NORVASC) 5 MG tablet, Take 1 tablet (5 mg total) by mouth once daily., Disp: 90 tablet, Rfl: 1    azelastine (ASTELIN) 137 mcg (0.1 %) nasal spray, 2 sprays (274 mcg total) by Nasal route 2 (two) times daily., Disp: 30 mL, Rfl: 7    blood sugar diagnostic (BLOOD GLUCOSE TEST) Strp, 1 strip by Misc.(Non-Drug; Combo Route) route 3 (three) times daily as needed., Disp: 1 each, Rfl: 11    cholecalciferol, vitamin D3, (VITAMIN D3) 25 mcg (1,000 unit) capsule, Take 1 capsule by mouth every morning., Disp: , Rfl:     cloNIDine (CATAPRES) 0.2 MG tablet, TAKE 1/2 (ONE-HALF) TABLET BY MOUTH IN THE  MORNING AND 1/2 (ONE-HALF) AT NOON AND 2 AT BEDTIME, Disp: 270 tablet, Rfl: 4    etanercept (ENBREL MINI) 50 mg/mL (1 mL) Crtg, Inject 1 mL into the skin every 7 days., Disp: 12 mL, Rfl: 3    lancing device Misc, 1 each by Misc.(Non-Drug; Combo Route) route 3 (three) times daily as needed., Disp: 1 each, Rfl: 0    LEVSIN/SL 0.125 mg Subl, Place 0.125-0.25 mg under the tongue every 4 (four) hours as needed., Disp: , Rfl:     losartan (COZAAR) 50 MG tablet, Take 1 tablet (50 mg total) by mouth once daily., Disp: 90 tablet, Rfl: 3    MYRBETRIQ 50 mg Tb24, Take 1 tablet by mouth once daily, Disp: 90 tablet, Rfl: 1    predniSONE (DELTASONE) 10 MG tablet, May take 5-10mg of prednisone for rheumatoid related pain.  If need to take more than 2 weeks of the month, suggest follow up evaluation., Disp: 30 tablet, Rfl: 1    rosuvastatin (CRESTOR) 20 MG tablet, Take 1 tablet by mouth once daily, Disp: 30 tablet, Rfl: 6    semaglutide (OZEMPIC) 0.25 mg or 0.5 mg (2 mg/3 mL) pen injector, Inject 0.5 mg into the skin every 7 days., Disp: 5 each, Rfl: 5    spirometers and accessories Buffy, 1 Device by Misc.(Non-Drug; Combo Route) route every 3 (three) hours., Disp: 1 each, Rfl: 0    traMADoL (ULTRAM) 50 mg tablet, Take 1 tablet (50 mg total) by mouth every 6 (six) hours as needed., Disp: 60 tablet, Rfl: 5  No current facility-administered medications for this visit.    Facility-Administered Medications Ordered in Other Visits:     lactated ringers infusion, , Intravenous, Continuous, Sylvia Bojorquez MD, Last Rate: 0 mL/hr at 03/27/23 0714, New Bag at 11/13/23 0856     Objective:     There were no vitals filed for this visit.  Physical Exam   Pulmonary/Chest: Effort normal. No respiratory distress.   Musculoskeletal:         General: Deformity present. No tenderness.   Skin: No rash noted.       Reviewed available old and all outside pertinent medical records available.    All lab results personally reviewed and interpreted by  me.       ASSESSMENT / PLAN     1. Seropositive rheumatoid arthritis  CDAI: mild disease activity. Good response to enbrel: switch to syringes (local pain injection site w/ auto injector)  C-Reactive Protein    sulfaSALAzine (AZULFIDINE) 500 MG EC tablet    etanercept (ENBREL) 50 mg/mL (1 mL) injection      2. Long term current use of systemic steroids  Ca/vit D supp OTC.  Adding SSZ as steroid sparring agent.      3. Medication monitoring encounter  Comprehensive Metabolic Panel      4. Immunosuppression due to drug therapy  Hold immunosuppression in event of active infections or surgery.  Vaccination per guidelines.  CBC Auto Differential    Quantiferon Gold TB    Hepatitis B Surface Antigen    Hepatitis B Core Antibody, Total      5. Fibromyalgia  Reassurance.      6. Localized osteoarthritis of both shoulder regions  Main source of active complains at present.  Follow with orthopedics.      7. Type 2 diabetes mellitus with other specified complication, without long-term current use of insulin  May worsen w/ systemic steroid use                Ab England M.D.

## 2024-05-02 ENCOUNTER — PATIENT MESSAGE (OUTPATIENT)
Dept: INTERNAL MEDICINE | Facility: CLINIC | Age: 74
End: 2024-05-02
Payer: MEDICARE

## 2024-05-02 DIAGNOSIS — E11.59 HYPERTENSION ASSOCIATED WITH DIABETES: ICD-10-CM

## 2024-05-02 DIAGNOSIS — I15.2 HYPERTENSION ASSOCIATED WITH DIABETES: ICD-10-CM

## 2024-05-02 RX ORDER — LOSARTAN POTASSIUM 50 MG/1
50 TABLET ORAL DAILY
Qty: 30 TABLET | Refills: 0 | Status: SHIPPED | OUTPATIENT
Start: 2024-05-02

## 2024-05-02 NOTE — TELEPHONE ENCOUNTER
Care Due:                  Date            Visit Type   Department     Provider  --------------------------------------------------------------------------------                                EP -                              PRIMARY      [facility] INTERNAL  Last Visit: 02-      CARE ([facility])   MEDICINE       Jayesh Jaramillo  Next Visit: None Scheduled  None         None Found                                                            Last  Test          Frequency    Reason                     Performed    Due Date  --------------------------------------------------------------------------------    HBA1C.......  6 months...  semaglutide..............  02- 07-    [facility] Embedded Care Due Messages. Reference number: [id number].   5/02/2024 10:07:25 AM CDT

## 2024-05-06 ENCOUNTER — OFFICE VISIT (OUTPATIENT)
Dept: UROLOGY | Facility: CLINIC | Age: 74
End: 2024-05-06
Payer: MEDICARE

## 2024-05-06 VITALS
HEIGHT: 64 IN | WEIGHT: 145.75 LBS | SYSTOLIC BLOOD PRESSURE: 140 MMHG | DIASTOLIC BLOOD PRESSURE: 60 MMHG | BODY MASS INDEX: 24.88 KG/M2 | RESPIRATION RATE: 17 BRPM | OXYGEN SATURATION: 98 % | HEART RATE: 63 BPM

## 2024-05-06 DIAGNOSIS — N28.89 RENAL MASS: ICD-10-CM

## 2024-05-06 DIAGNOSIS — N39.41 URGE INCONTINENCE: Primary | ICD-10-CM

## 2024-05-06 LAB
BILIRUB UR QL STRIP: NEGATIVE
GLUCOSE UR QL STRIP: NEGATIVE
KETONES UR QL STRIP: NEGATIVE
LEUKOCYTE ESTERASE UR QL STRIP: NEGATIVE
PH, POC UA: 6
POC BLOOD, URINE: NEGATIVE
POC NITRATES, URINE: NEGATIVE
POC RESIDUAL URINE VOLUME: 70 ML (ref 0–100)
PROT UR QL STRIP: NEGATIVE
SP GR UR STRIP: 1.02 (ref 1–1.03)
UROBILINOGEN UR STRIP-ACNC: 0.2 (ref 0.1–1.1)

## 2024-05-06 PROCEDURE — 99214 OFFICE O/P EST MOD 30 MIN: CPT | Mod: S$GLB,,, | Performed by: UROLOGY

## 2024-05-06 PROCEDURE — 3044F HG A1C LEVEL LT 7.0%: CPT | Mod: CPTII,S$GLB,, | Performed by: UROLOGY

## 2024-05-06 PROCEDURE — 4010F ACE/ARB THERAPY RXD/TAKEN: CPT | Mod: CPTII,S$GLB,, | Performed by: UROLOGY

## 2024-05-06 PROCEDURE — 1126F AMNT PAIN NOTED NONE PRSNT: CPT | Mod: CPTII,S$GLB,, | Performed by: UROLOGY

## 2024-05-06 PROCEDURE — 99999 PR PBB SHADOW E&M-EST. PATIENT-LVL V: CPT | Mod: PBBFAC,,, | Performed by: UROLOGY

## 2024-05-06 PROCEDURE — 3077F SYST BP >= 140 MM HG: CPT | Mod: CPTII,S$GLB,, | Performed by: UROLOGY

## 2024-05-06 PROCEDURE — 3078F DIAST BP <80 MM HG: CPT | Mod: CPTII,S$GLB,, | Performed by: UROLOGY

## 2024-05-06 PROCEDURE — 1159F MED LIST DOCD IN RCRD: CPT | Mod: CPTII,S$GLB,, | Performed by: UROLOGY

## 2024-05-06 PROCEDURE — 3288F FALL RISK ASSESSMENT DOCD: CPT | Mod: CPTII,S$GLB,, | Performed by: UROLOGY

## 2024-05-06 PROCEDURE — 81003 URINALYSIS AUTO W/O SCOPE: CPT | Mod: QW,S$GLB,, | Performed by: UROLOGY

## 2024-05-06 PROCEDURE — 51798 US URINE CAPACITY MEASURE: CPT | Mod: S$GLB,,, | Performed by: UROLOGY

## 2024-05-06 PROCEDURE — 3008F BODY MASS INDEX DOCD: CPT | Mod: CPTII,S$GLB,, | Performed by: UROLOGY

## 2024-05-06 PROCEDURE — 1101F PT FALLS ASSESS-DOCD LE1/YR: CPT | Mod: CPTII,S$GLB,, | Performed by: UROLOGY

## 2024-05-06 RX ORDER — PROMETHAZINE HYDROCHLORIDE 25 MG/1
25 TABLET ORAL 2 TIMES DAILY PRN
COMMUNITY
Start: 2024-03-29

## 2024-05-06 NOTE — PROGRESS NOTES
Chief Complaint:   Encounter Diagnosis   Name Primary?    Urge incontinence Yes         HPI:   5/6/24- here today mostly to discuss her CT, incontinence is unchanged and stable.      Allergies:  Valdecoxib    Medications: has a current medication list which includes the following prescription(s): acyclovir, amlodipine, blood sugar diagnostic, cholecalciferol (vitamin d3), clonidine, enbrel, lancing device, levsin/sl, losartan, myrbetriq, prednisone, rosuvastatin, ozempic, spirometers and accessories, sulfasalazine, tramadol, and azelastine, and the following Facility-Administered Medications: lactated ringers.    Review of Systems:  General: No fever, chills, fatigability, or weight loss.  Skin: No rashes, itching, or changes in color or texture of skin.  Chest: Denies ADLER, cyanosis, wheezing, cough, and sputum production.  Abdomen: Appetite fine. No weight loss. Denies diarrhea, abdominal pain, hematemesis, or blood in stool.  Musculoskeletal: No joint stiffness or swelling. Some back pain.  : As above.  All other review of systems negative.    PMH:   has a past medical history of ADHD (attention deficit hyperactivity disorder), Adult ADHD, Chronic rheumatic arthritis, Coronary artery disease involving native coronary artery of native heart (04/17/2023), Elevated LFTs, Ex-smoker, Genital herpes, Hyperlipidemia, Hypertension, Immunosuppressed status, Lichen sclerosus et atrophicus, Morbid obesity (01/11/2021), Obesity, Class I, BMI 30-34.9 (12/22/2015), SUKHDEEP (obstructive sleep apnea), Osteopenia, and Type 2 diabetes mellitus.    PSH:   has a past surgical history that includes Tonsillectomy (1958); Skin graft (1965); Cryotherapy (1980); Breast biopsy (Left); Colonoscopy (N/A, 02/12/2020); Esophagogastroduodenoscopy (N/A, 02/12/2020); Insertion of sacral neurostimulator, electrodes, and implantable pulse generator (IPG) (Left, 11/23/2020); Insertion of sacral neurostimulator generator (Left, 11/23/2020);  cystoscopy,with botulinum toxin injection (N/A, 03/27/2023); cystoscopy,with botulinum toxin injection (N/A, 11/13/2023); and Cataract extraction (Bilateral).    FamHx: family history includes Breast cancer (age of onset: 39) in her sister; Cancer (age of onset: 68) in an other family member; Coronary artery disease in her maternal grandfather; Diabetes in her mother; Heart disease in her mother; Hypertension in her father; Kidney failure in her father; No Known Problems in her daughter; Peripheral vascular disease in her mother; Stroke in her father, maternal grandmother, paternal grandfather, and paternal grandmother.    SocHx:  reports that she quit smoking about 10 years ago. Her smoking use included cigarettes. She started smoking about 25 years ago. She has a 3.8 pack-year smoking history. She has quit using smokeless tobacco. She reports current alcohol use. She reports that she does not currently use drugs.     Physical Exam:  Vitals:   There were no vitals filed for this visit.    General: A&Ox3. No apparent distress. No deformities.  Neck: No masses.   Lungs: No use of accessory muscles.  Ext: No c/c/e.    Labs/Studies:   UA normal 5/24  Pvr 70 ml 5/24  Prevoid 65 ml 3/23  UDS- decreased capacity otherwise normal 9/20  Percutaneous InterStim 09/29/2020  pvr 12 ml 1/21  KUB/GENIE normal 8/20    Impression/Plan:       1.  Urge incontinence-  botox  11/13/23, 3/27/23,  InterStim 11/23/20    The combination of Botox, InterStim and myrbetriq are still assisting, Botox cycle seems to be around 8 months.  Call if symptoms worsen and we can repeat the Botox.    2. Renal mass- suspicious lesion [facility], perform CT renal protocol and follow-up afterwards.  Proceed as appropriate, call with any issues prior to the next appointment.

## 2024-05-25 ENCOUNTER — PATIENT MESSAGE (OUTPATIENT)
Dept: INTERNAL MEDICINE | Facility: CLINIC | Age: 74
End: 2024-05-25
Payer: MEDICARE

## 2024-05-27 NOTE — TELEPHONE ENCOUNTER
No care due was identified.  [facility] Embedded Care Due Messages. Reference number: [id number].   5/27/2024 8:45:23 AM CDT

## 2024-05-28 PROBLEM — M05.9 RHEUMATOID ARTHRITIS WITH POSITIVE RHEUMATOID FACTOR: Status: ACTIVE | Noted: 2024-05-28

## 2024-05-31 NOTE — TELEPHONE ENCOUNTER
Spoke with the patient concerning her request for a refill on potassium and valium    Dr Jaramillo stated the following information.     Jayesh Jaramillo MD   to [facility]UN[facility] Staff       5/30/24 11:18 AM  The rf request if for potassium but the message is about valium. Not able to refill valium as it is not safe to take with other pain meds.  Her last potassium level in March was fine so if was not on potassium then do not recommend    The patient stated that she only take the potassium medication only when she get a leg cramp.   The patient stated yes, she is still on her pain med.     The patient was informed that Dr Jaramillo stated that unfortunately, he will not be able to prescribe the following medication at this time . Review message from Dr Jaramillo .     The patient was informed that she can try eating bananas to help with her potassium.

## 2024-06-02 RX ORDER — POTASSIUM CHLORIDE 20 MEQ/1
TABLET, EXTENDED RELEASE ORAL
Qty: 90 TABLET | Refills: 5 | OUTPATIENT
Start: 2024-06-02

## 2024-06-05 ENCOUNTER — HOSPITAL ENCOUNTER (OUTPATIENT)
Dept: RADIOLOGY | Facility: HOSPITAL | Age: 74
Discharge: HOME | End: 2024-06-05
Attending: UROLOGY
Payer: MEDICARE

## 2024-06-05 DIAGNOSIS — N28.89 RENAL MASS: ICD-10-CM

## 2024-06-05 PROCEDURE — 74170 CT ABD WO CNTRST FLWD CNTRST: CPT | Mod: 26,,, | Performed by: RADIOLOGY

## 2024-06-05 PROCEDURE — 74170 CT ABD WO CNTRST FLWD CNTRST: CPT | Mod: TC

## 2024-06-05 PROCEDURE — 25500020 PHARM REV CODE 255: Performed by: UROLOGY

## 2024-06-05 RX ADMIN — IOHEXOL 100 ML: 350 INJECTION, SOLUTION INTRAVENOUS at 01:06

## 2024-06-10 ENCOUNTER — OFFICE VISIT (OUTPATIENT)
Dept: UROLOGY | Facility: CLINIC | Age: 74
End: 2024-06-10
Payer: MEDICARE

## 2024-06-10 DIAGNOSIS — N39.41 URGE INCONTINENCE: Primary | ICD-10-CM

## 2024-06-10 DIAGNOSIS — N28.89 RENAL MASS: ICD-10-CM

## 2024-06-10 PROCEDURE — 1159F MED LIST DOCD IN RCRD: CPT | Mod: CPTII,95,, | Performed by: UROLOGY

## 2024-06-10 PROCEDURE — 3044F HG A1C LEVEL LT 7.0%: CPT | Mod: CPTII,95,, | Performed by: UROLOGY

## 2024-06-10 PROCEDURE — 99214 OFFICE O/P EST MOD 30 MIN: CPT | Mod: 95,,, | Performed by: UROLOGY

## 2024-06-10 PROCEDURE — 4010F ACE/ARB THERAPY RXD/TAKEN: CPT | Mod: CPTII,95,, | Performed by: UROLOGY

## 2024-06-10 PROCEDURE — 1160F RVW MEDS BY RX/DR IN RCRD: CPT | Mod: CPTII,95,, | Performed by: UROLOGY

## 2024-06-10 NOTE — PROGRESS NOTES
Chief Complaint:   Encounter Diagnoses   Name Primary?    Urge incontinence Yes    Renal mass          HPI:   6/10/24- currently stable, no specific complaints, here today to discuss her CT scan.  This is a virtual visit.      Allergies:  Valdecoxib    Medications: has a current medication list which includes the following prescription(s): acyclovir, amlodipine, azelastine, blood sugar diagnostic, cholecalciferol (vitamin d3), clonidine, enbrel, lancing device, levsin/sl, losartan, myrbetriq, prednisone, promethazine, rosuvastatin, ozempic, spirometers and accessories, sulfasalazine, and tramadol, and the following Facility-Administered Medications: lactated ringers.    Review of Systems:  General: No fever, chills, fatigability, or weight loss.  Skin: No rashes, itching, or changes in color or texture of skin.  Chest: Denies ADLER, cyanosis, wheezing, cough, and sputum production.  Abdomen: Appetite fine. No weight loss. Denies diarrhea, abdominal pain, hematemesis, or blood in stool.  Musculoskeletal: No joint stiffness or swelling. Some back pain.  : As above.  All other review of systems negative.    PMH:   has a past medical history of ADHD (attention deficit hyperactivity disorder), Adult ADHD, Chronic rheumatic arthritis, Coronary artery disease involving native coronary artery of native heart (04/17/2023), Elevated LFTs, Ex-smoker, Genital herpes, Hyperlipidemia, Hypertension, Immunosuppressed status, Lichen sclerosus et atrophicus, Morbid obesity (01/11/2021), Obesity, Class I, BMI 30-34.9 (12/22/2015), SUKHDEEP (obstructive sleep apnea), Osteopenia, and Type 2 diabetes mellitus.    PSH:   has a past surgical history that includes Tonsillectomy (1958); Skin graft (1965); Cryotherapy (1980); Breast biopsy (Left); Colonoscopy (N/A, 02/12/2020); Esophagogastroduodenoscopy (N/A, 02/12/2020); Insertion of sacral neurostimulator, electrodes, and implantable pulse generator (IPG) (Left, 11/23/2020); Insertion of sacral  neurostimulator generator (Left, 11/23/2020); cystoscopy,with botulinum toxin injection (N/A, 03/27/2023); cystoscopy,with botulinum toxin injection (N/A, 11/13/2023); and Cataract extraction (Bilateral).    FamHx: family history includes Breast cancer (age of onset: 39) in her sister; Cancer (age of onset: 68) in an other family member; Coronary artery disease in her maternal grandfather; Diabetes in her mother; Heart disease in her mother; Hypertension in her father; Kidney failure in her father; No Known Problems in her daughter; Peripheral vascular disease in her mother; Stroke in her father, maternal grandmother, paternal grandfather, and paternal grandmother.    SocHx:  reports that she quit smoking about 10 years ago. Her smoking use included cigarettes. She started smoking about 25 years ago. She has a 3.8 pack-year smoking history. She has quit using smokeless tobacco. She reports current alcohol use. She reports that she does not currently use drugs.     Physical Exam:  Vitals:   There were no vitals filed for this visit.    General: A&Ox3. No apparent distress. No deformities.  Neck: No masses.   Lungs: No use of accessory muscles.  Ext: No c/c/e.    Labs/Studies:   UA normal 5/24  Pvr 70 ml 5/24  Prevoid 65 ml 3/23  UDS- decreased capacity otherwise normal 9/20  Percutaneous InterStim 09/29/2020  Renal protocol CT 2.4 cm left Bosniak 4 cyst 6/24  KUB/GENIE normal 8/20    Impression/Plan:       1.  Urge incontinence-  botox  11/13/23, 3/27/23,  InterStim 11/23/20    The combination of Botox, InterStim and myrbetriq are still assisting, Botox cycle seems to be around 8 months.  Call if symptoms worsen and we can repeat the Botox.  This was a virtual visit.    2. Renal mass- suspicious [facility] has been re-evaluated with a CT renal protocol.  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  We discussed options of surveillance, partial nephrectomy, nephrectomy and percutaneous  cryotherapy by IR.  After our discussion today patient has elected to pursue percutaneous cryotherapy by IR.  Will follow-up after their procedure.    The patient location is: home  Visit type: Virtual visit with synchronous audio and video  Total time spent with patient: 10 min, nurse time with pt after 10 min  Each patient to whom he or she provides medical services by telemedicine is:  (1) informed of the relationship between the physician and patient and the respective role of any other health care provider with respect to management of the patient; and (2) notified that he or she may decline to receive medical services by telemedicine and may withdraw from such care at any time.

## 2024-06-13 ENCOUNTER — TELEPHONE (OUTPATIENT)
Dept: RADIOLOGY | Facility: HOSPITAL | Age: 74
End: 2024-06-13
Payer: MEDICARE

## 2024-06-13 DIAGNOSIS — N28.89 RENAL MASS: Primary | ICD-10-CM

## 2024-06-13 NOTE — TELEPHONE ENCOUNTER
Interventional Radiology  Scheduled 8:30AM Renal Cryoablation for 6/26/24 w/patient.  Instructed pt. to arrive by 7:15AM at hospital ([address]/ Entr[address]) off [address] in [place] and check in at Outpatient Registration located on the first floor.  She must have a ride and NPO after midnight the night before.  Pt. is to stop Ozempic after the 6/13/24 injection.  Pt. does not take ASA and other blood thinners, NSAIDs, fish oil, or other GLP-1/GIP agonists.  I gave patient our direct callback number [phone] and she verbalized understanding of all instructions.

## 2024-06-24 ENCOUNTER — PATIENT MESSAGE (OUTPATIENT)
Dept: UROLOGY | Facility: CLINIC | Age: 74
End: 2024-06-24
Payer: MEDICARE

## 2024-06-25 ENCOUNTER — TELEPHONE (OUTPATIENT)
Dept: RADIOLOGY | Facility: HOSPITAL | Age: 74
End: 2024-06-25
Payer: MEDICARE

## 2024-06-25 ENCOUNTER — TELEPHONE (OUTPATIENT)
Dept: UROLOGY | Facility: CLINIC | Age: 74
End: 2024-06-25
Payer: MEDICARE

## 2024-06-25 NOTE — TELEPHONE ENCOUNTER
----- Message from Sylvia Galvan MA sent at 6/25/2024 12:55 PM CDT -----  Regarding: Pt. rescheduled for CRYO  Aj Abreu,    Thank you for passing along this information!  I have spoken to the patient and we are rescheduling her CRYO to 7/8/24.  Pt. had forgotten that she and I spoke on 6/13/24 when we went over all instructions, including medications to stop.    Sylvia ENGLE  ----- Message -----  From: Lois Maria MA  Sent: 6/25/2024  10:02 AM CDT  To: Reno Suarez MD; Sylvia Galvan MA    Hi good morning, I work with Dr. Rawls Mrs. Smitha Salinas MRN: [id number] ended up in our in basket stating that she had taken her ozempic injection. She stated that no one contacted her regarding her instructions. If someone can please give her a call back at [phone]. Thank you.

## 2024-06-25 NOTE — TELEPHONE ENCOUNTER
Called patient confirmed name and . Patient stated she took her Ozempic injection and was wondering what was going to happen with sx if it was going to be r/s or canceled. Patient was informed that an GERSON Galvan contacted her with pre op instructions. Patient stated she does not recall that phone call. Informed patient that she could give a call to Interventional radiology and speak with Dr. Suarez staff. Patient got upset and started to cuss me out and told her that I was just trying to help her out and she just proceeded to talk over me. Tried to explain to her that I will also reach out to Dr. Suarez to find out the next step. Patient hung up and call was ended.           ----- Message from Rachna Shoemaker sent at 2024  9:28 AM CDT -----  Pt is calling in regards to knowing how to proceed with medications for procedure on tomorrow 24.  Pt can be reached at [phone].          Thanks  MAYKEL

## 2024-06-25 NOTE — TELEPHONE ENCOUNTER
Interventional Radiology  Re-scheduled 8:30AM renal cryoablation for 7/8/24 w/patient.  Instructed pt. to arrive by 7:15AM to the hospital ([address]/ [address]) off [address] in [place] and check in at Outpatient Registration located on the first floor.  She must have a ride and NPO after midnight the night before.  Pt. is to stop Ozempic after the 6/19/24 injection.  Pt. does not take ASA and other blood thinners, NSAIDs, fish oil, or other GLP-1/GIP agonists.  I gave patient our direct callback number [phone] and she verbalized understanding of all instructions.

## 2024-06-25 NOTE — TELEPHONE ENCOUNTER
See other message       ----- Message from Kimmy Rao, Patient Care Assistant sent at 6/24/2024  8:07 AM CDT -----  Contact: Pt  Type: Needs Medical Advice  Who Called: Pt  Best Call Back Number: [phone]  Inquiry/Question: Pt is calling to see if she needs to reschedule her procedure due to she took her Ozempic injection on 06/16/24 and she only takes .25 mg. Please advise Thank you~

## 2024-06-25 NOTE — TELEPHONE ENCOUNTER
TRied to call pt there was no answer. Pt left message on portal. Message will be sent to pt     ----- Message from Frederick Scott sent at 6/24/2024  3:20 PM CDT -----  Contact: Smitha Bone would like a call back at  [phone] regards to needing to speak with nurse about a message she has sent through the [facility] about her upcoming surgery and a medication she took.  Thanks   am

## 2024-07-01 ENCOUNTER — PATIENT MESSAGE (OUTPATIENT)
Dept: UROLOGY | Facility: CLINIC | Age: 74
End: 2024-07-01
Payer: MEDICARE

## 2024-07-01 RX ORDER — TRAMADOL HYDROCHLORIDE 50 MG/1
50 TABLET ORAL EVERY 6 HOURS PRN
Qty: 28 TABLET | Refills: 0 | OUTPATIENT
Start: 2024-07-01

## 2024-07-01 NOTE — TELEPHONE ENCOUNTER
No care due was identified.  [facility] Embedded Care Due Messages. Reference number: [id number].   7/01/2024 11:33:26 AM CDT

## 2024-07-01 NOTE — TELEPHONE ENCOUNTER
Care Due:                  Date            Visit Type   Department     Provider  --------------------------------------------------------------------------------                                EP -                              PRIMARY      [facility] INTER[facility]  Last Visit: 02-      CARE ([facility])   MEDICINE       Jayesh Jaramillo  Next Visit: None Scheduled  None         None Found                                                            Last  Test          Frequency    Reason                     Performed    Due Date  --------------------------------------------------------------------------------    HBA1C.......  6 months...  semaglutide..............  02- 07-    [facility] Embedded Care Due Messages. Reference number: [id number].   7/01/2024 11:31:44 AM CDT

## 2024-07-02 RX ORDER — TRAMADOL HYDROCHLORIDE 50 MG/1
50 TABLET ORAL EVERY 6 HOURS PRN
Qty: 60 TABLET | Refills: 5 | Status: SHIPPED | OUTPATIENT
Start: 2024-07-02

## 2024-07-03 ENCOUNTER — PATIENT MESSAGE (OUTPATIENT)
Dept: RHEUMATOLOGY | Facility: CLINIC | Age: 74
End: 2024-07-03
Payer: MEDICARE

## 2024-07-04 ENCOUNTER — PATIENT MESSAGE (OUTPATIENT)
Dept: UROLOGY | Facility: CLINIC | Age: 74
End: 2024-07-04
Payer: MEDICARE

## 2024-07-04 NOTE — TELEPHONE ENCOUNTER
No care due was identified.  [facility] Embedded Care Due Messages. Reference number: [id number].   7/04/2024 4:04:29 PM CDT

## 2024-07-05 ENCOUNTER — ANESTHESIA EVENT (OUTPATIENT)
Dept: ANESTHESIOLOGY | Facility: HOSPITAL | Age: 74
End: 2024-07-05
Payer: MEDICARE

## 2024-07-05 DIAGNOSIS — M79.18 MYOFASCIAL PAIN: Primary | ICD-10-CM

## 2024-07-05 DIAGNOSIS — M05.9 SEROPOSITIVE RHEUMATOID ARTHRITIS: ICD-10-CM

## 2024-07-05 RX ORDER — CYCLOBENZAPRINE HCL 10 MG
10 TABLET ORAL 3 TIMES DAILY PRN
Qty: 30 TABLET | Refills: 1 | Status: SHIPPED | OUTPATIENT
Start: 2024-07-05 | End: 2024-07-25

## 2024-07-05 RX ORDER — ETANERCEPT 50 MG/ML
50 SOLUTION SUBCUTANEOUS WEEKLY
Qty: 12 ML | Refills: 1 | Status: ACTIVE | OUTPATIENT
Start: 2024-07-05 | End: 2025-01-01

## 2024-07-08 ENCOUNTER — ANESTHESIA (OUTPATIENT)
Dept: ANESTHESIOLOGY | Facility: HOSPITAL | Age: 74
End: 2024-07-08
Payer: MEDICARE

## 2024-07-08 ENCOUNTER — HOSPITAL ENCOUNTER (OUTPATIENT)
Dept: RADIOLOGY | Facility: HOSPITAL | Age: 74
Discharge: HOME | End: 2024-07-08
Attending: UROLOGY
Payer: MEDICARE

## 2024-07-08 VITALS
HEART RATE: 54 BPM | RESPIRATION RATE: 17 BRPM | BODY MASS INDEX: 23.16 KG/M2 | HEIGHT: 65 IN | WEIGHT: 139 LBS | OXYGEN SATURATION: 99 % | SYSTOLIC BLOOD PRESSURE: 139 MMHG | DIASTOLIC BLOOD PRESSURE: 65 MMHG

## 2024-07-08 DIAGNOSIS — N28.89 RENAL MASS: ICD-10-CM

## 2024-07-08 LAB
ALBUMIN SERPL BCP-MCNC: 4.3 G/DL (ref 3.5–5.2)
ALP SERPL-CCNC: 90 U/L (ref 55–135)
ALT SERPL W/O P-5'-P-CCNC: 52 U/L (ref 10–44)
ANION GAP SERPL CALC-SCNC: 11 MMOL/L (ref 8–16)
AST SERPL-CCNC: 29 U/L (ref 10–40)
BASOPHILS # BLD AUTO: 0.03 K/UL (ref 0–0.2)
BASOPHILS NFR BLD: 0.4 % (ref 0–1.9)
BILIRUB SERPL-MCNC: 0.5 MG/DL (ref 0.1–1)
BUN SERPL-MCNC: 13 MG/DL (ref 8–23)
CALCIUM SERPL-MCNC: 9.9 MG/DL (ref 8.7–10.5)
CHLORIDE SERPL-SCNC: 110 MMOL/L (ref 95–110)
CO2 SERPL-SCNC: 23 MMOL/L (ref 23–29)
CREAT SERPL-MCNC: 0.8 MG/DL (ref 0.5–1.4)
DIFFERENTIAL METHOD BLD: ABNORMAL
EOSINOPHIL # BLD AUTO: 0.1 K/UL (ref 0–0.5)
EOSINOPHIL NFR BLD: 1.2 % (ref 0–8)
ERYTHROCYTE [DISTWIDTH] IN BLOOD BY AUTOMATED COUNT: 13.9 % (ref 11.5–14.5)
EST. GFR  (NO RACE VARIABLE): >60 ML/MIN/1.73 M^2
GLUCOSE SERPL-MCNC: 108 MG/DL (ref 70–110)
HCT VFR BLD AUTO: 37.5 % (ref 37–48.5)
HGB BLD-MCNC: 12.2 G/DL (ref 12–16)
IMM GRANULOCYTES # BLD AUTO: 0.02 K/UL (ref 0–0.04)
IMM GRANULOCYTES NFR BLD AUTO: 0.2 % (ref 0–0.5)
INR PPP: 0.9 (ref 0.8–1.2)
LYMPHOCYTES # BLD AUTO: 1.5 K/UL (ref 1–4.8)
LYMPHOCYTES NFR BLD: 18 % (ref 18–48)
MCH RBC QN AUTO: 27.8 PG (ref 27–31)
MCHC RBC AUTO-ENTMCNC: 32.5 G/DL (ref 32–36)
MCV RBC AUTO: 85 FL (ref 82–98)
MONOCYTES # BLD AUTO: 0.7 K/UL (ref 0.3–1)
MONOCYTES NFR BLD: 9 % (ref 4–15)
NEUTROPHILS # BLD AUTO: 5.9 K/UL (ref 1.8–7.7)
NEUTROPHILS NFR BLD: 71.2 % (ref 38–73)
NRBC BLD-RTO: 0 /100 WBC
PLATELET # BLD AUTO: 134 K/UL (ref 150–450)
PMV BLD AUTO: 11.8 FL (ref 9.2–12.9)
POTASSIUM SERPL-SCNC: 3.6 MMOL/L (ref 3.5–5.1)
PROT SERPL-MCNC: 7.4 G/DL (ref 6–8.4)
PROTHROMBIN TIME: 10.4 SEC (ref 9–12.5)
RBC # BLD AUTO: 4.39 M/UL (ref 4–5.4)
SODIUM SERPL-SCNC: 144 MMOL/L (ref 136–145)
WBC # BLD AUTO: 8.22 K/UL (ref 3.9–12.7)

## 2024-07-08 PROCEDURE — 77013 CT GUIDE FOR TISSUE ABLATION: CPT | Mod: 26,,, | Performed by: RADIOLOGY

## 2024-07-08 PROCEDURE — 88305 TISSUE EXAM BY PATHOLOGIST: CPT | Performed by: STUDENT IN AN ORGANIZED HEALTH CARE EDUCATION/TRAINING PROGRAM

## 2024-07-08 PROCEDURE — 25000003 PHARM REV CODE 250: Performed by: NURSE ANESTHETIST, CERTIFIED REGISTERED

## 2024-07-08 PROCEDURE — 85025 COMPLETE CBC W/AUTO DIFF WBC: CPT | Performed by: UROLOGY

## 2024-07-08 PROCEDURE — 88341 IMHCHEM/IMCYTCHM EA ADD ANTB: CPT | Mod: 59 | Performed by: STUDENT IN AN ORGANIZED HEALTH CARE EDUCATION/TRAINING PROGRAM

## 2024-07-08 PROCEDURE — 63600175 PHARM REV CODE 636 W HCPCS: Performed by: NURSE ANESTHETIST, CERTIFIED REGISTERED

## 2024-07-08 PROCEDURE — 80053 COMPREHEN METABOLIC PANEL: CPT | Performed by: UROLOGY

## 2024-07-08 PROCEDURE — 50593 PERC CRYO ABLATE RENAL TUM: CPT | Mod: LT,,, | Performed by: RADIOLOGY

## 2024-07-08 PROCEDURE — 50593 PERC CRYO ABLATE RENAL TUM: CPT | Mod: LT

## 2024-07-08 PROCEDURE — 88342 IMHCHEM/IMCYTCHM 1ST ANTB: CPT | Performed by: STUDENT IN AN ORGANIZED HEALTH CARE EDUCATION/TRAINING PROGRAM

## 2024-07-08 PROCEDURE — 88333 PATH CONSLTJ SURG CYTO XM 1: CPT | Performed by: STUDENT IN AN ORGANIZED HEALTH CARE EDUCATION/TRAINING PROGRAM

## 2024-07-08 PROCEDURE — 85610 PROTHROMBIN TIME: CPT | Performed by: UROLOGY

## 2024-07-08 RX ORDER — ROCURONIUM BROMIDE 10 MG/ML
INJECTION, SOLUTION INTRAVENOUS
Status: DISCONTINUED | OUTPATIENT
Start: 2024-07-08 | End: 2024-07-08

## 2024-07-08 RX ORDER — ONDANSETRON HYDROCHLORIDE 2 MG/ML
INJECTION, SOLUTION INTRAVENOUS
Status: DISCONTINUED | OUTPATIENT
Start: 2024-07-08 | End: 2024-07-08

## 2024-07-08 RX ORDER — PHENYLEPHRINE HYDROCHLORIDE 10 MG/ML
INJECTION INTRAVENOUS
Status: DISCONTINUED | OUTPATIENT
Start: 2024-07-08 | End: 2024-07-08

## 2024-07-08 RX ORDER — CEFAZOLIN SODIUM 1 G/3ML
INJECTION, POWDER, FOR SOLUTION INTRAMUSCULAR; INTRAVENOUS
Status: DISCONTINUED | OUTPATIENT
Start: 2024-07-08 | End: 2024-07-08

## 2024-07-08 RX ORDER — SUCCINYLCHOLINE CHLORIDE 20 MG/ML
INJECTION INTRAMUSCULAR; INTRAVENOUS
Status: DISCONTINUED | OUTPATIENT
Start: 2024-07-08 | End: 2024-07-08

## 2024-07-08 RX ORDER — SODIUM CHLORIDE, SODIUM LACTATE, POTASSIUM CHLORIDE, CALCIUM CHLORIDE 600; 310; 30; 20 MG/100ML; MG/100ML; MG/100ML; MG/100ML
INJECTION, SOLUTION INTRAVENOUS CONTINUOUS PRN
Status: DISCONTINUED | OUTPATIENT
Start: 2024-07-08 | End: 2024-07-08

## 2024-07-08 RX ORDER — LIDOCAINE HYDROCHLORIDE 10 MG/ML
INJECTION, SOLUTION EPIDURAL; INFILTRATION; INTRACAUDAL; PERINEURAL
Status: DISCONTINUED | OUTPATIENT
Start: 2024-07-08 | End: 2024-07-08

## 2024-07-08 RX ORDER — PROPOFOL 10 MG/ML
VIAL (ML) INTRAVENOUS
Status: DISCONTINUED | OUTPATIENT
Start: 2024-07-08 | End: 2024-07-08

## 2024-07-08 RX ADMIN — PHENYLEPHRINE HYDROCHLORIDE 100 MCG: 10 INJECTION INTRAVENOUS at 11:07

## 2024-07-08 RX ADMIN — SUGAMMADEX 200 MG: 100 INJECTION, SOLUTION INTRAVENOUS at 12:07

## 2024-07-08 RX ADMIN — ROCURONIUM BROMIDE 20 MG: 10 SOLUTION INTRAVENOUS at 10:07

## 2024-07-08 RX ADMIN — ROCURONIUM BROMIDE 10 MG: 10 SOLUTION INTRAVENOUS at 12:07

## 2024-07-08 RX ADMIN — ONDANSETRON 4 MG: 2 INJECTION INTRAMUSCULAR; INTRAVENOUS at 12:07

## 2024-07-08 RX ADMIN — SUCCINYLCHOLINE CHLORIDE 120 MG: 20 INJECTION, SOLUTION INTRAMUSCULAR; INTRAVENOUS; PARENTERAL at 10:07

## 2024-07-08 RX ADMIN — CEFAZOLIN 1 G: 330 INJECTION, POWDER, FOR SOLUTION INTRAMUSCULAR; INTRAVENOUS at 10:07

## 2024-07-08 RX ADMIN — SODIUM CHLORIDE, SODIUM LACTATE, POTASSIUM CHLORIDE, AND CALCIUM CHLORIDE: 600; 310; 30; 20 INJECTION, SOLUTION INTRAVENOUS at 10:07

## 2024-07-08 RX ADMIN — ROCURONIUM BROMIDE 20 MG: 10 SOLUTION INTRAVENOUS at 11:07

## 2024-07-08 RX ADMIN — ROCURONIUM BROMIDE 5 MG: 10 SOLUTION INTRAVENOUS at 10:07

## 2024-07-08 RX ADMIN — GLYCOPYRROLATE 0.2 MG: 0.2 INJECTION, SOLUTION INTRAMUSCULAR; INTRAVITREAL at 10:07

## 2024-07-08 RX ADMIN — LIDOCAINE HYDROCHLORIDE 50 MG: 10 SOLUTION INTRAVENOUS at 10:07

## 2024-07-08 RX ADMIN — PROPOFOL 150 MG: 10 INJECTION, EMULSION INTRAVENOUS at 10:07

## 2024-07-08 RX ADMIN — ROCURONIUM BROMIDE 25 MG: 10 SOLUTION INTRAVENOUS at 10:07

## 2024-07-08 NOTE — INTERVAL H&P NOTE
The patient has been examined and the H&P has been reviewed:    I concur with the findings and no changes have occurred since H&P was written.    Plan for left renal mass cryoablation    There are no hospital problems to display for this patient.

## 2024-07-08 NOTE — DISCHARGE INSTRUCTIONS
Please return to ER if any of these symptoms occur:  Fever over 101 degrees,  Any purulent drainage from site (pus, yellow or has foul odor), or any redness or swelling to site  Bleeding from the puncture site not controlled, If bleeding occurs at site hold pressure for 5 mins.  If bleeding continues go to ER  Pain not controlled with Aleve or Tylenol,     No driving for 24 hours after procedure due to sedation given during procedure.      Do not submerge in standing water for 2 days after biopsy but you may shower.     May change bandage if it becomes soiled and bandage may be removed in 2 days.     Rest for the next couple of days. Do not lift any thing heavier than a gallon of milk.  Increase activity as tolerated.     Resume home medications and diet     Please follow up with Dr. Rawls for any other questions or concerns that you may have.

## 2024-07-08 NOTE — ANESTHESIA PROCEDURE NOTES
Intubation    Date/Time: 7/8/2024 10:14 AM    Performed by: Sterling De Leon CRNA  Authorized by: Jason Joshua MD    Intubation:     Induction:  Intravenous    Intubated:  Postinduction    Mask Ventilation:  Easy mask    Attempts:  1    Attempted By:  CRNA    Method of Intubation:  Video laryngoscopy    Blade:  Vinson 3    Laryngeal View Grade: Grade I - full view of cords      Difficult Airway Encountered?: No      Complications:  None    Airway Device:  Oral endotracheal tube    Airway Device Size:  7.0    Style/Cuff Inflation:  Cuffed (inflated to minimal occlusive pressure)    Inflation Amount (mL):  4    Tube secured:  21    Secured at:  The lips    Placement Verified By:  Capnometry    Complicating Factors:  None    Findings Post-Intubation:  BS equal bilateral and atraumatic/condition of teeth unchanged
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
## 2024-07-08 NOTE — TRANSFER OF CARE
Anesthesia Transfer of Care Note    Patient: Smitha Salinas    Procedure(s) Performed: Procedure(s) (LRB):  CT/ Cryoablation (N/A)    Patient location: PACU    Anesthesia Type: general    Transport from OR: Transported from OR on room air with adequate spontaneous ventilation    Post pain: adequate analgesia    Post assessment: no apparent anesthetic complications and tolerated procedure well    Post vital signs: stable    Level of consciousness: awake    Nausea/Vomiting: no nausea/vomiting    Complications: none    Transfer of care protocol was followed      Last vitals: There were no vitals taken for this visit.

## 2024-07-09 ENCOUNTER — TELEPHONE (OUTPATIENT)
Dept: UROLOGY | Facility: CLINIC | Age: 74
End: 2024-07-09
Payer: MEDICARE

## 2024-07-09 RX ORDER — CYCLOBENZAPRINE HCL 10 MG
TABLET ORAL
Qty: 60 TABLET | Refills: 0 | Status: SHIPPED | OUTPATIENT
Start: 2024-07-09

## 2024-07-09 RX ORDER — MIRABEGRON 50 MG/1
TABLET, FILM COATED, EXTENDED RELEASE ORAL
Qty: 90 TABLET | Refills: 4 | Status: SHIPPED | OUTPATIENT
Start: 2024-07-09

## 2024-07-09 NOTE — TELEPHONE ENCOUNTER
Refill Routing Note   Medication(s) are not appropriate for processing by [facility] for the following reason(s):        Required vitals abnormal    [facility] action(s):  Defer               Appointments  past 12m or future 3m with PCP    Date Provider   Last Visit   2/8/2024 Jayesh Jaramillo MD   Next Visit   Visit date not found Jayesh Jaramillo MD   ED visits in past 90 days: 0        Note composed:12:58 PM 07/09/2024

## 2024-07-09 NOTE — TELEPHONE ENCOUNTER
No care due was identified.  [facility] Embedded Care Due Messages. Reference number: [id number].   7/09/2024 5:31:21 AM CDT

## 2024-07-10 LAB
FINAL PATHOLOGIC DIAGNOSIS: NORMAL
GROSS: NORMAL
Lab: NORMAL
MICROSCOPIC EXAM: NORMAL

## 2024-07-23 NOTE — ANESTHESIA POSTPROCEDURE EVALUATION
Anesthesia Post Evaluation    Patient: Smitha Salinas    Procedure(s) Performed: Procedure(s) (LRB):  CT/ Cryoablation (N/A)    Final Anesthesia Type: general      Patient location during evaluation: PACU  Patient participation: Yes- Able to Participate  Level of consciousness: awake and alert and oriented  Post-procedure vital signs: reviewed and stable  Pain management: adequate  Airway patency: patent  SUKHDEEP mitigation strategies: Multimodal analgesia  PONV status at discharge: No PONV  Anesthetic complications: no      Cardiovascular status: hemodynamically stable  Respiratory status: unassisted, spontaneous ventilation and room air  Hydration status: euvolemic  Follow-up not needed.              Vitals Value Taken Time   /67 07/08/24 1600   Temp 37.1 °C (98.8 °F) 07/08/24 1600   Pulse 69 07/08/24 1600   Resp 16 07/08/24 1600   SpO2 100 % 07/08/24 1600         Event Time   Out of Recovery 13:58:00         Pain/Bianka Score: No data recorded

## 2024-07-24 ENCOUNTER — PATIENT MESSAGE (OUTPATIENT)
Dept: HEPATOLOGY | Facility: CLINIC | Age: 74
End: 2024-07-24
Payer: MEDICARE

## 2024-08-14 ENCOUNTER — OFFICE VISIT (OUTPATIENT)
Dept: URGENT CARE | Facility: CLINIC | Age: 74
End: 2024-08-14
Payer: MEDICARE

## 2024-08-14 VITALS
WEIGHT: 142.75 LBS | OXYGEN SATURATION: 98 % | SYSTOLIC BLOOD PRESSURE: 117 MMHG | RESPIRATION RATE: 16 BRPM | HEIGHT: 65 IN | DIASTOLIC BLOOD PRESSURE: 61 MMHG | BODY MASS INDEX: 23.78 KG/M2 | TEMPERATURE: 100 F | HEART RATE: 70 BPM

## 2024-08-14 DIAGNOSIS — R09.81 NASAL CONGESTION: ICD-10-CM

## 2024-08-14 DIAGNOSIS — U07.1 COVID-19: Primary | ICD-10-CM

## 2024-08-14 DIAGNOSIS — U07.1 COVID-19 VIRUS DETECTED: ICD-10-CM

## 2024-08-14 LAB
CTP QC/QA: YES
SARS-COV-2 AG RESP QL IA.RAPID: POSITIVE

## 2024-08-14 PROCEDURE — 87811 SARS-COV-2 COVID19 W/OPTIC: CPT | Mod: QW,S$GLB,, | Performed by: FAMILY MEDICINE

## 2024-08-14 PROCEDURE — 99214 OFFICE O/P EST MOD 30 MIN: CPT | Mod: S$GLB,,, | Performed by: FAMILY MEDICINE

## 2024-08-14 RX ORDER — METHYLPREDNISOLONE 4 MG/1
TABLET ORAL
Qty: 21 EACH | Refills: 0 | Status: SHIPPED | OUTPATIENT
Start: 2024-08-14 | End: 2024-09-04

## 2024-08-14 NOTE — PATIENT INSTRUCTIONS
Thank you for allowing our team to care of you today.   The diagnosis today is Covid 19 infection.   This is contagious so be careful around others.  CDC recommends isolation until no fever for twenty four hours without medication and improvement of symptoms.   Supportive measures like staying hydrated.   Below are other recommendations for different symptoms.   Immediate medical provider/ER evaluation if symptoms continue or worsen. Secondary infections can occur.   Followup here as needed.       SYMPTOM MEDICATIONS IF NEEDED AND APPROPRIATE:    You may gargle with warm salt water/peroxide 4 times a day as needed for irritated throat.     Make sure you are getting rest.    You can use cough drops or lozenges to soothe your sore throat and for cough.     You can also take Vitamin C, D, Zinc, Elderberry or similar to help boost your immune system.    Honey is a natural cough suppressant that can be used.   Medrol steroid pack for congestion/sinus pressure.   Over the counter symptom medication as needed and appropriate.     Use Nasal Saline Spray to keep nasal passages moist.    A Humidifier can be used to keep moisture in the air.       PAIN/DISCOMFORT:  Tylenol every 6 hours if needed for aches or fever if no allergy or restriction.     If your symptoms do not improve, get a medical provider evaluation. Followup here as needed. If your symptoms worsen, go to the emergency room.

## 2024-08-14 NOTE — PROGRESS NOTES
"Subjective:      Patient ID: Smitha Salinas is a 73 y.o. female.    Vitals:  height is 5' 5" (1.651 m) and weight is 64.8 kg (142 lb 12 oz). Her temperature is 100.1 °F (37.8 °C). Her blood pressure is 117/61 and her pulse is 70. Her respiration is 16 and oxygen saturation is 98%.     Chief Complaint: Nasal Congestion    72 yo female with onset of sxs 08/13/24. Pt is c/o nasal and chest congestion, sinus pressure. Pt has used a otc nasal inhaler with little to no relief. Unknown sick contacts.     Sinus Problem  This is a new problem. The current episode started yesterday. The problem is unchanged. There has been no fever. Her pain is at a severity of 0/10. She is experiencing no pain. Associated symptoms include congestion, headaches and sinus pressure. Pertinent negatives include no coughing, ear pain or shortness of breath. Past treatments include nasal decongestants. The treatment provided no relief.       Constitution: Negative.   HENT:  Positive for congestion and sinus pressure. Negative for ear pain.    Cardiovascular: Negative.    Eyes: Negative.    Respiratory:  Negative for cough, shortness of breath and wheezing.    Gastrointestinal: Negative.    Genitourinary: Negative.    Musculoskeletal:  Positive for muscle ache.   Skin:  Negative for rash.   Neurological:  Positive for headaches.   Psychiatric/Behavioral: Negative.        Objective:     Physical Exam   Constitutional: She is oriented to person, place, and time. No distress.   HENT:   Head: Normocephalic.   Ears:   Right Ear: Tympanic membrane, external ear and ear canal normal.   Left Ear: Tympanic membrane, external ear and ear canal normal.   Nose: Sinus tenderness and congestion present. No epistaxis. Right sinus exhibits no maxillary sinus tenderness. Left sinus exhibits no maxillary sinus tenderness.   Mouth/Throat: Mucous membranes are moist. No oropharyngeal exudate or posterior oropharyngeal erythema.   Eyes: Conjunctivae are normal. Pupils " are equal, round, and reactive to light. Extraocular movement intact   Neck: Neck supple.   Cardiovascular: Normal rate, regular rhythm, normal heart sounds and normal pulses.   Pulmonary/Chest: Effort normal and breath sounds normal.   Abdominal: Normal appearance. She exhibits no distension. Soft. There is no abdominal tenderness. There is no left CVA tenderness and no right CVA tenderness.   Musculoskeletal: Normal range of motion.         General: Normal range of motion.   Neurological: no focal deficit. She is alert and oriented to person, place, and time.   Skin: Skin is warm, dry and no rash.   Psychiatric: Her behavior is normal. Mood, judgment and thought content normal.   Nursing note and vitals reviewed.      Assessment:     1. COVID-19    2. Nasal congestion        Plan:       COVID-19    Nasal congestion  -     SARS Coronavirus 2 Antigen, POCT Manual Read    Other orders  -     methylPREDNISolone (MEDROL DOSEPACK) 4 mg tablet; use as directed  Dispense: 21 each; Refill: 0      Results for orders placed or performed in visit on 08/14/24   SARS Coronavirus 2 Antigen, POCT Manual Read   Result Value Ref Range    SARS Coronavirus 2 Antigen Positive (A) Negative     Acceptable Yes      *Note: Due to a large number of results and/or encounters for the requested time period, some results have not been displayed. A complete set of results can be found in Results Review.       Review of patient's allergies indicates:   Allergen Reactions    Valdecoxib Other (See Comments)     palpitation       SUMMARY:  Covid illness. Contact precautions. CDC recommendations are to isolate until fever free for twenty four hours without medications and symptoms have improved. Can still wear masks. Antiviral possible interactions. Milder symptoms. Adding Medrol. Supportive measures.  Handout on Covid 19 given as well. Monitor for development of secondary infection. Immediate medical provider evaluation if worsens or  new symptoms.     Patient Instructions   Thank you for allowing our team to care of you today.   The diagnosis today is Covid 19 infection.   This is contagious so be careful around others.  CDC recommends isolation until no fever for twenty four hours without medication and improvement of symptoms.   Supportive measures like staying hydrated.   Below are other recommendations for different symptoms.   Immediate medical provider/ER evaluation if symptoms continue or worsen. Secondary infections can occur.   Followup here as needed.       SYMPTOM MEDICATIONS IF NEEDED AND APPROPRIATE:    You may gargle with warm salt water/peroxide 4 times a day as needed for irritated throat.     Make sure you are getting rest.    You can use cough drops or lozenges to soothe your sore throat and for cough.     You can also take Vitamin C, D, Zinc, Elderberry or similar to help boost your immune system.    Honey is a natural cough suppressant that can be used.   Medrol steroid pack for congestion/sinus pressure.   Over the counter symptom medication as needed and appropriate.     Use Nasal Saline Spray to keep nasal passages moist.    A Humidifier can be used to keep moisture in the air.       PAIN/DISCOMFORT:  Tylenol every 6 hours if needed for aches or fever if no allergy or restriction.     If your symptoms do not improve, get a medical provider evaluation. Followup here as needed. If your symptoms worsen, go to the emergency room.

## 2024-08-15 ENCOUNTER — TELEPHONE (OUTPATIENT)
Dept: URGENT CARE | Facility: CLINIC | Age: 74
End: 2024-08-15
Payer: MEDICARE

## 2024-08-17 ENCOUNTER — NURSE TRIAGE (OUTPATIENT)
Dept: ADMINISTRATIVE | Facility: CLINIC | Age: 74
End: 2024-08-17
Payer: MEDICARE

## 2024-08-17 NOTE — TELEPHONE ENCOUNTER
Pt calling for Covid isolation information. Triaged per protocol. Verbalizes understanding.    Reason for Disposition   [1] COVID-19 diagnosed by positive lab test (e.g., PCR, rapid self-test kit) AND [2] mild symptoms (e.g., cough, fever, others) AND [3] no complications or SOB    Additional Information   Negative: SEVERE difficulty breathing (e.g., struggling for each breath, speaks in single words)   Negative: Difficult to awaken or acting confused (e.g., disoriented, slurred speech)   Negative: Bluish (or gray) lips or face now   Negative: Shock suspected (e.g., cold/pale/clammy skin, too weak to stand, low BP, rapid pulse)   Negative: Sounds like a life-threatening emergency to the triager   Negative: SEVERE or constant chest pain or pressure  (Exception: Mild central chest pain, present only when coughing.)   Negative: MODERATE difficulty breathing (e.g., speaks in phrases, SOB even at rest, pulse 100-120)   Negative: [1] Headache AND [2] stiff neck (can't touch chin to chest)   Negative: Oxygen level (e.g., pulse oximetry) 90 percent or lower   Negative: Chest pain or pressure  (Exception: MILD central chest pain, present only when coughing.)   Negative: [1] Drinking very little AND [2] dehydration suspected (e.g., no urine > 12 hours, very dry mouth, very lightheaded)   Negative: Patient sounds very sick or weak to the triager   Negative: MILD difficulty breathing (e.g., minimal/no SOB at rest, SOB with walking, pulse <100)   Negative: Fever > 103 F (39.4 C)   Negative: [1] Fever > 101 F (38.3 C) AND [2] age > 60 years   Negative: [1] Fever > 100.0 F (37.8 C) AND [2] bedridden (e.g., CVA, chronic illness, recovering from surgery)   Negative: Oxygen level (e.g., pulse oximetry) 91 to 94 percent   Negative: [1] HIGH RISK patient (e.g., weak immune system, age > 64 years, obesity with BMI 30 or higher, pregnant, chronic lung disease or other chronic medical condition) AND [2] COVID symptoms (e.g., cough, fever)   (Exceptions: Already seen by PCP and no new or worsening symptoms.)   Negative: [1] HIGH RISK patient AND [2] influenza is widespread in the community AND [3] ONE OR MORE respiratory symptoms: cough, sore throat, runny or stuffy nose   Negative: [1] HIGH RISK patient AND [2] influenza exposure within the last 7 days AND [3] ONE OR MORE respiratory symptoms: cough, sore throat, runny or stuffy nose   Negative: Fever present > 3 days (72 hours)   Negative: [1] Fever returns after gone for over 24 hours AND [2] symptoms worse or not improved   Negative: [1] Continuous (nonstop) coughing interferes with work or school AND [2] no improvement using cough treatment per Care Advice   Negative: [1] COVID-19 infection suspected by caller or triager AND [2] mild symptoms (cough, fever, or others) AND [3] negative COVID-19 rapid test   Negative: Cough present > 3 weeks    Protocols used: Coronavirus (COVID-19) Diagnosed or [id number]

## 2024-09-03 ENCOUNTER — PATIENT OUTREACH (OUTPATIENT)
Dept: ADMINISTRATIVE | Facility: HOSPITAL | Age: 74
End: 2024-09-03
Payer: MEDICARE

## 2024-09-05 ENCOUNTER — TELEPHONE (OUTPATIENT)
Dept: UROLOGY | Facility: CLINIC | Age: 74
End: 2024-09-05
Payer: MEDICARE

## 2024-09-05 NOTE — TELEPHONE ENCOUNTER
Appt switched to virtual per pt request pt notified on portal       ----- Message from Gianna Todd sent at 9/5/2024  8:29 AM CDT -----  Contact: Smitha  Pt is calling in regards to getting her appt on 09/12 rescheduled to a virtual.please call back at  [phone]        Thanks  GERALDINE

## 2024-09-06 ENCOUNTER — TELEPHONE (OUTPATIENT)
Dept: UROLOGY | Facility: CLINIC | Age: 74
End: 2024-09-06
Payer: MEDICARE

## 2024-09-06 ENCOUNTER — LAB VISIT (OUTPATIENT)
Dept: LAB | Facility: HOSPITAL | Age: 74
End: 2024-09-06
Attending: INTERNAL MEDICINE
Payer: MEDICARE

## 2024-09-06 ENCOUNTER — TELEPHONE (OUTPATIENT)
Dept: HEPATOLOGY | Facility: CLINIC | Age: 74
End: 2024-09-06
Payer: MEDICARE

## 2024-09-06 DIAGNOSIS — E11.69 TYPE 2 DIABETES MELLITUS WITH OTHER SPECIFIED COMPLICATION, WITHOUT LONG-TERM CURRENT USE OF INSULIN: ICD-10-CM

## 2024-09-06 DIAGNOSIS — D84.821 IMMUNOSUPPRESSION DUE TO DRUG THERAPY: ICD-10-CM

## 2024-09-06 DIAGNOSIS — Z79.899 IMMUNOSUPPRESSION DUE TO DRUG THERAPY: ICD-10-CM

## 2024-09-06 DIAGNOSIS — M05.9 SEROPOSITIVE RHEUMATOID ARTHRITIS: ICD-10-CM

## 2024-09-06 DIAGNOSIS — Z51.81 MEDICATION MONITORING ENCOUNTER: ICD-10-CM

## 2024-09-06 LAB
ALBUMIN SERPL BCP-MCNC: 4.4 G/DL (ref 3.5–5.2)
ALBUMIN/CREAT UR: 42.5 UG/MG (ref 0–30)
ALP SERPL-CCNC: 110 U/L (ref 55–135)
ALT SERPL W/O P-5'-P-CCNC: 46 U/L (ref 10–44)
ANION GAP SERPL CALC-SCNC: 11 MMOL/L (ref 8–16)
AST SERPL-CCNC: 26 U/L (ref 10–40)
BASOPHILS # BLD AUTO: 0.02 K/UL (ref 0–0.2)
BASOPHILS NFR BLD: 0.3 % (ref 0–1.9)
BILIRUB SERPL-MCNC: 0.3 MG/DL (ref 0.1–1)
BUN SERPL-MCNC: 11 MG/DL (ref 8–23)
CALCIUM SERPL-MCNC: 9.7 MG/DL (ref 8.7–10.5)
CHLORIDE SERPL-SCNC: 109 MMOL/L (ref 95–110)
CO2 SERPL-SCNC: 21 MMOL/L (ref 23–29)
CREAT SERPL-MCNC: 0.7 MG/DL (ref 0.5–1.4)
CREAT UR-MCNC: 40 MG/DL (ref 15–325)
CRP SERPL-MCNC: 0.2 MG/L (ref 0–8.2)
DIFFERENTIAL METHOD BLD: ABNORMAL
EOSINOPHIL # BLD AUTO: 0.1 K/UL (ref 0–0.5)
EOSINOPHIL NFR BLD: 1.7 % (ref 0–8)
ERYTHROCYTE [DISTWIDTH] IN BLOOD BY AUTOMATED COUNT: 14.8 % (ref 11.5–14.5)
EST. GFR  (NO RACE VARIABLE): >60 ML/MIN/1.73 M^2
GLUCOSE SERPL-MCNC: 87 MG/DL (ref 70–110)
HBV CORE AB SERPL QL IA: NORMAL
HBV SURFACE AG SERPL QL IA: NORMAL
HCT VFR BLD AUTO: 37.7 % (ref 37–48.5)
HGB BLD-MCNC: 12.3 G/DL (ref 12–16)
IMM GRANULOCYTES # BLD AUTO: 0.01 K/UL (ref 0–0.04)
IMM GRANULOCYTES NFR BLD AUTO: 0.2 % (ref 0–0.5)
LYMPHOCYTES # BLD AUTO: 2 K/UL (ref 1–4.8)
LYMPHOCYTES NFR BLD: 30.8 % (ref 18–48)
MCH RBC QN AUTO: 28.1 PG (ref 27–31)
MCHC RBC AUTO-ENTMCNC: 32.6 G/DL (ref 32–36)
MCV RBC AUTO: 86 FL (ref 82–98)
MICROALBUMIN UR DL<=1MG/L-MCNC: 17 UG/ML
MONOCYTES # BLD AUTO: 1 K/UL (ref 0.3–1)
MONOCYTES NFR BLD: 15.6 % (ref 4–15)
NEUTROPHILS # BLD AUTO: 3.3 K/UL (ref 1.8–7.7)
NEUTROPHILS NFR BLD: 51.4 % (ref 38–73)
NRBC BLD-RTO: 0 /100 WBC
PLATELET # BLD AUTO: 162 K/UL (ref 150–450)
PMV BLD AUTO: 11.3 FL (ref 9.2–12.9)
POTASSIUM SERPL-SCNC: 3.6 MMOL/L (ref 3.5–5.1)
PROT SERPL-MCNC: 7.5 G/DL (ref 6–8.4)
RBC # BLD AUTO: 4.37 M/UL (ref 4–5.4)
SODIUM SERPL-SCNC: 141 MMOL/L (ref 136–145)
WBC # BLD AUTO: 6.39 K/UL (ref 3.9–12.7)

## 2024-09-06 PROCEDURE — 36415 COLL VENOUS BLD VENIPUNCTURE: CPT | Performed by: INTERNAL MEDICINE

## 2024-09-06 PROCEDURE — 85025 COMPLETE CBC W/AUTO DIFF WBC: CPT | Performed by: INTERNAL MEDICINE

## 2024-09-06 PROCEDURE — 86704 HEP B CORE ANTIBODY TOTAL: CPT | Performed by: INTERNAL MEDICINE

## 2024-09-06 PROCEDURE — 80053 COMPREHEN METABOLIC PANEL: CPT | Performed by: INTERNAL MEDICINE

## 2024-09-06 PROCEDURE — 86140 C-REACTIVE PROTEIN: CPT | Performed by: INTERNAL MEDICINE

## 2024-09-06 PROCEDURE — 86480 TB TEST CELL IMMUN MEASURE: CPT | Performed by: INTERNAL MEDICINE

## 2024-09-06 PROCEDURE — 87340 HEPATITIS B SURFACE AG IA: CPT | Performed by: INTERNAL MEDICINE

## 2024-09-06 PROCEDURE — 82043 UR ALBUMIN QUANTITATIVE: CPT | Performed by: FAMILY MEDICINE

## 2024-09-06 NOTE — TELEPHONE ENCOUNTER
Called the patient, after verification of name and , the patient was informed  of appt time and date. She voiced understanding       ----- Message from Darwin Bloom sent at 2024 11:17 AM CDT -----  Contact: [phone]  Smitha Salinas calling regarding Appointment Access  (message) Pt asking for a call back to discuss if the doctor needs to see her before the appt she schedule for  Pt asking for a call back to speak with her before scheduling an appt

## 2024-09-06 NOTE — TELEPHONE ENCOUNTER
Returned patient's call, she was requesting that her virtual visit be changed to an in person due to her having another appointment here at [facility] in person. I explained to her that I didn't have any in person appointments available to which she agreed and voiced understanding.

## 2024-09-09 LAB
GAMMA INTERFERON BACKGROUND BLD IA-ACNC: 0 IU/ML
M TB IFN-G CD4+ BCKGRND COR BLD-ACNC: 0.01 IU/ML
M TB IFN-G CD4+ BCKGRND COR BLD-ACNC: 0.08 IU/ML
MITOGEN IGNF BCKGRD COR BLD-ACNC: 10 IU/ML
TB GOLD PLUS: NEGATIVE

## 2024-09-19 ENCOUNTER — OFFICE VISIT (OUTPATIENT)
Dept: UROLOGY | Facility: CLINIC | Age: 74
End: 2024-09-19
Payer: MEDICARE

## 2024-09-19 ENCOUNTER — OFFICE VISIT (OUTPATIENT)
Dept: HEPATOLOGY | Facility: CLINIC | Age: 74
End: 2024-09-19
Payer: MEDICARE

## 2024-09-19 VITALS — HEIGHT: 65 IN | BODY MASS INDEX: 23.82 KG/M2 | WEIGHT: 143 LBS

## 2024-09-19 VITALS
DIASTOLIC BLOOD PRESSURE: 82 MMHG | BODY MASS INDEX: 23.52 KG/M2 | SYSTOLIC BLOOD PRESSURE: 162 MMHG | HEART RATE: 52 BPM | WEIGHT: 141.13 LBS | HEIGHT: 65 IN

## 2024-09-19 DIAGNOSIS — N39.41 URGE INCONTINENCE: Primary | ICD-10-CM

## 2024-09-19 DIAGNOSIS — R79.89 ABNORMAL LFTS: Primary | ICD-10-CM

## 2024-09-19 DIAGNOSIS — N28.89 RENAL MASS: ICD-10-CM

## 2024-09-19 PROCEDURE — 3288F FALL RISK ASSESSMENT DOCD: CPT | Mod: CPTII,95,, | Performed by: INTERNAL MEDICINE

## 2024-09-19 PROCEDURE — 3008F BODY MASS INDEX DOCD: CPT | Mod: CPTII,95,, | Performed by: INTERNAL MEDICINE

## 2024-09-19 PROCEDURE — 4010F ACE/ARB THERAPY RXD/TAKEN: CPT | Mod: CPTII,95,, | Performed by: INTERNAL MEDICINE

## 2024-09-19 PROCEDURE — 3288F FALL RISK ASSESSMENT DOCD: CPT | Mod: CPTII,S$GLB,, | Performed by: UROLOGY

## 2024-09-19 PROCEDURE — 3066F NEPHROPATHY DOC TX: CPT | Mod: CPTII,95,, | Performed by: INTERNAL MEDICINE

## 2024-09-19 PROCEDURE — 1160F RVW MEDS BY RX/DR IN RCRD: CPT | Mod: CPTII,S$GLB,, | Performed by: UROLOGY

## 2024-09-19 PROCEDURE — 3044F HG A1C LEVEL LT 7.0%: CPT | Mod: CPTII,S$GLB,, | Performed by: UROLOGY

## 2024-09-19 PROCEDURE — 3060F POS MICROALBUMINURIA REV: CPT | Mod: CPTII,95,, | Performed by: INTERNAL MEDICINE

## 2024-09-19 PROCEDURE — 4010F ACE/ARB THERAPY RXD/TAKEN: CPT | Mod: CPTII,S$GLB,, | Performed by: UROLOGY

## 2024-09-19 PROCEDURE — 99999 PR PBB SHADOW E&M-EST. PATIENT-LVL V: CPT | Mod: PBBFAC,,, | Performed by: UROLOGY

## 2024-09-19 PROCEDURE — 3060F POS MICROALBUMINURIA REV: CPT | Mod: CPTII,S$GLB,, | Performed by: UROLOGY

## 2024-09-19 PROCEDURE — 1126F AMNT PAIN NOTED NONE PRSNT: CPT | Mod: CPTII,95,, | Performed by: INTERNAL MEDICINE

## 2024-09-19 PROCEDURE — 3044F HG A1C LEVEL LT 7.0%: CPT | Mod: CPTII,95,, | Performed by: INTERNAL MEDICINE

## 2024-09-19 PROCEDURE — 3077F SYST BP >= 140 MM HG: CPT | Mod: CPTII,S$GLB,, | Performed by: UROLOGY

## 2024-09-19 PROCEDURE — 1159F MED LIST DOCD IN RCRD: CPT | Mod: CPTII,95,, | Performed by: INTERNAL MEDICINE

## 2024-09-19 PROCEDURE — 3066F NEPHROPATHY DOC TX: CPT | Mod: CPTII,S$GLB,, | Performed by: UROLOGY

## 2024-09-19 PROCEDURE — 1160F RVW MEDS BY RX/DR IN RCRD: CPT | Mod: CPTII,95,, | Performed by: INTERNAL MEDICINE

## 2024-09-19 PROCEDURE — 1101F PT FALLS ASSESS-DOCD LE1/YR: CPT | Mod: CPTII,95,, | Performed by: INTERNAL MEDICINE

## 2024-09-19 PROCEDURE — 3008F BODY MASS INDEX DOCD: CPT | Mod: CPTII,S$GLB,, | Performed by: UROLOGY

## 2024-09-19 PROCEDURE — 1101F PT FALLS ASSESS-DOCD LE1/YR: CPT | Mod: CPTII,S$GLB,, | Performed by: UROLOGY

## 2024-09-19 PROCEDURE — 99213 OFFICE O/P EST LOW 20 MIN: CPT | Mod: 95,,, | Performed by: INTERNAL MEDICINE

## 2024-09-19 PROCEDURE — 1126F AMNT PAIN NOTED NONE PRSNT: CPT | Mod: CPTII,S$GLB,, | Performed by: UROLOGY

## 2024-09-19 PROCEDURE — 3079F DIAST BP 80-89 MM HG: CPT | Mod: CPTII,S$GLB,, | Performed by: UROLOGY

## 2024-09-19 PROCEDURE — 1159F MED LIST DOCD IN RCRD: CPT | Mod: CPTII,S$GLB,, | Performed by: UROLOGY

## 2024-09-19 PROCEDURE — 99214 OFFICE O/P EST MOD 30 MIN: CPT | Mod: S$GLB,,, | Performed by: UROLOGY

## 2024-09-19 RX ORDER — VIBEGRON 75 MG/1
75 TABLET, FILM COATED ORAL DAILY
Qty: 30 TABLET | Refills: 11 | Status: SHIPPED | OUTPATIENT
Start: 2024-09-19

## 2024-09-19 NOTE — PROGRESS NOTES
Chief Complaint:   Encounter Diagnoses   Name Primary?    Urge incontinence Yes    Renal mass          HPI:   9/19/24- patient is here today following cryotherapy, no change in incontinence.  Patient would like to try another medical trial for the incontinence, not pursue another dose of Botox as of yet.      Allergies:  Valdecoxib    Medications: has a current medication list which includes the following prescription(s): acyclovir, amlodipine, blood sugar diagnostic, cholecalciferol (vitamin d3), clonidine, cyclobenzaprine, enbrel, lancing device, levsin/sl, losartan, myrbetriq, prednisone, promethazine, rosuvastatin, ozempic, spirometers and accessories, sulfasalazine, tramadol, and azelastine, and the following Facility-Administered Medications: lactated ringers.    Review of Systems:  General: No fever, chills, fatigability, or weight loss.  Skin: No rashes, itching, or changes in color or texture of skin.  Chest: Denies ADLER, cyanosis, wheezing, cough, and sputum production.  Abdomen: Appetite fine. No weight loss. Denies diarrhea, abdominal pain, hematemesis, or blood in stool.  Musculoskeletal: No joint stiffness or swelling. Some back pain.  : As above.  All other review of systems negative.    PMH:   has a past medical history of ADHD (attention deficit hyperactivity disorder), Adult ADHD, Chronic rheumatic arthritis, Coronary artery disease involving native coronary artery of native heart (04/17/2023), Elevated LFTs, Ex-smoker, Genital herpes, Hyperlipidemia, Hypertension, Immunosuppressed status, Lichen sclerosus et atrophicus, Morbid obesity (01/11/2021), Obesity, Class I, BMI 30-34.9 (12/22/2015), SUKHDEEP (obstructive sleep apnea), Osteopenia, and Type 2 diabetes mellitus.    PSH:   has a past surgical history that includes Tonsillectomy (1958); Skin graft (1965); Cryotherapy (1980); Breast biopsy (Left); Colonoscopy (N/A, 02/12/2020); Esophagogastroduodenoscopy (N/A, 02/12/2020); Insertion of sacral  neurostimulator, electrodes, and implantable pulse generator (IPG) (Left, 11/23/2020); Insertion of sacral neurostimulator generator (Left, 11/23/2020); cystoscopy,with botulinum toxin injection (N/A, 03/27/2023); cystoscopy,with botulinum toxin injection (N/A, 11/13/2023); Cataract extraction (Bilateral); and Computed tomography (N/A, 7/8/2024).    FamHx: family history includes Breast cancer (age of onset: 39) in her sister; Cancer (age of onset: 68) in an other family member; Coronary artery disease in her maternal grandfather; Diabetes in her mother; Heart disease in her mother; Hypertension in her father; Kidney failure in her father; No Known Problems in her daughter; Peripheral vascular disease in her mother; Stroke in her father, maternal grandmother, paternal grandfather, and paternal grandmother.    SocHx:  reports that she quit smoking about 11 years ago. Her smoking use included cigarettes. She started smoking about 26 years ago. She has a 3.8 pack-year smoking history. She has quit using smokeless tobacco. She reports current alcohol use. She reports that she does not currently use drugs.     Physical Exam:  Vitals:   Vitals:    09/19/24 1126   BP: (!) 162/82   Pulse: (!) 52       General: A&Ox3. No apparent distress. No deformities.  Neck: No masses.   Lungs: No use of accessory muscles.  Ext: No c/c/e.    Labs/Studies:   Pvr 70 ml 5/24  Prevoid 65 ml 3/23  UDS- decreased capacity otherwise normal 9/20  Percutaneous InterStim 09/29/2020  Renal protocol CT 2.4 cm left Bosniak 4 cyst 6/24  KUB/GENIE normal 8/20  Creatinine 0.7 9/24    Impression/Plan:       1.  Urge incontinence-  botox  11/13/23, 3/27/23,  InterStim 11/23/20    The combination of Botox, InterStim and myrbetriq are not assisting, Botox cycle seems to be around 8 months but she has chosen not to pursue another cycle as of yet, would like to try another medical trial.  Therefore will call in gemtesa 75 mg, reassess the next appointment.  At  some point she may require another Botox cycle.    2. Renal mass-  left cryoablation renal lesion  7/8/24    Status post cryotherapy, doing well with no complaints.  Will perform CT and chest x-ray now, see me in 6 months with CT, chest x-ray and labs.  Call with any issues prior to the next appointment.

## 2024-09-19 NOTE — PROGRESS NOTES
Subjective:     Smitha Salinas is here for follow up of Elevated Hepatic Enzymes    Patient is new to me    HPI  Since Smitha Salinas's last visit she denies any major liver related issues.  It seems like for the most part her liver tests have been normal but there was a been a mild increase recently with most recent lab trending down.  She denies any new medications seems like she had been still on her Enbrel for rheumatoid arthritis for the past 2 years.  She continues to follow up with the rheumatologist.    Review of Systems    Objective:     Physical Exam    MELD 3.0: 7 at 7/8/2024  8:41 AM  MELD-Na: 6 at 7/8/2024  8:41 AM  Calculated from:  Serum Creatinine: 0.8 mg/dL (Using min of 1 mg/dL) at 7/8/2024  8:41 AM  Serum Sodium: 144 mmol/L (Using max of 137 mmol/L) at 7/8/2024  8:41 AM  Total Bilirubin: 0.5 mg/dL (Using min of 1 mg/dL) at 7/8/2024  8:41 AM  Serum Albumin: 4.3 g/dL (Using max of 3.5 g/dL) at 7/8/2024  8:41 AM  INR(ratio): 0.9 (Using min of 1) at 7/8/2024  8:41 AM  Age at listing (hypothetical): 73 years  Sex: Female at 7/8/2024  8:41 AM      WBC   Date Value Ref Range Status   09/06/2024 6.39 3.90 - 12.70 K/uL Final     Hemoglobin   Date Value Ref Range Status   09/06/2024 12.3 12.0 - 16.0 g/dL Final     Hematocrit   Date Value Ref Range Status   09/06/2024 37.7 37.0 - 48.5 % Final     Platelets   Date Value Ref Range Status   09/06/2024 162 150 - 450 K/uL Final     BUN   Date Value Ref Range Status   09/06/2024 11 8 - 23 mg/dL Final     Creatinine   Date Value Ref Range Status   09/06/2024 0.7 0.5 - 1.4 mg/dL Final     Glucose   Date Value Ref Range Status   09/06/2024 87 70 - 110 mg/dL Final     Calcium   Date Value Ref Range Status   09/06/2024 9.7 8.7 - 10.5 mg/dL Final     Sodium   Date Value Ref Range Status   09/06/2024 141 136 - 145 mmol/L Final     Potassium   Date Value Ref Range Status   09/06/2024 3.6 3.5 - 5.1 mmol/L Final     Chloride   Date Value Ref Range Status   09/06/2024  109 95 - 110 mmol/L Final     Magnesium   Date Value Ref Range Status   09/06/2013 2.1 1.6 - 2.6 mg/dL Final     AST   Date Value Ref Range Status   09/06/2024 26 10 - 40 U/L Final     ALT   Date Value Ref Range Status   09/06/2024 46 (H) 10 - 44 U/L Final     Alkaline Phosphatase   Date Value Ref Range Status   09/06/2024 110 55 - 135 U/L Final     Total Bilirubin   Date Value Ref Range Status   09/06/2024 0.3 0.1 - 1.0 mg/dL Final     Comment:     For infants and newborns, interpretation of results should be based  on gestational age, weight and in agreement with clinical  observations.    Premature Infant recommended reference ranges:  Up to 24 hours.............<8.0 mg/dL  Up to 48 hours............<12.0 mg/dL  3-5 days..................<15.0 mg/dL  6-29 days.................<15.0 mg/dL       Albumin   Date Value Ref Range Status   09/06/2024 4.4 3.5 - 5.2 g/dL Final     INR   Date Value Ref Range Status   07/08/2024 0.9 0.8 - 1.2 Final     Comment:     Coumadin Therapy:  2.0 - 3.0 for INR for all indicators except mechanical heart valves  and antiphospholipid syndromes which should use 2.5 - 3.5.           Assessment/Plan:     1. Abnormal LFTs      Smitha Salinas is a 74 y.o. female withElevated Hepatic Enzymes      Abnormal LFTs-patient was been followed fluctuating LFTs.  They are usually only minimally elevated.  They have been thought to be associated with drug-induced liver injury from her rheumatoid arthritis medications at which point it seem the main culprit was the Actemra in reviewing her previous notes from her other provider.  Currently her liver tests are only very minimally elevated.  I do not think there is anything to do differently.  There has been no concern for fibrosis or steatosis on FibroScan.  -Patient can continue on current medications  -continue with lab monitoring per Rheumatology    Return to clinic in 1 year to ensure stability    The patient location is: [place]  The chief  complaint leading to consultation is: See above in note    Visit type: audiovisual    Face to Face time with patient: 5 minutes  15 minutes of total time spent on the encounter, which includes face to face time and non-face to face time preparing to see the patient (eg, review of tests), Obtaining and/or reviewing separately obtained history, Documenting clinical information in the electronic or other health record, Independently interpreting results (not separately reported) and communicating results to the patient/family/caregiver, or Care coordination (not separately reported).         Each patient to whom he or she provides medical services by telemedicine is:  (1) informed of the relationship between the physician and patient and the respective role of any other health care provider with respect to management of the patient; and (2) notified that he or she may decline to receive medical services by telemedicine and may withdraw from such care at any time.        Eva Car MD

## 2024-09-26 ENCOUNTER — OFFICE VISIT (OUTPATIENT)
Dept: RHEUMATOLOGY | Facility: CLINIC | Age: 74
End: 2024-09-26
Payer: MEDICARE

## 2024-09-26 DIAGNOSIS — M18.10 OSTEOARTHRITIS OF THUMB, UNSPECIFIED LATERALITY: ICD-10-CM

## 2024-09-26 DIAGNOSIS — Z79.899 IMMUNOSUPPRESSION DUE TO DRUG THERAPY: ICD-10-CM

## 2024-09-26 DIAGNOSIS — I25.10 CORONARY ARTERY DISEASE INVOLVING NATIVE CORONARY ARTERY OF NATIVE HEART WITHOUT ANGINA PECTORIS: ICD-10-CM

## 2024-09-26 DIAGNOSIS — M05.9 RHEUMATOID ARTHRITIS WITH POSITIVE RHEUMATOID FACTOR, INVOLVING UNSPECIFIED SITE: Primary | ICD-10-CM

## 2024-09-26 DIAGNOSIS — D84.821 IMMUNOSUPPRESSION DUE TO DRUG THERAPY: ICD-10-CM

## 2024-09-26 DIAGNOSIS — Z79.899 HIGH RISK MEDICATION USE: ICD-10-CM

## 2024-09-26 DIAGNOSIS — R79.89 ABNORMAL LFTS: ICD-10-CM

## 2024-09-26 DIAGNOSIS — E11.8 DIABETES MELLITUS TYPE 2 WITH COMPLICATIONS: ICD-10-CM

## 2024-09-26 DIAGNOSIS — M15.9 PRIMARY OSTEOARTHRITIS INVOLVING MULTIPLE JOINTS: ICD-10-CM

## 2024-09-26 PROBLEM — D69.6 THROMBOCYTOPENIA, UNSPECIFIED: Status: RESOLVED | Noted: 2021-05-20 | Resolved: 2024-09-26
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
## 2024-09-30 ENCOUNTER — PATIENT MESSAGE (OUTPATIENT)
Dept: UROLOGY | Facility: CLINIC | Age: 74
End: 2024-09-30
Payer: MEDICARE

## 2024-10-01 RX ORDER — VIBEGRON 75 MG/1
75 TABLET, FILM COATED ORAL DAILY
Qty: 30 TABLET | Refills: 11 | Status: SHIPPED | OUTPATIENT
Start: 2024-10-01

## 2024-10-02 ENCOUNTER — PATIENT MESSAGE (OUTPATIENT)
Dept: INTERNAL MEDICINE | Facility: CLINIC | Age: 74
End: 2024-10-02
Payer: MEDICARE

## 2024-10-09 ENCOUNTER — PATIENT MESSAGE (OUTPATIENT)
Dept: UROLOGY | Facility: CLINIC | Age: 74
End: 2024-10-09
Payer: MEDICARE

## 2024-10-14 ENCOUNTER — PATIENT MESSAGE (OUTPATIENT)
Dept: RHEUMATOLOGY | Facility: CLINIC | Age: 74
End: 2024-10-14
Payer: MEDICARE

## 2024-10-14 ENCOUNTER — PATIENT MESSAGE (OUTPATIENT)
Dept: INTERNAL MEDICINE | Facility: CLINIC | Age: 74
End: 2024-10-14
Payer: MEDICARE

## 2024-10-15 ENCOUNTER — PATIENT MESSAGE (OUTPATIENT)
Dept: UROLOGY | Facility: CLINIC | Age: 74
End: 2024-10-15
Payer: MEDICARE

## 2024-10-15 ENCOUNTER — TELEPHONE (OUTPATIENT)
Dept: UROLOGY | Facility: CLINIC | Age: 74
End: 2024-10-15
Payer: MEDICARE

## 2024-10-15 DIAGNOSIS — N39.41 URGE INCONTINENCE: Primary | ICD-10-CM

## 2024-10-15 NOTE — TELEPHONE ENCOUNTER
----- Message from Tech Ana Maria sent at 10/15/2024  9:45 AM CDT -----  Regarding: STAT Labs for CT  Ms Salinas will need a STAT CREATININE SERUM ordered and booked at least 1 1/2 hours prior to her CT Thursday. If the patient prefers, she can come in any day before then to have them drawn so we will not have to wait on results the day of the scan.     Thanks,   Elif   [id number]

## 2024-10-16 ENCOUNTER — PATIENT MESSAGE (OUTPATIENT)
Dept: INTERNAL MEDICINE | Facility: CLINIC | Age: 74
End: 2024-10-16
Payer: MEDICARE

## 2024-10-17 ENCOUNTER — HOSPITAL ENCOUNTER (OUTPATIENT)
Dept: RADIOLOGY | Facility: HOSPITAL | Age: 74
Discharge: HOME | End: 2024-10-17
Attending: UROLOGY
Payer: MEDICARE

## 2024-10-17 DIAGNOSIS — N28.89 RENAL MASS: ICD-10-CM

## 2024-10-17 PROCEDURE — 71045 X-RAY EXAM CHEST 1 VIEW: CPT | Mod: 26,,, | Performed by: RADIOLOGY

## 2024-10-17 PROCEDURE — 71045 X-RAY EXAM CHEST 1 VIEW: CPT | Mod: TC

## 2024-10-17 PROCEDURE — 74170 CT ABD WO CNTRST FLWD CNTRST: CPT | Mod: 26,,, | Performed by: RADIOLOGY

## 2024-10-17 PROCEDURE — 25500020 PHARM REV CODE 255: Performed by: UROLOGY

## 2024-10-17 PROCEDURE — 74170 CT ABD WO CNTRST FLWD CNTRST: CPT | Mod: TC

## 2024-10-17 RX ADMIN — IOHEXOL 100 ML: 350 INJECTION, SOLUTION INTRAVENOUS at 12:10

## 2024-10-24 ENCOUNTER — PATIENT MESSAGE (OUTPATIENT)
Dept: RHEUMATOLOGY | Facility: CLINIC | Age: 74
End: 2024-10-24
Payer: MEDICARE

## 2024-10-24 ENCOUNTER — PATIENT MESSAGE (OUTPATIENT)
Dept: INTERNAL MEDICINE | Facility: CLINIC | Age: 74
End: 2024-10-24
Payer: MEDICARE

## 2024-10-29 DIAGNOSIS — M50.30 DDD (DEGENERATIVE DISC DISEASE), CERVICAL: Primary | ICD-10-CM

## 2024-10-29 RX ORDER — CYCLOBENZAPRINE HCL 10 MG
TABLET ORAL
Qty: 60 TABLET | Refills: 1 | Status: SHIPPED | OUTPATIENT
Start: 2024-10-29

## 2024-10-30 ENCOUNTER — PATIENT MESSAGE (OUTPATIENT)
Dept: RHEUMATOLOGY | Facility: CLINIC | Age: 74
End: 2024-10-30
Payer: MEDICARE

## 2024-10-31 DIAGNOSIS — M05.9 SEROPOSITIVE RHEUMATOID ARTHRITIS: ICD-10-CM

## 2024-11-01 RX ORDER — PREDNISONE 10 MG/1
TABLET ORAL
Qty: 30 TABLET | Refills: 1 | Status: SHIPPED | OUTPATIENT
Start: 2024-11-01

## 2024-11-08 ENCOUNTER — OFFICE VISIT (OUTPATIENT)
Dept: URGENT CARE | Facility: CLINIC | Age: 74
End: 2024-11-08
Payer: MEDICARE

## 2024-11-08 VITALS
WEIGHT: 147.19 LBS | RESPIRATION RATE: 16 BRPM | BODY MASS INDEX: 24.52 KG/M2 | HEART RATE: 97 BPM | TEMPERATURE: 97 F | DIASTOLIC BLOOD PRESSURE: 70 MMHG | OXYGEN SATURATION: 97 % | SYSTOLIC BLOOD PRESSURE: 146 MMHG | HEIGHT: 65 IN

## 2024-11-08 DIAGNOSIS — J06.9 URI WITH COUGH AND CONGESTION: Primary | ICD-10-CM

## 2024-11-08 PROCEDURE — 99213 OFFICE O/P EST LOW 20 MIN: CPT | Mod: S$GLB,,, | Performed by: NURSE PRACTITIONER

## 2024-11-08 RX ORDER — PREDNISONE 20 MG/1
20 TABLET ORAL DAILY
Qty: 3 TABLET | Refills: 0 | Status: SHIPPED | OUTPATIENT
Start: 2024-11-08 | End: 2024-11-11

## 2024-11-08 RX ORDER — PROMETHAZINE HYDROCHLORIDE AND DEXTROMETHORPHAN HYDROBROMIDE 6.25; 15 MG/5ML; MG/5ML
5 SYRUP ORAL NIGHTLY PRN
Qty: 118 ML | Refills: 0 | Status: SHIPPED | OUTPATIENT
Start: 2024-11-08

## 2024-11-08 RX ORDER — AMOXICILLIN AND CLAVULANATE POTASSIUM 875; 125 MG/1; MG/1
1 TABLET, FILM COATED ORAL 2 TIMES DAILY
Qty: 10 TABLET | Refills: 0 | Status: SHIPPED | OUTPATIENT
Start: 2024-11-08 | End: 2024-11-13

## 2024-11-08 RX ORDER — SERTRALINE HYDROCHLORIDE 25 MG/1
25 TABLET, FILM COATED ORAL DAILY
COMMUNITY

## 2024-11-08 NOTE — PATIENT INSTRUCTIONS
- You were given a prescription for an antibiotic, but do not start taking it yet.  Wait 2-3 days to see if your symptoms improve without it. During these days please adequately treat your symptoms as discussed.   If your symptoms don't improve are get significantly worse then start the antibiotics.    PLEASE READ YOUR DISCHARGE INSTRUCTIONS ENTIRELY AS IT CONTAINS IMPORTANT INFORMATION.    Please drink plenty of fluids.    Please get plenty of rest.    Please return here or go to the Emergency Department for any concerns or worsening of condition.    Please take an over the counter antihistamine medication (Allegra/Claritin/Zyrtec/xyzal) of your choice as directed for allergy symptoms and/or runny nose and postnasal drip.    Try an over the counter decongestant for sinus pressure/ear pressure, congestion symptoms like Mucinex D or Sudafed or Phenylephrine. You buy this behind the pharmacy counter.    If you do have Hypertension or palpitations, it is safe to take Coricidin HBP for relief of sinus symptoms.    Certain OTC cough and cold medications may raise your blood pressure. To keep your blood pressure regulated avoid over-the-counter decongestants and multisymptom cold remedies that contain decongestants -- such as pseudoephedrine, ephedrine, phenylephrine, naphazoline and oxymetazoline. Also, check the label for high sodium content, which can also raise blood pressure.       Tylenol or ibuprofen can also be used as directed for pain and fever unless you have an allergy to them or medical condition such as stomach ulcers, kidney or liver disease or blood thinners etc for which you should not be taking these type of medications.     SORE THROAT:    You may gargle with hot salt water 4 times a day for the next 2 days and then you may also gargle diluted hydrogen peroxide once to twice daily to alleviate some of your throat discomfort.  Drink plenty of fluids, recommend warm tea with honey.     YOU MAY USE  "OVER-THE-COUNTER CEPACOL FOR SOOTHING OF YOUR THROAT.  You may wish to avoid spicy food, citrus fruits, and red sauces- as this may irritate the throat more.        Use over the counter Flonase or Nasocort: one spray each nostril twice daily OR two sprays each nostril once daily until nares dry out, unless you have Glaucoma.   Can also supplement with nasal saline rinse.    Sinus rinses DO NOT USE TAP WATER, if you must, water must be at a rolling boil for 1 minute, let it cool, then use.  May use distilled water, or over the counter nasal saline rinses.  Pierre's vapor rub in shower to help open nasal passages.  May use nasal gel to keep passages moisturized.  May use nasal saline sprays during the day for added relief of congestion.   For those who go to the gym, please do not use the sauna or steam room now to clear sinuses.    Cough     Rest and fluids are important  Can use honey with manuel to soothe your throat    Robitussin or Delsyum for cough suppressant for dry cough.    Mucinex DM or products containing Guaifenesin or Dextromethorphan for expectorant (wet cough).    Take prescription cough meds (pills) as prescribed; take prescription cough syrup at night as needed for cough.  Do not take both the prescribed cough pills and syrup at the same time or within 6 hours of each other.  Do not take the cough syrup with any other sedative medication as it can can cause drowsiness. Do not operate any heavy machinery, drink or drive while taking the cough syrup.   -  If you have hypertension avoid using the "D" which is the decongestant.  Instead you can use Coricidin HBP for cold and cough symptoms.      Please follow up with your primary care doctor or specialist in the next 48-72hrs as needed and if no improvement    If you smoke, please stop smoking.    Lastly, good hand washing and cough hygiene (cough into your elbow) will help prevent the spread of the illness. A general rule is that you are no longer " contagious once you have been without a fever for over 24 hours without requiring fever reducing medications.     Please arrange follow up with your primary medical clinic as soon as possible. You must understand that you've received an Urgent Care treatment only and that you may be released before all of your medical problems are known or treated. You, the patient, will arrange for follow up as instructed. If your symptoms worsen or fail to improve you should go to the Emergency Room.

## 2024-11-08 NOTE — PROGRESS NOTES
"Subjective:      Patient ID: Smitha Salinas is a 74 y.o. female.    Vitals:  height is 5' 5" (1.651 m) and weight is 66.7 kg (147 lb 2.5 oz). Her tympanic temperature is 97.4 °F (36.3 °C). Her blood pressure is 146/70 (abnormal) and her pulse is 97. Her respiration is 16 and oxygen saturation is 97%.     Chief Complaint: Cough    Smitha Salinas is a 74 year old female whom presents to urgent care for evaluation of a productive cough, chest congestion, sinus pressure and post nasal drip which has been present since 11/2/24. Patient has taken promethazine dm, robitussin, mucinex syrup and tablets with minimal relief.     Cough  This is a new problem. The current episode started in the past 7 days. The problem has been unchanged. The cough is Productive of sputum. Associated symptoms include postnasal drip. Associated symptoms comments: Chest congestion. Nothing aggravates the symptoms. She has tried OTC cough suppressant and prescription cough suppressant (mucinex) for the symptoms. The treatment provided no relief.       HENT:  Positive for postnasal drip.    Respiratory:  Positive for cough.       Objective:     Physical Exam   Constitutional: She is oriented to person, place, and time. She appears well-developed. She is cooperative.   HENT:   Head: Normocephalic and atraumatic.   Ears:   Right Ear: Hearing, tympanic membrane, external ear and ear canal normal.   Left Ear: Hearing, tympanic membrane, external ear and ear canal normal.   Nose: Congestion present. No mucosal edema or nasal deformity. No epistaxis. Right sinus exhibits no maxillary sinus tenderness and no frontal sinus tenderness. Left sinus exhibits no maxillary sinus tenderness and no frontal sinus tenderness.   Mouth/Throat: Uvula is midline, oropharynx is clear and moist and mucous membranes are normal. Mucous membranes are moist. No trismus in the jaw. Normal dentition. No uvula swelling. Oropharynx is clear.      Comments: +PND  Eyes: " Conjunctivae and lids are normal.   Neck: Trachea normal and phonation normal. Neck supple.   Cardiovascular: Normal rate, regular rhythm, normal heart sounds and normal pulses.   Pulmonary/Chest: Effort normal and breath sounds normal.   Abdominal: Normal appearance and bowel sounds are normal. Soft.   Musculoskeletal: Normal range of motion.         General: Normal range of motion.   Neurological: She is alert and oriented to person, place, and time. She exhibits normal muscle tone.   Skin: Skin is warm, dry and intact.   Psychiatric: Her speech is normal and behavior is normal. Judgment and thought content normal.   Nursing note and vitals reviewed.      Assessment:     1. URI with cough and congestion        Plan:       URI with cough and congestion  -     promethazine-dextromethorphan (PROMETHAZINE-DM) 6.25-15 mg/5 mL Syrp; Take 5 mLs by mouth nightly as needed (cough).  Dispense: 118 mL; Refill: 0  -     predniSONE (DELTASONE) 20 MG tablet; Take 1 tablet (20 mg total) by mouth once daily. for 3 days  Dispense: 3 tablet; Refill: 0  -     amoxicillin-clavulanate 875-125mg (AUGMENTIN) 875-125 mg per tablet; Take 1 tablet by mouth 2 (two) times daily. for 5 days  Dispense: 10 tablet; Refill: 0          Medical Decision Making:   Differential Diagnosis:   Bronchitis, COPD/Asthma exacerbation, Viral upper respiratory infection, Bacterial upper respiratory infection, Pneumonia, covid19, influenza,bacterial vs viral sinusitis.     Urgent Care Management:  Previous encounters and labs were independently reviewed. Discussed with patient  all pertinent information and results. - Patient was given a  prescription for a wait and see antibiotic. Patient was instructed to only fill and start taking antibiotics if no improvement in symptoms, despite symptomatic treatment, within the next 3-5 days.  Discussed patient diagnosis and plan of treatment. Additional plan of care as outlined above. Patient  was given all follow up  and return instructions. All questions and concerns were addressed at this time. Patient  expresses understanding of information and instructions, and is comfortable with plan.    Patient was instructed to follow up with his Primary Care Provider if no improvement in symptoms in 2-3 DAYS after starting wait and see antibiotic , if needed,  or go to ED immediately for any worsening or change in current symptoms. Treatment plan as well as options and alternatives reviewed and discussed with patient. All of the patients questions and concerns were addressed.The patient verbalized understanding and agrees with the discussed plan of care. Patient remained stable and was discharged in no acute distress.                   Patient Instructions   - You were given a prescription for an antibiotic, but do not start taking it yet.  Wait 2-3 days to see if your symptoms improve without it. During these days please adequately treat your symptoms as discussed.   If your symptoms don't improve are get significantly worse then start the antibiotics.    PLEASE READ YOUR DISCHARGE INSTRUCTIONS ENTIRELY AS IT CONTAINS IMPORTANT INFORMATION.    Please drink plenty of fluids.    Please get plenty of rest.    Please return here or go to the Emergency Department for any concerns or worsening of condition.    Please take an over the counter antihistamine medication (Allegra/Claritin/Zyrtec/xyzal) of your choice as directed for allergy symptoms and/or runny nose and postnasal drip.    Try an over the counter decongestant for sinus pressure/ear pressure, congestion symptoms like Mucinex D or Sudafed or Phenylephrine. You buy this behind the pharmacy counter.    If you do have Hypertension or palpitations, it is safe to take Coricidin HBP for relief of sinus symptoms.    Certain OTC cough and cold medications may raise your blood pressure. To keep your blood pressure regulated avoid over-the-counter decongestants and multisymptom cold remedies  that contain decongestants -- such as pseudoephedrine, ephedrine, phenylephrine, naphazoline and oxymetazoline. Also, check the label for high sodium content, which can also raise blood pressure.       Tylenol or ibuprofen can also be used as directed for pain and fever unless you have an allergy to them or medical condition such as stomach ulcers, kidney or liver disease or blood thinners etc for which you should not be taking these type of medications.     SORE THROAT:    You may gargle with hot salt water 4 times a day for the next 2 days and then you may also gargle diluted hydrogen peroxide once to twice daily to alleviate some of your throat discomfort.  Drink plenty of fluids, recommend warm tea with honey.     YOU MAY USE OVER-THE-COUNTER CEPACOL FOR SOOTHING OF YOUR THROAT.  You may wish to avoid spicy food, citrus fruits, and red sauces- as this may irritate the throat more.        Use over the counter Flonase or Nasocort: one spray each nostril twice daily OR two sprays each nostril once daily until nares dry out, unless you have Glaucoma.   Can also supplement with nasal saline rinse.    Sinus rinses DO NOT USE TAP WATER, if you must, water must be at a rolling boil for 1 minute, let it cool, then use.  May use distilled water, or over the counter nasal saline rinses.  Pierre's vapor rub in shower to help open nasal passages.  May use nasal gel to keep passages moisturized.  May use nasal saline sprays during the day for added relief of congestion.   For those who go to the gym, please do not use the sauna or steam room now to clear sinuses.    Cough     Rest and fluids are important  Can use honey with manuel to soothe your throat    Robitussin or Delsyum for cough suppressant for dry cough.    Mucinex DM or products containing Guaifenesin or Dextromethorphan for expectorant (wet cough).    Take prescription cough meds (pills) as prescribed; take prescription cough syrup at night as needed for cough.  Do  "not take both the prescribed cough pills and syrup at the same time or within 6 hours of each other.  Do not take the cough syrup with any other sedative medication as it can can cause drowsiness. Do not operate any heavy machinery, drink or drive while taking the cough syrup.   -  If you have hypertension avoid using the "D" which is the decongestant.  Instead you can use Coricidin HBP for cold and cough symptoms.      Please follow up with your primary care doctor or specialist in the next 48-72hrs as needed and if no improvement    If you smoke, please stop smoking.    Lastly, good hand washing and cough hygiene (cough into your elbow) will help prevent the spread of the illness. A general rule is that you are no longer contagious once you have been without a fever for over 24 hours without requiring fever reducing medications.     Please arrange follow up with your primary medical clinic as soon as possible. You must understand that you've received an Urgent Care treatment only and that you may be released before all of your medical problems are known or treated. You, the patient, will arrange for follow up as instructed. If your symptoms worsen or fail to improve you should go to the Emergency Room.      "

## 2024-11-12 ENCOUNTER — HOSPITAL ENCOUNTER (OUTPATIENT)
Dept: RADIOLOGY | Facility: CLINIC | Age: 74
Discharge: HOME | End: 2024-11-12
Attending: PHYSICIAN ASSISTANT
Payer: MEDICARE

## 2024-11-12 ENCOUNTER — OFFICE VISIT (OUTPATIENT)
Dept: URGENT CARE | Facility: CLINIC | Age: 74
End: 2024-11-12
Payer: MEDICARE

## 2024-11-12 VITALS
WEIGHT: 147 LBS | HEART RATE: 65 BPM | OXYGEN SATURATION: 98 % | DIASTOLIC BLOOD PRESSURE: 62 MMHG | HEIGHT: 65 IN | SYSTOLIC BLOOD PRESSURE: 118 MMHG | RESPIRATION RATE: 17 BRPM | TEMPERATURE: 97 F | BODY MASS INDEX: 24.49 KG/M2

## 2024-11-12 DIAGNOSIS — R05.9 COUGH, UNSPECIFIED TYPE: ICD-10-CM

## 2024-11-12 DIAGNOSIS — J40 BRONCHITIS: Primary | ICD-10-CM

## 2024-11-12 PROCEDURE — 99214 OFFICE O/P EST MOD 30 MIN: CPT | Mod: 25,S$GLB,, | Performed by: PHYSICIAN ASSISTANT

## 2024-11-12 PROCEDURE — 71046 X-RAY EXAM CHEST 2 VIEWS: CPT | Mod: S$GLB,,, | Performed by: RADIOLOGY

## 2024-11-12 PROCEDURE — 94640 AIRWAY INHALATION TREATMENT: CPT | Mod: S$GLB,,, | Performed by: EMERGENCY MEDICINE

## 2024-11-12 RX ORDER — ALBUTEROL SULFATE 90 UG/1
1-2 INHALANT RESPIRATORY (INHALATION) EVERY 4 HOURS PRN
Qty: 8 G | Refills: 0 | Status: SHIPPED | OUTPATIENT
Start: 2024-11-12 | End: 2024-12-12

## 2024-11-12 RX ORDER — IPRATROPIUM BROMIDE 0.5 MG/2.5ML
0.5 SOLUTION RESPIRATORY (INHALATION)
Status: COMPLETED | OUTPATIENT
Start: 2024-11-12 | End: 2024-11-12

## 2024-11-12 RX ORDER — DOXYCYCLINE 100 MG/1
100 CAPSULE ORAL EVERY 12 HOURS
Qty: 14 CAPSULE | Refills: 0 | Status: SHIPPED | OUTPATIENT
Start: 2024-11-12 | End: 2024-11-19

## 2024-11-12 RX ORDER — ALBUTEROL SULFATE 0.83 MG/ML
2.5 SOLUTION RESPIRATORY (INHALATION)
Status: COMPLETED | OUTPATIENT
Start: 2024-11-12 | End: 2024-11-12

## 2024-11-12 RX ORDER — BENZONATATE 200 MG/1
200 CAPSULE ORAL 3 TIMES DAILY PRN
Qty: 21 CAPSULE | Refills: 0 | Status: SHIPPED | OUTPATIENT
Start: 2024-11-12 | End: 2024-11-19

## 2024-11-12 RX ADMIN — IPRATROPIUM BROMIDE 0.5 MG: 0.5 SOLUTION RESPIRATORY (INHALATION) at 10:11

## 2024-11-12 RX ADMIN — ALBUTEROL SULFATE 2.5 MG: 0.83 SOLUTION RESPIRATORY (INHALATION) at 10:11

## 2024-11-12 NOTE — PROGRESS NOTES
"Subjective:      Patient ID: Smitha Salinas is a 74 y.o. female.    Vitals:  height is 5' 5" (1.651 m) and weight is 66.7 kg (147 lb). Her temperature is 97.2 °F (36.2 °C). Her blood pressure is 118/62 and her pulse is 65. Her respiration is 17 and oxygen saturation is 98%.     Chief Complaint: Sinus Problem    PT presents today with having a visit here on Friday, she states that she is still having a productive cough with feeling lethargic and having a headache. She states that she has taken the antibiotics that was given to her and the cough medicine isn't helping.  No fevers.  No wheezing.    Sinus Problem  This is a new problem. The current episode started 1 to 4 weeks ago. The problem is unchanged. There has been no fever. Associated symptoms include chills, congestion and coughing. Pertinent negatives include no diaphoresis, ear pain, headaches, hoarse voice, neck pain, shortness of breath, sinus pressure, sneezing, sore throat or swollen glands. Past treatments include antibiotics. The treatment provided no relief.       Constitution: Positive for chills. Negative for sweating.   HENT:  Positive for congestion. Negative for ear pain, sinus pressure and sore throat.    Neck: Negative for neck pain.   Respiratory:  Positive for cough and sputum production. Negative for shortness of breath.    Allergic/Immunologic: Negative for sneezing.   Neurological:  Negative for headaches.      Objective:     Physical Exam   Constitutional: She is oriented to person, place, and time. She appears well-developed.   HENT:   Head: Normocephalic and atraumatic.   Ears:   Right Ear: Tympanic membrane, external ear and ear canal normal.   Left Ear: Tympanic membrane, external ear and ear canal normal.   Nose: Nose normal.   Mouth/Throat: Oropharynx is clear and moist. Mucous membranes are moist.   Eyes: Conjunctivae and EOM are normal. Pupils are equal, round, and reactive to light.   Neck: Neck supple.   Cardiovascular: Normal " rate, regular rhythm, normal heart sounds and normal pulses.   Pulmonary/Chest: Effort normal and breath sounds normal.   Musculoskeletal: Normal range of motion.         General: Normal range of motion.   Neurological: She is alert and oriented to person, place, and time.   Skin: Skin is warm and dry.   Vitals reviewed.      Assessment:     1. Bronchitis    2. Cough, unspecified type        Plan:       Bronchitis  -     doxycycline (VIBRAMYCIN) 100 MG Cap; Take 1 capsule (100 mg total) by mouth every 12 (twelve) hours. for 7 days  Dispense: 14 capsule; Refill: 0  -     albuterol (PROVENTIL/VENTOLIN HFA) 90 mcg/actuation inhaler; Inhale 1-2 puffs into the lungs every 4 (four) hours as needed for Wheezing or Shortness of Breath (cough).  Dispense: 8 g; Refill: 0  -     benzonatate (TESSALON) 200 MG capsule; Take 1 capsule (200 mg total) by mouth 3 (three) times daily as needed for Cough.  Dispense: 21 capsule; Refill: 0    Cough, unspecified type  -     XR CHEST PA AND LATERAL; Future; Expected date: 11/12/2024  -     albuterol nebulizer solution 2.5 mg  -     ipratropium 0.02 % nebulizer solution 0.5 mg      XR CHEST PA AND LATERAL    Result Date: 11/12/2024  EXAM: XR CHEST PA AND LATERAL CLINICAL HISTORY:  Cough TECHNIQUE: 2 view chest PRIOR:  March 2023 FINDINGS:  Heart size remains normal.  Lungs are clear.  Thoracic spondylosis..      Stable exam without acute cardiopulmonary disease Finalized on: 11/12/2024 10:17 AM By:  Km Longoria MD [facility]# [id number]      2024-11-12 10:19:[id number]    [facility]        Advise increase p.o. fluids--at least 64 ounces of water/juice & rest  Meds:  Doxycycline sent to pharmacy.    Advise complete antibiotics.  Normal saline nasal wash to irrigate sinuses and for congestion/runny nose.  Cool mist humidifier/vaporizer.  Practice good handwashing.  Mucinex for cough and chest congestion.  Tylenol or Ibuprofen for fever, headache and body aches.  Warm salt water gargles for throat  comfort.  Chloraseptic spray or lozenges for throat comfort.  See PCP or go to ER if symptoms worsen or fail to improve with treatment.

## 2024-11-19 ENCOUNTER — TELEPHONE (OUTPATIENT)
Dept: INTERNAL MEDICINE | Facility: CLINIC | Age: 74
End: 2024-11-19
Payer: MEDICARE

## 2024-11-19 DIAGNOSIS — Z12.31 ENCOUNTER FOR SCREENING MAMMOGRAM FOR MALIGNANT NEOPLASM OF BREAST: Primary | ICD-10-CM

## 2024-11-19 NOTE — TELEPHONE ENCOUNTER
----- Message from Lisa sent at 11/19/2024  4:01 PM CST -----  Regarding: order  Contact: Smitha  .Type:  Mammogram    Caller is requesting to schedule their annual mammogram appointment.  Order is not listed in EPIC.  Please enter order and contact patient to schedule.  Name of Caller: Smitha   Where would they like the mammogram performed? [facility]   Would the patient rather a call back or a response via [facility]? call  Best Call Back Number: [phone] (home)    Additional Information:

## 2024-12-11 ENCOUNTER — HOSPITAL ENCOUNTER (OUTPATIENT)
Dept: CARDIOLOGY | Facility: HOSPITAL | Age: 74
Discharge: HOME | End: 2024-12-11
Attending: INTERNAL MEDICINE
Payer: MEDICARE

## 2024-12-11 ENCOUNTER — OFFICE VISIT (OUTPATIENT)
Dept: CARDIOLOGY | Facility: CLINIC | Age: 74
End: 2024-12-11
Payer: MEDICARE

## 2024-12-11 ENCOUNTER — IMMUNIZATION (OUTPATIENT)
Dept: INTERNAL MEDICINE | Facility: CLINIC | Age: 74
End: 2024-12-11
Payer: MEDICARE

## 2024-12-11 VITALS
WEIGHT: 149.69 LBS | BODY MASS INDEX: 24.91 KG/M2 | OXYGEN SATURATION: 97 % | SYSTOLIC BLOOD PRESSURE: 120 MMHG | HEART RATE: 63 BPM | DIASTOLIC BLOOD PRESSURE: 60 MMHG

## 2024-12-11 DIAGNOSIS — R93.1 ELEVATED CORONARY ARTERY CALCIUM SCORE: Primary | ICD-10-CM

## 2024-12-11 DIAGNOSIS — M05.9 RHEUMATOID ARTHRITIS WITH POSITIVE RHEUMATOID FACTOR, INVOLVING UNSPECIFIED SITE: ICD-10-CM

## 2024-12-11 DIAGNOSIS — G47.33 OSA ON CPAP: ICD-10-CM

## 2024-12-11 DIAGNOSIS — E11.69 TYPE 2 DIABETES MELLITUS WITH HYPERCHOLESTEROLEMIA: ICD-10-CM

## 2024-12-11 DIAGNOSIS — Z23 NEEDS FLU SHOT: Primary | ICD-10-CM

## 2024-12-11 DIAGNOSIS — I15.2 HYPERTENSION ASSOCIATED WITH DIABETES: ICD-10-CM

## 2024-12-11 DIAGNOSIS — E11.8 DIABETES MELLITUS TYPE 2 WITH COMPLICATIONS: ICD-10-CM

## 2024-12-11 DIAGNOSIS — I27.20 PULMONARY HYPERTENSION: ICD-10-CM

## 2024-12-11 DIAGNOSIS — E11.59 HYPERTENSION ASSOCIATED WITH DIABETES: ICD-10-CM

## 2024-12-11 DIAGNOSIS — E78.00 TYPE 2 DIABETES MELLITUS WITH HYPERCHOLESTEROLEMIA: ICD-10-CM

## 2024-12-11 DIAGNOSIS — R94.31 ABNORMAL EKG: Primary | ICD-10-CM

## 2024-12-11 DIAGNOSIS — R94.31 ABNORMAL EKG: ICD-10-CM

## 2024-12-11 DIAGNOSIS — I70.0 AORTIC ATHEROSCLEROSIS: ICD-10-CM

## 2024-12-11 LAB
OHS QRS DURATION: 94 MS
OHS QTC CALCULATION: 423 MS

## 2024-12-11 PROCEDURE — 99213 OFFICE O/P EST LOW 20 MIN: CPT | Mod: S$GLB,,, | Performed by: INTERNAL MEDICINE

## 2024-12-11 PROCEDURE — 93005 ELECTROCARDIOGRAM TRACING: CPT

## 2024-12-11 PROCEDURE — 1159F MED LIST DOCD IN RCRD: CPT | Mod: CPTII,S$GLB,, | Performed by: INTERNAL MEDICINE

## 2024-12-11 PROCEDURE — 99999 PR PBB SHADOW E&M-EST. PATIENT-LVL IV: CPT | Mod: PBBFAC,,, | Performed by: INTERNAL MEDICINE

## 2024-12-11 PROCEDURE — 3078F DIAST BP <80 MM HG: CPT | Mod: CPTII,S$GLB,, | Performed by: INTERNAL MEDICINE

## 2024-12-11 PROCEDURE — 3008F BODY MASS INDEX DOCD: CPT | Mod: CPTII,S$GLB,, | Performed by: INTERNAL MEDICINE

## 2024-12-11 PROCEDURE — 90653 IIV ADJUVANT VACCINE IM: CPT | Mod: S$GLB,,, | Performed by: FAMILY MEDICINE

## 2024-12-11 PROCEDURE — 3074F SYST BP LT 130 MM HG: CPT | Mod: CPTII,S$GLB,, | Performed by: INTERNAL MEDICINE

## 2024-12-11 PROCEDURE — 1126F AMNT PAIN NOTED NONE PRSNT: CPT | Mod: CPTII,S$GLB,, | Performed by: INTERNAL MEDICINE

## 2024-12-11 PROCEDURE — 1100F PTFALLS ASSESS-DOCD GE2>/YR: CPT | Mod: CPTII,S$GLB,, | Performed by: INTERNAL MEDICINE

## 2024-12-11 PROCEDURE — 3288F FALL RISK ASSESSMENT DOCD: CPT | Mod: CPTII,S$GLB,, | Performed by: INTERNAL MEDICINE

## 2024-12-11 PROCEDURE — 3060F POS MICROALBUMINURIA REV: CPT | Mod: CPTII,S$GLB,, | Performed by: INTERNAL MEDICINE

## 2024-12-11 PROCEDURE — 93010 ELECTROCARDIOGRAM REPORT: CPT | Mod: ,,, | Performed by: INTERNAL MEDICINE

## 2024-12-11 PROCEDURE — 4010F ACE/ARB THERAPY RXD/TAKEN: CPT | Mod: CPTII,S$GLB,, | Performed by: INTERNAL MEDICINE

## 2024-12-11 PROCEDURE — G0008 ADMIN INFLUENZA VIRUS VAC: HCPCS | Mod: S$GLB,,, | Performed by: FAMILY MEDICINE

## 2024-12-11 PROCEDURE — 3066F NEPHROPATHY DOC TX: CPT | Mod: CPTII,S$GLB,, | Performed by: INTERNAL MEDICINE

## 2024-12-11 PROCEDURE — 3044F HG A1C LEVEL LT 7.0%: CPT | Mod: CPTII,S$GLB,, | Performed by: INTERNAL MEDICINE
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
## 2024-12-12 ENCOUNTER — HOSPITAL ENCOUNTER (OUTPATIENT)
Dept: RADIOLOGY | Facility: HOSPITAL | Age: 74
Discharge: HOME | End: 2024-12-12
Attending: FAMILY MEDICINE
Payer: MEDICARE

## 2024-12-12 VITALS — WEIGHT: 149.69 LBS | BODY MASS INDEX: 24.94 KG/M2 | HEIGHT: 65 IN

## 2024-12-12 DIAGNOSIS — Z12.31 ENCOUNTER FOR SCREENING MAMMOGRAM FOR MALIGNANT NEOPLASM OF BREAST: ICD-10-CM

## 2024-12-12 PROCEDURE — 77063 BREAST TOMOSYNTHESIS BI: CPT | Mod: TC

## 2024-12-12 PROCEDURE — 77063 BREAST TOMOSYNTHESIS BI: CPT | Mod: 26,,, | Performed by: RADIOLOGY

## 2024-12-12 PROCEDURE — 77067 SCR MAMMO BI INCL CAD: CPT | Mod: 26,,, | Performed by: RADIOLOGY

## 2024-12-20 ENCOUNTER — TELEPHONE (OUTPATIENT)
Dept: RHEUMATOLOGY | Facility: CLINIC | Age: 74
End: 2024-12-20
Payer: MEDICARE

## 2024-12-20 NOTE — TELEPHONE ENCOUNTER
----- Message from Summer sent at 12/20/2024  2:05 PM CST -----  Contact: Smitha Nicole called asking for advice about to refax page 2 of her patient assistance form [phone] [employer]. Please call patient at [phone]. Thanks!

## 2024-12-20 NOTE — TELEPHONE ENCOUNTER
----- Message from Med Assistant Mcelroy sent at 12/20/2024  2:37 PM CST -----  Regarding: Please advise  Contact: Smitha Aguayo stated that [facility] didn't receive page 2 of the patient assistance form. Do you have these papers?  ----- Message -----  From: Nu Alston  Sent: 12/20/2024   2:08 PM CST  To: Tomás Berger Staff    Patient called asking for advice about to refax page 2 of her patient assistance form [phone] [facility]. Please call patient at [phone]. Thanks!

## 2024-12-23 ENCOUNTER — HOSPITAL ENCOUNTER (OUTPATIENT)
Dept: CARDIOLOGY | Facility: HOSPITAL | Age: 74
Discharge: HOME | End: 2024-12-23
Attending: INTERNAL MEDICINE
Payer: MEDICARE

## 2024-12-23 VITALS
HEIGHT: 65 IN | HEART RATE: 51 BPM | BODY MASS INDEX: 24.83 KG/M2 | SYSTOLIC BLOOD PRESSURE: 120 MMHG | WEIGHT: 149 LBS | DIASTOLIC BLOOD PRESSURE: 60 MMHG

## 2024-12-23 DIAGNOSIS — I27.20 PULMONARY HYPERTENSION: ICD-10-CM

## 2024-12-23 DIAGNOSIS — I15.2 HYPERTENSION ASSOCIATED WITH DIABETES: ICD-10-CM

## 2024-12-23 DIAGNOSIS — E11.59 HYPERTENSION ASSOCIATED WITH DIABETES: ICD-10-CM

## 2024-12-23 DIAGNOSIS — I70.0 AORTIC ATHEROSCLEROSIS: ICD-10-CM

## 2024-12-23 DIAGNOSIS — R93.1 ELEVATED CORONARY ARTERY CALCIUM SCORE: ICD-10-CM

## 2024-12-23 DIAGNOSIS — R94.31 ABNORMAL EKG: ICD-10-CM

## 2024-12-23 LAB
AORTIC ROOT ANNULUS: 3.19 CM
ASCENDING AORTA: 2.4 CM
AV INDEX (PROSTH): 0.91
AV MEAN GRADIENT: 8.2 MMHG
AV PEAK GRADIENT: 17.6 MMHG
AV VALVE AREA BY VELOCITY RATIO: 2.4 CM²
AV VALVE AREA: 2.9 CM²
AV VELOCITY RATIO: 0.76
BSA FOR ECHO PROCEDURE: 1.76 M2
CV ECHO LV RWT: 0.58 CM
DOP CALC AO PEAK VEL: 2.1 M/S
DOP CALC AO VTI: 41.9 CM
DOP CALC LVOT AREA: 3.1 CM2
DOP CALC LVOT DIAMETER: 2 CM
DOP CALC LVOT PEAK VEL: 1.6 M/S
DOP CALC LVOT STROKE VOLUME: 119.6 CM3
DOP CALC RVOT PEAK VEL: 0.79 M/S
DOP CALC RVOT VTI: 19.2 CM
DOP CALCLVOT PEAK VEL VTI: 38.1 CM
E WAVE DECELERATION TIME: 197.52 MSEC
E/A RATIO: 0.83
E/E' RATIO: 10.53 M/S
ECHO LV POSTERIOR WALL: 1.4 CM (ref 0.6–1.1)
FRACTIONAL SHORTENING: 41.7 % (ref 28–44)
INTERVENTRICULAR SEPTUM: 1.5 CM (ref 0.6–1.1)
IVC DIAMETER: 1.77 CM
IVRT: 78.02 MSEC
LA MAJOR: 5.24 CM
LA MINOR: 5.18 CM
LA WIDTH: 4.1 CM
LEFT ATRIUM AREA SYSTOLIC (APICAL 2 CHAMBER): 18.97 CM2
LEFT ATRIUM AREA SYSTOLIC (APICAL 4 CHAMBER): 21.9 CM2
LEFT ATRIUM SIZE: 4.3 CM
LEFT ATRIUM VOLUME INDEX MOD: 34.3 ML/M2
LEFT ATRIUM VOLUME INDEX: 44.6 ML/M2
LEFT ATRIUM VOLUME MOD: 59.95 ML
LEFT ATRIUM VOLUME: 78.07 CM3
LEFT INTERNAL DIMENSION IN SYSTOLE: 2.8 CM (ref 2.1–4)
LEFT VENTRICLE DIASTOLIC VOLUME INDEX: 62.19 ML/M2
LEFT VENTRICLE DIASTOLIC VOLUME: 108.84 ML
LEFT VENTRICLE END SYSTOLIC VOLUME APICAL 2 CHAMBER: 53.63 ML
LEFT VENTRICLE END SYSTOLIC VOLUME APICAL 4 CHAMBER: 60.17 ML
LEFT VENTRICLE MASS INDEX: 164.8 G/M2
LEFT VENTRICLE SYSTOLIC VOLUME INDEX: 17 ML/M2
LEFT VENTRICLE SYSTOLIC VOLUME: 29.78 ML
LEFT VENTRICULAR INTERNAL DIMENSION IN DIASTOLE: 4.8 CM (ref 3.5–6)
LEFT VENTRICULAR MASS: 288.4 G
LV LATERAL E/E' RATIO: 8.78 M/S
LV SEPTAL E/E' RATIO: 13.17 M/S
LVED V (TEICH): 108.84 ML
LVES V (TEICH): 29.78 ML
LVOT MG: 5.93 MMHG
LVOT MV: 1.13 CM/S
MV PEAK A VEL: 0.95 M/S
MV PEAK E VEL: 0.79 M/S
MV STENOSIS PRESSURE HALF TIME: 57.28 MS
MV VALVE AREA P 1/2 METHOD: 3.84 CM2
OHS CV RV/LV RATIO: 0.65 CM
PISA TR MAX VEL: 2.24 M/S
PULM VEIN S/D RATIO: 1.06
PV MEAN GRADIENT: 2 MMHG
PV PEAK D VEL: 0.54 M/S
PV PEAK GRADIENT: 4 MMHG
PV PEAK S VEL: 0.57 M/S
PV PEAK VELOCITY: 1.04 M/S
RA MAJOR: 3.54 CM
RA PRESSURE ESTIMATED: 3 MMHG
RA WIDTH: 2.74 CM
RIGHT VENTRICLE DIASTOLIC BASEL DIMENSION: 3.1 CM
RIGHT VENTRICULAR END-DIASTOLIC DIMENSION: 3.11 CM
RV TB RVSP: 5 MMHG
SINUS: 2.84 CM
STJ: 2.65 CM
TDI LATERAL: 0.09 M/S
TDI SEPTAL: 0.06 M/S
TDI: 0.08 M/S
TR MAX PG: 20 MMHG
TRICUSPID ANNULAR PLANE SYSTOLIC EXCURSION: 2.01 CM
TV REST PULMONARY ARTERY PRESSURE: 23 MMHG
Z-SCORE OF LEFT VENTRICULAR DIMENSION IN END DIASTOLE: -0.09
Z-SCORE OF LEFT VENTRICULAR DIMENSION IN END SYSTOLE: -0.53

## 2024-12-23 PROCEDURE — 93306 TTE W/DOPPLER COMPLETE: CPT

## 2024-12-23 PROCEDURE — 93306 TTE W/DOPPLER COMPLETE: CPT | Mod: 26,,, | Performed by: INTERNAL MEDICINE

## 2024-12-26 ENCOUNTER — TELEPHONE (OUTPATIENT)
Dept: CARDIOLOGY | Facility: CLINIC | Age: 74
End: 2024-12-26
Payer: MEDICARE

## 2024-12-26 ENCOUNTER — PATIENT MESSAGE (OUTPATIENT)
Dept: INTERNAL MEDICINE | Facility: CLINIC | Age: 74
End: 2024-12-26
Payer: MEDICARE

## 2024-12-26 NOTE — TELEPHONE ENCOUNTER
LVM to call clinic to review results and recommendations-Heart function normal.  Pressures in lungs normal.  Age realted changes in heart valves will monitor no change in therapy  Has mild fluid around the heart will monitor   Will repeat ultrasound in 6 months       [employer] message left     ----- Message from Mirian Peterson MD sent at 12/25/2024  7:35 PM CST -----  Heart function normal.  Pressures in lungs normal.  Age realted changes in heart valves will monitor no change in therapy  Has mild fluid around the heart will monitor   Will repeat ultrasound in 6 months

## 2025-01-07 ENCOUNTER — PATIENT MESSAGE (OUTPATIENT)
Dept: UROLOGY | Facility: CLINIC | Age: 75
End: 2025-01-07
Payer: MEDICARE

## 2025-02-12 DIAGNOSIS — E11.9 TYPE 2 DIABETES MELLITUS WITHOUT COMPLICATION: ICD-10-CM

## 2025-03-03 ENCOUNTER — PATIENT MESSAGE (OUTPATIENT)
Dept: INTERNAL MEDICINE | Facility: CLINIC | Age: 75
End: 2025-03-03
Payer: MEDICARE

## 2025-03-04 RX ORDER — SEMAGLUTIDE 0.68 MG/ML
0.5 INJECTION, SOLUTION SUBCUTANEOUS
Qty: 4 EACH | Refills: 5 | Status: SHIPPED | OUTPATIENT
Start: 2025-03-04 | End: 2026-03-04

## 2025-03-11 ENCOUNTER — PATIENT MESSAGE (OUTPATIENT)
Dept: UROLOGY | Facility: CLINIC | Age: 75
End: 2025-03-11
Payer: MEDICARE

## 2025-03-13 ENCOUNTER — PATIENT MESSAGE (OUTPATIENT)
Dept: UROLOGY | Facility: CLINIC | Age: 75
End: 2025-03-13
Payer: MEDICARE

## 2025-03-18 ENCOUNTER — PATIENT OUTREACH (OUTPATIENT)
Dept: ADMINISTRATIVE | Facility: HOSPITAL | Age: 75
End: 2025-03-18
Payer: MEDICARE

## 2025-03-18 NOTE — PROGRESS NOTES
Lab visit report: Patient has upcoming lab appointment on 3/20/25, Hemoglobin A1c order linked.

## 2025-03-19 ENCOUNTER — PATIENT MESSAGE (OUTPATIENT)
Dept: RHEUMATOLOGY | Facility: CLINIC | Age: 75
End: 2025-03-19
Payer: MEDICARE

## 2025-03-20 ENCOUNTER — HOSPITAL ENCOUNTER (OUTPATIENT)
Dept: RADIOLOGY | Facility: HOSPITAL | Age: 75
Discharge: HOME | End: 2025-03-20
Attending: UROLOGY
Payer: MEDICARE

## 2025-03-20 DIAGNOSIS — N28.89 RENAL MASS: ICD-10-CM

## 2025-03-20 PROCEDURE — 71045 X-RAY EXAM CHEST 1 VIEW: CPT | Mod: TC

## 2025-03-20 PROCEDURE — 71045 X-RAY EXAM CHEST 1 VIEW: CPT | Mod: 26,,, | Performed by: STUDENT IN AN ORGANIZED HEALTH CARE EDUCATION/TRAINING PROGRAM

## 2025-03-24 ENCOUNTER — TELEPHONE (OUTPATIENT)
Dept: UROLOGY | Facility: CLINIC | Age: 75
End: 2025-03-24
Payer: MEDICARE

## 2025-03-24 NOTE — TELEPHONE ENCOUNTER
Called patient and she stated that she wanted to reschedule her appointment. I let her know that  does not have anything available for me to schedule until August. She stated that she already knew that. I let her know we could get her in sooner with our NP. Patient stated that she could schedule with her or wait to see . Patient stated that I was not helping her the way that she wants. I got patient scheduled after the fact that she stated she did not want to see Krysten or go to the [facility] location. Patient agreed to appointment I let her know time, date and location. She expressed understanding.        ----- Message from Carolin sent at 3/24/2025 11:29 AM CDT -----  Contact: Patient, [phone]  [email or web address] Patient is calling for Medical Advice regarding: Calling to reschedule her appointment, there was nothing until August.How long has patient had these symptoms: N/APharmacy name and phone#: N/APatient wants a call back or thru [facility]: Call backComments: Please call her. Thanks.Please advise patient replies from provider may take up to 48 hours.

## 2025-03-28 ENCOUNTER — PATIENT MESSAGE (OUTPATIENT)
Dept: RHEUMATOLOGY | Facility: CLINIC | Age: 75
End: 2025-03-28
Payer: MEDICARE

## 2025-03-28 DIAGNOSIS — G89.29 CHRONIC KNEE PAIN, UNSPECIFIED LATERALITY: Primary | ICD-10-CM

## 2025-03-28 DIAGNOSIS — M25.569 CHRONIC KNEE PAIN, UNSPECIFIED LATERALITY: Primary | ICD-10-CM

## 2025-03-28 RX ORDER — CELECOXIB 100 MG/1
100 CAPSULE ORAL 2 TIMES DAILY PRN
Qty: 28 CAPSULE | Refills: 0 | Status: SHIPPED | OUTPATIENT
Start: 2025-03-28 | End: 2025-04-11

## 2025-04-01 ENCOUNTER — RESULTS FOLLOW-UP (OUTPATIENT)
Dept: INTERNAL MEDICINE | Facility: CLINIC | Age: 75
End: 2025-04-01

## 2025-04-01 ENCOUNTER — OFFICE VISIT (OUTPATIENT)
Dept: UROLOGY | Facility: CLINIC | Age: 75
End: 2025-04-01
Payer: MEDICARE

## 2025-04-01 VITALS
WEIGHT: 150 LBS | HEIGHT: 65 IN | BODY MASS INDEX: 24.99 KG/M2 | HEART RATE: 62 BPM | DIASTOLIC BLOOD PRESSURE: 74 MMHG | SYSTOLIC BLOOD PRESSURE: 129 MMHG

## 2025-04-01 DIAGNOSIS — N28.89 RENAL MASS: ICD-10-CM

## 2025-04-01 DIAGNOSIS — N39.41 URGE INCONTINENCE: Primary | ICD-10-CM

## 2025-04-01 PROBLEM — J31.0 PERENNIAL NON-ALLERGIC RHINITIS: Status: RESOLVED | Noted: 2022-07-28 | Resolved: 2025-04-01

## 2025-04-01 PROBLEM — T78.40XA ALLERGIES: Status: RESOLVED | Noted: 2022-07-28 | Resolved: 2025-04-01

## 2025-04-01 PROCEDURE — 3044F HG A1C LEVEL LT 7.0%: CPT | Mod: CPTII,S$GLB,, | Performed by: NURSE PRACTITIONER

## 2025-04-01 PROCEDURE — 99214 OFFICE O/P EST MOD 30 MIN: CPT | Mod: S$GLB,,, | Performed by: NURSE PRACTITIONER

## 2025-04-01 PROCEDURE — 3074F SYST BP LT 130 MM HG: CPT | Mod: CPTII,S$GLB,, | Performed by: NURSE PRACTITIONER

## 2025-04-01 PROCEDURE — 1160F RVW MEDS BY RX/DR IN RCRD: CPT | Mod: CPTII,S$GLB,, | Performed by: NURSE PRACTITIONER

## 2025-04-01 PROCEDURE — 4010F ACE/ARB THERAPY RXD/TAKEN: CPT | Mod: CPTII,S$GLB,, | Performed by: NURSE PRACTITIONER

## 2025-04-01 PROCEDURE — 3078F DIAST BP <80 MM HG: CPT | Mod: CPTII,S$GLB,, | Performed by: NURSE PRACTITIONER

## 2025-04-01 PROCEDURE — 1125F AMNT PAIN NOTED PAIN PRSNT: CPT | Mod: CPTII,S$GLB,, | Performed by: NURSE PRACTITIONER

## 2025-04-01 PROCEDURE — 1101F PT FALLS ASSESS-DOCD LE1/YR: CPT | Mod: CPTII,S$GLB,, | Performed by: NURSE PRACTITIONER

## 2025-04-01 PROCEDURE — 3008F BODY MASS INDEX DOCD: CPT | Mod: CPTII,S$GLB,, | Performed by: NURSE PRACTITIONER

## 2025-04-01 PROCEDURE — 1159F MED LIST DOCD IN RCRD: CPT | Mod: CPTII,S$GLB,, | Performed by: NURSE PRACTITIONER

## 2025-04-01 PROCEDURE — 3288F FALL RISK ASSESSMENT DOCD: CPT | Mod: CPTII,S$GLB,, | Performed by: NURSE PRACTITIONER

## 2025-04-01 PROCEDURE — 99999 PR PBB SHADOW E&M-EST. PATIENT-LVL IV: CPT | Mod: PBBFAC,,, | Performed by: NURSE PRACTITIONER

## 2025-04-01 NOTE — PROGRESS NOTES
"Chief Complaint:   Renal mass  Urge incontinence     HPI:   Patient is a 74-year-old female that has been followed by Dr. Rawls for renal mass ( left cryoablation renal lesion  7/8/24) and mixed incontinence.  Recently had a normal chest x-ray and CT scan.  CT abdomen pelvis with IV contrast indicated No gross locally recurrent or metastatic disease is identified in the abdomen or pelvis.  Patient states that Gemtesa has not resolved her mixed incontinence.  Patient was changed from Myrbetriq to Gemtesa secondary to "Myrbetriq stopped working".  Patient is status post InterStim and Botox.  Reports that she is wearing a pad that she changes several times a day and nocturia is up to 5-6 times.  Is concerned that InterStim is not working properly, requesting consultation with representative.  Patient wants to consider Botox if InterStim resolution is not found.  [facility]  9/19/24- patient is here today following cryotherapy, no change in incontinence.  Patient would like to try another medical trial for the incontinence, not pursue another dose of Botox as of yet.     Allergies:  Valdecoxib    Medications:  has a current medication list which includes the following prescription(s): acyclovir, albuterol, amlodipine, azelastine, blood sugar diagnostic, celecoxib, cholecalciferol (vitamin d3), clonidine, cyclobenzaprine, enbrel, lancing device, levsin/sl, losartan, prednisone, promethazine, promethazine-dextromethorphan, rosuvastatin, ozempic, sertraline, spirometers and accessories, tramadol, and gemtesa, and the following Facility-Administered Medications: lactated ringers.    Review of Systems:  General: No fever, chills, fatigability, or weight loss.  Skin: No rashes, itching, or changes in color or texture of skin.  Chest: Denies ADLER, cyanosis, wheezing, cough, and sputum production.  Abdomen: Appetite fine. No weight loss. Denies diarrhea, abdominal pain, hematemesis, or blood in stool.  Musculoskeletal: No " joint stiffness or swelling. Denies back pain.  : As above.  All other review of systems negative.    PMH:   has a past medical history of ADHD (attention deficit hyperactivity disorder), Adult ADHD, Chronic rheumatic arthritis, Coronary artery disease involving native coronary artery of native heart (04/17/2023), Elevated LFTs, Ex-smoker, Genital herpes, Hyperlipidemia, Hypertension, Immunosuppressed status, Lichen sclerosus et atrophicus, Morbid obesity (01/11/2021), Obesity, Class I, BMI 30-34.9 (12/22/2015), SUKHDEEP (obstructive sleep apnea), Osteopenia, and Type 2 diabetes mellitus.    PSH:   has a past surgical history that includes Tonsillectomy (1958); Skin graft (1965); Cryotherapy (1980); Breast biopsy (Left); Colonoscopy (N/A, 02/12/2020); Esophagogastroduodenoscopy (N/A, 02/12/2020); Insertion of sacral neurostimulator, electrodes, and implantable pulse generator (IPG) (Left, 11/23/2020); Insertion of sacral neurostimulator generator (Left, 11/23/2020); cystoscopy,with botulinum toxin injection (N/A, 03/27/2023); cystoscopy,with botulinum toxin injection (N/A, 11/13/2023); Cataract extraction (Bilateral); and Computed tomography (N/A, 7/8/2024).    FamHx: family history includes Breast cancer (age of onset: 39) in her sister; Cancer (age of onset: 68) in an other family member; Coronary artery disease in her maternal grandfather; Diabetes in her mother; Heart disease in her mother; Hypertension in her father; Kidney failure in her father; No Known Problems in her daughter; Peripheral vascular disease in her mother; Stroke in her father, maternal grandmother, paternal grandfather, and paternal grandmother.    SocHx:  reports that she quit smoking about 11 years ago. Her smoking use included cigarettes. She started smoking about 26 years ago. She has a 3.8 pack-year smoking history. She has quit using smokeless tobacco. She reports current alcohol use. She reports that she does not currently use drugs.       Physical Exam:  General: A&Ox3, no apparent distress, no deformities  Neck: No masses, normal thyroid  Lungs: normal inspiration, no use of accessory muscles  Heart: normal pulse, no arrhythmias  Abdomen: Soft, NT, ND, no masses, no hernias, no hepatosplenomegaly  Lymphatic: Neck and groin nodes negative  Skin: The skin is warm and dry. No jaundice.  Ext: No c/c/e.    Labs/Studies:   03/20/2025  XR CHEST 1 VIEW     CLINICAL HISTORY:  Other specified disorders of kidney and ureter     TECHNIQUE:  XR CHEST 1 VIEW     COMPARISON:  11/02/2024; 10/17/2024     FINDINGS:  Cardiac silhouette is unchanged.  Aortic tortuosity.     The lungs are clear and free of infiltrate.  No pleural effusion or pneumothorax.     No evidence of acute osseous abnormality.     Impression:  As above.    03/10/2025  CT Abdomen Pelvis With IV Contrast  Impression    Prior cryoablation of a left renal lesion. No gross locally recurrent or metastatic disease is identified in the abdomen or pelvis.  Narrative    CT ABDOMEN PELVIS W IV CONTRAST    HISTORY:  abdominal pain . Cryoablation of left renal lesion [facility] dated July 8, 2024.    COMPARISON:  Abdomen pelvis CTA dated March 29, 2024. Report of outside abdomen CT from [facility] dated October 17, 2024 was reviewed.    Axial images were obtained from the lung bases to the ischial tuberosities following administration of intravenous contrast. Oral contrast was not given.  Automated exposure technique was utilized for dose reduction. Coronal reconstructions were provided.    FINDINGS:    Visualized portions of the inferior thorax demonstrate a small fat-containing right posterior diaphragmatic hernia.    Abdomen: The liver, gallbladder, pancreas, spleen, and adrenal glands are normal . There are cryoablation changes medially at the left upper renal pole with no gross local recurrence evident. There are few tiny bilateral renal cysts. Small nonobstructing right upper  pole renal calculus. No abdominal adenopathy.    Pelvis: Unopacified bowel loops demonstrate no CT evidence of high-grade bowel obstruction. Mild colonic diverticulosis with no acute diverticulitis identified. Normal appendix. The bladder is grossly unremarkable. Unremarkable CT appearance of the atrophic uterus and adnexa. No pelvic or inguinal adenopathy. Degenerative changes in the lower lumbosacral spine.  Impression/Plan:    1.Renal mass-  left cryoablation renal lesion  7/8/24   Stable CT and chest x-ray. RTC in 6 months with MD    2. Mixed incontinence  Patient to remain on Gemtesa and the staff will reach out to InterStim representative as a follow-up, see HPI.  Based on InterStim resolution, patient to contact clinic to schedule Botox.

## 2025-04-10 ENCOUNTER — TELEPHONE (OUTPATIENT)
Dept: ALLERGY | Facility: CLINIC | Age: 75
End: 2025-04-10
Payer: MEDICARE

## 2025-04-10 NOTE — TELEPHONE ENCOUNTER
----- Message from Adriana sent at 4/10/2025 12:05 PM CDT -----  .Type: Patient Call Back  Who called: Patient What is the request in detail:  Called in concerning appt on4/17 . Patient needs to reschedule. Can the clinic reply by [facility]?     Would the patient rather a call back or a response via [facility]?  Best call back number:.[phone]

## 2025-04-25 ENCOUNTER — TELEPHONE (OUTPATIENT)
Dept: UROLOGY | Facility: CLINIC | Age: 75
End: 2025-04-25
Payer: MEDICARE

## 2025-04-25 NOTE — TELEPHONE ENCOUNTER
Spoke with patient who was able to provide acceptable patient identifiers prior to start of conversation. Patient rescheduled appointment on today with Dr. Rawls due to having pneumonia and septic. Patient rescheduled. No further assistance needed.

## 2025-04-28 ENCOUNTER — LAB VISIT (OUTPATIENT)
Dept: LAB | Facility: HOSPITAL | Age: 75
End: 2025-04-28
Attending: INTERNAL MEDICINE
Payer: MEDICARE

## 2025-04-28 DIAGNOSIS — D84.821 IMMUNOSUPPRESSION DUE TO DRUG THERAPY: ICD-10-CM

## 2025-04-28 DIAGNOSIS — Z79.899 IMMUNOSUPPRESSION DUE TO DRUG THERAPY: ICD-10-CM

## 2025-04-28 DIAGNOSIS — M05.9 RHEUMATOID ARTHRITIS WITH POSITIVE RHEUMATOID FACTOR, INVOLVING UNSPECIFIED SITE: ICD-10-CM

## 2025-04-28 DIAGNOSIS — Z79.899 HIGH RISK MEDICATION USE: ICD-10-CM

## 2025-04-28 LAB
ABSOLUTE EOSINOPHIL (OHS): 0.19 K/UL
ABSOLUTE MONOCYTE (OHS): 0.99 K/UL (ref 0.3–1)
ABSOLUTE NEUTROPHIL COUNT (OHS): 5.28 K/UL (ref 1.8–7.7)
ALBUMIN SERPL BCP-MCNC: 3.9 G/DL (ref 3.5–5.2)
ALP SERPL-CCNC: 63 UNIT/L (ref 40–150)
ALT SERPL W/O P-5'-P-CCNC: 37 UNIT/L (ref 10–44)
ANION GAP (OHS): 10 MMOL/L (ref 8–16)
AST SERPL-CCNC: 20 UNIT/L (ref 11–45)
BASOPHILS # BLD AUTO: 0.01 K/UL
BASOPHILS NFR BLD AUTO: 0.1 %
BILIRUB SERPL-MCNC: 0.4 MG/DL (ref 0.1–1)
BUN SERPL-MCNC: 22 MG/DL (ref 8–23)
CALCIUM SERPL-MCNC: 9.2 MG/DL (ref 8.7–10.5)
CHLORIDE SERPL-SCNC: 113 MMOL/L (ref 95–110)
CO2 SERPL-SCNC: 22 MMOL/L (ref 23–29)
CREAT SERPL-MCNC: 0.8 MG/DL (ref 0.5–1.4)
CRP SERPL-MCNC: 0.5 MG/L
ERYTHROCYTE [DISTWIDTH] IN BLOOD BY AUTOMATED COUNT: 14.8 % (ref 11.5–14.5)
GFR SERPLBLD CREATININE-BSD FMLA CKD-EPI: >60 ML/MIN/1.73/M2
GLUCOSE SERPL-MCNC: 98 MG/DL (ref 70–110)
HCT VFR BLD AUTO: 37.2 % (ref 37–48.5)
HGB BLD-MCNC: 12 GM/DL (ref 12–16)
IMM GRANULOCYTES # BLD AUTO: 0.02 K/UL (ref 0–0.04)
IMM GRANULOCYTES NFR BLD AUTO: 0.2 % (ref 0–0.5)
LYMPHOCYTES # BLD AUTO: 1.56 K/UL (ref 1–4.8)
MCH RBC QN AUTO: 28.3 PG (ref 27–31)
MCHC RBC AUTO-ENTMCNC: 32.3 G/DL (ref 32–36)
MCV RBC AUTO: 88 FL (ref 82–98)
NUCLEATED RBC (/100WBC) (OHS): 0 /100 WBC
PLATELET # BLD AUTO: 150 K/UL (ref 150–450)
PMV BLD AUTO: 10.4 FL (ref 9.2–12.9)
POTASSIUM SERPL-SCNC: 4 MMOL/L (ref 3.5–5.1)
PROT SERPL-MCNC: 6.8 GM/DL (ref 6–8.4)
RBC # BLD AUTO: 4.24 M/UL (ref 4–5.4)
RELATIVE EOSINOPHIL (OHS): 2.4 %
RELATIVE LYMPHOCYTE (OHS): 19.4 % (ref 18–48)
RELATIVE MONOCYTE (OHS): 12.3 % (ref 4–15)
RELATIVE NEUTROPHIL (OHS): 65.6 % (ref 38–73)
SODIUM SERPL-SCNC: 145 MMOL/L (ref 136–145)
WBC # BLD AUTO: 8.05 K/UL (ref 3.9–12.7)

## 2025-04-28 PROCEDURE — 82040 ASSAY OF SERUM ALBUMIN: CPT

## 2025-04-28 PROCEDURE — 36415 COLL VENOUS BLD VENIPUNCTURE: CPT

## 2025-04-28 PROCEDURE — 85025 COMPLETE CBC W/AUTO DIFF WBC: CPT

## 2025-04-28 PROCEDURE — 86140 C-REACTIVE PROTEIN: CPT

## 2025-04-29 ENCOUNTER — PATIENT MESSAGE (OUTPATIENT)
Dept: INTERNAL MEDICINE | Facility: CLINIC | Age: 75
End: 2025-04-29
Payer: MEDICARE

## 2025-05-03 ENCOUNTER — HOSPITAL ENCOUNTER (OUTPATIENT)
Dept: RADIOLOGY | Facility: HOSPITAL | Age: 75
Discharge: HOME | End: 2025-05-03
Attending: ANESTHESIOLOGY
Payer: MEDICARE

## 2025-05-03 DIAGNOSIS — M54.50 LOW BACK PAIN, UNSPECIFIED: ICD-10-CM

## 2025-05-03 DIAGNOSIS — M54.16 RADICULOPATHY, LUMBAR REGION: ICD-10-CM

## 2025-05-03 PROCEDURE — 72148 MRI LUMBAR SPINE W/O DYE: CPT | Mod: TC

## 2025-05-03 PROCEDURE — 72148 MRI LUMBAR SPINE W/O DYE: CPT | Mod: 26,,, | Performed by: RADIOLOGY

## 2025-05-03 PROCEDURE — 72110 X-RAY EXAM L-2 SPINE 4/>VWS: CPT | Mod: TC

## 2025-05-03 PROCEDURE — 72110 X-RAY EXAM L-2 SPINE 4/>VWS: CPT | Mod: 26,,, | Performed by: RADIOLOGY

## 2025-05-04 NOTE — TELEPHONE ENCOUNTER
Care Due:                  Date            Visit Type   Department     Provider  --------------------------------------------------------------------------------                                EP -                              PRIMARY      [facility] INTER[facility]  Last Visit: 02-      CARE ([facility])   MEDICINE       Jayesh Jaramillo  Next Visit: None Scheduled  None         None Found                                                            Last  Test          Frequency    Reason                     Performed    Due Date  --------------------------------------------------------------------------------    Office Visit  15 months..  amLODIPine, losartan,      02- 05-                             semaglutide..............    [employer] Embedded Care Due Messages. Reference number: [id number].   5/04/2025 4:44:38 PM CDT

## 2025-05-05 RX ORDER — CLONIDINE HYDROCHLORIDE 0.2 MG/1
TABLET ORAL
Qty: 90 TABLET | Refills: 4 | Status: SHIPPED | OUTPATIENT
Start: 2025-05-05

## 2025-05-05 NOTE — TELEPHONE ENCOUNTER
Refill Routing Note   Medication(s) are not appropriate for processing by [facility] for the following reason(s):        Outside of protocol    [facility] action(s):  Route   Requires appointment : Yes               Appointments  past 12m or future 3m with PCP    Date Provider   Last Visit   2/8/2024 Jayesh Jaramillo MD   Next Visit   Visit date not found Jayesh Jaramillo MD   ED visits in past 90 days: 0        Note composed:1:25 PM 05/05/2025

## 2025-05-07 RX ORDER — TRAMADOL HYDROCHLORIDE 50 MG/1
50 TABLET, FILM COATED ORAL EVERY 6 HOURS PRN
Qty: 60 TABLET | Refills: 5 | OUTPATIENT
Start: 2025-05-07

## 2025-05-07 NOTE — TELEPHONE ENCOUNTER
No care due was identified.  Health [facility] Embedded Care Due Messages. Reference number: [id number].   5/07/2025 10:28:03 AM CDT

## 2025-07-08 ENCOUNTER — TELEPHONE (OUTPATIENT)
Dept: RHEUMATOLOGY | Facility: CLINIC | Age: 75
End: 2025-07-08
Payer: MEDICARE

## 2025-07-08 NOTE — TELEPHONE ENCOUNTER
Patient having shoulder pain for 3 days . Today is worst day . Scheduled for 7-9 - Ariza . Would like a couple of prednisone to get through tomorrow .

## 2025-07-08 NOTE — TELEPHONE ENCOUNTER
Copied from CRM #[id number]. Topic: General Inquiry - Patient Advice  >> Jul 8, 2025  1:28 PM Parminder wrote:  Type:  Needs Medical Advice    Who Called: Smitha  Symptoms (please be specific): right shoulder pain    How long has patient had these symptoms:    Pharmacy name and phone #:    [facility] [address] [place] [place] [facility] [address]  [address] [address]  Phone: [phone] Fax: [phone]  Would the patient rather a call back or a response via [facility]? Call back   Best Call Back Number: [phone]  Additional Information:

## 2025-07-09 ENCOUNTER — TELEPHONE (OUTPATIENT)
Dept: RHEUMATOLOGY | Facility: CLINIC | Age: 75
End: 2025-07-09
Payer: MEDICARE

## 2025-07-09 ENCOUNTER — TELEPHONE (OUTPATIENT)
Dept: RHEUMATOLOGY | Facility: CLINIC | Age: 75
End: 2025-07-09

## 2025-07-09 ENCOUNTER — OFFICE VISIT (OUTPATIENT)
Dept: RHEUMATOLOGY | Facility: CLINIC | Age: 75
End: 2025-07-09
Payer: MEDICARE

## 2025-07-09 VITALS
HEART RATE: 67 BPM | WEIGHT: 142 LBS | HEIGHT: 65 IN | BODY MASS INDEX: 23.66 KG/M2 | SYSTOLIC BLOOD PRESSURE: 163 MMHG | DIASTOLIC BLOOD PRESSURE: 70 MMHG

## 2025-07-09 DIAGNOSIS — I25.10 CORONARY ARTERY DISEASE INVOLVING NATIVE CORONARY ARTERY OF NATIVE HEART WITHOUT ANGINA PECTORIS: ICD-10-CM

## 2025-07-09 DIAGNOSIS — Z78.9 FAILURE OF OUTPATIENT TREATMENT: ICD-10-CM

## 2025-07-09 DIAGNOSIS — Z79.899 IMMUNOSUPPRESSION DUE TO DRUG THERAPY: ICD-10-CM

## 2025-07-09 DIAGNOSIS — M51.35 DDD (DEGENERATIVE DISC DISEASE), THORACOLUMBAR: ICD-10-CM

## 2025-07-09 DIAGNOSIS — M05.9 SEROPOSITIVE RHEUMATOID ARTHRITIS: Primary | ICD-10-CM

## 2025-07-09 DIAGNOSIS — Z79.899 HIGH RISK MEDICATION USE: ICD-10-CM

## 2025-07-09 DIAGNOSIS — D84.821 IMMUNOSUPPRESSION DUE TO DRUG THERAPY: ICD-10-CM

## 2025-07-09 DIAGNOSIS — E11.8 DIABETES MELLITUS TYPE 2 WITH COMPLICATIONS: ICD-10-CM

## 2025-07-09 DIAGNOSIS — M06.9 RHEUMATOID ARTHRITIS FLARE: ICD-10-CM

## 2025-07-09 DIAGNOSIS — Z71.89 COUNSELING ON HEALTH PROMOTION AND DISEASE PREVENTION: ICD-10-CM

## 2025-07-09 PROCEDURE — 4010F ACE/ARB THERAPY RXD/TAKEN: CPT | Mod: CPTII,S$GLB,, | Performed by: INTERNAL MEDICINE

## 2025-07-09 PROCEDURE — 3044F HG A1C LEVEL LT 7.0%: CPT | Mod: CPTII,S$GLB,, | Performed by: INTERNAL MEDICINE

## 2025-07-09 PROCEDURE — 3008F BODY MASS INDEX DOCD: CPT | Mod: CPTII,S$GLB,, | Performed by: INTERNAL MEDICINE

## 2025-07-09 PROCEDURE — 1159F MED LIST DOCD IN RCRD: CPT | Mod: CPTII,S$GLB,, | Performed by: INTERNAL MEDICINE

## 2025-07-09 PROCEDURE — 99999 PR PBB SHADOW E&M-EST. PATIENT-LVL IV: CPT | Mod: PBBFAC,,, | Performed by: INTERNAL MEDICINE

## 2025-07-09 PROCEDURE — 1101F PT FALLS ASSESS-DOCD LE1/YR: CPT | Mod: CPTII,S$GLB,, | Performed by: INTERNAL MEDICINE

## 2025-07-09 PROCEDURE — 3077F SYST BP >= 140 MM HG: CPT | Mod: CPTII,S$GLB,, | Performed by: INTERNAL MEDICINE

## 2025-07-09 PROCEDURE — 99215 OFFICE O/P EST HI 40 MIN: CPT | Mod: 25,S$GLB,, | Performed by: INTERNAL MEDICINE

## 2025-07-09 PROCEDURE — 3078F DIAST BP <80 MM HG: CPT | Mod: CPTII,S$GLB,, | Performed by: INTERNAL MEDICINE

## 2025-07-09 PROCEDURE — 1125F AMNT PAIN NOTED PAIN PRSNT: CPT | Mod: CPTII,S$GLB,, | Performed by: INTERNAL MEDICINE

## 2025-07-09 PROCEDURE — 96372 THER/PROPH/DIAG INJ SC/IM: CPT | Mod: S$GLB,,, | Performed by: INTERNAL MEDICINE

## 2025-07-09 PROCEDURE — 3288F FALL RISK ASSESSMENT DOCD: CPT | Mod: CPTII,S$GLB,, | Performed by: INTERNAL MEDICINE

## 2025-07-09 RX ORDER — ACETAMINOPHEN 325 MG/1
650 TABLET ORAL
OUTPATIENT
Start: 2025-07-09

## 2025-07-09 RX ORDER — DIPHENHYDRAMINE HYDROCHLORIDE 50 MG/ML
50 INJECTION, SOLUTION INTRAMUSCULAR; INTRAVENOUS ONCE AS NEEDED
OUTPATIENT
Start: 2025-07-09

## 2025-07-09 RX ORDER — DIPHENHYDRAMINE HYDROCHLORIDE 50 MG/ML
50 INJECTION, SOLUTION INTRAMUSCULAR; INTRAVENOUS
OUTPATIENT
Start: 2025-07-09

## 2025-07-09 RX ORDER — METHYLPREDNISOLONE SOD SUCC 125 MG
40 VIAL (EA) INJECTION
OUTPATIENT
Start: 2025-07-09

## 2025-07-09 RX ORDER — SODIUM CHLORIDE 0.9 % (FLUSH) 0.9 %
10 SYRINGE (ML) INJECTION
OUTPATIENT
Start: 2025-07-09

## 2025-07-09 RX ORDER — PREDNISONE 5 MG/1
5 TABLET ORAL 2 TIMES DAILY PRN
Qty: 28 TABLET | Refills: 1 | Status: SHIPPED | OUTPATIENT
Start: 2025-07-09 | End: 2025-07-23

## 2025-07-09 RX ORDER — EPINEPHRINE 0.3 MG/.3ML
0.3 INJECTION SUBCUTANEOUS ONCE AS NEEDED
OUTPATIENT
Start: 2025-07-09

## 2025-07-09 RX ORDER — METHYLPREDNISOLONE SOD SUCC 125 MG
100 VIAL (EA) INJECTION
Status: CANCELLED | OUTPATIENT
Start: 2025-07-09

## 2025-07-09 RX ORDER — HEPARIN 100 UNIT/ML
500 SYRINGE INTRAVENOUS
OUTPATIENT
Start: 2025-07-09

## 2025-07-09 RX ORDER — BETAMETHASONE SODIUM PHOSPHATE AND BETAMETHASONE ACETATE 3; 3 MG/ML; MG/ML
6 INJECTION, SUSPENSION INTRA-ARTICULAR; INTRALESIONAL; INTRAMUSCULAR; SOFT TISSUE
Status: COMPLETED | OUTPATIENT
Start: 2025-07-09 | End: 2025-07-09

## 2025-07-09 RX ADMIN — BETAMETHASONE SODIUM PHOSPHATE AND BETAMETHASONE ACETATE 6 MG: 3; 3 INJECTION, SUSPENSION INTRA-ARTICULAR; INTRALESIONAL; INTRAMUSCULAR; SOFT TISSUE at 07:07

## 2025-07-09 NOTE — TELEPHONE ENCOUNTER
Spoke with patient . She will be contacted by the pain management  and she can choose which location works best for her .

## 2025-07-09 NOTE — PROGRESS NOTES
RHEUMATOLOGY OUTPATIENT CLINIC NOTE    7/9/2025    Attending Rheumatologist: Ab England  Primary Care Provider/Physician Requesting Consultation: Jayesh Jaramillo MD   Chief Complaint/Reason For Consultation:  Rheumatoid Arthritis, Osteoarthritis, and Shoulder Pain      Subjective:     Smitha Salinas is a 74 y.o. Black or  female with SPRA    Adherence w/ Enbrel q7d.  Experiencing refractory hand arthralgias w/ inflammatory features.  Increasing in frequency and duration.  Responsive to systemic CS.    Review of Systems   Constitutional:  Positive for malaise/fatigue. Negative for fever.   Eyes:  Negative for photophobia and pain.   Respiratory:  Negative for shortness of breath.    Cardiovascular:  Negative for chest pain.   Gastrointestinal:  Negative for blood in stool and melena.   Genitourinary:  Negative for dysuria, hematuria and urgency.   Musculoskeletal:  Positive for back pain and joint pain.   Skin:  Negative for rash.   Neurological:  Negative for headaches.       Chronic comorbid conditions affecting medical decision making today:  Past Medical History:   Diagnosis Date    ADHD (attention deficit hyperactivity disorder)     Adult ADHD     [facility]    Chronic rheumatic arthritis     on DMARD    Coronary artery disease involving native coronary artery of native heart 04/17/2023    elev cor calc score    Elevated LFTs     hepatology; poss enbrel    Ex-smoker     Genital herpes     Hyperlipidemia     Hypertension     Immunosuppressed status     Lichen sclerosus et atrophicus     Morbid obesity 01/11/2021    Obesity, Class I, BMI 30-34.9 12/22/2015    SUKHDEEP (obstructive sleep apnea)     Osteopenia     5/16 wai 5/18(start fmax if on pred 3months or more)    Type 2 diabetes mellitus     dxd 9/20     Past Surgical History:   Procedure Laterality Date    BREAST BIOPSY Left     benign    CATARACT EXTRACTION Bilateral     COLONOSCOPY N/A 02/12/2020    Procedure: COLONOSCOPY;  Surgeon:  Eloina Castro MD;  Location: [facility] ENDO;  Service: Endoscopy;  Laterality: N/A;    COMPUTED TOMOGRAPHY N/A 7/8/2024    Procedure: CT/ Cryoablation;  Surgeon: Reno Suarez MD;  Location: [facility] HOLLY;  Service: General;  Laterality: N/A;    CRYOTHERAPY  1980    CYSTOSCOPY,WITH BOTULINUM TOXIN INJECTION N/A 03/27/2023    Procedure: CYSTOSCOPY,WITH BOTULINUM TOXIN INJECTION;  Surgeon: Pablo Rawls MD;  Location: [facility] OR;  Service: Urology;  Laterality: N/A;    CYSTOSCOPY,WITH BOTULINUM TOXIN INJECTION N/A 11/13/2023    Procedure: CYSTOSCOPY,WITH BOTULINUM TOXIN INJECTION;  Surgeon: Pablo Rawls MD;  Location: [facility] OR;  Service: Urology;  Laterality: N/A;    ESOPHAGOGASTRODUODENOSCOPY N/A 02/12/2020    Procedure: EGD (ESOPHAGOGASTRODUODENOSCOPY);  Surgeon: Eloina Castro MD;  Location: [facility] ENDO;  Service: Endoscopy;  Laterality: N/A;    INSERTION OF SACRAL NEUROSTIMULATOR GENERATOR Left 11/23/2020    Procedure: INSERTION, PULSE GENERATOR, NEUROSTIMULATOR, SACRAL;  Surgeon: Pablo Rawls MD;  Location: [facility] OR;  Service: Urology;  Laterality: Left;    INSERTION OF SACRAL NEUROSTIMULATOR, ELECTRODES, AND IMPLANTABLE PULSE GENERATOR (IPG) Left 11/23/2020    Procedure: INSERTION, ELECTRODE LEADS AND PULSE GENERATOR, NEUROSTIMULATOR, SACRAL;  Surgeon: Pablo Rawls MD;  Location: [facility] OR;  Service: Urology;  Laterality: Left;    SKIN GRAFT  1965    laceration to right  fingers  from thigh     TONSILLECTOMY  1958     Family History   Problem Relation Name Age of Onset    Heart disease Mother      Diabetes Mother      Peripheral vascular disease Mother          amputations    Stroke Father      Kidney failure Father      Hypertension Father      Breast cancer Sister  39    Stroke Maternal Grandmother      Stroke Paternal Grandmother      Stroke Paternal Grandfather      No Known Problems Daughter      Cancer Other geat aunt 68        breast    Coronary artery disease Maternal  Grandfather      Colon cancer Neg Hx      Ovarian cancer Neg Hx       Tobacco Use History[1]  Current Medications[2]     Objective:     Vitals:    07/09/25 0704   BP: (!) 163/70   Pulse: 67     Physical Exam   Eyes: Conjunctivae are normal.   Pulmonary/Chest: Effort normal. No respiratory distress.   Musculoskeletal:         General: Swelling, tenderness and deformity present.      Comments: Guarding Rt. shoulder   Skin: No rash noted.       Reviewed available old and all outside pertinent medical records available.    All lab results personally reviewed and interpreted by me.       ASSESSMENT / PLAN     1. Seropositive rheumatoid arthritis  CDAI: high disease activity.  Replace TNFi SC by IV  Simponi aria rx plan placed, monitor for 20% improvement after 12w  C-Reactive Protein      2. Rheumatoid arthritis flare  betamethasone acetate-betamethasone sodium phosphate injection 6 mg    predniSONE (DELTASONE) 5 MG tablet      3. Failure of outpatient treatment  Methotrexate, sulfasalazine, arava; humira, rinvoq, actemra, enbrel.      4. DDD (degenerative disc disease), thoracolumbar  Ambulatory referral/consult to Pain Clinic      5. Coronary artery disease involving native coronary artery of native heart without angina pectoris  Increased CV risk w/ Madeline      6. Immunosuppression due to drug therapy  Hold immunosuppression in event of active infections or need for surgery.  CBC Auto Differential    Quantiferon Gold TB      7. High risk medication use  Comprehensive Metabolic Panel      8. Diabetes mellitus type 2 with complications  Caution advised with systemic steroid use      9. Counseling on health promotion and disease prevention  Ca/vit D D supp OTC              Visit today included increased complexity associated with the care of the episodic problem Seropositive rheumatoid arthritis addressed and managing the longitudinal care of the patient due to the serious and/or complex managed problem(s)  immunosuppression due to drug therapy.    Ab England M.D.           [1]   Social History  Tobacco Use   Smoking Status Former    Current packs/day: 0.00    Average packs/day: 0.3 packs/day for 15.0 years (3.8 ttl pk-yrs)    Types: Cigarettes    Start date: 1998    Quit date: 2013    Years since quittin.9   Smokeless Tobacco Former   Tobacco Comments    smokes for a few weeks per year - when under pressure. has been abstinent times years at a time. a pack lasts about a week   [2]   Current Outpatient Medications:     acyclovir (ZOVIRAX) 400 MG tablet, Take 1 tablet by mouth twice daily, Disp: 180 tablet, Rfl: 3    amLODIPine (NORVASC) 5 MG tablet, Take 1 tablet (5 mg total) by mouth once daily., Disp: 90 tablet, Rfl: 1    blood sugar diagnostic (BLOOD GLUCOSE TEST) Strp, 1 strip by Misc.(Non-Drug; Combo Route) route 3 (three) times daily as needed., Disp: 1 each, Rfl: 11    cholecalciferol, vitamin D3, (VITAMIN D3) 25 mcg (1,000 unit) capsule, Take 1 capsule by mouth every morning., Disp: , Rfl:     cyclobenzaprine (FLEXERIL) 10 MG tablet, TAKE 1/2 TO 1 TABLET BY MOUTH THREE TIMES NNEKA AS NEEDED FOR MUSCLE SPASM, Disp: 60 tablet, Rfl: 1    lancing device Misc, 1 each by Misc.(Non-Drug; Combo Route) route 3 (three) times daily as needed., Disp: 1 each, Rfl: 0    losartan (COZAAR) 50 MG tablet, Take 1 tablet (50 mg total) by mouth once daily., Disp: 30 tablet, Rfl: 0    promethazine (PHENERGAN) 25 MG tablet, Take 25 mg by mouth 2 (two) times daily as needed., Disp: , Rfl:     promethazine-dextromethorphan (PROMETHAZINE-DM) 6.25-15 mg/5 mL Syrp, Take 5 mLs by mouth nightly as needed (cough)., Disp: 118 mL, Rfl: 0    rosuvastatin (CRESTOR) 20 MG tablet, Take 1 tablet by mouth once daily, Disp: 30 tablet, Rfl: 6    semaglutide (OZEMPIC) 0.25 mg or 0.5 mg (2 mg/3 mL) pen injector, Inject 0.5 mg into the skin every 7 days., Disp: 4 each, Rfl: 5    sertraline (ZOLOFT) 25 MG tablet, Take 25 mg by mouth once  daily., Disp: , Rfl:     spirometers and accessories Buffy, 1 Device by Misc.(Non-Drug; Combo Route) route every 3 (three) hours., Disp: 1 each, Rfl: 0    traMADoL (ULTRAM) 50 mg tablet, Take 1 tablet (50 mg total) by mouth every 6 (six) hours as needed., Disp: 60 tablet, Rfl: 5    vibegron (GEMTESA) 75 mg Tab, Take 1 tablet (75 mg total) by mouth once daily., Disp: 30 tablet, Rfl: 11    albuterol (PROVENTIL/VENTOLIN HFA) 90 mcg/actuation inhaler, Inhale 1-2 puffs into the lungs every 4 (four) hours as needed for Wheezing or Shortness of Breath (cough)., Disp: 8 g, Rfl: 0    azelastine (ASTELIN) 137 mcg (0.1 %) nasal spray, 2 sprays (274 mcg total) by Nasal route 2 (two) times daily., Disp: 30 mL, Rfl: 7    cloNIDine (CATAPRES) 0.2 MG tablet, TAKE 1/2 TABLET BY MOUTH IN THE MORNING AND 1/2 TABLET AT NOON AND 2 TABLETS AT BEDTIME, Disp: 90 tablet, Rfl: 4    etanercept (ENBREL) 50 mg/mL (1 mL) injection, Inject 1 mL (50 mg total) into the skin once a week., Disp: 12 mL, Rfl: 1    LEVSIN/SL 0.125 mg Subl, Place 0.125-0.25 mg under the tongue every 4 (four) hours as needed., Disp: , Rfl:     Current Facility-Administered Medications:     betamethasone acetate-betamethasone sodium phosphate injection 6 mg, 6 mg, Intramuscular, 1 time in Clinic/HOD,     Facility-Administered Medications Ordered in Other Visits:     lactated ringers infusion, , Intravenous, Continuous, Sylvia Bojorquez MD, Last Rate: 0 mL/hr at 03/27/23 0714, New Bag at 11/13/23 0818

## 2025-07-09 NOTE — PROGRESS NOTES
Administered 1 cc Betamethasone 6mg/cc  to Left ventrogluteal. Pt tolerated well. No acute reaction noted to site. Pt instructed on S/S to report. Advised patient to wait in lobby 15 minutes after receiving injection to monitor for any reactions. Pt verbalized understanding.     Lot: [id number]  Exp: 12-              Side Effects:  appetite changes, glucose intolerance, insomnia, diaphoresis (sweating), elevated blood pressure, ecchymosis (bruising), rash, headache, injection site reaction/pain, anaphylaxis.

## 2025-07-09 NOTE — TELEPHONE ENCOUNTER
[facility] Detox Discharge Summary


Admission Date: 


01/18/17





Discharge Date: 01/23/17





- History


Present History: Opioid Dependence


Pertinent Past History: 


CHRONIC LEFT KNEE PAIN





- Physical Exam Results


Vital Signs: 


 Vital Signs











Temperature  97.1 F L  01/23/17 09:30


 


Pulse Rate  94 H  01/23/17 09:30


 


Respiratory Rate  18   01/23/17 09:30


 


Blood Pressure  96/62   01/23/17 09:30


 


O2 Sat by Pulse Oximetry (%)      











Pertinent Admission Physical Exam Findings: 


WITHDRAWAL SX.


 Laboratory Last Values











WBC  6.7 K/mm3 (4.0-10.0)  D 01/19/17  05:45    


 


RBC  4.22 M/mm3 (4.00-5.60)   01/19/17  05:45    


 


Hgb  13.7 GM/dL (11.7-16.9)   01/19/17  05:45    


 


Hct  40.8 % (35.4-49)   01/19/17  05:45    


 


MCV  96.7 fl (80-96)  H  01/19/17  05:45    


 


MCHC  33.7 g/dl (32.0-35.9)   01/19/17  05:45    


 


RDW  13.7 % (11.9-15.9)   01/19/17  05:45    


 


Plt Count  188 K/MM3 (134-434)   01/19/17  05:45    


 


MPV  9.1 fl (7.5-11.1)   01/19/17  05:45    


 


Sodium  141 mmol/L (136-145)   01/19/17  05:45    


 


Potassium  4.5 mmol/L (3.5-5.1)   01/19/17  05:45    


 


Chloride  105 mmol/L ()   01/19/17  05:45    


 


Carbon Dioxide  30 mmol/L (21-32)   01/19/17  05:45    


 


Anion Gap  6  (8-16)  L  01/19/17  05:45    


 


BUN  15 mg/dL (7-18)   01/19/17  05:45    


 


Creatinine  1.0 mg/dL (0.7-1.3)   01/19/17  05:45    


 


Creat Clearance w eGFR  > 60  (>60)   01/19/17  05:45    


 


Random Glucose  87 mg/dL ()   01/19/17  05:45    


 


Calcium  8.6 mg/dL (8.5-10.1)   01/19/17  05:45    


 


Total Bilirubin  0.5 mg/dL (0.2-1.0)  D 01/19/17  05:45    


 


AST  15 U/L (15-37)  D 01/19/17  05:45    


 


ALT  18 U/L (12-78)   01/19/17  05:45    


 


Alkaline Phosphatase  50 U/L ()   01/19/17  05:45    


 


Total Protein  7.6 g/dl (6.4-8.2)   01/19/17  05:45    


 


Albumin  4.6 g/dl (3.4-5.0)   01/19/17  05:45    


 


Urine Color  Yellow   01/18/17  14:00    


 


Urine Appearance  Clear   01/18/17  14:00    


 


Urine pH  5.0  (5.0-8.0)   01/18/17  14:00    


 


Ur Specific Gravity  1.025  (1.001-1.035)   01/18/17  14:00    


 


Urine Protein  Negative  (NEGATIVE)   01/18/17  14:00    


 


Urine Glucose (UA)  Negative  (NEGATIVE)   01/18/17  14:00    


 


Urine Ketones  Negative  (NEGATIVE)   01/18/17  14:00    


 


Urine Blood  Negative  (NEGATIVE)   01/18/17  14:00    


 


Urine Nitrite  Negative  (NEGATIVE)   01/18/17  14:00    


 


Urine Bilirubin  Negative  (NEGATIVE)   01/18/17  14:00    


 


Urine Urobilinogen  Negative E.U./dl (0.2-1.0)   01/18/17  14:00    


 


Ur Leukocyte Esterase  Negative  (NEGATIVE)   01/18/17  14:00    


 


RPR Titer  Nonreactive  (NONREACTIVE)   01/19/17  05:45    


 


HIV 1&2 Antibody Screen  Negative   01/18/17  12:40    


 


HIV P24 Antigen  Negative   01/18/17  12:40    








LABS NOTED





- Treatment


Hospital Course: Detox Protocol Followed, Detoxed Safely, Responded well, 

Discharged Condition Good, Rehab Referral Accepted





- Medication


Discharge Medications: 


Ambulatory Orders





NK [No Known Home Medication]  10/07/16 











- Diagnosis


(1) Chronic pain of left knee


Status: Chronic





(2) Nicotine dependence


Status: Chronic   Qualifiers: 


     Nicotine product type: cigarettes     Substance use status: uncomplicated 

       Qualified Code(s): F17.210 - Nicotine dependence, cigarettes, 

uncomplicated  





(3) Opioid dependence with withdrawal


Status: Acute





(4) Drug-induced mood disorder


Status: Suspected








- AMA


Did Patient Leave Against Medical Advice: No Last TB, Hep B panel- 9/6/2024. All negative.   [facility]   Therapy plan entered.     Solu-Medrol dose decreased to 40 mg.

## 2025-07-09 NOTE — Clinical Note
SPRA w/ multi-biologic failure.  CDAI: high disease activity.  Recommending Enbrel switch to TNFi IV; monitor response after 12w.

## 2025-07-09 NOTE — TELEPHONE ENCOUNTER
Copied from CRM #[id number]. Topic: General Inquiry - Patient Advice  >> Jul 9, 2025  8:03 AM Abby wrote:  ..Type:  Patient Requesting Call    Who Called: pt   Does the patient know what this is regarding?: discuss pain referral, pt wants to know the name of provider   Would the patient rather a call back or a response via [employer]?  both   Best Call Back Number: .[phone] (home)  Additional Information:

## 2025-07-09 NOTE — TELEPHONE ENCOUNTER
----- Message from Ab England MD sent at 7/9/2025  7:51 AM CDT -----  SPRA w/ multi-biologic failure.  CDAI: high disease activity.  Recommending Enbrel switch to TNFi IV; monitor response after 12w.

## 2025-07-12 ENCOUNTER — PATIENT MESSAGE (OUTPATIENT)
Dept: RHEUMATOLOGY | Facility: CLINIC | Age: 75
End: 2025-07-12
Payer: MEDICARE

## 2025-07-17 ENCOUNTER — OFFICE VISIT (OUTPATIENT)
Dept: URGENT CARE | Facility: CLINIC | Age: 75
End: 2025-07-17
Payer: MEDICARE

## 2025-07-17 VITALS
TEMPERATURE: 98 F | WEIGHT: 141 LBS | HEART RATE: 66 BPM | OXYGEN SATURATION: 96 % | HEIGHT: 65 IN | RESPIRATION RATE: 20 BRPM | DIASTOLIC BLOOD PRESSURE: 70 MMHG | SYSTOLIC BLOOD PRESSURE: 153 MMHG | BODY MASS INDEX: 23.49 KG/M2

## 2025-07-17 DIAGNOSIS — K59.00 CONSTIPATION, UNSPECIFIED CONSTIPATION TYPE: Primary | ICD-10-CM

## 2025-07-17 DIAGNOSIS — G89.29 CHRONIC LOW BACK PAIN, UNSPECIFIED BACK PAIN LATERALITY, UNSPECIFIED WHETHER SCIATICA PRESENT: ICD-10-CM

## 2025-07-17 DIAGNOSIS — M54.50 CHRONIC LOW BACK PAIN, UNSPECIFIED BACK PAIN LATERALITY, UNSPECIFIED WHETHER SCIATICA PRESENT: ICD-10-CM

## 2025-07-17 LAB
BILIRUBIN, UA POC OHS: NEGATIVE
BLOOD, UA POC OHS: NEGATIVE
CLARITY, UA POC OHS: CLEAR
COLOR, UA POC OHS: YELLOW
GLUCOSE, UA POC OHS: NEGATIVE
KETONES, UA POC OHS: NEGATIVE
LEUKOCYTES, UA POC OHS: NEGATIVE
NITRITE, UA POC OHS: NEGATIVE
PH, UA POC OHS: 6
PROTEIN, UA POC OHS: 30
SPECIFIC GRAVITY, UA POC OHS: >=1.03
UROBILINOGEN, UA POC OHS: 0.2

## 2025-07-17 PROCEDURE — 81003 URINALYSIS AUTO W/O SCOPE: CPT | Mod: QW,S$GLB,, | Performed by: NURSE PRACTITIONER

## 2025-07-17 PROCEDURE — 99213 OFFICE O/P EST LOW 20 MIN: CPT | Mod: S$GLB,,, | Performed by: NURSE PRACTITIONER

## 2025-07-17 RX ORDER — POLYETHYLENE GLYCOL 3350 17 G/17G
17 POWDER, FOR SOLUTION ORAL DAILY
Qty: 238 G | Refills: 0 | Status: SHIPPED | OUTPATIENT
Start: 2025-07-17 | End: 2025-07-17

## 2025-07-17 RX ORDER — POLYETHYLENE GLYCOL 3350 17 G/17G
17 POWDER, FOR SOLUTION ORAL DAILY
Qty: 238 G | Refills: 0 | Status: SHIPPED | OUTPATIENT
Start: 2025-07-17

## 2025-07-17 NOTE — PROGRESS NOTES
"Subjective:      Patient ID: Smitha Salinas is a 74 y.o. female.    Vitals:  height is 5' 5" (1.651 m) and weight is 64 kg (141 lb). Her tympanic temperature is 97.9 °F (36.6 °C). Her blood pressure is 153/70 (abnormal) and her pulse is 66. Her respiration is 20 and oxygen saturation is 96%.     Chief Complaint: Back Pain    Patient is a 73 yo female with a history of chronic back pain who presents for evaluation of abdominal pain. Pain is in the lower abdomen. Onset Wednesday. She states she helped a friend move on Tuesday and was more active than usual. She states she treated her pain with Tylenol, ibuprofen and tramadol that day, taking more than she usually does but not over the maximum daily dosage. She notes onset of constipation and pain yesterday. She states she considered using a suppository but did not treat her constipation.. States she has had a loose bowel movement today. Reports she has not taken any further pain medication. She denies any fever, nausea, vomiting, incontinence, numbness/tingling. No other concerns were voiced.     Back Pain  This is a new problem. The current episode started in the past 7 days. The problem occurs constantly. The problem is unchanged. Pain location: side pain. The pain does not radiate. The pain is at a severity of 5/10. Stiffness is present All day. Associated symptoms include abdominal pain. Pertinent negatives include no bladder incontinence, bowel incontinence, chest pain, dysuria, fever, headaches, leg pain, numbness, paresis, paresthesias, pelvic pain, perianal numbness, tingling, weakness or weight loss. She has tried NSAIDs (tramadol) for the symptoms. The treatment provided no relief.       Constitution: Negative for chills and fever.   Cardiovascular:  Negative for chest pain.   Respiratory:  Negative for shortness of breath, stridor and wheezing.    Gastrointestinal:  Positive for abdominal pain and constipation. Negative for nausea, vomiting, bright red " blood in stool, dark colored stools, rectal pain, hemorrhoids, heartburn and bowel incontinence.   Genitourinary:  Negative for dysuria, frequency, urgency, bladder incontinence and pelvic pain.   Musculoskeletal:  Positive for back pain.   Skin:  Negative for rash.   Neurological:  Negative for headaches and numbness.      Objective:     Physical Exam   Constitutional: She is oriented to person, place, and time. She appears well-developed.   HENT:   Head: Normocephalic and atraumatic.   Ears:   Right Ear: External ear normal.   Left Ear: External ear normal.   Nose: Nose normal.   Mouth/Throat: Mucous membranes are normal.   Eyes: Conjunctivae and lids are normal.   Neck: Trachea normal. Neck supple.   Cardiovascular: Normal rate, regular rhythm and normal heart sounds.   Pulmonary/Chest: Effort normal and breath sounds normal. No respiratory distress. She has no wheezes. She has no rhonchi. She has no rales.   Abdominal: Normal appearance. She exhibits no distension and no mass. Soft. Bowel sounds are decreased. There is generalized abdominal tenderness. There is no rebound, no guarding, no left CVA tenderness and no right CVA tenderness.   Musculoskeletal: Normal range of motion.         General: Normal range of motion.   Neurological: She is alert and oriented to person, place, and time. She has normal strength. She displays no weakness. Gait normal.   Skin: Skin is warm, dry, intact, not diaphoretic and not pale.   Psychiatric: Her speech is normal and behavior is normal. Judgment and thought content normal.   Nursing note and vitals reviewed.      Assessment:     1. Constipation, unspecified constipation type    2. Chronic low back pain, unspecified back pain laterality, unspecified whether sciatica present        Plan:       Constipation, unspecified constipation type  -     POCT Urinalysis(Instrument)    Chronic low back pain, unspecified back pain laterality, unspecified whether sciatica present    Other  orders  -     Discontinue: polyethylene glycol (GLYCOLAX) 17 gram/dose powder; Take 17 g by mouth once daily.  Dispense: 238 g; Refill: 0  -     polyethylene glycol (GLYCOLAX) 17 gram/dose powder; Take 17 g by mouth once daily.  Dispense: 238 g; Refill: 0          Medical Decision Making:   Initial Assessment:   After complete evaluation, including thorough history and physical exam, presentation is most consistent with constipation The patient has no severe abdominal pain or systemic symptoms to suggest  sepsis. There is no reported consumption of contaminated foods or travel concerning for parasite exposure. Clinical exam and history do not indicate a GIB. Physical exam is inconsistent with cholecystitis, appendicitis, diverticulitis, small bowel obstruction or acute surgical abdomen.  Patient has no signs of acute distress, vital signs are stable. Patient is tolerating PO  is stable for D/C with symptomatic. Patient instructed to drink plenty of water and eat a high fiber diet. I provided prescription for lactulose and  miralax.The patient was informed of findings, and recommended to follow-up with PCP for further management and ER precautions were given    Patient is seen in clinic with known history of essential hypertension noted to be elevated above goal today in clinic.  Patient was counseled that blood pressure was elevated. I advised adherence to current medication regime, low-salt heart smart diet, exercise and self-monitoring.  Patient follow up with primary physician for long-term management adjustments as needed.    Patient has a history of chronic pain, treated with tramadol. Discussed with patient the risk of development of constipation secondary to pain medication.            Results for orders placed or performed in visit on 07/17/25   POCT Urinalysis(Instrument)    Collection Time: 07/17/25  5:35 PM   Result Value Ref Range    Color, POC UA Yellow Yellow, Straw, Colorless    Clarity, POC UA Clear  Clear    Glucose, POC UA Negative Negative    Bilirubin, POC UA Negative Negative    Ketones, POC UA Negative Negative    Spec Grav POC UA >=1.030 1.005 - 1.030    Blood, POC UA Negative Negative    pH, POC UA 6.0 5.0 - 8.0    Protein, POC UA 30 (A) Negative    Urobilinogen, POC UA 0.2 <=1.0    Nitrite, POC UA Negative Negative    WBC, POC UA Negative Negative     *Note: Due to a large number of results and/or encounters for the requested time period, some results have not been displayed. A complete set of results can be found in Results Review.     Patient Instructions   Constipation:    You need to increase your fiber and water intake to help regulate your bowel movements. Fiber can be found in vegetables, fruit, whole grain wheats and grains.     If you are unable to get the appropriate amount of fiber in your system you can also obtain Bene fiber over the counter. Follow directions on the back of the label.   You should be striving for 6-8 glasses of water a day.   Use Goldie lax and stool softeners as we discussed.     Monitor for concerning symptoms: worsening pain, fever, blood in stool, dark tarry stool, recurrent vomiting, blood in vomit, inability to defecate.     Please follow up with your Primary care provider within 2-5 days if your signs and symptoms have not resolved or worsen.      If your condition worsens or fails to improve we recommend that you receive another evaluation at the emergency room immediately or contact your primary medical clinic to discuss your concerns.     You must understand that you have received an Urgent Care treatment only and that you may be released before all of your medical problems are known or treated. You, the patient, will arrange for follow up care as instructed.

## 2025-07-17 NOTE — PATIENT INSTRUCTIONS
Constipation:    You need to increase your fiber and water intake to help regulate your bowel movements. Fiber can be found in vegetables, fruit, whole grain wheats and grains.     If you are unable to get the appropriate amount of fiber in your system you can also obtain Bene fiber over the counter. Follow directions on the back of the label.   You should be striving for 6-8 glasses of water a day.   Use Goldie lax and stool softeners as we discussed.     Monitor for concerning symptoms: worsening pain, fever, blood in stool, dark tarry stool, recurrent vomiting, blood in vomit, inability to defecate.     Please follow up with your Primary care provider within 2-5 days if your signs and symptoms have not resolved or worsen.      If your condition worsens or fails to improve we recommend that you receive another evaluation at the emergency room immediately or contact your primary medical clinic to discuss your concerns.     You must understand that you have received an Urgent Care treatment only and that you may be released before all of your medical problems are known or treated. You, the patient, will arrange for follow up care as instructed.

## 2025-07-21 PROBLEM — Z96.1 PSEUDOPHAKIA: Status: ACTIVE | Noted: 2025-07-21

## 2025-07-21 PROBLEM — E11.9: Status: ACTIVE | Noted: 2025-07-21

## 2025-07-21 PROBLEM — H52.7 REFRACTIVE ERRORS: Status: ACTIVE | Noted: 2025-07-21

## 2025-08-04 ENCOUNTER — ON-DEMAND VIRTUAL (OUTPATIENT)
Dept: URGENT CARE | Facility: CLINIC | Age: 75
End: 2025-08-04
Payer: MEDICARE

## 2025-08-04 NOTE — PROGRESS NOTES
Subjective:      Patient ID: Smitha Salinas is a 74 y.o. female.    Vitals:  vitals were not taken for this visit.     Chief Complaint: No chief complaint on file.      Visit Type: TELE AUDIOVISUAL    Patient Location: Home [facility]     Pt having technical difficulty connecting.     Erronous encounter.

## 2025-08-07 ENCOUNTER — TELEPHONE (OUTPATIENT)
Dept: RHEUMATOLOGY | Facility: CLINIC | Age: 75
End: 2025-08-07
Payer: MEDICARE

## 2025-08-07 ENCOUNTER — PATIENT MESSAGE (OUTPATIENT)
Dept: RHEUMATOLOGY | Facility: CLINIC | Age: 75
End: 2025-08-07
Payer: MEDICARE

## 2025-08-07 NOTE — TELEPHONE ENCOUNTER
Called to inform the patient that we submitted the necessary form for her patient enrollment. The  was also been notified that the form has been sent in to begin the assistance process.  Patient even gave me authorization to sign on her behalf as her medical representation to submit the form.

## 2025-08-12 ENCOUNTER — TELEPHONE (OUTPATIENT)
Dept: RHEUMATOLOGY | Facility: CLINIC | Age: 75
End: 2025-08-12
Payer: MEDICARE

## 2025-08-20 ENCOUNTER — TELEPHONE (OUTPATIENT)
Dept: RHEUMATOLOGY | Facility: CLINIC | Age: 75
End: 2025-08-20
Payer: MEDICARE

## 2025-08-20 DIAGNOSIS — M25.569 CHRONIC KNEE PAIN, UNSPECIFIED LATERALITY: ICD-10-CM

## 2025-08-20 DIAGNOSIS — G89.29 CHRONIC KNEE PAIN, UNSPECIFIED LATERALITY: ICD-10-CM

## 2025-08-20 DIAGNOSIS — J06.9 URI WITH COUGH AND CONGESTION: ICD-10-CM

## 2025-08-20 RX ORDER — CELECOXIB 100 MG/1
100 CAPSULE ORAL 2 TIMES DAILY PRN
Qty: 28 CAPSULE | Refills: 0 | Status: SHIPPED | OUTPATIENT
Start: 2025-08-20 | End: 2025-09-03

## 2025-08-20 RX ORDER — PREDNISONE 20 MG/1
20 TABLET ORAL DAILY
Qty: 5 TABLET | Refills: 0 | Status: SHIPPED | OUTPATIENT
Start: 2025-08-20 | End: 2025-08-25

## 2025-08-22 ENCOUNTER — TELEPHONE (OUTPATIENT)
Dept: RHEUMATOLOGY | Facility: CLINIC | Age: 75
End: 2025-08-22
Payer: MEDICARE

## 2025-08-27 ENCOUNTER — TELEPHONE (OUTPATIENT)
Dept: RHEUMATOLOGY | Facility: CLINIC | Age: 75
End: 2025-08-27
Payer: MEDICARE

## 2025-08-28 ENCOUNTER — TELEPHONE (OUTPATIENT)
Dept: RHEUMATOLOGY | Facility: CLINIC | Age: 75
End: 2025-08-28
Payer: MEDICARE

## 2025-08-29 ENCOUNTER — TELEPHONE (OUTPATIENT)
Dept: RHEUMATOLOGY | Facility: CLINIC | Age: 75
End: 2025-08-29
Payer: MEDICARE

## 2025-08-29 ENCOUNTER — PATIENT MESSAGE (OUTPATIENT)
Dept: RHEUMATOLOGY | Facility: CLINIC | Age: 75
End: 2025-08-29
Payer: MEDICARE

## (undated) DEVICE — GOWN POLY REINF BRTH SLV XL

## (undated) DEVICE — TUBING SUCTION STRAIGHT .25X20

## (undated) DEVICE — DECANTER VIAL ASEPTIC TRANSFER

## (undated) DEVICE — APPLICATOR CHLORAPREP ORN 26ML

## (undated) DEVICE — SPONGE COTTON TRAY 4X4IN

## (undated) DEVICE — RECHARGER BELT

## (undated) DEVICE — DRESSING TRANS 4X4 TEGADERM

## (undated) DEVICE — GOWN SMARTGOWN LVL4 X-LONG XL

## (undated) DEVICE — SUT MONOCRYL 3-0 PS-2 UND

## (undated) DEVICE — ADHESIVE DERMABOND ADVANCED

## (undated) DEVICE — GAUZE SPONGE 4X4 12PLY

## (undated) DEVICE — SHEET XLGE DRAPE

## (undated) DEVICE — TRAY SKIN SCRUB WET PREMIUM

## (undated) DEVICE — SET IRR URLGY 2LINE UNIV SPIKE

## (undated) DEVICE — TOWEL OR DISP STRL BLUE 4/PK

## (undated) DEVICE — COVER LIGHT HANDLE 80/CA

## (undated) DEVICE — NDL ECLIPSE SAFETY 18GX1-1/2IN

## (undated) DEVICE — NDL HYPO 27G X 1 1/2

## (undated) DEVICE — ELECTRODE REM PLYHSV RETURN 9

## (undated) DEVICE — NDL EMG BOTOX 4X37MM 27G

## (undated) DEVICE — SOL IRR NACL .9% 3000ML

## (undated) DEVICE — Device

## (undated) DEVICE — DRAPE T CYSTOSCOPY STERILE

## (undated) DEVICE — JELLY SURGILUBE LUBE PKT 3GM

## (undated) DEVICE — SEE MEDLINE ITEM 152622

## (undated) DEVICE — SEE MEDLINE ITEM 157117

## (undated) DEVICE — SYR ONLY LUER LOCK 20CC

## (undated) DEVICE — NDL HYPODERMIC BLUNT 18G 1.5IN

## (undated) DEVICE — SYR 10CC LUER LOCK

## (undated) DEVICE — NDL INJETAK ADJ TIP 70CM

## (undated) DEVICE — SKIN MARKER DEVON 160

## (undated) DEVICE — SOL WATER STRL IRR 1000ML

## (undated) DEVICE — DRESSING XEROFORM FOIL PK 1X8

## (undated) DEVICE — NDL SPINAL 22GX5

## (undated) DEVICE — DRAPE MOBILE C-ARM

## (undated) DEVICE — BOWL STERILE LARGE 32OZ

## (undated) DEVICE — SUT 2/0 30IN SILK BLK BRAI

## (undated) DEVICE — SEE MEDLINE ITEM 157148

## (undated) DEVICE — SUT VICRYL 2-0 CT-2 VCP269H

## (undated) DEVICE — DRESSING TRANS 2X2 TEGADERM

## (undated) DEVICE — GLOVE SURG BIOGEL LATEX SZ 7.5

## (undated) DEVICE — SEE MEDLINE ITEM 157027

## (undated) DEVICE — SEE L#152161

## (undated) DEVICE — MANIFOLD 4 PORT